# Patient Record
Sex: FEMALE | Race: BLACK OR AFRICAN AMERICAN | NOT HISPANIC OR LATINO | Employment: FULL TIME | URBAN - METROPOLITAN AREA
[De-identification: names, ages, dates, MRNs, and addresses within clinical notes are randomized per-mention and may not be internally consistent; named-entity substitution may affect disease eponyms.]

---

## 2017-01-02 ENCOUNTER — TELEPHONE (OUTPATIENT)
Dept: NEPHROLOGY | Facility: CLINIC | Age: 47
End: 2017-01-02

## 2017-01-02 NOTE — TELEPHONE ENCOUNTER
Checked in with patient regarding blood pressure. Adelaida has not monitored her blood pressure as of lately. She is feeling great. 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 Her stress level is way down. She is wondering if her blood pressure was higher due to the holidays. She has started weight watchers and has been counting points. Adelaida has also been working out at the gym. Alize will check her blood pressure when she gets home from the grocery store and call if she finds that her blood pressure is consistently greater than 140/90.    Wendy Sepulveda, RN, BSN  Nephrology Care Coordinator  [facility]

## 2017-01-03 ENCOUNTER — TELEPHONE (OUTPATIENT)
Dept: NEPHROLOGY | Facility: CLINIC | Age: 47
End: 2017-01-03

## 2017-01-03 DIAGNOSIS — I15.2 HYPERTENSION SECONDARY TO ENDOCRINE DISORDER WITH GOAL BLOOD PRESSURE LESS THAN 140/90: Primary | ICD-10-CM

## 2017-01-03 DIAGNOSIS — E34.9 HYPERTENSION SECONDARY TO ENDOCRINE DISORDER WITH GOAL BLOOD PRESSURE LESS THAN 140/90: Primary | ICD-10-CM

## 2017-01-03 NOTE — TELEPHONE ENCOUNTER
Adelaida is calling Wendy JOHNSTON RN with her blood pressure update  Last night blood pressure was 175/111  This morning blood pressure was  173/110  Please call patient back on cell phone [phone]  It is ok to leave detailed message on her voice mail

## 2017-01-05 RX ORDER — LABETALOL 300 MG/1
300 TABLET, FILM COATED ORAL EVERY 12 HOURS
Qty: 180 TABLET | Refills: 3 | Status: SHIPPED | OUTPATIENT
Start: 2017-01-05 | End: 2017-08-15 | Stop reason: DRUGHIGH

## 2017-01-05 NOTE — TELEPHONE ENCOUNTER
Spoke to patient. HR has been greater than 60. She found it to be around 67 last evening. Advised Adelaida to increase Labetalol to 300 mg BID. She verbalized understanding and will plan to send updated readings or call with updated readings within a couple of weeks. Adelaida verbalized understanding.      Wendy Sepulveda, RN, BSN  Nephrology Care Coordinator  [facility]

## 2017-02-04 ENCOUNTER — OFFICE VISIT (OUTPATIENT)
Dept: URGENT CARE | Facility: URGENT CARE | Age: 47
End: 2017-02-04
Payer: COMMERCIAL

## 2017-02-04 VITALS
SYSTOLIC BLOOD PRESSURE: 134 MMHG | TEMPERATURE: 97.4 F | HEART RATE: 80 BPM | BODY MASS INDEX: 41.87 KG/M2 | DIASTOLIC BLOOD PRESSURE: 90 MMHG | OXYGEN SATURATION: 98 % | WEIGHT: 229 LBS

## 2017-02-04 DIAGNOSIS — K08.89 TOOTH PAIN: ICD-10-CM

## 2017-02-04 DIAGNOSIS — H65.01 RIGHT ACUTE SEROUS OTITIS MEDIA, RECURRENCE NOT SPECIFIED: ICD-10-CM

## 2017-02-04 DIAGNOSIS — J01.90 ACUTE SINUSITIS WITH COEXISTING CONDITION REQUIRING PROPHYLACTIC TREATMENT: Primary | ICD-10-CM

## 2017-02-04 DIAGNOSIS — R51.9 SINUS HEADACHE: ICD-10-CM

## 2017-02-04 PROCEDURE — 99214 OFFICE O/P EST MOD 30 MIN: CPT | Performed by: FAMILY MEDICINE

## 2017-02-04 RX ORDER — CEFDINIR 300 MG/1
300 CAPSULE ORAL 2 TIMES DAILY
Qty: 20 CAPSULE | Refills: 0 | Status: SHIPPED | OUTPATIENT
Start: 2017-02-04 | End: 2017-02-21

## 2017-02-04 NOTE — PROGRESS NOTES
SUBJECTIVE:                                                    Adelaida Packer is a 46 year old female who presents to clinic today for the following health issues:      RESPIRATORY SYMPTOMS      Duration: 3 days     Description  Sinus pain and pressure, dental pain, right ear pain     Severity: moderate    Accompanying signs and symptoms: None    History (predisposing factors):  none    Precipitating or alleviating factors: None    Therapies tried and outcome:  Tylenol with no relief      PATIENT HAS HAD A RIGHT FRONTAL AND SINUS HEADACHE AND THE EAR HURTING  Patient says the pain was so severe and she feels like it's a sinus infection  No fevers chills  No body aches.   No sore throat  No fevers or chills chest pain or shortness of breath   No abdominal pain  No nausea vomiting or diarrhea  Denies any possibility of pregnancy declined a pregnancy test  Fever: No  Tried over the counter medications without relief  No fevers or chills chest pain or shortness of breath   No rash  Ill-contacts: family  Because of persistent and worsening symptoms came in to be seen    Problem list and histories reviewed & adjusted, as indicated.  Additional history: as documented    Problem list, Medication list, Allergies, and Medical/Social/Surgical histories reviewed in [facility] and updated as appropriate.    ROS:  Constitutional, HEENT, cardiovascular, pulmonary, gi and gu systems are negative, except as otherwise noted.    OBJECTIVE:                                                    /90 mmHg  Pulse 80  Temp(Src) 97.4  F (36.3  C) (Tympanic)  Wt 229 lb (103.874 kg)  SpO2 98%  LMP 08/13/2005  Body mass index is 41.87 kg/(m^2).  GENERAL: healthy, alert and no distress  EYES: pink palpebral conjunctiva, anicteric sclera, pupils equally reactive to light and accomodation, extraocular muscles intact full and equal.  ENT: midline nasal septum, positive  nasal congestion   Left ear:no tragal tenderness, no mastoid tenderness  normal tympaninc membrane   Right ear: no tragal tenderness, no mastoid tenderness dull and erythematous tympaninc membrane   NECK: no adenopathy, no asymmetry or  masses  RESP: lungs clear to auscultation - no rales, rhonchi or wheezes  CV: regular rate and rhythm, normal S1 S2, no S3 or S4, no murmur, click or rub, no peripheral edema and peripheral pulses strong  ABDOMEN: soft, nontender, no hepatosplenomegaly, no masses and bowel sounds normal  MS: no gross musculoskeletal defects noted, no edema  NEURO: Normal strength and tone, mentation intact and speech normal    Diagnostic Test Results:  No results found for this or any previous visit (from the past 24 hour(s)).     ASSESSMENT/PLAN:                                                        ICD-10-CM    1. Acute sinusitis with coexisting condition requiring prophylactic treatment J01.90 cefdinir (OMNICEF) 300 MG capsule   2. Right acute serous otitis media, recurrence not specified H65.01 cefdinir (OMNICEF) 300 MG capsule   3. Sinus headache R51        ICD-10-CM    1. Acute sinusitis with coexisting condition requiring prophylactic treatment J01.90 cefdinir (OMNICEF) 300 MG capsule   2. Right acute serous otitis media, recurrence not specified H65.01 cefdinir (OMNICEF) 300 MG capsule   3. Sinus headache R51    4. Tooth pain K08.89          Prescribed with omnicef  Patient mentions some tooth ache, may still be from sinus but recommend dental appointment. Patient already has one she plans to keep   Recommend follow up with primary care provider if no relief , sooner if worse  Needs ear recheck with primary care provider in 2-4 weeks  Adverse reactions of medications discussed.  Over the counter medications discussed.   Aware to come back in if with worsening symptoms or if no relief despite treatment plan  Patient voiced understanding and had no further questions.     MD Pearl Jalloh MD  [facility]

## 2017-02-04 NOTE — MR AVS SNAPSHOT
After Visit Summary   2/4/2017    Adelaida Packer    MRN: [id number]           Patient Information     Date Of Birth          1970        Visit Information        Provider Department      2/4/2017 4:15 PM Pearl Antonio MD [facility]        Today's Diagnoses     Acute sinusitis with coexisting condition requiring prophylactic treatment    -  1     Right acute serous otitis media, recurrence not specified         Sinus headache         Tooth pain            Follow-ups after your visit        Your next 10 appointments already scheduled     Feb 14, 2017  7:10 AM   LAB with LAB FIRST FLOOR [facility])    [address] [place] [id number]   [phone]           Patient must bring picture ID.  Patient should be prepared to give a urine specimen  Please do not eat 10-12 hours before your appointment if you are coming in fasting for labs on lipids, cholesterol, or glucose (sugar).  Pregnant women should follow their Care Team instructions. Water with medications is okay. Do not drink coffee or other fluids.   If you have concerns about taking  your medications, please ask at office or if scheduling via [employer], send a message by clicking on Secure Messaging, Message Your Care Team.            Feb 14, 2017  7:30 AM   Return Visit with Cele Myers MD   [facility])    [address] [place] [id number]   [phone]            Aug 10, 2017  9:00 AM   LAB with LAB FIRST FLOOR [facility])    [address] [place] [address]   [phone]           Patient must bring picture ID.  Patient should be prepared to give a urine specimen  Please do not eat 10-12 hours before your appointment if you are coming in fasting for labs on lipids, cholesterol, or glucose (sugar).  Pregnant  women should follow their Care Team instructions. Water with medications is okay. Do not drink coffee or other fluids.   If you have concerns about taking  your medications, please ask at office or if scheduling via [employer] send a message by clicking on Secure Messaging, Message Your Care Team.            Aug 17, 2017 12:30 PM   [facility] MD   [facility]    [address] [place] [address] [phone]              Who to contact     If you have questions or need follow up information about today's clinic visit or your schedule please contact [facility] directly at [phone].  Normal or non-critical lab and imaging results will be communicated to you by [employer], letter or phone within 4 business days after the clinic has received the results. If you do not hear from us within 7 days, please contact the clinic through [employer] or phone. If you have a critical or abnormal lab result, we will notify you by phone as soon as possible.  Submit refill requests through [employer] or call your pharmacy and they will forward the refill request to us. Please allow 3 business days for your refill to be completed.          Additional Information About Your Visit        [employer] Information     [employer] gives you secure access to your electronic health record. If you see a primary care provider, you can also send messages to your care team and make appointments. If you have questions, please call your primary care clinic.  If you do not have a primary care provider, please call [phone] and they will assist you.        Care EveryWhere ID     This is your Care EveryWhere ID. This could be used by other organizations to access your [place] medical records  [id number]        Your Vitals Were     Pulse Temperature Pulse Oximetry Last Period          80 97.4  F (36.3  C) (Tympanic) 98% 08/13/2005         Blood Pressure from  Last 3 Encounters:   02/04/17 134/90   12/27/16 154/88   12/27/16 160/94    Weight from Last 3 Encounters:   02/04/17 229 lb (103.874 kg)   08/18/16 239 lb (108.41 kg)   08/10/16 236 lb 11.2 oz (107.366 kg)              Today, you had the following     No orders found for display         Today's Medication Changes          These changes are accurate as of: 2/4/17 11:59 PM.  If you have any questions, ask your nurse or doctor.               Start taking these medicines.        Dose/Directions    cefdinir 300 MG capsule   Commonly known as:  OMNICEF   Used for:  Right acute serous otitis media, recurrence not specified, Acute sinusitis with coexisting condition requiring prophylactic treatment   Started by:  Pearl Antonio MD        Dose:  300 mg   Take 1 capsule (300 mg) by mouth 2 times daily   Quantity:  20 capsule   Refills:  0         These medicines have changed or have updated prescriptions.        Dose/Directions    ranitidine 150 MG tablet   Commonly known as:  ZANTAC   This may have changed:    - when to take this  - reasons to take this   Used for:  Gastroesophageal reflux disease without esophagitis        Dose:  150 mg   Take 1 tablet (150 mg) by mouth 2 times daily   Quantity:  60 tablet   Refills:  2            Where to get your medicines      These medications were sent to [facility] [address]  [address] - [address] AT [facility] [address]  [address] [facility] [place] [address]     Phone:  [phone]    - cefdinir 300 MG capsule             Primary Care Provider Office Phone # Fax #    Windy Gordillo [phone]       [facility] [address]  [facility] [address]        Thank you!     Thank you for choosing [facility]  [facility]. Our goal is always to provide you with excellent care. Hearing back from our patients is one way we can continue to improve our services. Please take a few minutes to complete  the written survey that you may receive in the mail after your visit with us. Thank you!             Your Updated Medication List - Protect others around you: Learn how to safely use, store and throw away your medicines at www.disposemymeds.org.          This list is accurate as of: 2/4/17 11:59 PM.  Always use your most recent med list.                   Brand Name Dispense Instructions for use    acetaminophen 325 MG tablet    TYLENOL    100 tablet    Take 3 tablets (975 mg) by mouth every 8 hours as needed for mild pain or headaches       ASPIRIN LOW DOSE 81 MG EC tablet   Generic drug:  aspirin     100 tablet    TAKE 1 TABLET BY MOUTH ONCE DAILY       blood glucose monitoring lancets     1 Box    Use to test blood sugar 4-5 times daily or as directed.       Blood Pressure Monitor Kit     1 kit    1 Device 2 times daily       cefdinir 300 MG capsule    OMNICEF    20 capsule    Take 1 capsule (300 mg) by mouth 2 times daily       cetirizine 10 MG tablet    zyrTEC     Take 10 mg by mouth daily as needed for allergies       eplerenone 25 MG tablet    INSPRA    540 tablet    Take 3 tablets (75 mg) by mouth 2 times daily       fluticasone 50 MCG/ACT spray    FLONASE    16 g    Spray 2 sprays into both nostrils daily       labetalol 300 MG tablet    NORMODYNE    180 tablet    Take 1 tablet (300 mg) by mouth every 12 hours       polyethylene glycol Packet    MIRALAX/GLYCOLAX     Take 17 g by mouth daily       ranitidine 150 MG tablet    ZANTAC    60 tablet    Take 1 tablet (150 mg) by mouth 2 times daily       simvastatin 10 MG tablet    ZOCOR    90 tablet    Take 1 tablet (10 mg) by mouth At Bedtime       SUMAtriptan 25 MG tablet    IMITREX    18 tablet    Take 1-2 tablets (25-50 mg) by mouth at onset of headache for migraine May repeat dose in 2 hours.  Do not exceed 200 mg or 2 doses in 24 hours

## 2017-02-04 NOTE — NURSING NOTE
"Chief Complaint   Patient presents with     Sick       Initial /90 mmHg  Pulse 80  Temp(Src) 97.4  F (36.3  C) (Tympanic)  Wt 229 lb (103.874 kg)  SpO2 98%  LMP 08/13/2005 Estimated body mass index is 41.87 kg/(m^2) as calculated from the following:    Height as of 8/18/16: 5' 2.01\" (1.575 m).    Weight as of this encounter: 229 lb (103.874 kg).  Medication Reconciliation: complete     FAB Mendez      "

## 2017-02-06 ENCOUNTER — TELEPHONE (OUTPATIENT)
Dept: NEUROLOGY | Facility: CLINIC | Age: 47
End: 2017-02-06

## 2017-02-06 ENCOUNTER — OFFICE VISIT - HEALTHEAST (OUTPATIENT)
Dept: PODIATRY | Facility: CLINIC | Age: 47
End: 2017-02-06

## 2017-02-06 ENCOUNTER — RECORDS - HEALTHEAST (OUTPATIENT)
Dept: GENERAL RADIOLOGY | Facility: CLINIC | Age: 47
End: 2017-02-06

## 2017-02-06 ENCOUNTER — TRANSFERRED RECORDS (OUTPATIENT)
Dept: HEALTH INFORMATION MANAGEMENT | Facility: CLINIC | Age: 47
End: 2017-02-06

## 2017-02-06 DIAGNOSIS — M21.6X1 OTHER ACQUIRED DEFORMITIES OF RIGHT FOOT: ICD-10-CM

## 2017-02-06 DIAGNOSIS — M20.10 HALLUX VALGUS (ACQUIRED), UNSPECIFIED FOOT: ICD-10-CM

## 2017-02-06 DIAGNOSIS — M20.10 HALLUX VALGUS, UNSPECIFIED LATERALITY: ICD-10-CM

## 2017-02-06 DIAGNOSIS — I10 BENIGN ESSENTIAL HYPERTENSION: Primary | ICD-10-CM

## 2017-02-06 DIAGNOSIS — M21.6X1 PRONATION DEFORMITY OF RIGHT FOOT: ICD-10-CM

## 2017-02-10 ENCOUNTER — DOCUMENTATION ONLY (OUTPATIENT)
Dept: LAB | Facility: CLINIC | Age: 47
End: 2017-02-10

## 2017-02-10 DIAGNOSIS — E11.22 TYPE 2 DIABETES MELLITUS WITH STAGE 3 CHRONIC KIDNEY DISEASE, WITHOUT LONG-TERM CURRENT USE OF INSULIN (H): Primary | ICD-10-CM

## 2017-02-10 DIAGNOSIS — N18.30 TYPE 2 DIABETES MELLITUS WITH STAGE 3 CHRONIC KIDNEY DISEASE, WITHOUT LONG-TERM CURRENT USE OF INSULIN (H): Primary | ICD-10-CM

## 2017-02-12 ENCOUNTER — OFFICE VISIT (OUTPATIENT)
Dept: URGENT CARE | Facility: URGENT CARE | Age: 47
End: 2017-02-12
Payer: COMMERCIAL

## 2017-02-12 VITALS
HEART RATE: 68 BPM | SYSTOLIC BLOOD PRESSURE: 138 MMHG | DIASTOLIC BLOOD PRESSURE: 92 MMHG | BODY MASS INDEX: 42.24 KG/M2 | WEIGHT: 231 LBS | TEMPERATURE: 97.9 F | OXYGEN SATURATION: 98 %

## 2017-02-12 DIAGNOSIS — H65.90 OTITIS MEDIA WITH EFFUSION, UNSPECIFIED LATERALITY: Primary | ICD-10-CM

## 2017-02-12 PROCEDURE — 99213 OFFICE O/P EST LOW 20 MIN: CPT | Performed by: FAMILY MEDICINE

## 2017-02-12 NOTE — MR AVS SNAPSHOT
After Visit Summary   2/12/2017    Adelaida Packer    MRN: [id number]           Patient Information     Date Of Birth          1970        Visit Information        Provider Department      2/12/2017 1:30 PM Behzad Lisa MD [facility]        Care Instructions      Fluid in the Middle Ear, No Infection (Adult)  Earaches can happen without an infection. This occurs when air and fluid build up behind the eardrum causing a feeling of fullness and discomfort and reduced hearing. This is called otitis media with effusion (OME) or serous otitis media. It means there is fluid in the middle ear. It is not the same as acute otitis media, which is typically from infection.  OME can happen when you have a cold if congestion blocks the passage that drains the middle ear (eustachian tube). It may also occur with nasal allergies or after a bacterial middle ear infection.    The pain/discomfort may come and go. You may hear clicking or popping sounds when you chew or swallow. You may feel that your balance is off. Or you may hear ringing in the ear.  It often takes from several weeks up to 3 months for the fluid to clear on its own. Oral pain relievers and ear drops help if there is pain. Decongestants and antihistamines sometimes help. Antibiotics don't help since there is no infection. Your doctor may prescribe a nasal spray to help reduce swelling in the nose and eustachian tube. This can allow the ear to drain.  If it doesn't improve after 3 months, surgery may be used to drain the fluid and insert a small tube in the eardrum to allow continued drainage.  Because the middle ear fluid can become infected, it is important to watch for signs of an ear infection which may develop later. These signs include increased ear pain, fever, or drainage from the ear.  Home care  The following guidelines will help you care for yourself at home:    You may use acetaminophen or ibuprofen to  control pain, unless another medicine was prescribed. [NOTE: If you have chronic liver or kidney disease or ever had a stomach ulcer or GI bleeding, talk with your doctor before using these medicines.] (Aspirin should never be used in anyone under 18 years of age who is ill with a fever. It may cause severe liver damage.)    While not always helpful, you may use over-the-counter decongestants such as phenylephrine or pseudoephedrine. Don't use nasal spray decongestants more than 3 days. Longer use can make congestion worse. (Prescription nasal sprays from your doctor don't typically have those restrictions.)    Antihistamines may help if you are also having allergy symptoms.    You may use medicines such as guaifenesin to thin mucus and promote drainage.  Follow-up care  Follow up with your doctor or as advised if you are not feeling better after 3 days.  When to seek medical care  Get prompt medical attention if any of the following occur:    Ear pain gets worse or does not start to improve     Fever of 100.4 F (38 C) or higher, or as directed by your health care provider    Fluid or blood draining from the ear    Headache or sinus pain    Stiff neck    Unusual drowsiness or confusion    2847-8833 [employer]. [address] [place] [address]. All rights reserved. This information is not intended as a substitute for professional medical care. Always follow your healthcare professional's instructions.              Follow-ups after your visit        Your next 10 appointments already scheduled     Feb 14, 2017  7:10 AM CST   LAB with LAB FIRST FLOOR [facility] ([facility])    [address] [place] [address] [phone]           Patient must bring picture ID.  Patient should be prepared to give a urine specimen  Please do not eat 10-12 hours before your appointment if you are coming in fasting for labs on lipids, cholesterol, or  glucose (sugar).  Pregnant women should follow their Care Team instructions. Water with medications is okay. Do not drink coffee or other fluids.   If you have concerns about taking  your medications, please ask at office or if scheduling via [employer], send a message by clicking on Secure Messaging, Message Your Care Team.            Feb 14, 2017  7:30 AM CST   [facility]   [facility]    [address] [place] [id number] [phone]            Mar 07, 2017  4:30 PM CST   Return Visit with Dia Aleman MD   [facility]    [address] [place] [phone]            Aug 10, 2017  9:00 AM CDT   LAB with LAB FIRST FLOOR [facility])    [address] [place] [id number] [phone]           Patient must bring picture ID.  Patient should be prepared to give a urine specimen  Please do not eat 10-12 hours before your appointment if you are coming in fasting for labs on lipids, cholesterol, or glucose (sugar).  Pregnant women should follow their Care Team instructions. Water with medications is okay. Do not drink coffee or other fluids.   If you have concerns about taking  your medications, please ask at office or if scheduling via [employer], send a message by clicking on Secure Messaging, Message Your Care Team.            Aug 17, 2017 12:30 PM CDT   Return Visit with Kristi Rodas MD   [facility]    [address] [place] [phone] [phone]              Who to contact     If you have questions or need follow up information about today's clinic visit or your schedule please contact [facility] directly at [phone].  Normal or non-critical lab and imaging results will be  communicated to you by [employer], letter or phone within 4 business days after the clinic has received the results. If you do not hear from us within 7 days, please contact the clinic through [employer] or phone. If you have a critical or abnormal lab result, we will notify you by phone as soon as possible.  Submit refill requests through [employer] or call your pharmacy and they will forward the refill request to us. Please allow 3 business days for your refill to be completed.          Additional Information About Your Visit        [employer] Information     [employer] gives you secure access to your electronic health record. If you see a primary care provider, you can also send messages to your care team and make appointments. If you have questions, please call your primary care clinic.  If you do not have a primary care provider, please call [phone] and they will assist you.        Care EveryWhere ID     This is your Care EveryWhere ID. This could be used by other organizations to access your [place] medical records  [id number]        Your Vitals Were     Pulse Temperature Last Period Pulse Oximetry BMI (Body Mass Index)       68 97.9  F (36.6  C) (Oral) 08/13/2005 98% 42.24 kg/m2        Blood Pressure from Last 3 Encounters:   02/12/17 (!) 138/92   02/04/17 134/90   12/27/16 154/88    Weight from Last 3 Encounters:   02/12/17 231 lb (104.8 kg)   02/04/17 229 lb (103.9 kg)   08/18/16 239 lb (108.4 kg)              Today, you had the following     No orders found for display         Today's Medication Changes          These changes are accurate as of: 2/12/17  2:21 PM.  If you have any questions, ask your nurse or doctor.               These medicines have changed or have updated prescriptions.        Dose/Directions    ranitidine 150 MG tablet   Commonly known as:  ZANTAC   This may have changed:    - when to take this  - reasons to take this   Used for:  Gastroesophageal reflux disease without esophagitis         Dose:  150 mg   Take 1 tablet (150 mg) by mouth 2 times daily   Quantity:  60 tablet   Refills:  2                Primary Care Provider Office Phone # Fax #    Windy Gordillo [phone]       [facility] [address]  [facility] [address]        Thank you!     Thank you for choosing [facility]  for your care. Our goal is always to provide you with excellent care. Hearing back from our patients is one way we can continue to improve our services. Please take a few minutes to complete the written survey that you may receive in the mail after your visit with us. Thank you!             Your Updated Medication List - Protect others around you: Learn how to safely use, store and throw away your medicines at www.disposemymeds.org.          This list is accurate as of: 2/12/17  2:21 PM.  Always use your most recent med list.                   Brand Name Dispense Instructions for use    acetaminophen 325 MG tablet    TYLENOL    100 tablet    Take 3 tablets (975 mg) by mouth every 8 hours as needed for mild pain or headaches       ASPIRIN LOW DOSE 81 MG EC tablet   Generic drug:  aspirin     100 tablet    TAKE 1 TABLET BY MOUTH ONCE DAILY       blood glucose monitoring lancets     1 Box    Use to test blood sugar 4-5 times daily or as directed.       Blood Pressure Monitor Kit     1 kit    1 Device 2 times daily       cefdinir 300 MG capsule    OMNICEF    20 capsule    Take 1 capsule (300 mg) by mouth 2 times daily       cetirizine 10 MG tablet    zyrTEC     Take 10 mg by mouth daily as needed for allergies       eplerenone 25 MG tablet    INSPRA    540 tablet    Take 3 tablets (75 mg) by mouth 2 times daily       fluticasone 50 MCG/ACT spray    FLONASE    16 g    Spray 2 sprays into both nostrils daily       labetalol 300 MG tablet    NORMODYNE    180 tablet    Take 1 tablet (300 mg) by mouth every 12 hours       polyethylene glycol Packet    MIRALAX/GLYCOLAX     Take  17 g by mouth daily       ranitidine 150 MG tablet    ZANTAC    60 tablet    Take 1 tablet (150 mg) by mouth 2 times daily       simvastatin 10 MG tablet    ZOCOR    90 tablet    Take 1 tablet (10 mg) by mouth At Bedtime       SUMAtriptan 25 MG tablet    IMITREX    18 tablet    Take 1-2 tablets (25-50 mg) by mouth at onset of headache for migraine May repeat dose in 2 hours.  Do not exceed 200 mg or 2 doses in 24 hours
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
## 2017-02-12 NOTE — PATIENT INSTRUCTIONS
Fluid in the Middle Ear, No Infection (Adult)  Earaches can happen without an infection. This occurs when air and fluid build up behind the eardrum causing a feeling of fullness and discomfort and reduced hearing. This is called otitis media with effusion (OME) or serous otitis media. It means there is fluid in the middle ear. It is not the same as acute otitis media, which is typically from infection.  OME can happen when you have a cold if congestion blocks the passage that drains the middle ear (eustachian tube). It may also occur with nasal allergies or after a bacterial middle ear infection.    The pain/discomfort may come and go. You may hear clicking or popping sounds when you chew or swallow. You may feel that your balance is off. Or you may hear ringing in the ear.  It often takes from several weeks up to 3 months for the fluid to clear on its own. Oral pain relievers and ear drops help if there is pain. Decongestants and antihistamines sometimes help. Antibiotics don't help since there is no infection. Your doctor may prescribe a nasal spray to help reduce swelling in the nose and eustachian tube. This can allow the ear to drain.  If it doesn't improve after 3 months, surgery may be used to drain the fluid and insert a small tube in the eardrum to allow continued drainage.  Because the middle ear fluid can become infected, it is important to watch for signs of an ear infection which may develop later. These signs include increased ear pain, fever, or drainage from the ear.  Home care  The following guidelines will help you care for yourself at home:    You may use acetaminophen or ibuprofen to control pain, unless another medicine was prescribed. [NOTE: If you have chronic liver or kidney disease or ever had a stomach ulcer or GI bleeding, talk with your doctor before using these medicines.] (Aspirin should never be used in anyone under 18 years of age who is ill with a fever. It may cause severe liver  damage.)    While not always helpful, you may use over-the-counter decongestants such as phenylephrine or pseudoephedrine. Don't use nasal spray decongestants more than 3 days. Longer use can make congestion worse. (Prescription nasal sprays from your doctor don't typically have those restrictions.)    Antihistamines may help if you are also having allergy symptoms.    You may use medicines such as guaifenesin to thin mucus and promote drainage.  Follow-up care  Follow up with your doctor or as advised if you are not feeling better after 3 days.  When to seek medical care  Get prompt medical attention if any of the following occur:    Ear pain gets worse or does not start to improve     Fever of 100.4 F (38 C) or higher, or as directed by your health care provider    Fluid or blood draining from the ear    Headache or sinus pain    Stiff neck    Unusual drowsiness or confusion    9812-2055 [employer]. [address] [place] [address], [place] [address]. All rights reserved. This information is not intended as a substitute for professional medical care. Always follow your healthcare professional's instructions.
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
## 2017-02-12 NOTE — NURSING NOTE
"Chief Complaint   Patient presents with     Otalgia     was seen last week and not feeling any better       Initial BP (!) 176/95 (BP Location: Left arm, Patient Position: Chair, Cuff Size: Adult Regular)  Pulse 68  Temp 97.9  F (36.6  C) (Oral)  Wt 231 lb (104.8 kg)  LMP 08/13/2005  SpO2 98%  BMI 42.24 kg/m2 Estimated body mass index is 42.24 kg/(m^2) as calculated from the following:    Height as of 8/18/16: 5' 2.01\" (1.575 m).    Weight as of this encounter: 231 lb (104.8 kg).  Medication Reconciliation: complete   Erica Dickey CMA      "

## 2017-02-14 ENCOUNTER — TELEPHONE (OUTPATIENT)
Dept: NURSING | Facility: CLINIC | Age: 47
End: 2017-02-14

## 2017-02-17 NOTE — TELEPHONE ENCOUNTER
Patient called today. She wanted to provide an update on her father. She stated that he is in the hospital with a tricuspid valve problem, complicated with heart failure, influenza and high blood pressure. He also has a creatinine of 1.93. Cardiology is recommending surgery. While she was visiting her father, who is 80 years old one of they physicians had advised that Adelaida and her siblings get echocardiograms done yearly. She is wanting to get Dr. Aleman's recommendations regarding her blood pressure knowing this information. Should he have a lower blood pressure reading? See BP readings below. She does not have updated readings as she has recently been ill and has not taken them. Advised that she should come back for follow up to discuss these concerns. This is scheduled for March 7. Will route to Dr. Aleman to see if she advises any changes prior to appointment.     Wendy Sepulveda, RN, BSN  Nephrology Care Coordinator  [facility]

## 2017-02-21 ENCOUNTER — OFFICE VISIT (OUTPATIENT)
Dept: ENDOCRINOLOGY | Facility: CLINIC | Age: 47
End: 2017-02-21
Payer: COMMERCIAL

## 2017-02-21 VITALS
SYSTOLIC BLOOD PRESSURE: 142 MMHG | BODY MASS INDEX: 43.37 KG/M2 | HEART RATE: 60 BPM | WEIGHT: 229.72 LBS | HEIGHT: 61 IN | DIASTOLIC BLOOD PRESSURE: 92 MMHG

## 2017-02-21 DIAGNOSIS — N95.1 MENOPAUSAL SYNDROME (HOT FLASHES): Primary | ICD-10-CM

## 2017-02-21 DIAGNOSIS — N18.30 TYPE 2 DIABETES MELLITUS WITH STAGE 3 CHRONIC KIDNEY DISEASE, WITHOUT LONG-TERM CURRENT USE OF INSULIN (H): ICD-10-CM

## 2017-02-21 DIAGNOSIS — E55.9 VITAMIN D INSUFFICIENCY: ICD-10-CM

## 2017-02-21 DIAGNOSIS — E11.21 CONTROLLED TYPE 2 DIABETES MELLITUS WITH DIABETIC NEPHROPATHY, WITHOUT LONG-TERM CURRENT USE OF INSULIN (H): ICD-10-CM

## 2017-02-21 DIAGNOSIS — E11.22 TYPE 2 DIABETES MELLITUS WITH STAGE 3 CHRONIC KIDNEY DISEASE, WITHOUT LONG-TERM CURRENT USE OF INSULIN (H): ICD-10-CM

## 2017-02-21 LAB
CHOLEST SERPL-MCNC: 177 MG/DL
ESTRADIOL SERPL-MCNC: 123 PG/ML
FSH SERPL-ACNC: 4.3 IU/L
HBA1C MFR BLD: 5.5 % (ref 4.3–6)
HCT VFR BLD AUTO: 36.8 % (ref 35–47)
HDLC SERPL-MCNC: 58 MG/DL
LDLC SERPL CALC-MCNC: 102 MG/DL
LH SERPL-ACNC: 1.9 IU/L
NONHDLC SERPL-MCNC: 119 MG/DL
TRIGL SERPL-MCNC: 83 MG/DL

## 2017-02-21 PROCEDURE — 85014 HEMATOCRIT: CPT | Performed by: INTERNAL MEDICINE

## 2017-02-21 PROCEDURE — 83002 ASSAY OF GONADOTROPIN (LH): CPT | Performed by: INTERNAL MEDICINE

## 2017-02-21 PROCEDURE — 83036 HEMOGLOBIN GLYCOSYLATED A1C: CPT | Performed by: INTERNAL MEDICINE

## 2017-02-21 PROCEDURE — 80061 LIPID PANEL: CPT | Performed by: INTERNAL MEDICINE

## 2017-02-21 PROCEDURE — 83001 ASSAY OF GONADOTROPIN (FSH): CPT | Performed by: INTERNAL MEDICINE

## 2017-02-21 PROCEDURE — 36415 COLL VENOUS BLD VENIPUNCTURE: CPT | Performed by: INTERNAL MEDICINE

## 2017-02-21 PROCEDURE — 82670 ASSAY OF TOTAL ESTRADIOL: CPT | Performed by: INTERNAL MEDICINE

## 2017-02-21 PROCEDURE — 99214 OFFICE O/P EST MOD 30 MIN: CPT | Performed by: INTERNAL MEDICINE

## 2017-02-21 NOTE — LETTER
2/21/2017      RE: Adelaida Packer  [address] [facility]  [place] [address]     Dear Colleague,    Thank you for referring your patient, Adelaida Packer, to the [facility]. Please see a copy of my visit note below.      The patient is seen in f/up.    Adelaida Packer is a 46 year old obese female with a long-standing history of hypertension, chronic kidney disease, primary hyperparathyroidism, primary hyperaldosteronism, type 2 diabetes diagnosed in July 2014, morbid obesity and stomach banding in 2009 (the band was removed).     1. Type 2 diabetes, formally diagnosed in 2014 (the blood glucose numbers have been high since 2012).   Due to the high HbA1c of 12.1 at the time of diagnosis, she was treated with insulin from July until September 2014. Adelaida was not able to tolerate metformin due to diarrhea and dry heaves. In August 2014, the short acting insulin was discontinued and she was started on Prandin. Later on, she discontinued both Prandin and Lantus as the glycemic control significantly improved with a low carbohydrate diet, and she was started on Bydureon on 9/22/14. The patient found the injections painful and she decided to discontinue Bydureon in the beginning 2015.  In February 2015, she was started on Victoza, at 3 mg daily, which she took until beginning of 2016. The GLP-1 analogs haven't been successful in inducing weight loss.  With improved diet and regular exercise, the patient was able to maintain a good glycemic control in the absence of treatment.    HbA1c today 5.5, down from 6.1 at her last appointment here, in August 2016.  She occasionally check her BG in am and the numbers are in the 90s. This was confirmed by her meter, which was downloaded in the clinic.     For the last 2 weeks, she's been having a cold.  She's been exercising regularly (riding the bike, elliptical exercises and water aerobics), for 40 minutes, 3-4 times a week. She also joined [employer].    In the past, she used to take vitamin D supplements but she discontinued them. She occasionally takes a MVI, denies taking any calcium supplements. Daily intake of dairy products - 1-2 servings.     Adelaida has no diabetes complications.  A comprehensive eye exam done in March 2016 was negative for diabetic retinopathy. Recent GFR in the 50s.   She is medicated with a daily baby ASA and 10 mg simvastatin daily.     2.  Primary hyperaldosteronism  The adrenal CT didn't identify adrenal lesions. Prior screening tests for pheochromocytoma and Cushing syndrome were negative. The decision was to treat the patient with a mineralocorticoid receptor blocker.    BP at home has been around: 130-140s/80-90s. Denies headaches.   Her blood pressure is currently managed with 75 mg eplerenone BID and 300 mg labetalol BID. She f/up with nephrology and she has an apt scheduled.     Prior antihypertensive medications she was treated with in the past were: hydralazine, hydrochlorothiazide, losartan, metoprolol, triamterene, verapamil, chlorthalidone.    Past Medical History   Diagnosis Date     Allergic rhinitis due to other allergen      seasonal     Diabetes (H)      Migraine, unspecified, without mention of intractable migraine without mention of status migrainosus      Morbid obesity (H)      Unspecified essential hypertension      dx around age 31   CKD   HTN dx 2007  Hypercholesterolemia  - on zocor since 2012   Ovarian cyst rupture 2014 - started on BCP   GERD   Type 2 diabetes 07/2014   Osteoarthritis of the knee  In 2013, she underwent parathyroidectomy for primary hyperparathyroidism.  The surgery was complicated by postoperative bleeding and she was intubated for almost one week. The patient thinks that the removed parathyroid gland was the right upper gland.  Adrenal CT - no adrenal lesions; metanephrines negative in 2012, 24 hr urine cortisol normal in 2014     Past Surgical History   Procedure Laterality Date     C  anesth,knee area surgery  01/2001      remove tonsils/adenoids,11+ y/o  1995     Hysterectomy, vaginal - ovaries were left in place  2005 12/2005     hysterectomy     C gastroplasty,obesity,vert band - 2007       removed 2009     Head & neck surgery  3/2013     Parathyroidectomy--had to go back in 6 days later for a possible bleed     Current Medications   Prescription Medications as of 2/21/2017             labetalol (NORMODYNE) 300 MG tablet Take 1 tablet (300 mg) by mouth every 12 hours    polyethylene glycol (MIRALAX/GLYCOLAX) Packet Take 17 g by mouth daily    cetirizine (ZYRTEC) 10 MG tablet Take 10 mg by mouth daily as needed for allergies    eplerenone (INSPRA) 25 MG tablet Take 3 tablets (75 mg) by mouth 2 times daily    fluticasone (FLONASE) 50 MCG/ACT nasal spray Spray 2 sprays into both nostrils daily    ranitidine (ZANTAC) 150 MG tablet Take 1 tablet (150 mg) by mouth 2 times daily    SUMAtriptan (IMITREX) 25 MG tablet Take 1-2 tablets (25-50 mg) by mouth at onset of headache for migraine May repeat dose in 2 hours.  Do not exceed 200 mg or 2 doses in 24 hours    Blood Pressure Monitor KIT 1 Device 2 times daily    acetaminophen (TYLENOL) 325 MG tablet Take 3 tablets (975 mg) by mouth every 8 hours as needed for mild pain or headaches    ASPIRIN LOW DOSE 81 MG EC tablet TAKE 1 TABLET BY MOUTH ONCE DAILY    blood glucose monitoring (ACCU-CHEK MULTICLIX) lancets Use to test blood sugar 4-5 times daily or as directed.    simvastatin (ZOCOR) 10 MG tablet Take 1 tablet (10 mg) by mouth At Bedtime        Family History   Problem Relation Age of Onset     Hypertension Mother      Gynecology Mother      hysterectomy     GI Mother      bowel upstruction     Thyroid  Mother      Migraines  Mother      Osteoporosis Mother      Hypertension Father      Lipids Father      Arthritis  Father      Prostatic CA Brother      Alcohol/Drug Brother      Arthritis Paternal Grandmother      Cancer Maternal Grandfather       "bone     Eye disease  Maternal Grandfather      Prostatic CA Maternal Grandfather      Cancer Maternal Grandmother      tumor-brain     Obesity Maternal Grandmother      Respiratory Daughter      asthma     Diabetes ? Type 1  Half Sister      Social History  Single with 2 children. She denies smoking, drinking alcohol or using illicit drugs. 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     Review of Systems   Systemic:               some fatigue which she associates with the recent cold  Eye:                       No eye symptoms   Glory-Laryngeal:      No glory-laryngeal symptoms, no dysphagia, no voice hoarseness, no cough      Breast:                   No breast symptoms  Cardiovascular:     No cardiovascular symptoms, no CP or palpitations   Pulmonary:            No pulmonary symptoms, no cough or SOB    Gastrointestinal:     Has occasional constipation   Genitourinary:        No increased thirst or urination   Endocrine:             No heat or cold intolerance; occasional hot flashes which started around age 43 - once a week, both at night and also during the day    Neurological:          no headaches, no tremor, no dizziness, no numbness or tingling sensation      Musculoskeletal:    R knee pain - intermittently   Skin:                       No dry skin or hair loss  Psychological:       No psychological symptoms                Vital Signs     Previous Weights:    Wt Readings from Last 10 Encounters:   02/21/17 104.2 kg (229 lb 11.5 oz)   02/12/17 104.8 kg (231 lb)   02/04/17 103.9 kg (229 lb)   08/18/16 108.4 kg (239 lb)   08/10/16 107.4 kg (236 lb 11.2 oz)   08/09/16 107.6 kg (237 lb 3.4 oz)   08/03/16 108.9 kg (240 lb)   07/07/16 108.6 kg (239 lb 6.4 oz)   04/25/16 106.6 kg (235 lb 1.6 oz)   04/06/16 107.5 kg (237 lb)                   Vital signs:   BP (!) 142/92  Pulse 60  Ht 1.556 m (5' 1.25\")  Wt 104.2 kg (229 lb 11.5 oz)  LMP 08/13/2005  BMI 43.05 kg/m2     Physical Exam  General Appearance:  " General obesity, mild buffalo hump, full supraclavicular fossae   Eyes:  conjutivae and extra-ocular motions are normal.                                    pupils round and reactive to light, no lid lag, no stare    HEENT:   oropharynx clear and moist, no JVD, no bruits      no thyromegaly, no palpable nodules   Cardiovascular:  regular rhythm, no murmurs, distal pulse palpable, no lower extremities edema  Respiratory:       chest clear, no rales, no rhonchi  Musculoskeletal: normal tone and strength  Psychiatric: affect and judgment normal  Skin: warm, no lesions   Neurological: reflexes normal and symmetric, no resting tremor     Lab Results  I reviewed prior lab results documented in Epic.  Lab Results   Component Value Date    A1C 5.5 02/21/2017    A1C 6.1 (H) 08/09/2016    A1C 6.1 (H) 02/24/2016    A1C 6.0 01/19/2016    A1C 6.1 (H) 10/16/2015    HEMOGLOBINA1 7.3 (A) 02/16/2015       Hemoglobin   Date Value Ref Range Status   08/03/2016 12.5 11.7 - 15.7 g/dL Final     Hematocrit   Date Value Ref Range Status   02/24/2016 36.3 35.0 - 47.0 % Final     Cholesterol   Date Value Ref Range Status   02/24/2016 137 <200 mg/dL Final     Cholesterol/HDL Ratio   Date Value Ref Range Status   05/15/2015 2.9 0.0 - 5.0 Final     HDL Cholesterol   Date Value Ref Range Status   02/24/2016 60 >49 mg/dL Final     LDL Cholesterol Calculated   Date Value Ref Range Status   02/24/2016 65 <100 mg/dL Final     Comment:     Desirable:       <100 mg/dl     VLDL-Cholesterol   Date Value Ref Range Status   05/15/2015 12 0 - 30 mg/dL Final     Triglycerides   Date Value Ref Range Status   02/24/2016 62 <150 mg/dL Final     Comment:     Fasting specimen     Albumin Urine mg/L   Date Value Ref Range Status   02/18/2016 22 mg/L Final     TSH   Date Value Ref Range Status   05/15/2015 1.84 0.40 - 4.00 mU/L Final     Last Basic Metabolic Panel:    Sodium   Date Value Ref Range Status   08/18/2016 139 133 - 144 mmol/L Final     Potassium   Date  Value Ref Range Status   08/18/2016 4.2 3.4 - 5.3 mmol/L Final     Chloride   Date Value Ref Range Status   08/18/2016 106 94 - 109 mmol/L Final     Calcium   Date Value Ref Range Status   08/18/2016 9.2 8.5 - 10.1 mg/dL Final     Carbon Dioxide   Date Value Ref Range Status   08/18/2016 25 20 - 32 mmol/L Final     Urea Nitrogen   Date Value Ref Range Status   08/18/2016 15 7 - 30 mg/dL Final     Creatinine   Date Value Ref Range Status   08/18/2016 1.09 (H) 0.52 - 1.04 mg/dL Final     Creatinine For Methaneph 24 H/Random Urine   Date Value Ref Range Status   02/06/2012 73  Final     Comment:     Unit: mg/dL     GFR Estimate   Date Value Ref Range Status   08/18/2016 54 (L) >60 mL/min/1.7m2 Final     Comment:     Non  GFR Calc     Glucose   Date Value Ref Range Status   08/18/2016 113 (H) 70 - 99 mg/dL Final       AST   Date Value Ref Range Status   02/24/2016 22 0 - 45 U/L Final     ALT   Date Value Ref Range Status   02/24/2016 34 0 - 50 U/L Final     Albumin   Date Value Ref Range Status   08/18/2016 3.6 3.4 - 5.0 g/dL Final     Assessment      1. Type 2 diabetes, with good glycemic control and no microvascular complications.   Check hematocrit and lipid panel.     2. Hot flashes, most likely menopausal  Check FSH, LH and estradiol     3. Health maintenance   Start taking 2000 U vitamin D daily and 600 mg calcium carbonate 1-2 times a day (depending on the dairy intake).     3. Primary hyperaldosteronism  Her BP remains elevated. She is going to f/up with Dr. Aleman. In the event the BP is diff to manage, we might pursue adrenal vein catheterization.       Orders Placed This Encounter   Procedures     Follicle stimulating hormone     Estradiol     Lutropin     Lipid panel reflex to direct LDL     Hematocrit       Again, thank you for allowing me to participate in the care of your patient.      Sincerely,    Cele Myers MD

## 2017-02-21 NOTE — MR AVS SNAPSHOT
After Visit Summary   2/21/2017    Adelaida Packer    MRN: [id number]           Patient Information     Date Of Birth          1970        Visit Information        Provider Department      2/21/2017 7:30 AM Cele Myers MD [facility]        Today's Diagnoses     Menopausal syndrome (hot flashes)    -  1    Controlled type 2 diabetes mellitus with diabetic nephropathy, without long-term current use of insulin (H)          Care Instructions    Please allow 7-10 business days for your lab results. You will be notified by phone, letter, or My Chart after the provider has reviewed them.  Thank you.     Please take Vitamin D 2000 iu daily and Calcium 600 mg         Follow-ups after your visit        Your next 10 appointments already scheduled     Mar 07, 2017  4:30 PM CST   Return Visit with Dia Aleman MD   [facility])    [id number] [place] [id number] [phone]            Aug 10, 2017  9:00 AM CDT   LAB with LAB FIRST FLOOR [facility])    [id number] [place] [id number] [phone]           Patient must bring picture ID.  Patient should be prepared to give a urine specimen  Please do not eat 10-12 hours before your appointment if you are coming in fasting for labs on lipids, cholesterol, or glucose (sugar).  Pregnant women should follow their Care Team instructions. Water with medications is okay. Do not drink coffee or other fluids.   If you have concerns about taking  your medications, please ask at office or if scheduling via [facility], send a message by clicking on Secure Messaging, Message Your Care Team.            Aug 15, 2017  9:15 AM CDT   Return Visit with Cele Myers MD,  ENDO NURSE   [facility])    [address] [place] [address]    [phone]            Aug 17, 2017  3:00 PM CDT   Return Visit with Kristi Rodas MD   [facility])    [address] [place] [address] [phone]              Who to contact     If you have questions or need follow up information about today's clinic visit or your schedule please contact [facility] directly at [phone].  Normal or non-critical lab and imaging results will be communicated to you by MyChart, letter or phone within 4 business days after the clinic has received the results. If you do not hear from us within 7 days, please contact the clinic through [employer] or phone. If you have a critical or abnormal lab result, we will notify you by phone as soon as possible.  Submit refill requests through [employer] or call your pharmacy and they will forward the refill request to us. Please allow 3 business days for your refill to be completed.          Additional Information About Your Visit        [employer] Information     [employer] gives you secure access to your electronic health record. If you see a primary care provider, you can also send messages to your care team and make appointments. If you have questions, please call your primary care clinic.  If you do not have a primary care provider, please call [phone] and they will assist you.      [employer] is an electronic gateway that provides easy, online access to your medical records. With [employer] [facility], you can request a clinic appointment, read your test results, renew a prescription or communicate with your care team.     To access your existing account, please contact your [facility] or call [phone] for assistance.        Care EveryWhere ID     This is your Care EveryWhere ID. This could be used by other organizations to access your [facility] medical records  [id number]        Your Vitals Were     Pulse Height Last Period BMI (Body Mass  "Index)          60 1.556 m (5' 1.25\") 08/13/2005 43.05 kg/m2         Blood Pressure from Last 3 Encounters:   02/21/17 (!) 142/92   02/12/17 (!) 138/92   02/04/17 134/90    Weight from Last 3 Encounters:   02/21/17 104.2 kg (229 lb 11.5 oz)   02/12/17 104.8 kg (231 lb)   02/04/17 103.9 kg (229 lb)              We Performed the Following     Estradiol     Follicle stimulating hormone     Hematocrit     Lipid panel reflex to direct LDL     Lutropin          Today's Medication Changes          These changes are accurate as of: 2/21/17  7:59 AM.  If you have any questions, ask your nurse or doctor.               These medicines have changed or have updated prescriptions.        Dose/Directions    ranitidine 150 MG tablet   Commonly known as:  ZANTAC   This may have changed:    - when to take this  - reasons to take this   Used for:  Gastroesophageal reflux disease without esophagitis        Dose:  150 mg   Take 1 tablet (150 mg) by mouth 2 times daily   Quantity:  60 tablet   Refills:  2                Primary Care Provider Office Phone # Fax #    Windy Gordillo [phone]       [facility] [address] [facility] [address]        Thank you!     Thank you for choosing [facility]  for your care. Our goal is always to provide you with excellent care. Hearing back from our patients is one way we can continue to improve our services. Please take a few minutes to complete the written survey that you may receive in the mail after your visit with us. Thank you!             Your Updated Medication List - Protect others around you: Learn how to safely use, store and throw away your medicines at www.disposemymeds.org.          This list is accurate as of: 2/21/17  7:59 AM.  Always use your most recent med list.                   Brand Name Dispense Instructions for use    acetaminophen 325 MG tablet    TYLENOL    100 tablet    Take 3 tablets (975 mg) by mouth every 8 hours " as needed for mild pain or headaches       ASPIRIN LOW DOSE 81 MG EC tablet   Generic drug:  aspirin     100 tablet    TAKE 1 TABLET BY MOUTH ONCE DAILY       blood glucose monitoring lancets     1 Box    Use to test blood sugar 4-5 times daily or as directed.       Blood Pressure Monitor Kit     1 kit    1 Device 2 times daily       cetirizine 10 MG tablet    zyrTEC     Take 10 mg by mouth daily as needed for allergies       eplerenone 25 MG tablet    INSPRA    540 tablet    Take 3 tablets (75 mg) by mouth 2 times daily       fluticasone 50 MCG/ACT spray    FLONASE    16 g    Spray 2 sprays into both nostrils daily       labetalol 300 MG tablet    NORMODYNE    180 tablet    Take 1 tablet (300 mg) by mouth every 12 hours       polyethylene glycol Packet    MIRALAX/GLYCOLAX     Take 17 g by mouth daily       ranitidine 150 MG tablet    ZANTAC    60 tablet    Take 1 tablet (150 mg) by mouth 2 times daily       simvastatin 10 MG tablet    ZOCOR    90 tablet    Take 1 tablet (10 mg) by mouth At Bedtime       SUMAtriptan 25 MG tablet    IMITREX    18 tablet    Take 1-2 tablets (25-50 mg) by mouth at onset of headache for migraine May repeat dose in 2 hours.  Do not exceed 200 mg or 2 doses in 24 hours

## 2017-02-21 NOTE — NURSING NOTE
"Adelaida Packer's goals for this visit include: Follow up Diabetes  She requests these members of her care team be copied on today's visit information: YES    PCP: Windy Gordillo    Referring Provider:  ESTABLISHED PATIENT  No address on file    Chief Complaint   Patient presents with     Diabetes       Initial Ht 1.556 m (5' 1.25\")  Wt 104.2 kg (229 lb 11.5 oz)  LMP 08/13/2005  BMI 43.05 kg/m2 Estimated body mass index is 43.05 kg/(m^2) as calculated from the following:    Height as of this encounter: 1.556 m (5' 1.25\").    Weight as of this encounter: 104.2 kg (229 lb 11.5 oz).  Medication Reconciliation: complete    Do you need any medication refills at today's visit? NO    "

## 2017-02-21 NOTE — PATIENT INSTRUCTIONS
Please allow 7-10 business days for your lab results. You will be notified by phone, letter, or My Chart after the provider has reviewed them.  Thank you.     Please take Vitamin D 2000 iu daily and Calcium 600 mg

## 2017-02-21 NOTE — PROGRESS NOTES
The patient is seen in f/up.    Adelaida Packer is a 46 year old obese female with a long-standing history of hypertension, chronic kidney disease, primary hyperparathyroidism, primary hyperaldosteronism, type 2 diabetes diagnosed in July 2014, morbid obesity and stomach banding in 2009 (the band was removed).     1. Type 2 diabetes, formally diagnosed in 2014 (the blood glucose numbers have been high since 2012).   Due to the high HbA1c of 12.1 at the time of diagnosis, she was treated with insulin from July until September 2014. Adelaida was not able to tolerate metformin due to diarrhea and dry heaves. In August 2014, the short acting insulin was discontinued and she was started on Prandin. Later on, she discontinued both Prandin and Lantus as the glycemic control significantly improved with a low carbohydrate diet, and she was started on Bydureon on 9/22/14. The patient found the injections painful and she decided to discontinue Bydureon in the beginning 2015.  In February 2015, she was started on Victoza, at 3 mg daily, which she took until beginning of 2016. The GLP-1 analogs haven't been successful in inducing weight loss.  With improved diet and regular exercise, the patient was able to maintain a good glycemic control in the absence of treatment.    HbA1c today 5.5, down from 6.1 at her last appointment here, in August 2016.  She occasionally check her BG in am and the numbers are in the 90s. This was confirmed by her meter, which was downloaded in the clinic.     For the last 2 weeks, she's been having a cold.  She's been exercising regularly (riding the bike, elliptical exercises and water aerobics), for 40 minutes, 3-4 times a week. She also joined [employer].   In the past, she used to take vitamin D supplements but she discontinued them. She occasionally takes a MVI, denies taking any calcium supplements. Daily intake of dairy products - 1-2 servings.     Adelaida has no diabetes complications.  A  comprehensive eye exam done in March 2016 was negative for diabetic retinopathy. Recent GFR in the 50s.   She is medicated with a daily baby ASA and 10 mg simvastatin daily.     2.  Primary hyperaldosteronism  The adrenal CT didn't identify adrenal lesions. Prior screening tests for pheochromocytoma and Cushing syndrome were negative. The decision was to treat the patient with a mineralocorticoid receptor blocker.    BP at home has been around: 130-140s/80-90s. Denies headaches.   Her blood pressure is currently managed with 75 mg eplerenone BID and 300 mg labetalol BID. She f/up with nephrology and she has an apt scheduled.     Prior antihypertensive medications she was treated with in the past were: hydralazine, hydrochlorothiazide, losartan, metoprolol, triamterene, verapamil, chlorthalidone.    Past Medical History   Diagnosis Date     Allergic rhinitis due to other allergen      seasonal     Diabetes (H)      Migraine, unspecified, without mention of intractable migraine without mention of status migrainosus      Morbid obesity (H)      Unspecified essential hypertension      dx around age 31   CKD   HTN dx 2007  Hypercholesterolemia  - on zocor since 2012   Ovarian cyst rupture 2014 - started on BCP   GERD   Type 2 diabetes 07/2014   Osteoarthritis of the knee  In 2013, she underwent parathyroidectomy for primary hyperparathyroidism.  The surgery was complicated by postoperative bleeding and she was intubated for almost one week. The patient thinks that the removed parathyroid gland was the right upper gland.  Adrenal CT - no adrenal lesions; metanephrines negative in 2012, 24 hr urine cortisol normal in 2014     Past Surgical History   Procedure Laterality Date     C anesth,knee area surgery  01/2001     Hc remove tonsils/adenoids,11+ y/o  1995     Hysterectomy, vaginal - ovaries were left in place  2005 12/2005     hysterectomy     C gastroplasty,obesity,vert band - 2007       removed 2009     Head & neck  surgery  3/2013     Parathyroidectomy--had to go back in 6 days later for a possible bleed     Current Medications   Prescription Medications as of 2/21/2017             labetalol (NORMODYNE) 300 MG tablet Take 1 tablet (300 mg) by mouth every 12 hours    polyethylene glycol (MIRALAX/GLYCOLAX) Packet Take 17 g by mouth daily    cetirizine (ZYRTEC) 10 MG tablet Take 10 mg by mouth daily as needed for allergies    eplerenone (INSPRA) 25 MG tablet Take 3 tablets (75 mg) by mouth 2 times daily    fluticasone (FLONASE) 50 MCG/ACT nasal spray Spray 2 sprays into both nostrils daily    ranitidine (ZANTAC) 150 MG tablet Take 1 tablet (150 mg) by mouth 2 times daily    SUMAtriptan (IMITREX) 25 MG tablet Take 1-2 tablets (25-50 mg) by mouth at onset of headache for migraine May repeat dose in 2 hours.  Do not exceed 200 mg or 2 doses in 24 hours    Blood Pressure Monitor KIT 1 Device 2 times daily    acetaminophen (TYLENOL) 325 MG tablet Take 3 tablets (975 mg) by mouth every 8 hours as needed for mild pain or headaches    ASPIRIN LOW DOSE 81 MG EC tablet TAKE 1 TABLET BY MOUTH ONCE DAILY    blood glucose monitoring (ACCU-CHEK MULTICLIX) lancets Use to test blood sugar 4-5 times daily or as directed.    simvastatin (ZOCOR) 10 MG tablet Take 1 tablet (10 mg) by mouth At Bedtime        Family History   Problem Relation Age of Onset     Hypertension Mother      Gynecology Mother      hysterectomy     GI Mother      bowel upstruction     Thyroid  Mother      Migraines  Mother      Osteoporosis Mother      Hypertension Father      Lipids Father      Arthritis  Father      Prostatic CA Brother      Alcohol/Drug Brother      Arthritis Paternal Grandmother      Cancer Maternal Grandfather      bone     Eye disease  Maternal Grandfather      Prostatic CA Maternal Grandfather      Cancer Maternal Grandmother      tumor-brain     Obesity Maternal Grandmother      Respiratory Daughter      asthma     Diabetes ? Type 1  Half Sister   "    Social History  Single with 2 children. She denies smoking, drinking alcohol or using illicit drugs. 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     Review of Systems   Systemic:               some fatigue which she associates with the recent cold  Eye:                       No eye symptoms   Glory-Laryngeal:      No glory-laryngeal symptoms, no dysphagia, no voice hoarseness, no cough      Breast:                   No breast symptoms  Cardiovascular:     No cardiovascular symptoms, no CP or palpitations   Pulmonary:            No pulmonary symptoms, no cough or SOB    Gastrointestinal:     Has occasional constipation   Genitourinary:        No increased thirst or urination   Endocrine:             No heat or cold intolerance; occasional hot flashes which started around age 43 - once a week, both at night and also during the day    Neurological:          no headaches, no tremor, no dizziness, no numbness or tingling sensation      Musculoskeletal:    R knee pain - intermittently   Skin:                       No dry skin or hair loss  Psychological:       No psychological symptoms                Vital Signs     Previous Weights:    Wt Readings from Last 10 Encounters:   02/21/17 104.2 kg (229 lb 11.5 oz)   02/12/17 104.8 kg (231 lb)   02/04/17 103.9 kg (229 lb)   08/18/16 108.4 kg (239 lb)   08/10/16 107.4 kg (236 lb 11.2 oz)   08/09/16 107.6 kg (237 lb 3.4 oz)   08/03/16 108.9 kg (240 lb)   07/07/16 108.6 kg (239 lb 6.4 oz)   04/25/16 106.6 kg (235 lb 1.6 oz)   04/06/16 107.5 kg (237 lb)                   Vital signs:   BP (!) 142/92  Pulse 60  Ht 1.556 m (5' 1.25\")  Wt 104.2 kg (229 lb 11.5 oz)  LMP 08/13/2005  BMI 43.05 kg/m2     Physical Exam  General Appearance:  General obesity, mild buffalo hump, full supraclavicular fossae   Eyes:  conjutivae and extra-ocular motions are normal.                                    pupils round and reactive to light, no lid lag, no stare    HEENT:   oropharynx " clear and moist, no JVD, no bruits      no thyromegaly, no palpable nodules   Cardiovascular:  regular rhythm, no murmurs, distal pulse palpable, no lower extremities edema  Respiratory:       chest clear, no rales, no rhonchi  Musculoskeletal: normal tone and strength  Psychiatric: affect and judgment normal  Skin: warm, no lesions   Neurological: reflexes normal and symmetric, no resting tremor     Lab Results  I reviewed prior lab results documented in Epic.  Lab Results   Component Value Date    A1C 5.5 02/21/2017    A1C 6.1 (H) 08/09/2016    A1C 6.1 (H) 02/24/2016    A1C 6.0 01/19/2016    A1C 6.1 (H) 10/16/2015    HEMOGLOBINA1 7.3 (A) 02/16/2015       Hemoglobin   Date Value Ref Range Status   08/03/2016 12.5 11.7 - 15.7 g/dL Final     Hematocrit   Date Value Ref Range Status   02/24/2016 36.3 35.0 - 47.0 % Final     Cholesterol   Date Value Ref Range Status   02/24/2016 137 <200 mg/dL Final     Cholesterol/HDL Ratio   Date Value Ref Range Status   05/15/2015 2.9 0.0 - 5.0 Final     HDL Cholesterol   Date Value Ref Range Status   02/24/2016 60 >49 mg/dL Final     LDL Cholesterol Calculated   Date Value Ref Range Status   02/24/2016 65 <100 mg/dL Final     Comment:     Desirable:       <100 mg/dl     VLDL-Cholesterol   Date Value Ref Range Status   05/15/2015 12 0 - 30 mg/dL Final     Triglycerides   Date Value Ref Range Status   02/24/2016 62 <150 mg/dL Final     Comment:     Fasting specimen     Albumin Urine mg/L   Date Value Ref Range Status   02/18/2016 22 mg/L Final     TSH   Date Value Ref Range Status   05/15/2015 1.84 0.40 - 4.00 mU/L Final     Last Basic Metabolic Panel:    Sodium   Date Value Ref Range Status   08/18/2016 139 133 - 144 mmol/L Final     Potassium   Date Value Ref Range Status   08/18/2016 4.2 3.4 - 5.3 mmol/L Final     Chloride   Date Value Ref Range Status   08/18/2016 106 94 - 109 mmol/L Final     Calcium   Date Value Ref Range Status   08/18/2016 9.2 8.5 - 10.1 mg/dL Final      Carbon Dioxide   Date Value Ref Range Status   08/18/2016 25 20 - 32 mmol/L Final     Urea Nitrogen   Date Value Ref Range Status   08/18/2016 15 7 - 30 mg/dL Final     Creatinine   Date Value Ref Range Status   08/18/2016 1.09 (H) 0.52 - 1.04 mg/dL Final     Creatinine For Methaneph 24 H/Random Urine   Date Value Ref Range Status   02/06/2012 73  Final     Comment:     Unit: mg/dL     GFR Estimate   Date Value Ref Range Status   08/18/2016 54 (L) >60 mL/min/1.7m2 Final     Comment:     Non  GFR Calc     Glucose   Date Value Ref Range Status   08/18/2016 113 (H) 70 - 99 mg/dL Final       AST   Date Value Ref Range Status   02/24/2016 22 0 - 45 U/L Final     ALT   Date Value Ref Range Status   02/24/2016 34 0 - 50 U/L Final     Albumin   Date Value Ref Range Status   08/18/2016 3.6 3.4 - 5.0 g/dL Final     Assessment      1. Type 2 diabetes, with good glycemic control and no microvascular complications.   Check hematocrit and lipid panel.     2. Hot flashes, most likely menopausal  Check FSH, LH and estradiol     3. Health maintenance   Start taking 2000 U vitamin D daily and 600 mg calcium carbonate 1-2 times a day (depending on the dairy intake).     3. Primary hyperaldosteronism  Her BP remains elevated. She is going to f/up with Dr. Aleman. In the event the BP is diff to manage, we might pursue adrenal vein catheterization.       Orders Placed This Encounter   Procedures     Follicle stimulating hormone     Estradiol     Lutropin     Lipid panel reflex to direct LDL     Hematocrit

## 2017-02-21 NOTE — TELEPHONE ENCOUNTER
Vital Signs 2/21/2017   Systolic 142   Diastolic 92   Pulse 60   Clinic reading today for patient. ECHO ordered per Dr. Aleman recommendations.     Wendy Sepulveda, RN, BSN  Nephrology Care Coordinator  [facility]

## 2017-02-22 DIAGNOSIS — N18.30 CKD (CHRONIC KIDNEY DISEASE) STAGE 3, GFR 30-59 ML/MIN (H): Primary | ICD-10-CM

## 2017-03-02 NOTE — TELEPHONE ENCOUNTER
Patient is going to be seen by Dr. Aleman on 3/7/17.    Wendy Sepulveda, RN, BSN  Nephrology Care Coordinator  [facility]

## 2017-03-07 ENCOUNTER — OFFICE VISIT (OUTPATIENT)
Dept: NEPHROLOGY | Facility: CLINIC | Age: 47
End: 2017-03-07
Payer: COMMERCIAL

## 2017-03-07 ENCOUNTER — COMMUNICATION - HEALTHEAST (OUTPATIENT)
Dept: ADMINISTRATIVE | Facility: CLINIC | Age: 47
End: 2017-03-07

## 2017-03-07 VITALS
HEART RATE: 63 BPM | WEIGHT: 229 LBS | DIASTOLIC BLOOD PRESSURE: 89 MMHG | SYSTOLIC BLOOD PRESSURE: 154 MMHG | BODY MASS INDEX: 42.92 KG/M2

## 2017-03-07 DIAGNOSIS — N18.30 CKD (CHRONIC KIDNEY DISEASE) STAGE 3, GFR 30-59 ML/MIN (H): ICD-10-CM

## 2017-03-07 DIAGNOSIS — E21.0 PRIMARY HYPERPARATHYROIDISM (H): Primary | ICD-10-CM

## 2017-03-07 DIAGNOSIS — D47.2 MONOCLONAL GAMMOPATHY: ICD-10-CM

## 2017-03-07 DIAGNOSIS — I10 HYPERTENSION GOAL BP (BLOOD PRESSURE) < 140/90: ICD-10-CM

## 2017-03-07 LAB
ALBUMIN SERPL-MCNC: 4.1 G/DL (ref 3.4–5)
ANION GAP SERPL CALCULATED.3IONS-SCNC: 7 MMOL/L (ref 3–14)
BUN SERPL-MCNC: 23 MG/DL (ref 7–30)
CALCIUM SERPL-MCNC: 9.7 MG/DL (ref 8.5–10.1)
CHLORIDE SERPL-SCNC: 104 MMOL/L (ref 94–109)
CO2 SERPL-SCNC: 26 MMOL/L (ref 20–32)
CREAT SERPL-MCNC: 1.28 MG/DL (ref 0.52–1.04)
CREAT UR-MCNC: 24 MG/DL
GFR SERPL CREATININE-BSD FRML MDRD: 45 ML/MIN/1.7M2
GLUCOSE SERPL-MCNC: 81 MG/DL (ref 70–99)
HGB BLD-MCNC: 12.4 G/DL (ref 11.7–15.7)
PHOSPHATE SERPL-MCNC: 3.3 MG/DL (ref 2.5–4.5)
POTASSIUM SERPL-SCNC: 3.7 MMOL/L (ref 3.4–5.3)
PROT UR-MCNC: <0.05 G/L
PROT/CREAT 24H UR: NORMAL G/G CR (ref 0–0.2)
PTH-INTACT SERPL-MCNC: 26 PG/ML (ref 12–72)
SODIUM SERPL-SCNC: 137 MMOL/L (ref 133–144)

## 2017-03-07 PROCEDURE — 83970 ASSAY OF PARATHORMONE: CPT | Performed by: INTERNAL MEDICINE

## 2017-03-07 PROCEDURE — 80069 RENAL FUNCTION PANEL: CPT | Performed by: INTERNAL MEDICINE

## 2017-03-07 PROCEDURE — 85018 HEMOGLOBIN: CPT | Performed by: INTERNAL MEDICINE

## 2017-03-07 PROCEDURE — 36415 COLL VENOUS BLD VENIPUNCTURE: CPT | Performed by: INTERNAL MEDICINE

## 2017-03-07 PROCEDURE — 99214 OFFICE O/P EST MOD 30 MIN: CPT | Performed by: INTERNAL MEDICINE

## 2017-03-07 PROCEDURE — 84156 ASSAY OF PROTEIN URINE: CPT | Performed by: INTERNAL MEDICINE

## 2017-03-09 ENCOUNTER — RADIANT APPOINTMENT (OUTPATIENT)
Dept: CARDIOLOGY | Facility: CLINIC | Age: 47
End: 2017-03-09
Attending: INTERNAL MEDICINE
Payer: COMMERCIAL

## 2017-03-09 DIAGNOSIS — I10 BENIGN ESSENTIAL HYPERTENSION: ICD-10-CM

## 2017-03-09 PROCEDURE — 93306 TTE W/DOPPLER COMPLETE: CPT

## 2017-03-10 NOTE — PROGRESS NOTES
LATA for patient regarding echocardiogram results.    Wendy Sepulveda, RN, BSN  Nephrology Care Coordinator  [facility]

## 2017-03-14 DIAGNOSIS — E78.5 HYPERLIPIDEMIA WITH TARGET LDL LESS THAN 100: ICD-10-CM

## 2017-03-17 RX ORDER — SIMVASTATIN 10 MG
TABLET ORAL
Qty: 90 TABLET | Refills: 0 | Status: SHIPPED | OUTPATIENT
Start: 2017-03-17 | End: 2017-09-02

## 2017-03-17 NOTE — TELEPHONE ENCOUNTER
simvastatin     Last Written Prescription Date: 1/8/16  Last Fill Quantity: 90, # refills: 3  Last Office Visit with [facility] or [facility] prescribing provider: 7/7/16       Lab Results   Component Value Date    CHOL 177 02/21/2017     Lab Results   Component Value Date    HDL 58 02/21/2017     Lab Results   Component Value Date     02/21/2017     Lab Results   Component Value Date    TRIG 83 02/21/2017     Lab Results   Component Value Date    CHOLHDLRATIO 2.9 05/15/2015     Medication filled for 3 months per protocol.      Kay Arriaga RN, [facility]

## 2017-03-26 NOTE — PROGRESS NOTES
3/7/17  CC: HTN and CKD    HPI: Adelaida Packer is a 46 year old female who presents for follow-up of HTN/CKD.  Has some mild CKD which is felt to be related to hypertension as well as likely some effects from hypercalcemia in the past. Her hx includes hyperparathyroidism, primary for which she underwent parathyroidectomy on 3/21/13. Following that surgery, she did have a complication related to a hematoma but we have seen many benefits of her parathyroidectomy - blood pressure improved as well as kidney function.    We again have seen variability to her blood pressure control with recent adjustments made. In the past, this has been associated with increased stress. Creatinine in the past peaked at 1.38 but was 1.09 in August. She denies any NSAID use. She has had some weight loss - was 239 lbs in August and now 229 lbs. Blood pressure is above goal today.        Allergies   Allergen Reactions     Sulfa Drugs Shortness Of Breath     Maxzide [Hydrochlorothiazide W/Triamterene] Other (See Comments)     Renal insuff     Metoprolol      Other reaction(s): Bradycardia  At high dose only     Seasonal Allergies          Current Outpatient Prescriptions   Medication Sig Dispense Refill     cholecalciferol 2000 UNITS CAPS Take 2,000 Units by mouth daily 90 capsule 3     calcium carbonate (OS-EVGENY 600 MG [place]. CA) 1500 (600 CA) MG tablet Take 1 tablet (600 mg) by mouth 2 times daily 180 tablet 3     labetalol (NORMODYNE) 300 MG tablet Take 1 tablet (300 mg) by mouth every 12 hours 180 tablet 3     polyethylene glycol (MIRALAX/GLYCOLAX) Packet Take 17 g by mouth daily       cetirizine (ZYRTEC) 10 MG tablet Take 10 mg by mouth daily as needed for allergies       eplerenone (INSPRA) 25 MG tablet Take 3 tablets (75 mg) by mouth 2 times daily 540 tablet 1     fluticasone (FLONASE) 50 MCG/ACT nasal spray Spray 2 sprays into both nostrils daily 16 g 3     ranitidine (ZANTAC) 150 MG tablet Take 1 tablet (150 mg) by mouth 2 times daily  (Patient taking differently: Take 150 mg by mouth daily as needed ) 60 tablet 2     SUMAtriptan (IMITREX) 25 MG tablet Take 1-2 tablets (25-50 mg) by mouth at onset of headache for migraine May repeat dose in 2 hours.  Do not exceed 200 mg or 2 doses in 24 hours 18 tablet 0     Blood Pressure Monitor KIT 1 Device 2 times daily 1 kit 0     acetaminophen (TYLENOL) 325 MG tablet Take 3 tablets (975 mg) by mouth every 8 hours as needed for mild pain or headaches 100 tablet 0     ASPIRIN LOW DOSE 81 MG EC tablet TAKE 1 TABLET BY MOUTH ONCE DAILY 100 tablet 3     blood glucose monitoring (ACCU-CHEK MULTICLIX) lancets Use to test blood sugar 4-5 times daily or as directed. 1 Box 6     simvastatin (ZOCOR) 10 MG tablet TAKE 1 TABLET BY MOUTH BEDTIME 90 tablet 0       Past Medical History:   Diagnosis Date     Allergic rhinitis due to other allergen     seasonal     Diabetes (H)      Migraine, unspecified, without mention of intractable migraine without mention of status migrainosus      Morbid obesity (H)      Unspecified essential hypertension     dx around age 31         Past Surgical History:   Procedure Laterality Date     C ANESTH,KNEE AREA SURGERY  01/2001     C GASTROPLASTY,OBESITY,VERT BAND      removed 2009     HC REMOVE TONSILS/ADENOIDS,11+ Y/O  1995     HEAD & NECK SURGERY  3/2013    Parathyroidectomy--had to go back in 6 days later for a possible bleed     HYSTERECTOMY, VAGINAL  12/2005    hysterectomy- fibroids         Social History   Substance Use Topics     Smoking status: Never Smoker     Smokeless tobacco: Never Used     Alcohol use No         Family History   Problem Relation Age of Onset     Hypertension Mother      Gynecology Mother      hysterectomy     GASTROINTESTINAL DISEASE Mother      bowel upstruction     Thyroid Disease Mother      Neurologic Disorder Mother      OSTEOPOROSIS Mother      Hypertension Father      Lipids Father      Arthritis Father      Prostate Cancer Brother      Alcohol/Drug  Brother      Circulatory Brother      Arthritis Paternal Grandmother      CANCER Maternal Grandfather      bone     Eye Disorder Maternal Grandfather      Prostate Cancer Maternal Grandfather      CANCER Maternal Grandmother      tumor-head     Obesity Maternal Grandmother      Respiratory Daughter      asthma     DIABETES Sister      CEREBROVASCULAR DISEASE Sister      Glaucoma No family hx of      Macular Degeneration No family hx of          ROS: A 4 system review of systems was negative other than noted here or above.     Exam:  /89  Pulse 63  Wt 103.9 kg (229 lb)  LMP 08/13/2005  BMI 42.92 kg/m2    GENERAL APPEARANCE: alert and no distress  CV: RRR  R: CTAB  Extremities: no clubbing, cyanosis, or edema  SKIN: no rash  NEURO: mentation intact and speech normal  PSYCH: affect normal/bright    Results  Office Visit on 03/07/2017   Component Date Value Ref Range Status     Hemoglobin 03/07/2017 12.4  11.7 - 15.7 g/dL Final     Parathyroid Hormone Intact 03/07/2017 26  12 - 72 pg/mL Final     Sodium 03/07/2017 137  133 - 144 mmol/L Final     Potassium 03/07/2017 3.7  3.4 - 5.3 mmol/L Final     Chloride 03/07/2017 104  94 - 109 mmol/L Final     Carbon Dioxide 03/07/2017 26  20 - 32 mmol/L Final     Anion Gap 03/07/2017 7  3 - 14 mmol/L Final     Glucose 03/07/2017 81  70 - 99 mg/dL Final    Non Fasting     Urea Nitrogen 03/07/2017 23  7 - 30 mg/dL Final     Creatinine 03/07/2017 1.28* 0.52 - 1.04 mg/dL Final     GFR Estimate 03/07/2017 45* >60 mL/min/1.7m2 Final    Non  GFR Calc     GFR Estimate If Black 03/07/2017 54* >60 mL/min/1.7m2 Final    African American GFR Calc     Calcium 03/07/2017 9.7  8.5 - 10.1 mg/dL Final     Phosphorus 03/07/2017 3.3  2.5 - 4.5 mg/dL Final     Albumin 03/07/2017 4.1  3.4 - 5.0 g/dL Final     Protein Random Urine 03/07/2017 <0.05  g/L Final     Protein Total Urine g/gr Creatinine 03/07/2017 Unable to calculate due to low value  0 - 0.2 g/g Cr Final      Creatinine Urine 03/07/2017 24  mg/dL Final          Assessment/Plan:  1. Hypertension: aldosterone level has been high on evaluation by Dr. Quintanilla with a low renin. In the past I was suspicious for hyperaldosteronism as well but CT scan was without adrenal adneoma appreciated. Agree with potassium sparing diuretic for BP control given presumed adrenal hyperplasia. Currently taking eplerenone 75 mg BID. I have noted in the past that stress related to work has cause an increase in blood pressure - we have been able to remove some anti-hypertensives during summer months given less stress around summer.   - Increasing eplerenone for addt BP coverage.   - Father recent dx with CMP - will get an echo this week to assure no concerns for a genetic CMP    2. CKD: likely some chronic kidney changes related to longstanding hypertension and hypercalcemia.     3. Hypercalcemia: s/p parathyroidectomy on 3/21/13. Unfortunately, complicated by arterial bleed 6 days later leading to intubation but now doing well. Calcium levels are normal.     4. 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 Rodas/[facility] - yearly follow-up was recommended by Dr. Rodas at either [facility] or here.      5. DM: A1C much improved at 5.5% - follows with Dr. Quintanilla.     Patient Instructions   1. Increase eplerenone to 100 mg in the AM and 75 mg in the PM  2. Repeat labs today   3. Labs in 6 months   4. Follow-up in 1 year.  5. Echo Thursday as planned.          Dia Aleman, DO

## 2017-04-03 ENCOUNTER — TELEPHONE (OUTPATIENT)
Dept: NEPHROLOGY | Facility: CLINIC | Age: 47
End: 2017-04-03

## 2017-04-03 NOTE — TELEPHONE ENCOUNTER
Message taken off of voice mail:    Patient requesting appointment for a blood pressure check on 4/04/17 around [phone].  Please call her back at [phone].    Darla Severin-Brown, LPN

## 2017-04-03 NOTE — TELEPHONE ENCOUNTER
Appointment scheduled. LM for patient with appointment time.    Wendy Sepulveda, RN, BSN  Nephrology Care Coordinator  [facility]

## 2017-04-04 ENCOUNTER — ALLIED HEALTH/NURSE VISIT (OUTPATIENT)
Dept: NURSING | Facility: CLINIC | Age: 47
End: 2017-04-04

## 2017-04-04 ENCOUNTER — RADIANT APPOINTMENT (OUTPATIENT)
Dept: MAMMOGRAPHY | Facility: CLINIC | Age: 47
End: 2017-04-04
Payer: COMMERCIAL

## 2017-04-04 VITALS — SYSTOLIC BLOOD PRESSURE: 138 MMHG | DIASTOLIC BLOOD PRESSURE: 77 MMHG | HEART RATE: 57 BPM

## 2017-04-04 DIAGNOSIS — E26.09 PRIMARY HYPERALDOSTERONISM (H): ICD-10-CM

## 2017-04-04 DIAGNOSIS — Z13.9 SCREENING: ICD-10-CM

## 2017-04-04 PROCEDURE — G0202 SCR MAMMO BI INCL CAD: HCPCS

## 2017-04-04 NOTE — MR AVS SNAPSHOT
After Visit Summary   4/4/2017    Adelaida Packer    MRN: [id number]           Patient Information     Date Of Birth          1970        Visit Information        Provider Department      4/4/2017 8:45 AM Spec, Nurse Only Med [facility]        Today's Diagnoses     Primary hyperaldosteronism (H)           Follow-ups after your visit        Your next 10 appointments already scheduled     Apr 20, 2017  4:15 PM CDT   PHYSICAL with Ruth Romero,    [facility] ([facility])    [address] [place] [address]   [phone]            May 12, 2017  2:30 PM CDT   Return Visit with Zac Sandhu OD   [facility] ([facility])    [address] [place] [address]   [phone]            Aug 10, 2017  9:00 AM CDT   LAB with LAB FIRST FLOOR [facility])    [address] [place] [address]   [phone]           Patient must bring picture ID.  Patient should be prepared to give a urine specimen  Please do not eat 10-12 hours before your appointment if you are coming in fasting for labs on lipids, cholesterol, or glucose (sugar).  Pregnant women should follow their Care Team instructions. Water with medications is okay. Do not drink coffee or other fluids.   If you have concerns about taking  your medications, please ask at office or if scheduling via [employer], send a message by clicking on Secure Messaging, Message Your Care Team.            Aug 15, 2017  9:15 AM CDT   Return Visit with Cele Myers MD, MG ENDO NURSE   [facility])    [address] [place] [address]   [phone]            Aug 17, 2017  2:30 PM CDT   LAB with LAB FIRST FLOOR [facility] ([facility])    [address] [place] [address]  [place] [address]   [phone]           Patient must bring picture ID.  Patient should be prepared to give a urine specimen  Please do not eat 10-12 hours before your appointment if you are coming in fasting for labs on lipids, cholesterol, or glucose (sugar).  Pregnant women should follow their Care Team instructions. Water with medications is okay. Do not drink coffee or other fluids.   If you have concerns about taking  your medications, please ask at office or if scheduling via [employer], send a message by clicking on Secure Messaging, Message Your Care Team.            Aug 17, 2017  3:00 PM CDT   Return Visit with Kristi Rodas MD   [facility] ([facility])    [address] [place] [id number] [phone]            Mar 06, 2018  4:00 PM CST   LAB with LAB FIRST FLOOR [facility] ([facility])    [address] [facility] [address] [phone]           Patient must bring picture ID.  Patient should be prepared to give a urine specimen  Please do not eat 10-12 hours before your appointment if you are coming in fasting for labs on lipids, cholesterol, or glucose (sugar).  Pregnant women should follow their Care Team instructions. Water with medications is okay. Do not drink coffee or other fluids.   If you have concerns about taking  your medications, please ask at office or if scheduling via [employer], send a message by clicking on Secure Messaging, Message Your Care Team.            Mar 06, 2018  4:30 PM CST   Return Visit with Dia Aleman MD   [facility] ([facility])    [address] [facility] [address] [phone]              Who to contact     If you have questions or need follow up information about today's clinic visit or your schedule please contact [facility] directly at [phone].  Normal or non-critical lab and  imaging results will be communicated to you by [employer], letter or phone within 4 business days after the clinic has received the results. If you do not hear from us within 7 days, please contact the clinic through [employer] or phone. If you have a critical or abnormal lab result, we will notify you by phone as soon as possible.  Submit refill requests through [employer] or call your pharmacy and they will forward the refill request to us. Please allow 3 business days for your refill to be completed.          Additional Information About Your Visit        [employer] Information     [employer] gives you secure access to your electronic health record. If you see a primary care provider, you can also send messages to your care team and make appointments. If you have questions, please call your primary care clinic.  If you do not have a primary care provider, please call [phone] and they will assist you.      [employer] is an electronic gateway that provides easy, online access to your medical records. With [employer], you can request a clinic appointment, read your test results, renew a prescription or communicate with your care team.     To access your existing account, please contact your [facility] or call [phone] for assistance.        Care EveryWhere ID     This is your Care EveryWhere ID. This could be used by other organizations to access your [place] medical records  [id number]        Your Vitals Were     Pulse Last Period                57 08/13/2005           Blood Pressure from Last 3 Encounters:   04/04/17 138/77   03/07/17 154/89   02/21/17 (!) 142/92    Weight from Last 3 Encounters:   03/07/17 229 lb (103.9 kg)   02/21/17 229 lb 11.5 oz (104.2 kg)   02/12/17 231 lb (104.8 kg)              Today, you had the following     No orders found for display         Today's Medication Changes          These changes are accurate as of: 4/4/17 11:59 PM.  If you have any questions, ask your  nurse or doctor.               These medicines have changed or have updated prescriptions.        Dose/Directions    eplerenone 25 MG tablet   Commonly known as:  INSPRA   This may have changed:    - how much to take  - how to take this  - when to take this  - additional instructions   Used for:  Primary hyperaldosteronism (H)   Changed by:  Spec, Nurse Only Med        100 mg (4 tabs) in AM and 75 mg (3 tabs) in PM   Quantity:  630 tablet   Refills:  1       ranitidine 150 MG tablet   Commonly known as:  ZANTAC   This may have changed:    - when to take this  - reasons to take this   Used for:  Gastroesophageal reflux disease without esophagitis        Dose:  150 mg   Take 1 tablet (150 mg) by mouth 2 times daily   Quantity:  60 tablet   Refills:  2            Where to get your medicines      These medications were sent to [id number]  [address] [facility] [facility] [address] [facility] [address], [place] [address]     Phone:  [phone]     eplerenone 25 MG tablet                Primary Care Provider Office Phone # Fax #    Windy Gordillo [phone]       [facility] [address]  [place] [address]        Thank you!     Thank you for choosing [facility]  for your care. Our goal is always to provide you with excellent care. Hearing back from our patients is one way we can continue to improve our services. Please take a few minutes to complete the written survey that you may receive in the mail after your visit with us. Thank you!             Your Updated Medication List - Protect others around you: Learn how to safely use, store and throw away your medicines at www.disposemymeds.org.          This list is accurate as of: 4/4/17 11:59 PM.  Always use your most recent med list.                   Brand Name Dispense Instructions for use    acetaminophen 325 MG tablet    TYLENOL    100 tablet    Take 3 tablets (440  mg) by mouth every 8 hours as needed for mild pain or headaches       ASPIRIN LOW DOSE 81 MG EC tablet   Generic drug:  aspirin     100 tablet    TAKE 1 TABLET BY MOUTH ONCE DAILY       blood glucose monitoring lancets     1 Box    Use to test blood sugar 4-5 times daily or as directed.       Blood Pressure Monitor Kit     1 kit    1 Device 2 times daily       calcium carbonate 1500 (600 CA) MG tablet    OS-EVGENY 600 mg [place]. Ca    180 tablet    Take 1 tablet (600 mg) by mouth 2 times daily       cetirizine 10 MG tablet    zyrTEC     Take 10 mg by mouth daily as needed for allergies       cholecalciferol 2000 UNITS Caps     90 capsule    Take 2,000 Units by mouth daily       eplerenone 25 MG tablet    INSPRA    630 tablet    100 mg (4 tabs) in AM and 75 mg (3 tabs) in PM       fluticasone 50 MCG/ACT spray    FLONASE    16 g    Spray 2 sprays into both nostrils daily       labetalol 300 MG tablet    NORMODYNE    180 tablet    Take 1 tablet (300 mg) by mouth every 12 hours       polyethylene glycol Packet    MIRALAX/GLYCOLAX     Take 17 g by mouth daily       ranitidine 150 MG tablet    ZANTAC    60 tablet    Take 1 tablet (150 mg) by mouth 2 times daily       simvastatin 10 MG tablet    ZOCOR    90 tablet    TAKE 1 TABLET BY MOUTH BEDTIME       SUMAtriptan 25 MG tablet    IMITREX    18 tablet    Take 1-2 tablets (25-50 mg) by mouth at onset of headache for migraine May repeat dose in 2 hours.  Do not exceed 200 mg or 2 doses in 24 hours

## 2017-04-05 RX ORDER — EPLERENONE 25 MG/1
TABLET, FILM COATED ORAL
Qty: 630 TABLET | Refills: 1 | Status: SHIPPED | OUTPATIENT
Start: 2017-04-05 | End: 2017-07-26

## 2017-04-05 NOTE — PROGRESS NOTES
Patient arrived for a blood pressure check. Dr. Aleman reviewed reading from today. No changes advised per Dr. Aleman. Will discuss with patient.     Wendy Sepulveda RN, BSN  Nephrology Care Coordinator  [facility]

## 2017-04-18 ENCOUNTER — COMMUNICATION - HEALTHEAST (OUTPATIENT)
Dept: ADMINISTRATIVE | Facility: CLINIC | Age: 47
End: 2017-04-18

## 2017-04-19 ENCOUNTER — RECORDS - HEALTHEAST (OUTPATIENT)
Dept: ADMINISTRATIVE | Facility: OTHER | Age: 47
End: 2017-04-19

## 2017-04-26 ENCOUNTER — OFFICE VISIT (OUTPATIENT)
Dept: OBGYN | Facility: CLINIC | Age: 47
End: 2017-04-26
Payer: COMMERCIAL

## 2017-04-26 VITALS
BODY MASS INDEX: 42.53 KG/M2 | WEIGHT: 225.3 LBS | HEART RATE: 63 BPM | DIASTOLIC BLOOD PRESSURE: 88 MMHG | SYSTOLIC BLOOD PRESSURE: 154 MMHG | HEIGHT: 61 IN | OXYGEN SATURATION: 98 %

## 2017-04-26 DIAGNOSIS — Z00.00 ROUTINE GENERAL MEDICAL EXAMINATION AT A HEALTH CARE FACILITY: Primary | ICD-10-CM

## 2017-04-26 DIAGNOSIS — Z13.29 SCREENING FOR THYROID DISORDER: ICD-10-CM

## 2017-04-26 DIAGNOSIS — Z12.31 ENCOUNTER FOR SCREENING MAMMOGRAM FOR BREAST CANCER: ICD-10-CM

## 2017-04-26 DIAGNOSIS — Z13.1 SCREENING FOR DIABETES MELLITUS: ICD-10-CM

## 2017-04-26 LAB
GLUCOSE SERPL-MCNC: 91 MG/DL (ref 70–99)
TSH SERPL DL<=0.005 MIU/L-ACNC: 1.66 MU/L (ref 0.4–4)

## 2017-04-26 PROCEDURE — 99396 PREV VISIT EST AGE 40-64: CPT | Performed by: OBSTETRICS & GYNECOLOGY

## 2017-04-26 PROCEDURE — 82947 ASSAY GLUCOSE BLOOD QUANT: CPT | Performed by: OBSTETRICS & GYNECOLOGY

## 2017-04-26 PROCEDURE — 84443 ASSAY THYROID STIM HORMONE: CPT | Performed by: OBSTETRICS & GYNECOLOGY

## 2017-04-26 PROCEDURE — 36415 COLL VENOUS BLD VENIPUNCTURE: CPT | Performed by: OBSTETRICS & GYNECOLOGY

## 2017-04-26 ASSESSMENT — ANXIETY QUESTIONNAIRES
2. NOT BEING ABLE TO STOP OR CONTROL WORRYING: NOT AT ALL
3. WORRYING TOO MUCH ABOUT DIFFERENT THINGS: NOT AT ALL
1. FEELING NERVOUS, ANXIOUS, OR ON EDGE: NOT AT ALL
7. FEELING AFRAID AS IF SOMETHING AWFUL MIGHT HAPPEN: NOT AT ALL
5. BEING SO RESTLESS THAT IT IS HARD TO SIT STILL: NOT AT ALL
6. BECOMING EASILY ANNOYED OR IRRITABLE: NOT AT ALL
GAD7 TOTAL SCORE: 0

## 2017-04-26 ASSESSMENT — PATIENT HEALTH QUESTIONNAIRE - PHQ9: 5. POOR APPETITE OR OVEREATING: NOT AT ALL

## 2017-04-26 NOTE — MR AVS SNAPSHOT
After Visit Summary   4/26/2017    Adelaida Packer    MRN: [id number]           Patient Information     Date Of Birth          1970        Visit Information        Provider Department      4/26/2017 8:30 AM Ruth Romero DO [facility]        Care Instructions                                                         If you have any questions regarding your visit, Please contact your care team.    [facility] CLINIC HOURS TELEPHONE NUMBER   Ruth DO Nick.    CHON Marcos -    MELY Velez RN       Monday, Wednesday, [facility]  8:30a.m-5:00 p.m   [facility]  [address] [place] [address]  [phone] ask for [facility]    Imaging Egrtloeixz-[phone]       Urgent Care locations:    [facility] Saturday and Sunday   9 am - 5 pm    Monday-Friday   12 pm - 8 pm  Saturday and Sunday   9 am - 5 pm   [phone]     [facility] Labor and Delivery:  [phone]    If you need a medication refill, please contact your pharmacy. Please allow 3 business days for your refill to be completed.  As always, Thank you for trusting us with your healthcare needs!              Follow-ups after your visit        Your next 10 appointments already scheduled     May 12, 2017  2:30 PM CDT   Return Visit with Zac Sandhu OD   [facility])    [address] [facility] [id number] [phone]            Aug 10, 2017  9:00 AM CDT   LAB with LAB FIRST FLOOR [facility] ([facility])    [address] [facility] [address] [phone]           Patient must bring picture ID.  Patient should be prepared to give a urine specimen  Please do not eat 10-12 hours before your appointment if you are coming in fasting for labs on lipids, cholesterol,  or glucose (sugar).  Pregnant women should follow their Care Team instructions. Water with medications is okay. Do not drink coffee or other fluids.   If you have concerns about taking  your medications, please ask at office or if scheduling via [employer], send a message by clicking on Secure Messaging, Message Your Care Team.            Aug 15, 2017  9:15 AM CDT   Return Visit with Cele Myers MD,  ENDO NURSE   [facility])    [address] [facility] [address]   [phone]            Aug 17, 2017  2:30 PM CDT   LAB with LAB FIRST FLOOR [facility])    [address] [facility] [address] [phone]           Patient must bring picture ID.  Patient should be prepared to give a urine specimen  Please do not eat 10-12 hours before your appointment if you are coming in fasting for labs on lipids, cholesterol, or glucose (sugar).  Pregnant women should follow their Care Team instructions. Water with medications is okay. Do not drink coffee or other fluids.   If you have concerns about taking  your medications, please ask at office or if scheduling via [employer], send a message by clicking on Secure Messaging, Message Your Care Team.            Aug 17, 2017  3:00 PM CDT   Return Visit with Kristi Rodas MD   [facility])    [address] [facility] [address]   [phone]            Mar 06, 2018  4:00 PM CST   LAB with LAB FIRST FLOOR [facility] ([facility])    [address] [facility] [address]   [phone]           Patient must bring picture ID.  Patient should be prepared to give a urine specimen  Please do not eat 10-12 hours before your appointment if you are coming in fasting for labs on lipids, cholesterol, or glucose (sugar).  Pregnant women should follow  "their Care Team instructions. Water with medications is okay. Do not drink coffee or other fluids.   If you have concerns about taking  your medications, please ask at office or if scheduling via [employer], send a message by clicking on Secure Messaging, Message Your Care Team.            Mar 06, 2018  4:30 PM CST   Return Visit with Dia Aleman MD   [facility])    [address] [place] [address] [phone]              Who to contact     If you have questions or need follow up information about today's clinic visit or your schedule please contact [facility] directly at [phone].  Normal or non-critical lab and imaging results will be communicated to you by [employer], letter or phone within 4 business days after the clinic has received the results. If you do not hear from us within 7 days, please contact the clinic through [employer] or phone. If you have a critical or abnormal lab result, we will notify you by phone as soon as possible.  Submit refill requests through [employer] or call your pharmacy and they will forward the refill request to us. Please allow 3 business days for your refill to be completed.          Additional Information About Your Visit        [employer] Information     [employer] gives you secure access to your electronic health record. If you see a primary care provider, you can also send messages to your care team and make appointments. If you have questions, please call your primary care clinic.  If you do not have a primary care provider, please call [phone] and they will assist you.        Care EveryWhere ID     This is your Care EveryWhere ID. This could be used by other organizations to access your [place] medical records  [id number]        Your Vitals Were     Pulse Height Last Period Pulse Oximetry Breastfeeding? BMI (Body Mass Index)    63 1.549 m (5' 1\") 08/13/2005 98% No 42.57 kg/m2       " Blood Pressure from Last 3 Encounters:   04/26/17 154/88   04/04/17 138/77   03/07/17 154/89    Weight from Last 3 Encounters:   04/26/17 102.2 kg (225 lb 4.8 oz)   03/07/17 103.9 kg (229 lb)   02/21/17 104.2 kg (229 lb 11.5 oz)              Today, you had the following     No orders found for display         Today's Medication Changes          These changes are accurate as of: 4/26/17  8:53 AM.  If you have any questions, ask your nurse or doctor.               These medicines have changed or have updated prescriptions.        Dose/Directions    ranitidine 150 MG tablet   Commonly known as:  ZANTAC   This may have changed:    - when to take this  - reasons to take this   Used for:  Gastroesophageal reflux disease without esophagitis        Dose:  150 mg   Take 1 tablet (150 mg) by mouth 2 times daily   Quantity:  60 tablet   Refills:  2                Primary Care Provider Office Phone # Fax #    Windy Gordillo [phone]       [facility] [address] [facility] [address]        Thank you!     Thank you for choosing [facility]  for your care. Our goal is always to provide you with excellent care. Hearing back from our patients is one way we can continue to improve our services. Please take a few minutes to complete the written survey that you may receive in the mail after your visit with us. Thank you!             Your Updated Medication List - Protect others around you: Learn how to safely use, store and throw away your medicines at www.disposemymeds.org.          This list is accurate as of: 4/26/17  8:53 AM.  Always use your most recent med list.                   Brand Name Dispense Instructions for use    acetaminophen 325 MG tablet    TYLENOL    100 tablet    Take 3 tablets (975 mg) by mouth every 8 hours as needed for mild pain or headaches       ASPIRIN LOW DOSE 81 MG EC tablet   Generic drug:  aspirin     100 tablet    TAKE 1 TABLET BY MOUTH ONCE  DAILY       blood glucose monitoring lancets     1 Box    Use to test blood sugar 4-5 times daily or as directed.       Blood Pressure Monitor Kit     1 kit    1 Device 2 times daily       calcium carbonate 1500 (600 CA) MG tablet    OS-EVGENY 600 mg [place]. Ca    180 tablet    Take 1 tablet (600 mg) by mouth 2 times daily       cetirizine 10 MG tablet    zyrTEC     Take 10 mg by mouth daily as needed for allergies       cholecalciferol 2000 UNITS Caps     90 capsule    Take 2,000 Units by mouth daily       eplerenone 25 MG tablet    INSPRA    630 tablet    100 mg (4 tabs) in AM and 75 mg (3 tabs) in PM       fluticasone 50 MCG/ACT spray    FLONASE    16 g    Spray 2 sprays into both nostrils daily       labetalol 300 MG tablet    NORMODYNE    180 tablet    Take 1 tablet (300 mg) by mouth every 12 hours       polyethylene glycol Packet    MIRALAX/GLYCOLAX     Take 17 g by mouth daily       ranitidine 150 MG tablet    ZANTAC    60 tablet    Take 1 tablet (150 mg) by mouth 2 times daily       simvastatin 10 MG tablet    ZOCOR    90 tablet    TAKE 1 TABLET BY MOUTH BEDTIME       SUMAtriptan 25 MG tablet    IMITREX    18 tablet    Take 1-2 tablets (25-50 mg) by mouth at onset of headache for migraine May repeat dose in 2 hours.  Do not exceed 200 mg or 2 doses in 24 hours

## 2017-04-26 NOTE — NURSING NOTE
"Chief Complaint   Patient presents with     Physical       Initial /88 (BP Location: Right arm, Patient Position: Chair, Cuff Size: Adult Large)  Pulse 63  Ht 1.549 m (5' 1\")  Wt 102.2 kg (225 lb 4.8 oz)  LMP 08/13/2005  SpO2 98%  Breastfeeding? No  BMI 42.57 kg/m2 Estimated body mass index is 42.57 kg/(m^2) as calculated from the following:    Height as of this encounter: 1.549 m (5' 1\").    Weight as of this encounter: 102.2 kg (225 lb 4.8 oz).  Medication Reconciliation: complete   Priti Brunner, [facility]  April 26, 2017 8:53 AM        "

## 2017-04-26 NOTE — PATIENT INSTRUCTIONS
If you have any questions regarding your visit, Please contact your care team.    [facility] CLINIC HOURS TELEPHONE NUMBER   Ruth DO Nick.    CHON Marcos -    MELY Velez RN       Monday, Wednesday, Thursday and Friday, [place]  8:30a.m-5:00 p.m   [facility]  [address] [place] [address]  [phone] ask for [facility]    Imaging [phone]       Urgent Care locations:    [facility] Saturday and Sunday   9 am - 5 pm    Monday-Friday   12 pm - 8 pm  Saturday and Sunday   9 am - 5 pm   [phone]     [facility] Labor and Delivery:  [phone]    If you need a medication refill, please contact your pharmacy. Please allow 3 business days for your refill to be completed.  As always, Thank you for trusting us with your healthcare needs!

## 2017-04-26 NOTE — PROGRESS NOTES
Adelaida is a 47 year old female, , who is here for her annual exam.  She is s/p vaginal hysterectomy in 2005 for menorrhagia issues felt to be due to fibroids.  She denies any gyn issues and declines a f/u US for ovarian cysts.  She is working with Dr. Piña for improved hypertension control.  She has already updated her mammogram this year and will be due for a baseline colonoscopy in 2 years.    ROS: Ten point review of systems was reviewed and negative except the above.    Health Maintenance   Topic Date Due     PNEUMOVAX 1X HI RISK PATIENT < 65 (NO IB MSG)  1971     MICROALBUMIN Q1 YEAR( NO INBASKET)  2017     EYE EXAM Q1 YEAR( NO INBASKET)  2017     TSH W/ FREE T4 REFLEX Q2 YEAR (NO INBASKET)  05/15/2017     FOOT EXAM Q1 YEAR( NO INBASKET)  2017     A1C Q6 MO( NO INBASKET)  2017     INFLUENZA VACCINE (SYSTEM ASSIGNED)  2017     LIPID MONITORING Q1 YEAR( NO INBASKET)  2018     CREATININE Q1 YEAR (NO INBASKET)  2018     MAMMO Q1 YR INBASKET MESSAGE  2018     TETANUS IMMUNIZATION (SYSTEM ASSIGNED)  2022     MIGRAINE ACTION PLAN,ONE TIME,NO INBASKET  Completed      Last pap: 06 normal  Last Mammogram: 17  Last Dexa: not applicable  Last Colonoscopy: not applicable  Lab Results   Component Value Date    CHOL 177 2017     Lab Results   Component Value Date    HDL 58 2017     Lab Results   Component Value Date     2017     Lab Results   Component Value Date    TRIG 83 2017     Lab Results   Component Value Date    CHOLHDLRATIO 2.9 05/15/2015         OBHX:      PSH:   Past Surgical History:   Procedure Laterality Date     C ANESTH,KNEE AREA SURGERY  2001     C GASTROPLASTY,OBESITY,VERT BAND      removed      HC REMOVE TONSILS/ADENOIDS,12+ Y/O       HEAD & NECK SURGERY  3/2013    Parathyroidectomy--had to go back in 6 days later for a possible bleed     HYSTERECTOMY, VAGINAL  2005    hysterectomy-  "fibroids     ORTHOPEDIC SURGERY           PMH: Her past medical, surgical, and obstetric histories were reviewed and are documented in their appropriate chart areas.    ALL/Meds: Her medication and allergy histories were reviewed and are documented in their appropriate chart areas.    SH/FMH: Her social and family history was reviewed and documented in its appropriate chart area.    PE: /88 (BP Location: Right arm, Patient Position: Chair, Cuff Size: Adult Large)  Pulse 63  Ht 1.549 m (5' 1\")  Wt 102.2 kg (225 lb 4.8 oz)  LMP 08/13/2005  SpO2 98%  Breastfeeding? No  BMI 42.57 kg/m2  Body mass index is 42.57 kg/(m^2).    General Appearance:  healthy, alert, active, no distress  Cardiovascular:  Regular rate and Rhythm without murmur  Neck: Supple, no adenopathy and thyroid normal  Lungs:  Clear, without wheeze, rale or rhonchi  Breast: normal breast exam  Abdomen: Benign, Soft, flat, non-tender, No masses, organomegaly, No inguinal nodes and Bowel sounds normoactive.   Pelvic:       - Ext: Vulva and perineum are normal without lesion, mass or discharge        - Urethra: normal without discharge        - Urethral Meatus: normal appearance       - Bladder: no tenderness, no masses       - Vagina: Normal mucosa, no discharge        - Cervix: Surgically absent       - Uterus: Surgically absent        - Adnexa: Normal without masses or tenderness       - Rectal: deferred    A/P:   Assessment:  Well woman exam (Annual exam)  Diabetic screening  Thyroid screening  Plan:   -  I discussed the new pap recommendations regarding screening.  Explained the rationale for increased intervals between paps.  Questions asked and answered.  She does agree to this regiment.  Pap was not obtained since she has no cervix and it was removed for benign disease   -  BC: TVH   -  Check fasting labwork this morning - due for a glucose and thyroid check since she already had her lipids checked in 2/17  Orders Placed This Encounter "   Procedures     TSH with free T4 reflex     Glucose      - Encouraged self-breast exam   - Encouraged low fat diet, regular exercise, and adequate calcium intake.    Ruth Romero DO, FACOG, FACS

## 2017-04-27 ASSESSMENT — PATIENT HEALTH QUESTIONNAIRE - PHQ9: SUM OF ALL RESPONSES TO PHQ QUESTIONS 1-9: 1

## 2017-04-27 ASSESSMENT — ANXIETY QUESTIONNAIRES: GAD7 TOTAL SCORE: 0

## 2017-05-07 DIAGNOSIS — G43.709 CHRONIC MIGRAINE WITHOUT AURA WITHOUT STATUS MIGRAINOSUS, NOT INTRACTABLE: ICD-10-CM

## 2017-05-07 DIAGNOSIS — E78.5 HYPERLIPIDEMIA WITH TARGET LDL LESS THAN 100: ICD-10-CM

## 2017-05-07 DIAGNOSIS — E11.9 TYPE 2 DIABETES, HBA1C GOAL < 7% (H): ICD-10-CM

## 2017-05-09 RX ORDER — SUMATRIPTAN 25 MG/1
TABLET, FILM COATED ORAL
Qty: 18 TABLET | Refills: 0 | Status: SHIPPED | OUTPATIENT
Start: 2017-05-09 | End: 2022-06-03

## 2017-05-09 RX ORDER — ASPIRIN 81 MG
TABLET, DELAYED RELEASE (ENTERIC COATED) ORAL
Qty: 100 TABLET | Refills: 0 | Status: SHIPPED | OUTPATIENT
Start: 2017-05-09 | End: 2021-05-28

## 2017-05-09 NOTE — TELEPHONE ENCOUNTER
SUMAtriptan (IMITREX) 25 MG tablet  Last Written Prescription Date: 2/9/2016  Last Fill Quantity: 18, # refills: 0  Last Office Visit with [facility], [facility] or [facility] prescribing provider: 7/7/16  Next 5 appointments (look out 90 days)     Jun 14, 2017  9:00 AM CDT   Return Visit with Zac Sandhu OD   [facility]    [address] [facility] [id number] [phone]                   BP Readings from Last 3 Encounters:   04/26/17 154/88   04/04/17 138/77   03/07/17 154/89         ASPIRIN LOW DOSE 81 MG EC tablet  Last Written Prescription Date: 12/30/2015  Last Fill Quantity: 100,  # refills: 3   Last Office Visit with [facility], [facility] or [facility] prescribing provider: 7/7/2016                                         Next 5 appointments (look out 90 days)     Jun 14, 2017  9:00 AM CDT   Return Visit with Zac Sandhu OD   [facility])    [address] [facility] [id number] [phone]                    Refilled per [facility] protocol.    Sydni Yeager RN

## 2017-05-22 ENCOUNTER — COMMUNICATION - HEALTHEAST (OUTPATIENT)
Dept: ADMINISTRATIVE | Facility: CLINIC | Age: 47
End: 2017-05-22

## 2017-06-14 ENCOUNTER — OFFICE VISIT (OUTPATIENT)
Dept: OPTOMETRY | Facility: CLINIC | Age: 47
End: 2017-06-14
Payer: COMMERCIAL

## 2017-06-14 DIAGNOSIS — H52.13 MYOPIA, BILATERAL: ICD-10-CM

## 2017-06-14 DIAGNOSIS — N18.30 TYPE 2 DIABETES MELLITUS WITH STAGE 3 CHRONIC KIDNEY DISEASE, WITHOUT LONG-TERM CURRENT USE OF INSULIN (H): Primary | ICD-10-CM

## 2017-06-14 DIAGNOSIS — H52.4 PRESBYOPIA: ICD-10-CM

## 2017-06-14 DIAGNOSIS — E11.22 TYPE 2 DIABETES MELLITUS WITH STAGE 3 CHRONIC KIDNEY DISEASE, WITHOUT LONG-TERM CURRENT USE OF INSULIN (H): Primary | ICD-10-CM

## 2017-06-14 PROCEDURE — 92015 DETERMINE REFRACTIVE STATE: CPT | Performed by: OPTOMETRIST

## 2017-06-14 PROCEDURE — 92014 COMPRE OPH EXAM EST PT 1/>: CPT | Performed by: OPTOMETRIST

## 2017-06-14 ASSESSMENT — VISUAL ACUITY
OD_CC: 20/70
OD_SC: 20/150
OS_CC: 20/20
OD_CC: 20/20
METHOD: SNELLEN - LINEAR
CORRECTION_TYPE: GLASSES
OS_SC: 20/100
OS_CC: 20/70

## 2017-06-14 ASSESSMENT — REFRACTION_MANIFEST
OS_AXIS: 070
OS_SPHERE: -4.50
OS_ADD: +2.00
OS_CYLINDER: +0.75
OD_SPHERE: -4.50
OD_ADD: +2.00

## 2017-06-14 ASSESSMENT — REFRACTION_WEARINGRX
OD_SPHERE: -5.25
OD_AXIS: 002
OS_AXIS: 075
OD_CYLINDER: +0.50
OD_ADD: +1.50
OS_ADD: +1.50
OS_SPHERE: -5.00
OS_CYLINDER: +0.75

## 2017-06-14 ASSESSMENT — TONOMETRY
IOP_METHOD: TONOPEN
OD_IOP_MMHG: 13
OS_IOP_MMHG: 12

## 2017-06-14 ASSESSMENT — SLIT LAMP EXAM - LIDS
COMMENTS: NORMAL
COMMENTS: NORMAL

## 2017-06-14 ASSESSMENT — CUP TO DISC RATIO
OS_RATIO: 0.2
OD_RATIO: 0.2

## 2017-06-14 ASSESSMENT — EXTERNAL EXAM - LEFT EYE: OS_EXAM: NORMAL

## 2017-06-14 ASSESSMENT — CONF VISUAL FIELD
OS_NORMAL: 1
OD_NORMAL: 1

## 2017-06-14 ASSESSMENT — EXTERNAL EXAM - RIGHT EYE: OD_EXAM: NORMAL

## 2017-06-14 NOTE — LETTER
June 14, 2017        Adelaida Packer    1970  [id number]    Dear Dr. Myers and Dr. Gordillo,    Your patient was seen in our office on 6/14/2017 for a dilated diabetic eye exam.      Findings:    No Retinopathy  OU - Bilaterally (both)    Comments:  Dilated eye exam.  No diabetic retinopathy.  RTC 1 year or sooner at your discretion.      Sincerely,    Zac Sandhu, OD  [address] [id number] [phone]

## 2017-06-14 NOTE — MR AVS SNAPSHOT
After Visit Summary   6/14/2017    Adelaida Packer    MRN: [id number]           Patient Information     Date Of Birth          1970        Visit Information        Provider Department      6/14/2017 9:00 AM Zac Sandhu OD [facility]        Today's Diagnoses     Type 2 diabetes mellitus with stage 3 chronic kidney disease, without long-term current use of insulin (H)    -  1    No diabetic retinopathy in both eyes        Myopia, bilateral        Presbyopia          Care Instructions    There are not any signs of the diabetes affecting the eyes today.  It is important that you get your eyes dilated once yearly and keep good control of your diabetes.    Eyeglass prescription given.  Your options are a bifocal which would allow you to see distance and near vision or separate glasses for distance and reading.    Return for cl fit- may try a different brand of 1 use contact lens.    Return in 1 year for a complete eye exam or sooner if needed.    Zac Sandhu OD            Follow-ups after your visit        Follow-up notes from your care team     Return in about 1 year (around 6/14/2018) for Annual Visit.      Your next 10 appointments already scheduled     Aug 10, 2017  9:00 AM CDT   LAB with LAB FIRST FLOOR [facility] ([facility])    [address] [place] [address] [phone]           Patient must bring picture ID.  Patient should be prepared to give a urine specimen  Please do not eat 10-12 hours before your appointment if you are coming in fasting for labs on lipids, cholesterol, or glucose (sugar).  Pregnant women should follow their Care Team instructions. Water with medications is okay. Do not drink coffee or other fluids.   If you have concerns about taking  your medications, please ask at office or if scheduling via [facility], send a message by clicking on Secure Messaging, Message Your Care Team.            Aug 15,  2017  9:15 AM CDT   Return Visit with Cele Myers MD,  ENDO NURSE   [facility])    [address] [facility] [address]   [phone]            Aug 17, 2017  2:30 PM CDT   LAB with LAB FIRST FLOOR [facility] ([facility])    [address] [facility] [address]   [phone]           Patient must bring picture ID.  Patient should be prepared to give a urine specimen  Please do not eat 10-12 hours before your appointment if you are coming in fasting for labs on lipids, cholesterol, or glucose (sugar).  Pregnant women should follow their Care Team instructions. Water with medications is okay. Do not drink coffee or other fluids.   If you have concerns about taking  your medications, please ask at office or if scheduling via [employer], send a message by clicking on Secure Messaging, Message Your Care Team.            Aug 17, 2017  3:00 PM CDT   Return Visit with Kristi Rodas MD   [facility])    [address] [facility] [address]   [phone]            Mar 06, 2018  4:00 PM CST   LAB with LAB FIRST FLOOR [facility])    [address] [facility] [address]   [phone]           Patient must bring picture ID.  Patient should be prepared to give a urine specimen  Please do not eat 10-12 hours before your appointment if you are coming in fasting for labs on lipids, cholesterol, or glucose (sugar).  Pregnant women should follow their Care Team instructions. Water with medications is okay. Do not drink coffee or other fluids.   If you have concerns about taking  your medications, please ask at office or if scheduling via [employer], send a message by clicking on Secure Messaging, Message Your Care Team.            Mar 06, 2018  4:30 PM CST   Return Visit with Dia  Elsa Aleman MD   [facility] ([facility])    [address] [place] [address] [phone]              Who to contact     If you have questions or need follow up information about today's clinic visit or your schedule please contact [facility] directly at [phone].  Normal or non-critical lab and imaging results will be communicated to you by MyChart, letter or phone within 4 business days after the clinic has received the results. If you do not hear from us within 7 days, please contact the clinic through [employer] or phone. If you have a critical or abnormal lab result, we will notify you by phone as soon as possible.  Submit refill requests through [employer] or call your pharmacy and they will forward the refill request to us. Please allow 3 business days for your refill to be completed.          Additional Information About Your Visit        [employer] Information     [employer] gives you secure access to your electronic health record. If you see a primary care provider, you can also send messages to your care team and make appointments. If you have questions, please call your primary care clinic.  If you do not have a primary care provider, please call [phone] and they will assist you.      [employer] is an electronic gateway that provides easy, online access to your medical records. With [employer], you can request a clinic appointment, read your test results, renew a prescription or communicate with your care team.     To access your existing account, please contact your [facility] or call [phone] for assistance.        Care EveryWhere ID     This is your Care EveryWhere ID. This could be used by other organizations to access your [facility] medical records  [id number]        Your Vitals Were     Last Period                   08/13/2005            Blood Pressure from Last 3 Encounters:   04/26/17 154/88    04/04/17 138/77   03/07/17 154/89    Weight from Last 3 Encounters:   04/26/17 102.2 kg (225 lb 4.8 oz)   03/07/17 103.9 kg (229 lb)   02/21/17 104.2 kg (229 lb 11.5 oz)              We Performed the Following     EYE EXAM (SIMPLE-NONBILLABLE)     REFRACTION          Today's Medication Changes          These changes are accurate as of: 6/14/17  1:19 PM.  If you have any questions, ask your nurse or doctor.               These medicines have changed or have updated prescriptions.        Dose/Directions    ranitidine 150 MG tablet   Commonly known as:  ZANTAC   This may have changed:    - when to take this  - reasons to take this   Used for:  Gastroesophageal reflux disease without esophagitis        Dose:  150 mg   Take 1 tablet (150 mg) by mouth 2 times daily   Quantity:  60 tablet   Refills:  2                Primary Care Provider Office Phone # Fax #    Windy Gordillo [phone]       [facility] [id number] [address] [facility] [address]        Thank you!     Thank you for choosing [facility]  for your care. Our goal is always to provide you with excellent care. Hearing back from our patients is one way we can continue to improve our services. Please take a few minutes to complete the written survey that you may receive in the mail after your visit with us. Thank you!             Your Updated Medication List - Protect others around you: Learn how to safely use, store and throw away your medicines at www.disposemymeds.org.          This list is accurate as of: 6/14/17  1:19 PM.  Always use your most recent med list.                   Brand Name Dispense Instructions for use    acetaminophen 325 MG tablet    TYLENOL    100 tablet    Take 3 tablets (975 mg) by mouth every 8 hours as needed for mild pain or headaches       ASPIRIN LOW DOSE 81 MG EC tablet   Generic drug:  aspirin     100 tablet    TAKE 1 TABLET BY MOUTH ONCE DAILY       blood glucose monitoring lancets      1 Box    Use to test blood sugar 4-5 times daily or as directed.       Blood Pressure Monitor Kit     1 kit    1 Device 2 times daily       calcium carbonate 1500 (600 CA) MG tablet    OS-EVGENY 600 mg [place]. Ca    180 tablet    Take 1 tablet (600 mg) by mouth 2 times daily       cetirizine 10 MG tablet    zyrTEC     Take 10 mg by mouth daily as needed for allergies       cholecalciferol 2000 UNITS Caps     90 capsule    Take 2,000 Units by mouth daily       eplerenone 25 MG tablet    INSPRA    630 tablet    100 mg (4 tabs) in AM and 75 mg (3 tabs) in PM       fluticasone 50 MCG/ACT spray    FLONASE    16 g    Spray 2 sprays into both nostrils daily       labetalol 300 MG tablet    NORMODYNE    180 tablet    Take 1 tablet (300 mg) by mouth every 12 hours       polyethylene glycol Packet    MIRALAX/GLYCOLAX     Take 17 g by mouth daily       ranitidine 150 MG tablet    ZANTAC    60 tablet    Take 1 tablet (150 mg) by mouth 2 times daily       simvastatin 10 MG tablet    ZOCOR    90 tablet    TAKE 1 TABLET BY MOUTH BEDTIME       SUMAtriptan 25 MG tablet    IMITREX    18 tablet    TAKE 1 TO 2 TABLETS BY MOUTH AT ONSET OF HEADACHE FOR MIGRAINE. MAY REPEAT DOSE IN 2 HOURS. MAX OF 200MG OR 2 DOSES IN 24 HOURS

## 2017-06-14 NOTE — PROGRESS NOTES
Chief Complaint   Patient presents with     COMPREHENSIVE EYE EXAM     Type 2 diabetes         Hemoglobin A1C   Date Value Ref Range Status   02/21/2017 5.5 4.3 - 6.0 % Final       Last Eye Exam: 3-  Dilated Previously: Yes    What are you currently using to see?  glasses and contacts       Distance Vision Acuity: Satisfied with vision    Near Vision Acuity: Satisfied with vision while reading  unaided    Eye Comfort: good  Do you use eye drops? : No  Occupation or Hobbies: teacher    Ibeth Martin Optometric Assistant, A.B.O.C.          Medical, surgical and family histories reviewed and updated 6/14/2017.       OBJECTIVE: See Ophthalmology exam    ASSESSMENT:    ICD-10-CM    1. Type 2 diabetes mellitus with stage 3 chronic kidney disease, without long-term current use of insulin (H) E11.22 EYE EXAM (SIMPLE-NONBILLABLE)    N18.3    2. No diabetic retinopathy in both eyes Z03.89 EYE EXAM (SIMPLE-NONBILLABLE)   3. Myopia, bilateral H52.13 REFRACTION   4. Presbyopia H52.4 REFRACTION      PLAN:     Patient Instructions   There are not any signs of the diabetes affecting the eyes today.  It is important that you get your eyes dilated once yearly and keep good control of your diabetes.    Eyeglass prescription given.  Your options are a bifocal which would allow you to see distance and near vision or separate glasses for distance and reading.    Return for cl fit- may try a different brand of 1 use contact lens.    Return in 1 year for a complete eye exam or sooner if needed.    Zac Sandhu, OD

## 2017-06-14 NOTE — PATIENT INSTRUCTIONS
There are not any signs of the diabetes affecting the eyes today.  It is important that you get your eyes dilated once yearly and keep good control of your diabetes.    Eyeglass prescription given.  Your options are a bifocal which would allow you to see distance and near vision or separate glasses for distance and reading.    Return for cl fit- may try a different brand of 1 use contact lens.    Return in 1 year for a complete eye exam or sooner if needed.    Zac Sandhu, OD

## 2017-06-20 ENCOUNTER — TELEPHONE (OUTPATIENT)
Dept: PHARMACY | Facility: CLINIC | Age: 47
End: 2017-06-20

## 2017-06-20 NOTE — TELEPHONE ENCOUNTER
Called patient to schedule a f/u MTM. Patient is scheduled for 8/15.  Mireya Gabriel, Pharm.D, BCACP  Medication Therapy Management Pharmacist

## 2017-07-17 ENCOUNTER — OFFICE VISIT (OUTPATIENT)
Dept: PEDIATRICS | Facility: CLINIC | Age: 47
End: 2017-07-17
Payer: COMMERCIAL

## 2017-07-17 VITALS
WEIGHT: 212 LBS | SYSTOLIC BLOOD PRESSURE: 138 MMHG | TEMPERATURE: 97.2 F | BODY MASS INDEX: 40.06 KG/M2 | HEART RATE: 64 BPM | DIASTOLIC BLOOD PRESSURE: 90 MMHG | OXYGEN SATURATION: 100 %

## 2017-07-17 DIAGNOSIS — L03.811 CELLULITIS OF HEAD EXCEPT FACE: Primary | ICD-10-CM

## 2017-07-17 DIAGNOSIS — I88.9 LYMPHADENITIS: ICD-10-CM

## 2017-07-17 PROCEDURE — 99213 OFFICE O/P EST LOW 20 MIN: CPT | Performed by: INTERNAL MEDICINE

## 2017-07-17 RX ORDER — CEPHALEXIN 500 MG/1
500 CAPSULE ORAL 3 TIMES DAILY
Qty: 30 CAPSULE | Refills: 0 | Status: SHIPPED | OUTPATIENT
Start: 2017-07-17 | End: 2017-08-15

## 2017-07-17 NOTE — PATIENT INSTRUCTIONS
Follow up if no improvement or worsening.     Medication(s) prescribed today:    Orders Placed This Encounter   Medications     cephALEXin (KEFLEX) 500 MG capsule     Sig: Take 1 capsule (500 mg) by mouth 3 times daily     Dispense:  30 capsule     Refill:  0

## 2017-07-17 NOTE — NURSING NOTE
"Chief Complaint   Patient presents with     Mass     Lump behind right ear, also on right neck.        Initial /90 (Cuff Size: Adult Large)  Pulse 64  Temp 97.2  F (36.2  C) (Temporal)  Wt 212 lb (96.2 kg)  LMP 08/13/2005  SpO2 100%  BMI 40.06 kg/m2 Estimated body mass index is 40.06 kg/(m^2) as calculated from the following:    Height as of 4/26/17: 5' 1\" (1.549 m).    Weight as of this encounter: 212 lb (96.2 kg).  Medication Reconciliation: complete    "

## 2017-07-17 NOTE — MR AVS SNAPSHOT
After Visit Summary   7/17/2017    Adelaida Packer    MRN: [id number]           Patient Information     Date Of Birth          1970        Visit Information        Provider Department      7/17/2017 9:40 AM Saeid Cruz MD [facility]        Today's Diagnoses     Cellulitis of head except face    -  1    Lymphadenitis          Care Instructions    Follow up if no improvement or worsening.     Medication(s) prescribed today:    Orders Placed This Encounter   Medications     cephALEXin (KEFLEX) 500 MG capsule     Sig: Take 1 capsule (500 mg) by mouth 3 times daily     Dispense:  30 capsule     Refill:  0                   Follow-ups after your visit        Your next 10 appointments already scheduled     Aug 10, 2017  9:00 AM CDT   LAB with LAB FIRST FLOOR [facility] ([facility])    [address] [place] [address] [phone]           Patient must bring picture ID.  Patient should be prepared to give a urine specimen  Please do not eat 10-12 hours before your appointment if you are coming in fasting for labs on lipids, cholesterol, or glucose (sugar).  Pregnant women should follow their Care Team instructions. Water with medications is okay. Do not drink coffee or other fluids.   If you have concerns about taking  your medications, please ask at office or if scheduling via [employer], send a message by clicking on Secure Messaging, Message Your Care Team.            Aug 15, 2017  9:15 AM CDT   Return Visit with Cele Myers MD,  ENDO NURSE   [facility] ([facility])    [address] [place] [address] [address]  [facility] [address]   [phone]            Aug 15, 2017 10:30 AM CDT   SHORT with Mireya Gabriel [facility] ([facility])    [address] [place] [address] [address]  [facility] [address]   [phone]            Aug 17, 2017  2:30 PM  CDT   LAB with LAB FIRST FLOOR [facility] ([facility])    [address] [facility] [address] [phone]           Patient must bring picture ID.  Patient should be prepared to give a urine specimen  Please do not eat 10-12 hours before your appointment if you are coming in fasting for labs on lipids, cholesterol, or glucose (sugar).  Pregnant women should follow their Care Team instructions. Water with medications is okay. Do not drink coffee or other fluids.   If you have concerns about taking  your medications, please ask at office or if scheduling via [employer], send a message by clicking on Secure Messaging, Message Your Care Team.            Aug 17, 2017  3:00 PM CDT   Return Visit with Kristi Rodas MD   [facility])    [address] [facility] [address]   [phone]            Mar 06, 2018  4:00 PM CST   LAB with LAB FIRST FLOOR [facility])    [address] [facility] [address] [phone]           Patient must bring picture ID.  Patient should be prepared to give a urine specimen  Please do not eat 10-12 hours before your appointment if you are coming in fasting for labs on lipids, cholesterol, or glucose (sugar).  Pregnant women should follow their Care Team instructions. Water with medications is okay. Do not drink coffee or other fluids.   If you have concerns about taking  your medications, please ask at office or if scheduling via [employer], send a message by clicking on Secure Messaging, Message Your Care Team.            Mar 06, 2018  4:30 PM CST   Return Visit with Dia Aleman MD   [facility])    [address] [address] [address]   [phone]              Who to contact     If you have questions or need follow up information about today's  clinic visit or your schedule please contact [facility] directly at [phone].  Normal or non-critical lab and imaging results will be communicated to you by [employer], letter or phone within 4 business days after the clinic has received the results. If you do not hear from us within 7 days, please contact the clinic through [employer] or phone. If you have a critical or abnormal lab result, we will notify you by phone as soon as possible.  Submit refill requests through [employer] or call your pharmacy and they will forward the refill request to us. Please allow 3 business days for your refill to be completed.          Additional Information About Your Visit        [employer] Information     [employer] gives you secure access to your electronic health record. If you see a primary care provider, you can also send messages to your care team and make appointments. If you have questions, please call your primary care clinic.  If you do not have a primary care provider, please call [phone] and they will assist you.      [employer] is an electronic gateway that provides easy, online access to your medical records. With [employer], you can request a clinic appointment, read your test results, renew a prescription or communicate with your care team.     To access your existing account, please contact your [facility] or call [phone] for assistance.        Care EveryWhere ID     This is your Care EveryWhere ID. This could be used by other organizations to access your [facility] medical records  [id number]        Your Vitals Were     Pulse Temperature Last Period Pulse Oximetry BMI (Body Mass Index)       64 97.2  F (36.2  C) (Temporal) 08/13/2005 100% 40.06 kg/m2        Blood Pressure from Last 3 Encounters:   07/17/17 138/90   04/26/17 154/88   04/04/17 138/77    Weight from Last 3 Encounters:   07/17/17 212 lb (96.2 kg)   04/26/17 225 lb 4.8 oz (102.2 kg)   03/07/17 229 lb (103.9  kg)              Today, you had the following     No orders found for display         Today's Medication Changes          These changes are accurate as of: 7/17/17 10:11 AM.  If you have any questions, ask your nurse or doctor.               Start taking these medicines.        Dose/Directions    cephALEXin 500 MG capsule   Commonly known as:  KEFLEX   Used for:  Lymphadenitis, Cellulitis of head except face   Started by:  Saeid Cruz MD        Dose:  500 mg   Take 1 capsule (500 mg) by mouth 3 times daily   Quantity:  30 capsule   Refills:  0         These medicines have changed or have updated prescriptions.        Dose/Directions    ranitidine 150 MG tablet   Commonly known as:  ZANTAC   This may have changed:    - when to take this  - reasons to take this   Used for:  Gastroesophageal reflux disease without esophagitis        Dose:  150 mg   Take 1 tablet (150 mg) by mouth 2 times daily   Quantity:  60 tablet   Refills:  2            Where to get your medicines      These medications were sent to [facility] [place], [place] - [address]  [address], [facility] [address]     Phone:  [phone]     cephALEXin 500 MG capsule                Primary Care Provider Office Phone # Fax #    Windy Gordillo [phone]       [facility] [address]  [facility] [address]        Equal Access to Services     ATTILA VILLEGAS AH: Hadii luma awad hadasho Soomaali, waaxda luqadaha, qaybta kaalmada adeegyada, silvia faust haymateus mahmood . So [facility] [phone].    ATENCIÓN: Si habla español, tiene a lynch disposición servicios gratuitos de asistencia lingüística. Llame al [phone].    We comply with applicable federal civil rights laws and [place] laws. We do not discriminate on the basis of race, color, national origin, age, disability sex, sexual orientation or gender identity.            Thank you!     Thank you for choosing [facility]  for your care. Our goal is always to provide you with excellent care. Hearing back from our patients is one way we can continue to improve our services. Please take a few minutes to complete the written survey that you may receive in the mail after your visit with us. Thank you!             Your Updated Medication List - Protect others around you: Learn how to safely use, store and throw away your medicines at www.disposemymeds.org.          This list is accurate as of: 7/17/17 10:11 AM.  Always use your most recent med list.                   Brand Name Dispense Instructions for use Diagnosis    acetaminophen 325 MG tablet    TYLENOL    100 tablet    Take 3 tablets (975 mg) by mouth every 8 hours as needed for mild pain or headaches    New daily persistent headache       ASPIRIN LOW DOSE 81 MG EC tablet   Generic drug:  aspirin     100 tablet    TAKE 1 TABLET BY MOUTH ONCE DAILY    Type 2 diabetes, HbA1c goal < 7% (H), Hyperlipidemia with target LDL less than 100       blood glucose monitoring lancets     1 Box    Use to test blood sugar 4-5 times daily or as directed.    Type 2 diabetes, HbA1c goal < 7% (H)       Blood Pressure Monitor Kit     1 kit    1 Device 2 times daily    Essential hypertension       calcium carbonate 1500 (600 CA) MG tablet    OS-EVGENY 600 mg [place]. Ca    180 tablet    Take 1 tablet (600 mg) by mouth 2 times daily    Vitamin D insufficiency       cephALEXin 500 MG capsule    KEFLEX    30 capsule    Take 1 capsule (500 mg) by mouth 3 times daily    Lymphadenitis, Cellulitis of head except face       cetirizine 10 MG tablet    zyrTEC     Take 10 mg by mouth daily as needed for allergies        cholecalciferol 2000 UNITS Caps     90 capsule    Take 2,000 Units by mouth daily    Vitamin D insufficiency       eplerenone 25 MG tablet    INSPRA    630 tablet    100 mg (4 tabs) in AM and 75 mg (3 tabs) in PM    Primary hyperaldosteronism (H)       fluticasone 50 MCG/ACT  spray    FLONASE    16 g    Spray 2 sprays into both nostrils daily    Nasal congestion       labetalol 300 MG tablet    NORMODYNE    180 tablet    Take 1 tablet (300 mg) by mouth every 12 hours    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 MAY REPEAT DOSE IN 2 HOURS. MAX OF 200MG OR 2 DOSES IN 24 HOURS    Chronic migraine without aura without status migrainosus, not intractable

## 2017-07-17 NOTE — PROGRESS NOTES
SUBJECTIVE:                                                    Adelaida Packer is a 47 year old female who presents to clinic today for the following health issues:      Lump behind right ear, also on right neck. Started suddenly 2 days ago, warm and painful. No fever. No URIs, no scalp infection, no ear pain.       Current Outpatient Prescriptions on File Prior to Visit:  ASPIRIN LOW DOSE 81 MG EC tablet TAKE 1 TABLET BY MOUTH ONCE DAILY   simvastatin (ZOCOR) 10 MG tablet TAKE 1 TABLET BY MOUTH BEDTIME   cholecalciferol 2000 UNITS CAPS Take 2,000 Units by mouth daily   labetalol (NORMODYNE) 300 MG tablet Take 1 tablet (300 mg) by mouth every 12 hours   cetirizine (ZYRTEC) 10 MG tablet Take 10 mg by mouth daily as needed for allergies   SUMAtriptan (IMITREX) 25 MG tablet TAKE 1 TO 2 TABLETS BY MOUTH AT ONSET OF HEADACHE FOR MIGRAINE. MAY REPEAT DOSE IN 2 HOURS. MAX OF 200MG OR 2 DOSES IN 24 HOURS   calcium carbonate (OS-EVGENY 600 MG [place]. CA) 1500 (600 CA) MG tablet Take 1 tablet (600 mg) by mouth 2 times daily   polyethylene glycol (MIRALAX/GLYCOLAX) Packet Take 17 g by mouth daily   fluticasone (FLONASE) 50 MCG/ACT nasal spray Spray 2 sprays into both nostrils daily   ranitidine (ZANTAC) 150 MG tablet Take 1 tablet (150 mg) by mouth 2 times daily (Patient taking differently: Take 150 mg by mouth daily as needed )   Blood Pressure Monitor KIT 1 Device 2 times daily   acetaminophen (TYLENOL) 325 MG tablet Take 3 tablets (975 mg) by mouth every 8 hours as needed for mild pain or headaches   blood glucose monitoring (ACCU-CHEK MULTICLIX) lancets Use to test blood sugar 4-5 times daily or as directed.     No current facility-administered medications on file prior to visit.       Problem list, Medication list, Allergies, and Medical/Social/Surgical histories reviewed in [facility] and updated as appropriate.    OBJECTIVE:                                                    /90 (Cuff Size: Adult Large)  Pulse 64  Temp  97.2  F (36.2  C) (Temporal)  Wt 212 lb (96.2 kg)  LMP 08/13/2005  SpO2 100%  BMI 40.06 kg/m2    GEN: healthy, alert and no distress  HEENT: normal TM, external canal. Behind the right ear over the mastoid, there is a palpable lump with warmth and erythema, tender. There is a small cut in the center. There is also a tender right anterior cervical lymph node.         ASSESSMENT/PLAN:                                                      47 year old female with the following diagnoses and treatment plan:      ICD-10-CM    1. Cellulitis of head except face L03.811 cephALEXin (KEFLEX) 500 MG capsule   2. Lymphadenitis I88.9 cephALEXin (KEFLEX) 500 MG capsule       Will call or return to clinic if worsening or symptoms not improving as discussed.  See Patient Instructions.        Saeid Cruz MD-PhD  [facility]    (Note: Chart documentation was done in part with Dragon Voice Recognition software. Although reviewed after completion, some word and grammatical errors may remain.)

## 2017-07-26 DIAGNOSIS — E26.09 PRIMARY HYPERALDOSTERONISM (H): ICD-10-CM

## 2017-07-26 RX ORDER — EPLERENONE 25 MG/1
TABLET, FILM COATED ORAL
Qty: 630 TABLET | Refills: 1 | Status: SHIPPED | OUTPATIENT
Start: 2017-07-26 | End: 2017-10-30

## 2017-07-26 NOTE — TELEPHONE ENCOUNTER
Refill request received for Eplerenone 25 mg tablet- Take 4 tablets by mouth every morning, then take 3 tablets by mouth every evening. #[id number] from [employer].    eplerenone (INSPRA) 25 MG tablet 630 tablet 1 2017  No   Si mg (4 tabs) in AM and 75 mg (3 tabs) in PM   Class: E-Prescribe   Order: [id number]   E-Prescribing Status: Receipt confirmed by pharmacy (2017  8:38 AM CDT)     Component      Latest Ref Rng & Units 3/7/2017   GFR Estimate If Black      >60 mL/min/1.7m2 54 (L)     Component      Latest Ref Rng & Units 3/7/2017   Potassium      3.4 - 5.3 mmol/L 3.7     Next follow up with Dr. Aleman scheduled for 3/2018. Refilled medication.    Wendy Sepulveda, RN, BSN  Nephrology Care Coordinator  [facility]

## 2017-08-10 DIAGNOSIS — D47.2 MONOCLONAL GAMMOPATHY: ICD-10-CM

## 2017-08-10 LAB
ALBUMIN SERPL-MCNC: 3.8 G/DL (ref 3.4–5)
ALP SERPL-CCNC: 34 U/L (ref 40–150)
ALT SERPL W P-5'-P-CCNC: 28 U/L (ref 0–50)
ANION GAP SERPL CALCULATED.3IONS-SCNC: 7 MMOL/L (ref 3–14)
AST SERPL W P-5'-P-CCNC: 16 U/L (ref 0–45)
BASOPHILS # BLD AUTO: 0 10E9/L (ref 0–0.2)
BASOPHILS NFR BLD AUTO: 0.3 %
BILIRUB SERPL-MCNC: 0.5 MG/DL (ref 0.2–1.3)
BUN SERPL-MCNC: 16 MG/DL (ref 7–30)
CALCIUM SERPL-MCNC: 9.3 MG/DL (ref 8.5–10.1)
CHLORIDE SERPL-SCNC: 106 MMOL/L (ref 94–109)
CO2 SERPL-SCNC: 26 MMOL/L (ref 20–32)
CREAT SERPL-MCNC: 1.13 MG/DL (ref 0.52–1.04)
DIFFERENTIAL METHOD BLD: ABNORMAL
EOSINOPHIL # BLD AUTO: 0.3 10E9/L (ref 0–0.7)
EOSINOPHIL NFR BLD AUTO: 7.3 %
ERYTHROCYTE [DISTWIDTH] IN BLOOD BY AUTOMATED COUNT: 13.2 % (ref 10–15)
GFR SERPL CREATININE-BSD FRML MDRD: 51 ML/MIN/1.7M2
GLUCOSE SERPL-MCNC: 89 MG/DL (ref 70–99)
HCT VFR BLD AUTO: 35.3 % (ref 35–47)
HGB BLD-MCNC: 12 G/DL (ref 11.7–15.7)
LYMPHOCYTES # BLD AUTO: 1.7 10E9/L (ref 0.8–5.3)
LYMPHOCYTES NFR BLD AUTO: 44.8 %
MCH RBC QN AUTO: 29.9 PG (ref 26.5–33)
MCHC RBC AUTO-ENTMCNC: 34 G/DL (ref 31.5–36.5)
MCV RBC AUTO: 88 FL (ref 78–100)
MONOCYTES # BLD AUTO: 0.3 10E9/L (ref 0–1.3)
MONOCYTES NFR BLD AUTO: 6.5 %
NEUTROPHILS # BLD AUTO: 1.6 10E9/L (ref 1.6–8.3)
NEUTROPHILS NFR BLD AUTO: 41.1 %
PLATELET # BLD AUTO: 177 10E9/L (ref 150–450)
POTASSIUM SERPL-SCNC: 3.7 MMOL/L (ref 3.4–5.3)
PROT SERPL-MCNC: 7.3 G/DL (ref 6.8–8.8)
RBC # BLD AUTO: 4.02 10E12/L (ref 3.8–5.2)
SODIUM SERPL-SCNC: 139 MMOL/L (ref 133–144)
WBC # BLD AUTO: 3.8 10E9/L (ref 4–11)

## 2017-08-10 PROCEDURE — 80053 COMPREHEN METABOLIC PANEL: CPT | Performed by: INTERNAL MEDICINE

## 2017-08-10 PROCEDURE — 83883 ASSAY NEPHELOMETRY NOT SPEC: CPT | Mod: 59 | Performed by: INTERNAL MEDICINE

## 2017-08-10 PROCEDURE — 85025 COMPLETE CBC W/AUTO DIFF WBC: CPT | Performed by: INTERNAL MEDICINE

## 2017-08-10 PROCEDURE — 82784 ASSAY IGA/IGD/IGG/IGM EACH: CPT | Performed by: INTERNAL MEDICINE

## 2017-08-10 PROCEDURE — 86334 IMMUNOFIX E-PHORESIS SERUM: CPT | Performed by: INTERNAL MEDICINE

## 2017-08-10 PROCEDURE — 86335 IMMUNFIX E-PHORSIS/URINE/CSF: CPT | Performed by: INTERNAL MEDICINE

## 2017-08-10 PROCEDURE — 84165 PROTEIN E-PHORESIS SERUM: CPT | Performed by: INTERNAL MEDICINE

## 2017-08-10 PROCEDURE — 84166 PROTEIN E-PHORESIS/URINE/CSF: CPT | Performed by: INTERNAL MEDICINE

## 2017-08-10 PROCEDURE — 00000402 ZZHCL STATISTIC TOTAL PROTEIN: Performed by: INTERNAL MEDICINE

## 2017-08-10 PROCEDURE — 83883 ASSAY NEPHELOMETRY NOT SPEC: CPT | Performed by: INTERNAL MEDICINE

## 2017-08-10 PROCEDURE — 36415 COLL VENOUS BLD VENIPUNCTURE: CPT | Performed by: INTERNAL MEDICINE

## 2017-08-11 LAB
ALBUMIN MFR UR ELPH: 42.6 %
ALBUMIN SERPL ELPH-MCNC: 4.1 G/DL (ref 3.7–5.1)
ALPHA1 GLOB MFR UR ELPH: 23.1 %
ALPHA1 GLOB SERPL ELPH-MCNC: 0.2 G/DL (ref 0.2–0.4)
ALPHA2 GLOB MFR UR ELPH: 17.6 %
ALPHA2 GLOB SERPL ELPH-MCNC: 0.6 G/DL (ref 0.5–0.9)
B-GLOBULIN MFR UR ELPH: 9.8 %
B-GLOBULIN SERPL ELPH-MCNC: 0.7 G/DL (ref 0.6–1)
GAMMA GLOB MFR UR ELPH: 6.9 %
GAMMA GLOB SERPL ELPH-MCNC: 1.3 G/DL (ref 0.7–1.6)
IGA SERPL-MCNC: 109 MG/DL (ref 70–380)
IGG SERPL-MCNC: 1360 MG/DL (ref 695–1620)
IGM SERPL-MCNC: 98 MG/DL (ref 60–265)
IMMUNOFIXATION ELP: NORMAL
KAPPA LC UR-MCNC: 1.69 MG/DL (ref 0.33–1.94)
KAPPA LC/LAMBDA SER: 0.4 {RATIO} (ref 0.26–1.65)
LAMBDA LC SERPL-MCNC: 4.21 MG/DL (ref 0.57–2.63)
M PROTEIN MFR UR ELPH: 0 %
M PROTEIN SERPL ELPH-MCNC: 0.3 G/DL
PROT ELPH PNL UR ELPH: NORMAL
PROT PATTERN SERPL ELPH-IMP: ABNORMAL
PROT PATTERN UR ELPH-IMP: ABNORMAL

## 2017-08-15 ENCOUNTER — DOCUMENTATION ONLY (OUTPATIENT)
Dept: LAB | Facility: CLINIC | Age: 47
End: 2017-08-15

## 2017-08-15 ENCOUNTER — OFFICE VISIT (OUTPATIENT)
Dept: ENDOCRINOLOGY | Facility: CLINIC | Age: 47
End: 2017-08-15
Payer: COMMERCIAL

## 2017-08-15 ENCOUNTER — OFFICE VISIT (OUTPATIENT)
Dept: PHARMACY | Facility: CLINIC | Age: 47
End: 2017-08-15
Payer: COMMERCIAL

## 2017-08-15 VITALS
BODY MASS INDEX: 39.08 KG/M2 | DIASTOLIC BLOOD PRESSURE: 82 MMHG | HEIGHT: 61 IN | HEART RATE: 63 BPM | SYSTOLIC BLOOD PRESSURE: 139 MMHG | WEIGHT: 207 LBS

## 2017-08-15 DIAGNOSIS — E11.22 TYPE 2 DIABETES MELLITUS WITH STAGE 3 CHRONIC KIDNEY DISEASE, WITHOUT LONG-TERM CURRENT USE OF INSULIN (H): Primary | ICD-10-CM

## 2017-08-15 DIAGNOSIS — K21.9 GASTROESOPHAGEAL REFLUX DISEASE WITHOUT ESOPHAGITIS: ICD-10-CM

## 2017-08-15 DIAGNOSIS — G89.29 CHRONIC PAIN OF BOTH KNEES: ICD-10-CM

## 2017-08-15 DIAGNOSIS — G43.009 MIGRAINE WITHOUT AURA AND WITHOUT STATUS MIGRAINOSUS, NOT INTRACTABLE: ICD-10-CM

## 2017-08-15 DIAGNOSIS — E78.5 HYPERLIPIDEMIA WITH TARGET LDL LESS THAN 100: ICD-10-CM

## 2017-08-15 DIAGNOSIS — E21.0 PRIMARY HYPERPARATHYROIDISM (H): ICD-10-CM

## 2017-08-15 DIAGNOSIS — E55.9 VITAMIN D DEFICIENCY: Primary | ICD-10-CM

## 2017-08-15 DIAGNOSIS — E11.22 TYPE 2 DIABETES MELLITUS WITH STAGE 3 CHRONIC KIDNEY DISEASE, WITHOUT LONG-TERM CURRENT USE OF INSULIN (H): ICD-10-CM

## 2017-08-15 DIAGNOSIS — I15.2 HYPERTENSION DUE TO ENDOCRINE DISORDER: Primary | ICD-10-CM

## 2017-08-15 DIAGNOSIS — M25.562 CHRONIC PAIN OF BOTH KNEES: ICD-10-CM

## 2017-08-15 DIAGNOSIS — N18.30 TYPE 2 DIABETES MELLITUS WITH STAGE 3 CHRONIC KIDNEY DISEASE, WITHOUT LONG-TERM CURRENT USE OF INSULIN (H): Primary | ICD-10-CM

## 2017-08-15 DIAGNOSIS — M25.561 CHRONIC PAIN OF BOTH KNEES: ICD-10-CM

## 2017-08-15 DIAGNOSIS — J30.2 SEASONAL ALLERGIC RHINITIS, UNSPECIFIED CHRONICITY, UNSPECIFIED TRIGGER: ICD-10-CM

## 2017-08-15 DIAGNOSIS — N18.30 TYPE 2 DIABETES MELLITUS WITH STAGE 3 CHRONIC KIDNEY DISEASE, WITHOUT LONG-TERM CURRENT USE OF INSULIN (H): ICD-10-CM

## 2017-08-15 DIAGNOSIS — I12.9 HYPERTENSION, RENAL, STAGE 1-4 OR UNSPECIFIED CHRONIC KIDNEY DISEASE: ICD-10-CM

## 2017-08-15 DIAGNOSIS — E26.09 PRIMARY HYPERALDOSTERONISM (H): ICD-10-CM

## 2017-08-15 LAB
CHOLEST SERPL-MCNC: 120 MG/DL
HBA1C MFR BLD: 5.2 % (ref 0–5.7)
HDLC SERPL-MCNC: 56 MG/DL
LDLC SERPL CALC-MCNC: 52 MG/DL
NONHDLC SERPL-MCNC: 64 MG/DL
TRIGL SERPL-MCNC: 62 MG/DL

## 2017-08-15 PROCEDURE — 99213 OFFICE O/P EST LOW 20 MIN: CPT | Performed by: INTERNAL MEDICINE

## 2017-08-15 PROCEDURE — 83036 HEMOGLOBIN GLYCOSYLATED A1C: CPT | Performed by: INTERNAL MEDICINE

## 2017-08-15 PROCEDURE — 80061 LIPID PANEL: CPT | Performed by: INTERNAL MEDICINE

## 2017-08-15 PROCEDURE — 36415 COLL VENOUS BLD VENIPUNCTURE: CPT | Performed by: INTERNAL MEDICINE

## 2017-08-15 PROCEDURE — 99607 MTMS BY PHARM ADDL 15 MIN: CPT | Performed by: PHARMACIST

## 2017-08-15 PROCEDURE — 99605 MTMS BY PHARM NP 15 MIN: CPT | Performed by: PHARMACIST

## 2017-08-15 RX ORDER — SENNOSIDES 8.6 MG
650-1300 CAPSULE ORAL EVERY 8 HOURS PRN
Status: ON HOLD | COMMUNITY
End: 2021-08-12

## 2017-08-15 RX ORDER — LABETALOL 200 MG/1
200 TABLET, FILM COATED ORAL 2 TIMES DAILY
COMMUNITY
End: 2017-10-30

## 2017-08-15 NOTE — LETTER
8/15/2017       RE: Adelaida Packer  [id number] [facility]  [place] [address]     Dear Colleague,    Thank you for referring your patient, Adelaida Packer, to the [facility]. Please see a copy of my visit note below.      The patient is seen in f/up.    Adelaida Packer is a 47 year old obese female with a long-standing history of hypertension, chronic kidney disease, primary hyperparathyroidism, primary hyperaldosteronism, type 2 diabetes diagnosed in July 2014, morbid obesity and stomach banding in 2009 (the band was removed).     1. Type 2 diabetes, formally diagnosed in 2014 (the blood glucose numbers have been high since 2012).   Due to the high HbA1c of 12.1 at the time of diagnosis, she was treated with insulin from July until September 2014. Adelaida was not able to tolerate metformin due to diarrhea and dry heaves. In August 2014, the short acting insulin was discontinued and she was started on Prandin. Later on, she discontinued both Prandin and Lantus as the glycemic control significantly improved with a low carbohydrate diet, and she was started on Bydureon on 9/22/14. The patient found the injections painful and she decided to discontinue Bydureon in the beginning 2015.  In February 2015, she was started on Victoza, at 3 mg daily, which she took until beginning of 2016. The GLP-1 analogs haven't been successful in inducing weight loss.  With improved diet and regular exercise, the patient was able to maintain a good glycemic control in the absence of treatment.    HbA1c today was 5.2%, down from 5.5% at her last appointment here.  She occasionally check her BG in am and the numbers are in the 80s or 90s. She rarely has numbers as high as 120.   Her weight is down 18 pounds since March and the patient reports exercising regularly (riding the bike, elliptical exercises and water aerobics), for 40 minutes, 3-4 times a week. Also, she is  enrolled in a weight loss program with [facility], since this spring.     In the past, she used to take vitamin D supplements but she discontinued them. She occasionally takes a MVI, denies taking any calcium supplements. Daily intake of dairy products - 1-2 servings.     Adelaida has no diabetes complications.  A comprehensive eye exam done in March 2016 was negative for diabetic retinopathy. Recent GFR in the 50s.   She is medicated with a daily baby ASA and 10 mg simvastatin daily.     2.  Primary hyperaldosteronism  The adrenal CT didn't identify adrenal lesions. Prior screening tests for pheochromocytoma and Cushing syndrome were negative. The decision was to treat the patient with a mineralocorticoid receptor blocker.    BP at home has been around: 130-140s/80-90s. Denies headaches.   Her blood pressure is currently managed with 100 mg eplerenone in am and 75 mg in the evening, 300 mg labetalol BID. She f/up with nephrology.   Prior antihypertensive medications she was treated with in the past were: hydralazine, hydrochlorothiazide, losartan, metoprolol, triamterene, verapamil, chlorthalidone.    Past Medical History:   Diagnosis Date     Allergic rhinitis due to other allergen     seasonal     Diabetes (H)      Migraine, unspecified, without mention of intractable migraine without mention of status migrainosus      Morbid obesity (H)      Unspecified essential hypertension     dx around age 31   CKD   HTN dx 2007  Hypercholesterolemia  - on zocor since 2012   Ovarian cyst rupture 2014 - started on BCP   GERD   Type 2 diabetes 07/2014   Osteoarthritis of the knee  In 2013, she underwent parathyroidectomy for primary hyperparathyroidism.  The surgery was complicated by postoperative bleeding and she was intubated for almost one week. The patient thinks that the removed parathyroid gland was the right upper gland.  Adrenal CT - no adrenal lesions; metanephrines negative in 2012, 24 hr urine cortisol normal in  2014   Monoclonal heavy Chain: followed by Dr. Rodas/[facility]     Past Surgical History   Procedure Laterality Date     C anesth,knee area surgery  01/2001     Hc remove tonsils/adenoids,11+ y/o  1995     Hysterectomy, vaginal - ovaries were left in place  2005 12/2005     hysterectomy     C gastroplasty,obesity,vert band - 2007       removed 2009     Head & neck surgery  3/2013     Parathyroidectomy--had to go back in 6 days later for a possible bleed     Current Medications   Prescription Medications as of 8/15/2017             eplerenone (INSPRA) 25 MG tablet 100 mg (4 tabs) in AM and 75 mg (3 tabs) in PM    SUMAtriptan (IMITREX) 25 MG tablet TAKE 1 TO 2 TABLETS BY MOUTH AT ONSET OF HEADACHE FOR MIGRAINE. MAY REPEAT DOSE IN 2 HOURS. MAX OF 200MG OR 2 DOSES IN 24 HOURS    ASPIRIN LOW DOSE 81 MG EC tablet TAKE 1 TABLET BY MOUTH ONCE DAILY    simvastatin (ZOCOR) 10 MG tablet TAKE 1 TABLET BY MOUTH BEDTIME    cholecalciferol 2000 UNITS CAPS Take 2,000 Units by mouth daily    calcium carbonate (OS-EVGENY 600 MG [place]. CA) 1500 (600 CA) MG tablet Take 1 tablet (600 mg) by mouth 2 times daily    labetalol (NORMODYNE) 300 MG tablet Take 1 tablet (300 mg) by mouth every 12 hours    polyethylene glycol (MIRALAX/GLYCOLAX) Packet Take 17 g by mouth daily    cetirizine (ZYRTEC) 10 MG tablet Take 10 mg by mouth daily as needed for allergies    fluticasone (FLONASE) 50 MCG/ACT nasal spray Spray 2 sprays into both nostrils daily    ranitidine (ZANTAC) 150 MG tablet Take 1 tablet (150 mg) by mouth 2 times daily    Blood Pressure Monitor KIT 1 Device 2 times daily    acetaminophen (TYLENOL) 325 MG tablet Take 3 tablets (975 mg) by mouth every 8 hours as needed for mild pain or headaches    blood glucose monitoring (ACCU-CHEK MULTICLIX) lancets Use to test blood sugar 4-5 times daily or as directed.        Family History   Problem Relation Age of Onset     Hypertension Mother      Gynecology Mother      hysterectomy     GI  Mother      bowel upstruction     Thyroid  Mother      Migraines  Mother      Osteoporosis Mother      Hypertension Father      Lipids Father      Arthritis  Father      Prostatic CA Brother      Alcohol/Drug Brother      Arthritis Paternal Grandmother      Cancer Maternal Grandfather      bone     Eye disease  Maternal Grandfather      Prostatic CA Maternal Grandfather      Cancer Maternal Grandmother      tumor-brain     Obesity Maternal Grandmother      Respiratory Daughter      asthma     Diabetes ? Type 1  Half Sister    Father - cardiomyopathy.     Social History  Single with 2 children. She denies smoking, drinking alcohol or using illicit drugs. 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     Review of Systems   Systemic:               No significant fatigue  Eye:                       No eye symptoms   Glory-Laryngeal:      No glory-laryngeal symptoms, no dysphagia, no voice hoarseness, no cough      Breast:                   No breast symptoms  Cardiovascular:     No cardiovascular symptoms, no CP or palpitations   Pulmonary:            No pulmonary symptoms, no cough or SOB    Gastrointestinal:     Has occasional constipation   Genitourinary:        No increased thirst or urination   Endocrine:             No heat or cold intolerance; occasional hot flashes which started around age 43 - improved since her last visit here  Neurological:          no headaches, no tremor, no dizziness, no numbness or tingling sensation      Musculoskeletal:    R knee pain - intermittently   Skin:                       No dry skin or hair loss  Psychological:       No psychological symptoms                Vital Signs     Previous Weights:    Wt Readings from Last 10 Encounters:   07/17/17 96.2 kg (212 lb)   04/26/17 102.2 kg (225 lb 4.8 oz)   03/07/17 103.9 kg (229 lb)   02/21/17 104.2 kg (229 lb 11.5 oz)   02/12/17 104.8 kg (231 lb)   02/04/17 103.9 kg (229 lb)   08/18/16 108.4 kg (239 lb)   08/10/16 107.4 kg (236  "lb 11.2 oz)   08/09/16 107.6 kg (237 lb 3.4 oz)   08/03/16 108.9 kg (240 lb)                   Vital signs:   /82  Pulse 63  Ht 1.549 m (5' 1\")  Wt 93.9 kg (207 lb)  LMP 08/13/2005  BMI 39.11 kg/m2     Physical Exam  General Appearance:  General obesity, mild buffalo hump, full supraclavicular fossae   Eyes:  conjutivae and extra-ocular motions are normal.                                    pupils round and reactive to light, no lid lag, no stare    HEENT:   oropharynx clear and moist, no JVD, no bruits      no thyromegaly, no palpable nodules   Cardiovascular:  regular rhythm, no murmurs, distal pulse palpable, no lower extremities edema  Respiratory:       chest clear, no rales, no rhonchi  Musculoskeletal: normal tone and strength  Psychiatric: affect and judgment normal  Skin: warm, no lesions   Neurological: reflexes normal and symmetric, no resting tremor  Feet:                        Sensation intact to monofilament testing     Lab Results  I reviewed prior lab results documented in Epic.  Lab Results   Component Value Date    A1C 5.5 02/21/2017    A1C 6.1 (H) 08/09/2016    A1C 6.1 (H) 02/24/2016    A1C 6.0 01/19/2016    A1C 6.1 (H) 10/16/2015    HEMOGLOBINA1 7.3 (A) 02/16/2015       Hemoglobin   Date Value Ref Range Status   08/10/2017 12.0 11.7 - 15.7 g/dL Final     Hematocrit   Date Value Ref Range Status   08/10/2017 35.3 35.0 - 47.0 % Final     Cholesterol   Date Value Ref Range Status   02/21/2017 177 <200 mg/dL Final     Cholesterol/HDL Ratio   Date Value Ref Range Status   05/15/2015 2.9 0.0 - 5.0 Final     HDL Cholesterol   Date Value Ref Range Status   02/21/2017 58 >49 mg/dL Final     LDL Cholesterol Calculated   Date Value Ref Range Status   02/21/2017 102 (H) <100 mg/dL Final     Comment:     Above desirable:  100-129 mg/dl   Borderline High:  130-159 mg/dL   High:             160-189 mg/dL   Very high:       >189 mg/dl       VLDL-Cholesterol   Date Value Ref Range Status   05/15/2015 " 12 0 - 30 mg/dL Final     Triglycerides   Date Value Ref Range Status   02/21/2017 83 <150 mg/dL Final     Comment:     Fasting specimen     Albumin Urine mg/L   Date Value Ref Range Status   02/18/2016 22 mg/L Final     TSH   Date Value Ref Range Status   04/26/2017 1.66 0.40 - 4.00 mU/L Final     Last Basic Metabolic Panel:    Sodium   Date Value Ref Range Status   08/10/2017 139 133 - 144 mmol/L Final     Potassium   Date Value Ref Range Status   08/10/2017 3.7 3.4 - 5.3 mmol/L Final     Chloride   Date Value Ref Range Status   08/10/2017 106 94 - 109 mmol/L Final     Calcium   Date Value Ref Range Status   08/10/2017 9.3 8.5 - 10.1 mg/dL Final     Carbon Dioxide   Date Value Ref Range Status   08/10/2017 26 20 - 32 mmol/L Final     Urea Nitrogen   Date Value Ref Range Status   08/10/2017 16 7 - 30 mg/dL Final     Creatinine   Date Value Ref Range Status   08/10/2017 1.13 (H) 0.52 - 1.04 mg/dL Final     GFR Estimate   Date Value Ref Range Status   08/10/2017 51 (L) >60 mL/min/1.7m2 Final     Comment:     Non  GFR Calc     Glucose   Date Value Ref Range Status   08/10/2017 89 70 - 99 mg/dL Final     Comment:     Non Fasting       AST   Date Value Ref Range Status   08/10/2017 16 0 - 45 U/L Final     ALT   Date Value Ref Range Status   08/10/2017 28 0 - 50 U/L Final     Albumin   Date Value Ref Range Status   08/10/2017 3.8 3.4 - 5.0 g/dL Final     Assessment      1. H/o type 2 diabetes, with no microvascular complications. With better eating habits and significant weight loss, it appears that the diabetes resolved.  F/up lipid panel.     2. Primary hyperaldosteronism  Her blood pressure remains fairly well controlled.    3. History of primary hyperparathyroidism  Most recent calcium level has been normal.    Orders Placed This Encounter   Procedures     Hemoglobin A1c POCT     Lipid panel reflex to direct LDL       Again, thank you for allowing me to participate in the care of your patient.       Sincerely,    Cele Myers MD

## 2017-08-15 NOTE — PROGRESS NOTES
The patient is seen in f/up.    Adelaida Packer is a 47 year old obese female with a long-standing history of hypertension, chronic kidney disease, primary hyperparathyroidism, primary hyperaldosteronism, type 2 diabetes diagnosed in July 2014, morbid obesity and stomach banding in 2009 (the band was removed).     1. Type 2 diabetes, formally diagnosed in 2014 (the blood glucose numbers have been high since 2012).   Due to the high HbA1c of 12.1 at the time of diagnosis, she was treated with insulin from July until September 2014. Adelaida was not able to tolerate metformin due to diarrhea and dry heaves. In August 2014, the short acting insulin was discontinued and she was started on Prandin. Later on, she discontinued both Prandin and Lantus as the glycemic control significantly improved with a low carbohydrate diet, and she was started on Bydureon on 9/22/14. The patient found the injections painful and she decided to discontinue Bydureon in the beginning 2015.  In February 2015, she was started on Victoza, at 3 mg daily, which she took until beginning of 2016. The GLP-1 analogs haven't been successful in inducing weight loss.  With improved diet and regular exercise, the patient was able to maintain a good glycemic control in the absence of treatment.    HbA1c today was 5.2%, down from 5.5% at her last appointment here.  She occasionally check her BG in am and the numbers are in the 80s or 90s. She rarely has numbers as high as 120.   Her weight is down 18 pounds since March and the patient reports exercising regularly (riding the bike, elliptical exercises and water aerobics), for 40 minutes, 3-4 times a week. Also, she is enrolled in a weight loss program with [facility], since this spring.     In the past, she used to take vitamin D supplements but she discontinued them. She occasionally takes a MVI, denies taking any calcium supplements. Daily intake of dairy products - 1-2 servings.     Adelaida has no  diabetes complications.  A comprehensive eye exam done in March 2016 was negative for diabetic retinopathy. Recent GFR in the 50s.   She is medicated with a daily baby ASA and 10 mg simvastatin daily.     2.  Primary hyperaldosteronism  The adrenal CT didn't identify adrenal lesions. Prior screening tests for pheochromocytoma and Cushing syndrome were negative. The decision was to treat the patient with a mineralocorticoid receptor blocker.    BP at home has been around: 130-140s/80-90s. Denies headaches.   Her blood pressure is currently managed with 100 mg eplerenone in am and 75 mg in the evening, 300 mg labetalol BID. She f/up with nephrology.   Prior antihypertensive medications she was treated with in the past were: hydralazine, hydrochlorothiazide, losartan, metoprolol, triamterene, verapamil, chlorthalidone.    Past Medical History:   Diagnosis Date     Allergic rhinitis due to other allergen     seasonal     Diabetes (H)      Migraine, unspecified, without mention of intractable migraine without mention of status migrainosus      Morbid obesity (H)      Unspecified essential hypertension     dx around age 31   CKD   HTN dx 2007  Hypercholesterolemia  - on zocor since 2012   Ovarian cyst rupture 2014 - started on BCP   GERD   Type 2 diabetes 07/2014   Osteoarthritis of the knee  In 2013, she underwent parathyroidectomy for primary hyperparathyroidism.  The surgery was complicated by postoperative bleeding and she was intubated for almost one week. The patient thinks that the removed parathyroid gland was the right upper gland.  Adrenal CT - no adrenal lesions; metanephrines negative in 2012, 24 hr urine cortisol normal in 2014   Monoclonal heavy Chain: followed by Dr. Rodas/[facility]     Past Surgical History   Procedure Laterality Date     C anesth,knee area surgery  01/2001     Hc remove tonsils/adenoids,11+ y/o  1995     Hysterectomy, vaginal - ovaries were left in place  2005 12/2005      hysterectomy     C gastroplasty,obesity,vert band - 2007       removed 2009     Head & neck surgery  3/2013     Parathyroidectomy--had to go back in 6 days later for a possible bleed     Current Medications   Prescription Medications as of 8/15/2017             eplerenone (INSPRA) 25 MG tablet 100 mg (4 tabs) in AM and 75 mg (3 tabs) in PM    SUMAtriptan (IMITREX) 25 MG tablet TAKE 1 TO 2 TABLETS BY MOUTH AT ONSET OF HEADACHE FOR MIGRAINE. MAY REPEAT DOSE IN 2 HOURS. MAX OF 200MG OR 2 DOSES IN 24 HOURS    ASPIRIN LOW DOSE 81 MG EC tablet TAKE 1 TABLET BY MOUTH ONCE DAILY    simvastatin (ZOCOR) 10 MG tablet TAKE 1 TABLET BY MOUTH BEDTIME    cholecalciferol 2000 UNITS CAPS Take 2,000 Units by mouth daily    calcium carbonate (OS-EVGENY 600 MG [place]. CA) 1500 (600 CA) MG tablet Take 1 tablet (600 mg) by mouth 2 times daily    labetalol (NORMODYNE) 300 MG tablet Take 1 tablet (300 mg) by mouth every 12 hours    polyethylene glycol (MIRALAX/GLYCOLAX) Packet Take 17 g by mouth daily    cetirizine (ZYRTEC) 10 MG tablet Take 10 mg by mouth daily as needed for allergies    fluticasone (FLONASE) 50 MCG/ACT nasal spray Spray 2 sprays into both nostrils daily    ranitidine (ZANTAC) 150 MG tablet Take 1 tablet (150 mg) by mouth 2 times daily    Blood Pressure Monitor KIT 1 Device 2 times daily    acetaminophen (TYLENOL) 325 MG tablet Take 3 tablets (975 mg) by mouth every 8 hours as needed for mild pain or headaches    blood glucose monitoring (ACCU-CHEK MULTICLIX) lancets Use to test blood sugar 4-5 times daily or as directed.        Family History   Problem Relation Age of Onset     Hypertension Mother      Gynecology Mother      hysterectomy     GI Mother      bowel upstruction     Thyroid  Mother      Migraines  Mother      Osteoporosis Mother      Hypertension Father      Lipids Father      Arthritis  Father      Prostatic CA Brother      Alcohol/Drug Brother      Arthritis Paternal Grandmother      Cancer Maternal Grandfather   "    bone     Eye disease  Maternal Grandfather      Prostatic CA Maternal Grandfather      Cancer Maternal Grandmother      tumor-brain     Obesity Maternal Grandmother      Respiratory Daughter      asthma     Diabetes ? Type 1  Half Sister    Father - cardiomyopathy.     Social History  Single with 2 children. She denies smoking, drinking alcohol or using illicit drugs. 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     Review of Systems   Systemic:               No significant fatigue  Eye:                       No eye symptoms   Glory-Laryngeal:      No glory-laryngeal symptoms, no dysphagia, no voice hoarseness, no cough      Breast:                   No breast symptoms  Cardiovascular:     No cardiovascular symptoms, no CP or palpitations   Pulmonary:            No pulmonary symptoms, no cough or SOB    Gastrointestinal:     Has occasional constipation   Genitourinary:        No increased thirst or urination   Endocrine:             No heat or cold intolerance; occasional hot flashes which started around age 43 - improved since her last visit here  Neurological:          no headaches, no tremor, no dizziness, no numbness or tingling sensation      Musculoskeletal:    R knee pain - intermittently   Skin:                       No dry skin or hair loss  Psychological:       No psychological symptoms                Vital Signs     Previous Weights:    Wt Readings from Last 10 Encounters:   07/17/17 96.2 kg (212 lb)   04/26/17 102.2 kg (225 lb 4.8 oz)   03/07/17 103.9 kg (229 lb)   02/21/17 104.2 kg (229 lb 11.5 oz)   02/12/17 104.8 kg (231 lb)   02/04/17 103.9 kg (229 lb)   08/18/16 108.4 kg (239 lb)   08/10/16 107.4 kg (236 lb 11.2 oz)   08/09/16 107.6 kg (237 lb 3.4 oz)   08/03/16 108.9 kg (240 lb)                   Vital signs:   /82  Pulse 63  Ht 1.549 m (5' 1\")  Wt 93.9 kg (207 lb)  LMP 08/13/2005  BMI 39.11 kg/m2     Physical Exam  General Appearance:  General obesity, mild buffalo hump, " full supraclavicular fossae   Eyes:  conjutivae and extra-ocular motions are normal.                                    pupils round and reactive to light, no lid lag, no stare    HEENT:   oropharynx clear and moist, no JVD, no bruits      no thyromegaly, no palpable nodules   Cardiovascular:  regular rhythm, no murmurs, distal pulse palpable, no lower extremities edema  Respiratory:       chest clear, no rales, no rhonchi  Musculoskeletal: normal tone and strength  Psychiatric: affect and judgment normal  Skin: warm, no lesions   Neurological: reflexes normal and symmetric, no resting tremor  Feet:                        Sensation intact to monofilament testing     Lab Results  I reviewed prior lab results documented in Epic.  Lab Results   Component Value Date    A1C 5.5 02/21/2017    A1C 6.1 (H) 08/09/2016    A1C 6.1 (H) 02/24/2016    A1C 6.0 01/19/2016    A1C 6.1 (H) 10/16/2015    HEMOGLOBINA1 7.3 (A) 02/16/2015       Hemoglobin   Date Value Ref Range Status   08/10/2017 12.0 11.7 - 15.7 g/dL Final     Hematocrit   Date Value Ref Range Status   08/10/2017 35.3 35.0 - 47.0 % Final     Cholesterol   Date Value Ref Range Status   02/21/2017 177 <200 mg/dL Final     Cholesterol/HDL Ratio   Date Value Ref Range Status   05/15/2015 2.9 0.0 - 5.0 Final     HDL Cholesterol   Date Value Ref Range Status   02/21/2017 58 >49 mg/dL Final     LDL Cholesterol Calculated   Date Value Ref Range Status   02/21/2017 102 (H) <100 mg/dL Final     Comment:     Above desirable:  100-129 mg/dl   Borderline High:  130-159 mg/dL   High:             160-189 mg/dL   Very high:       >189 mg/dl       VLDL-Cholesterol   Date Value Ref Range Status   05/15/2015 12 0 - 30 mg/dL Final     Triglycerides   Date Value Ref Range Status   02/21/2017 83 <150 mg/dL Final     Comment:     Fasting specimen     Albumin Urine mg/L   Date Value Ref Range Status   02/18/2016 22 mg/L Final     TSH   Date Value Ref Range Status   04/26/2017 1.66 0.40 - 4.00  mU/L Final     Last Basic Metabolic Panel:    Sodium   Date Value Ref Range Status   08/10/2017 139 133 - 144 mmol/L Final     Potassium   Date Value Ref Range Status   08/10/2017 3.7 3.4 - 5.3 mmol/L Final     Chloride   Date Value Ref Range Status   08/10/2017 106 94 - 109 mmol/L Final     Calcium   Date Value Ref Range Status   08/10/2017 9.3 8.5 - 10.1 mg/dL Final     Carbon Dioxide   Date Value Ref Range Status   08/10/2017 26 20 - 32 mmol/L Final     Urea Nitrogen   Date Value Ref Range Status   08/10/2017 16 7 - 30 mg/dL Final     Creatinine   Date Value Ref Range Status   08/10/2017 1.13 (H) 0.52 - 1.04 mg/dL Final     GFR Estimate   Date Value Ref Range Status   08/10/2017 51 (L) >60 mL/min/1.7m2 Final     Comment:     Non  GFR Calc     Glucose   Date Value Ref Range Status   08/10/2017 89 70 - 99 mg/dL Final     Comment:     Non Fasting       AST   Date Value Ref Range Status   08/10/2017 16 0 - 45 U/L Final     ALT   Date Value Ref Range Status   08/10/2017 28 0 - 50 U/L Final     Albumin   Date Value Ref Range Status   08/10/2017 3.8 3.4 - 5.0 g/dL Final     Assessment      1. H/o type 2 diabetes, with no microvascular complications. With better eating habits and significant weight loss, it appears that the diabetes resolved.  F/up lipid panel.     2. Primary hyperaldosteronism  Her blood pressure remains fairly well controlled.    3. History of primary hyperparathyroidism  Most recent calcium level has been normal.    Orders Placed This Encounter   Procedures     Hemoglobin A1c POCT     Lipid panel reflex to direct LDL

## 2017-08-15 NOTE — NURSING NOTE
"Adelaida Packer's goals for this visit include:   Chief Complaint   Patient presents with     Diabetes       She requests these members of her care team be copied on today's visit information: Windy Gordillo      PCP: Windy Gordillo    Referring Provider:  No referring provider defined for this encounter.    Chief Complaint   Patient presents with     Diabetes       Initial /82  Pulse 63  Ht 1.549 m (5' 1\")  Wt 93.9 kg (207 lb)  LMP 08/13/2005  BMI 39.11 kg/m2 Estimated body mass index is 39.11 kg/(m^2) as calculated from the following:    Height as of this encounter: 1.549 m (5' 1\").    Weight as of this encounter: 93.9 kg (207 lb).  Medication Reconciliation: complete    Do you need any medication refills at today's visit? No    Briseida Hebert LPN      "

## 2017-08-15 NOTE — MR AVS SNAPSHOT
After Visit Summary   8/15/2017    Adelaida Packer    MRN: [id number]           Patient Information     Date Of Birth          1970        Visit Information        Provider Department      8/15/2017 10:30 AM Mireya Gabriel, [facility] MTM        Care Instructions    Recommendations from today's MTM visit:                                                        1.  Vitamin D level    Next MTM visit: 6 months    To schedule another MTM appointment, please call the clinic directly or you may call the MTM scheduling line at [phone] or toll-free at [phone].     My Clinical Pharmacist's contact information:                                                      It was a pleasure seeing you today!  Please feel free to contact me with any questions or concerns you have.     Mireya Gabriel, Pharm.D, [facility]  Medication Therapy Management Pharmacist  [phone]    You may receive a survey about the MTM services you received.  I would appreciate your feedback to help me serve you better in the future. Please fill it out and return it when you can. Your comments will be anonymous.                Follow-ups after your visit        Your next 10 appointments already scheduled     Aug 17, 2017  2:30 PM CDT   LAB with LAB FIRST FLOOR [facility] ([facility])    [address] [place] [id number] [phone]           Patient must bring picture ID. Patient should be prepared to give a urine specimen  Please do not eat 10-12 hours before your appointment if you are coming in fasting for labs on lipids, cholesterol, or glucose (sugar). Pregnant women should follow their Care Team instructions. Water with medications is okay. Do not drink coffee or other fluids. If you have concerns about taking  your medications, please ask at office or if scheduling via [email or web address], send a message by clicking on Secure Messaging, Message  Your Care Team.            Aug 17, 2017  3:00 PM CDT   Return Visit with Kristi Rodas MD   [facility])    [address] [place] [phone]            Aug 22, 2017 11:20 AM CDT   Return Visit with Zac Sandhu OD   [facility]    [address] [facility] [id number] [phone]            Mar 06, 2018  4:00 PM CST   LAB with LAB FIRST FLOOR [facility] ([facility])    [address] [place] [phone]           Patient must bring picture ID. Patient should be prepared to give a urine specimen  Please do not eat 10-12 hours before your appointment if you are coming in fasting for labs on lipids, cholesterol, or glucose (sugar). Pregnant women should follow their Care Team instructions. Water with medications is okay. Do not drink coffee or other fluids. If you have concerns about taking  your medications, please ask at office or if scheduling via dBMEDx, send a message by clicking on Secure Messaging, Message Your Care Team.            Mar 06, 2018  4:30 PM CST   Return Visit with Dia Aleman MD   [facility]    [address] [place] [address]   [phone]            Aug 14, 2018  9:15 AM CDT   Return Visit with Cele Myers MD, [facility] NURSE   [facility])    [address] [place] [address]   [phone]              Future tests that were ordered for you today     Open Standing Orders        Priority Remaining Interval Expires Ordered    Hemoglobin A1c POCT Routine 3/4  8/15/2018 8/15/2017          Open Future Orders        Priority Expected Expires Ordered    Hemoglobin Routine 9/18/2017 10/31/2017 8/15/2017    Parathyroid Hormone Intact Routine  9/18/2017 10/31/2017 8/15/2017    Renal panel Routine 9/18/2017 10/31/2017 8/15/2017    Protein  random urine Routine 9/18/2017 10/31/2017 8/15/2017            Who to contact     If you have questions or need follow up information about today's clinic visit or your schedule please contact [facility] directly at [phone].  Normal or non-critical lab and imaging results will be communicated to you by Omatehart, letter or phone within 4 business days after the clinic has received the results. If you do not hear from us within 7 days, please contact the clinic through Hubblr or phone. If you have a critical or abnormal lab result, we will notify you by phone as soon as possible.  Submit refill requests through Hubblr or call your pharmacy and they will forward the refill request to us. Please allow 3 business days for your refill to be completed.          Additional Information About Your Visit        OmateharEverything But The House (EBTH) Information     Hubblr gives you secure access to your electronic health record. If you see a primary care provider, you can also send messages to your care team and make appointments. If you have questions, please call your primary care clinic.  If you do not have a primary care provider, please call [phone] and they will assist you.        Care EveryWhere ID     This is your Care EveryWhere ID. This could be used by other organizations to access your [place] medical records  [id number]        Your Vitals Were     Last Period                   08/13/2005            Blood Pressure from Last 3 Encounters:   08/15/17 139/82   07/17/17 138/90   04/26/17 154/88    Weight from Last 3 Encounters:   08/15/17 207 lb (93.9 kg)   07/17/17 212 lb (96.2 kg)   04/26/17 225 lb 4.8 oz (102.2 kg)              Today, you had the following     No orders found for display         Today's Medication Changes          These changes are accurate as of: 8/15/17 10:52 AM.  If you have any questions, ask  your nurse or doctor.               These medicines have changed or have updated prescriptions.        Dose/Directions    labetalol 200 MG tablet   Commonly known as:  NORMODYNE   This may have changed:  Another medication with the same name was removed. Continue taking this medication, and follow the directions you see here.   Changed by:  Mireya Gabriel RPH        Dose:  200 mg   Take 200 mg by mouth 2 times daily   Refills:  0       TYLENOL ARTHRITIS PAIN 650 MG CR tablet   This may have changed:  Another medication with the same name was removed. Continue taking this medication, and follow the directions you see here.   Generic drug:  acetaminophen   Changed by:  Mireya Gabriel RPH        Dose:  650-1300 mg   Take 650-1,300 mg by mouth every 8 hours as needed for mild pain or fever   Refills:  0                Primary Care Provider Office Phone # Fax #    Windy Gordillo [phone] [address]  [facility] [address]        Equal Access to Services     [facility]: Hadii luma ku hadasho Soomaali, waaxda luqadaha, qaybta kaalmada adeegyada, waxay idiin hayyasmeenn rayray mahmood . So [facility] [phone].    ATENCIÓN: Si habla español, tiene a lynch disposición servicios gratuitos de asistencia lingüística. Llame al [phone].    We comply with applicable federal civil rights laws and [place] laws. We do not discriminate on the basis of race, color, national origin, age, disability sex, sexual orientation or gender identity.            Thank you!     Thank you for choosing [facility]  for your care. Our goal is always to provide you with excellent care. Hearing back from our patients is one way we can continue to improve our services. Please take a few minutes to complete the written survey that you may receive in the mail after your visit with us. Thank you!             Your Updated Medication List - Protect others around you: Learn how to  safely use, store and throw away your medicines at www.disposemymeds.org.          This list is accurate as of: 8/15/17 10:52 AM.  Always use your most recent med list.                   Brand Name Dispense Instructions for use Diagnosis    ASPIRIN LOW DOSE 81 MG EC tablet   Generic drug:  aspirin     100 tablet    TAKE 1 TABLET BY MOUTH ONCE DAILY    Type 2 diabetes, HbA1c goal < 7% (H), Hyperlipidemia with target LDL less than 100       blood glucose monitoring lancets     1 Box    Use to test blood sugar 4-5 times daily or as directed.    Type 2 diabetes, HbA1c goal < 7% (H)       Blood Pressure Monitor Kit     1 kit    1 Device 2 times daily    Essential hypertension       calcium carbonate 1500 (600 CA) MG tablet    OS-EVGENY 600 mg [place]. Ca    180 tablet    Take 1 tablet (600 mg) by mouth 2 times daily    Vitamin D insufficiency       cetirizine 10 MG tablet    zyrTEC     Take 10 mg by mouth daily as needed for allergies        cholecalciferol 2000 UNITS Caps     90 capsule    Take 2,000 Units by mouth daily    Vitamin D insufficiency       eplerenone 25 MG tablet    INSPRA    630 tablet    100 mg (4 tabs) in AM and 75 mg (3 tabs) in PM    Primary hyperaldosteronism (H)       fluticasone 50 MCG/ACT spray    FLONASE    16 g    Spray 2 sprays into both nostrils daily    Nasal congestion       labetalol 200 MG tablet    NORMODYNE     Take 200 mg by mouth 2 times daily        polyethylene glycol Packet    MIRALAX/GLYCOLAX     Take 17 g by mouth daily        ranitidine 150 MG tablet    ZANTAC    60 tablet    Take 1 tablet (150 mg) by mouth 2 times daily    Gastroesophageal reflux disease without esophagitis       simvastatin 10 MG tablet    ZOCOR    90 tablet    TAKE 1 TABLET BY MOUTH BEDTIME    Hyperlipidemia with target LDL less than 100       SUMAtriptan 25 MG tablet    IMITREX    18 tablet    TAKE 1 TO 2 TABLETS BY MOUTH AT ONSET OF HEADACHE FOR MIGRAINE. MAY REPEAT DOSE IN 2 HOURS. MAX OF 200MG OR 2 DOSES IN 24  HOURS    Chronic migraine without aura without status migrainosus, not intractable       TYLENOL ARTHRITIS PAIN 650 MG CR tablet   Generic drug:  acetaminophen      Take 650-1,300 mg by mouth every 8 hours as needed for mild pain or fever

## 2017-08-15 NOTE — PATIENT INSTRUCTIONS
Recommendations from today's MTM visit:                                                        1.  Vitamin D level    Next MTM visit: 6 months    To schedule another MTM appointment, please call the clinic directly or you may call the MTM scheduling line at [phone] or toll-free at [phone].     My Clinical Pharmacist's contact information:                                                      It was a pleasure seeing you today!  Please feel free to contact me with any questions or concerns you have.     Mireya Gabriel, Pharm.D, [facility]  Medication Therapy Management Pharmacist  [phone]    You may receive a survey about the MTM services you received.  I would appreciate your feedback to help me serve you better in the future. Please fill it out and return it when you can. Your comments will be anonymous.

## 2017-08-15 NOTE — MR AVS SNAPSHOT
After Visit Summary   8/15/2017    Adelaida Packer    MRN: [id number]           Patient Information     Date Of Birth          1970        Visit Information        Provider Department      8/15/2017 9:15 AM Cele Myers MD;  ENDO NURSE [facility]        Today's Diagnoses     Type 2 diabetes mellitus with stage 3 chronic kidney disease, without long-term current use of insulin (H)    -  1      Care Instructions      Sending blood sugars to your provider at [facility]:  We want to help you with your diabetes management, which often requires frequent adjustments to your therapy. For your convenience, we have several ways to send your blood sugars to your doctor for review.    - Send message directly to your doctor through My Chart.  Please ask the rooming staff if you would like to sign up for My Chart.  This is a fast and confidential way to send your information and communicate directly with your provider.   - Record readings and fax to [phone].  We have a template for you to use for your convenience.  - If you have a Medtronic pump, upload to Cumulux and notify your provider of your username and password.   - Stop by the clinic with your meter for download.   - My Chart or call Jenny Bello, Diabetes Educator at [phone]  - Call the clinic and speak to one of the endocrine nurses to relay information on the telephone.  Di Duque or Briseida at [phone].   -    Please call the on-call Endocrinologist at the [facility] for after       hours/weekend needs at [phone] Option #4.    Please note that you do not need to FAST if you are just having an A1C drawn. Please remember to ALWAYS bring your glucose meter with to your appointment. This data is very important for the management of your care.    Thank you!  Your [facility] Diabetes Care Team                Follow-ups after your visit        Your next 10 appointments already scheduled     Aug 15,  2017 10:30 AM CDT   SHORT with Mireya Gabriel [facility] ([facility]    [address]  [facility] [address]  [facility] [address]   [phone]            Aug 17, 2017  2:30 PM CDT   LAB with LAB FIRST FLOOR [facility] ([facility])    [address] [facility] [address]   [phone]           Patient must bring picture ID. Patient should be prepared to give a urine specimen  Please do not eat 10-12 hours before your appointment if you are coming in fasting for labs on lipids, cholesterol, or glucose (sugar). Pregnant women should follow their Care Team instructions. Water with medications is okay. Do not drink coffee or other fluids. If you have concerns about taking  your medications, please ask at office or if scheduling via The Micro, send a message by clicking on Secure Messaging, Message Your Care Team.            Aug 17, 2017  3:00 PM CDT   Return Visit with Krisit Rodas MD   [facility]    [address] [facility] [address]   [phone]            Mar 06, 2018  4:00 PM CST   LAB with LAB FIRST FLOOR [facility] ([facility])    [address] [facility] [address]   [phone]           Patient must bring picture ID. Patient should be prepared to give a urine specimen  Please do not eat 10-12 hours before your appointment if you are coming in fasting for labs on lipids, cholesterol, or glucose (sugar). Pregnant women should follow their Care Team instructions. Water with medications is okay. Do not drink coffee or other fluids. If you have concerns about taking  your medications, please ask at office or if scheduling via The Micro, send a message by clicking on Secure Messaging, Message Your Care Team.            Mar 06, 2018  4:30 PM CST   Return Visit with Dia Aleman MD    [facility]    [address] [place] [phone]            Aug 14, 2018  9:15 AM CDT   Return Visit with Cele Myers MD, MG ENDO NURSE   [facility] ([facility])    [address] [place] [phone]              Future tests that were ordered for you today     Open Standing Orders        Priority Remaining Interval Expires Ordered    Hemoglobin A1c POCT Routine 3/4  8/15/2018 8/15/2017          Open Future Orders        Priority Expected Expires Ordered    Hemoglobin Routine 9/18/2017 10/31/2017 8/15/2017    Parathyroid Hormone Intact Routine 9/18/2017 10/31/2017 8/15/2017    Renal panel Routine 9/18/2017 10/31/2017 8/15/2017    Protein  random urine Routine 9/18/2017 10/31/2017 8/15/2017            Who to contact     If you have questions or need follow up information about today's clinic visit or your schedule please contact [facility] directly at [phone].  Normal or non-critical lab and imaging results will be communicated to you by Vee24hart, letter or phone within 4 business days after the clinic has received the results. If you do not hear from us within 7 days, please contact the clinic through Vee24hart or phone. If you have a critical or abnormal lab result, we will notify you by phone as soon as possible.  Submit refill requests through Epoque or call your pharmacy and they will forward the refill request to us. Please allow 3 business days for your refill to be completed.          Additional Information About Your Visit        MyChart Information     Epoque gives you secure access to your electronic health record. If you see a primary care provider, you can also send messages to your care team and make appointments. If you have questions, please call your primary care clinic.  If you do not have a primary care provider, please call  "[phone] and they will assist you.      Sword & Plough is an electronic gateway that provides easy, online access to your medical records. With Sword & Plough, you can request a clinic appointment, read your test results, renew a prescription or communicate with your care team.     To access your existing account, please contact your [facility] or call [phone] for assistance.        Care EveryWhere ID     This is your Care EveryWhere ID. This could be used by other organizations to access your [place] medical records  [id number]        Your Vitals Were     Pulse Height Last Period BMI (Body Mass Index)          63 1.549 m (5' 1\") 08/13/2005 39.11 kg/m2         Blood Pressure from Last 3 Encounters:   08/15/17 139/82   07/17/17 138/90   04/26/17 154/88    Weight from Last 3 Encounters:   08/15/17 93.9 kg (207 lb)   07/17/17 96.2 kg (212 lb)   04/26/17 102.2 kg (225 lb 4.8 oz)              We Performed the Following     Hemoglobin A1c POCT     Lipid panel reflex to direct LDL        Primary Care Provider Office Phone # Fax #    Windy Gordillo [phone] [address] [facility] [address]        Equal Access to Services     [facility] : Hadii aad ku hadasho Soomaali, waaxda luqadaha, qaybta kaalmada adeegyada, silvia faust hayyasmeenn rayray mahmood . So [facility] [phone].    ATENCIÓN: Si habla español, tiene a lynch disposición servicios gratuitos de asistencia lingüística. Llame al [phone].    We comply with applicable federal civil rights laws and [place] laws. We do not discriminate on the basis of race, color, national origin, age, disability sex, sexual orientation or gender identity.            Thank you!     Thank you for choosing [facility]  for your care. Our goal is always to provide you with excellent care. Hearing back from our patients is one way we can continue to improve our services. Please take a few minutes to " complete the written survey that you may receive in the mail after your visit with us. Thank you!             Your Updated Medication List - Protect others around you: Learn how to safely use, store and throw away your medicines at www.disposemymeds.org.          This list is accurate as of: 8/15/17  9:47 AM.  Always use your most recent med list.                   Brand Name Dispense Instructions for use Diagnosis    acetaminophen 325 MG tablet    TYLENOL    100 tablet    Take 3 tablets (975 mg) by mouth every 8 hours as needed for mild pain or headaches    New daily persistent headache       ASPIRIN LOW DOSE 81 MG EC tablet   Generic drug:  aspirin     100 tablet    TAKE 1 TABLET BY MOUTH ONCE DAILY    Type 2 diabetes, HbA1c goal < 7% (H), Hyperlipidemia with target LDL less than 100       blood glucose monitoring lancets     1 Box    Use to test blood sugar 4-5 times daily or as directed.    Type 2 diabetes, HbA1c goal < 7% (H)       Blood Pressure Monitor Kit     1 kit    1 Device 2 times daily    Essential hypertension       calcium carbonate 1500 (600 CA) MG tablet    OS-EVGENY 600 mg [place]. Ca    180 tablet    Take 1 tablet (600 mg) by mouth 2 times daily    Vitamin D insufficiency       cetirizine 10 MG tablet    zyrTEC     Take 10 mg by mouth daily as needed for allergies        cholecalciferol 2000 UNITS Caps     90 capsule    Take 2,000 Units by mouth daily    Vitamin D insufficiency       eplerenone 25 MG tablet    INSPRA    630 tablet    100 mg (4 tabs) in AM and 75 mg (3 tabs) in PM    Primary hyperaldosteronism (H)       fluticasone 50 MCG/ACT spray    FLONASE    16 g    Spray 2 sprays into both nostrils daily    Nasal congestion       labetalol 300 MG tablet    NORMODYNE    180 tablet    Take 1 tablet (300 mg) by mouth every 12 hours    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 MAY REPEAT DOSE IN 2 HOURS. MAX OF 200MG OR 2 DOSES IN 24 HOURS    Chronic migraine without aura without status migrainosus, not intractable

## 2017-08-15 NOTE — PATIENT INSTRUCTIONS
Sending blood sugars to your provider at [facility]:  We want to help you with your diabetes management, which often requires frequent adjustments to your therapy. For your convenience, we have several ways to send your blood sugars to your doctor for review.    - Send message directly to your doctor through My Chart.  Please ask the rooming staff if you would like to sign up for My Chart.  This is a fast and confidential way to send your information and communicate directly with your provider.   - Record readings and fax to [phone].  We have a template for you to use for your convenience.  - If you have a Medtronic pump, upload to Packback and notify your provider of your username and password.   - Stop by the clinic with your meter for download.   - My Chart or call Jenny Bello, Diabetes Educator at [phone]  - Call the clinic and speak to one of the endocrine nurses to relay information on the telephone.  Di Duque, or Briseida at [phone].   -    Please call the on-call Endocrinologist at the [facility] [place] for after       hours/weekend needs at [phone] Option #4.    Please note that you do not need to FAST if you are just having an A1C drawn. Please remember to ALWAYS bring your glucose meter with to your appointment. This data is very important for the management of your care.    Thank you!  Your [facility] Diabetes Care Team

## 2017-08-15 NOTE — PROGRESS NOTES
SUBJECTIVE/OBJECTIVE:                           Adelaida Packer is a 47 year old female coming in for an initial visit for Medication Therapy Management for 2017.  She was referred to me from Windy Gordillo     Chief Complaint: Medication Review.    Allergies/ADRs: Reviewed in Epic  Tobacco: No tobacco use  Alcohol: none  Caffeine: 1 cups/week of coffee  Activity: Goal 200 minutes a week; biking/water tabata  PMH: Reviewed in Epic    Medication Adherence: no issues reported    Diabetes:  Pt currently taking lifestyle modifications.   SMBG: rarely.     Patient is not experiencing hypoglycemia  Recent symptoms of high blood sugar? none  Eye exam: up to date  Foot exam: up to date  Microalbumin is < 30 mg/g. Pt is not taking an ACEi/ARB.  Aspirin: Taking 81mg daily and denies side effects  Diet/Exercise: patient has lost weight [employer]; 1400 calories/day and exercise.    Hypertension: Current medications include labetalol 200mg bid, eplerenone 100mg in the morning and 75mg in the evening.  Patient does not self-monitor BP.  Patient reports no current medication side effects.    Hyperlipidemia: Current therapy includes simvastatin 10mg once daily.  Pt reports no significant myalgias or other side effects.   Pooled Cohorts Equation Calculator:  Risk factor: 6.1%    Allergies: Current therapy includes zyrtec 10mg daily as needed. Patient also has flonase but has not had to use but generally uses in the spring and fall. Denies side effects.    Migraines: current therapy includes imitrex. Patient had one migraine in the last spring. Sleep deprivation will cause migraine.    Knee Pain: current therapy includes tylenol arthritis 1-2 tablets daily. Will only take if the pain is unmanageable. Does take the edge off.     Osteoporosis Prevention: Current therapy includes: calcium 600/Vitamin D 200 bid and Vitamin D supplements: 2000 IU. Pt is experiencing side effects.  Constipation. Patient has always had issues with constipation and takes miralax every couple of days.  Pt is getting the following sources of dietary calcium: yogurt and green leafy vegetables  Last vitamin D level: 2013 <13  DEXA History: None  Risk factors: None    GERD: Current medications include: Zantac (ranitidine) 150mg twice daily only takes as needed. Pt c/o no current symptoms.  Patient feels that current regimen is effective.    Current labs include:BP Readings from Last 3 Encounters:   08/15/17 139/82   07/17/17 138/90   04/26/17 154/88     Today's Vitals: LMP 08/13/2005  Lab Results   Component Value Date    A1C 5.2 08/15/2017   .  Lab Results   Component Value Date    CHOL 177 02/21/2017     Lab Results   Component Value Date    TRIG 83 02/21/2017     Lab Results   Component Value Date    HDL 58 02/21/2017     Lab Results   Component Value Date     02/21/2017       Liver Function Studies -   Recent Labs   Lab Test  08/10/17   0906   PROTTOTAL  7.3   ALBUMIN  3.8   BILITOTAL  0.5   ALKPHOS  34*   AST  16   ALT  28       Lab Results   Component Value Date    UCRR 24 03/07/2017    MICROL 22 02/18/2016    UMALCR 14.86 02/18/2016       Last Basic Metabolic Panel:  Lab Results   Component Value Date     08/10/2017      Lab Results   Component Value Date    POTASSIUM 3.7 08/10/2017     Lab Results   Component Value Date    CHLORIDE 106 08/10/2017     Lab Results   Component Value Date    BUN 16 08/10/2017     Lab Results   Component Value Date    CR 1.13 08/10/2017     GFR Estimate   Date Value Ref Range Status   08/10/2017 51 (L) >60 mL/min/1.7m2 Final     Comment:     Non  GFR Calc   03/07/2017 45 (L) >60 mL/min/1.7m2 Final     Comment:     Non  GFR Calc   08/18/2016 54 (L) >60 mL/min/1.7m2 Final     Comment:     Non  GFR Calc     GFR Estimate If Black   Date Value Ref Range Status   08/10/2017 62 >60 mL/min/1.7m2 Final     Comment:       American GFR Calc   03/07/2017 54 (L) >60 mL/min/1.7m2 Final     Comment:      GFR Calc   08/18/2016 65 >60 mL/min/1.7m2 Final     Comment:      GFR Calc     TSH   Date Value Ref Range Status   04/26/2017 1.66 0.40 - 4.00 mU/L Final   ]    Most Recent Immunizations   Administered Date(s) Administered     Influenza Vaccine IM 3yrs+ 4 Valent IIV4 10/17/2014     MMR 11/30/1990     TD (ADULT, 7+) 10/25/1990     TDAP Vaccine (Adacel) 01/11/2012       ASSESSMENT:                             Current medications were reviewed today.     Medication Adherence: no issues identified    Diabetes: Stable. Patient is meeting A1c goal of < 7%.    Hypertension: Stable. Patient is meeting BP goal of < 140/90mmHg.     Hyperlipidemia: Stable. No change in current medication necessary at this time.    Allergies: stable    Migraines: stable    Knee Pain: stable    Osteoporosis Prevention: Stable. Pt would benefit from:repeat Vitamin D level. Last level in 2013 low.    GERD: Stable.  Current treatment is effective.    PLAN:                            Repeat Vitamin D level    I spent 45 minutes with this patient today. All changes were made via collaborative practice agreement with Windy Gordillo A copy of the visit note was provided to the patient's primary care provider.    Will follow up in 6 months.    The patient was given a summary of these recommendations as an after visit summary.     Mireya Gabriel, Pharm.D, BCACP  Medication Therapy Management Pharmacist

## 2017-08-16 ENCOUNTER — TELEPHONE (OUTPATIENT)
Dept: OPTOMETRY | Facility: CLINIC | Age: 47
End: 2017-08-16

## 2017-08-16 ENCOUNTER — MYC MEDICAL ADVICE (OUTPATIENT)
Dept: ENDOCRINOLOGY | Facility: CLINIC | Age: 47
End: 2017-08-16

## 2017-08-16 ASSESSMENT — REFRACTION_CURRENTRX
OD_BRAND: ALCON DAILIES TOTAL 1 BC 8.5, D 14.1
OS_BRAND: ALCON DAILIES TOTAL 1 BC 8.5, D 14.1
OD_SPHERE: -4.25
OS_SPHERE: -4.00

## 2017-08-16 NOTE — TELEPHONE ENCOUNTER
Trials ordered for Adelaida's appointment on 8/22/17.    Current Contact Lens Rx (Trial Lens, Ordered)     Brand Sphere   Right Ron Dailies Total 1 BC 8.5, D 14.1 -4.25   Left Ron Dailies Total 1 BC 8.5, D 14.1 -4.00

## 2017-08-17 ENCOUNTER — ONCOLOGY VISIT (OUTPATIENT)
Dept: ONCOLOGY | Facility: CLINIC | Age: 47
End: 2017-08-17
Payer: COMMERCIAL

## 2017-08-17 VITALS
RESPIRATION RATE: 20 BRPM | TEMPERATURE: 97.6 F | DIASTOLIC BLOOD PRESSURE: 97 MMHG | OXYGEN SATURATION: 97 % | WEIGHT: 207 LBS | SYSTOLIC BLOOD PRESSURE: 135 MMHG | BODY MASS INDEX: 39.08 KG/M2 | HEIGHT: 61 IN | HEART RATE: 60 BPM

## 2017-08-17 DIAGNOSIS — E55.9 VITAMIN D DEFICIENCY: ICD-10-CM

## 2017-08-17 DIAGNOSIS — D47.2 MONOCLONAL GAMMOPATHY: ICD-10-CM

## 2017-08-17 DIAGNOSIS — I15.2 HYPERTENSION DUE TO ENDOCRINE DISORDER: ICD-10-CM

## 2017-08-17 DIAGNOSIS — D47.2 MGUS (MONOCLONAL GAMMOPATHY OF UNKNOWN SIGNIFICANCE): Primary | ICD-10-CM

## 2017-08-17 LAB
ALBUMIN SERPL-MCNC: 3.8 G/DL (ref 3.4–5)
ANION GAP SERPL CALCULATED.3IONS-SCNC: 6 MMOL/L (ref 3–14)
BUN SERPL-MCNC: 15 MG/DL (ref 7–30)
CALCIUM SERPL-MCNC: 9.4 MG/DL (ref 8.5–10.1)
CHLORIDE SERPL-SCNC: 105 MMOL/L (ref 94–109)
CO2 SERPL-SCNC: 27 MMOL/L (ref 20–32)
CREAT SERPL-MCNC: 1.03 MG/DL (ref 0.52–1.04)
CREAT UR-MCNC: 24 MG/DL
GFR SERPL CREATININE-BSD FRML MDRD: 57 ML/MIN/1.7M2
GLUCOSE SERPL-MCNC: 99 MG/DL (ref 70–99)
HGB BLD-MCNC: 11.9 G/DL (ref 11.7–15.7)
PHOSPHATE SERPL-MCNC: 2.7 MG/DL (ref 2.5–4.5)
POTASSIUM SERPL-SCNC: 4 MMOL/L (ref 3.4–5.3)
PROT UR-MCNC: <0.05 G/L
PROT/CREAT 24H UR: NORMAL G/G CR (ref 0–0.2)
PTH-INTACT SERPL-MCNC: 26 PG/ML (ref 12–72)
SODIUM SERPL-SCNC: 138 MMOL/L (ref 133–144)

## 2017-08-17 PROCEDURE — 99214 OFFICE O/P EST MOD 30 MIN: CPT | Performed by: INTERNAL MEDICINE

## 2017-08-17 PROCEDURE — 83970 ASSAY OF PARATHORMONE: CPT | Performed by: INTERNAL MEDICINE

## 2017-08-17 PROCEDURE — 86335 IMMUNFIX E-PHORSIS/URINE/CSF: CPT | Performed by: INTERNAL MEDICINE

## 2017-08-17 PROCEDURE — 85018 HEMOGLOBIN: CPT | Performed by: INTERNAL MEDICINE

## 2017-08-17 PROCEDURE — 36415 COLL VENOUS BLD VENIPUNCTURE: CPT | Performed by: INTERNAL MEDICINE

## 2017-08-17 PROCEDURE — 80069 RENAL FUNCTION PANEL: CPT | Performed by: INTERNAL MEDICINE

## 2017-08-17 PROCEDURE — 84156 ASSAY OF PROTEIN URINE: CPT | Performed by: INTERNAL MEDICINE

## 2017-08-17 PROCEDURE — 82306 VITAMIN D 25 HYDROXY: CPT | Performed by: INTERNAL MEDICINE

## 2017-08-17 ASSESSMENT — PAIN SCALES - GENERAL: PAINLEVEL: SEVERE PAIN (6)

## 2017-08-17 NOTE — MR AVS SNAPSHOT
After Visit Summary   8/17/2017    Adelaida Packer    MRN: [id number]           Patient Information     Date Of Birth          1970        Visit Information        Provider Department      8/17/2017 3:00 PM Kristi Rodas MD [facility]        Today's Diagnoses     MGUS (monoclonal gammopathy of unknown significance)    -  1       Follow-ups after your visit        Your next 10 appointments already scheduled     Aug 22, 2017 11:20 AM CDT   [facility] Visit with Zac Sandhu OD   [facility]    [address] [facility] [address]   [phone]            Mar 06, 2018  4:00 PM CST   LAB with LAB FIRST FLOOR [facility])    [address] [address] [place] [address]   [phone]           Patient must bring picture ID. Patient should be prepared to give a urine specimen  Please do not eat 10-12 hours before your appointment if you are coming in fasting for labs on lipids, cholesterol, or glucose (sugar). Pregnant women should follow their Care Team instructions. Water with medications is okay. Do not drink coffee or other fluids. If you have concerns about taking  your medications, please ask at office or if scheduling via [employer], send a message by clicking on Secure Messaging, Message Your Care Team.            Mar 06, 2018  4:30 PM CST   Return Visit with Dia Aleman MD   [facility])    [address] [facility] [address]   [phone]            Aug 07, 2018 10:10 AM CDT   LAB with LAB FIRST FLOOR [facility] ([facility])    [address] [facility] [address]   [phone]           Patient must bring picture ID. Patient should be prepared to give a urine specimen  Please do not eat 10-12 hours before your appointment if you are  coming in fasting for labs on lipids, cholesterol, or glucose (sugar). Pregnant women should follow their Care Team instructions. Water with medications is okay. Do not drink coffee or other fluids. If you have concerns about taking  your medications, please ask at office or if scheduling via [employer], send a message by clicking on Secure Messaging, Message Your Care Team.            Aug 14, 2018  9:15 AM CDT   Return Visit with Cele Myers MD, MG ENDO NURSE   [facility]    [address] [place] [id number] [phone]            Aug 14, 2018  2:00 PM CDT   Return Visit with Kristi Rodas MD   [facility])    [address] [place] [id number] [phone]              Future tests that were ordered for you today     Open Future Orders        Priority Expected Expires Ordered    CBC with platelets differential Routine  8/17/2018 8/17/2017    Comprehensive metabolic panel Routine  8/17/2018 8/17/2017    Protein electrophoresis random urine Routine  8/17/2018 8/17/2017    Protein immunofixation urine Routine  8/17/2018 8/17/2017    Protein Immunofixation Serum Routine  8/17/2018 8/17/2017    Kappa and lambda light chain Routine  8/17/2018 8/17/2017    Protein electrophoresis Routine  8/17/2018 8/17/2017    Immunoglobulins A G and M Routine  8/17/2018 8/17/2017            Who to contact     If you have questions or need follow up information about today's clinic visit or your schedule please contact [facility] directly at [phone].  Normal or non-critical lab and imaging results will be communicated to you by MyChart, letter or phone within 4 business days after the clinic has received the results. If you do not hear from us within 7 days, please contact the clinic through MyChart or phone. If you have a critical or abnormal lab result, we will notify you by  "phone as soon as possible.  Submit refill requests through [employer] or call your pharmacy and they will forward the refill request to us. Please allow 3 business days for your refill to be completed.          Additional Information About Your Visit        [employer] Information     [employer] gives you secure access to your electronic health record. If you see a primary care provider, you can also send messages to your care team and make appointments. If you have questions, please call your primary care clinic.  If you do not have a primary care provider, please call [phone] and they will assist you.      [employer] is an electronic gateway that provides easy, online access to your medical records. With [employer], you can request a clinic appointment, read your test results, renew a prescription or communicate with your care team.     To access your existing account, please contact your [facility] or call [phone] for assistance.        Care EveryWhere ID     This is your Care EveryWhere ID. This could be used by other organizations to access your [facility] medical records  [id number]        Your Vitals Were     Pulse Temperature Respirations Height Last Period Pulse Oximetry    60 97.6  F (36.4  C) (Oral) 20 1.549 m (5' 0.98\") 08/13/2005 97%    BMI (Body Mass Index)                   39.13 kg/m2            Blood Pressure from Last 3 Encounters:   08/17/17 (!) 135/97   08/15/17 139/82   07/17/17 138/90    Weight from Last 3 Encounters:   08/17/17 93.9 kg (207 lb)   08/15/17 93.9 kg (207 lb)   07/17/17 96.2 kg (212 lb)               Primary Care Provider Office Phone # Fax #    Windy Gordillo [phone]       [address]  [facility] [address]        Equal Access to Services     TABATHA VILLEGAS : Alphonso Darby, marcelina plata, qaybta kasilvia hernandez. So [facility] [phone].    ATENCIÓN: Si otto espemily, " tiene a lynch disposición servicios gratuitos de asistencia lingüística. Jaleesa mullins [phone].    We comply with applicable federal civil rights laws and [place] laws. We do not discriminate on the basis of race, color, national origin, age, disability sex, sexual orientation or gender identity.            Thank you!     Thank you for choosing [facility]  for your care. Our goal is always to provide you with excellent care. Hearing back from our patients is one way we can continue to improve our services. Please take a few minutes to complete the written survey that you may receive in the mail after your visit with us. Thank you!             Your Updated Medication List - Protect others around you: Learn how to safely use, store and throw away your medicines at www.disposemymeds.org.          This list is accurate as of: 8/17/17 11:59 PM.  Always use your most recent med list.                   Brand Name Dispense Instructions for use Diagnosis    ASPIRIN LOW DOSE 81 MG EC tablet   Generic drug:  aspirin     100 tablet    TAKE 1 TABLET BY MOUTH ONCE DAILY    Type 2 diabetes, HbA1c goal < 7% (H), Hyperlipidemia with target LDL less than 100       blood glucose monitoring lancets     1 Box    Use to test blood sugar 4-5 times daily or as directed.    Type 2 diabetes, HbA1c goal < 7% (H)       Blood Pressure Monitor Kit     1 kit    1 Device 2 times daily    Essential hypertension       calcium carbonate 1500 (600 CA) MG tablet    OS-EVGENY 600 mg [place]. Ca    180 tablet    Take 1 tablet (600 mg) by mouth 2 times daily    Vitamin D insufficiency       cetirizine 10 MG tablet    zyrTEC     Take 10 mg by mouth daily as needed for allergies        cholecalciferol 2000 UNITS Caps     90 capsule    Take 2,000 Units by mouth daily    Vitamin D insufficiency       eplerenone 25 MG tablet    INSPRA    630 tablet    100 mg (4 tabs) in AM and 75 mg (3 tabs) in PM    Primary hyperaldosteronism (H)       fluticasone  50 MCG/ACT spray    FLONASE    16 g    Spray 2 sprays into both nostrils daily    Nasal congestion       labetalol 200 MG tablet    NORMODYNE     Take 200 mg by mouth 2 times daily        polyethylene glycol Packet    MIRALAX/GLYCOLAX     Take 17 g by mouth daily        ranitidine 150 MG tablet    ZANTAC    60 tablet    Take 1 tablet (150 mg) by mouth 2 times daily    Gastroesophageal reflux disease without esophagitis       simvastatin 10 MG tablet    ZOCOR    90 tablet    TAKE 1 TABLET BY MOUTH BEDTIME    Hyperlipidemia with target LDL less than 100       SUMAtriptan 25 MG tablet    IMITREX    18 tablet    TAKE 1 TO 2 TABLETS BY MOUTH AT ONSET OF HEADACHE FOR MIGRAINE. MAY REPEAT DOSE IN 2 HOURS. MAX OF 200MG OR 2 DOSES IN 24 HOURS    Chronic migraine without aura without status migrainosus, not intractable       TYLENOL ARTHRITIS PAIN 650 MG CR tablet   Generic drug:  acetaminophen      Take 650-1,300 mg by mouth every 8 hours as needed for mild pain or fever

## 2017-08-17 NOTE — PROGRESS NOTES
Hematology Follow-up Visit:  8/17/2017    PRIMARY CARE PHYSICIAN:  Dr. Gordillo   Referring PHYSICIAN:  Dia Aleman MD    Hematologist at [facility]: Dr. Can Munoz    Diagnosis:  1. Monoclonal gammopathy (alpha heavy chains detected in serum repeatedly) -  She had alpha heavy chains found in the serum during the evaluation of chronic renal insufficiency. Adelaida has had a longstanding history of hypertension and chronic kidney disease (ckd since at least 2009, with a creatinine ranging from 0.9-1.3 and no evidence of proteinuria).  During workup for evaluation of chronic kidney disease on 02/28/2012 serum protein electrophoresis was and was essentially normal with no obvious monoclonal protein seen on SPEP.  However, immunofixation and electrophoresis in the sample collected on 02/06/2012 has demonstrated presence of monoclonal protein, specifically monoclonal heavy-chain IgA with no associated light chain visible suggesting the possibility of alpha heavy-chain disease.  The patient also had a serum free kappa lambda chain analysis at that time that showed normal amounts of serum free kappa light chains and serum free lambda light chains.  She also had normal serum immunoglobulin levels.   She has had repeated serum protein electrophoresis and serum immunofixation studies, and on serum immunofixation repeatedly, a monoclonal IgA immunoglobulin band with no corresponding light chain band was found suggesting the possibility of alpha heavy chain disease.  UPEP/UIEP was negative for M protein.   She has had a non-contrast CT abdomen/pelvis in 02/2012 and there was no definite abnormality  or lymphadenopathy. She also had an EGD in 08/2012 which showed normal esophagus, erythema of gastric mucosa which was biopsied and normal duodenum, biopsied as well, and the biopsies were unremarkable.   Pt sen by Dr. Can Munoz's heme team at [facility] on 2/22/13. She was felt to have MGUS with small monoclonal IgA fragment  in the alpha 2 region. Campylobacter jejunum, Campylobacter coli, salmonella, shigella, Yersinia and shiga toxin producing E.Coli as well as enteroinvasive E.Coli testing was negative.H.pylori also negative. Did not feel there was MALT. Bone Marrow Biopsy at [facility] in [place], [place] 2/20/2013: normocellular BM, no morphologic or immunophenotypic evidence of involvement by a plasma cell disorder or malignant lymphoma. Amyloid stain neg on BM.  Skeletal survey showed no lytic lesions.      History Of Present Illness:  Ms. Packer is a 47 year old female with type 2 DM, who  is here for follow-up of alpha heavy chains detected in the serum. .    Her M spike has been stable. Serum free lambda light chains have been stable in the last 6 months. She is on Labetalol and eplerenone for HTN. Creat has been stable. She saw Dr. Aleman in March 2017 and her eplerenone dose was increased. She had parathyroidectomy in 2013. Calcium level is normal.  She had f/up MGUS labs on 8/10/2017.  Serum FLC ratio is normal. M spike is stable at 0.2g/dl-0.3 g/dl. Serum immunoglobulin levels are within normal limits. Serum immunofixation showed:  Monoclonal IgG immunoglobulin of lambda light chain type.   Monoclonal IgA immunoglobulin with no visible light chain.     Results for SERENITY PACKER (MRN [id number]) as of 8/17/2017 14:50   Ref. Range 2/24/2016 08:03 8/3/2016 07:38 8/9/2016 11:14 8/9/2016 11:22 2/21/2017 08:10 3/7/2017 16:52 8/10/2017 09:06   IGA Latest Ref Range: 70 - 380 mg/dL 136  124    109   IGG Latest Ref Range: 695 - 1620 mg/dL 1540  1470    1360   IGM Latest Ref Range: 60 - 265 mg/dL 177  131    98   Immunofix ELP Urine Unknown    No monoclonal pro...      Immunofixation ELP Unknown (Note)...  (Note)...    (Note)...   Kappa Free Lt Chain Latest Ref Range: 0.33 - 1.94 mg/dL 2.87 (H)  1.86    1.69   Kappa Lambda Ratio Latest Ref Range: 0.26 - 1.65  0.89  0.58    0.40   Lambda Free Lt Chain Latest Ref Range: 0.57 - 2.63  mg/dL 3.22 (H)  3.21 (H)    4.21 (H)   Lutropin Latest Units: IU/L     1.9     Monoclonal Peak Latest Ref Range: 0.0 g/dL 0.2 (H)  0.2 (H)    0.3 (H)       Urine protein electrophoresis and urine immunofixation electrophoresis showed  no M protein either. Her CBCd showed mildly low WBC with normal absolute differential counts and Hb was normal at 12 g/dl and platelet count was normal as well.  She had a negative screening mammogram in 08/2015 and was reminded to proceed with her annual screening study at this time again.  Most recent HbA1C earlier this week was 5.2%. She followed up with Dr. Myers on 8/15/2017. DM is diet controlled. She has lost about 20 lbs in the last 6 months. She has occasional hard stools and is using Miralax prn. She has pain in her right knee which she rates at 5/6. She uses Tylenol arthritis for pain relief.  In addition, a complete 12 point  review of systems is negative.        Past medical, social, surgical, and family histories reviewed.  Pt underwent right inferior parathyroidectomy at [facility] on 3/21/2013 and PTH normalized  She has a history of morbid obesity and has had stomach banding in 2009 and the band was subsequently removed.    Allergies:  Allergies as of 08/17/2017 - Review Complete 08/15/2017   Allergen Reaction Noted     Sulfa drugs Shortness Of Breath 11/19/2003     Maxzide [hydrochlorothiazide w/triamterene] Other (See Comments) 09/13/2010     Metoprolol  08/10/2016     Seasonal allergies  04/09/2007       Current Medications:  Current Outpatient Prescriptions   Medication Sig Dispense Refill     labetalol (NORMODYNE) 200 MG tablet Take 200 mg by mouth 2 times daily       acetaminophen (TYLENOL ARTHRITIS PAIN) 650 MG CR tablet Take 650-1,300 mg by mouth every 8 hours as needed for mild pain or fever       eplerenone (INSPRA) 25 MG tablet 100 mg (4 tabs) in AM and 75 mg (3 tabs) in  tablet 1     SUMAtriptan (IMITREX) 25 MG tablet TAKE 1 TO 2 TABLETS BY MOUTH  "AT ONSET OF HEADACHE FOR MIGRAINE. MAY REPEAT DOSE IN 2 HOURS. MAX OF 200MG OR 2 DOSES IN 24 HOURS 18 tablet 0     ASPIRIN LOW DOSE 81 MG EC tablet TAKE 1 TABLET BY MOUTH ONCE DAILY 100 tablet 0     simvastatin (ZOCOR) 10 MG tablet TAKE 1 TABLET BY MOUTH BEDTIME 90 tablet 0     cholecalciferol 2000 UNITS CAPS Take 2,000 Units by mouth daily 90 capsule 3     calcium carbonate (OS-EVGENY 600 MG [place]. CA) 1500 (600 CA) MG tablet Take 1 tablet (600 mg) by mouth 2 times daily 180 tablet 3     polyethylene glycol (MIRALAX/GLYCOLAX) Packet Take 17 g by mouth daily       cetirizine (ZYRTEC) 10 MG tablet Take 10 mg by mouth daily as needed for allergies       fluticasone (FLONASE) 50 MCG/ACT nasal spray Spray 2 sprays into both nostrils daily 16 g 3     ranitidine (ZANTAC) 150 MG tablet Take 1 tablet (150 mg) by mouth 2 times daily 60 tablet 2     Blood Pressure Monitor KIT 1 Device 2 times daily 1 kit 0     blood glucose monitoring (ACCU-CHEK MULTICLIX) lancets Use to test blood sugar 4-5 times daily or as directed. 1 Box 6        Physical Exam:  Pulse 60  Temp 97.6  F (36.4  C) (Oral)  Resp 20  Ht 1.549 m (5' 0.98\")  Wt 93.9 kg (207 lb)  LMP 08/13/2005  SpO2 97%  BMI 39.13 kg/m2 /97      LMP 08/13/2005      GENERAL APPEARANCE: healthy, alert and no distress        NECK: no adenopathy, no asymmetry or masses  Lymphatics: No cervical, supraclavicular or axillary lymphadenopathy     RESP: lungs clear to auscultation - no rales, rhonchi or wheezes     CARDIOVASCULAR: regular rates and rhythm, normal S1 S2, no S3 or S4 and no murmur.     ABDOMEN:  soft, nontender, no HSM or masses and bowel sounds normal     MUSCULOSKELETAL: extremities normal- no gross deformities noted, no evidence of inflammation in joints, FROM in all extremities. No edema b/l LE.     SKIN: no suspicious lesions or rashes     PSYCHIATRIC: mentation appears normal and affect normal    Laboratory/Imaging Studies  Results for SERENITY PHAN (MRN " [id number]) as of 8/17/2017 14:50   Ref. Range 8/3/2016 07:38 8/9/2016 11:14 8/18/2016 11:41 3/7/2017 16:52 8/10/2017 09:06   Creatinine Latest Ref Range: 0.52 - 1.04 mg/dL 1.38 (H) 1.27 (H) 1.09 (H) 1.28 (H) 1.13 (H)     Labs reviewed and documented in the EMR.      ASSESSMENT/PLAN:   Adelaida is a very pleasant 47-year-old woman with a longstanding history of hypertension and also with chronic kidney disease likely secondary to hypertension, who during evaluation of chronic kidney disease was found to have an IgA monoclonal protein on serum immunofixation electrophoresis  without corresponding light chain band, although on most recent serum immunofixation ELP in 08/2016 she had monoclonal IgG immunoglobulin of lambda light chain type and a persistent small monoclonal IgA immunoglobulin with no associated light chain type.     1. Monoclonal gammopathy- She is felt to have monoclonal gammopathy of unknown significance (MGUS) and needs annual f/up like patients with MGUS. M spike is stable. Ig levels normal. Serum FLC ratio is normal.  We'll continue to monitor her annually with serum and urine MGUS labs.     2. Type 2 DM- good glycemic control. Continue diet.  3. HTN- Cont Eplerenone, Labetalol.  4. Hyperaldosteronism- Cont Eplerenone and f/up with Dr. Aleman and Dr. Myers  At the end of our visit patient verbalized understanding and concurred with the plan.

## 2017-08-17 NOTE — NURSING NOTE
"Oncology Rooming Note    August 17, 2017 2:58 PM   Adelaida Packer is a 47 year old female who presents for:    Chief Complaint   Patient presents with     Oncology Clinic Visit     1 yr f/u, labs 8/10     Initial Vitals: LMP 08/13/2005 Estimated body mass index is 39.11 kg/(m^2) as calculated from the following:    Height as of 8/15/17: 1.549 m (5' 1\").    Weight as of 8/15/17: 93.9 kg (207 lb). There is no height or weight on file to calculate BSA.  Data Unavailable Comment: Data Unavailable   Patient's last menstrual period was 08/13/2005.  Allergies reviewed: Yes  Medications reviewed: Yes    Medications: Medication refills not needed today.  Pharmacy name entered into [facility]:    [facility] [place] - [address], [address]  [facility] - [place] - [address] [facility] [address]  [facility] - [place], [place] - [address] [facility]  AT [facility]    Clinical concerns:     8 minutes for nursing intake (face to face time)     YOEL MILIAN LPN              "

## 2017-08-18 LAB
DEPRECATED CALCIDIOL+CALCIFEROL SERPL-MC: 32 UG/L (ref 20–75)
PROT ELPH PNL UR ELPH: NORMAL

## 2017-08-19 NOTE — PROGRESS NOTES
Dr.Kuppa Janelle  Is out of the office and we are reviewing your results for you.  Please follow up if further concerns or questions   Attached are your test results.  -Vitamin D level is normal, 1000 IU daily in diet or supplements is recommended.    Please contact us if you have any questions.    Nat Mac, CNP

## 2017-08-22 ENCOUNTER — OFFICE VISIT (OUTPATIENT)
Dept: OPTOMETRY | Facility: CLINIC | Age: 47
End: 2017-08-22
Payer: COMMERCIAL

## 2017-08-22 DIAGNOSIS — H52.13 MYOPIA, BILATERAL: Primary | ICD-10-CM

## 2017-08-22 PROCEDURE — 99207 ZZC NO BILLABLE SERVICE THIS VISIT: CPT | Performed by: OPTOMETRIST

## 2017-08-22 PROCEDURE — 92310 CONTACT LENS FITTING OU: CPT | Performed by: OPTOMETRIST

## 2017-08-22 ASSESSMENT — REFRACTION_CURRENTRX
OD_BRAND: ALCON DAILIES TOTAL 1 BC 8.5, D 14.1
OS_SPHERE: -4.00
OD_SPHERE: -4.25
OS_BRAND: ALCON DAILIES TOTAL 1 BC 8.5, D 14.1

## 2017-08-22 NOTE — MR AVS SNAPSHOT
After Visit Summary   8/22/2017    Adelaida Packer    MRN: [id number]           Patient Information     Date Of Birth          1970        Visit Information        Provider Department      8/22/2017 11:20 AM Zac Sandhu OD [facility]        Today's Diagnoses     Myopia, bilateral    -  1      Care Instructions    Dispensed trial contacts.  Ok to order if satisfied.    OTC readers over contact lenses to read.    Return in 1 year for a complete eye exam or sooner if needed.    Zac Sandhu OD            Follow-ups after your visit        Follow-up notes from your care team     Return in about 1 year (around 8/22/2018), or if symptoms worsen or fail to improve, for Annual Visit.      Your next 10 appointments already scheduled     Mar 06, 2018  4:00 PM CST   LAB with LAB FIRST FLOOR [facility])    [address] [place] [id number] [phone]           Patient must bring picture ID. Patient should be prepared to give a urine specimen  Please do not eat 10-12 hours before your appointment if you are coming in fasting for labs on lipids, cholesterol, or glucose (sugar). Pregnant women should follow their Care Team instructions. Water with medications is okay. Do not drink coffee or other fluids. If you have concerns about taking  your medications, please ask at office or if scheduling via PaeDae, send a message by clicking on Secure Messaging, Message Your Care Team.            Mar 06, 2018  4:30 PM CST   Return Visit with Dia Aleman MD   [facility])    [address] [facility] [address]   [phone]            Aug 07, 2018 10:10 AM CDT   LAB with LAB FIRST FLOOR [facility])    [address] [place] [id number]   [phone]           Patient must bring picture ID.  Patient should be prepared to give a urine specimen  Please do not eat 10-12 hours before your appointment if you are coming in fasting for labs on lipids, cholesterol, or glucose (sugar). Pregnant women should follow their Care Team instructions. Water with medications is okay. Do not drink coffee or other fluids. If you have concerns about taking  your medications, please ask at office or if scheduling via Evirx, send a message by clicking on Secure Messaging, Message Your Care Team.            Aug 14, 2018  9:15 AM CDT   Return Visit with Cele Myers MD, MG ENDO NURSE   [facility]    [address] [place] [id number] [phone]            Aug 14, 2018  2:00 PM CDT   Return Visit with Kristi Rodas MD   [facility])    [address] [place] [id number] [phone]              Who to contact     If you have questions or need follow up information about today's clinic visit or your schedule please [facility] at [phone].  Normal or non-critical lab and imaging results will be communicated to you by MyChart, letter or phone within 4 business days after the clinic has received the results. If you do not hear from us within 7 days, please contact the clinic through Aula 7hart or phone. If you have a critical or abnormal lab result, we will notify you by phone as soon as possible.  Submit refill requests through Evirx or call your pharmacy and they will forward the refill request to us. Please allow 3 business days for your refill to be completed.          Additional Information About Your Visit        Evirx Information     Evirx gives you secure access to your electronic health record. If you see a primary care provider, you can also send messages to your care team and make appointments. If you have questions, please call your  primary care clinic.  If you do not have a primary care provider, please call [phone] and they will assist you.        Care EveryWhere ID     This is your Care EveryWhere ID. This could be used by other organizations to access your [place] medical records  [id number]        Your Vitals Were     Last Period                   08/13/2005            Blood Pressure from Last 3 Encounters:   08/17/17 (!) 135/97   08/15/17 139/82   07/17/17 138/90    Weight from Last 3 Encounters:   08/17/17 93.9 kg (207 lb)   08/15/17 93.9 kg (207 lb)   07/17/17 96.2 kg (212 lb)              We Performed the Following     CONTACT LENS FITTING,BILAT        Primary Care Provider Office Phone # Fax #    Windy Gordillo [phone] [address]  [facility] [address]        Equal Access to Services     [facility]: Hadii aad ku hadasho Soomaali, waaxda luqadaha, qaybta kaalmada adeegyada, waxay idiin hayaan rayray khbrandon mahmood . So [facility] [phone].    ATENCIÓN: Si habla español, tiene a lynch disposición servicios gratuitos de asistencia lingüística. Jaleesa al [phone].    We comply with applicable federal civil rights laws and [place] laws. We do not discriminate on the basis of race, color, national origin, age, disability sex, sexual orientation or gender identity.            Thank you!     Thank you for choosing [facility]  for your care. Our goal is always to provide you with excellent care. Hearing back from our patients is one way we can continue to improve our services. Please take a few minutes to complete the written survey that you may receive in the mail after your visit with us. Thank you!             Your Updated Medication List - Protect others around you: Learn how to safely use, store and throw away your medicines at www.disposemymeds.org.          This list is accurate as of: 8/22/17 11:54 AM.  Always use your most recent med list.                   Brand Name  Dispense Instructions for use Diagnosis    ASPIRIN LOW DOSE 81 MG EC tablet   Generic drug:  aspirin     100 tablet    TAKE 1 TABLET BY MOUTH ONCE DAILY    Type 2 diabetes, HbA1c goal < 7% (H), Hyperlipidemia with target LDL less than 100       blood glucose monitoring lancets     1 Box    Use to test blood sugar 4-5 times daily or as directed.    Type 2 diabetes, HbA1c goal < 7% (H)       Blood Pressure Monitor Kit     1 kit    1 Device 2 times daily    Essential hypertension       calcium carbonate 1500 (600 CA) MG tablet    OS-EVGENY 600 mg [place]. [place]    180 tablet    Take 1 tablet (600 mg) by mouth 2 times daily    Vitamin D insufficiency       cetirizine 10 MG tablet    zyrTEC     Take 10 mg by mouth daily as needed for allergies        cholecalciferol 2000 UNITS Caps     90 capsule    Take 2,000 Units by mouth daily    Vitamin D insufficiency       eplerenone 25 MG tablet    INSPRA    630 tablet    100 mg (4 tabs) in AM and 75 mg (3 tabs) in PM    Primary hyperaldosteronism (H)       fluticasone 50 MCG/ACT spray    FLONASE    16 g    Spray 2 sprays into both nostrils daily    Nasal congestion       labetalol 200 MG tablet    NORMODYNE     Take 200 mg by mouth 2 times daily        polyethylene glycol Packet    MIRALAX/GLYCOLAX     Take 17 g by mouth daily        ranitidine 150 MG tablet    ZANTAC    60 tablet    Take 1 tablet (150 mg) by mouth 2 times daily    Gastroesophageal reflux disease without esophagitis       simvastatin 10 MG tablet    ZOCOR    90 tablet    TAKE 1 TABLET BY MOUTH BEDTIME    Hyperlipidemia with target LDL less than 100       SUMAtriptan 25 MG tablet    IMITREX    18 tablet    TAKE 1 TO 2 TABLETS BY MOUTH AT ONSET OF HEADACHE FOR MIGRAINE. MAY REPEAT DOSE IN 2 HOURS. MAX OF 200MG OR 2 DOSES IN 24 HOURS    Chronic migraine without aura without status migrainosus, not intractable       TYLENOL ARTHRITIS PAIN 650 MG CR tablet   Generic drug:  acetaminophen      Take 650-1,300 mg by mouth every 8  hours as needed for mild pain or fever

## 2017-08-22 NOTE — PROGRESS NOTES
Chief Complaint   Patient presents with     Contact Lens Fitting       Contact lenses dispensed    Ibeth Martin Optometric Assistant, A.B.O.C.       OBJECTIVE: See Ophthalmology exam     ASSESSMENT:    ICD-10-CM    1. Myopia, bilateral H52.13 CONTACT LENS FITTING,BILAT      PLAN:  Patient Instructions   Dispensed trial contacts.  Ok to order if satisfied.    OTC readers over contact lenses to read.    Return in 1 year for a complete eye exam or sooner if needed.    Zac Sandhu, OD

## 2017-08-22 NOTE — PATIENT INSTRUCTIONS
Dispensed trial contacts.  Ok to order if satisfied.    OTC readers over contact lenses to read.    Return in 1 year for a complete eye exam or sooner if needed.    Zac Sandhu, OD

## 2017-10-30 ENCOUNTER — MYC REFILL (OUTPATIENT)
Dept: NEPHROLOGY | Facility: CLINIC | Age: 47
End: 2017-10-30

## 2017-10-30 ENCOUNTER — MYC MEDICAL ADVICE (OUTPATIENT)
Dept: NEPHROLOGY | Facility: CLINIC | Age: 47
End: 2017-10-30

## 2017-10-30 DIAGNOSIS — E26.09 PRIMARY HYPERALDOSTERONISM (H): ICD-10-CM

## 2017-10-30 DIAGNOSIS — I12.9 HYPERTENSION, RENAL: Primary | ICD-10-CM

## 2017-10-30 RX ORDER — LABETALOL 200 MG/1
200 TABLET, FILM COATED ORAL 2 TIMES DAILY
Qty: 180 TABLET | Refills: 1 | Status: SHIPPED | OUTPATIENT
Start: 2017-10-30 | End: 2018-05-14

## 2017-10-30 RX ORDER — EPLERENONE 25 MG/1
TABLET, FILM COATED ORAL
Qty: 630 TABLET | Refills: 1 | Status: SHIPPED | OUTPATIENT
Start: 2017-10-30 | End: 2018-05-09

## 2017-12-01 ENCOUNTER — OFFICE VISIT (OUTPATIENT)
Dept: FAMILY MEDICINE | Facility: CLINIC | Age: 47
End: 2017-12-01
Payer: COMMERCIAL

## 2017-12-01 VITALS
SYSTOLIC BLOOD PRESSURE: 144 MMHG | HEART RATE: 64 BPM | BODY MASS INDEX: 36.45 KG/M2 | WEIGHT: 192.8 LBS | TEMPERATURE: 98.6 F | OXYGEN SATURATION: 99 % | DIASTOLIC BLOOD PRESSURE: 108 MMHG

## 2017-12-01 DIAGNOSIS — R19.7 DIARRHEA, UNSPECIFIED TYPE: Primary | ICD-10-CM

## 2017-12-01 DIAGNOSIS — E21.0 PRIMARY HYPERPARATHYROIDISM (H): Primary | ICD-10-CM

## 2017-12-01 DIAGNOSIS — N18.30 CKD (CHRONIC KIDNEY DISEASE) STAGE 3, GFR 30-59 ML/MIN (H): ICD-10-CM

## 2017-12-01 DIAGNOSIS — I12.9 HYPERTENSION, RENAL: ICD-10-CM

## 2017-12-01 DIAGNOSIS — R11.0 NAUSEA: ICD-10-CM

## 2017-12-01 PROCEDURE — 99213 OFFICE O/P EST LOW 20 MIN: CPT | Performed by: PHYSICIAN ASSISTANT

## 2017-12-01 RX ORDER — ONDANSETRON 4 MG/1
4 TABLET, FILM COATED ORAL EVERY 8 HOURS PRN
Qty: 15 TABLET | Refills: 0 | Status: SHIPPED | OUTPATIENT
Start: 2017-12-01 | End: 2018-11-15

## 2017-12-01 NOTE — NURSING NOTE
"Chief Complaint   Patient presents with     Diarrhea       Initial BP (!) 144/108 (BP Location: Left arm, Patient Position: Chair, Cuff Size: Adult Large)  Pulse 64  Temp 98.6  F (37  C) (Oral)  Wt 192 lb 12.8 oz (87.5 kg)  LMP 08/13/2005  SpO2 99%  Breastfeeding? No  BMI 36.45 kg/m2 Estimated body mass index is 36.45 kg/(m^2) as calculated from the following:    Height as of 8/17/17: 5' 0.98\" (1.549 m).    Weight as of this encounter: 192 lb 12.8 oz (87.5 kg).  Medication Reconciliation: complete   Kameron Reyes MA        "
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
## 2017-12-01 NOTE — PATIENT INSTRUCTIONS
"At [facility], we strive to deliver an exceptional experience to you, every time we see you.  If you receive a survey in the mail, please send us back your thoughts. We really do value your feedback.    Based on your medical history, these are the current health maintenance/preventive care services that you are due for (some may have been done at this visit.)  Health Maintenance Due   Topic Date Due     PNEUMOVAX 1X HI RISK PATIENT < 65 (NO IB MSG)  04/07/1972     MICROALBUMIN Q1 YEAR  02/18/2017     INFLUENZA VACCINE (SYSTEM ASSIGNED)  09/01/2017         Suggested websites for health information:  Www.[facility].org : Up to date and easily searchable information on multiple topics.  Www.medlineplus.gov : medication info, interactive tutorials, watch real surgeries online  Www.familydoctor.org : good info from the Academy of Family Physicians  Www.cdc.gov : public health info, travel advisories, epidemics (H1N1)  Www.aap.org : children's health info, normal development, vaccinations  Www.health.[facility].[place].us : [place] dept of health, public health issues in [place], N1N1    Your care team:                            Family Medicine Internal Medicine   MD Neymar Johnson MD Shantel Branch-Fleming, MD Katya Georgiev PA-C Nam Ho, MD Pediatrics   ROSSANA Christianson, JAYDA Garcia APRROXANA CNP   MD Yani Gonzalez MD Deborah Mielke, MD Kim Thein, APRN [facility]      Clinic hours: Monday - Thursday 7 am-7 pm; Fridays 7 am-5 pm.   Urgent care: Monday - Friday 11 am-9 pm; Saturday and Sunday 9 am-5 pm.  Pharmacy : Monday -Thursday 8 am-8 pm; Friday 8 am-6 pm; Saturday and Sunday 9 am-5 pm.     Clinic: [phone]   Pharmacy: [phone]     * FOOD POISONING or VIRAL GASTROENTERITIS (6yr-Adult)  FOOD POISONING may occur from 6 to 24 hours after eating food that has spoiled and lasts up to1-2 days. VIRAL GASTRO-ENTERITIS is commonly known as the \"stomach " "flu\" and may last 2-7 days. Symptoms of both illnesses may include vomiting, diarrhea, fever, abdominal cramping. Antibiotics are not effective, but simple home treatment will be helpful.  HOME CARE:    If symptoms are severe, rest at home for the next 24 hours.    Avoid tobacco and alcohol. These may worsen your symptoms.    If medicines for diarrhea (low dose of Immodium: one tablet a day for an adult) or vomiting were prescribed, take only as directed.   During the first 12 to 24 hours follow the diet below:    DRINKS: Sport drinks like Gatorade, soft drinks without caffeine; ginger ale, mineral water (plain or flavored), decaffeinated tea and coffee.    SOUPS: Clear broth, consommé and bouillon    DESSERTS: Plain gelatin (Jell-O), popsicles and fruit juice bars.  During the next 24 hours you may add the following to the above:    Hot cereal, plain toast, bread, rolls, crackers    Plain noodles, rice, mashed potatoes, chicken noodle or rice soup    Unsweetened canned fruit (avoid pineapple), bananas    Limit fat intake to less than 15 grams per day by avoiding margarine, butter, oils, mayonnaise, sauces, gravies, fried foods, peanut butter, meat, poultry and fish.    Limit fiber; avoid raw or cooked vegetables, fresh fruits (except bananas) and bran cereals.    Limit caffeine and chocolate. No spices or seasonings except salt.  Slowly go back to a normal diet as you feel better and your symptoms lessen.  FOLLOW UP with your doctor as advised if you are not better in 2 days. If a stool (diarrhea) sample was taken, you may call in 2 days (or as directed) for the results.  GET PROMPT MEDICAL ATTENTION if any of the following occur:    Increasing abdominal pain or constant pain in one spot    Continued vomiting (unable to keep liquids down)    Frequent diarrhea (more than 5 times a day)    Blood in vomit or stool (black or red color)    Unable to take in fluids at all    No urine output for 12 hours or extreme " thirst    Weakness, dizziness, fainting    Drowsiness, confusion, stiff neck or seizure    Fever over 101.0  F (38.3  C) for more than 3 days    New rash    7112-3203 [employer]. [address] [place] [address], [place] [address]. All rights reserved. This information is not intended as a substitute for professional medical care. Always follow your healthcare professional's instructions.  This information has been modified by your health care provider with permission from the publisher.

## 2017-12-01 NOTE — MR AVS SNAPSHOT
After Visit Summary   12/1/2017    Adealida Packer    MRN: [id number]           Patient Information     Date Of Birth          1970        Visit Information        Provider Department      12/1/2017 11:20 AM Chase Osborne PA [facility]        Today's Diagnoses     Diarrhea, unspecified type    -  1    Nausea          Care Instructions    At [facility], we strive to deliver an exceptional experience to you, every time we see you.  If you receive a survey in the mail, please send us back your thoughts. We really do value your feedback.    Based on your medical history, these are the current health maintenance/preventive care services that you are due for (some may have been done at this visit.)  Health Maintenance Due   Topic Date Due     PNEUMOVAX 1X HI RISK PATIENT < 65 (NO IB MSG)  04/07/1971     MICROALBUMIN Q1 YEAR  02/18/2017     INFLUENZA VACCINE (SYSTEM ASSIGNED)  09/01/2017         Suggested websites for health information:  Www.Kanshu : Up to date and easily searchable information on multiple topics.  Www.medlineplus.gov : medication info, interactive tutorials, watch real surgeries online  Www.familydoctor.org : good info from the Academy of Family Physicians  Www.cdc.gov : public health info, travel advisories, epidemics (H1N1)  Www.aap.org : children's health info, normal development, vaccinations  Www.health.state.[place].us : [place] dept of health, public health issues in [place], N1N1    Your care team:                            Family Medicine Internal Medicine   MD Neymar Johnson MD Shantel Branch-Fleming, MD Katya Georgiev PA-C Nam Ho, MD Pediatrics   ROSSANA Christianson, MD Yani Abraham CNP, MD Deborah Mielke, MD Kim Thein, PEREZ CNP      Clinic hours: Monday - Thursday 7 am-7 pm; Fridays 7 am-5 pm.   Urgent care: Monday - Friday 11 am-9 pm;  "Saturday and Sunday 9 am-5 pm.  Pharmacy : Monday -Thursday 8 am-8 pm; Friday 8 am-6 pm; Saturday and Sunday 9 am-5 pm.     Clinic: [phone]   Pharmacy: [phone]     * FOOD POISONING or VIRAL GASTROENTERITIS (6yr-Adult)  FOOD POISONING may occur from 6 to 24 hours after eating food that has spoiled and lasts up to1-2 days. VIRAL GASTRO-ENTERITIS is commonly known as the \"stomach flu\" and may last 2-7 days. Symptoms of both illnesses may include vomiting, diarrhea, fever, abdominal cramping. Antibiotics are not effective, but simple home treatment will be helpful.  HOME CARE:    If symptoms are severe, rest at home for the next 24 hours.    Avoid tobacco and alcohol. These may worsen your symptoms.    If medicines for diarrhea (low dose of Immodium: one tablet a day for an adult) or vomiting were prescribed, take only as directed.   During the first 12 to 24 hours follow the diet below:    DRINKS: Sport drinks like Gatorade, soft drinks without caffeine; ginger ale, mineral water (plain or flavored), decaffeinated tea and coffee.    SOUPS: Clear broth, consommé and bouillon    DESSERTS: Plain gelatin (Jell-O), popsicles and fruit juice bars.  During the next 24 hours you may add the following to the above:    Hot cereal, plain toast, bread, rolls, crackers    Plain noodles, rice, mashed potatoes, chicken noodle or rice soup    Unsweetened canned fruit (avoid pineapple), bananas    Limit fat intake to less than 15 grams per day by avoiding margarine, butter, oils, mayonnaise, sauces, gravies, fried foods, peanut butter, meat, poultry and fish.    Limit fiber; avoid raw or cooked vegetables, fresh fruits (except bananas) and bran cereals.    Limit caffeine and chocolate. No spices or seasonings except salt.  Slowly go back to a normal diet as you feel better and your symptoms lessen.  FOLLOW UP with your doctor as advised if you are not better in 2 days. If a stool (diarrhea) sample was taken, you may call " in 2 days (or as directed) for the results.  GET PROMPT MEDICAL ATTENTION if any of the following occur:    Increasing abdominal pain or constant pain in one spot    Continued vomiting (unable to keep liquids down)    Frequent diarrhea (more than 5 times a day)    Blood in vomit or stool (black or red color)    Unable to take in fluids at all    No urine output for 12 hours or extreme thirst    Weakness, dizziness, fainting    Drowsiness, confusion, stiff neck or seizure    Fever over 101.0  F (38.3  C) for more than 3 days    New rash    7720-5430 [employer]. [address] [place] [address]. All rights reserved. This information is not intended as a substitute for professional medical care. Always follow your healthcare professional's instructions.  This information has been modified by your health care provider with permission from the publisher.            Follow-ups after your visit        Follow-up notes from your care team     Return if symptoms worsen or fail to improve.      Your next 10 appointments already scheduled     Dec 05, 2017  4:30 PM CST   Return Visit with Dia Aleman MD   [facility])    [address] [place] [address]   [phone]            Mar 06, 2018  4:00 PM CST   LAB with LAB FIRST FLOOR [facility])    [address] [place] [address]   [phone]           Please do not eat 10-12 hours before your appointment if you are coming in fasting for labs on lipids, cholesterol, or glucose (sugar). This does not apply to pregnant women. Water, hot tea and black coffee (with nothing added) are okay. Do not drink other fluids, diet soda or chew gum.            Mar 06, 2018  4:30 PM CST   Return Visit with Dia Aleman MD   [facility])    [address] [place] [address]  [address]   [phone]            Aug 07, 2018 10:10 AM CDT   LAB with LAB FIRST FLOOR [facility] ([facility])    [address] [place] [address]   [phone]           Please do not eat 10-12 hours before your appointment if you are coming in fasting for labs on lipids, cholesterol, or glucose (sugar). This does not apply to pregnant women. Water, hot tea and black coffee (with nothing added) are okay. Do not drink other fluids, diet soda or chew gum.            Aug 14, 2018  9:15 AM CDT   Return Visit with Cele Myers MD, [facility] NURSE   [facility])    [address] [place] [address]   [phone]            Aug 14, 2018  2:00 PM CDT   Return Visit with Kristi Rodas MD   [facility])    [address] [place] [phone]   [phone]              Future tests that were ordered for you today     Open Future Orders        Priority Expected Expires Ordered    Clostridium difficile toxin B PCR Routine  12/4/2017 12/1/2017    Enteric Bacteria and Virus Panel by ALISIA Stool Routine  12/1/2018 12/1/2017    Hemoglobin Routine 12/5/2017 2/28/2018 12/1/2017    Parathyroid Hormone Intact Routine 12/5/2017 2/28/2018 12/1/2017    Protein  random urine with Creat Ratio Routine 12/5/2017 2/28/2018 12/1/2017    Renal panel Routine 12/5/2017 2/28/2018 12/1/2017            Who to contact     If you have questions or need follow up information about today's clinic visit or your schedule please contact [facility] directly at [phone].  Normal or non-critical lab and imaging results will be communicated to you by MyChart, letter or phone within 4 business days after the clinic has received the results. If you do not hear from us within 7 days, please contact the clinic through MyChart or phone. If you have a  critical or abnormal lab result, we will notify you by phone as soon as possible.  Submit refill requests through YES.TAP or call your pharmacy and they will forward the refill request to us. Please allow 3 business days for your refill to be completed.          Additional Information About Your Visit        Vendor Registryhart Information     YES.TAP gives you secure access to your electronic health record. If you see a primary care provider, you can also send messages to your care team and make appointments. If you have questions, please call your primary care clinic.  If you do not have a primary care provider, please call [phone] and they will assist you.        Care EveryWhere ID     This is your Care EveryWhere ID. This could be used by other organizations to access your [facility] medical records  [id number]        Your Vitals Were     Pulse Temperature Last Period Pulse Oximetry Breastfeeding? BMI (Body Mass Index)    64 98.6  F (37  C) (Oral) 08/13/2005 99% No 36.45 kg/m2       Blood Pressure from Last 3 Encounters:   12/01/17 (!) 144/108   08/17/17 (!) 135/97   08/15/17 139/82    Weight from Last 3 Encounters:   12/01/17 192 lb 12.8 oz (87.5 kg)   08/17/17 207 lb (93.9 kg)   08/15/17 207 lb (93.9 kg)                 Today's Medication Changes          These changes are accurate as of: 12/1/17 11:20 AM.  If you have any questions, ask your nurse or doctor.               Start taking these medicines.        Dose/Directions    ondansetron 4 MG tablet   Commonly known as:  ZOFRAN   Used for:  Nausea   Started by:  Chase Osborne PA        Dose:  4 mg   Take 1 tablet (4 mg) by mouth every 8 hours as needed for nausea   Quantity:  15 tablet   Refills:  0            Where to get your medicines      These medications were sent to [facility] [place] [address]  [address], [place] [address]     Phone:  [phone]     ondansetron 4 MG tablet                 Primary Care Provider Office Phone # Fax #    Windy Gordillo [phone] [address]  [facility] [address]        Equal Access to Services     TABATHA VILLEGAS : Hadavelino luma ku amano Soshannonali, waaxda luqadaha, qaybta kaalmada adeshola, silvia zacariasmateus ayala. So [facility] [phone].    ATENCIÓN: Si habla español, tiene a lynch disposición servicios gratuitos de asistencia lingüística. Llame al [phone].    We comply with applicable federal civil rights laws and [place] laws. We do not discriminate on the basis of race, color, national origin, age, disability, sex, sexual orientation, or gender identity.            Thank you!     Thank you for choosing [facility]  for your care. Our goal is always to provide you with excellent care. Hearing back from our patients is one way we can continue to improve our services. Please take a few minutes to complete the written survey that you may receive in the mail after your visit with us. Thank you!             Your Updated Medication List - Protect others around you: Learn how to safely use, store and throw away your medicines at www.disposemymeds.org.          This list is accurate as of: 12/1/17 11:20 AM.  Always use your most recent med list.                   Brand Name Dispense Instructions for use Diagnosis    ASPIRIN LOW DOSE 81 MG EC tablet   Generic drug:  aspirin     100 tablet    TAKE 1 TABLET BY MOUTH ONCE DAILY    Type 2 diabetes, HbA1c goal < 7% (H), Hyperlipidemia with target LDL less than 100       blood glucose monitoring lancets     1 Box    Use to test blood sugar 4-5 times daily or as directed.    Type 2 diabetes, HbA1c goal < 7% (H)       Blood Pressure Monitor Kit     1 kit    1 Device 2 times daily    Essential hypertension       calcium carbonate 1500 (600 CA) MG tablet    OS-EVGENY 600 mg [place]. Ca    180 tablet    Take 1 tablet (600 mg) by mouth 2 times daily    Vitamin D insufficiency        cetirizine 10 MG tablet    zyrTEC     Take 10 mg by mouth daily as needed for allergies        cholecalciferol 2000 UNITS Caps     90 capsule    Take 2,000 Units by mouth daily    Vitamin D insufficiency       eplerenone 25 MG tablet    INSPRA    630 tablet    100 mg (4 tabs) in AM and 75 mg (3 tabs) in PM    Primary hyperaldosteronism (H)       fluticasone 50 MCG/ACT spray    FLONASE    16 g    Spray 2 sprays into both nostrils daily    Nasal congestion       labetalol 200 MG tablet    NORMODYNE    180 tablet    Take 1 tablet (200 mg) by mouth 2 times daily    Hypertension, renal       ondansetron 4 MG tablet    ZOFRAN    15 tablet    Take 1 tablet (4 mg) by mouth every 8 hours as needed for nausea    Nausea       polyethylene glycol Packet    MIRALAX/GLYCOLAX     Take 17 g by mouth daily        ranitidine 150 MG tablet    ZANTAC    60 tablet    Take 1 tablet (150 mg) by mouth 2 times daily    Gastroesophageal reflux disease without esophagitis       simvastatin 10 MG tablet    ZOCOR    90 tablet    TAKE 1 TABLET BY MOUTH BEDTIME    Hyperlipidemia with target LDL less than 100       SUMAtriptan 25 MG tablet    IMITREX    18 tablet    TAKE 1 TO 2 TABLETS BY MOUTH AT ONSET OF HEADACHE FOR MIGRAINE. MAY REPEAT DOSE IN 2 HOURS. MAX OF 200MG OR 2 DOSES IN 24 HOURS    Chronic migraine without aura without status migrainosus, not intractable       TYLENOL ARTHRITIS PAIN 650 MG CR tablet   Generic drug:  acetaminophen      Take 650-1,300 mg by mouth every 8 hours as needed for mild pain or fever

## 2017-12-05 ENCOUNTER — OFFICE VISIT (OUTPATIENT)
Dept: NEPHROLOGY | Facility: CLINIC | Age: 47
End: 2017-12-05
Payer: COMMERCIAL

## 2017-12-05 VITALS
WEIGHT: 196 LBS | OXYGEN SATURATION: 98 % | SYSTOLIC BLOOD PRESSURE: 154 MMHG | BODY MASS INDEX: 37.05 KG/M2 | DIASTOLIC BLOOD PRESSURE: 103 MMHG | HEART RATE: 72 BPM

## 2017-12-05 DIAGNOSIS — E26.09 PRIMARY HYPERALDOSTERONISM (H): ICD-10-CM

## 2017-12-05 DIAGNOSIS — E21.0 PRIMARY HYPERPARATHYROIDISM (H): ICD-10-CM

## 2017-12-05 DIAGNOSIS — D47.2 MONOCLONAL GAMMOPATHY: ICD-10-CM

## 2017-12-05 DIAGNOSIS — E11.22 TYPE 2 DIABETES MELLITUS WITH STAGE 3 CHRONIC KIDNEY DISEASE, WITHOUT LONG-TERM CURRENT USE OF INSULIN (H): Primary | ICD-10-CM

## 2017-12-05 DIAGNOSIS — I10 HYPERTENSION GOAL BP (BLOOD PRESSURE) < 140/90: ICD-10-CM

## 2017-12-05 DIAGNOSIS — N18.30 TYPE 2 DIABETES MELLITUS WITH STAGE 3 CHRONIC KIDNEY DISEASE, WITHOUT LONG-TERM CURRENT USE OF INSULIN (H): Primary | ICD-10-CM

## 2017-12-05 DIAGNOSIS — N18.30 CKD (CHRONIC KIDNEY DISEASE) STAGE 3, GFR 30-59 ML/MIN (H): ICD-10-CM

## 2017-12-05 PROCEDURE — 99214 OFFICE O/P EST MOD 30 MIN: CPT | Performed by: INTERNAL MEDICINE

## 2017-12-05 ASSESSMENT — PAIN SCALES - GENERAL: PAINLEVEL: NO PAIN (0)

## 2017-12-05 NOTE — PATIENT INSTRUCTIONS
1. Increase eplerenone 100 mg twice a day  2. Labs in a week  3. Clinicaltrials.gov if interested in studies  4. Plan one year follow-up but keep in touch as we will keep working on your blood pressure

## 2017-12-05 NOTE — NURSING NOTE
"Adelaida MARIN Ochoa's goals for this visit include: discuss blood pressure readings    She requests these members of her care team be copied on today's visit information: no    PCP: Windy Gordillo    Referring Provider:  No referring provider defined for this encounter.    Chief Complaint   Patient presents with     RECHECK     hypertension  LOV 03/07/17       Initial BP (!) 166/109 (BP Location: Right arm, Patient Position: Sitting, Cuff Size: Adult Regular)  Pulse 72  Wt 88.9 kg (196 lb)  LMP 08/13/2005  SpO2 98%  BMI 37.05 kg/m2 Estimated body mass index is 37.05 kg/(m^2) as calculated from the following:    Height as of 8/17/17: 1.549 m (5' 0.98\").    Weight as of this encounter: 88.9 kg (196 lb).  Medication Reconciliation: complete    Do you need any medication refills at today's visit? No    Alise Nesbitt LPN    "

## 2017-12-05 NOTE — MR AVS SNAPSHOT
After Visit Summary   12/5/2017    Adelaida Packer    MRN: [id number]           Patient Information     Date Of Birth          1970        Visit Information        Provider Department      12/5/2017 4:30 PM Dia Aleman MD [facility]        Care Instructions    1. Increase eplerenone 100 mg twice a day  2. Labs in a week  3. Clinicaltrials.gov if interested in studies  4. Plan one year follow-up but keep in touch as we will keep working on your blood pressure          Follow-ups after your visit        Your next 10 appointments already scheduled     Dec 12, 2017  5:00 PM CST   LAB with BE LAB   [facility])    [address] [facility] [id number] [phone]           Please do not eat 10-12 hours before your appointment if you are coming in fasting for labs on lipids, cholesterol, or glucose (sugar). This does not apply to pregnant women. Water, hot tea and black coffee (with nothing added) are okay. Do not drink other fluids, diet soda or chew gum.            Mar 06, 2018  4:00 PM CST   LAB with LAB FIRST FLOOR [facility])    [address] [place] [id number] [phone]           Please do not eat 10-12 hours before your appointment if you are coming in fasting for labs on lipids, cholesterol, or glucose (sugar). This does not apply to pregnant women. Water, hot tea and black coffee (with nothing added) are okay. Do not drink other fluids, diet soda or chew gum.            Mar 06, 2018  4:30 PM CST   Return Visit with Dia Aleman MD   [facility])    [address] [facility] [phone] [phone]            Aug 07, 2018 10:10 AM CDT   LAB with LAB FIRST FLOOR [facility])    [address] [place] [address]    [phone]           Please do not eat 10-12 hours before your appointment if you are coming in fasting for labs on lipids, cholesterol, or glucose (sugar). This does not apply to pregnant women. Water, hot tea and black coffee (with nothing added) are okay. Do not drink other fluids, diet soda or chew gum.            Aug 14, 2018  9:15 AM CDT   Return Visit with Cele Myers MD, [facility] NURSE   [facility])    [address] [place] [phone] [phone]            Aug 14, 2018  2:00 PM CDT   Return Visit with Kristi Rodas MD   [facility])    [address] [place] [phone]            Dec 04, 2018  4:00 PM CST   LAB with LAB FIRST FLOOR [facility])    [address] [place] [id number] [phone]           Please do not eat 10-12 hours before your appointment if you are coming in fasting for labs on lipids, cholesterol, or glucose (sugar). This does not apply to pregnant women. Water, hot tea and black coffee (with nothing added) are okay. Do not drink other fluids, diet soda or chew gum.            Dec 04, 2018  4:30 PM CST   Return Visit with Dia Aleman MD   [facility])    [address] [place] [phone] [phone]              Who to contact     If you have questions or need follow up information about today's clinic visit or your schedule please contact [facility] directly at [phone].  Normal or non-critical lab and imaging results will be communicated to you by MyChart, letter or phone within 4 business days after the clinic has received the results. If you do not hear from us within 7 days, please contact the clinic through MyChart or phone. If you have a critical or abnormal lab  result, we will notify you by phone as soon as possible.  Submit refill requests through MindOps or call your pharmacy and they will forward the refill request to us. Please allow 3 business days for your refill to be completed.          Additional Information About Your Visit        InnoVital Systemshart Information     MindOps gives you secure access to your electronic health record. If you see a primary care provider, you can also send messages to your care team and make appointments. If you have questions, please call your primary care clinic.  If you do not have a primary care provider, please call [phone] and they will assist you.      MindOps is an electronic gateway that provides easy, online access to your medical records. With MindOps, you can request a clinic appointment, read your test results, renew a prescription or communicate with your care team.     To access your existing account, please contact your [facility] or call [phone] for assistance.        Care EveryWhere ID     This is your Care EveryWhere ID. This could be used by other organizations to access your [place] medical records  [id number]        Your Vitals Were     Pulse Last Period Pulse Oximetry BMI (Body Mass Index)          72 08/13/2005 98% 37.05 kg/m2         Blood Pressure from Last 3 Encounters:   12/05/17 (!) 154/103   12/01/17 (!) 144/108   08/17/17 (!) 135/97    Weight from Last 3 Encounters:   12/05/17 88.9 kg (196 lb)   12/01/17 87.5 kg (192 lb 12.8 oz)   08/17/17 93.9 kg (207 lb)              Today, you had the following     No orders found for display       Primary Care Provider Office Phone # Fax #    Windy Gordillo [phone]       [address]  [facility] [address]        Equal Access to Services     TABATHA VILLEGAS : Alphonso Darby, marcelina plata, silvia baird. So [facility] [phone].    ATENCIÓN: Si  otto corbett, tiene a lynch disposición servicios gratuitos de asistencia lingüística. Jaleesa mullins [phone].    We comply with applicable federal civil rights laws and [place] laws. We do not discriminate on the basis of race, color, national origin, age, disability, sex, sexual orientation, or gender identity.            Thank you!     Thank you for choosing [facility]  for your care. Our goal is always to provide you with excellent care. Hearing back from our patients is one way we can continue to improve our services. Please take a few minutes to complete the written survey that you may receive in the mail after your visit with us. Thank you!             Your Updated Medication List - Protect others around you: Learn how to safely use, store and throw away your medicines at www.disposemymeds.org.          This list is accurate as of: 12/5/17  5:26 PM.  Always use your most recent med list.                   Brand Name Dispense Instructions for use Diagnosis    ASPIRIN LOW DOSE 81 MG EC tablet   Generic drug:  aspirin     100 tablet    TAKE 1 TABLET BY MOUTH ONCE DAILY    Type 2 diabetes, HbA1c goal < 7% (H), Hyperlipidemia with target LDL less than 100       blood glucose monitoring lancets     1 Box    Use to test blood sugar 4-5 times daily or as directed.    Type 2 diabetes, HbA1c goal < 7% (H)       Blood Pressure Monitor Kit     1 kit    1 Device 2 times daily    Essential hypertension       calcium carbonate 1500 (600 CA) MG tablet    OS-EVGENY 600 mg [place]. Ca    180 tablet    Take 1 tablet (600 mg) by mouth 2 times daily    Vitamin D insufficiency       cetirizine 10 MG tablet    zyrTEC     Take 10 mg by mouth daily as needed for allergies        cholecalciferol 2000 UNITS Caps     90 capsule    Take 2,000 Units by mouth daily    Vitamin D insufficiency       eplerenone 25 MG tablet    INSPRA    630 tablet    100 mg (4 tabs) in AM and 75 mg (3 tabs) in PM    Primary hyperaldosteronism (H)        fluticasone 50 MCG/ACT spray    FLONASE    16 g    Spray 2 sprays into both nostrils daily    Nasal congestion       labetalol 200 MG tablet    NORMODYNE    180 tablet    Take 1 tablet (200 mg) by mouth 2 times daily    Hypertension, renal       ondansetron 4 MG tablet    ZOFRAN    15 tablet    Take 1 tablet (4 mg) by mouth every 8 hours as needed for nausea    Nausea       polyethylene glycol Packet    MIRALAX/GLYCOLAX     Take 17 g by mouth daily        ranitidine 150 MG tablet    ZANTAC    60 tablet    Take 1 tablet (150 mg) by mouth 2 times daily    Gastroesophageal reflux disease without esophagitis       simvastatin 10 MG tablet    ZOCOR    90 tablet    TAKE 1 TABLET BY MOUTH BEDTIME    Hyperlipidemia with target LDL less than 100       SUMAtriptan 25 MG tablet    IMITREX    18 tablet    TAKE 1 TO 2 TABLETS BY MOUTH AT ONSET OF HEADACHE FOR MIGRAINE. MAY REPEAT DOSE IN 2 HOURS. MAX OF 200MG OR 2 DOSES IN 24 HOURS    Chronic migraine without aura without status migrainosus, not intractable       TYLENOL ARTHRITIS PAIN 650 MG CR tablet   Generic drug:  acetaminophen      Take 650-1,300 mg by mouth every 8 hours as needed for mild pain or fever

## 2017-12-30 ENCOUNTER — OFFICE VISIT (OUTPATIENT)
Dept: URGENT CARE | Facility: URGENT CARE | Age: 47
End: 2017-12-30
Payer: COMMERCIAL

## 2017-12-30 VITALS
DIASTOLIC BLOOD PRESSURE: 90 MMHG | WEIGHT: 196 LBS | OXYGEN SATURATION: 99 % | HEART RATE: 75 BPM | TEMPERATURE: 98.1 F | SYSTOLIC BLOOD PRESSURE: 152 MMHG | BODY MASS INDEX: 37.05 KG/M2

## 2017-12-30 DIAGNOSIS — J02.0 ACUTE STREPTOCOCCAL PHARYNGITIS: Primary | ICD-10-CM

## 2017-12-30 DIAGNOSIS — R07.0 THROAT PAIN: ICD-10-CM

## 2017-12-30 DIAGNOSIS — R50.9 FEVER, UNSPECIFIED FEVER CAUSE: ICD-10-CM

## 2017-12-30 LAB
DEPRECATED S PYO AG THROAT QL EIA: ABNORMAL
FLUAV+FLUBV AG SPEC QL: NEGATIVE
FLUAV+FLUBV AG SPEC QL: NEGATIVE
SPECIMEN SOURCE: ABNORMAL
SPECIMEN SOURCE: NORMAL

## 2017-12-30 PROCEDURE — 87880 STREP A ASSAY W/OPTIC: CPT | Performed by: PHYSICIAN ASSISTANT

## 2017-12-30 PROCEDURE — 99213 OFFICE O/P EST LOW 20 MIN: CPT | Performed by: PHYSICIAN ASSISTANT

## 2017-12-30 PROCEDURE — 87804 INFLUENZA ASSAY W/OPTIC: CPT | Mod: 59 | Performed by: PHYSICIAN ASSISTANT

## 2017-12-30 RX ORDER — PENICILLIN V POTASSIUM 500 MG/1
500 TABLET, FILM COATED ORAL 2 TIMES DAILY
Qty: 20 TABLET | Refills: 0 | Status: SHIPPED | OUTPATIENT
Start: 2017-12-30 | End: 2018-04-08

## 2017-12-30 NOTE — PROGRESS NOTES
SUBJECTIVE:   Adelaida Packer is a 47 year old female presenting with a chief complaint of No chief complaint on file.  .    Onset of symptoms was 2 day(s) ago.  Course of illness is worsening.    Severity moderate  Current and Associated symptoms: chills, stuffy nose, sore throat, hoarse voice, headache, body aches and fatigue  Treatment measures tried include Tylenol/Ibuprofen and Decongestants.  Predisposing factors include None.        Review of Systems   Constitutional: Positive for chills, fever and malaise/fatigue. Negative for diaphoresis and weight loss.   HENT: Positive for congestion, sinus pain and sore throat. Negative for ear discharge, ear pain, hearing loss, nosebleeds and tinnitus.    Eyes: Negative for blurred vision, pain and discharge.   Respiratory: Positive for cough. Negative for hemoptysis, sputum production, shortness of breath and stridor.    Cardiovascular: Negative for chest pain, palpitations, orthopnea and claudication.   Gastrointestinal: Negative for constipation, diarrhea, nausea and vomiting.   Genitourinary: Negative.    Musculoskeletal: Positive for myalgias.   Skin: Negative for itching and rash.   Neurological: Negative for weakness.         Past Medical History:   Diagnosis Date     Allergic rhinitis due to other allergen     seasonal     Diabetes (H)      Migraine, unspecified, without mention of intractable migraine without mention of status migrainosus      Morbid obesity (H)      Unspecified essential hypertension     dx around age 31     Current Outpatient Prescriptions   Medication Sig Dispense Refill     ondansetron (ZOFRAN) 4 MG tablet Take 1 tablet (4 mg) by mouth every 8 hours as needed for nausea 15 tablet 0     eplerenone (INSPRA) 25 MG tablet 100 mg (4 tabs) in AM and 75 mg (3 tabs) in  tablet 1     labetalol (NORMODYNE) 200 MG tablet Take 1 tablet (200 mg) by mouth 2 times daily 180 tablet 1     simvastatin (ZOCOR) 10 MG tablet TAKE 1 TABLET BY MOUTH BEDTIME  90 tablet 3     acetaminophen (TYLENOL ARTHRITIS PAIN) 650 MG CR tablet Take 650-1,300 mg by mouth every 8 hours as needed for mild pain or fever       SUMAtriptan (IMITREX) 25 MG tablet TAKE 1 TO 2 TABLETS BY MOUTH AT ONSET OF HEADACHE FOR MIGRAINE. MAY REPEAT DOSE IN 2 HOURS. MAX OF 200MG OR 2 DOSES IN 24 HOURS (Patient not taking: Reported on 12/5/2017) 18 tablet 0     ASPIRIN LOW DOSE 81 MG EC tablet TAKE 1 TABLET BY MOUTH ONCE DAILY 100 tablet 0     cholecalciferol 2000 UNITS CAPS Take 2,000 Units by mouth daily 90 capsule 3     calcium carbonate (OS-EVGENY 600 MG [place]. [place]) 1500 (600 CA) MG tablet Take 1 tablet (600 mg) by mouth 2 times daily 180 tablet 3     polyethylene glycol (MIRALAX/GLYCOLAX) Packet Take 17 g by mouth daily       cetirizine (ZYRTEC) 10 MG tablet Take 10 mg by mouth daily as needed for allergies       fluticasone (FLONASE) 50 MCG/ACT nasal spray Spray 2 sprays into both nostrils daily 16 g 3     ranitidine (ZANTAC) 150 MG tablet Take 1 tablet (150 mg) by mouth 2 times daily 60 tablet 2     Blood Pressure Monitor KIT 1 Device 2 times daily 1 kit 0     blood glucose monitoring (ACCU-CHEK MULTICLIX) lancets Use to test blood sugar 4-5 times daily or as directed. 1 Box 6     Social History   Substance Use Topics     Smoking status: Never Smoker     Smokeless tobacco: Never Used     Alcohol use No       OBJECTIVE  /90  Pulse 75  Temp 98.1  F (36.7  C) (Oral)  Wt 196 lb (88.9 kg)  LMP 08/13/2005  SpO2 99%  BMI 37.05 kg/m2    Physical Exam   Constitutional: She is well-developed, well-nourished, and in no distress.   HENT:   Right Ear: External ear normal.   Left Ear: External ear normal.   Nose: Nose normal.   Mouth/Throat: Oropharynx is clear and moist.   Eyes: Conjunctivae are normal. Pupils are equal, round, and reactive to light. Right eye exhibits no discharge. Left eye exhibits no discharge. No scleral icterus.   Neck: Normal range of motion. Neck supple.   Cardiovascular: Normal  rate, regular rhythm and normal heart sounds.    Pulmonary/Chest: Effort normal and breath sounds normal.   Lymphadenopathy:     She has no cervical adenopathy.       Labs:  Results for orders placed or performed in visit on 12/30/17   Strep, Rapid Screen   Result Value Ref Range    Specimen Description Throat     Rapid Strep A Screen (A)      POSITIVE: Group A Streptococcal antigen detected by immunoassay.   Influenza A/B antigen   Result Value Ref Range    Influenza A/B Agn Specimen Nasal     Influenza A Negative NEG^Negative    Influenza B Negative NEG^Negative         ASSESSMENT:  (J02.0) Acute streptococcal pharyngitis  (primary encounter diagnosis)  Plan: penicillin V potassium (VEETID) 500 MG tablet           (R50.9) Fever  Plan: Influenza A/B antigen       (R07.0) Throat pain  Plan: Strep, Rapid Screen    Patient Instructions     Pharyngitis: Strep (Confirmed)    You have had a positive test for strep throat. Strep throat is a contagious illness. It is spread by coughing, kissing or by touching others after touching your mouth or nose. Symptoms include throat pain that is worse with swallowing, aching all over, headache, and fever. It is treated with antibiotic medicine. This should help you start to feel better in 1 to 2 days.  Home care    Rest at home. Drink plenty of fluids to you won't get dehydrated.    No work or school for the first 2 days of taking the antibiotics. After this time, you will not be contagious. You can then return to school or work if you are feeling better.     Take antibiotic medicine for the full 10 days, even if you feel better. This is very important to ensure the infection is treated. It is also important to prevent medicine-resistant germs from developing. If you were given an antibiotic shot, you don't need any more antibiotics.    You may use acetaminophen or ibuprofen to control pain or fever, unless another medicine was prescribed for this. Talk with your doctor before  taking these medicines if you have chronic liver or kidney disease. Also talk with your doctor if you have had a stomach ulcer or GI bleeding.    Throat lozenges or sprays help reduce pain. Gargling with warm saltwater will also reduce throat pain. Dissolve 1/2 teaspoon of salt in 1 glass of warm water. This may be useful just before meals.     Soft foods are OK. Avoid salty or spicy foods.  Follow-up care  Follow up with your healthcare provider or our staff if you don't get better over the next week.  When to seek medical advice  Call your healthcare provider right away if any of these occur:    Fever of 100.4 F (38 C) or higher, or as directed by your healthcare provider    New or worsening ear pain, sinus pain, or headache    Painful lumps in the back of neck    Stiff neck    Lymph nodes getting larger or becoming soft in the middle    You can't swallow liquids or you can't open your mouth wide because of throat pain    Signs of dehydration. These include very dark urine or no urine, sunken eyes, and dizziness.    Trouble breathing or noisy breathing    Muffled voice    Rash  Date Last Reviewed: 4/13/2015 2000-2017 [employer] [address] [place] [address], [place] [address]. All rights reserved. This information is not intended as a substitute for professional medical care. Always follow your healthcare professional's instructions.

## 2017-12-30 NOTE — PATIENT INSTRUCTIONS
Pharyngitis: Strep (Confirmed)    You have had a positive test for strep throat. Strep throat is a contagious illness. It is spread by coughing, kissing or by touching others after touching your mouth or nose. Symptoms include throat pain that is worse with swallowing, aching all over, headache, and fever. It is treated with antibiotic medicine. This should help you start to feel better in 1 to 2 days.  Home care    Rest at home. Drink plenty of fluids to you won't get dehydrated.    No work or school for the first 2 days of taking the antibiotics. After this time, you will not be contagious. You can then return to school or work if you are feeling better.     Take antibiotic medicine for the full 10 days, even if you feel better. This is very important to ensure the infection is treated. It is also important to prevent medicine-resistant germs from developing. If you were given an antibiotic shot, you don't need any more antibiotics.    You may use acetaminophen or ibuprofen to control pain or fever, unless another medicine was prescribed for this. Talk with your doctor before taking these medicines if you have chronic liver or kidney disease. Also talk with your doctor if you have had a stomach ulcer or GI bleeding.    Throat lozenges or sprays help reduce pain. Gargling with warm saltwater will also reduce throat pain. Dissolve 1/2 teaspoon of salt in 1 glass of warm water. This may be useful just before meals.     Soft foods are OK. Avoid salty or spicy foods.  Follow-up care  Follow up with your healthcare provider or our staff if you don't get better over the next week.  When to seek medical advice  Call your healthcare provider right away if any of these occur:    Fever of 100.4 F (38 C) or higher, or as directed by your healthcare provider    New or worsening ear pain, sinus pain, or headache    Painful lumps in the back of neck    Stiff neck    Lymph nodes getting larger or becoming soft in the  middle    You can't swallow liquids or you can't open your mouth wide because of throat pain    Signs of dehydration. These include very dark urine or no urine, sunken eyes, and dizziness.    Trouble breathing or noisy breathing    Muffled voice    Rash  Date Last Reviewed: 4/13/2015 2000-2017 [employer]. [address] [place] [address], [place] [address]. All rights reserved. This information is not intended as a substitute for professional medical care. Always follow your healthcare professional's instructions.

## 2017-12-30 NOTE — MR AVS SNAPSHOT
After Visit Summary   12/30/2017    Adelaida Packer    MRN: [id number]           Patient Information     Date Of Birth          1970        Visit Information        Provider Department      12/30/2017 1:35 PM Petr Avelar PA-C [facility]        Today's Diagnoses     Fever    -  1    Throat pain        Acute recurrent streptococcal tonsillitis        Acute streptococcal pharyngitis          Care Instructions      Pharyngitis: Strep (Confirmed)    You have had a positive test for strep throat. Strep throat is a contagious illness. It is spread by coughing, kissing or by touching others after touching your mouth or nose. Symptoms include throat pain that is worse with swallowing, aching all over, headache, and fever. It is treated with antibiotic medicine. This should help you start to feel better in 1 to 2 days.  Home care    Rest at home. Drink plenty of fluids to you won't get dehydrated.    No work or school for the first 2 days of taking the antibiotics. After this time, you will not be contagious. You can then return to school or work if you are feeling better.     Take antibiotic medicine for the full 10 days, even if you feel better. This is very important to ensure the infection is treated. It is also important to prevent medicine-resistant germs from developing. If you were given an antibiotic shot, you don't need any more antibiotics.    You may use acetaminophen or ibuprofen to control pain or fever, unless another medicine was prescribed for this. Talk with your doctor before taking these medicines if you have chronic liver or kidney disease. Also talk with your doctor if you have had a stomach ulcer or GI bleeding.    Throat lozenges or sprays help reduce pain. Gargling with warm saltwater will also reduce throat pain. Dissolve 1/2 teaspoon of salt in 1 glass of warm water. This may be useful just before meals.     Soft foods are OK. Avoid salty or spicy  foods.  Follow-up care  Follow up with your healthcare provider or our staff if you don't get better over the next week.  When to seek medical advice  Call your healthcare provider right away if any of these occur:    Fever of 100.4 F (38 C) or higher, or as directed by your healthcare provider    New or worsening ear pain, sinus pain, or headache    Painful lumps in the back of neck    Stiff neck    Lymph nodes getting larger or becoming soft in the middle    You can't swallow liquids or you can't open your mouth wide because of throat pain    Signs of dehydration. These include very dark urine or no urine, sunken eyes, and dizziness.    Trouble breathing or noisy breathing    Muffled voice    Rash  Date Last Reviewed: 4/13/2015 2000-2017 [employer]. [address] [place] [address]. All rights reserved. This information is not intended as a substitute for professional medical care. Always follow your healthcare professional's instructions.                Follow-ups after your visit        Your next 10 appointments already scheduled     Aug 07, 2018 10:10 AM CDT   LAB with LAB FIRST FLOOR [facility])    [id number] [place] [address] [id number] [phone]           Please do not eat 10-12 hours before your appointment if you are coming in fasting for labs on lipids, cholesterol, or glucose (sugar). This does not apply to pregnant women. Water, hot tea and black coffee (with nothing added) are okay. Do not drink other fluids, diet soda or chew gum.            Aug 14, 2018  9:15 AM CDT   Return Visit with Cele Myers MD, [facility] NURSE   [facility])    [id number] [place] [address] [id number] [phone]            Aug 14, 2018  2:00 PM CDT   Return Visit with Kristi Rodas MD   [facility])    [id number] [place] [address]  [facility] [id number] [phone]            Dec 04, 2018  4:00 PM CST   LAB with LAB FIRST FLOOR [facility] ([facility])    [address] [facility] [id number] [phone]           Please do not eat 10-12 hours before your appointment if you are coming in fasting for labs on lipids, cholesterol, or glucose (sugar). This does not apply to pregnant women. Water, hot tea and black coffee (with nothing added) are okay. Do not drink other fluids, diet soda or chew gum.            Dec 04, 2018  4:30 PM CST   Return Visit with Dia Aleman MD   [facility])    [address] [facility] [id number] [phone]              Who to contact     If you have questions or need follow up information about today's clinic visit or your schedule please contact [facility] directly at [phone].  Normal or non-critical lab and imaging results will be communicated to you by [employer]hart, letter or phone within 4 business days after the clinic has received the results. If you do not hear from us within 7 days, please contact the clinic through [employer] or phone. If you have a critical or abnormal lab result, we will notify you by phone as soon as possible.  Submit refill requests through [employer] or call your pharmacy and they will forward the refill request to us. Please allow 3 business days for your refill to be completed.          Additional Information About Your Visit        [employer] Information     [employer] gives you secure access to your electronic health record. If you see a primary care provider, you can also send messages to your care team and make appointments. If you have questions, please call your primary care clinic.  If you do not have a primary care provider, please call [phone] and they will assist you.        Care EveryWhere ID     This is your Care  EveryWhere ID. This could be used by other organizations to access your [facility] medical records  [id number]        Your Vitals Were     Pulse Temperature Last Period Pulse Oximetry BMI (Body Mass Index)       75 98.1  F (36.7  C) (Oral) 08/13/2005 99% 37.05 kg/m2        Blood Pressure from Last 3 Encounters:   12/30/17 152/90   12/05/17 (!) 154/103   12/01/17 (!) 144/108    Weight from Last 3 Encounters:   12/30/17 196 lb (88.9 kg)   12/05/17 196 lb (88.9 kg)   12/01/17 192 lb 12.8 oz (87.5 kg)              We Performed the Following     Influenza A/B antigen     Strep, Rapid Screen          Today's Medication Changes          These changes are accurate as of: 12/30/17  3:40 PM.  If you have any questions, ask your nurse or doctor.               Start taking these medicines.        Dose/Directions    penicillin V potassium 500 MG tablet   Commonly known as:  VEETID   Used for:  Acute streptococcal pharyngitis   Started by:  Petr Avelar PA-C        Dose:  500 mg   Take 1 tablet (500 mg) by mouth 2 times daily   Quantity:  20 tablet   Refills:  0            Where to get your medicines      These medications were sent to [facility] - [place], [place] - [address]  [address], [facility] [address]     Phone:  [phone]     penicillin V potassium 500 MG tablet                Primary Care Provider Office Phone # Fax #    Windy Gordillo [phone] [address]  [facility] [address]        Equal Access to Services     [facility]: Hadii luma awad hadasho Soomaali, waaxda luqadaha, qaybta kaalmada lola, silvia ayala. So [facility] [phone].    ATENCIÓN: Si habla español, tiene a lynch disposición servicios gratuitos de asistencia lingüística. Llame al [phone].    We comply with applicable federal civil rights laws and [place] laws. We do not discriminate on the basis of race, color, national origin,  age, disability, sex, sexual orientation, or gender identity.            Thank you!     Thank you for choosing [facility]  for your care. Our goal is always to provide you with excellent care. Hearing back from our patients is one way we can continue to improve our services. Please take a few minutes to complete the written survey that you may receive in the mail after your visit with us. Thank you!             Your Updated Medication List - Protect others around you: Learn how to safely use, store and throw away your medicines at www.disposemymeds.org.          This list is accurate as of: 12/30/17  3:40 PM.  Always use your most recent med list.                   Brand Name Dispense Instructions for use Diagnosis    ASPIRIN LOW DOSE 81 MG EC tablet   Generic drug:  aspirin     100 tablet    TAKE 1 TABLET BY MOUTH ONCE DAILY    Type 2 diabetes, HbA1c goal < 7% (H), Hyperlipidemia with target LDL less than 100       blood glucose monitoring lancets     1 Box    Use to test blood sugar 4-5 times daily or as directed.    Type 2 diabetes, HbA1c goal < 7% (H)       Blood Pressure Monitor Kit     1 kit    1 Device 2 times daily    Essential hypertension       calcium carbonate 1500 (600 CA) MG tablet    OS-EVGENY 600 mg [place]. [place]    180 tablet    Take 1 tablet (600 mg) by mouth 2 times daily    Vitamin D insufficiency       cetirizine 10 MG tablet    zyrTEC     Take 10 mg by mouth daily as needed for allergies        cholecalciferol 2000 UNITS Caps     90 capsule    Take 2,000 Units by mouth daily    Vitamin D insufficiency       eplerenone 25 MG tablet    INSPRA    630 tablet    100 mg (4 tabs) in AM and 75 mg (3 tabs) in PM    Primary hyperaldosteronism (H)       fluticasone 50 MCG/ACT spray    FLONASE    16 g    Spray 2 sprays into both nostrils daily    Nasal congestion       labetalol 200 MG tablet    NORMODYNE    180 tablet    Take 1 tablet (200 mg) by mouth 2 times daily    Hypertension, renal        ondansetron 4 MG tablet    ZOFRAN    15 tablet    Take 1 tablet (4 mg) by mouth every 8 hours as needed for nausea    Nausea       penicillin V potassium 500 MG tablet    VEETID    20 tablet    Take 1 tablet (500 mg) by mouth 2 times daily    Acute streptococcal pharyngitis       polyethylene glycol Packet    MIRALAX/GLYCOLAX     Take 17 g by mouth daily        ranitidine 150 MG tablet    ZANTAC    60 tablet    Take 1 tablet (150 mg) by mouth 2 times daily    Gastroesophageal reflux disease without esophagitis       simvastatin 10 MG tablet    ZOCOR    90 tablet    TAKE 1 TABLET BY MOUTH BEDTIME    Hyperlipidemia with target LDL less than 100       SUMAtriptan 25 MG tablet    IMITREX    18 tablet    TAKE 1 TO 2 TABLETS BY MOUTH AT ONSET OF HEADACHE FOR MIGRAINE. MAY REPEAT DOSE IN 2 HOURS. MAX OF 200MG OR 2 DOSES IN 24 HOURS    Chronic migraine without aura without status migrainosus, not intractable       TYLENOL ARTHRITIS PAIN 650 MG CR tablet   Generic drug:  acetaminophen      Take 650-1,300 mg by mouth every 8 hours as needed for mild pain or fever

## 2017-12-30 NOTE — NURSING NOTE
"Chief Complaint   Patient presents with     URI     2.5 days       Initial BP (!) 179/92 (BP Location: Left arm, Patient Position: Chair, Cuff Size: Adult Large)  Pulse 75  Temp 98.1  F (36.7  C) (Oral)  Wt 196 lb (88.9 kg)  LMP 08/13/2005  SpO2 99%  BMI 37.05 kg/m2 Estimated body mass index is 37.05 kg/(m^2) as calculated from the following:    Height as of 8/17/17: 5' 0.98\" (1.549 m).    Weight as of this encounter: 196 lb (88.9 kg).  Medication Reconciliation: complete   Erica Dickey CMA      "

## 2017-12-31 NOTE — PROGRESS NOTES
12/5/17  CC: HTN and CKD    HPI: Adelaida Packer is a 47 year old female who presents for follow-up of HTN/CKD.  Has some mild CKD which is felt to be related to hypertension as well as likely some effects from hypercalcemia in the past. Her hx includes hyperparathyroidism, primary for which she underwent parathyroidectomy on 3/21/13. Following that surgery, she did have a complication related to a hematoma but we have seen many benefits of her parathyroidectomy - blood pressure improved as well as kidney function.    Today she presents for follow-up and reports that her blood pressure has been higher than goal most recently. Her creatinine has been stable at 1.03-1.28 in the past year with a correlating GFR of 54-69. No proteinuria previously. She reports some diarrhea recently. She has had success with weight loss - 40# so far. She is checking her BP at home about once per week and it is typically 160s most recently.        Allergies   Allergen Reactions     Sulfa Drugs Shortness Of Breath     Maxzide [Hydrochlorothiazide W/Triamterene] Other (See Comments)     Renal insuff     Metoprolol      Other reaction(s): Bradycardia  At high dose only     Seasonal Allergies          Current Outpatient Prescriptions   Medication Sig Dispense Refill     ondansetron (ZOFRAN) 4 MG tablet Take 1 tablet (4 mg) by mouth every 8 hours as needed for nausea 15 tablet 0     eplerenone (INSPRA) 25 MG tablet 100 mg (4 tabs) in AM and 75 mg (3 tabs) in  tablet 1     labetalol (NORMODYNE) 200 MG tablet Take 1 tablet (200 mg) by mouth 2 times daily 180 tablet 1     simvastatin (ZOCOR) 10 MG tablet TAKE 1 TABLET BY MOUTH BEDTIME 90 tablet 3     acetaminophen (TYLENOL ARTHRITIS PAIN) 650 MG CR tablet Take 650-1,300 mg by mouth every 8 hours as needed for mild pain or fever       ASPIRIN LOW DOSE 81 MG EC tablet TAKE 1 TABLET BY MOUTH ONCE DAILY 100 tablet 0     cholecalciferol 2000 UNITS CAPS Take 2,000 Units by mouth daily 90 capsule  3     calcium carbonate (OS-EVGENY 600 MG [place]. CA) 1500 (600 CA) MG tablet Take 1 tablet (600 mg) by mouth 2 times daily 180 tablet 3     polyethylene glycol (MIRALAX/GLYCOLAX) Packet Take 17 g by mouth daily       cetirizine (ZYRTEC) 10 MG tablet Take 10 mg by mouth daily as needed for allergies       fluticasone (FLONASE) 50 MCG/ACT nasal spray Spray 2 sprays into both nostrils daily 16 g 3     ranitidine (ZANTAC) 150 MG tablet Take 1 tablet (150 mg) by mouth 2 times daily 60 tablet 2     Blood Pressure Monitor KIT 1 Device 2 times daily 1 kit 0     blood glucose monitoring (ACCU-CHEK MULTICLIX) lancets Use to test blood sugar 4-5 times daily or as directed. 1 Box 6     penicillin V potassium (VEETID) 500 MG tablet Take 1 tablet (500 mg) by mouth 2 times daily 20 tablet 0     SUMAtriptan (IMITREX) 25 MG tablet TAKE 1 TO 2 TABLETS BY MOUTH AT ONSET OF HEADACHE FOR MIGRAINE. MAY REPEAT DOSE IN 2 HOURS. MAX OF 200MG OR 2 DOSES IN 24 HOURS (Patient not taking: Reported on 12/5/2017) 18 tablet 0       Past Medical History:   Diagnosis Date     Allergic rhinitis due to other allergen     seasonal     Diabetes (H)      Migraine, unspecified, without mention of intractable migraine without mention of status migrainosus      Morbid obesity (H)      Unspecified essential hypertension     dx around age 31         Past Surgical History:   Procedure Laterality Date     C ANESTH,KNEE AREA SURGERY  01/2001     C GASTROPLASTY,OBESITY,VERT BAND      removed 2009     HC REMOVE TONSILS/ADENOIDS,11+ Y/O  1995     HEAD & NECK SURGERY  3/2013    Parathyroidectomy--had to go back in 6 days later for a possible bleed     HYSTERECTOMY, VAGINAL  12/2005    hysterectomy- fibroids     ORTHOPEDIC SURGERY           Social History   Substance Use Topics     Smoking status: Never Smoker     Smokeless tobacco: Never Used     Alcohol use No         Family History   Problem Relation Age of Onset     Hypertension Mother      Gynecology Mother       hysterectomy     GASTROINTESTINAL DISEASE Mother      bowel upstruction     Thyroid Disease Mother      Neurologic Disorder Mother      OSTEOPOROSIS Mother      Hypertension Father      Lipids Father      Arthritis Father      HEART DISEASE Father      Prostate Cancer Brother      Alcohol/Drug Brother      Circulatory Brother      Arthritis Paternal Grandmother      CANCER Maternal Grandfather      bone     Eye Disorder Maternal Grandfather      Prostate Cancer Maternal Grandfather      CANCER Maternal Grandmother      tumor-head     Obesity Maternal Grandmother      Respiratory Daughter      asthma     DIABETES Sister      CEREBROVASCULAR DISEASE Sister      Glaucoma No family hx of      Macular Degeneration No family hx of          ROS: A 4 system review of systems was negative other than noted here or above.     Exam:  BP (!) 154/103 (BP Location: Right arm, Patient Position: Sitting, Cuff Size: Adult Large)  Pulse 72  Wt 88.9 kg (196 lb)  LMP 08/13/2005  SpO2 98%  BMI 37.05 kg/m2    GENERAL APPEARANCE: alert and no distress  CV: RRR  R: CTAB  Extremities: no clubbing, cyanosis, or edema  SKIN: no rash  NEURO: mentation intact and speech normal  PSYCH: affect normal/bright    Results  No visits with results within 1 Day(s) from this visit.  Latest known visit with results is:    Orders Only on 08/17/2017   Component Date Value Ref Range Status     Immunofix ELP Urine 08/17/2017    Final                    Value:No monoclonal protein seen on immunofixation.  Pathological significance requires clinical   correlation.      BARBARA Miner M.D., Ph.D.     Hemoglobin 08/17/2017 11.9  11.7 - 15.7 g/dL Final     Parathyroid Hormone Intact 08/17/2017 26  12 - 72 pg/mL Final     Sodium 08/17/2017 138  133 - 144 mmol/L Final     Potassium 08/17/2017 4.0  3.4 - 5.3 mmol/L Final     Chloride 08/17/2017 105  94 - 109 mmol/L Final     Carbon Dioxide 08/17/2017 27  20 - 32 mmol/L Final     Anion Gap 08/17/2017 6  3 - 14  mmol/L Final     Glucose 08/17/2017 99  70 - 99 mg/dL Final    Non Fasting     Urea Nitrogen 08/17/2017 15  7 - 30 mg/dL Final     Creatinine 08/17/2017 1.03  0.52 - 1.04 mg/dL Final     GFR Estimate 08/17/2017 57* >60 mL/min/1.7m2 Final    Non  GFR Calc     GFR Estimate If Black 08/17/2017 69  >60 mL/min/1.7m2 Final    African American GFR Calc     Calcium 08/17/2017 9.4  8.5 - 10.1 mg/dL Final     Phosphorus 08/17/2017 2.7  2.5 - 4.5 mg/dL Final     Albumin 08/17/2017 3.8  3.4 - 5.0 g/dL Final     Protein Random Urine 08/17/2017 <0.05  g/L Final     Protein Total Urine g/gr Creatinine 08/17/2017 Unable to calculate due to low value  0 - 0.2 g/g Cr Final     Vitamin D Deficiency screening 08/17/2017 32  20 - 75 ug/L Final    Comment: Season, race, dietary intake, and treatment affect the concentration of   25-hydroxy-Vitamin D. Values may decrease during winter months and increase   during summer months. Values 20-29 ug/L may indicate Vitamin D insufficiency   and values <20 ug/L may indicate Vitamin D deficiency.  Vitamin D determination is routinely performed by an immunoassay specific for   25 hydroxyvitamin D3.  If an individual is on vitamin D2 (ergocalciferol)   supplementation, please specify 25 OH vitamin D2 and D3 level determination by   LCMSMS test [id number].       Creatinine Urine 08/17/2017 24  mg/dL Final        Assessment/Plan:  1. Hypertension: aldosterone level has been high on evaluation by Dr. Quintanilla with a low renin. In the past I was suspicious for hyperaldosteronism as well but CT scan was without adrenal adneoma appreciated. Agree with potassium sparing diuretic for BP control given presumed adrenal hyperplasia. Currently taking eplerenone 75 mg BID. I have noted in the past that stress related to work has cause an increase in blood pressure - we have been able to remove some anti-hypertensives during summer months given less stress around summer.   - Increasing eplerenone for  addt BP coverage.     2. CKD: likely some chronic kidney changes related to longstanding hypertension and hypercalcemia.     3. Hypercalcemia: s/p parathyroidectomy on 3/21/13. Calcium now normal.     4. 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 Rodas/[facility] - yearly follow-up was recommended by Dr. Rodas at either [place] or here.      5. DM: A1C much improved at 5.2% - follows with Dr. Quintanilla.     Patient Instructions   1. Increase eplerenone 100 mg twice a day  2. Labs in a week  3. Clinicaltrials.gov if interested in studies  4. Plan one year follow-up but keep in touch as we will keep working on your blood pressure         Dia Aleman, DO

## 2018-01-03 ASSESSMENT — ENCOUNTER SYMPTOMS
WEIGHT LOSS: 0
STRIDOR: 0
HEMOPTYSIS: 0
SPUTUM PRODUCTION: 0
CLAUDICATION: 0
ORTHOPNEA: 0
EYE DISCHARGE: 0
NAUSEA: 0
BLURRED VISION: 0
EYE PAIN: 0
PALPITATIONS: 0
SINUS PAIN: 1
CHILLS: 1
SHORTNESS OF BREATH: 0
COUGH: 1
VOMITING: 0
FEVER: 1
WEAKNESS: 0
DIAPHORESIS: 0
MYALGIAS: 1
SORE THROAT: 1
DIARRHEA: 0
CONSTIPATION: 0

## 2018-03-12 ENCOUNTER — TELEPHONE (OUTPATIENT)
Dept: FAMILY MEDICINE | Facility: CLINIC | Age: 48
End: 2018-03-12

## 2018-03-12 NOTE — LETTER
March 21, 2018          Adelaida Packer  [address]  [place] [address]            Dear Adelaida Packer,      At [facility] we care about your health and are committed to providing quality patient care. Regular appointments are a vital part of the care and management of your health and can help prevent many of the complications that can occur.      It has come to our attention that you are due for Blood Pressure follow up.  Please call [facility] at [phone] soon to schedule your follow up appointment.    If you have transferred care to another clinic please call to inform us so that we do not continue to send you reminder letters.      Sincerely,      [facility] Care Team

## 2018-03-21 NOTE — TELEPHONE ENCOUNTER
This writer attempted to contact patient on 03/21/18    Reason for call due for follow up Blood Pressure check and left detailed message.    If patient calls back:   Schedule Office Visit appointment within 2 business days with PCP, document that pt called and close encounter     Letter sent.      Tasneem Bull MA

## 2018-04-08 ENCOUNTER — OFFICE VISIT (OUTPATIENT)
Dept: URGENT CARE | Facility: URGENT CARE | Age: 48
End: 2018-04-08
Payer: COMMERCIAL

## 2018-04-08 VITALS
BODY MASS INDEX: 38.75 KG/M2 | HEART RATE: 64 BPM | SYSTOLIC BLOOD PRESSURE: 164 MMHG | WEIGHT: 205 LBS | OXYGEN SATURATION: 100 % | TEMPERATURE: 97.9 F | DIASTOLIC BLOOD PRESSURE: 95 MMHG

## 2018-04-08 DIAGNOSIS — J06.9 VIRAL URI: ICD-10-CM

## 2018-04-08 DIAGNOSIS — H69.91 DYSFUNCTION OF RIGHT EUSTACHIAN TUBE: ICD-10-CM

## 2018-04-08 DIAGNOSIS — R07.0 THROAT PAIN: Primary | ICD-10-CM

## 2018-04-08 LAB
DEPRECATED S PYO AG THROAT QL EIA: NORMAL
SPECIMEN SOURCE: NORMAL

## 2018-04-08 PROCEDURE — 87880 STREP A ASSAY W/OPTIC: CPT | Performed by: FAMILY MEDICINE

## 2018-04-08 PROCEDURE — 87081 CULTURE SCREEN ONLY: CPT | Performed by: FAMILY MEDICINE

## 2018-04-08 PROCEDURE — 99213 OFFICE O/P EST LOW 20 MIN: CPT | Performed by: FAMILY MEDICINE
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
## 2018-04-08 NOTE — NURSING NOTE
"Chief Complaint   Patient presents with     URI     ear pain and sore throat since last night- was [place] water park.       Initial BP (!) 164/95 (BP Location: Left arm, Patient Position: Chair, Cuff Size: Adult Large)  Pulse 64  Temp 97.9  F (36.6  C) (Oral)  Wt 205 lb (93 kg)  LMP 08/13/2005  SpO2 100%  BMI 38.75 kg/m2 Estimated body mass index is 38.75 kg/(m^2) as calculated from the following:    Height as of 8/17/17: 5' 0.98\" (1.549 m).    Weight as of this encounter: 205 lb (93 kg).  Medication Reconciliation: complete   Erica Dickey CMA      "

## 2018-04-08 NOTE — MR AVS SNAPSHOT
After Visit Summary   4/8/2018    Adelaida Packer    MRN: [id number]           Patient Information     Date Of Birth          1970        Visit Information        Provider Department      4/8/2018 12:05 PM Valery York MD [facility]        Today's Diagnoses     Throat pain    -  1    Viral URI        Dysfunction of right eustachian tube          Care Instructions      Earache, No Infection (Adult)  Earaches can happen without an infection. This occurs when air and fluid build up behind the eardrum causing a feeling of fullness and discomfort and reduced hearing. This is called otitis media with effusion (OME) or serous otitis media. It means there is fluid in the middle ear. It is not the same as acute otitis media, which is typically from infection.  OME can happen when you have a cold if congestion blocks the passage that drains the middle ear. This passage is called the eustachian tube. OME may also occur with nasal allergies or after a bacterial middle ear infection.    The pain or discomfort may come and go. You may hear clicking or popping sounds when you chew or swallow. You may feel that your balance is off. Or you may hear ringing in the ear.  It often takes from several weeks up to 3 months for the fluid to clear on its own. Oral pain relievers and ear drops help if there is pain. Decongestants and antihistamines sometimes help. Antibiotics don't help since there is no infection. Your doctor may prescribe a nasal spray to help reduce swelling in the nose and eustachian tube. This can allow the ear to drain.  If your OME doesn't improve after 3 months, surgery may be used to drain the fluid and insert a small tube in the eardrum to allow continued drainage.  Because the middle ear fluid can become infected, it is important to watch for signs of an ear infection which may develop later. These signs include increased ear pain, fever, or drainage from the ear.  Home  care  The following guidelines will help you care for yourself at home:    You may use over-the-counter medicine as directed to control pain, unless another medicine was prescribed. If you have chronic liver or kidney disease or ever had a stomach ulcer or GI bleeding, talk with your doctor before using these medicines. Aspirin should never be used in anyone under 18 years of age who is ill with a fever. It may cause severe liver damage.    You may use over-the-counter decongestants such as phenylephrine or pseudoephedrine. But they are not always helpful. Don't use nasal spray decongestants more than 3 days. Longer use can make congestion worse. Prescription nasal sprays from your doctor don't typically have those restrictions.    Antihistamines may help if you are also having allergy symptoms.    You may use medicines such as guaifenesin to thin mucus and promote drainage.  Follow-up care  Follow up with your healthcare provider or as advised if you are not feeling better after 3 days.  When to seek medical advice  Call your healthcare provider right away if any of the following occur:    Your ear pain gets worse or does not start to improve     Fever of 100.4 F (38 C) or higher, or as directed by your healthcare provider    Fluid or blood draining from the ear    Headache or sinus pain    Stiff neck    Unusual drowsiness or confusion  Date Last Reviewed: 10/1/2016    1052-5827 [employer]. [address] [place] [address], [place] [address]. All rights reserved. This information is not intended as a substitute for professional medical care. Always follow your healthcare professional's instructions.        Viral Upper Respiratory Illness (Adult)  You have a viral upper respiratory illness (URI), which is another term for the common cold. This illness is contagious during the first few days. It is spread through the air by coughing and sneezing. It may also be spread by direct contact (touching the sick person  and then touching your own eyes, nose, or mouth). Frequent handwashing will decrease risk of spread. Most viral illnesses go away within 7 to 10 days with rest and simple home remedies. Sometimes the illness may last for several weeks. Antibiotics will not kill a virus, and they are generally not prescribed for this condition.    Home care    If symptoms are severe, rest at home for the first 2 to 3 days. When you resume activity, don't let yourself get too tired.    Avoid being exposed to cigarette smoke (yours or others ).    You may use acetaminophen or ibuprofen to control pain and fever, unless another medicine was prescribed. (Note: If you have chronic liver or kidney disease, have ever had a stomach ulcer or gastrointestinal bleeding, or are taking blood-thinning medicines, talk with your healthcare provider before using these medicines.) Aspirin should never be given to anyone under 18 years of age who is ill with a viral infection or fever. It may cause severe liver or brain damage.    Your appetite may be poor, so a light diet is fine. Avoid dehydration by drinking 6 to 8 glasses of fluids per day (water, soft drinks, juices, tea, or soup). Extra fluids will help loosen secretions in the nose and lungs.    Over-the-counter cold medicines will not shorten the length of time you re sick, but they may be helpful for the following symptoms: cough, sore throat, and nasal and sinus congestion. (Note: Do not use decongestants if you have high blood pressure.)  Follow-up care  Follow up with your healthcare provider, or as advised.  When to seek medical advice  Call your healthcare provider right away if any of these occur:    Cough with lots of colored sputum (mucus)    Severe headache; face, neck, or ear pain    Difficulty swallowing due to throat pain    Fever of 100.4 F (38 C)  Call 911, or get immediate medical care  Call emergency services right away if any of these occur:    Chest pain, shortness of breath,  wheezing, or difficulty breathing    Coughing up blood    Inability to swallow due to throat pain  Date Last Reviewed: 9/13/2015 2000-2017 [employer]. [address] [place] [address], [place] [address]. All rights reserved. This information is not intended as a substitute for professional medical care. Always follow your healthcare professional's instructions.                Follow-ups after your visit        Your next 10 appointments already scheduled     Aug 07, 2018 10:10 AM CDT   LAB with LAB FIRST FLOOR [facility])    [id number] [place] [id number]   [phone]           Please do not eat 10-12 hours before your appointment if you are coming in fasting for labs on lipids, cholesterol, or glucose (sugar). This does not apply to pregnant women. Water, hot tea and black coffee (with nothing added) are okay. Do not drink other fluids, diet soda or chew gum.            Aug 14, 2018  9:15 AM CDT   Return Visit with Cele Myers MD, [facility] NURSE   [facility])    [address] [place] [id number]   [phone]            Aug 14, 2018  2:00 PM CDT   Return Visit with Kristi Rodas MD   [facility])    [address] [place] [address]   [phone]            Dec 04, 2018  4:00 PM CST   LAB with LAB FIRST FLOOR [facility])    [address] [place] [id number]   [phone]           Please do not eat 10-12 hours before your appointment if you are coming in fasting for labs on lipids, cholesterol, or glucose (sugar). This does not apply to pregnant women. Water, hot tea and black coffee (with nothing added) are okay. Do not drink other fluids, diet soda or chew gum.            Dec 04, 2018  4:30 PM CST   Return Visit with Dia Hunter  MD [facility]   [facility]    [address] [place] [id number] [phone]              Who to contact     If you have questions or need follow up information about today's clinic visit or your schedule please contact [facility] directly at [phone].  Normal or non-critical lab and imaging results will be communicated to you by MyChart, letter or phone within 4 business days after the clinic has received the results. If you do not hear from us within 7 days, please contact the clinic through [email or web address] or phone. If you have a critical or abnormal lab result, we will notify you by phone as soon as possible.  Submit refill requests through [employer] or call your pharmacy and they will forward the refill request to us. Please allow 3 business days for your refill to be completed.          Additional Information About Your Visit        [email or web address] Information     [employer] gives you secure access to your electronic health record. If you see a primary care provider, you can also send messages to your care team and make appointments. If you have questions, please call your primary care clinic.  If you do not have a primary care provider, please call [phone] and they will assist you.        Care EveryWhere ID     This is your Care EveryWhere ID. This could be used by other organizations to access your [place] medical records  [id number]        Your Vitals Were     Pulse Temperature Last Period Pulse Oximetry BMI (Body Mass Index)       64 97.9  F (36.6  C) (Oral) 08/13/2005 100% 38.75 kg/m2        Blood Pressure from Last 3 Encounters:   04/08/18 (!) 164/95   12/30/17 152/90   12/05/17 (!) 154/103    Weight from Last 3 Encounters:   04/08/18 205 lb (93 kg)   12/30/17 196 lb (88.9 kg)   12/05/17 196 lb (88.9 kg)              We Performed the Following     Beta strep group A culture     Strep, Rapid Screen        Primary Care Provider Office  Phone # Fax #    Windy Gordillo [phone] [address]  [facility] [address]        Equal Access to Services     TABATHA VILLEGAS : Hadii aad ku hadlexigarcia Soaj, waaxda luqadaha, qaybta kaalmada lola, silvia hoskins laRobertomateus ayala. So [facility] [phone].    ATENCIÓN: Si habla español, tiene a lynch disposición servicios gratuitos de asistencia lingüística. Llame al [phone].    We comply with applicable federal civil rights laws and [place] laws. We do not discriminate on the basis of race, color, national origin, age, disability, sex, sexual orientation, or gender identity.            Thank you!     Thank you for choosing [facility]  for your care. Our goal is always to provide you with excellent care. Hearing back from our patients is one way we can continue to improve our services. Please take a few minutes to complete the written survey that you may receive in the mail after your visit with us. Thank you!             Your Updated Medication List - Protect others around you: Learn how to safely use, store and throw away your medicines at www.disposemymeds.org.          This list is accurate as of 4/8/18  1:09 PM.  Always use your most recent med list.                   Brand Name Dispense Instructions for use Diagnosis    ASPIRIN LOW DOSE 81 MG EC tablet   Generic drug:  aspirin     100 tablet    TAKE 1 TABLET BY MOUTH ONCE DAILY    Type 2 diabetes, HbA1c goal < 7% (H), Hyperlipidemia with target LDL less than 100       blood glucose monitoring lancets     1 Box    Use to test blood sugar 4-5 times daily or as directed.    Type 2 diabetes, HbA1c goal < 7% (H)       Blood Pressure Monitor Kit     1 kit    1 Device 2 times daily    Essential hypertension       calcium carbonate 1500 (600 CA) MG tablet    OS-EVGENY 600 mg [place]. Ca    180 tablet    Take 1 tablet (600 mg) by mouth 2 times daily    Vitamin D insufficiency       cetirizine 10 MG tablet     zyrTEC     Take 10 mg by mouth daily as needed for allergies        cholecalciferol 2000 UNITS Caps     90 capsule    Take 2,000 Units by mouth daily    Vitamin D insufficiency       eplerenone 25 MG tablet    INSPRA    630 tablet    100 mg (4 tabs) in AM and 75 mg (3 tabs) in PM    Primary hyperaldosteronism (H)       fluticasone 50 MCG/ACT spray    FLONASE    16 g    Spray 2 sprays into both nostrils daily    Nasal congestion       labetalol 200 MG tablet    NORMODYNE    180 tablet    Take 1 tablet (200 mg) by mouth 2 times daily    Hypertension, renal       ondansetron 4 MG tablet    ZOFRAN    15 tablet    Take 1 tablet (4 mg) by mouth every 8 hours as needed for nausea    Nausea       polyethylene glycol Packet    MIRALAX/GLYCOLAX     Take 17 g by mouth daily        ranitidine 150 MG tablet    ZANTAC    60 tablet    Take 1 tablet (150 mg) by mouth 2 times daily    Gastroesophageal reflux disease without esophagitis       simvastatin 10 MG tablet    ZOCOR    90 tablet    TAKE 1 TABLET BY MOUTH BEDTIME    Hyperlipidemia with target LDL less than 100       SUMAtriptan 25 MG tablet    IMITREX    18 tablet    TAKE 1 TO 2 TABLETS BY MOUTH AT ONSET OF HEADACHE FOR MIGRAINE. MAY REPEAT DOSE IN 2 HOURS. MAX OF 200MG OR 2 DOSES IN 24 HOURS    Chronic migraine without aura without status migrainosus, not intractable       TYLENOL ARTHRITIS PAIN 650 MG CR tablet   Generic drug:  acetaminophen      Take 650-1,300 mg by mouth every 8 hours as needed for mild pain or fever

## 2018-04-08 NOTE — PATIENT INSTRUCTIONS
Earache, No Infection (Adult)  Earaches can happen without an infection. This occurs when air and fluid build up behind the eardrum causing a feeling of fullness and discomfort and reduced hearing. This is called otitis media with effusion (OME) or serous otitis media. It means there is fluid in the middle ear. It is not the same as acute otitis media, which is typically from infection.  OME can happen when you have a cold if congestion blocks the passage that drains the middle ear. This passage is called the eustachian tube. OME may also occur with nasal allergies or after a bacterial middle ear infection.    The pain or discomfort may come and go. You may hear clicking or popping sounds when you chew or swallow. You may feel that your balance is off. Or you may hear ringing in the ear.  It often takes from several weeks up to 3 months for the fluid to clear on its own. Oral pain relievers and ear drops help if there is pain. Decongestants and antihistamines sometimes help. Antibiotics don't help since there is no infection. Your doctor may prescribe a nasal spray to help reduce swelling in the nose and eustachian tube. This can allow the ear to drain.  If your OME doesn't improve after 3 months, surgery may be used to drain the fluid and insert a small tube in the eardrum to allow continued drainage.  Because the middle ear fluid can become infected, it is important to watch for signs of an ear infection which may develop later. These signs include increased ear pain, fever, or drainage from the ear.  Home care  The following guidelines will help you care for yourself at home:    You may use over-the-counter medicine as directed to control pain, unless another medicine was prescribed. If you have chronic liver or kidney disease or ever had a stomach ulcer or GI bleeding, talk with your doctor before using these medicines. Aspirin should never be used in anyone under 18 years of age who is ill with a fever. It  may cause severe liver damage.    You may use over-the-counter decongestants such as phenylephrine or pseudoephedrine. But they are not always helpful. Don't use nasal spray decongestants more than 3 days. Longer use can make congestion worse. Prescription nasal sprays from your doctor don't typically have those restrictions.    Antihistamines may help if you are also having allergy symptoms.    You may use medicines such as guaifenesin to thin mucus and promote drainage.  Follow-up care  Follow up with your healthcare provider or as advised if you are not feeling better after 3 days.  When to seek medical advice  Call your healthcare provider right away if any of the following occur:    Your ear pain gets worse or does not start to improve     Fever of 100.4 F (38 C) or higher, or as directed by your healthcare provider    Fluid or blood draining from the ear    Headache or sinus pain    Stiff neck    Unusual drowsiness or confusion  Date Last Reviewed: 10/1/2016    5329-4386 [employer]. [address] [place] [address]. All rights reserved. This information is not intended as a substitute for professional medical care. Always follow your healthcare professional's instructions.        Viral Upper Respiratory Illness (Adult)  You have a viral upper respiratory illness (URI), which is another term for the common cold. This illness is contagious during the first few days. It is spread through the air by coughing and sneezing. It may also be spread by direct contact (touching the sick person and then touching your own eyes, nose, or mouth). Frequent handwashing will decrease risk of spread. Most viral illnesses go away within 7 to 10 days with rest and simple home remedies. Sometimes the illness may last for several weeks. Antibiotics will not kill a virus, and they are generally not prescribed for this condition.    Home care    If symptoms are severe, rest at home for the first 2 to 3 days.  When you resume activity, don't let yourself get too tired.    Avoid being exposed to cigarette smoke (yours or others ).    You may use acetaminophen or ibuprofen to control pain and fever, unless another medicine was prescribed. (Note: If you have chronic liver or kidney disease, have ever had a stomach ulcer or gastrointestinal bleeding, or are taking blood-thinning medicines, talk with your healthcare provider before using these medicines.) Aspirin should never be given to anyone under 18 years of age who is ill with a viral infection or fever. It may cause severe liver or brain damage.    Your appetite may be poor, so a light diet is fine. Avoid dehydration by drinking 6 to 8 glasses of fluids per day (water, soft drinks, juices, tea, or soup). Extra fluids will help loosen secretions in the nose and lungs.    Over-the-counter cold medicines will not shorten the length of time you re sick, but they may be helpful for the following symptoms: cough, sore throat, and nasal and sinus congestion. (Note: Do not use decongestants if you have high blood pressure.)  Follow-up care  Follow up with your healthcare provider, or as advised.  When to seek medical advice  Call your healthcare provider right away if any of these occur:    Cough with lots of colored sputum (mucus)    Severe headache; face, neck, or ear pain    Difficulty swallowing due to throat pain    Fever of 100.4 F (38 C)  Call 911, or get immediate medical care  Call emergency services right away if any of these occur:    Chest pain, shortness of breath, wheezing, or difficulty breathing    Coughing up blood    Inability to swallow due to throat pain  Date Last Reviewed: 9/13/2015 2000-2017 [employer]. [address] [place] [address]. All rights reserved. This information is not intended as a substitute for professional medical care. Always follow your healthcare professional's instructions.

## 2018-04-09 LAB
BACTERIA SPEC CULT: NORMAL
SPECIMEN SOURCE: NORMAL

## 2018-05-09 ENCOUNTER — COMMUNICATION - HEALTHEAST (OUTPATIENT)
Dept: ADMINISTRATIVE | Facility: CLINIC | Age: 48
End: 2018-05-09

## 2018-05-09 DIAGNOSIS — E26.09 PRIMARY HYPERALDOSTERONISM (H): ICD-10-CM

## 2018-05-09 DIAGNOSIS — I12.9 HYPERTENSION, RENAL: ICD-10-CM

## 2018-05-09 NOTE — TELEPHONE ENCOUNTER
Message taken from voicemail-left at 4:13 pm on 18.    Patient requesting refill of:    eplerenone (INSPRA) 25 MG tablet 630 tablet 1 10/30/2017  No   Si mg (4 tabs) in AM and 75 mg (3 tabs) in PM     Pt did not leave name of pharmacy.    Pt states that she thinks she is due for follow up (she currently has one scheduled 18)but can not come in for follow up right now and is hoping to get refill until she can come in in .    Please advise.    Pt can be reached at [phone].    Verito Bonner LPN

## 2018-05-14 RX ORDER — EPLERENONE 25 MG/1
TABLET, FILM COATED ORAL
Qty: 630 TABLET | Refills: 1 | Status: SHIPPED | OUTPATIENT
Start: 2018-05-14 | End: 2018-11-15

## 2018-05-14 RX ORDER — LABETALOL 200 MG/1
200 TABLET, FILM COATED ORAL 2 TIMES DAILY
Qty: 180 TABLET | Refills: 1 | Status: SHIPPED | OUTPATIENT
Start: 2018-05-14 | End: 2018-09-14

## 2018-05-14 NOTE — TELEPHONE ENCOUNTER
Refilled medication.    Left patient message that yearly follow up had been previously advised and that blood pressure management would continue in between visits.     Advised that she continue to monitor BP at home and contact clinic if BP above goal range.    Wendy Sepulveda RN, BSN  Nephrology Care Coordinator  [facility]

## 2018-05-14 NOTE — TELEPHONE ENCOUNTER
Adelaida called back. Her BP has been 140-150/80-90s. She denies any swelling. She also needs a refill on the labetalol. 4/8/18 clinic blood pressure.  Vital Signs 4/8/2018   Systolic 164   Diastolic 95   Pulse 64     Will route to Dr. Aleman to advise on BP readings and if any further recommendations at this time. Current meds reconciled with patient.     Wendy Sepulveda, RN, BSN  Nephrology Care Coordinator  [facility]

## 2018-05-17 RX ORDER — LABETALOL 100 MG/1
100 TABLET, FILM COATED ORAL 2 TIMES DAILY
Qty: 60 TABLET | Refills: 1 | Status: SHIPPED | OUTPATIENT
Start: 2018-05-17 | End: 2018-09-14 | Stop reason: ALTCHOICE

## 2018-05-17 NOTE — TELEPHONE ENCOUNTER
Received message from Dr. Aleman:  Is her heart rate consistently above 60? If so, could we consider increasing the labetalol to 300 mg BID?     Thanks, KW     Contacted and spoke to patient regarding Dr. Aleman's message. She states she has been tracking her pulse, and the readings have been in the mid-60s consistently. Advised her of Dr. Aleman's recommendations for medication change. She states she has been taking labetalol 200mg BID (as listed on med list), and just refilled the Rx last night. She requests our clinic to send Rx for labetalol 100mg tablets so she can take with 200mg tablets to total 300mg BID.     Rx sent via e-prescribe per Dr. Aleman's recommendations and patient request. Encounter routed to Dr. Aleman as FYI with update and that Rx was sent. Patient agrees to contact our clinic with any further questions or concerns.    Jo Ann PATEL RN, BSN  Pulmonary Care Coordinator

## 2018-05-29 ENCOUNTER — TELEPHONE (OUTPATIENT)
Dept: PHARMACY | Facility: CLINIC | Age: 48
End: 2018-05-29

## 2018-05-29 NOTE — TELEPHONE ENCOUNTER
Called patient to schedule follow-up MTM appointment. LM to return call.  Mireya Gabriel, Pharm.D, BCACP  Medication Therapy Management Pharmacist

## 2018-06-09 ENCOUNTER — HEALTH MAINTENANCE LETTER (OUTPATIENT)
Age: 48
End: 2018-06-09

## 2018-06-14 ENCOUNTER — OFFICE VISIT - HEALTHEAST (OUTPATIENT)
Dept: PODIATRY | Facility: CLINIC | Age: 48
End: 2018-06-14

## 2018-06-14 DIAGNOSIS — M62.40 CONTRACTED, TENDON: ICD-10-CM

## 2018-06-14 DIAGNOSIS — M20.10 HALLUX VALGUS, UNSPECIFIED LATERALITY: ICD-10-CM

## 2018-06-14 DIAGNOSIS — M21.6X2 PRONATION DEFORMITY OF BOTH FEET: ICD-10-CM

## 2018-06-14 DIAGNOSIS — M21.6X1 PRONATION DEFORMITY OF BOTH FEET: ICD-10-CM

## 2018-06-18 ENCOUNTER — OFFICE VISIT (OUTPATIENT)
Dept: PEDIATRICS | Facility: CLINIC | Age: 48
End: 2018-06-18
Payer: COMMERCIAL

## 2018-06-18 VITALS
OXYGEN SATURATION: 100 % | HEIGHT: 61 IN | SYSTOLIC BLOOD PRESSURE: 140 MMHG | WEIGHT: 208.6 LBS | DIASTOLIC BLOOD PRESSURE: 87 MMHG | TEMPERATURE: 97.8 F | BODY MASS INDEX: 39.38 KG/M2 | HEART RATE: 57 BPM

## 2018-06-18 DIAGNOSIS — Z01.818 PREOP GENERAL PHYSICAL EXAM: Primary | ICD-10-CM

## 2018-06-18 PROCEDURE — 99214 OFFICE O/P EST MOD 30 MIN: CPT | Performed by: FAMILY MEDICINE
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
## 2018-06-18 NOTE — TELEPHONE ENCOUNTER
No response from patient after multiple attempts. MTM will no longer be following. Would be happy to see patient again in the future.    Mireya Gabriel, Pharm.D, BCACP  Medication Therapy Management Pharmacist

## 2018-06-18 NOTE — MR AVS SNAPSHOT
After Visit Summary   6/18/2018    Adelaida Packer    MRN: [id number]           Patient Information     Date Of Birth          1970        Visit Information        Provider Department      6/18/2018 8:30 AM Marco Antonio Vang MD [facility]        Today's Diagnoses     Preop general physical exam    -  1      Care Instructions      Before Your Surgery      Call your surgeon if there is any change in your health. This includes signs of a cold or flu (such as a sore throat, runny nose, cough, rash or fever).    Do not smoke, drink alcohol or take over the counter medicine (unless your surgeon or primary care doctor tells you to) for the 24 hours before and after surgery.    If you take prescribed drugs: Follow your doctor s orders about which medicines to take and which to stop until after surgery.    Eating and drinking prior to surgery: follow the instructions from your surgeon    Take a shower or bath the night before surgery. Use the soap your surgeon gave you to gently clean your skin. If you do not have soap from your surgeon, use your regular soap. Do not shave or scrub the surgery site.  Wear clean pajamas and have clean sheets on your bed.           Follow-ups after your visit        Your next 10 appointments already scheduled     Aug 07, 2018 10:10 AM CDT   LAB with LAB FIRST FLOOR [facility] ([facility])    [address] [place] [id number] [phone]           Please do not eat 10-12 hours before your appointment if you are coming in fasting for labs on lipids, cholesterol, or glucose (sugar). This does not apply to pregnant women. Water, hot tea and black coffee (with nothing added) are okay. Do not drink other fluids, diet soda or chew gum.            Aug 14, 2018  9:15 AM CDT   Return Visit with Cele Myers MD, [facility] NURSE   [facility]    [address]  [address] [place] [address]   [phone]            Aug 14, 2018  2:00 PM CDT   [facility] Visit with Kristi Rodas MD   [facility]    [address] [place] [id number] [phone]            Dec 04, 2018  4:00 PM CST   LAB with LAB FIRST FLOOR [facility] ([facility])    [address] [place] [id number] [phone]           Please do not eat 10-12 hours before your appointment if you are coming in fasting for labs on lipids, cholesterol, or glucose (sugar). This does not apply to pregnant women. Water, hot tea and black coffee (with nothing added) are okay. Do not drink other fluids, diet soda or chew gum.            Dec 04, 2018  4:30 PM CST   Return Visit with Dia Aleman MD   [facility]    [address] [place] [id number] [phone]              Who to contact     If you have questions or need follow up information about today's clinic visit or your schedule please contact [facility] directly at [phone].  Normal or non-critical lab and imaging results will be communicated to you by MyChart, letter or phone within 4 business days after the clinic has received the results. If you do not hear from us within 7 days, please contact the clinic through MyChart or phone. If you have a critical or abnormal lab result, we will notify you by phone as soon as possible.  Submit refill requests through [employer] or call your pharmacy and they will forward the refill request to us. Please allow 3 business days for your refill to be completed.          Additional Information About Your Visit        [employer] Information     [employer] gives you secure access to your electronic health record. If you see a primary care provider, you can also send messages to your care team and make appointments. If  "you have questions, please call your primary care clinic.  If you do not have a primary care provider, please call [phone] and they will assist you.      [employer] is an electronic gateway that provides easy, online access to your medical records. With [employer], you can request a clinic appointment, read your test results, renew a prescription or communicate with your care team.     To access your existing account, please contact your [facility] or call [phone] for assistance.        Care EveryWhere ID     This is your Care EveryWhere ID. This could be used by other organizations to access your [facility] medical records  [id number]        Your Vitals Were     Pulse Temperature Height Last Period Pulse Oximetry BMI (Body Mass Index)    57 97.8  F (36.6  C) (Temporal) 5' 1\" (1.549 m) 08/13/2005 100% 39.41 kg/m2       Blood Pressure from Last 3 Encounters:   06/18/18 140/87   04/08/18 (!) 164/95   12/30/17 152/90    Weight from Last 3 Encounters:   06/18/18 208 lb 9.6 oz (94.6 kg)   04/08/18 205 lb (93 kg)   12/30/17 196 lb (88.9 kg)              Today, you had the following     No orders found for display       Primary Care Provider Office Phone # Fax #    Windy Gordillo [phone]       [address] [facility] [address]        Equal Access to Services     [facility]: Hadii aad ku hadasho Soomaali, waaxda luqadaha, qaybta kaalmada adejovitayada, silvia ayala. So [facility] [phone].    ATENCIÓN: Si habla español, tiene a lynch disposición servicios gratuitos de asistencia lingüística. Llame al [phone].    We comply with applicable federal civil rights laws and [place] laws. We do not discriminate on the basis of race, color, national origin, age, disability, sex, sexual orientation, or gender identity.            Thank you!     Thank you for choosing [facility]  for your care. Our goal is always to provide " you with excellent care. Hearing back from our patients is one way we can continue to improve our services. Please take a few minutes to complete the written survey that you may receive in the mail after your visit with us. Thank you!             Your Updated Medication List - Protect others around you: Learn how to safely use, store and throw away your medicines at www.disposemymeds.org.          This list is accurate as of 6/18/18  9:16 AM.  Always use your most recent med list.                   Brand Name Dispense Instructions for use Diagnosis    ASPIRIN LOW DOSE 81 MG EC tablet   Generic drug:  aspirin     100 tablet    TAKE 1 TABLET BY MOUTH ONCE DAILY    Type 2 diabetes, HbA1c goal < 7% (H), Hyperlipidemia with target LDL less than 100       blood glucose monitoring lancets     1 Box    Use to test blood sugar 4-5 times daily or as directed.    Type 2 diabetes, HbA1c goal < 7% (H)       Blood Pressure Monitor Kit     1 kit    1 Device 2 times daily    Essential hypertension       calcium carbonate 1500 (600 Ca) MG tablet    OS-EVGENY 600 mg [place]. Ca    180 tablet    Take 1 tablet (600 mg) by mouth 2 times daily    Vitamin D insufficiency       cetirizine 10 MG tablet    zyrTEC     Take 10 mg by mouth daily as needed for allergies        cholecalciferol 2000 units Caps     90 capsule    Take 2,000 Units by mouth daily    Vitamin D insufficiency       eplerenone 25 MG tablet    INSPRA    630 tablet    Take 100 mg (4, 25 mg tabs) in AM and 75 mg (3, 25 mg tabs) in PM    Primary hyperaldosteronism (H)       fluticasone 50 MCG/ACT spray    FLONASE    16 g    Spray 2 sprays into both nostrils daily    Nasal congestion       * labetalol 200 MG tablet    NORMODYNE    180 tablet    Take 1 tablet (200 mg) by mouth 2 times daily    Hypertension, renal       * labetalol 100 MG tablet    NORMODYNE    60 tablet    Take 1 tablet (100 mg) by mouth 2 times daily With 1 tablet (200mg) to equal 300mg 2 times daily     Hypertension, renal       ondansetron 4 MG tablet    ZOFRAN    15 tablet    Take 1 tablet (4 mg) by mouth every 8 hours as needed for nausea    Nausea       polyethylene glycol Packet    MIRALAX/GLYCOLAX     Take 17 g by mouth daily        ranitidine 150 MG tablet    ZANTAC    60 tablet    Take 1 tablet (150 mg) by mouth 2 times daily    Gastroesophageal reflux disease without esophagitis       simvastatin 10 MG tablet    ZOCOR    90 tablet    TAKE 1 TABLET BY MOUTH BEDTIME    Hyperlipidemia with target LDL less than 100       SUMAtriptan 25 MG tablet    IMITREX    18 tablet    TAKE 1 TO 2 TABLETS BY MOUTH AT ONSET OF HEADACHE FOR MIGRAINE. MAY REPEAT DOSE IN 2 HOURS. MAX OF 200MG OR 2 DOSES IN 24 HOURS    Chronic migraine without aura without status migrainosus, not intractable       TYLENOL ARTHRITIS PAIN 650 MG CR tablet   Generic drug:  acetaminophen      Take 650-1,300 mg by mouth every 8 hours as needed for mild pain or fever        * Notice:  This list has 2 medication(s) that are the same as other medications prescribed for you. Read the directions carefully, and ask your doctor or other care provider to review them with you.

## 2018-06-20 ENCOUNTER — COMMUNICATION - HEALTHEAST (OUTPATIENT)
Dept: ADMINISTRATIVE | Facility: CLINIC | Age: 48
End: 2018-06-20

## 2018-06-20 ENCOUNTER — COMMUNICATION - HEALTHEAST (OUTPATIENT)
Dept: PODIATRY | Facility: CLINIC | Age: 48
End: 2018-06-20

## 2018-06-28 ASSESSMENT — MIFFLIN-ST. JEOR: SCORE: 1522.61

## 2018-06-29 ENCOUNTER — TELEPHONE (OUTPATIENT)
Dept: PEDIATRICS | Facility: CLINIC | Age: 48
End: 2018-06-29

## 2018-06-29 NOTE — TELEPHONE ENCOUNTER
[facility] Call Center    Phone Message    May a detailed message be left on voicemail: no    Reason for Call:  Kristel from [facility] called asking for Pre-op notes, images and lab in relation to patients pre op that was done on 6/18/18. Requesting that be faxed over as soon as possible to [phone]. Clinic is closed M-W next week and patients surgery is Friday. If you have any questions please call Kristel at [phone]    Action Taken: Message routed to:  Primary Care p [phone]

## 2018-06-29 NOTE — TELEPHONE ENCOUNTER
Pre-op exam note faxed to [facility] at fax #: [phone]. Received confirmation from [employer] that fax was sent successfully.  Veronica Landry CMA

## 2018-07-03 ENCOUNTER — TELEPHONE (OUTPATIENT)
Dept: PEDIATRICS | Facility: CLINIC | Age: 48
End: 2018-07-03

## 2018-07-03 NOTE — TELEPHONE ENCOUNTER
Faxed preop to new fax # [phone] provided. Received confirmation from [employer] that fax was sent successfully.  Called patient to inform that both times clinic has faxed the preop to her surgery center it shows successful on our end. Patient will call surgery center to verify preop received and will call clinic back only if there is still a problem.  Rosalva Jimenez, CMA

## 2018-07-03 NOTE — TELEPHONE ENCOUNTER
[facility] Call Center    Phone Message    May a detailed message be left on voicemail: yes    Reason for Call: Other: Patient states that [facility] did not receive Pre-op notes and requesting them to be resent to  Fax: [phone]. Jonasaes call patient once request is complete.      Action Taken: Message routed to:  Primary Care p [phone]

## 2018-07-05 ENCOUNTER — ANESTHESIA - HEALTHEAST (OUTPATIENT)
Dept: SURGERY | Facility: AMBULATORY SURGERY CENTER | Age: 48
End: 2018-07-05

## 2018-07-05 ENCOUNTER — TRANSFERRED RECORDS (OUTPATIENT)
Dept: HEALTH INFORMATION MANAGEMENT | Facility: CLINIC | Age: 48
End: 2018-07-05

## 2018-07-06 ENCOUNTER — SURGERY - HEALTHEAST (OUTPATIENT)
Dept: SURGERY | Facility: AMBULATORY SURGERY CENTER | Age: 48
End: 2018-07-06

## 2018-07-06 ENCOUNTER — TRANSFERRED RECORDS (OUTPATIENT)
Dept: HEALTH INFORMATION MANAGEMENT | Facility: CLINIC | Age: 48
End: 2018-07-06

## 2018-07-06 ASSESSMENT — MIFFLIN-ST. JEOR: SCORE: 1522.61

## 2018-07-09 ENCOUNTER — COMMUNICATION - HEALTHEAST (OUTPATIENT)
Dept: ADMINISTRATIVE | Facility: CLINIC | Age: 48
End: 2018-07-09

## 2018-07-12 ENCOUNTER — COMMUNICATION - HEALTHEAST (OUTPATIENT)
Dept: ADMINISTRATIVE | Facility: CLINIC | Age: 48
End: 2018-07-12

## 2018-07-12 ENCOUNTER — OFFICE VISIT - HEALTHEAST (OUTPATIENT)
Dept: PODIATRY | Facility: CLINIC | Age: 48
End: 2018-07-12

## 2018-07-12 ENCOUNTER — AMBULATORY - HEALTHEAST (OUTPATIENT)
Dept: PODIATRY | Facility: CLINIC | Age: 48
End: 2018-07-12

## 2018-07-12 DIAGNOSIS — M20.10 HALLUX ABDUCTO VALGUS: ICD-10-CM

## 2018-07-12 DIAGNOSIS — M21.6X2 PRONATION DEFORMITY OF BOTH FEET: ICD-10-CM

## 2018-07-12 DIAGNOSIS — M21.6X1 PRONATION DEFORMITY OF BOTH FEET: ICD-10-CM

## 2018-07-12 DIAGNOSIS — M62.40 CONTRACTED, TENDON: ICD-10-CM

## 2018-07-12 DIAGNOSIS — M20.10 HALLUX VALGUS, UNSPECIFIED LATERALITY: ICD-10-CM

## 2018-07-19 ENCOUNTER — RECORDS - HEALTHEAST (OUTPATIENT)
Dept: GENERAL RADIOLOGY | Facility: CLINIC | Age: 48
End: 2018-07-19

## 2018-07-19 ENCOUNTER — OFFICE VISIT - HEALTHEAST (OUTPATIENT)
Dept: PODIATRY | Facility: CLINIC | Age: 48
End: 2018-07-19

## 2018-07-19 DIAGNOSIS — M62.40 CONTRACTURE OF MUSCLE, UNSPECIFIED SITE: ICD-10-CM

## 2018-07-19 DIAGNOSIS — M20.11 HALLUX VALGUS (ACQUIRED), RIGHT FOOT: ICD-10-CM

## 2018-07-19 DIAGNOSIS — M21.6X1 PRONATION DEFORMITY OF RIGHT FOOT: ICD-10-CM

## 2018-07-19 DIAGNOSIS — M21.6X1 OTHER ACQUIRED DEFORMITIES OF RIGHT FOOT: ICD-10-CM

## 2018-07-19 DIAGNOSIS — M62.40 CONTRACTED, TENDON: ICD-10-CM

## 2018-07-19 DIAGNOSIS — M20.11 HALLUX VALGUS OF RIGHT FOOT: ICD-10-CM

## 2018-07-20 ENCOUNTER — COMMUNICATION - HEALTHEAST (OUTPATIENT)
Dept: ADMINISTRATIVE | Facility: CLINIC | Age: 48
End: 2018-07-20

## 2018-08-02 ENCOUNTER — OFFICE VISIT - HEALTHEAST (OUTPATIENT)
Dept: PODIATRY | Facility: CLINIC | Age: 48
End: 2018-08-02

## 2018-08-02 ENCOUNTER — TRANSFERRED RECORDS (OUTPATIENT)
Dept: HEALTH INFORMATION MANAGEMENT | Facility: CLINIC | Age: 48
End: 2018-08-02

## 2018-08-02 DIAGNOSIS — M20.11 HALLUX VALGUS OF RIGHT FOOT: ICD-10-CM

## 2018-08-02 DIAGNOSIS — M62.40 CONTRACTED, TENDON: ICD-10-CM

## 2018-08-02 DIAGNOSIS — M21.6X1 PRONATION DEFORMITY OF RIGHT FOOT: ICD-10-CM

## 2018-08-06 DIAGNOSIS — N18.30 CKD (CHRONIC KIDNEY DISEASE) STAGE 3, GFR 30-59 ML/MIN (H): Primary | ICD-10-CM

## 2018-08-06 DIAGNOSIS — D47.2 MGUS (MONOCLONAL GAMMOPATHY OF UNKNOWN SIGNIFICANCE): ICD-10-CM

## 2018-08-06 DIAGNOSIS — N18.30 TYPE 2 DIABETES MELLITUS WITH STAGE 3 CHRONIC KIDNEY DISEASE, WITHOUT LONG-TERM CURRENT USE OF INSULIN (H): Primary | ICD-10-CM

## 2018-08-06 DIAGNOSIS — E11.22 TYPE 2 DIABETES MELLITUS WITH STAGE 3 CHRONIC KIDNEY DISEASE, WITHOUT LONG-TERM CURRENT USE OF INSULIN (H): Primary | ICD-10-CM

## 2018-08-06 DIAGNOSIS — E11.22 TYPE 2 DIABETES MELLITUS WITH STAGE 3 CHRONIC KIDNEY DISEASE, WITHOUT LONG-TERM CURRENT USE OF INSULIN (H): ICD-10-CM

## 2018-08-06 DIAGNOSIS — N18.30 TYPE 2 DIABETES MELLITUS WITH STAGE 3 CHRONIC KIDNEY DISEASE, WITHOUT LONG-TERM CURRENT USE OF INSULIN (H): ICD-10-CM

## 2018-08-06 LAB
ALBUMIN SERPL-MCNC: 3.7 G/DL (ref 3.4–5)
ALP SERPL-CCNC: 50 U/L (ref 40–150)
ALT SERPL W P-5'-P-CCNC: 30 U/L (ref 0–50)
ANION GAP SERPL CALCULATED.3IONS-SCNC: 6 MMOL/L (ref 3–14)
AST SERPL W P-5'-P-CCNC: 19 U/L (ref 0–45)
BASOPHILS # BLD AUTO: 0 10E9/L (ref 0–0.2)
BASOPHILS NFR BLD AUTO: 0.4 %
BILIRUB SERPL-MCNC: 0.3 MG/DL (ref 0.2–1.3)
BUN SERPL-MCNC: 25 MG/DL (ref 7–30)
CALCIUM SERPL-MCNC: 9.6 MG/DL (ref 8.5–10.1)
CHLORIDE SERPL-SCNC: 105 MMOL/L (ref 94–109)
CO2 SERPL-SCNC: 29 MMOL/L (ref 20–32)
CREAT SERPL-MCNC: 1.16 MG/DL (ref 0.52–1.04)
CREAT UR-MCNC: 105 MG/DL
DIFFERENTIAL METHOD BLD: ABNORMAL
EOSINOPHIL # BLD AUTO: 0.2 10E9/L (ref 0–0.7)
EOSINOPHIL NFR BLD AUTO: 8.1 %
ERYTHROCYTE [DISTWIDTH] IN BLOOD BY AUTOMATED COUNT: 13.1 % (ref 10–15)
GFR SERPL CREATININE-BSD FRML MDRD: 50 ML/MIN/1.7M2
GLUCOSE SERPL-MCNC: 94 MG/DL (ref 70–99)
HBA1C MFR BLD: 5.2 % (ref 0–5.6)
HCT VFR BLD AUTO: 36.9 % (ref 35–47)
HGB BLD-MCNC: 12.1 G/DL (ref 11.7–15.7)
IMM GRANULOCYTES # BLD: 0 10E9/L (ref 0–0.4)
IMM GRANULOCYTES NFR BLD: 0.4 %
LYMPHOCYTES # BLD AUTO: 1.2 10E9/L (ref 0.8–5.3)
LYMPHOCYTES NFR BLD AUTO: 40.6 %
MCH RBC QN AUTO: 29.1 PG (ref 26.5–33)
MCHC RBC AUTO-ENTMCNC: 32.8 G/DL (ref 31.5–36.5)
MCV RBC AUTO: 89 FL (ref 78–100)
MONOCYTES # BLD AUTO: 0.3 10E9/L (ref 0–1.3)
MONOCYTES NFR BLD AUTO: 11.7 %
NEUTROPHILS # BLD AUTO: 1.1 10E9/L (ref 1.6–8.3)
NEUTROPHILS NFR BLD AUTO: 38.8 %
PLATELET # BLD AUTO: 154 10E9/L (ref 150–450)
POTASSIUM SERPL-SCNC: 4.1 MMOL/L (ref 3.4–5.3)
PROT SERPL-MCNC: 7.5 G/DL (ref 6.8–8.8)
PROT UR-MCNC: 0.07 G/L
PROT/CREAT 24H UR: 0.06 G/G CR (ref 0–0.2)
PTH-INTACT SERPL-MCNC: 37 PG/ML (ref 18–80)
RBC # BLD AUTO: 4.16 10E12/L (ref 3.8–5.2)
SODIUM SERPL-SCNC: 140 MMOL/L (ref 133–144)
WBC # BLD AUTO: 2.8 10E9/L (ref 4–11)

## 2018-08-06 PROCEDURE — 80053 COMPREHEN METABOLIC PANEL: CPT | Performed by: INTERNAL MEDICINE

## 2018-08-06 PROCEDURE — 83036 HEMOGLOBIN GLYCOSYLATED A1C: CPT | Performed by: INTERNAL MEDICINE

## 2018-08-06 PROCEDURE — 86334 IMMUNOFIX E-PHORESIS SERUM: CPT | Performed by: INTERNAL MEDICINE

## 2018-08-06 PROCEDURE — 82784 ASSAY IGA/IGD/IGG/IGM EACH: CPT | Performed by: INTERNAL MEDICINE

## 2018-08-06 PROCEDURE — 83970 ASSAY OF PARATHORMONE: CPT | Performed by: INTERNAL MEDICINE

## 2018-08-06 PROCEDURE — 85025 COMPLETE CBC W/AUTO DIFF WBC: CPT | Performed by: INTERNAL MEDICINE

## 2018-08-06 PROCEDURE — 83883 ASSAY NEPHELOMETRY NOT SPEC: CPT | Performed by: INTERNAL MEDICINE

## 2018-08-06 PROCEDURE — 86335 IMMUNFIX E-PHORSIS/URINE/CSF: CPT | Performed by: INTERNAL MEDICINE

## 2018-08-06 PROCEDURE — 84156 ASSAY OF PROTEIN URINE: CPT | Performed by: INTERNAL MEDICINE

## 2018-08-06 PROCEDURE — 36415 COLL VENOUS BLD VENIPUNCTURE: CPT | Performed by: INTERNAL MEDICINE

## 2018-08-06 PROCEDURE — 00000402 ZZHCL STATISTIC TOTAL PROTEIN: Performed by: INTERNAL MEDICINE

## 2018-08-06 PROCEDURE — 84166 PROTEIN E-PHORESIS/URINE/CSF: CPT | Performed by: INTERNAL MEDICINE

## 2018-08-06 PROCEDURE — 84165 PROTEIN E-PHORESIS SERUM: CPT | Performed by: INTERNAL MEDICINE

## 2018-08-07 LAB
ALBUMIN SERPL ELPH-MCNC: 4.2 G/DL (ref 3.7–5.1)
ALPHA1 GLOB SERPL ELPH-MCNC: 0.3 G/DL (ref 0.2–0.4)
ALPHA2 GLOB SERPL ELPH-MCNC: 0.6 G/DL (ref 0.5–0.9)
B-GLOBULIN SERPL ELPH-MCNC: 0.7 G/DL (ref 0.6–1)
GAMMA GLOB SERPL ELPH-MCNC: 1.4 G/DL (ref 0.7–1.6)
IGA SERPL-MCNC: 120 MG/DL (ref 70–380)
IGG SERPL-MCNC: 1460 MG/DL (ref 695–1620)
IGM SERPL-MCNC: 138 MG/DL (ref 60–265)
KAPPA LC UR-MCNC: 2.06 MG/DL (ref 0.33–1.94)
KAPPA LC/LAMBDA SER: 0.26 {RATIO} (ref 0.26–1.65)
LAMBDA LC SERPL-MCNC: 7.91 MG/DL (ref 0.57–2.63)
M PROTEIN SERPL ELPH-MCNC: 0.5 G/DL
PROT ELPH PNL UR ELPH: NORMAL
PROT PATTERN SERPL ELPH-IMP: ABNORMAL
PROT PATTERN SERPL IFE-IMP: NORMAL
PROT PATTERN UR ELPH-IMP: NORMAL

## 2018-08-20 ENCOUNTER — COMMUNICATION - HEALTHEAST (OUTPATIENT)
Dept: ADMINISTRATIVE | Facility: CLINIC | Age: 48
End: 2018-08-20

## 2018-08-23 ENCOUNTER — OFFICE VISIT - HEALTHEAST (OUTPATIENT)
Dept: PODIATRY | Facility: CLINIC | Age: 48
End: 2018-08-23

## 2018-08-23 DIAGNOSIS — M20.11 HALLUX VALGUS OF RIGHT FOOT: ICD-10-CM

## 2018-08-23 DIAGNOSIS — M21.6X2 PRONATION DEFORMITY OF BOTH FEET: ICD-10-CM

## 2018-08-23 DIAGNOSIS — M21.6X1 PRONATION DEFORMITY OF RIGHT FOOT: ICD-10-CM

## 2018-08-23 DIAGNOSIS — M62.40 CONTRACTED, TENDON: ICD-10-CM

## 2018-08-23 DIAGNOSIS — M21.6X1 PRONATION DEFORMITY OF BOTH FEET: ICD-10-CM

## 2018-08-27 ENCOUNTER — COMMUNICATION - HEALTHEAST (OUTPATIENT)
Dept: OTHER | Facility: CLINIC | Age: 48
End: 2018-08-27

## 2018-09-07 ENCOUNTER — COMMUNICATION - HEALTHEAST (OUTPATIENT)
Dept: ADMINISTRATIVE | Facility: CLINIC | Age: 48
End: 2018-09-07

## 2018-09-13 DIAGNOSIS — I12.9 HYPERTENSION, RENAL: ICD-10-CM

## 2018-09-14 RX ORDER — LABETALOL 300 MG/1
300 TABLET, FILM COATED ORAL 2 TIMES DAILY
Qty: 180 TABLET | Refills: 3 | Status: SHIPPED | OUTPATIENT
Start: 2018-09-14 | End: 2019-10-28

## 2018-09-14 NOTE — TELEPHONE ENCOUNTER
Refilled medication. Sent in 300 mg tablet versus previous SIG in EPIC which requrire two separate prescriptions to equal 300 mg BID dosing.    Wendy Sepulveda, RN, BSN  Nephrology Care Coordinator  [facility]

## 2018-09-14 NOTE — TELEPHONE ENCOUNTER
Writer received a refill request from: [facility] in [place]. [phone]    Medication: Labetalol 300mg tablet  Sig: Take One tablet by mouth twice daily    Date last dispensed: 8/09/18      Pt's last office visit: 12/05/17  Next scheduled office visit: 12/04/18      Per the RN/LPN medication refill protocol, writer is  to refill this request. If able to refill, please call the pt to inform them the RX was sent to the pharmacy. If unable to refill, route this encounter to the prescribing physician for authorization or further instructions.

## 2018-09-18 ENCOUNTER — RECORDS - HEALTHEAST (OUTPATIENT)
Dept: ADMINISTRATIVE | Facility: OTHER | Age: 48
End: 2018-09-18

## 2018-09-19 ENCOUNTER — COMMUNICATION - HEALTHEAST (OUTPATIENT)
Dept: ADMINISTRATIVE | Facility: CLINIC | Age: 48
End: 2018-09-19

## 2018-10-19 ENCOUNTER — OFFICE VISIT (OUTPATIENT)
Dept: OPTOMETRY | Facility: CLINIC | Age: 48
End: 2018-10-19
Payer: COMMERCIAL

## 2018-10-19 DIAGNOSIS — N18.30 TYPE 2 DIABETES MELLITUS WITH STAGE 3 CHRONIC KIDNEY DISEASE, WITHOUT LONG-TERM CURRENT USE OF INSULIN (H): Primary | ICD-10-CM

## 2018-10-19 DIAGNOSIS — H52.4 PRESBYOPIA: ICD-10-CM

## 2018-10-19 DIAGNOSIS — H52.13 MYOPIA OF BOTH EYES: ICD-10-CM

## 2018-10-19 DIAGNOSIS — E11.22 TYPE 2 DIABETES MELLITUS WITH STAGE 3 CHRONIC KIDNEY DISEASE, WITHOUT LONG-TERM CURRENT USE OF INSULIN (H): Primary | ICD-10-CM

## 2018-10-19 DIAGNOSIS — H52.222 REGULAR ASTIGMATISM OF LEFT EYE: ICD-10-CM

## 2018-10-19 PROCEDURE — 92015 DETERMINE REFRACTIVE STATE: CPT | Performed by: OPTOMETRIST

## 2018-10-19 PROCEDURE — 92014 COMPRE OPH EXAM EST PT 1/>: CPT | Performed by: OPTOMETRIST

## 2018-10-19 ASSESSMENT — VISUAL ACUITY
OD_CC: 20/20
OS_SC: 20/50
OS_SC+: -1
OD_SC: 20/100
CORRECTION_TYPE: GLASSES
METHOD: SNELLEN - LINEAR
OS_SC: J1+
OD_CC+: -1
OD_SC: J1+
OS_CC: 20/20

## 2018-10-19 ASSESSMENT — REFRACTION_MANIFEST
OS_CYLINDER: +0.75
OS_ADD: +2.00
OD_ADD: +2.00
OS_SPHERE: -4.50
OD_SPHERE: -4.50
OS_AXIS: 070

## 2018-10-19 ASSESSMENT — REFRACTION_WEARINGRX
OD_ADD: +2.00
OD_SPHERE: -4.50
SPECS_TYPE: SVL
OS_AXIS: 075
OS_SPHERE: -4.50
OD_SPHERE: -5.25
OD_CYLINDER: +0.50
OD_AXIS: 002
OS_CYLINDER: +0.75
OS_AXIS: 070
OS_ADD: +2.00
OS_CYLINDER: +0.75
OS_SPHERE: -5.00

## 2018-10-19 ASSESSMENT — CUP TO DISC RATIO
OD_RATIO: 0.2
OS_RATIO: 0.2

## 2018-10-19 ASSESSMENT — CONF VISUAL FIELD
OS_NORMAL: 1
OD_NORMAL: 1
METHOD: COUNTING FINGERS

## 2018-10-19 ASSESSMENT — TONOMETRY
IOP_METHOD: TONOPEN
OS_IOP_MMHG: 20
OD_IOP_MMHG: 19

## 2018-10-19 ASSESSMENT — SLIT LAMP EXAM - LIDS
COMMENTS: NORMAL
COMMENTS: NORMAL

## 2018-10-19 ASSESSMENT — EXTERNAL EXAM - LEFT EYE: OS_EXAM: NORMAL

## 2018-10-19 ASSESSMENT — EXTERNAL EXAM - RIGHT EYE: OD_EXAM: NORMAL

## 2018-10-19 NOTE — PATIENT INSTRUCTIONS
There are not any signs of the diabetes affecting the eyes today.  It is important that you get your eyes dilated once yearly and keep good control of your diabetes.    Diabetes weakens the blood vessels all over the body, including the eyes. Damage to the blood vessels in the eyes can cause swelling or bleeding into part of the eye (called the retina). This is called diabetic retinopathy (DENIS-tin-AH-puh-thee). If not treated, this disease can cause vision loss or blindness.   Symptoms may include blurred or distorted vision, but many people have no symptoms. It's important to see your eye doctor regularly to check for problems.   Early treatment and good control can help protect your vision. Here are the things you can do to help prevent vision loss:      1. Keep your blood sugar levels under tight control.      2. Bring high blood pressure under control.      3. No smoking.      4. Have yearly dilated eye exams.    Eyeglass prescription given.  Recommend no line bifocal.     Return in 1 year for a complete eye exam or sooner if needed.    Zac Sandhu, OD    The affects of the dilating drops last for 4- 6 hours.  You will be more sensitive to light and vision will be blurry up close.  Mydriatic sunglasses were given if needed.      Optometry Providers       Clinic Locations                                 Telephone Number   Dr. Bhumika Barahona   [place]    [place]   [place] [facility] [phone]     [place] Optical Hours:                [place] Barah[place] Optical Hours:       [place] [facility] Hours:   [address]   [address]     [address] [place] [address]   [place] [address]    [place] [address]  Phone: [phone]                    Phone: [phone]     Phone: [phone]                      Monday 8:00-7:00                          Monday 8:00-7:00                           Monday 8:00-7:00              Tuesday 8:00-6:00                          Tuesday 8:00-7:00                          Tuesday 8:00-7:00              Wednesday 8:00-6:00                  Wednesday 8:00-7:00                   Wednesday 8:00-7:00      Thursday 8:00-6:00                        Thursday 8:00-7:00                         Thursday 8:00-7:00            Friday 8:00-5:00 Friday 8:00-5:00 Friday 8:00-5:00    [facility] Hours:   [address] [place] [address]  [phone]    Monday 8:00-7:00  Tuesday 8:00-7:00  Wednesday 8:00-7:00  Thursday 8:00-7:00  Friday 8:00-5:00  Please log on to [email or web address] to order your contact lenses.  The link is found on the [facility] page.  As always, Thank you for trusting us with your health care needs!

## 2018-10-19 NOTE — PROGRESS NOTES
Chief Complaint   Patient presents with     COMPREHENSIVE EYE EXAM     Pt does wear contacts but has elected not to do contact lens fit today - will do next year.     Lab Results   Component Value Date    A1C 5.2 08/06/2018    A1C 5.2 08/15/2017    A1C 5.5 02/21/2017    A1C 6.1 08/09/2016    A1C 6.1 02/24/2016       Last Eye Exam: 6/2017  Dilated Previously: Yes    What are you currently using to see?  glasses and contacts    Distance Vision Acuity: Satisfied with vision    Near Vision Acuity: Not satisfied  - Pt currently has single vision glasses which she has to take off to read.  Adelaida wears cheaters when she wears her contacts.    Eye Comfort: good  Do you use eye drops? 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       OBJECTIVE: See Ophthalmology exam    ASSESSMENT:    ICD-10-CM    1. Type 2 diabetes mellitus with stage 3 chronic kidney disease, without long-term current use of insulin (H) E11.22 EYE EXAM (SIMPLE-NONBILLABLE)    N18.3     Negative diabetic retinopathy   2. Myopia of both eyes H52.13 REFRACTION   3. Regular astigmatism of left eye H52.222 REFRACTION   4. Presbyopia H52.4 REFRACTION      PLAN:    Adelaida Packer aware  eye exam results will be sent to Windy Gordillo  Patient Instructions   There are not any signs of the diabetes affecting the eyes today.  It is important that you get your eyes dilated once yearly and keep good control of your diabetes.    Diabetes weakens the blood vessels all over the body, including the eyes. Damage to the blood vessels in the eyes can cause swelling or bleeding into part of the eye (called the retina). This is called diabetic retinopathy (DENIS-tin-AH-puh-thee). If not treated, this disease can cause vision loss or blindness.   Symptoms may include blurred or distorted vision, but many people have no symptoms. It's important to see your  eye doctor regularly to check for problems.   Early treatment and good control can help protect your vision. Here are the things you can do to help prevent vision loss:      1. Keep your blood sugar levels under tight control.      2. Bring high blood pressure under control.      3. No smoking.      4. Have yearly dilated eye exams.    Eyeglass prescription given.  Recommend no line bifocal.     Return in 1 year for a complete eye exam or sooner if needed.    Zac Sandhu, OD

## 2018-10-19 NOTE — MR AVS SNAPSHOT
After Visit Summary   10/19/2018    Adelaida Packer    MRN: [id number]           Patient Information     Date Of Birth          1970        Visit Information        Provider Department      10/19/2018 8:40 AM Zac Sandhu, BR[facility]        Today's Diagnoses     Type 2 diabetes mellitus with stage 3 chronic kidney disease, without long-term current use of insulin (H)    -  1    Myopia of both eyes        Regular astigmatism of left eye        Presbyopia          Care Instructions    There are not any signs of the diabetes affecting the eyes today.  It is important that you get your eyes dilated once yearly and keep good control of your diabetes.    Diabetes weakens the blood vessels all over the body, including the eyes. Damage to the blood vessels in the eyes can cause swelling or bleeding into part of the eye (called the retina). This is called diabetic retinopathy (DENIS-tin-AH-puh-thee). If not treated, this disease can cause vision loss or blindness.   Symptoms may include blurred or distorted vision, but many people have no symptoms. It's important to see your eye doctor regularly to check for problems.   Early treatment and good control can help protect your vision. Here are the things you can do to help prevent vision loss:      1. Keep your blood sugar levels under tight control.      2. Bring high blood pressure under control.      3. No smoking.      4. Have yearly dilated eye exams.    Eyeglass prescription given.  Recommend no line bifocal.     Return in 1 year for a complete eye exam or sooner if needed.    Zac Sandhu, BRIGHT    The affects of the dilating drops last for 4- 6 hours.  You will be more sensitive to light and vision will be blurry up close.  Mydriatic sunglasses were given if needed.      Optometry Providers       Clinic Locations                                 Telephone Number   Dr. Bhumika Sandhu Dr.  [facility]   [facility]   [facility] [phone]     [employer] Optical Hours:                [place] Barahona Optical Hours:       [facility] Optical Hours:   [address]   [address]     [address]  [place] [address]   [place] [address]    [place] [address]  Phone: [phone]                    Phone: [phone]     Phone: [phone]                      Monday 8:00-7:00                          Monday 8:00-7:00                          Monday 8:00-7:00              Tuesday 8:00-6:00                          Tuesday 8:00-7:00                          Tuesday 8:00-7:00              Wednesday 8:00-6:00                  Wednesday 8:00-7:00                   Wednesday 8:00-7:00      Thursday 8:00-6:00                        Thursday 8:00-7:00                         Thursday 8:00-7:00            Friday 8:00-5:00                              Friday 8:00-5:00                              Friday 8:00-5:00    [facility] Hours:   [address] [place], [place] [id number] [phone]    Monday 8:00-7:00  Tuesday 8:00-7:00  Wednesday 8:00-7:00  Thursday 8:00-7:00  Friday 8:00-5:00  Please log on to [email or web address] to order your contact lenses.  The link is found on the [facility] page.  As always, Thank you for trusting us with your health care needs!              Follow-ups after your visit        Follow-up notes from your care team     Return in about 1 year (around 10/19/2019) for Annual Visit.      Your next 10 appointments already scheduled     Dec 04, 2018  4:00 PM CST   LAB with LAB FIRST FLOOR [facility] ([facility])    [address] [place] [address] [phone]           Please do not eat 10-12 hours before your appointment if you are coming in fasting for labs on lipids, cholesterol, or glucose (sugar). This does not apply to pregnant women. Water,  hot tea and black coffee (with nothing added) are okay. Do not drink other fluids, diet soda or chew gum.            Dec 04, 2018  4:30 PM CST   [facility]ia [facility]   [facility]    [address] [place] [address] [phone]            Jan 29, 2019  2:00 PM CST   Return Visit with Cele Myers MD   [facility]    [address] [place] [address] [phone]              Who to contact     If you have questions or need follow up information about today's clinic visit or your schedule please contact [facility] directly at [phone].  Normal or non-critical lab and imaging results will be communicated to you by Dougu[employer]art, letter or phone within 4 business days after the clinic has received the results. If you do not hear from us within 7 days, please contact the clinic through [employer] or phone. If you have a critical or abnormal lab result, we will notify you by phone as soon as possible.  Submit refill requests through [employer] or call your pharmacy and they will forward the refill request to us. Please allow 3 business days for your refill to be completed.          Additional Information About Your Visit        [employer]art Information     [employer] gives you secure access to your electronic health record. If you see a primary care provider, you can also send messages to your care team and make appointments. If you have questions, please call your primary care clinic.  If you do not have a primary care provider, please call [phone] and they will assist you.      [employer] is an electronic gateway that provides easy, online access to your medical records. With [employer]e, you can request a clinic appointment, read your test results, renew a prescription or communicate with your care team.     To access your existing account, please contact  your [facility] or call [phone] for assistance.        Care EveryWhere ID     This is your Care EveryWhere ID. This could be used by other organizations to access your [facility] medical records  [id number]        Your Vitals Were     Last Period                   08/13/2005            Blood Pressure from Last 3 Encounters:   06/18/18 140/87   04/08/18 (!) 164/95   12/30/17 152/90    Weight from Last 3 Encounters:   06/18/18 94.6 kg (208 lb 9.6 oz)   04/08/18 93 kg (205 lb)   12/30/17 88.9 kg (196 lb)              We Performed the Following     EYE EXAM (SIMPLE-NONBILLABLE)     REFRACTION        Primary Care Provider Office Phone # Fax #    Windy Gordillo [phone] [address]  [facility] [address]        Equal Access to Services     [facility]: Hadii aad ku hadasho Soomaali, waaxda luqadaha, qaybta kaalmada adeegyada, waxay idiin hayaan adejovita mahmood . So [facility] [phone].    ATENCIÓN: Si habla español, tiene a lynch disposición servicios gratuitos de asistencia lingüística. Llame al [phone].    We comply with applicable federal civil rights laws and [place] laws. We do not discriminate on the basis of race, color, national origin, age, disability, sex, sexual orientation, or gender identity.            Thank you!     Thank you for choosing [facility]  for your care. Our goal is always to provide you with excellent care. Hearing back from our patients is one way we can continue to improve our services. Please take a few minutes to complete the written survey that you may receive in the mail after your visit with us. Thank you!             Your Updated Medication List - Protect others around you: Learn how to safely use, store and throw away your medicines at www.disposemymeds.org.          This list is accurate as of 10/19/18 10:14 AM.  Always use your most recent med list.                   Brand Name Dispense  Instructions for use Diagnosis    ASPIRIN LOW DOSE 81 MG EC tablet   Generic drug:  aspirin     100 tablet    TAKE 1 TABLET BY MOUTH ONCE DAILY    Type 2 diabetes, HbA1c goal < 7% (H), Hyperlipidemia with target LDL less than 100       blood glucose monitoring lancets     1 Box    Use to test blood sugar 4-5 times daily or as directed.    Type 2 diabetes, HbA1c goal < 7% (H)       Blood Pressure Monitor Kit     1 kit    1 Device 2 times daily    Essential hypertension       calcium carbonate 600 mg (elemental) 1500 (600 Ca) MG tablet    OS-EVGENY    180 tablet    Take 1 tablet (600 mg) by mouth 2 times daily    Vitamin D insufficiency       cetirizine 10 MG tablet    zyrTEC     Take 10 mg by mouth daily as needed for allergies        cholecalciferol 2000 units Caps     90 capsule    Take 2,000 Units by mouth daily    Vitamin D insufficiency       eplerenone 25 MG tablet    INSPRA    630 tablet    Take 100 mg (4, 25 mg tabs) in AM and 75 mg (3, 25 mg tabs) in PM    Primary hyperaldosteronism (H)       fluticasone 50 MCG/ACT spray    FLONASE    16 g    Spray 2 sprays into both nostrils daily    Nasal congestion       labetalol 300 MG tablet    NORMODYNE    180 tablet    Take 1 tablet (300 mg) by mouth 2 times daily Please note change in tablet strength    Hypertension, renal       ondansetron 4 MG tablet    ZOFRAN    15 tablet    Take 1 tablet (4 mg) by mouth every 8 hours as needed for nausea    Nausea       polyethylene glycol Packet    MIRALAX/GLYCOLAX     Take 17 g by mouth daily        ranitidine 150 MG tablet    ZANTAC    60 tablet    Take 1 tablet (150 mg) by mouth 2 times daily    Gastroesophageal reflux disease without esophagitis       simvastatin 10 MG tablet    ZOCOR    90 tablet    TAKE 1 TABLET BY MOUTH BEDTIME    Hyperlipidemia with target LDL less than 100       SUMAtriptan 25 MG tablet    IMITREX    18 tablet    TAKE 1 TO 2 TABLETS BY MOUTH AT ONSET OF HEADACHE FOR MIGRAINE. MAY REPEAT DOSE IN 2 HOURS.  MAX OF 200MG OR 2 DOSES IN 24 HOURS    Chronic migraine without aura without status migrainosus, not intractable       TYLENOL ARTHRITIS PAIN 650 MG CR tablet   Generic drug:  acetaminophen      Take 650-1,300 mg by mouth every 8 hours as needed for mild pain or fever

## 2018-11-15 ENCOUNTER — TELEPHONE (OUTPATIENT)
Dept: NEPHROLOGY | Facility: CLINIC | Age: 48
End: 2018-11-15

## 2018-11-15 ENCOUNTER — OFFICE VISIT (OUTPATIENT)
Dept: PEDIATRICS | Facility: CLINIC | Age: 48
End: 2018-11-15
Payer: COMMERCIAL

## 2018-11-15 VITALS
SYSTOLIC BLOOD PRESSURE: 168 MMHG | BODY MASS INDEX: 42.01 KG/M2 | DIASTOLIC BLOOD PRESSURE: 97 MMHG | HEIGHT: 61 IN | WEIGHT: 222.5 LBS | HEART RATE: 58 BPM | TEMPERATURE: 98.2 F | OXYGEN SATURATION: 100 %

## 2018-11-15 DIAGNOSIS — H83.09 LABYRINTHITIS, UNSPECIFIED LATERALITY: Primary | ICD-10-CM

## 2018-11-15 DIAGNOSIS — E26.09 PRIMARY HYPERALDOSTERONISM (H): ICD-10-CM

## 2018-11-15 DIAGNOSIS — N18.30 CKD (CHRONIC KIDNEY DISEASE) STAGE 3, GFR 30-59 ML/MIN (H): ICD-10-CM

## 2018-11-15 DIAGNOSIS — I10 HYPERTENSION GOAL BP (BLOOD PRESSURE) < 140/90: ICD-10-CM

## 2018-11-15 PROCEDURE — 99214 OFFICE O/P EST MOD 30 MIN: CPT | Performed by: INTERNAL MEDICINE

## 2018-11-15 RX ORDER — EPLERENONE 25 MG/1
TABLET, FILM COATED ORAL
Qty: 240 TABLET | Refills: 3 | Status: SHIPPED | OUTPATIENT
Start: 2018-11-15 | End: 2019-03-17

## 2018-11-15 RX ORDER — AMLODIPINE BESYLATE 5 MG/1
5 TABLET ORAL DAILY
Qty: 30 TABLET | Refills: 1 | Status: SHIPPED | OUTPATIENT
Start: 2018-11-15 | End: 2019-01-04

## 2018-11-15 RX ORDER — EPLERENONE 25 MG/1
TABLET, FILM COATED ORAL
Qty: 630 TABLET | Refills: 1 | Status: CANCELLED | OUTPATIENT
Start: 2018-11-15
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
## 2018-11-15 NOTE — TELEPHONE ENCOUNTER
"Patient called nephrology clinic to discuss symptoms of dizziness that she has noted within the past 24 hours. She awoke yesterday with symptoms and went home from work early yesterday. Yesterday AM, she reports that it was difficult to get her head off of her pillow due to the feeling of dizziness and room spinning. She went home from work yesterday with a headache, took some Excedrin and awoke today with improved headache symptoms. Adelaida also mentions that she had a \"plugged ear\" that she could not get relief from with yawning. She resumed taking her BP throughout the past 24 hours and has found readings as follows: 158/102, 175/104. She takes Eplerenone 100 mg in AM and 75 mg in the PM as well as Labetalol 300 mg BID. 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     Last visit with Dr. Aleman was 12/5/17. Upcoming visit scheduled for 12/4/18. Advised that Adelaida schedule with PCP team to assess/address symptoms. Transferred patient to scheduling team.    Wendy Sepulveda, RN, BSN  Nephrology Care Coordinator  [facility]    "

## 2018-11-15 NOTE — MR AVS SNAPSHOT
After Visit Summary   11/15/2018    Adelaida Packer    MRN: [id number]           Patient Information     Date Of Birth          1970        Visit Information        Provider Department      11/15/2018 1:30 PM Bonilla Weston MD [facility]        Today's Diagnoses     Labyrinthitis, unspecified laterality    -  1    Hypertension goal BP (blood pressure) < 140/90        CKD (chronic kidney disease) stage 3, GFR 30-59 ml/min (H)        Primary hyperaldosteronism (H)           Follow-ups after your visit        Follow-up notes from your care team     Return if symptoms worsen or fail to improve.      Your next 10 appointments already scheduled     Dec 04, 2018  4:00 PM CST   LAB with LAB FIRST FLOOR [facility] ([facility])    [address] [place] [address] [phone]           Please do not eat 10-12 hours before your appointment if you are coming in fasting for labs on lipids, cholesterol, or glucose (sugar). This does not apply to pregnant women. Water, hot tea and black coffee (with nothing added) are okay. Do not drink other fluids, diet soda or chew gum.            Dec 04, 2018  4:30 PM CST   Return Visit with Dia Aleman MD   [facility] ([facility])    [address] [place] [address] [phone]            Jan 29, 2019  2:00 PM CST   Return Visit with Cele Myers MD   [facility])    [address] [place] [address] [phone]              Who to contact     If you have questions or need follow up information about today's clinic visit or your schedule please contact [facility] directly at [phone].  Normal or non-critical lab and imaging results will be communicated to you by MyChart, letter or phone within 4 business days after the clinic has received  "the results. If you do not hear from us within 7 days, please contact the clinic through NodePrime or phone. If you have a critical or abnormal lab result, we will notify you by phone as soon as possible.  Submit refill requests through NodePrime or call your pharmacy and they will forward the refill request to us. Please allow 3 business days for your refill to be completed.          Additional Information About Your Visit        siOPTICAharBitPoster Information     NodePrime gives you secure access to your electronic health record. If you see a primary care provider, you can also send messages to your care team and make appointments. If you have questions, please call your primary care clinic.  If you do not have a primary care provider, please call [phone] and they will assist you.      NodePrime is an electronic gateway that provides easy, online access to your medical records. With NodePrime, you can request a clinic appointment, read your test results, renew a prescription or communicate with your care team.     To access your existing account, please contact your [facility] or call [phone] for assistance.        Care EveryWhere ID     This is your Care EveryWhere ID. This could be used by other organizations to access your [place] medical records  [id number]        Your Vitals Were     Pulse Temperature Height Last Period Pulse Oximetry BMI (Body Mass Index)    58 98.2  F (36.8  C) (Temporal) 5' 1\" (1.549 m) 08/13/2005 100% 42.04 kg/m2       Blood Pressure from Last 3 Encounters:   11/15/18 (!) 168/97   06/18/18 140/87   04/08/18 (!) 164/95    Weight from Last 3 Encounters:   11/15/18 222 lb 8 oz (100.9 kg)   06/18/18 208 lb 9.6 oz (94.6 kg)   04/08/18 205 lb (93 kg)              Today, you had the following     No orders found for display         Today's Medication Changes          These changes are accurate as of 11/15/18  2:31 PM.  If you have any questions, ask your nurse or doctor. "               Start taking these medicines.        Dose/Directions    amLODIPine 5 MG tablet   Commonly known as:  NORVASC   Used for:  Hypertension goal BP (blood pressure) < 140/90   Started by:  Bonilla Weston MD        Dose:  5 mg   Take 1 tablet (5 mg) by mouth daily   Quantity:  30 tablet   Refills:  1         These medicines have changed or have updated prescriptions.        Dose/Directions    eplerenone 25 MG tablet   Commonly known as:  INSPRA   This may have changed:  additional instructions   Used for:  Primary hyperaldosteronism (H)   Changed by:  Bonilla Weston MD        Take 100 mg (4, 25 mg tabs) in AM and 100 mg (4, 25 mg tabs) in PM   Quantity:  240 tablet   Refills:  3            Where to get your medicines      These medications were sent to [facility] - [place], [place] - [address], [address]  [address], [facility] [address]     Phone:  [phone]     amLODIPine 5 MG tablet         These medications were sent to [facility] [address] [place] [address] [address] [facility]  [address] [facility] [address]    Hours:  24-hours Phone:  [phone]     eplerenone 25 MG tablet                Primary Care Provider Office Phone # Fax #    Windy Gordillo [phone]       [address]  [facility] [address]        Equal Access to Services     [facility]: Hadii luma ku hadasho Soomaali, waaxda luqadaha, qaybta kaalmada adeegyada, silvia ozuna adejovita mahmood . So [facility] [phone].    ATENCIÓN: Si angelitala aric, tiene a lynch disposición servicios gratuitos de asistencia lingüística. Jaleesa al [phone].    We comply with applicable federal civil rights laws and [place] laws. We do not discriminate on the basis of race, color, national origin, age, disability, sex, sexual orientation, or gender identity.            Thank you!     Thank you for choosing [facility]  for your care. Our goal is always to provide you with excellent care. Hearing back from our patients is one way we can continue to improve our services. Please take a few minutes to complete the written survey that you may receive in the mail after your visit with us. Thank you!             Your Updated Medication List - Protect others around you: Learn how to safely use, store and throw away your medicines at www.disposemymeds.org.          This list is accurate as of 11/15/18  2:31 PM.  Always use your most recent med list.                   Brand Name Dispense Instructions for use Diagnosis    amLODIPine 5 MG tablet    NORVASC    30 tablet    Take 1 tablet (5 mg) by mouth daily    Hypertension goal BP (blood pressure) < 140/90       ASPIRIN LOW DOSE 81 MG EC tablet   Generic drug:  aspirin     100 tablet    TAKE 1 TABLET BY MOUTH ONCE DAILY    Type 2 diabetes, HbA1c goal < 7% (H), Hyperlipidemia with target LDL less than 100       blood glucose monitoring lancets     1 Box    Use to test blood sugar 4-5 times daily or as directed.    Type 2 diabetes, HbA1c goal < 7% (H)       Blood Pressure Monitor Kit     1 kit    1 Device 2 times daily    Essential hypertension       calcium carbonate 600 mg (elemental) 1500 (600 Ca) MG tablet    OS-EVGENY    180 tablet    Take 1 tablet (600 mg) by mouth 2 times daily    Vitamin D insufficiency       cetirizine 10 MG tablet    zyrTEC     Take 10 mg by mouth daily as needed for allergies        cholecalciferol 2000 units Caps     90 capsule    Take 2,000 Units by mouth daily    Vitamin D insufficiency       eplerenone 25 MG tablet    INSPRA    240 tablet    Take 100 mg (4, 25 mg tabs) in AM and 100 mg (4, 25 mg tabs) in PM    Primary hyperaldosteronism (H)       labetalol 300 MG tablet    NORMODYNE    180 tablet    Take 1 tablet (300 mg) by mouth 2 times daily Please note change in tablet strength    Hypertension, renal       polyethylene glycol Packet     MIRALAX/GLYCOLAX     Take 17 g by mouth daily        ranitidine 150 MG tablet    ZANTAC    60 tablet    Take 1 tablet (150 mg) by mouth 2 times daily    Gastroesophageal reflux disease without esophagitis       SUMAtriptan 25 MG tablet    IMITREX    18 tablet    TAKE 1 TO 2 TABLETS BY MOUTH AT ONSET OF HEADACHE FOR MIGRAINE. MAY REPEAT DOSE IN 2 HOURS. MAX OF 200MG OR 2 DOSES IN 24 HOURS    Chronic migraine without aura without status migrainosus, not intractable       TYLENOL ARTHRITIS PAIN 650 MG CR tablet   Generic drug:  acetaminophen      Take 650-1,300 mg by mouth every 8 hours as needed for mild pain or fever

## 2018-11-15 NOTE — TELEPHONE ENCOUNTER
Phone Message    Name of caller: Adelaida  Phone number: [phone]  Relation to patient: Self    Is this a new patient? No    Reason for call:   Symptoms or Concerns     If patient has red-flag symptoms, warm transfer to triage line    Current symptom or concern: dizziness    Symptoms have been present for: couple of days    Has patient previously been seen for this? No      MESSAGE ROUTED TO: Adult Clinics:  Nephrology - [id number]

## 2018-11-15 NOTE — TELEPHONE ENCOUNTER
Writer received a refill request from: [facility] in [place], [place]. [phone]    Medication: eplerenone (INSPRA) 25 MG tablet  Sig: Take 4 tablets (100mg) by mouth every morning and 3 tablets (75mg) by mouth every evening    Date last dispensed: 10/15/18      Pt's last office visit: 12/05/17  Next scheduled office visit: 12/04/18      Per the RN/LPN medication refill protocol, writer is to refill this request. If able to refill, please call the pt to inform them the RX was sent to the pharmacy. If unable to refill, route this encounter to the prescribing physician for authorization or further instructions.

## 2018-11-20 DIAGNOSIS — N18.30 CKD (CHRONIC KIDNEY DISEASE) STAGE 3, GFR 30-59 ML/MIN (H): Primary | ICD-10-CM

## 2018-11-29 ENCOUNTER — OFFICE VISIT - HEALTHEAST (OUTPATIENT)
Dept: PODIATRY | Facility: CLINIC | Age: 48
End: 2018-11-29

## 2018-11-29 ENCOUNTER — RECORDS - HEALTHEAST (OUTPATIENT)
Dept: GENERAL RADIOLOGY | Facility: CLINIC | Age: 48
End: 2018-11-29

## 2018-11-29 DIAGNOSIS — M79.672 PAIN IN LEFT FOOT: ICD-10-CM

## 2018-11-29 DIAGNOSIS — M79.672 LEFT FOOT PAIN: ICD-10-CM

## 2018-12-04 ENCOUNTER — OFFICE VISIT (OUTPATIENT)
Dept: NEPHROLOGY | Facility: CLINIC | Age: 48
End: 2018-12-04
Payer: COMMERCIAL

## 2018-12-04 VITALS
OXYGEN SATURATION: 95 % | HEART RATE: 73 BPM | BODY MASS INDEX: 41.57 KG/M2 | SYSTOLIC BLOOD PRESSURE: 141 MMHG | WEIGHT: 220 LBS | DIASTOLIC BLOOD PRESSURE: 77 MMHG

## 2018-12-04 DIAGNOSIS — I10 HYPERTENSION GOAL BP (BLOOD PRESSURE) < 140/90: ICD-10-CM

## 2018-12-04 DIAGNOSIS — D47.2 MONOCLONAL GAMMOPATHY: ICD-10-CM

## 2018-12-04 DIAGNOSIS — N18.30 CKD (CHRONIC KIDNEY DISEASE) STAGE 3, GFR 30-59 ML/MIN (H): ICD-10-CM

## 2018-12-04 DIAGNOSIS — N18.30 TYPE 2 DIABETES MELLITUS WITH STAGE 3 CHRONIC KIDNEY DISEASE, WITHOUT LONG-TERM CURRENT USE OF INSULIN (H): ICD-10-CM

## 2018-12-04 DIAGNOSIS — N18.30 CKD (CHRONIC KIDNEY DISEASE) STAGE 3, GFR 30-59 ML/MIN (H): Primary | ICD-10-CM

## 2018-12-04 DIAGNOSIS — E21.0 PRIMARY HYPERPARATHYROIDISM (H): ICD-10-CM

## 2018-12-04 DIAGNOSIS — E11.22 TYPE 2 DIABETES MELLITUS WITH STAGE 3 CHRONIC KIDNEY DISEASE, WITHOUT LONG-TERM CURRENT USE OF INSULIN (H): ICD-10-CM

## 2018-12-04 LAB
ALBUMIN SERPL-MCNC: 4.1 G/DL (ref 3.4–5)
ANION GAP SERPL CALCULATED.3IONS-SCNC: 9 MMOL/L (ref 3–14)
BUN SERPL-MCNC: 22 MG/DL (ref 7–30)
CALCIUM SERPL-MCNC: 9.8 MG/DL (ref 8.5–10.1)
CHLORIDE SERPL-SCNC: 104 MMOL/L (ref 94–109)
CO2 SERPL-SCNC: 23 MMOL/L (ref 20–32)
CREAT SERPL-MCNC: 1.06 MG/DL (ref 0.52–1.04)
CREAT UR-MCNC: 109 MG/DL
GFR SERPL CREATININE-BSD FRML MDRD: 55 ML/MIN/1.7M2
GLUCOSE SERPL-MCNC: 88 MG/DL (ref 70–99)
HGB BLD-MCNC: 12.5 G/DL (ref 11.7–15.7)
PHOSPHATE SERPL-MCNC: 3.2 MG/DL (ref 2.5–4.5)
POTASSIUM SERPL-SCNC: 4.2 MMOL/L (ref 3.4–5.3)
PROT UR-MCNC: <0.05 G/L
PROT/CREAT 24H UR: NORMAL G/G CR (ref 0–0.2)
PTH-INTACT SERPL-MCNC: 26 PG/ML (ref 18–80)
SODIUM SERPL-SCNC: 136 MMOL/L (ref 133–144)

## 2018-12-04 PROCEDURE — 85018 HEMOGLOBIN: CPT | Performed by: INTERNAL MEDICINE

## 2018-12-04 PROCEDURE — 36415 COLL VENOUS BLD VENIPUNCTURE: CPT | Performed by: INTERNAL MEDICINE

## 2018-12-04 PROCEDURE — 80069 RENAL FUNCTION PANEL: CPT | Performed by: INTERNAL MEDICINE

## 2018-12-04 PROCEDURE — 99214 OFFICE O/P EST MOD 30 MIN: CPT | Performed by: INTERNAL MEDICINE

## 2018-12-04 PROCEDURE — 84156 ASSAY OF PROTEIN URINE: CPT | Performed by: INTERNAL MEDICINE

## 2018-12-04 PROCEDURE — 83970 ASSAY OF PARATHORMONE: CPT | Performed by: INTERNAL MEDICINE

## 2018-12-04 ASSESSMENT — PAIN SCALES - GENERAL: PAINLEVEL: MODERATE PAIN (5)

## 2018-12-04 NOTE — PATIENT INSTRUCTIONS
1. Follow home blood pressure. Ideal range 110-130 but please update in a few weeks what your range is.   2. Follow-up yearly  3. Labs in 6 months

## 2018-12-04 NOTE — MR AVS SNAPSHOT
After Visit Summary   12/4/2018    Adelaida Packer    MRN: [id number]           Patient Information     Date Of Birth          1970        Visit Information        Provider Department      12/4/2018 4:30 PM Dia Aleman MD [facility]        Care Instructions    1. Follow home blood pressure. Ideal range 110-130 but please update in a few weeks what your range is.   2. Follow-up yearly  3. Labs in 6 months          Follow-ups after your visit        Your next 10 appointments already scheduled     Jan 29, 2019  2:00 PM CST   Return Visit with Cele Myers MD   [facility])    [address] [place] [id number]   [phone]            Reyes 10, 2019  9:00 AM CDT   LAB with BE LAB   [facility]    [address] [place] [address]   [phone]           Please do not eat 10-12 hours before your appointment if you are coming in fasting for labs on lipids, cholesterol, or glucose (sugar). This does not apply to pregnant women. Water, hot tea and black coffee (with nothing added) are okay. Do not drink other fluids, diet soda or chew gum.            Dec 03, 2019  4:00 PM CST   LAB with LAB FIRST FLOOR [facility])    [address] [place] [address]   [phone]           Please do not eat 10-12 hours before your appointment if you are coming in fasting for labs on lipids, cholesterol, or glucose (sugar). This does not apply to pregnant women. Water, hot tea and black coffee (with nothing added) are okay. Do not drink other fluids, diet soda or chew gum.            Dec 03, 2019  4:30 PM CST   Return Visit with Dia Aleman MD   [facility])    [address] [place] [address]   [phone]              Who to  contact     If you have questions or need follow up information about today's clinic visit or your schedule please contact [facility] directly at [phone].  Normal or non-critical lab and imaging results will be communicated to you by Interanahart, letter or phone within 4 business days after the clinic has received the results. If you do not hear from us within 7 days, please contact the clinic through Interanahart or phone. If you have a critical or abnormal lab result, we will notify you by phone as soon as possible.  Submit refill requests through Entreda or call your pharmacy and they will forward the refill request to us. Please allow 3 business days for your refill to be completed.          Additional Information About Your Visit        Entreda Information     Entreda gives you secure access to your electronic health record. If you see a primary care provider, you can also send messages to your care team and make appointments. If you have questions, please call your primary care clinic.  If you do not have a primary care provider, please call [phone] and they will assist you.      Entreda is an electronic gateway that provides easy, online access to your medical records. With Entreda, you can request a clinic appointment, read your test results, renew a prescription or communicate with your care team.     To access your existing account, please contact your [facility] or call [phone] for assistance.        Care EveryWhere ID     This is your Care EveryWhere ID. This could be used by other organizations to access your [place] medical records  [id number]        Your Vitals Were     Pulse Last Period Pulse Oximetry BMI (Body Mass Index)          73 08/13/2005 95% 41.57 kg/m2         Blood Pressure from Last 3 Encounters:   12/04/18 141/77   11/15/18 (!) 168/97   06/18/18 140/87    Weight from Last 3 Encounters:   12/04/18 99.8 kg (220 lb)   11/15/18 100.9  kg (222 lb 8 oz)   06/18/18 94.6 kg (208 lb 9.6 oz)              Today, you had the following     No orders found for display       Primary Care Provider Office Phone # Fax #    Windy Gordillo [phone] [address]  [facility] [address]        Equal Access to Services     [facility]: Hadii aad ku hadasho Soomaali, waaxda luqadaha, qaybta kaalmada adeegyada, waxay idiin hayaan adeeg kharash laRobertoaan . So [facility] [phone].    ATENCIÓN: Si habla español, tiene a lynch disposición servicios gratuitos de asistencia lingüística. Llame al [phone].    We comply with applicable federal civil rights laws and [place] laws. We do not discriminate on the basis of race, color, national origin, age, disability, sex, sexual orientation, or gender identity.            Thank you!     Thank you for choosing [facility]  for your care. Our goal is always to provide you with excellent care. Hearing back from our patients is one way we can continue to improve our services. Please take a few minutes to complete the written survey that you may receive in the mail after your visit with us. Thank you!             Your Updated Medication List - Protect others around you: Learn how to safely use, store and throw away your medicines at www.disposemymeds.org.          This list is accurate as of 12/4/18  4:46 PM.  Always use your most recent med list.                   Brand Name Dispense Instructions for use Diagnosis    amLODIPine 5 MG tablet    NORVASC    30 tablet    Take 1 tablet (5 mg) by mouth daily    Hypertension goal BP (blood pressure) < 140/90       ASPIRIN LOW DOSE 81 MG EC tablet   Generic drug:  aspirin     100 tablet    TAKE 1 TABLET BY MOUTH ONCE DAILY    Type 2 diabetes, HbA1c goal < 7% (H), Hyperlipidemia with target LDL less than 100       blood glucose monitoring lancets     1 Box    Use to test blood sugar 4-5 times daily or as directed.    Type 2 diabetes, HbA1c  goal < 7% (H)       Blood Pressure Monitor Kit     1 kit    1 Device 2 times daily    Essential hypertension       calcium carbonate 600 mg (elemental) 1500 (600 Ca) MG tablet    OS-EVGENY    180 tablet    Take 1 tablet (600 mg) by mouth 2 times daily    Vitamin D insufficiency       cetirizine 10 MG tablet    zyrTEC     Take 10 mg by mouth daily as needed for allergies        cholecalciferol 2000 units Caps     90 capsule    Take 2,000 Units by mouth daily    Vitamin D insufficiency       eplerenone 25 MG tablet    INSPRA    240 tablet    Take 100 mg (4, 25 mg tabs) in AM and 100 mg (4, 25 mg tabs) in PM    Primary hyperaldosteronism (H)       labetalol 300 MG tablet    NORMODYNE    180 tablet    Take 1 tablet (300 mg) by mouth 2 times daily Please note change in tablet strength    Hypertension, renal       polyethylene glycol packet    MIRALAX/GLYCOLAX     Take 17 g by mouth daily        ranitidine 150 MG tablet    ZANTAC    60 tablet    Take 1 tablet (150 mg) by mouth 2 times daily    Gastroesophageal reflux disease without esophagitis       SUMAtriptan 25 MG tablet    IMITREX    18 tablet    TAKE 1 TO 2 TABLETS BY MOUTH AT ONSET OF HEADACHE FOR MIGRAINE. MAY REPEAT DOSE IN 2 HOURS. MAX OF 200MG OR 2 DOSES IN 24 HOURS    Chronic migraine without aura without status migrainosus, not intractable       TYLENOL ARTHRITIS PAIN 650 MG CR tablet   Generic drug:  acetaminophen      Take 650-1,300 mg by mouth every 8 hours as needed for mild pain or fever

## 2018-12-04 NOTE — NURSING NOTE
Adelaida Packer's goals for this visit include:   Chief Complaint   Patient presents with     RECHECK     yrly recheck CKD        She requests these members of her care team be copied on today's visit information: no    PCP: Windy Gordillo    Referring Provider:  No referring provider defined for this encounter.    /77 (BP Location: Right arm, Patient Position: Sitting, Cuff Size: Adult Large)  Pulse 73  Wt 99.8 kg (220 lb)  LMP 08/13/2005  SpO2 95%  BMI 41.57 kg/m2    Do you need any medication refills at today's visit? No    Alise Nesbitt LPN

## 2018-12-31 NOTE — PROGRESS NOTES
12/4/18  CC: HTN and CKD    HPI: Adelaida Packer is a 48 year old female who presents for follow-up of HTN/CKD.  Has some mild CKD which is felt to be related to hypertension as well as likely some effects from hypercalcemia in the past. Her hx includes hyperparathyroidism, primary for which she underwent parathyroidectomy on 3/21/13. Following that surgery, she did have a complication related to a hematoma but we have seen many benefits of her parathyroidectomy - blood pressure improved as well as kidney function.    Creatinine has been 1.03-1.28 in the past year; 1.06 now. No proteinuria in august 2018. She has not been following her BP at home recently but it was 120/80 last Thursday; 141/77 today. She had a foot surgery this summer but otherwise no other changes to her health.        Allergies   Allergen Reactions     Sulfa Drugs Shortness Of Breath     Hydrochlorothiazide W/Triamterene Other (See Comments)     Renal insuff     Maxzide [Hydrochlorothiazide W/Triamterene] Other (See Comments)     Renal insuff     Metoprolol      Other reaction(s): Bradycardia  At high dose only     Seasonal Allergies          Current Outpatient Medications   Medication Sig Dispense Refill     acetaminophen (TYLENOL ARTHRITIS PAIN) 650 MG CR tablet Take 650-1,300 mg by mouth every 8 hours as needed for mild pain or fever       amLODIPine (NORVASC) 5 MG tablet Take 1 tablet (5 mg) by mouth daily 30 tablet 1     ASPIRIN LOW DOSE 81 MG EC tablet TAKE 1 TABLET BY MOUTH ONCE DAILY 100 tablet 0     Blood Pressure Monitor KIT 1 Device 2 times daily 1 kit 0     calcium carbonate (OS-EVGENY 600 MG [place]. CA) 1500 (600 CA) MG tablet Take 1 tablet (600 mg) by mouth 2 times daily 180 tablet 3     cetirizine (ZYRTEC) 10 MG tablet Take 10 mg by mouth daily as needed for allergies       cholecalciferol 2000 UNITS CAPS Take 2,000 Units by mouth daily 90 capsule 3     eplerenone (INSPRA) 25 MG tablet Take 100 mg (4, 25 mg tabs) in AM and 100 mg (4, 25  mg tabs) in  tablet 3     labetalol (NORMODYNE) 300 MG tablet Take 1 tablet (300 mg) by mouth 2 times daily Please note change in tablet strength 180 tablet 3     polyethylene glycol (MIRALAX/GLYCOLAX) Packet Take 17 g by mouth daily       ranitidine (ZANTAC) 150 MG tablet Take 1 tablet (150 mg) by mouth 2 times daily 60 tablet 2     SUMAtriptan (IMITREX) 25 MG tablet TAKE 1 TO 2 TABLETS BY MOUTH AT ONSET OF HEADACHE FOR MIGRAINE. MAY REPEAT DOSE IN 2 HOURS. MAX OF 200MG OR 2 DOSES IN 24 HOURS 18 tablet 0     blood glucose monitoring (ACCU-CHEK MULTICLIX) lancets Use to test blood sugar 4-5 times daily or as directed. (Patient not taking: Reported on 11/15/2018) 1 Box 6       Past Medical History:   Diagnosis Date     Allergic rhinitis due to other allergen     seasonal     Diabetes (H)      Migraine, unspecified, without mention of intractable migraine without mention of status migrainosus      Morbid obesity (H)      Unspecified essential hypertension     dx around age 31         Past Surgical History:   Procedure Laterality Date     C ANESTH,KNEE AREA SURGERY  01/2001     C GASTROPLASTY,OBESITY,VERT BAND      removed 2009     HC REMOVE TONSILS/ADENOIDS,12+ Y/O  1995     HEAD & NECK SURGERY  3/2013    Parathyroidectomy--had to go back in 6 days later for a possible bleed     HYSTERECTOMY, VAGINAL  12/2005    hysterectomy- fibroids     ORTHOPEDIC SURGERY           Social History     Tobacco Use     Smoking status: Never Smoker     Smokeless tobacco: Never Used   Substance Use Topics     Alcohol use: No     Drug use: No         Family History   Problem Relation Age of Onset     Hypertension Mother      Gynecology Mother         hysterectomy     Gastrointestinal Disease Mother         bowel upstruction     Thyroid Disease Mother      Neurologic Disorder Mother      Osteoporosis Mother      Hypertension Father      Lipids Father      Arthritis Father      Heart Disease Father      Prostate Cancer Brother       Alcohol/Drug Brother      Circulatory Brother      Arthritis Paternal Grandmother      Cancer Maternal Grandfather         bone     Eye Disorder Maternal Grandfather      Prostate Cancer Maternal Grandfather      Glaucoma Maternal Grandfather      Cancer Maternal Grandmother         tumor-head     Obesity Maternal Grandmother      Respiratory Daughter         asthma     Diabetes Sister      Cerebrovascular Disease Sister      Macular Degeneration No family hx of          ROS: A 4 system review of systems was negative other than noted here or above.     Exam:  /77 (BP Location: Right arm, Patient Position: Sitting, Cuff Size: Adult Large)   Pulse 73   Wt 99.8 kg (220 lb)   LMP 08/13/2005   SpO2 95%   BMI 41.57 kg/m      GENERAL APPEARANCE: alert and no distress  CV: RRR  R: CTAB  Extremities: no clubbing, cyanosis, or edema  SKIN: no rash  NEURO: mentation intact and speech normal  PSYCH: affect normal/bright    Results  Orders Only on 12/04/2018   Component Date Value Ref Range Status     Protein Random Urine 12/04/2018 <0.05  g/L Final     Protein Total Urine g/gr Creatinine 12/04/2018 Unable to calculate due to low value  0 - 0.2 g/g Cr Final     Hemoglobin 12/04/2018 12.5  11.7 - 15.7 g/dL Final     Parathyroid Hormone Intact 12/04/2018 26  18 - 80 pg/mL Final     Sodium 12/04/2018 136  133 - 144 mmol/L Final     Potassium 12/04/2018 4.2  3.4 - 5.3 mmol/L Final     Chloride 12/04/2018 104  94 - 109 mmol/L Final     Carbon Dioxide 12/04/2018 23  20 - 32 mmol/L Final     Anion Gap 12/04/2018 9  3 - 14 mmol/L Final     Glucose 12/04/2018 88  70 - 99 mg/dL Final    Non Fasting     Urea Nitrogen 12/04/2018 22  7 - 30 mg/dL Final     Creatinine 12/04/2018 1.06* 0.52 - 1.04 mg/dL Final     GFR Estimate 12/04/2018 55* >60 mL/min/1.7m2 Final    Non  GFR Calc     GFR Estimate If Black 12/04/2018 66  >60 mL/min/1.7m2 Final    African American GFR Calc     Calcium 12/04/2018 9.8  8.5 - 10.1 mg/dL  Final     Phosphorus 12/04/2018 3.2  2.5 - 4.5 mg/dL Final     Albumin 12/04/2018 4.1  3.4 - 5.0 g/dL Final     Creatinine Urine 12/04/2018 109  mg/dL Final         Assessment/Plan:  1. Hypertension: aldosterone level has been high on evaluation by Dr. Quintanilla with a low renin. In the past I was suspicious for hyperaldosteronism as well but CT scan was without adrenal adneoma appreciated. Agree with potassium sparing diuretic for BP control given presumed adrenal hyperplasia. Will have her monitor BP readings at home and update if not consistently at goal of 110-130.     2. CKD: likely some chronic kidney changes related to longstanding hypertension and hypercalcemia.     3. Hypercalcemia: s/p parathyroidectomy on 3/21/13. Calcium now normal.     4. 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 Rodas/[facility] - yearly follow-up was recommended by Dr. Rodas at either [place] or here.      5. DM: A1C much improved at 5.2% - follows with Dr. Quintanilla.     Patient Instructions   1. Follow home blood pressure. Ideal range 110-130 but please update in a few weeks what your range is.   2. Follow-up yearly  3. Labs in 6 months         Dia Aleman, DO

## 2019-01-04 DIAGNOSIS — I10 HYPERTENSION GOAL BP (BLOOD PRESSURE) < 140/90: ICD-10-CM

## 2019-01-04 RX ORDER — AMLODIPINE BESYLATE 5 MG/1
5 TABLET ORAL DAILY
Qty: 90 TABLET | Refills: 3 | Status: SHIPPED | OUTPATIENT
Start: 2019-01-04 | End: 2019-01-29

## 2019-01-04 NOTE — TELEPHONE ENCOUNTER
Patient called to request a refill on Amlodipine. She has not been checking home readings though will begin to do so and let the clinic know if abnormal.    Wendy Sepulveda, RN, BSN  Nephrology Care Coordinator  [facility]

## 2019-01-17 ENCOUNTER — OFFICE VISIT - HEALTHEAST (OUTPATIENT)
Dept: PODIATRY | Facility: CLINIC | Age: 49
End: 2019-01-17

## 2019-01-17 DIAGNOSIS — M79.671 RIGHT FOOT PAIN: ICD-10-CM

## 2019-01-17 ASSESSMENT — MIFFLIN-ST. JEOR: SCORE: 1567.97

## 2019-01-28 ENCOUNTER — OFFICE VISIT (OUTPATIENT)
Dept: PEDIATRICS | Facility: CLINIC | Age: 49
End: 2019-01-28
Payer: COMMERCIAL

## 2019-01-28 VITALS
BODY MASS INDEX: 41.37 KG/M2 | DIASTOLIC BLOOD PRESSURE: 100 MMHG | TEMPERATURE: 97.2 F | HEIGHT: 61 IN | WEIGHT: 219.1 LBS | SYSTOLIC BLOOD PRESSURE: 133 MMHG | HEART RATE: 66 BPM | OXYGEN SATURATION: 99 %

## 2019-01-28 DIAGNOSIS — J01.00 ACUTE NON-RECURRENT MAXILLARY SINUSITIS: Primary | ICD-10-CM

## 2019-01-28 PROCEDURE — 99213 OFFICE O/P EST LOW 20 MIN: CPT | Performed by: NURSE PRACTITIONER

## 2019-01-28 ASSESSMENT — MIFFLIN-ST. JEOR: SCORE: 1556.21
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
## 2019-01-28 NOTE — PATIENT INSTRUCTIONS
PLAN:   1.   Symptomatic therapy suggested: rest, increase fluids, apply heat to sinuses prn, use mist of vaporizer prn, OTC saline nasal spray as needed and call prn if symptoms persist or worsen.  2.  Orders Placed This Encounter   Medications     amoxicillin-clavulanate (AUGMENTIN) 875-125 MG tablet     Sig: Take 1 tablet by mouth 2 times daily     Dispense:  20 tablet     Refill:  0     3. Patient needs to follow up in if no improvement,or sooner if worsening of symptoms or other symptoms develop.        Patient Education     Sinusitis (Antibiotic Treatment)    The sinuses are air-filled spaces within the bones of the face. They connect to the inside of the nose. Sinusitis is an inflammation of the tissue that lines the sinuses. Sinusitis can occur during a cold. It can also happen due to allergies to pollens and other particles in the air. Sinusitis can cause symptoms of sinus congestion and a feeling of fullness. A sinus infection causes fever, headache, and facial pain. There is often green or yellow fluid draining from the nose or into the back of the throat (post-nasal drip). You have been given antibiotics to treat this condition.  Home care    Take the full course of antibiotics as instructed. Do not stop taking them, even when you feel better.    Drink plenty of water, hot tea, and other liquids. This may help thin nasal mucus. It also may help your sinuses drain fluids.    Heat may help soothe painful areas of your face. Use a towel soaked in hot water. Or,  the shower and direct the warm spray onto your face. Using a vaporizer along with a menthol rub at night may also help soothe symptoms.     An expectorant with guaifenesin may help thin nasal mucus and help your sinuses drain fluids.    You can use an over-the-counter decongestant, unless a similar medicine was prescribed to you. Nasal sprays work the fastest. Use one that contains phenylephrine or oxymetazoline. First blow your nose gently.  Then use the spray. Do not use these medicines more often than directed on the label. If you do, your symptoms may get worse. You may also take pills that contain pseudoephedrine. Don t use products that combine multiple medicines. This is because side effects may be increased. Read labels. You can also ask the pharmacist for help. (People with high blood pressure should not use decongestants. They can raise blood pressure.)    Over-the-counter antihistamines may help if allergies contributed to your sinusitis.      Do not use nasal rinses or irrigation during an acute sinus infection, unless your healthcare provider tells you to. Rinsing may spread the infection to other areas in your sinuses.    Use acetaminophen or ibuprofen to control pain, unless another pain medicine was prescribed to you. If you have chronic liver or kidney disease or ever had a stomach ulcer, talk with your healthcare provider before using these medicines. (Aspirin should never be taken by anyone under age 18 who is ill with a fever. It may cause severe liver damage.)    Don't smoke. This can make symptoms worse.  Follow-up care  Follow up with your healthcare provider or our staff if you are better in 1 week.  When to seek medical advice  Call your healthcare provider if any of these occur:    Facial pain or headache that gets worse    Stiff neck    Unusual drowsiness or confusion    Swelling of your forehead or eyelids    Vision problems, such as blurred or double vision    Fever of 100.4 F (38 C) or higher, or as directed by your healthcare provider    Seizure    Breathing problems    Symptoms don't go away in 10 days  Prevention  Here are steps you can take to help prevent an infection:    Keep good hand washing habits.    Don t have close contact with people who have sore throats, colds, or other upper respiratory infections.    Don t smoke, and stay away from secondhand smoke.    Stay up to date with of your vaccines.  Date Last  Reviewed: 11/1/2017 2000-2018 [employer]. [address] [place] [address], [place] [address]. All rights reserved. This information is not intended as a substitute for professional medical care. Always follow your healthcare professional's instructions.

## 2019-01-28 NOTE — NURSING NOTE
"Chief Complaint   Patient presents with     URI     URI x 5 days       Initial BP (!) 133/100 (BP Location: Right arm, Patient Position: Sitting, Cuff Size: Adult Large)   Pulse 66   Temp 97.2  F (36.2  C) (Temporal)   Ht 1.549 m (5' 1\")   Wt 99.4 kg (219 lb 1.6 oz)   LMP 08/13/2005   SpO2 99%   Breastfeeding? No   BMI 41.40 kg/m   Estimated body mass index is 41.4 kg/m  as calculated from the following:    Height as of this encounter: 1.549 m (5' 1\").    Weight as of this encounter: 99.4 kg (219 lb 1.6 oz).  Medication Reconciliation: complete      FAB Nuno      "

## 2019-01-29 ENCOUNTER — OFFICE VISIT (OUTPATIENT)
Dept: ENDOCRINOLOGY | Facility: CLINIC | Age: 49
End: 2019-01-29
Payer: COMMERCIAL

## 2019-01-29 VITALS
DIASTOLIC BLOOD PRESSURE: 90 MMHG | HEART RATE: 73 BPM | OXYGEN SATURATION: 98 % | WEIGHT: 219.5 LBS | BODY MASS INDEX: 41.47 KG/M2 | SYSTOLIC BLOOD PRESSURE: 142 MMHG

## 2019-01-29 DIAGNOSIS — I15.9 SECONDARY HYPERTENSION, UNSPECIFIED: ICD-10-CM

## 2019-01-29 DIAGNOSIS — Z86.39 HISTORY OF PRIMARY HYPERPARATHYROIDISM: ICD-10-CM

## 2019-01-29 DIAGNOSIS — N18.30 TYPE 2 DIABETES MELLITUS WITH STAGE 3 CHRONIC KIDNEY DISEASE, WITHOUT LONG-TERM CURRENT USE OF INSULIN (H): Primary | ICD-10-CM

## 2019-01-29 DIAGNOSIS — E11.22 TYPE 2 DIABETES MELLITUS WITH STAGE 3 CHRONIC KIDNEY DISEASE, WITHOUT LONG-TERM CURRENT USE OF INSULIN (H): Primary | ICD-10-CM

## 2019-01-29 LAB — HBA1C MFR BLD: 5.3 % (ref 0–5.6)

## 2019-01-29 PROCEDURE — 36415 COLL VENOUS BLD VENIPUNCTURE: CPT | Performed by: INTERNAL MEDICINE

## 2019-01-29 PROCEDURE — 83036 HEMOGLOBIN GLYCOSYLATED A1C: CPT | Performed by: INTERNAL MEDICINE

## 2019-01-29 PROCEDURE — 99214 OFFICE O/P EST MOD 30 MIN: CPT | Performed by: INTERNAL MEDICINE

## 2019-01-29 RX ORDER — AMLODIPINE BESYLATE 10 MG/1
10 TABLET ORAL DAILY
Qty: 90 TABLET | Refills: 3 | Status: SHIPPED | OUTPATIENT
Start: 2019-01-29 | End: 2020-05-29

## 2019-01-29 NOTE — PROGRESS NOTES
The patient is seen in f/up.    Adelaida Packer is a 48 year old obese female with a long-standing history of hypertension, chronic kidney disease, primary hyperparathyroidism, primary hyperaldosteronism, type 2 diabetes diagnosed in July 2014, morbid obesity and stomach banding in 2009 (the band was removed).     1. Type 2 diabetes, formally diagnosed in 2014 (the blood glucose numbers have been high since 2012).   Due to the high HbA1c of 12.1 at the time of diagnosis, she was treated with insulin from July until September 2014. Adelaida was not able to tolerate metformin due to diarrhea and dry heaves. In August 2014, the short acting insulin was discontinued and she was started on Prandin. Later on, she discontinued both Prandin and Lantus as the glycemic control significantly improved with a low carbohydrate diet, and she was started on Bydureon on 9/22/14. The patient found the injections painful and she decided to discontinue Bydureon in the beginning 2015.  In February 2015, she was started on Victoza, at 3 mg daily, which she took until beginning of 2016. The GLP-1 analogs haven't been successful in inducing weight loss.  With improved diet and regular exercise, the patient was able to maintain a good glycemic control in the absence of treatment.    HbA1c today was 5.3%,stable since her last appointment here.  She has not been checking her blood sugar at home.  Her weight is up 18 pounds in the last year.    Adelaida has no diabetes complications.  She is scheduled for an eye exam.  Recent GFR is in the 50s, mainly as a result of long-standing hypertension.     2.  Primary hyperaldosteronism  The adrenal CT didn't identify adrenal lesions. Prior screening tests for pheochromocytoma and Cushing syndrome were negative. The decision was to treat the patient with a mineralocorticoid receptor blocker and she continues to take eplerenone, at 100 mg twice daily.  In addition to eplerenone, she takes labetalol and 5  mg amlodipine daily.    As per patient, at home, her blood pressure is well controlled, averaging 120/80.  For the last week, she has been having a cold associated with sinus infection and headaches.  She f/up with nephrology.   Prior antihypertensive medications she was treated with in the past were: hydralazine, hydrochlorothiazide, losartan, metoprolol, triamterene, verapamil, chlorthalidone.    Past Medical History:   Diagnosis Date     Allergic rhinitis due to other allergen     seasonal     Diabetes (H)      Migraine, unspecified, without mention of intractable migraine without mention of status migrainosus      Monoclonal gammopathy      Morbid obesity (H)      Unspecified essential hypertension     dx around age 31   CKD   HTN dx 2007  Hypercholesterolemia  - on zocor since 2012   Ovarian cyst rupture 2014 - started on BCP   GERD   Type 2 diabetes 07/2014   Osteoarthritis of the knee  In 2013, she underwent parathyroidectomy for primary hyperparathyroidism.  The surgery was complicated by postoperative bleeding and she was intubated for almost one week. The patient thinks that the removed parathyroid gland was the right upper gland.  Adrenal CT - no adrenal lesions; metanephrines negative in 2012, 24 hr urine cortisol normal in 2014   Monoclonal heavy Chain: followed by Dr. Rodas/[facility]     Past Surgical History   Procedure Laterality Date     C anesth,knee area surgery  01/2001     Hc remove tonsils/adenoids,11+ y/o  1995     Hysterectomy, vaginal - ovaries were left in place  2005 12/2005     hysterectomy     C gastroplasty,obesity,vert band - 2007       removed 2009     Head & neck surgery  3/2013     Parathyroidectomy--had to go back in 6 days later for a possible bleed     Current Medications     Current Outpatient Medications:      acetaminophen (TYLENOL ARTHRITIS PAIN) 650 MG CR tablet, Take 650-1,300 mg by mouth every 8 hours as needed for mild pain or fever, Disp: , Rfl:      amLODIPine  (NORVASC) 5 MG tablet, Take 1 tablet (5 mg) by mouth daily, Disp: 90 tablet, Rfl: 3     amoxicillin-clavulanate (AUGMENTIN) 875-125 MG tablet, Take 1 tablet by mouth 2 times daily, Disp: 20 tablet, Rfl: 0     ASPIRIN LOW DOSE 81 MG EC tablet, TAKE 1 TABLET BY MOUTH ONCE DAILY, Disp: 100 tablet, Rfl: 0     Blood Pressure Monitor KIT, 1 Device 2 times daily, Disp: 1 kit, Rfl: 0     calcium carbonate (OS-EVGENY 600 MG [place]. [place]) 1500 (600 [place]) MG tablet, Take 1 tablet (600 mg) by mouth 2 times daily, Disp: 180 tablet, Rfl: 3     cetirizine (ZYRTEC) 10 MG tablet, Take 10 mg by mouth daily as needed for allergies, Disp: , Rfl:      cholecalciferol 2000 UNITS CAPS, Take 2,000 Units by mouth daily, Disp: 90 capsule, Rfl: 3     eplerenone (INSPRA) 25 MG tablet, Take 100 mg (4, 25 mg tabs) in AM and 100 mg (4, 25 mg tabs) in PM, Disp: 240 tablet, Rfl: 3     labetalol (NORMODYNE) 300 MG tablet, Take 1 tablet (300 mg) by mouth 2 times daily Please note change in tablet strength, Disp: 180 tablet, Rfl: 3     polyethylene glycol (MIRALAX/GLYCOLAX) Packet, Take 17 g by mouth daily, Disp: , Rfl:      ranitidine (ZANTAC) 150 MG tablet, Take 1 tablet (150 mg) by mouth 2 times daily, Disp: 60 tablet, Rfl: 2     blood glucose monitoring (ACCU-CHEK MULTICLIX) lancets, Use to test blood sugar 4-5 times daily or as directed. (Patient not taking: Reported on 1/29/2019), Disp: 1 Box, Rfl: 6     SUMAtriptan (IMITREX) 25 MG tablet, TAKE 1 TO 2 TABLETS BY MOUTH AT ONSET OF HEADACHE FOR MIGRAINE. MAY REPEAT DOSE IN 2 HOURS. MAX OF 200MG OR 2 DOSES IN 24 HOURS (Patient not taking: Reported on 1/29/2019), Disp: 18 tablet, Rfl: 0    Family History   Problem Relation Age of Onset     Hypertension Mother      Gynecology Mother      hysterectomy     GI Mother      bowel upstruction     Thyroid  Mother      Migraines  Mother      Osteoporosis Mother      Hypertension Father      Lipids Father      Arthritis  Father      Prostatic CA Brother       Alcohol/Drug Brother      Arthritis Paternal Grandmother      Cancer Maternal Grandfather      bone     Eye disease  Maternal Grandfather      Prostatic CA Maternal Grandfather      Cancer Maternal Grandmother      tumor-brain     Obesity Maternal Grandmother      Respiratory Daughter      asthma     Diabetes ? Type 1  Half Sister    Father - cardiomyopathy.     Social History  Single with 2 children. She denies smoking, drinking alcohol or using illicit drugs. 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     Review of Systems   Systemic:               No significant fatigue; weight up 20 lbs in the last year   Eye:                       No eye symptoms   Glory-Laryngeal:      No glory-laryngeal symptoms, no dysphagia, no voice hoarseness, no cough      Breast:                   No breast symptoms  Cardiovascular:     No cardiovascular symptoms, no CP or palpitations   Pulmonary:            No pulmonary symptoms, no cough or SOB    Gastrointestinal:     Has occasional constipation   Genitourinary:        No increased thirst or urination   Endocrine:             No heat or cold intolerance; occasional hot flashes which started around age 43   Neurological:          no headaches, no tremor, no dizziness, no numbness or tingling sensation      Musculoskeletal:    B/L knee pain - intermittently   Skin:                       No dry skin or hair loss  Psychological:       No psychological symptoms                Vital Signs     Previous Weights:    Wt Readings from Last 10 Encounters:   01/29/19 99.6 kg (219 lb 8 oz)   01/28/19 99.4 kg (219 lb 1.6 oz)   12/04/18 99.8 kg (220 lb)   11/15/18 100.9 kg (222 lb 8 oz)   06/18/18 94.6 kg (208 lb 9.6 oz)   04/08/18 93 kg (205 lb)   12/30/17 88.9 kg (196 lb)   12/05/17 88.9 kg (196 lb)   12/01/17 87.5 kg (192 lb 12.8 oz)   08/17/17 93.9 kg (207 lb)                   Vital signs:   /90 (BP Location: Left arm, Patient Position: Sitting, Cuff Size: Adult Regular)    Pulse 73   Wt 99.6 kg (219 lb 8 oz)   LMP 08/13/2005   SpO2 98%   BMI 41.47 kg/m       Physical Exam  General Appearance:  General obesity, mild buffalo hump, full supraclavicular fossae   Eyes:  conjutivae and extra-ocular motions are normal.                                    pupils round and reactive to light, no lid lag, no stare    HEENT:   oropharynx clear and moist, no JVD, no bruits      no thyromegaly, no palpable nodules   Cardiovascular:  regular rhythm, no murmurs, distal pulse palpable, no lower extremities edema  Respiratory:       chest clear, no rales, no rhonchi  Musculoskeletal: normal tone and strength  Psychiatric: affect and judgment normal  Skin: warm, no lesions   Neurological: reflexes normal and symmetric, no resting tremor  Feet:                        Sensation intact to monofilament testing     Lab Results  I reviewed prior lab results documented in Epic.  Lab Results   Component Value Date    A1C 5.3 01/29/2019    A1C 5.2 08/06/2018    A1C 5.2 08/15/2017    A1C 5.5 02/21/2017    A1C 6.1 (H) 08/09/2016    HEMOGLOBINA1 7.3 (A) 02/16/2015       Hemoglobin   Date Value Ref Range Status   12/04/2018 12.5 11.7 - 15.7 g/dL Final     Hematocrit   Date Value Ref Range Status   08/06/2018 36.9 35.0 - 47.0 % Final     Cholesterol   Date Value Ref Range Status   08/15/2017 120 <200 mg/dL Final     Cholesterol/HDL Ratio   Date Value Ref Range Status   05/15/2015 2.9 0.0 - 5.0 Final     HDL Cholesterol   Date Value Ref Range Status   08/15/2017 56 >49 mg/dL Final     LDL Cholesterol Calculated   Date Value Ref Range Status   08/15/2017 52 <100 mg/dL Final     Comment:     Desirable:       <100 mg/dl     VLDL-Cholesterol   Date Value Ref Range Status   05/15/2015 12 0 - 30 mg/dL Final     Triglycerides   Date Value Ref Range Status   08/15/2017 62 <150 mg/dL Final     Comment:     Fasting specimen     Albumin Urine mg/L   Date Value Ref Range Status   02/18/2016 22 mg/L Final     TSH   Date Value  Ref Range Status   04/26/2017 1.66 0.40 - 4.00 mU/L Final     Last Basic Metabolic Panel:    Sodium   Date Value Ref Range Status   12/04/2018 136 133 - 144 mmol/L Final     Potassium   Date Value Ref Range Status   12/04/2018 4.2 3.4 - 5.3 mmol/L Final     Chloride   Date Value Ref Range Status   12/04/2018 104 94 - 109 mmol/L Final     Calcium   Date Value Ref Range Status   12/04/2018 9.8 8.5 - 10.1 mg/dL Final     Carbon Dioxide   Date Value Ref Range Status   12/04/2018 23 20 - 32 mmol/L Final     Urea Nitrogen   Date Value Ref Range Status   12/04/2018 22 7 - 30 mg/dL Final     Creatinine   Date Value Ref Range Status   12/04/2018 1.06 (H) 0.52 - 1.04 mg/dL Final     GFR Estimate   Date Value Ref Range Status   12/04/2018 55 (L) >60 mL/min/1.7m2 Final     Comment:     Non  GFR Calc     Glucose   Date Value Ref Range Status   12/04/2018 88 70 - 99 mg/dL Final     Comment:     Non Fasting       AST   Date Value Ref Range Status   08/06/2018 19 0 - 45 U/L Final     ALT   Date Value Ref Range Status   08/06/2018 30 0 - 50 U/L Final     Albumin   Date Value Ref Range Status   12/04/2018 4.1 3.4 - 5.0 g/dL Final     Assessment      1. H/o type 2 diabetes, with no microvascular complications. With better eating habits and significant weight loss, the diabetes resolved.  Advised to have a lipid panel checked.    2. Primary hyperaldosteronism  Her blood pressure was elevated.  I recommended to increase the dose of amlodipine to 10 mg daily.  She is going to follow-up on this with nephrology.    3. History of primary hyperparathyroidism  Most recent calcium level has been normal.    Orders Placed This Encounter   Procedures     Hemoglobin A1c POCT     Lipid panel reflex to direct LDL Fasting

## 2019-01-29 NOTE — LETTER
1/29/2019         RE: Adelaida Packer  [address] [place] [address]        Dear Colleague,    Thank you for referring your patient, Adelaida Packer, to the [facility]. Please see a copy of my visit note below.      The patient is seen in f/up.    Adelaida Packer is a 48 year old obese female with a long-standing history of hypertension, chronic kidney disease, primary hyperparathyroidism, primary hyperaldosteronism, type 2 diabetes diagnosed in July 2014, morbid obesity and stomach banding in 2009 (the band was removed).     1. Type 2 diabetes, formally diagnosed in 2014 (the blood glucose numbers have been high since 2012).   Due to the high HbA1c of 12.1 at the time of diagnosis, she was treated with insulin from July until September 2014. Adelaida was not able to tolerate metformin due to diarrhea and dry heaves. In August 2014, the short acting insulin was discontinued and she was started on Prandin. Later on, she discontinued both Prandin and Lantus as the glycemic control significantly improved with a low carbohydrate diet, and she was started on Bydureon on 9/22/14. The patient found the injections painful and she decided to discontinue Bydureon in the beginning 2015.  In February 2015, she was started on Victoza, at 3 mg daily, which she took until beginning of 2016. The GLP-1 analogs haven't been successful in inducing weight loss.  With improved diet and regular exercise, the patient was able to maintain a good glycemic control in the absence of treatment.    HbA1c today was 5.3%,stable since her last appointment here.  She has not been checking her blood sugar at home.  Her weight is up 18 pounds in the last year.    Adelaida has no diabetes complications.  She is scheduled for an eye exam.  Recent GFR is in the 50s, mainly as a result of long-standing hypertension.     2.  Primary hyperaldosteronism  The adrenal CT didn't identify adrenal lesions. Prior screening  tests for pheochromocytoma and Cushing syndrome were negative. The decision was to treat the patient with a mineralocorticoid receptor blocker and she continues to take eplerenone, at 100 mg twice daily.  In addition to eplerenone, she takes labetalol and 5 mg amlodipine daily.    As per patient, at home, her blood pressure is well controlled, averaging 120/80.  For the last week, she has been having a cold associated with sinus infection and headaches.  She f/up with nephrology.   Prior antihypertensive medications she was treated with in the past were: hydralazine, hydrochlorothiazide, losartan, metoprolol, triamterene, verapamil, chlorthalidone.    Past Medical History:   Diagnosis Date     Allergic rhinitis due to other allergen     seasonal     Diabetes (H)      Migraine, unspecified, without mention of intractable migraine without mention of status migrainosus      Monoclonal gammopathy      Morbid obesity (H)      Unspecified essential hypertension     dx around age 31   CKD   HTN dx 2007  Hypercholesterolemia  - on zocor since 2012   Ovarian cyst rupture 2014 - started on BCP   GERD   Type 2 diabetes 07/2014   Osteoarthritis of the knee  In 2013, she underwent parathyroidectomy for primary hyperparathyroidism.  The surgery was complicated by postoperative bleeding and she was intubated for almost one week. The patient thinks that the removed parathyroid gland was the right upper gland.  Adrenal CT - no adrenal lesions; metanephrines negative in 2012, 24 hr urine cortisol normal in 2014   Monoclonal heavy Chain: followed by Dr. Rodas/[facility]     Past Surgical History   Procedure Laterality Date     C anesth,knee area surgery  01/2001     Hc remove tonsils/adenoids,11+ y/o  1995     Hysterectomy, vaginal - ovaries were left in place  2005 12/2005     hysterectomy     C gastroplasty,obesity,vert band - 2007       removed 2009     Head & neck surgery  3/2013     Parathyroidectomy--had to go back in 6  days later for a possible bleed     Current Medications     Current Outpatient Medications:      acetaminophen (TYLENOL ARTHRITIS PAIN) 650 MG CR tablet, Take 650-1,300 mg by mouth every 8 hours as needed for mild pain or fever, Disp: , Rfl:      amLODIPine (NORVASC) 5 MG tablet, Take 1 tablet (5 mg) by mouth daily, Disp: 90 tablet, Rfl: 3     amoxicillin-clavulanate (AUGMENTIN) 875-125 MG tablet, Take 1 tablet by mouth 2 times daily, Disp: 20 tablet, Rfl: 0     ASPIRIN LOW DOSE 81 MG EC tablet, TAKE 1 TABLET BY MOUTH ONCE DAILY, Disp: 100 tablet, Rfl: 0     Blood Pressure Monitor KIT, 1 Device 2 times daily, Disp: 1 kit, Rfl: 0     calcium carbonate (OS-EVGENY 600 MG [place]. [place]) 1500 (600 [place]) MG tablet, Take 1 tablet (600 mg) by mouth 2 times daily, Disp: 180 tablet, Rfl: 3     cetirizine (ZYRTEC) 10 MG tablet, Take 10 mg by mouth daily as needed for allergies, Disp: , Rfl:      cholecalciferol 2000 UNITS CAPS, Take 2,000 Units by mouth daily, Disp: 90 capsule, Rfl: 3     eplerenone (INSPRA) 25 MG tablet, Take 100 mg (4, 25 mg tabs) in AM and 100 mg (4, 25 mg tabs) in PM, Disp: 240 tablet, Rfl: 3     labetalol (NORMODYNE) 300 MG tablet, Take 1 tablet (300 mg) by mouth 2 times daily Please note change in tablet strength, Disp: 180 tablet, Rfl: 3     polyethylene glycol (MIRALAX/GLYCOLAX) Packet, Take 17 g by mouth daily, Disp: , Rfl:      ranitidine (ZANTAC) 150 MG tablet, Take 1 tablet (150 mg) by mouth 2 times daily, Disp: 60 tablet, Rfl: 2     blood glucose monitoring (ACCU-CHEK MULTICLIX) lancets, Use to test blood sugar 4-5 times daily or as directed. (Patient not taking: Reported on 1/29/2019), Disp: 1 Box, Rfl: 6     SUMAtriptan (IMITREX) 25 MG tablet, TAKE 1 TO 2 TABLETS BY MOUTH AT ONSET OF HEADACHE FOR MIGRAINE. MAY REPEAT DOSE IN 2 HOURS. MAX OF 200MG OR 2 DOSES IN 24 HOURS (Patient not taking: Reported on 1/29/2019), Disp: 18 tablet, Rfl: 0    Family History   Problem Relation Age of Onset     Hypertension  Mother      Gynecology Mother      hysterectomy     GI Mother      bowel upstruction     Thyroid  Mother      Migraines  Mother      Osteoporosis Mother      Hypertension Father      Lipids Father      Arthritis  Father      Prostatic CA Brother      Alcohol/Drug Brother      Arthritis Paternal Grandmother      Cancer Maternal Grandfather      bone     Eye disease  Maternal Grandfather      Prostatic CA Maternal Grandfather      Cancer Maternal Grandmother      tumor-brain     Obesity Maternal Grandmother      Respiratory Daughter      asthma     Diabetes ? Type 1  Half Sister    Father - cardiomyopathy.     Social History  Single with 2 children. She denies smoking, drinking alcohol or using illicit drugs. 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     Review of Systems   Systemic:               No significant fatigue; weight up 20 lbs in the last year   Eye:                       No eye symptoms   Glory-Laryngeal:      No glory-laryngeal symptoms, no dysphagia, no voice hoarseness, no cough      Breast:                   No breast symptoms  Cardiovascular:     No cardiovascular symptoms, no CP or palpitations   Pulmonary:            No pulmonary symptoms, no cough or SOB    Gastrointestinal:     Has occasional constipation   Genitourinary:        No increased thirst or urination   Endocrine:             No heat or cold intolerance; occasional hot flashes which started around age 43   Neurological:          no headaches, no tremor, no dizziness, no numbness or tingling sensation      Musculoskeletal:    B/L knee pain - intermittently   Skin:                       No dry skin or hair loss  Psychological:       No psychological symptoms                Vital Signs     Previous Weights:    Wt Readings from Last 10 Encounters:   01/29/19 99.6 kg (219 lb 8 oz)   01/28/19 99.4 kg (219 lb 1.6 oz)   12/04/18 99.8 kg (220 lb)   11/15/18 100.9 kg (222 lb 8 oz)   06/18/18 94.6 kg (208 lb 9.6 oz)   04/08/18 93 kg  (205 lb)   12/30/17 88.9 kg (196 lb)   12/05/17 88.9 kg (196 lb)   12/01/17 87.5 kg (192 lb 12.8 oz)   08/17/17 93.9 kg (207 lb)                   Vital signs:   /90 (BP Location: Left arm, Patient Position: Sitting, Cuff Size: Adult Regular)   Pulse 73   Wt 99.6 kg (219 lb 8 oz)   LMP 08/13/2005   SpO2 98%   BMI 41.47 kg/m        Physical Exam  General Appearance:  General obesity, mild buffalo hump, full supraclavicular fossae   Eyes:  conjutivae and extra-ocular motions are normal.                                    pupils round and reactive to light, no lid lag, no stare    HEENT:   oropharynx clear and moist, no JVD, no bruits      no thyromegaly, no palpable nodules   Cardiovascular:  regular rhythm, no murmurs, distal pulse palpable, no lower extremities edema  Respiratory:       chest clear, no rales, no rhonchi  Musculoskeletal: normal tone and strength  Psychiatric: affect and judgment normal  Skin: warm, no lesions   Neurological: reflexes normal and symmetric, no resting tremor  Feet:                        Sensation intact to monofilament testing     Lab Results  I reviewed prior lab results documented in Epic.  Lab Results   Component Value Date    A1C 5.3 01/29/2019    A1C 5.2 08/06/2018    A1C 5.2 08/15/2017    A1C 5.5 02/21/2017    A1C 6.1 (H) 08/09/2016    HEMOGLOBINA1 7.3 (A) 02/16/2015       Hemoglobin   Date Value Ref Range Status   12/04/2018 12.5 11.7 - 15.7 g/dL Final     Hematocrit   Date Value Ref Range Status   08/06/2018 36.9 35.0 - 47.0 % Final     Cholesterol   Date Value Ref Range Status   08/15/2017 120 <200 mg/dL Final     Cholesterol/HDL Ratio   Date Value Ref Range Status   05/15/2015 2.9 0.0 - 5.0 Final     HDL Cholesterol   Date Value Ref Range Status   08/15/2017 56 >49 mg/dL Final     LDL Cholesterol Calculated   Date Value Ref Range Status   08/15/2017 52 <100 mg/dL Final     Comment:     Desirable:       <100 mg/dl     VLDL-Cholesterol   Date Value Ref Range Status    05/15/2015 12 0 - 30 mg/dL Final     Triglycerides   Date Value Ref Range Status   08/15/2017 62 <150 mg/dL Final     Comment:     Fasting specimen     Albumin Urine mg/L   Date Value Ref Range Status   02/18/2016 22 mg/L Final     TSH   Date Value Ref Range Status   04/26/2017 1.66 0.40 - 4.00 mU/L Final     Last Basic Metabolic Panel:    Sodium   Date Value Ref Range Status   12/04/2018 136 133 - 144 mmol/L Final     Potassium   Date Value Ref Range Status   12/04/2018 4.2 3.4 - 5.3 mmol/L Final     Chloride   Date Value Ref Range Status   12/04/2018 104 94 - 109 mmol/L Final     Calcium   Date Value Ref Range Status   12/04/2018 9.8 8.5 - 10.1 mg/dL Final     Carbon Dioxide   Date Value Ref Range Status   12/04/2018 23 20 - 32 mmol/L Final     Urea Nitrogen   Date Value Ref Range Status   12/04/2018 22 7 - 30 mg/dL Final     Creatinine   Date Value Ref Range Status   12/04/2018 1.06 (H) 0.52 - 1.04 mg/dL Final     GFR Estimate   Date Value Ref Range Status   12/04/2018 55 (L) >60 mL/min/1.7m2 Final     Comment:     Non  GFR Calc     Glucose   Date Value Ref Range Status   12/04/2018 88 70 - 99 mg/dL Final     Comment:     Non Fasting       AST   Date Value Ref Range Status   08/06/2018 19 0 - 45 U/L Final     ALT   Date Value Ref Range Status   08/06/2018 30 0 - 50 U/L Final     Albumin   Date Value Ref Range Status   12/04/2018 4.1 3.4 - 5.0 g/dL Final     Assessment      1. H/o type 2 diabetes, with no microvascular complications. With better eating habits and significant weight loss, the diabetes resolved.  Advised to have a lipid panel checked.    2. Primary hyperaldosteronism  Her blood pressure was elevated.  I recommended to increase the dose of amlodipine to 10 mg daily.  She is going to follow-up on this with nephrology.    3. History of primary hyperparathyroidism  Most recent calcium level has been normal.    Orders Placed This Encounter   Procedures     Hemoglobin A1c POCT     Lipid  panel reflex to direct LDL Fasting       Again, thank you for allowing me to participate in the care of your patient.        Sincerely,        Cele Myers MD

## 2019-01-29 NOTE — NURSING NOTE
Adelaida Packer's goals for this visit include:   Chief Complaint   Patient presents with     Diabetes     She requests these members of her care team be copied on today's visit information: Yes    PCP: Windy Gordillo    Referring Provider:  Windy Gordillo MD  [address] [place] [address]    /90 (BP Location: Left arm, Patient Position: Sitting, Cuff Size: Adult Regular)   Pulse 73   Wt 99.6 kg (219 lb 8 oz)   LMP 08/13/2005   SpO2 98%   BMI 41.47 kg/m      Do you need any medication refills at today's visit? No

## 2019-02-18 DIAGNOSIS — E11.22 TYPE 2 DIABETES MELLITUS WITH STAGE 3 CHRONIC KIDNEY DISEASE, WITHOUT LONG-TERM CURRENT USE OF INSULIN (H): ICD-10-CM

## 2019-02-18 DIAGNOSIS — N18.30 TYPE 2 DIABETES MELLITUS WITH STAGE 3 CHRONIC KIDNEY DISEASE, WITHOUT LONG-TERM CURRENT USE OF INSULIN (H): ICD-10-CM

## 2019-02-18 LAB
CHOLEST SERPL-MCNC: 164 MG/DL
HDLC SERPL-MCNC: 59 MG/DL
LDLC SERPL CALC-MCNC: 95 MG/DL
NONHDLC SERPL-MCNC: 105 MG/DL
TRIGL SERPL-MCNC: 48 MG/DL

## 2019-02-18 PROCEDURE — 80061 LIPID PANEL: CPT | Performed by: INTERNAL MEDICINE

## 2019-02-18 PROCEDURE — 36415 COLL VENOUS BLD VENIPUNCTURE: CPT | Performed by: INTERNAL MEDICINE

## 2019-05-07 ENCOUNTER — ANCILLARY PROCEDURE (OUTPATIENT)
Dept: MAMMOGRAPHY | Facility: CLINIC | Age: 49
End: 2019-05-07
Attending: FAMILY MEDICINE
Payer: COMMERCIAL

## 2019-05-07 DIAGNOSIS — Z12.31 VISIT FOR SCREENING MAMMOGRAM: ICD-10-CM

## 2019-05-07 PROCEDURE — 77067 SCR MAMMO BI INCL CAD: CPT | Mod: TC

## 2019-05-08 NOTE — RESULT ENCOUNTER NOTE
Dear Adelaida,  Your mammogram is negative and reassuring.   Let me know if you have any questions. Take care.  Windy Gordillo MD

## 2019-05-24 ENCOUNTER — DOCUMENTATION ONLY (OUTPATIENT)
Dept: LAB | Facility: CLINIC | Age: 49
End: 2019-05-24

## 2019-05-24 DIAGNOSIS — N18.30 CKD (CHRONIC KIDNEY DISEASE) STAGE 3, GFR 30-59 ML/MIN (H): Primary | ICD-10-CM

## 2019-05-24 NOTE — PROGRESS NOTES
Patient is coming in for 'lee labs' on 6/10. Please place orders needed.  Thanks, FREDERIC Velazquez ([facility])

## 2019-06-11 DIAGNOSIS — N18.30 CKD (CHRONIC KIDNEY DISEASE) STAGE 3, GFR 30-59 ML/MIN (H): ICD-10-CM

## 2019-06-11 LAB
ALBUMIN SERPL-MCNC: 3.7 G/DL (ref 3.4–5)
ANION GAP SERPL CALCULATED.3IONS-SCNC: 8 MMOL/L (ref 3–14)
BUN SERPL-MCNC: 14 MG/DL (ref 7–30)
CALCIUM SERPL-MCNC: 8.9 MG/DL (ref 8.5–10.1)
CHLORIDE SERPL-SCNC: 108 MMOL/L (ref 94–109)
CO2 SERPL-SCNC: 23 MMOL/L (ref 20–32)
CREAT SERPL-MCNC: 1.03 MG/DL (ref 0.52–1.04)
GFR SERPL CREATININE-BSD FRML MDRD: 63 ML/MIN/{1.73_M2}
GLUCOSE SERPL-MCNC: 91 MG/DL (ref 70–99)
HGB BLD-MCNC: 11.2 G/DL (ref 11.7–15.7)
PHOSPHATE SERPL-MCNC: 3 MG/DL (ref 2.5–4.5)
POTASSIUM SERPL-SCNC: 4.4 MMOL/L (ref 3.4–5.3)
PTH-INTACT SERPL-MCNC: 100 PG/ML (ref 18–80)
SODIUM SERPL-SCNC: 139 MMOL/L (ref 133–144)

## 2019-06-11 PROCEDURE — 80069 RENAL FUNCTION PANEL: CPT | Performed by: INTERNAL MEDICINE

## 2019-06-11 PROCEDURE — 83970 ASSAY OF PARATHORMONE: CPT | Performed by: INTERNAL MEDICINE

## 2019-06-11 PROCEDURE — 85018 HEMOGLOBIN: CPT | Performed by: INTERNAL MEDICINE

## 2019-06-11 PROCEDURE — 36415 COLL VENOUS BLD VENIPUNCTURE: CPT | Performed by: INTERNAL MEDICINE

## 2019-06-14 DIAGNOSIS — N18.30 CKD (CHRONIC KIDNEY DISEASE) STAGE 3, GFR 30-59 ML/MIN (H): ICD-10-CM

## 2019-06-14 LAB
CREAT UR-MCNC: 101 MG/DL
PROT UR-MCNC: 0.05 G/L
PROT/CREAT 24H UR: 0.05 G/G CR (ref 0–0.2)

## 2019-06-14 PROCEDURE — 84156 ASSAY OF PROTEIN URINE: CPT | Performed by: INTERNAL MEDICINE

## 2019-07-01 ENCOUNTER — OFFICE VISIT (OUTPATIENT)
Dept: PEDIATRICS | Facility: CLINIC | Age: 49
End: 2019-07-01
Payer: COMMERCIAL

## 2019-07-01 VITALS
TEMPERATURE: 98.1 F | DIASTOLIC BLOOD PRESSURE: 81 MMHG | BODY MASS INDEX: 42.76 KG/M2 | OXYGEN SATURATION: 99 % | WEIGHT: 226.3 LBS | HEART RATE: 67 BPM | SYSTOLIC BLOOD PRESSURE: 134 MMHG

## 2019-07-01 DIAGNOSIS — J06.9 VIRAL URI WITH COUGH: ICD-10-CM

## 2019-07-01 DIAGNOSIS — J02.9 SORE THROAT: Primary | ICD-10-CM

## 2019-07-01 LAB
DEPRECATED S PYO AG THROAT QL EIA: NORMAL
SPECIMEN SOURCE: NORMAL

## 2019-07-01 PROCEDURE — 99213 OFFICE O/P EST LOW 20 MIN: CPT | Performed by: FAMILY MEDICINE

## 2019-07-01 PROCEDURE — 87081 CULTURE SCREEN ONLY: CPT | Performed by: FAMILY MEDICINE

## 2019-07-01 PROCEDURE — 87880 STREP A ASSAY W/OPTIC: CPT | Performed by: FAMILY MEDICINE

## 2019-07-01 NOTE — PATIENT INSTRUCTIONS
Use Afrin nasal sprays for up to 3 days  Take Tylenol as needed for fever and headaches  Push fluids  Continue with oral antihistamines  Patient Education     Viral Upper Respiratory Illness (Adult)    You have a viral upper respiratory illness (URI), which is another term for the common cold. This illness is contagious during the first few days. It is spread through the air by coughing and sneezing. It may also be spread by direct contact (touching the sick person and then touching your own eyes, nose, or mouth). Frequent handwashing will decrease risk of spread. Most viral illnesses go away within 7 to 10 days with rest and simple home remedies. Sometimes the illness may last for several weeks. Antibiotics will not kill a virus, and they are generally not prescribed for this condition.  Home care    If symptoms are severe, rest at home for the first 2 to 3 days. When you resume activity, don't let yourself get too tired.    Don't smoke. If you need help stopping, talk with your healthcare provider.    Avoid being exposed to cigarette smoke (yours or others ).    You may use acetaminophen or ibuprofen to control pain and fever, unless another medicine was prescribed. If you have chronic liver or kidney disease, have ever had a stomach ulcer or gastrointestinal bleeding, or are taking blood-thinning medicines, talk with your healthcare provider before using these medicines. Aspirin should never be given to anyone under 18 years of age who is ill with a viral infection or fever. It may cause severe liver or brain damage.    Your appetite may be poor, so a light diet is fine. Stay well hydrated by drinking 6 to 8 glasses of fluids per day (water, soft drinks, juices, tea, or soup). Extra fluids will help loosen secretions in the nose and lungs.    Over-the-counter cold medicines will not shorten the length of time you re sick, but they may be helpful for the following symptoms: cough, sore throat, and nasal and sinus  congestion. If you take prescription medicines, ask your healthcare provider or pharmacist which over-the-counter medicines are safe to use. (Note: Don't use decongestants if you have high blood pressure.)  Follow-up care  Follow up with your healthcare provider, or as advised.  When to seek medical advice  Call your healthcare provider right away if any of these occur:    Cough with lots of colored sputum (mucus)    Severe headache; face, neck, or ear pain    Difficulty swallowing due to throat pain    Fever of 100.4 F (38 C) or higher, or as directed by your healthcare provider  Call 911  Call 911 if any of these occur:    Chest pain, shortness of breath, wheezing, or difficulty breathing    Coughing up blood    Very severe pain with swallowing, especially if it goes along with a muffled voice   Date Last Reviewed: 6/1/2018 2000-2018 [employer]. [address] [place] [address]. All rights reserved. This information is not intended as a substitute for professional medical care. Always follow your healthcare professional's instructions.

## 2019-07-02 LAB
BACTERIA SPEC CULT: NORMAL
SPECIMEN SOURCE: NORMAL

## 2019-07-04 ENCOUNTER — NURSE TRIAGE (OUTPATIENT)
Dept: NURSING | Facility: CLINIC | Age: 49
End: 2019-07-04

## 2019-07-04 ENCOUNTER — OFFICE VISIT (OUTPATIENT)
Dept: URGENT CARE | Facility: URGENT CARE | Age: 49
End: 2019-07-04
Payer: COMMERCIAL

## 2019-07-04 VITALS
SYSTOLIC BLOOD PRESSURE: 145 MMHG | WEIGHT: 228.6 LBS | OXYGEN SATURATION: 100 % | BODY MASS INDEX: 43.19 KG/M2 | HEART RATE: 80 BPM | TEMPERATURE: 98.4 F | DIASTOLIC BLOOD PRESSURE: 86 MMHG

## 2019-07-04 DIAGNOSIS — J01.90 ACUTE SINUSITIS WITH SYMPTOMS > 10 DAYS: Primary | ICD-10-CM

## 2019-07-04 DIAGNOSIS — H10.33 ACUTE BACTERIAL CONJUNCTIVITIS OF BOTH EYES: ICD-10-CM

## 2019-07-04 PROCEDURE — 99214 OFFICE O/P EST MOD 30 MIN: CPT | Performed by: PHYSICIAN ASSISTANT

## 2019-07-04 RX ORDER — POLYMYXIN B SULFATE AND TRIMETHOPRIM 1; 10000 MG/ML; [USP'U]/ML
1 SOLUTION OPHTHALMIC EVERY 4 HOURS
Qty: 1 BOTTLE | Refills: 0 | Status: SHIPPED | OUTPATIENT
Start: 2019-07-04 | End: 2019-11-19

## 2019-07-04 ASSESSMENT — ENCOUNTER SYMPTOMS
ABDOMINAL PAIN: 0
COUGH: 0
TROUBLE SWALLOWING: 0
BACK PAIN: 0
VOMITING: 0
FATIGUE: 0
EYE REDNESS: 1
WHEEZING: 0
PALPITATIONS: 0
SINUS PRESSURE: 1
EYE DISCHARGE: 1
SHORTNESS OF BREATH: 0
DIARRHEA: 0
CHEST TIGHTNESS: 0
CHILLS: 0
SINUS PAIN: 1
NAUSEA: 0
FEVER: 0
UNEXPECTED WEIGHT CHANGE: 0
EYE PAIN: 0
SORE THROAT: 0
MYALGIAS: 0
RHINORRHEA: 1
ARTHRALGIAS: 0

## 2019-07-04 NOTE — PROGRESS NOTES
SUBJECTIVE:   Adelaida Packer is a 49 year old female presenting with a chief complaint of   Chief Complaint   Patient presents with     Conjunctivitis     Patient complains of redness in both eyes and sinus problems        She is an established patient of [facility].    UR Adult    Onset of symptoms was 1+ week(s) ago.  Course of illness is same.    Severity moderate  Current and Associated symptoms: facial pain/pressure, eye drainage  Treatment measures tried include Tylenol/Ibuprofen.  Predisposing factors include None.        Review of Systems   Constitutional: Negative for chills, fatigue, fever and unexpected weight change.   HENT: Positive for rhinorrhea, sinus pressure and sinus pain. Negative for congestion, ear pain, postnasal drip, sore throat and trouble swallowing.    Eyes: Positive for discharge and redness. Negative for pain and visual disturbance.   Respiratory: Negative for cough, chest tightness, shortness of breath and wheezing.    Cardiovascular: Negative for chest pain and palpitations.   Gastrointestinal: Negative for abdominal pain, diarrhea, nausea and vomiting.   Musculoskeletal: Negative for arthralgias, back pain and myalgias.   Skin: Negative for rash.       Past Medical History:   Diagnosis Date     Allergic rhinitis due to other allergen     seasonal     Diabetes (H)      Migraine, unspecified, without mention of intractable migraine without mention of status migrainosus      Monoclonal gammopathy      Morbid obesity (H)      Unspecified essential hypertension     dx around age 31     Family History   Problem Relation Age of Onset     Hypertension Mother      Gynecology Mother         hysterectomy     Gastrointestinal Disease Mother         bowel upstruction     Thyroid Disease Mother      Neurologic Disorder Mother      Osteoporosis Mother      Hypertension Father      Lipids Father      Arthritis Father      Heart Disease Father      Prostate Cancer Brother      Alcohol/Drug Brother       Circulatory Brother      Arthritis Paternal Grandmother      Cancer Maternal Grandfather         bone     Eye Disorder Maternal Grandfather      Prostate Cancer Maternal Grandfather      Glaucoma Maternal Grandfather      Cancer Maternal Grandmother         tumor-head     Obesity Maternal Grandmother      Respiratory Daughter         asthma     Diabetes Sister      Cerebrovascular Disease Sister      Macular Degeneration No family hx of      Current Outpatient Medications   Medication Sig Dispense Refill     acetaminophen (TYLENOL ARTHRITIS PAIN) 650 MG CR tablet Take 650-1,300 mg by mouth every 8 hours as needed for mild pain or fever       amLODIPine (NORVASC) 10 MG tablet Take 1 tablet (10 mg) by mouth daily 90 tablet 3     amoxicillin-clavulanate (AUGMENTIN) 875-125 MG tablet Take 1 tablet by mouth 2 times daily for 10 days 20 tablet 0     ASPIRIN LOW DOSE 81 MG EC tablet TAKE 1 TABLET BY MOUTH ONCE DAILY 100 tablet 0     Blood Pressure Monitor KIT 1 Device 2 times daily 1 kit 0     calcium carbonate (OS-EVGENY 600 MG [employer] [place]) 1500 (600 CA) MG tablet Take 1 tablet (600 mg) by mouth 2 times daily 180 tablet 3     cetirizine (ZYRTEC) 10 MG tablet Take 10 mg by mouth daily as needed for allergies       cholecalciferol 2000 UNITS CAPS Take 2,000 Units by mouth daily 90 capsule 3     eplerenone (INSPRA) 25 MG tablet TAKE 4 TABLETS EVERY MORNING AND 4 TABLETS EVERY EVENING 240 tablet 5     labetalol (NORMODYNE) 300 MG tablet Take 1 tablet (300 mg) by mouth 2 times daily Please note change in tablet strength 180 tablet 3     polyethylene glycol (MIRALAX/GLYCOLAX) Packet Take 17 g by mouth daily       ranitidine (ZANTAC) 150 MG tablet Take 1 tablet (150 mg) by mouth 2 times daily 60 tablet 2     SUMAtriptan (IMITREX) 25 MG tablet TAKE 1 TO 2 TABLETS BY MOUTH AT ONSET OF HEADACHE FOR MIGRAINE. MAY REPEAT DOSE IN 2 HOURS. MAX OF 200MG OR 2 DOSES IN 24 HOURS 18 tablet 0     trimethoprim-polymyxin b (POLYTRIM) 08096-8.1  UNIT/ML-% ophthalmic solution Place 1 drop into both eyes every 4 hours for 7 days 1 Bottle 0     Social History     Tobacco Use     Smoking status: Never Smoker     Smokeless tobacco: Never Used   Substance Use Topics     Alcohol use: No       OBJECTIVE  /86 (BP Location: Left arm, Patient Position: Chair, Cuff Size: Adult Regular)   Pulse 80   Temp 98.4  F (36.9  C) (Oral)   Wt 103.7 kg (228 lb 9.6 oz)   LMP 08/13/2005   SpO2 100%   BMI 43.19 kg/m      Physical Exam   Constitutional: She appears well-developed and well-nourished.   HENT:   Head: Normocephalic.   Right Ear: Tympanic membrane and ear canal normal.   Left Ear: Tympanic membrane and ear canal normal.   Nose: Mucosal edema and rhinorrhea (right nare) present.   Mouth/Throat: Oropharynx is clear and moist.   Eyes: Pupils are equal, round, and reactive to light. Left eye exhibits exudate. Right conjunctiva is injected. Left conjunctiva is injected.   Cardiovascular: Normal rate and normal heart sounds.   Pulmonary/Chest: Effort normal and breath sounds normal.   Skin: Skin is warm and dry. No rash noted.   Psychiatric: She has a normal mood and affect. Her behavior is normal.       Labs:  No results found for this or any previous visit (from the past 24 hour(s)).    X-Ray was not done.    ASSESSMENT:      ICD-10-CM    1. Acute sinusitis with symptoms > 10 days J01.90 amoxicillin-clavulanate (AUGMENTIN) 875-125 MG tablet   2. Acute bacterial conjunctivitis of both eyes H10.33 trimethoprim-polymyxin b (POLYTRIM) 08550-0.1 UNIT/ML-% ophthalmic solution        Medical Decision Making:    Differential Diagnosis:  URI Adult/Peds:  Allergic rhinitis, Conjunctivitis, Sinusitis, Viral syndrome and Viral upper respiratory illness    Serious Comorbid Conditions:  Adult:  None    PLAN:    URI Adult:  Tylenol, Ibuprofen, Fluids, Rest, OTC cough suppressant/expectorant, OTC decongestant/antihistamine and RX sinusitis  Augmentin 875 bid x 10 days     Eye  Problem: Warm packs for comfort. Hygiene measures discussed. Polytrim ophthalmic drops-1-2 drops in the affected eye(s) every 4 hours while awake for 3-7 days.    Followup:    If not improving or if condition worsens, follow up with your Primary Care Provider    There are no Patient Instructions on file for this visit.

## 2019-07-04 NOTE — TELEPHONE ENCOUNTER
Adelaida has been sick for 6 days with a viral illness. She was seen in clinic on 7/1/2019. Negative strep throat culture.  Her eyes started tearing excessively yesterday. She woke today with her lashes matted and crusty. The eye discharge has continued throughout the morning. Her eyes are red and her eyelids are both nearly swollen shut. She said she's had fevers off and on throughout this illness. Her thermometer just quit working but she feels still that she's having off and on fevers.    Because of the degree of swelling, the discharge discharge and fever the protocol recommended she be seen in an ER. I told her too that she needs someone else to drive.  Adelaida stated understanding and agreement.  She said she'll go to [facility]'s ER. I answered all Adelaida's questions.    Reason for Disposition    [1] Eyelids are very swollen (shut or almost) AND [2] fever    Additional Information    Negative: Eye exposure to chemical or fumes    Negative: Redness of white of eye (sclera), but no pus or only a small amount of brief pus    Negative: SEVERE eye pain (e.g., excruciating)    Protocols used: EYE - PUS OR [id number]    Aliza HEART RN [facility]

## 2019-09-29 ENCOUNTER — HEALTH MAINTENANCE LETTER (OUTPATIENT)
Age: 49
End: 2019-09-29

## 2019-10-25 DIAGNOSIS — N18.30 CKD (CHRONIC KIDNEY DISEASE) STAGE 3, GFR 30-59 ML/MIN (H): Primary | ICD-10-CM

## 2019-10-28 DIAGNOSIS — I12.9 HYPERTENSION, RENAL: ICD-10-CM

## 2019-10-28 RX ORDER — LABETALOL 300 MG/1
300 TABLET, FILM COATED ORAL 2 TIMES DAILY
Qty: 180 TABLET | Refills: 3 | Status: SHIPPED | OUTPATIENT
Start: 2019-10-28 | End: 2020-10-16

## 2019-10-28 NOTE — TELEPHONE ENCOUNTER
Writer received a refill request from: [facility] in [place], [place]. [phone]    Medication: labetalol (NORMODYNE) 300 MG tablet     Sig: Take 1 tablet (300 mg) by mouth 2 times daily     Date last dispensed: 10/01/19      Pt's last office visit: 12/04/18  Next scheduled office visit: 12/0/19      Per the RN/LPN medication refill protocol, writer is to refill this request. If able to refill, please call the pt to inform them the RX was sent to the pharmacy. If unable to refill, route this encounter to the prescribing physician for authorization or further instructions.

## 2019-11-05 ENCOUNTER — RECORDS - HEALTHEAST (OUTPATIENT)
Dept: ADMINISTRATIVE | Facility: OTHER | Age: 49
End: 2019-11-05

## 2019-11-15 ENCOUNTER — DOCUMENTATION ONLY (OUTPATIENT)
Dept: LAB | Facility: CLINIC | Age: 49
End: 2019-11-15

## 2019-11-15 DIAGNOSIS — E11.22 TYPE 2 DIABETES MELLITUS WITH STAGE 3 CHRONIC KIDNEY DISEASE, WITHOUT LONG-TERM CURRENT USE OF INSULIN (H): Primary | ICD-10-CM

## 2019-11-15 DIAGNOSIS — N18.30 TYPE 2 DIABETES MELLITUS WITH STAGE 3 CHRONIC KIDNEY DISEASE, WITHOUT LONG-TERM CURRENT USE OF INSULIN (H): Primary | ICD-10-CM

## 2019-11-19 ENCOUNTER — OFFICE VISIT (OUTPATIENT)
Dept: PEDIATRICS | Facility: CLINIC | Age: 49
End: 2019-11-19
Payer: COMMERCIAL

## 2019-11-19 VITALS
HEART RATE: 67 BPM | OXYGEN SATURATION: 96 % | DIASTOLIC BLOOD PRESSURE: 84 MMHG | HEIGHT: 61 IN | WEIGHT: 240 LBS | SYSTOLIC BLOOD PRESSURE: 124 MMHG | TEMPERATURE: 97.9 F | BODY MASS INDEX: 45.31 KG/M2

## 2019-11-19 DIAGNOSIS — N18.30 CKD (CHRONIC KIDNEY DISEASE) STAGE 3, GFR 30-59 ML/MIN (H): ICD-10-CM

## 2019-11-19 DIAGNOSIS — Z12.39 BREAST CANCER SCREENING: ICD-10-CM

## 2019-11-19 DIAGNOSIS — Z23 NEED FOR PNEUMOCOCCAL VACCINATION: ICD-10-CM

## 2019-11-19 DIAGNOSIS — Z23 NEED FOR SHINGLES VACCINE: ICD-10-CM

## 2019-11-19 DIAGNOSIS — R73.01 ELEVATED FASTING BLOOD SUGAR: ICD-10-CM

## 2019-11-19 DIAGNOSIS — E11.22 TYPE 2 DIABETES MELLITUS WITH STAGE 3 CHRONIC KIDNEY DISEASE, WITHOUT LONG-TERM CURRENT USE OF INSULIN (H): ICD-10-CM

## 2019-11-19 DIAGNOSIS — M25.561 CHRONIC PAIN OF BOTH KNEES: ICD-10-CM

## 2019-11-19 DIAGNOSIS — M25.562 CHRONIC PAIN OF BOTH KNEES: ICD-10-CM

## 2019-11-19 DIAGNOSIS — Z12.4 CERVICAL CANCER SCREENING: ICD-10-CM

## 2019-11-19 DIAGNOSIS — I10 HYPERTENSION GOAL BP (BLOOD PRESSURE) < 140/90: ICD-10-CM

## 2019-11-19 DIAGNOSIS — E66.01 MORBID OBESITY WITH BMI OF 45.0-49.9, ADULT (H): ICD-10-CM

## 2019-11-19 DIAGNOSIS — N18.30 TYPE 2 DIABETES MELLITUS WITH STAGE 3 CHRONIC KIDNEY DISEASE, WITHOUT LONG-TERM CURRENT USE OF INSULIN (H): ICD-10-CM

## 2019-11-19 DIAGNOSIS — M25.551 HIP PAIN, RIGHT: ICD-10-CM

## 2019-11-19 DIAGNOSIS — Z23 NEED FOR INFLUENZA VACCINATION: ICD-10-CM

## 2019-11-19 DIAGNOSIS — G89.29 CHRONIC PAIN OF BOTH KNEES: ICD-10-CM

## 2019-11-19 DIAGNOSIS — Z12.11 COLON CANCER SCREENING: ICD-10-CM

## 2019-11-19 DIAGNOSIS — Z00.00 ROUTINE GENERAL MEDICAL EXAMINATION AT A HEALTH CARE FACILITY: Primary | ICD-10-CM

## 2019-11-19 DIAGNOSIS — Z90.710 S/P VAGINAL HYSTERECTOMY: ICD-10-CM

## 2019-11-19 LAB
ALBUMIN SERPL-MCNC: 3.9 G/DL (ref 3.4–5)
ANION GAP SERPL CALCULATED.3IONS-SCNC: 6 MMOL/L (ref 3–14)
BUN SERPL-MCNC: 19 MG/DL (ref 7–30)
CALCIUM SERPL-MCNC: 9.5 MG/DL (ref 8.5–10.1)
CHLORIDE SERPL-SCNC: 107 MMOL/L (ref 94–109)
CO2 SERPL-SCNC: 27 MMOL/L (ref 20–32)
CREAT SERPL-MCNC: 1.13 MG/DL (ref 0.52–1.04)
CREAT UR-MCNC: 125 MG/DL
GFR SERPL CREATININE-BSD FRML MDRD: 56 ML/MIN/{1.73_M2}
GLUCOSE SERPL-MCNC: 106 MG/DL (ref 70–99)
HBA1C MFR BLD: 5.5 % (ref 0–5.6)
HGB BLD-MCNC: 13 G/DL (ref 11.7–15.7)
MICROALBUMIN UR-MCNC: <5 MG/L
MICROALBUMIN/CREAT UR: NORMAL MG/G CR (ref 0–25)
PHOSPHATE SERPL-MCNC: 3.5 MG/DL (ref 2.5–4.5)
POTASSIUM SERPL-SCNC: 4.4 MMOL/L (ref 3.4–5.3)
PROT UR-MCNC: 0.08 G/L
PROT/CREAT 24H UR: 0.07 G/G CR (ref 0–0.2)
PTH-INTACT SERPL-MCNC: 50 PG/ML (ref 18–80)
SODIUM SERPL-SCNC: 140 MMOL/L (ref 133–144)
TSH SERPL DL<=0.005 MIU/L-ACNC: 1.59 MU/L (ref 0.4–4)

## 2019-11-19 PROCEDURE — 83970 ASSAY OF PARATHORMONE: CPT | Performed by: FAMILY MEDICINE

## 2019-11-19 PROCEDURE — 83036 HEMOGLOBIN GLYCOSYLATED A1C: CPT | Performed by: FAMILY MEDICINE

## 2019-11-19 PROCEDURE — 84156 ASSAY OF PROTEIN URINE: CPT | Performed by: FAMILY MEDICINE

## 2019-11-19 PROCEDURE — 85018 HEMOGLOBIN: CPT | Performed by: FAMILY MEDICINE

## 2019-11-19 PROCEDURE — 80069 RENAL FUNCTION PANEL: CPT | Performed by: FAMILY MEDICINE

## 2019-11-19 PROCEDURE — 99396 PREV VISIT EST AGE 40-64: CPT | Performed by: FAMILY MEDICINE

## 2019-11-19 PROCEDURE — 82043 UR ALBUMIN QUANTITATIVE: CPT | Performed by: FAMILY MEDICINE

## 2019-11-19 PROCEDURE — 99213 OFFICE O/P EST LOW 20 MIN: CPT | Mod: 25 | Performed by: FAMILY MEDICINE

## 2019-11-19 PROCEDURE — 84443 ASSAY THYROID STIM HORMONE: CPT | Performed by: FAMILY MEDICINE

## 2019-11-19 PROCEDURE — 36415 COLL VENOUS BLD VENIPUNCTURE: CPT | Performed by: FAMILY MEDICINE

## 2019-11-19 ASSESSMENT — MIFFLIN-ST. JEOR: SCORE: 1638.07

## 2019-11-19 ASSESSMENT — PAIN SCALES - GENERAL: PAINLEVEL: EXTREME PAIN (8)
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
## 2019-11-19 NOTE — PATIENT INSTRUCTIONS
Start on PT  Get the xrays today    Schedule for dietary consult  Schedule for colonoscopy  Schedule for sports consult      Preventive Health Recommendations  Female Ages 50 - 64    Yearly exam: See your health care provider every year in order to  o Review health changes.   o Discuss preventive care.    o Review your medicines if your doctor has prescribed any.      Get a Pap test every three years (unless you have an abnormal result and your provider advises testing more often).    If you get Pap tests with HPV test, you only need to test every 5 years, unless you have an abnormal result.     You do not need a Pap test if your uterus was removed (hysterectomy) and you have not had cancer.    You should be tested each year for STDs (sexually transmitted diseases) if you're at risk.     Have a mammogram every 1 to 2 years.    Have a colonoscopy at age 50, or have a yearly FIT test (stool test). These exams screen for colon cancer.      Have a cholesterol test every 5 years, or more often if advised.    Have a diabetes test (fasting glucose) every three years. If you are at risk for diabetes, you should have this test more often.     If you are at risk for osteoporosis (brittle bone disease), think about having a bone density scan (DEXA).    Shots: Get a flu shot each year. Get a tetanus shot every 10 years.    Nutrition:     Eat at least 5 servings of fruits and vegetables each day.    Eat whole-grain bread, whole-wheat pasta and brown rice instead of white grains and rice.    Get adequate Calcium and Vitamin D.     Lifestyle    Exercise at least 150 minutes a week (30 minutes a day, 5 days a week). This will help you control your weight and prevent disease.    Limit alcohol to one drink per day.    No smoking.     Wear sunscreen to prevent skin cancer.     See your dentist every six months for an exam and cleaning.    See your eye doctor every 1 to 2 years.

## 2019-11-20 ENCOUNTER — ANCILLARY PROCEDURE (OUTPATIENT)
Dept: GENERAL RADIOLOGY | Facility: CLINIC | Age: 49
End: 2019-11-20
Attending: FAMILY MEDICINE
Payer: COMMERCIAL

## 2019-11-20 DIAGNOSIS — G89.29 CHRONIC PAIN OF BOTH KNEES: ICD-10-CM

## 2019-11-20 DIAGNOSIS — M25.551 HIP PAIN, RIGHT: ICD-10-CM

## 2019-11-20 DIAGNOSIS — M25.561 CHRONIC PAIN OF BOTH KNEES: ICD-10-CM

## 2019-11-20 DIAGNOSIS — M25.562 CHRONIC PAIN OF BOTH KNEES: ICD-10-CM

## 2019-11-20 PROCEDURE — 73560 X-RAY EXAM OF KNEE 1 OR 2: CPT | Mod: LT | Performed by: RADIOLOGY

## 2019-11-20 PROCEDURE — 73521 X-RAY EXAM HIPS BI 2 VIEWS: CPT | Performed by: RADIOLOGY

## 2019-11-21 ENCOUNTER — OFFICE VISIT (OUTPATIENT)
Dept: ORTHOPEDICS | Facility: CLINIC | Age: 49
End: 2019-11-21
Payer: COMMERCIAL

## 2019-11-21 VITALS
HEIGHT: 61 IN | BODY MASS INDEX: 45.31 KG/M2 | SYSTOLIC BLOOD PRESSURE: 125 MMHG | WEIGHT: 240 LBS | DIASTOLIC BLOOD PRESSURE: 84 MMHG

## 2019-11-21 DIAGNOSIS — M17.10 OSTEOARTHRITIS, LOCALIZED, KNEE: Primary | ICD-10-CM

## 2019-11-21 DIAGNOSIS — M25.851 RIGHT HIP IMPINGEMENT SYNDROME: ICD-10-CM

## 2019-11-21 PROCEDURE — 99214 OFFICE O/P EST MOD 30 MIN: CPT | Performed by: FAMILY MEDICINE

## 2019-11-21 ASSESSMENT — MIFFLIN-ST. JEOR: SCORE: 1638.07

## 2019-11-21 ASSESSMENT — PAIN SCALES - GENERAL: PAINLEVEL: SEVERE PAIN (6)
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
## 2019-11-21 NOTE — PATIENT INSTRUCTIONS
AQUATIC THERAPY LOCATIONS    [facility] ([facility])    - [place], [place]: [address], call [phone] for  an appointment    - [place], [place]: [address], call [phone] for an appointment    - [place], [place]: [address], call [phone] for an appointment    - [place], [place]: [address], call [phone] for an appointment    - [place], [place]: [address] [facility] [address] [facility] [facility], call [phone] for an appointment    - [place], [place]: [address] [place] [address], call [phone] for an appointment    - [place]: [address], call [phone] for an appointment    - [place], [place]: [address], call [phone] for an appointment     - [place], [place]: [address] [facility] [facility], call [phone] for an appointment    - [place], [place]: [address], call [phone] for an appointment    - [place], [place]: [address], call [phone] for an appointment    - [place], [place]: [address] [facility], call [phone] for an appointment    - [place], [place]: [address], call [phone] for an appointment    [facility] ([facility]    - [place], [place]: [address] [facility] [facility] [address], call [phone] for an appointment    [facility]    -[place], [place]: [address], call [phone] for an appointment    -[place], [place]: [address], call [phone] for an appointment    [facility]    - [place], [place]: [address], call [phone] for an appointment    - [place], [place]: [address], call [phone] for an appointment    - [place]: [address], call [phone] for an appointment    - [place]: [address], call [phone] for an appointment    [facility]    - [place]: [address] [facility], call  [phone]
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
## 2019-11-29 ENCOUNTER — DOCUMENTATION ONLY (OUTPATIENT)
Dept: LAB | Facility: CLINIC | Age: 49
End: 2019-11-29

## 2019-11-29 DIAGNOSIS — I12.9 HYPERTENSION, RENAL: ICD-10-CM

## 2019-11-29 DIAGNOSIS — N18.30 CKD (CHRONIC KIDNEY DISEASE) STAGE 3, GFR 30-59 ML/MIN (H): Primary | ICD-10-CM

## 2019-12-03 ENCOUNTER — OFFICE VISIT (OUTPATIENT)
Dept: NEPHROLOGY | Facility: CLINIC | Age: 49
End: 2019-12-03
Payer: COMMERCIAL

## 2019-12-03 VITALS
SYSTOLIC BLOOD PRESSURE: 128 MMHG | HEART RATE: 75 BPM | DIASTOLIC BLOOD PRESSURE: 86 MMHG | OXYGEN SATURATION: 96 % | BODY MASS INDEX: 46.29 KG/M2 | WEIGHT: 241 LBS

## 2019-12-03 DIAGNOSIS — E26.09 PRIMARY HYPERALDOSTERONISM (H): ICD-10-CM

## 2019-12-03 DIAGNOSIS — N18.2 CKD (CHRONIC KIDNEY DISEASE) STAGE 2, GFR 60-89 ML/MIN: ICD-10-CM

## 2019-12-03 DIAGNOSIS — I12.9 HYPERTENSION, RENAL: ICD-10-CM

## 2019-12-03 DIAGNOSIS — E11.22 TYPE 2 DIABETES MELLITUS WITH STAGE 3 CHRONIC KIDNEY DISEASE, WITHOUT LONG-TERM CURRENT USE OF INSULIN (H): ICD-10-CM

## 2019-12-03 DIAGNOSIS — I10 HYPERTENSION GOAL BP (BLOOD PRESSURE) < 140/90: Primary | ICD-10-CM

## 2019-12-03 DIAGNOSIS — N18.30 CKD (CHRONIC KIDNEY DISEASE) STAGE 3, GFR 30-59 ML/MIN (H): ICD-10-CM

## 2019-12-03 DIAGNOSIS — N18.30 TYPE 2 DIABETES MELLITUS WITH STAGE 3 CHRONIC KIDNEY DISEASE, WITHOUT LONG-TERM CURRENT USE OF INSULIN (H): ICD-10-CM

## 2019-12-03 PROCEDURE — 99214 OFFICE O/P EST MOD 30 MIN: CPT | Performed by: INTERNAL MEDICINE

## 2019-12-03 RX ORDER — AMOXICILLIN 500 MG/1
500 CAPSULE ORAL 3 TIMES DAILY
COMMUNITY
Start: 2019-11-30 | End: 2020-05-26

## 2019-12-03 RX ORDER — EPLERENONE 50 MG/1
100 TABLET, FILM COATED ORAL 2 TIMES DAILY
Qty: 360 TABLET | Refills: 3 | Status: SHIPPED | OUTPATIENT
Start: 2019-12-03 | End: 2020-10-16

## 2019-12-03 ASSESSMENT — PAIN SCALES - GENERAL: PAINLEVEL: MODERATE PAIN (5)

## 2019-12-03 NOTE — NURSING NOTE
Adelaida Packer's goals for this visit include:   Chief Complaint   Patient presents with     RECHECK     one year recheck CKD       She requests these members of her care team be copied on today's visit information: no    PCP: Windy Gordillo    Referring Provider:  No referring provider defined for this encounter.    /86 (BP Location: Right arm, Patient Position: Sitting, Cuff Size: Adult Large)   Pulse 75   Wt 109.3 kg (241 lb)   LMP 08/13/2005   SpO2 96%   BMI 46.29 kg/m      Do you need any medication refills at today's visit? yes

## 2019-12-04 ENCOUNTER — OFFICE VISIT (OUTPATIENT)
Dept: PEDIATRICS | Facility: CLINIC | Age: 49
End: 2019-12-04
Payer: COMMERCIAL

## 2019-12-04 VITALS
HEART RATE: 79 BPM | BODY MASS INDEX: 46.25 KG/M2 | DIASTOLIC BLOOD PRESSURE: 76 MMHG | TEMPERATURE: 97.9 F | OXYGEN SATURATION: 98 % | WEIGHT: 240.8 LBS | SYSTOLIC BLOOD PRESSURE: 113 MMHG

## 2019-12-04 DIAGNOSIS — Z23 NEED FOR PROPHYLACTIC VACCINATION AND INOCULATION AGAINST INFLUENZA: ICD-10-CM

## 2019-12-04 DIAGNOSIS — J20.9 ACUTE BRONCHITIS, UNSPECIFIED ORGANISM: Primary | ICD-10-CM

## 2019-12-04 DIAGNOSIS — R09.81 CONGESTION OF PARANASAL SINUS: ICD-10-CM

## 2019-12-04 DIAGNOSIS — T36.95XA ANTIBIOTIC CAUSING ADVERSE EFFECT: ICD-10-CM

## 2019-12-04 PROCEDURE — 90471 IMMUNIZATION ADMIN: CPT | Performed by: FAMILY MEDICINE

## 2019-12-04 PROCEDURE — 99214 OFFICE O/P EST MOD 30 MIN: CPT | Mod: 25 | Performed by: FAMILY MEDICINE

## 2019-12-04 PROCEDURE — 90682 RIV4 VACC RECOMBINANT DNA IM: CPT | Performed by: FAMILY MEDICINE

## 2019-12-04 RX ORDER — OXYMETAZOLINE HYDROCHLORIDE 0.05 G/100ML
2 SPRAY NASAL 2 TIMES DAILY
Qty: 1 BOTTLE | Refills: 0 | Status: SHIPPED | OUTPATIENT
Start: 2019-12-04 | End: 2020-01-23

## 2019-12-04 RX ORDER — FLUCONAZOLE 150 MG/1
150 TABLET ORAL ONCE
Qty: 1 TABLET | Refills: 0 | Status: SHIPPED | OUTPATIENT
Start: 2019-12-04 | End: 2020-01-23

## 2019-12-04 RX ORDER — AZITHROMYCIN 250 MG/1
TABLET, FILM COATED ORAL
Qty: 6 TABLET | Refills: 0 | Status: SHIPPED | OUTPATIENT
Start: 2019-12-04 | End: 2020-05-26

## 2019-12-04 RX ORDER — ALBUTEROL SULFATE 90 UG/1
2 AEROSOL, METERED RESPIRATORY (INHALATION) EVERY 4 HOURS PRN
Qty: 1 INHALER | Refills: 0 | Status: SHIPPED | OUTPATIENT
Start: 2019-12-04 | End: 2021-05-28

## 2019-12-04 RX ORDER — GUAIFENESIN 1200 MG/1
1200 TABLET, EXTENDED RELEASE ORAL EVERY 12 HOURS
COMMUNITY
End: 2020-01-23

## 2019-12-04 ASSESSMENT — PAIN SCALES - GENERAL: PAINLEVEL: NO PAIN (0)

## 2019-12-04 NOTE — LETTER
December 4, 2019      Adelaida Packer  [address] [place] [address]              To whom it may concern:    Adelaida Packer is under my care. Adelaida was seen in the clinic today. Please excuse her from work for today.                Sincerely,      Windy Gordillo MD

## 2019-12-04 NOTE — PATIENT INSTRUCTIONS
Start on z-evelyn, inhaler as given today  Take diflucan after the z-evelyn completion  Use afrin for up to 3 days  Get the flu shot today
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
## 2019-12-29 NOTE — PROGRESS NOTES
12/3/19  CC: HTN and CKD    HPI: Adelaida Packer is a 49 year old female who presents for follow-up of HTN/CKD.  Has some mild CKD which is felt to be related to hypertension as well as likely some effects from hypercalcemia in the past. Her hx includes hyperparathyroidism, primary for which she underwent parathyroidectomy on 3/21/13. Following that surgery, she did have a complication related to a hematoma but we have seen many benefits of her parathyroidectomy - blood pressure improved as well as kidney function.    Creatinine has been 1.03-1.16 in the past year.  She did not have proteinuria on last check.  She has been sick for 10 days and is currently on amoxicillin.  Her blood pressure has been 120s at home over 70s to 80s.  She feels that her blood pressure improvement may be related to her change in job this year as it has been less stress.       Allergies   Allergen Reactions     Sulfa Drugs Shortness Of Breath and Rash     Hydrochlorothiazide W/Triamterene Other (See Comments)     Renal insuff     Maxzide [Hydrochlorothiazide W/Triamterene] Other (See Comments)     Renal insuff     Metoprolol Other (See Comments)     Other reaction(s): Bradycardia  At high dose only  At high dose only     Seasonal Allergies          Current Outpatient Medications   Medication Sig Dispense Refill     acetaminophen (TYLENOL ARTHRITIS PAIN) 650 MG CR tablet Take 650-1,300 mg by mouth every 8 hours as needed for mild pain or fever       amLODIPine (NORVASC) 10 MG tablet Take 1 tablet (10 mg) by mouth daily 90 tablet 3     amoxicillin (AMOXIL) 500 MG capsule 500 mg 3 times daily        ASPIRIN LOW DOSE 81 MG EC tablet TAKE 1 TABLET BY MOUTH ONCE DAILY 100 tablet 0     Blood Pressure Monitor KIT 1 Device 2 times daily 1 kit 0     calcium carbonate (OS-EVGENY 600 MG [place]. CA) 1500 (600 CA) MG tablet Take 1 tablet (600 mg) by mouth 2 times daily 180 tablet 3     cetirizine (ZYRTEC) 10 MG tablet Take 10 mg by mouth daily as needed for  allergies       cholecalciferol 2000 UNITS CAPS Take 2,000 Units by mouth daily 90 capsule 3     eplerenone (INSPRA) 50 MG tablet Take 2 tablets (100 mg) by mouth 2 times daily 360 tablet 3     labetalol (NORMODYNE) 300 MG tablet Take 1 tablet (300 mg) by mouth 2 times daily 180 tablet 3     polyethylene glycol (MIRALAX/GLYCOLAX) Packet Take 17 g by mouth daily       SUMAtriptan (IMITREX) 25 MG tablet TAKE 1 TO 2 TABLETS BY MOUTH AT ONSET OF HEADACHE FOR MIGRAINE. MAY REPEAT DOSE IN 2 HOURS. MAX OF 200MG OR 2 DOSES IN 24 HOURS 18 tablet 0     albuterol (PROAIR HFA/PROVENTIL HFA/VENTOLIN HFA) 108 (90 Base) MCG/ACT inhaler Inhale 2 puffs into the lungs every 4 hours as needed for shortness of breath / dyspnea or wheezing 1 Inhaler 0     azithromycin (ZITHROMAX) 250 MG tablet Two tablets first day, then one tablet daily for four days. 6 tablet 0     guaiFENesin (CVS MUCUS EXTENDED RELEASE) 1200 MG TB12 Take 1,200 mg by mouth every 12 hours         Past Medical History:   Diagnosis Date     Allergic rhinitis due to other allergen     seasonal     Diabetes (H)      Migraine, unspecified, without mention of intractable migraine without mention of status migrainosus      Monoclonal gammopathy      Morbid obesity (H)      Unspecified essential hypertension     dx around age 31         Past Surgical History:   Procedure Laterality Date     C ANESTH,KNEE AREA SURGERY  01/2001     C GASTROPLASTY,OBESITY,VERT BAND      removed 2009     HC REMOVE TONSILS/ADENOIDS,12+ Y/O  1995     HEAD & NECK SURGERY  3/2013    Parathyroidectomy--had to go back in 6 days later for a possible bleed     HYSTERECTOMY, VAGINAL  12/2005    hysterectomy- fibroids     ORTHOPEDIC SURGERY           Social History     Tobacco Use     Smoking status: Never Smoker     Smokeless tobacco: Never Used   Substance Use Topics     Alcohol use: No     Drug use: No         Family History   Problem Relation Age of Onset     Hypertension Mother      Gynecology Mother          hysterectomy     Gastrointestinal Disease Mother         bowel upstruction     Thyroid Disease Mother      Neurologic Disorder Mother      Osteoporosis Mother      Hypertension Father      Lipids Father      Arthritis Father      Heart Disease Father      Prostate Cancer Brother      Alcohol/Drug Brother      Circulatory Brother      Arthritis Paternal Grandmother      Cancer Maternal Grandfather         bone     Eye Disorder Maternal Grandfather      Prostate Cancer Maternal Grandfather      Glaucoma Maternal Grandfather      Cancer Maternal Grandmother         tumor-head     Obesity Maternal Grandmother      Respiratory Daughter         asthma     Diabetes Sister      Cerebrovascular Disease Sister      Macular Degeneration No family hx of          ROS: A 4 system review of systems was negative other than noted here or above.     Exam:  /86 (BP Location: Right arm, Patient Position: Sitting, Cuff Size: Adult Large)   Pulse 75   Wt 109.3 kg (241 lb)   LMP 08/13/2005   SpO2 96%   BMI 46.29 kg/m      GENERAL APPEARANCE: alert and no distress  CV: RRR  R: CTAB  Extremities: no clubbing, cyanosis, or edema  SKIN: no rash  NEURO: mentation intact and speech normal  PSYCH: affect normal/bright    Result  No visits with results within 1 Day(s) from this visit.   Latest known visit with results is:   Orders Only on 11/19/2019   Component Date Value Ref Range Status     Hemoglobin A1C 11/19/2019 5.5  0 - 5.6 % Final    Comment: Normal <5.7% Prediabetes 5.7-6.4%  Diabetes 6.5% or higher - adopted from ADA   consensus guidelines.       TSH 11/19/2019 1.59  0.40 - 4.00 mU/L Final     Creatinine Urine 11/19/2019 125  mg/dL Final     Albumin Urine mg/L 11/19/2019 <5  mg/L Final     Albumin Urine mg/g Cr 11/19/2019 Unable to calculate due to low value  0 - 25 mg/g Cr Final     Sodium 11/19/2019 140  133 - 144 mmol/L Final     Potassium 11/19/2019 4.4  3.4 - 5.3 mmol/L Final     Chloride 11/19/2019 107  94 - 109  mmol/L Final     Carbon Dioxide 11/19/2019 27  20 - 32 mmol/L Final     Anion Gap 11/19/2019 6  3 - 14 mmol/L Final     Glucose 11/19/2019 106* 70 - 99 mg/dL Final     Urea Nitrogen 11/19/2019 19  7 - 30 mg/dL Final     Creatinine 11/19/2019 1.13* 0.52 - 1.04 mg/dL Final     GFR Estimate 11/19/2019 56* >60 mL/min/[1.73_m2] Final    Comment: Non  GFR Calc  Starting 12/18/2018, serum creatinine based estimated GFR (eGFR) will be   calculated using the Chronic Kidney Disease Epidemiology Collaboration   (CKD-EPI) equation.       GFR Estimate If Black 11/19/2019 65  >60 mL/min/[1.73_m2] Final    Comment:  GFR Calc  Starting 12/18/2018, serum creatinine based estimated GFR (eGFR) will be   calculated using the Chronic Kidney Disease Epidemiology Collaboration   (CKD-EPI) equation.       Calcium 11/19/2019 9.5  8.5 - 10.1 mg/dL Final     Phosphorus 11/19/2019 3.5  2.5 - 4.5 mg/dL Final     Albumin 11/19/2019 3.9  3.4 - 5.0 g/dL Final     Protein Random Urine 11/19/2019 0.08  g/L Final     Protein Total Urine g/gr Creatinine 11/19/2019 0.07  0 - 0.2 g/g Cr Final     Parathyroid Hormone Intact 11/19/2019 50  18 - 80 pg/mL Final     Hemoglobin 11/19/2019 13.0  11.7 - 15.7 g/dL Final        Assessment/Plan:  1. Hypertension: aldosterone level has been high on evaluation by Dr. Quintanilla with a low renin. In the past I was suspicious for hyperaldosteronism as well but CT scan was without adrenal adneoma appreciated. Agree with potassium sparing diuretic for BP control given presumed adrenal hyperplasia. Blood pressure is at goal.     2. CKD: likely some chronic kidney changes related to longstanding hypertension and hypercalcemia.     3. Hypercalcemia: s/p parathyroidectomy on 3/21/13. Calcium now normal.     4. 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 Rodas/[facility] - yearly follow-up was recommended by Dr. Rodas at either [place] or here.      5. DM: A1C much improved at 5.5% - follows  with Dr. Quintanilla.     Patient Instructions   One year follow-up.          Dia Aleman DO

## 2020-01-20 ENCOUNTER — TELEPHONE (OUTPATIENT)
Dept: ORTHOPEDICS | Facility: CLINIC | Age: 50
End: 2020-01-20

## 2020-01-20 NOTE — TELEPHONE ENCOUNTER
[facility] calling, pt needs referral for PT sent to insurance company for PT outside of [facility]. Please advise with insurance company

## 2020-01-23 ENCOUNTER — OFFICE VISIT (OUTPATIENT)
Dept: ORTHOPEDICS | Facility: CLINIC | Age: 50
End: 2020-01-23
Payer: COMMERCIAL

## 2020-01-23 ENCOUNTER — HOSPITAL ENCOUNTER (EMERGENCY)
Facility: CLINIC | Age: 50
Discharge: HOME | End: 2020-01-24
Attending: EMERGENCY MEDICINE | Admitting: EMERGENCY MEDICINE
Payer: COMMERCIAL

## 2020-01-23 ENCOUNTER — APPOINTMENT (OUTPATIENT)
Dept: ULTRASOUND IMAGING | Facility: CLINIC | Age: 50
End: 2020-01-23
Attending: EMERGENCY MEDICINE
Payer: COMMERCIAL

## 2020-01-23 VITALS
WEIGHT: 240 LBS | BODY MASS INDEX: 45.31 KG/M2 | SYSTOLIC BLOOD PRESSURE: 129 MMHG | DIASTOLIC BLOOD PRESSURE: 87 MMHG | HEIGHT: 61 IN

## 2020-01-23 DIAGNOSIS — M71.22 POPLITEAL CYST, LEFT: ICD-10-CM

## 2020-01-23 DIAGNOSIS — M79.89 LEFT LEG SWELLING: Primary | ICD-10-CM

## 2020-01-23 LAB — D DIMER PPP FEU-MCNC: 1.3 UG/ML FEU (ref 0–0.5)

## 2020-01-23 PROCEDURE — 99214 OFFICE O/P EST MOD 30 MIN: CPT | Performed by: FAMILY MEDICINE

## 2020-01-23 PROCEDURE — 85379 FIBRIN DEGRADATION QUANT: CPT | Performed by: FAMILY MEDICINE

## 2020-01-23 PROCEDURE — 99284 EMERGENCY DEPT VISIT MOD MDM: CPT | Mod: 25

## 2020-01-23 PROCEDURE — 36415 COLL VENOUS BLD VENIPUNCTURE: CPT | Performed by: FAMILY MEDICINE

## 2020-01-23 PROCEDURE — 99283 EMERGENCY DEPT VISIT LOW MDM: CPT | Mod: Z6 | Performed by: EMERGENCY MEDICINE

## 2020-01-23 PROCEDURE — 93971 EXTREMITY STUDY: CPT | Mod: LT

## 2020-01-23 ASSESSMENT — PAIN SCALES - GENERAL: PAINLEVEL: EXTREME PAIN (8)

## 2020-01-23 ASSESSMENT — MIFFLIN-ST. JEOR: SCORE: 1638.07
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
## 2020-01-23 NOTE — ED AVS SNAPSHOT
[facility], [place], Emergency Department  [address]  [facility] [address]  Phone:  [phone]  Fax:  [phone]                                    Adelaida Packer   MRN: [id number]    Department:  [facility], Emergency Department   Date of Visit:  1/23/2020           After Visit Summary Signature Page    I have received my discharge instructions, and my questions have been answered. I have discussed any challenges I see with this plan with the nurse or doctor.    ..........................................................................................................................................  Patient/Patient Representative Signature      ..........................................................................................................................................  Patient Representative Print Name and Relationship to Patient    ..................................................               ................................................  Date                                   Time    ..........................................................................................................................................  Reviewed by Signature/Title    ...................................................              ..............................................  Date                                               Time          22EPIC Rev 08/18
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
## 2020-01-24 ENCOUNTER — TELEPHONE (OUTPATIENT)
Dept: ORTHOPEDICS | Facility: CLINIC | Age: 50
End: 2020-01-24

## 2020-01-24 ENCOUNTER — TELEPHONE (OUTPATIENT)
Dept: PEDIATRICS | Facility: CLINIC | Age: 50
End: 2020-01-24

## 2020-01-24 ENCOUNTER — OFFICE VISIT (OUTPATIENT)
Dept: ORTHOPEDICS | Facility: CLINIC | Age: 50
End: 2020-01-24
Payer: COMMERCIAL

## 2020-01-24 VITALS — HEIGHT: 61 IN | BODY MASS INDEX: 47.39 KG/M2 | WEIGHT: 251 LBS

## 2020-01-24 VITALS
SYSTOLIC BLOOD PRESSURE: 135 MMHG | RESPIRATION RATE: 16 BRPM | OXYGEN SATURATION: 99 % | TEMPERATURE: 98.3 F | BODY MASS INDEX: 48.31 KG/M2 | HEART RATE: 75 BPM | WEIGHT: 251.5 LBS | DIASTOLIC BLOOD PRESSURE: 77 MMHG

## 2020-01-24 DIAGNOSIS — M71.21 SYNOVIAL CYST OF BOTH POPLITEAL SPACES: Primary | ICD-10-CM

## 2020-01-24 DIAGNOSIS — M71.22 SYNOVIAL CYST OF BOTH POPLITEAL SPACES: Primary | ICD-10-CM

## 2020-01-24 DIAGNOSIS — M17.0 BILATERAL PRIMARY OSTEOARTHRITIS OF KNEE: ICD-10-CM

## 2020-01-24 PROCEDURE — 20610 DRAIN/INJ JOINT/BURSA W/O US: CPT | Mod: 50 | Performed by: FAMILY MEDICINE

## 2020-01-24 PROCEDURE — 99212 OFFICE O/P EST SF 10 MIN: CPT | Mod: 25 | Performed by: FAMILY MEDICINE

## 2020-01-24 RX ADMIN — TRIAMCINOLONE ACETONIDE 40 MG: 40 INJECTION, SUSPENSION INTRA-ARTICULAR; INTRAMUSCULAR at 11:42

## 2020-01-24 ASSESSMENT — ENCOUNTER SYMPTOMS
FEVER: 0
JOINT SWELLING: 0
SHORTNESS OF BREATH: 0
MYALGIAS: 1
ARTHRALGIAS: 1

## 2020-01-24 ASSESSMENT — MIFFLIN-ST. JEOR: SCORE: 1687.97

## 2020-01-24 NOTE — ED PROVIDER NOTES
"    [facility] [place] EMERGENCY DEPARTMENT ([facility])    1/23/20      History     Chief Complaint   Patient presents with     Abnormal Labs     pt was called by her PMD informing pt that her D dimer is high and that she needs to go to the ER for further eval. Pt c/o \" a knot behind the L leg\" Denies hx of blood clots. Denies CP or SOB in triage     The history is provided by the patient and medical records.   Abnormal Labs     Adelaida Packer is a 49 year old female with a past medical history of essential hypertension, morbid obesity, monoclonal gammopathy, diabetes, CKD stage III, and osteoarthritis of bilateral knees who was referred to the Emergency Department for elevated d-dimer in setting of new left posterior leg pain. Patient reports pain started in the past 2-3 days. Located behind left knee and radiates into proximal calf and distal thigh posteriorly. Denies leg swelling or asymmetry. No recent trauma or surgeries. No recent travel, immobilization. Denies family or personal hx of clotting disorder. Not on estrogen. Denies CP/SOB. No leg numbness/weakness/joint swelling/redness.    Past Medical History:   Diagnosis Date     Allergic rhinitis due to other allergen     seasonal     Diabetes (H)      Migraine, unspecified, without mention of intractable migraine without mention of status migrainosus      Monoclonal gammopathy      Morbid obesity (H)      Unspecified essential hypertension     dx around age 31       Past Surgical History:   Procedure Laterality Date     C ANESTH,KNEE AREA SURGERY  01/2001     C GASTROPLASTY,OBESITY,VERT BAND      removed 2009     HC REMOVE TONSILS/ADENOIDS,12+ Y/O  1995     HEAD & NECK SURGERY  3/2013    Parathyroidectomy--had to go back in 6 days later for a possible bleed     HYSTERECTOMY, VAGINAL  12/2005    hysterectomy- fibroids     ORTHOPEDIC SURGERY         Family History   Problem Relation Age of Onset     Hypertension Mother      Gynecology Mother         hysterectomy "     Gastrointestinal Disease Mother         bowel upstruction     Thyroid Disease Mother      Neurologic Disorder Mother      Osteoporosis Mother      Hypertension Father      Lipids Father      Arthritis Father      Heart Disease Father      Prostate Cancer Brother      Alcohol/Drug Brother      Circulatory Brother      Arthritis Paternal Grandmother      Cancer Maternal Grandfather         bone     Eye Disorder Maternal Grandfather      Prostate Cancer Maternal Grandfather      Glaucoma Maternal Grandfather      Cancer Maternal Grandmother         tumor-head     Obesity Maternal Grandmother      Respiratory Daughter         asthma     Diabetes Sister      Cerebrovascular Disease Sister      Macular Degeneration No family hx of        Social History     Tobacco Use     Smoking status: Never Smoker     Smokeless tobacco: Never Used   Substance Use Topics     Alcohol use: No       No current facility-administered medications for this encounter.      Current Outpatient Medications   Medication     amLODIPine (NORVASC) 10 MG tablet     eplerenone (INSPRA) 50 MG tablet     labetalol (NORMODYNE) 300 MG tablet     acetaminophen (TYLENOL ARTHRITIS PAIN) 650 MG CR tablet     albuterol (PROAIR HFA/PROVENTIL HFA/VENTOLIN HFA) 108 (90 Base) MCG/ACT inhaler     amoxicillin (AMOXIL) 500 MG capsule     ASPIRIN LOW DOSE 81 MG EC tablet     azithromycin (ZITHROMAX) 250 MG tablet     Blood Pressure Monitor KIT     calcium carbonate (OS-EVGENY 600 MG [place]. CA) 1500 (600 CA) MG tablet     cetirizine (ZYRTEC) 10 MG tablet     cholecalciferol 2000 UNITS CAPS     polyethylene glycol (MIRALAX/GLYCOLAX) Packet     SUMAtriptan (IMITREX) 25 MG tablet        Allergies   Allergen Reactions     Sulfa Drugs Shortness Of Breath and Rash     Hydrochlorothiazide W/Triamterene Other (See Comments)     Renal insuff     Maxzide [Hydrochlorothiazide W/Triamterene] Other (See Comments)     Renal insuff     Metoprolol Other (See Comments)     Other  reaction(s): Bradycardia  At high dose only  At high dose only     Seasonal Allergies      I have reviewed the Medications, Allergies, Past Medical and Surgical History, and Social History in the Epic system.    Review of Systems   Constitutional: Negative for fever.   Respiratory: Negative for shortness of breath.    Cardiovascular: Negative for chest pain and leg swelling.   Musculoskeletal: Positive for arthralgias and myalgias. Negative for joint swelling.   Skin: Negative for rash.   All other systems reviewed and are negative.      Physical Exam   BP: (!) 149/80  Pulse: 72  Temp: 98.3  F (36.8  C)  Resp: 16  Weight: 114.1 kg (251 lb 8 oz)  SpO2: 99 %      Physical Exam  Vitals signs and nursing note reviewed.   Constitutional:       General: She is not in acute distress.     Appearance: Normal appearance. She is well-developed. She is obese. She is not ill-appearing or diaphoretic.   HENT:      Head: Normocephalic and atraumatic.      Nose: Nose normal.      Mouth/Throat:      Mouth: Mucous membranes are moist.   Eyes:      General: No scleral icterus.     Conjunctiva/sclera: Conjunctivae normal.   Neck:      Musculoskeletal: Normal range of motion and neck supple.   Cardiovascular:      Rate and Rhythm: Normal rate.   Pulmonary:      Effort: Pulmonary effort is normal. No respiratory distress.      Breath sounds: No stridor.   Abdominal:      General: There is no distension.   Musculoskeletal: Normal range of motion.         General: Tenderness present. No swelling, deformity or signs of injury.      Left knee: She exhibits normal range of motion, no swelling, no effusion, no ecchymosis, no deformity, no laceration, no erythema and no bony tenderness.      Right lower leg: No edema.      Left lower leg: No edema.        Legs:       Comments: Tender over left popliteal fossa, distal left thigh and proximal left calf. No warmth, fluctuance, erythema. Normal knee ROM. Distal strength, sensation, perfusion  intact.   Skin:     General: Skin is warm and dry.      Findings: No rash.   Neurological:      General: No focal deficit present.      Mental Status: She is alert and oriented to person, place, and time.   Psychiatric:         Mood and Affect: Mood normal.         Behavior: Behavior normal.         Thought Content: Thought content normal.         ED Course   Medications - No data to display     Procedures             Critical Care time:  none             Labs Ordered and Resulted from Time of ED Arrival Up to the Time of Departure from the ED - No data to display  Results for orders placed or performed during the hospital encounter of 01/23/20 (from the past 24 hour(s))   US Lower Extremity Venous Duplex Left    Narrative    EXAM: US LOWER EXTREMITY VENOUS DUPLEX LEFT  LOCATION: [facility]  DATE/TIME: 1/23/2020 10:55 PM    INDICATION: Leg pain. Elevated d-dimer.  COMPARISON: None.  TECHNIQUE: Venous Duplex ultrasound of the left lower extremity with and without compression, augmentation and duplex. Color flow and spectral Doppler with waveform analysis performed.    FINDINGS: Exam includes the common femoral, femoral, popliteal, and contralateral common femoral veins as well as segmentally visualized deep calf veins and greater saphenous vein.     LEFT: No deep vein thrombosis. No superficial thrombophlebitis. Complex left popliteal fossa cyst measuring 8.8 x 5.9 x 3.7 cm with septations. This compresses upon the left popliteal vein, however it still remains patent.      Impression    IMPRESSION:  1.  No deep venous thrombosis in the left lower extremity.  2.  Complex popliteal fossa cyst.          Assessments & Plan (with Medical Decision Making)   Adelaida Packer is a 49 year old female with a past medical history of essential hypertension, morbid obesity, monoclonal gammopathy, diabetes, CKD stage III, and osteoarthritis of bilateral knees who was referred to the Emergency Department for elevated  d-dimer in setting of new left posterior leg pain.    Ddx: superficial thrombophlebitis, DVT, popliteal cyst, early cellulitis    No s/s of PE. DVT US of left leg neg for DVT but shows complex popliteal fossa cyst. Discussed results with patient. Advised follow up with PCP or sports med for reevaluation of baker's cyst. Patient understands she could develop a DVT from cyst pressing on veins in leg, so she agrees to return for repeat evaluation if she develops worsening sxs. Advised on conservative management for leg pain.     I have reviewed the nursing notes.    I have reviewed the findings, diagnosis, plan and need for follow up with the patient.    Discharge Medication List as of 1/24/2020 12:13 AM          Final diagnoses:   Popliteal cyst, left   I, Zeferino Ray, am serving as a trained medical scribe to document services personally performed by Gosia Branham MD, based on the provider's statements to me.      IGosia MD, was physically present and have reviewed and verified the accuracy of this note documented by Zeferino Ray.    1/23/2020   [facility], [address] [place], EMERGENCY DEPARTMENT     Gosia Branham MD  01/24/20 0306

## 2020-01-24 NOTE — TELEPHONE ENCOUNTER
Patient is scheduled with Sports Med in one hour.   Is it from arthritis? Also, D dimer is elevated- what does that mean? When does she need to follow up with PCP? Tylenol and Advil isn't helping and she woke up crying.  She will ask all questions at her visit today and schedule follow ups. She is also aware that PCP isn't in the office until next week. Patient verbalized understanding.     ER visit says: No s/s of PE. DVT US of left leg neg for DVT but shows complex popliteal fossa cyst. Discussed results with patient. Advised follow up with PCP or sports med for reevaluation of baker's cyst. Patient understands she could develop a DVT from cyst pressing on veins in leg, so she agrees to return for repeat evaluation if she develops worsening sxs. Advised on conservative management for leg pain.   Albania Gomez RN

## 2020-01-24 NOTE — TELEPHONE ENCOUNTER
Hi there.    For PT PA's that would go to our PT department, however if the PT is being done outside of , then the provider of the requested facility would need to check for PA and other coverages.

## 2020-01-24 NOTE — LETTER
"    1/24/2020         RE: Adelaida Packer  [address] [place] [address]        Dear Colleague,    Thank you for referring your patient, Adelaida Packer, to the [facility]. Please see a copy of my visit note below.    HISTORY OF PRESENT ILLNESS  Ms. Packer is a pleasant 49 year old female following up with left posterior knee pain.  Adelaida was in the emergency department last night after a positive d-dimer associated with her leg and knee swelling.  Fortunately she had a negative ultrasound.  A large Ch's cyst was discovered with multiple septations.  Her knee and leg are still quite painful.     PHYSICAL EXAM  Vitals:    01/24/20 1108   Weight: 113.9 kg (251 lb)   Height: 1.537 m (5' 0.5\")     General  - normal appearance, in no obvious distress  CV  - normal popliteal pulse  Pulm  - normal respiratory pattern, non-labored  Musculoskeletal - knee  - stance: mildly antalgic gait  - inspection: generalized swelling, trace effusion  - palpation: posterior lateral knee tenderness  Neuro  - no sensory or motor deficit, grossly normal coordination, normal muscle tone  Skin  - no ecchymosis, erythema, warmth, or induration, no obvious rash  Psych  - interactive, appropriate, normal mood and affect          ASSESSMENT & PLAN  Ms. Packer is a 49 year old female following up with left knee pain and swelling.    I reviewed her ultrasound in the room with her:  LEFT: No deep vein thrombosis. No superficial thrombophlebitis. Complex left popliteal fossa cyst measuring 8.8 x 5.9 x 3.7 cm with septations. This compresses upon the left popliteal vein, however it still remains patent.    We discussed pathophysiology of popliteal cysts, we also discussed management strategies.  After frances discussion I did inject Law knee today (see procedure note).    If she does well with this injection we could follow up as needed for this and other issues.    It was a pleasure seeing " "Adelaida.    PROCEDURE    Knee Injection - Intraarticular  The patient was informed of the risks and the benefits of the procedure and a written consent was signed.  The patient s left knee was prepped with chlorhexidine in sterile fashion.   40 mg of triamcinolone suspension was drawn up into a 5 mL syringe with 4 mL of 1% lidocaine.  Injection was performed using substerile technique.  A 1.5-inch 22-gauge needle was used to enter the lateral aspect of the left knee.  Injection performed successfully without difficulty.  There were no complications. The patient tolerated the procedure well. There was negligible bleeding.   The patient s right knee was prepped with chlorhexidine in sterile fashion.   40 mg of triamcinolone suspension was drawn up into a 5 mL syringe with 4 mL of 1% lidocaine.  Injection was performed using substerile technique.  A 1.5-inch 22-gauge needle was used to enter the lateral aspect of the right knee.  Injection performed successfully without difficulty.  There were no complications. The patient tolerated the procedure well. There was negligible bleeding.   The patient was instructed to ice the knee upon leaving clinic and refrain from overuse over the next 3 days.   The patient was instructed to call or go to the emergency room with any unusual pain, swelling, redness, or if otherwise concerned.      Tj Mac DO, CAQSM      This note was constructed using Dragon dictation software, please excuse any minor errors in spelling, grammar, or syntax.        [facility] FOLLOW-UP VISIT 1/24/2020    Adelaida Packer's chief complaint for this visit includes:  Chief Complaint   Patient presents with     RECHECK     f/u left leg      PCP: Windy Gordillo    Referring Provider:  No referring provider defined for this encounter.    Ht 1.537 m (5' 0.5\")   Wt 113.9 kg (251 lb)   LMP 08/13/2005   BMI 48.21 kg/m     Data Unavailable       Interval History:     Follow up reason: left " leg pain     Medical History:    Any recent changes to your medical history? No    Any new medication prescribed since last visit? No    Review of Systems:    Do you have fever, chills, weight loss? No    Do you have any vision problems? No    Do you have any chest pain or edema? No    Do you have any shortness of breath or wheezing?  No    Do you have stomach problems? No    Do you have any numbness or focal weakness? No    Do you have diabetes? No    Do you have problems with bleeding or clotting? No    Do you have an rashes or other skin lesions? No    Large Joint Injection/Arthocentesis: bilateral knee  Date/Time: 1/24/2020 11:42 AM  Performed by: Tj Mac DO  Authorized by: Tj Mac DO     Indications:  Pain  Needle Size:  22 G  Guidance: landmark guided    Approach:  Lateral  Location:  Knee  Laterality:  Bilateral      Medications (Right):  40 mg triamcinolone 40 MG/ML  Medications (Left):  40 mg triamcinolone 40 MG/ML  Outcome:  Tolerated well, no immediate complications  Procedure discussed: discussed risks, benefits, and alternatives    Consent Given by:  Patient  Timeout: timeout called immediately prior to procedure    Prep: patient was prepped and draped in usual sterile fashion            Again, thank you for allowing me to participate in the care of your patient.        Sincerely,        Tj Mac DO

## 2020-01-24 NOTE — TELEPHONE ENCOUNTER
[facility] Call Center    Phone Message    May a detailed message be left on voicemail: yes    Reason for Call: Patient was seen at [facility] ER and a cyst was dicovered behind left knee. Patient would like a call back plan of care.       Action Taken: Message routed to:  Primary Care p [phone]

## 2020-01-24 NOTE — PROGRESS NOTES
"HISTORY OF PRESENT ILLNESS  Ms. Packer is a pleasant 49 year old female following up with left posterior knee pain.  Adelaida was in the emergency department last night after a positive d-dimer associated with her leg and knee swelling.  Fortunately she had a negative ultrasound.  A large Ch's cyst was discovered with multiple septations.  Her knee and leg are still quite painful.     PHYSICAL EXAM  Vitals:    01/24/20 1108   Weight: 113.9 kg (251 lb)   Height: 1.537 m (5' 0.5\")     General  - normal appearance, in no obvious distress  CV  - normal popliteal pulse  Pulm  - normal respiratory pattern, non-labored  Musculoskeletal - knee  - stance: mildly antalgic gait  - inspection: generalized swelling, trace effusion  - palpation: posterior lateral knee tenderness  Neuro  - no sensory or motor deficit, grossly normal coordination, normal muscle tone  Skin  - no ecchymosis, erythema, warmth, or induration, no obvious rash  Psych  - interactive, appropriate, normal mood and affect          ASSESSMENT & PLAN  Ms. Packer is a 49 year old female following up with left knee pain and swelling.    I reviewed her ultrasound in the room with her:  LEFT: No deep vein thrombosis. No superficial thrombophlebitis. Complex left popliteal fossa cyst measuring 8.8 x 5.9 x 3.7 cm with septations. This compresses upon the left popliteal vein, however it still remains patent.    We discussed pathophysiology of popliteal cysts, we also discussed management strategies.  After frances discussion I did inject Adelaida's knee today (see procedure note).    If she does well with this injection we could follow up as needed for this and other issues.    It was a pleasure seeing Adelaida.    PROCEDURE    Knee Injection - Intraarticular  The patient was informed of the risks and the benefits of the procedure and a written consent was signed.  The patient s left knee was prepped with chlorhexidine in sterile fashion.   40 mg of triamcinolone suspension " "was drawn up into a 5 mL syringe with 4 mL of 1% lidocaine.  Injection was performed using substerile technique.  A 1.5-inch 22-gauge needle was used to enter the lateral aspect of the left knee.  Injection performed successfully without difficulty.  There were no complications. The patient tolerated the procedure well. There was negligible bleeding.   The patient s right knee was prepped with chlorhexidine in sterile fashion.   40 mg of triamcinolone suspension was drawn up into a 5 mL syringe with 4 mL of 1% lidocaine.  Injection was performed using substerile technique.  A 1.5-inch 22-gauge needle was used to enter the lateral aspect of the right knee.  Injection performed successfully without difficulty.  There were no complications. The patient tolerated the procedure well. There was negligible bleeding.   The patient was instructed to ice the knee upon leaving clinic and refrain from overuse over the next 3 days.   The patient was instructed to call or go to the emergency room with any unusual pain, swelling, redness, or if otherwise concerned.      Tj Mac DO, CAQSM      This note was constructed using Dragon dictation software, please excuse any minor errors in spelling, grammar, or syntax.        [facility] FOLLOW-UP VISIT 1/24/2020    Adelaida Packer's chief complaint for this visit includes:  Chief Complaint   Patient presents with     RECHECK     f/u left leg      PCP: Windy Gordillo    Referring Provider:  No referring provider defined for this encounter.    Ht 1.537 m (5' 0.5\")   Wt 113.9 kg (251 lb)   LMP 08/13/2005   BMI 48.21 kg/m    Data Unavailable       Interval History:     Follow up reason: left leg pain     Medical History:    Any recent changes to your medical history? No    Any new medication prescribed since last visit? No    Review of Systems:    Do you have fever, chills, weight loss? No    Do you have any vision problems? No    Do you have any chest pain or " edema? No    Do you have any shortness of breath or wheezing?  No    Do you have stomach problems? No    Do you have any numbness or focal weakness? No    Do you have diabetes? No    Do you have problems with bleeding or clotting? No    Do you have an rashes or other skin lesions? No    Large Joint Injection/Arthocentesis: bilateral knee  Date/Time: 1/24/2020 11:42 AM  Performed by: Tj Mac DO  Authorized by: Tj Mac DO     Indications:  Pain  Needle Size:  22 G  Guidance: landmark guided    Approach:  Lateral  Location:  Knee  Laterality:  Bilateral      Medications (Right):  40 mg triamcinolone 40 MG/ML  Medications (Left):  40 mg triamcinolone 40 MG/ML  Outcome:  Tolerated well, no immediate complications  Procedure discussed: discussed risks, benefits, and alternatives    Consent Given by:  Patient  Timeout: timeout called immediately prior to procedure    Prep: patient was prepped and draped in usual sterile fashion

## 2020-01-24 NOTE — TELEPHONE ENCOUNTER
[facility] Call Center    Phone Message    May a detailed message be left on voicemail: yes    Reason for Call: Patient was seen at [facility] ER and a cyst was dicovered behind left knee. Patient would like a call back plan of care.      Action Taken: Message routed to:  Adult Clinics: Sports Medicine p [phone]

## 2020-01-24 NOTE — DISCHARGE INSTRUCTIONS
Please make an appointment to follow up with Your Primary Care Provider and Sports Medicine (phone: [phone]) in 2-3 days unless symptoms completely resolve.    Take 600 mg ibuprofen every 8 hours, for pain and inflammation.  Take this on schedule, for approximately 1 week or until resolution of  symptoms.  Take this medication with food to prevent stomach upset.  If you taking a chronic antacid medication, make sure to take this while you are taking this medication.    Rest, avoid repetitive motion, and lifting over 5 pounds with the effected extremity.  May use ice or heating pad to help with the pain.  Elevate the affected extremity above the level of your heart to help with swelling.    Follow-up with her primary care doctor in 1-2 weeks if your symptoms have not resolved. Return to the emergency department if you develop worsening pain, weakness, numbness or tingling, discoloration of your extremity.  Sometimes popliteal cysts can cause you to develop blood clots from increased pressure on the veins behind your knee. If you develop worsening swelling or pain you should follow up for repeat evaluation.

## 2020-02-03 RX ORDER — TRIAMCINOLONE ACETONIDE 40 MG/ML
40 INJECTION, SUSPENSION INTRA-ARTICULAR; INTRAMUSCULAR
Status: DISCONTINUED | OUTPATIENT
Start: 2020-01-24 | End: 2021-08-09

## 2020-02-17 NOTE — PROGRESS NOTES
SUBJECTIVE:   Adelaida Packer is a 49 year old female who is seen in consultation at the request of Tj Mac DO for evaluation of bilateral knee pain that has been present years. No known injury.    Present symptoms: left side pain in the posterior knee. Pain at night, sitting.  Right: anterior pain.  Painful to walk.  Locks up. Feeling of giving-way occasionally.  Constant pain.     Treatments tried to this point: has a bakers cyst on the left,  Had corticosteroid injection in the knees 4-5 times the last 4 years.  They only work a couple of weeks.  (no cyst aspirations)  In the process of fitting for  braces    Orthopedic PMH: bunion surgery 2018 right side    Review of Systems:  Constitutional:  NEGATIVE for fever, chills, change in weight  Integumentary/Skin:  NEGATIVE for worrisome rashes, moles or lesions  Eyes:  NEGATIVE for vision changes or irritation  ENT/Mouth:  NEGATIVE for ear, mouth and throat problems  Resp:  NEGATIVE for significant cough or SOB  Breast:  NEGATIVE for masses, tenderness or discharge  CV:  NEGATIVE for chest pain, palpitations or peripheral edema  GI:  NEGATIVE for nausea, abdominal pain, heartburn, or change in bowel habits  :  Negative   Musculoskeletal:  See HPI above  Neuro:  NEGATIVE for weakness, dizziness or paresthesias  Endocrine:  NEGATIVE for temperature intolerance, skin/hair changes  Heme/allergy/immune:  NEGATIVE for bleeding problems  Psychiatric:  NEGATIVE for changes in mood or affect    Past Medical History:   Past Medical History:   Diagnosis Date     Allergic rhinitis due to other allergen     seasonal     Diabetes (H)      Migraine, unspecified, without mention of intractable migraine without mention of status migrainosus      Monoclonal gammopathy      Morbid obesity (H)      Unspecified essential hypertension     dx around age 31     Past Surgical History:   Past Surgical History:   Procedure Laterality Date     C ANESTH,KNEE AREA SURGERY  01/2001  "    C GASTROPLASTY,OBESITY,VERT BAND      removed 2009      REMOVE TONSILS/ADENOIDS,12+ Y/O  1995     HEAD & NECK SURGERY  3/2013    Parathyroidectomy--had to go back in 6 days later for a possible bleed     HYSTERECTOMY, VAGINAL  12/2005    hysterectomy- fibroids     ORTHOPEDIC SURGERY       Family History:   Family History   Problem Relation Age of Onset     Hypertension Mother      Gynecology Mother         hysterectomy     Gastrointestinal Disease Mother         bowel upstruction     Thyroid Disease Mother      Neurologic Disorder Mother      Osteoporosis Mother      Hypertension Father      Lipids Father      Arthritis Father      Heart Disease Father      Prostate Cancer Brother      Alcohol/Drug Brother      Circulatory Brother      Arthritis Paternal Grandmother      Cancer Maternal Grandfather         bone     Eye Disorder Maternal Grandfather      Prostate Cancer Maternal Grandfather      Glaucoma Maternal Grandfather      Cancer Maternal Grandmother         tumor-head     Obesity Maternal Grandmother      Respiratory Daughter         asthma     Diabetes Sister      Cerebrovascular Disease Sister      Macular Degeneration No family hx of      Social History:   Social History     Tobacco Use     Smoking status: Never Smoker     Smokeless tobacco: Never Used   Substance Use Topics     Alcohol use: No     OBJECTIVE:  Physical Exam:  BP (!) 140/80 (BP Location: Left arm, Patient Position: Sitting, Cuff Size: Adult Regular)   Pulse 69   LMP 08/13/2005   SpO2 97%    Weight: 113.9 kg (251 lb)   Height: 1.537 m (5' 0.5\")   BMI= 49.01    General Appearance: healthy, alert and no distress   Skin: no suspicious lesions or rashes  Neuro: Normal strength and tone, mentation intact and speech normal  Vascular: good pulses, and cappillary refill   Lymph: no lymphadenopathy   Psych:  mentation appears normal and affect normal/bright  Resp: no increased work of breathing     Bilateral Knee Exam:  Gait: " broad-based  Squat: 40 % painful.     Left Knee Right Knee   Alignment moderate varus moderate varus   Patellofemoral Joint mild crepitations mild crepitations   Effusion ? ?   ROM 0-110* 0-110   Tender popliteal area and ? mass medial joint line, popliteal area, ? mass   Ligaments Lachman's: stable  Anterior/Posterior Drawer: stable  Varus/Valgus stress:pseudolaxity medially Lachman's: stable  Anterior/Posterior Drawer: stable  Varus/Valgus stress: pseudolaxity medially   McMurrays pain but no catching with hyperflexion pain but no catching with hyperflexion     X-rays:  Obtained 11/20/2019 of the bilateral knees: 2 views, reviewed in the office with the patient by myself today and show      Comparison: 7/14/2014 radiograph     Findings:     AP view of bilateral knees, and lateral views of the right and left knee were  obtained.      RIGHT KNEE     No acute osseous abnormality.  Small joint effusion.     Severe medial compartment joint space narrowing with osteophytosis.     Soft tissue is unremarkable.     LEFT KNEE     No acute osseous abnormality.  Small knee joint effusion.     Severe medial compartment joint space narrowing with osteophytosis.     Soft tissue is unremarkable.                                                                      Impression:  1.  Severe bilateral medial compartment joint space narrowing.         ASSESSMENT:   Severe bilateral knee osteoarthritis  Left, possible bilateral popliteal cysts    Encounter Diagnoses   Name Primary?     Chronic pain of both knees      Primary osteoarthritis of both knees Yes     Morbid obesity (H)         PLAN:   Her BMI is well above our limit of 40.  She is also short of stature and carries a lot of her weight in her thighs.  This combination greatly increases the risks associated with total knee arthroplasty.  We discussed the risks of increased weight in relation to surgery and the importance of weight loss for a future arthroplasty.   In the  meantime, I think the treatment team's goal at this point should be to help control her pain, to buy some time so she can lose weight before having total knee replacement.  I think a well fitting  brace would be quite helpful, but but her leg would be difficult to fit.  Aspiration of popliteal cysts, if present could help her posterior knee pains.  I think it is just as likely that the posterior knee pains could be caused by the severe osteoarthritis in the medial compartment that extends all the way to the posterior aspect of the joint.  I have asked Dr. Mac to try image guided popliteal cyst aspirations as part of our desperate effort to help her pain.  I also suggest trying hyaluronic acid injections, preferably image guided.  An AVS was given outlining some other ways to manage osteoarthritis pain.    Return to clinic: as needed.  Patellofemoral views would be needed if surgery was being contemplated.    HORACIO Bowen MD  Dept. Orthopedic Surgery  [facility]

## 2020-02-18 ENCOUNTER — OFFICE VISIT (OUTPATIENT)
Dept: ORTHOPEDICS | Facility: CLINIC | Age: 50
End: 2020-02-18
Payer: COMMERCIAL

## 2020-02-18 VITALS — DIASTOLIC BLOOD PRESSURE: 80 MMHG | SYSTOLIC BLOOD PRESSURE: 140 MMHG | HEART RATE: 69 BPM | OXYGEN SATURATION: 97 %

## 2020-02-18 DIAGNOSIS — M25.561 CHRONIC PAIN OF BOTH KNEES: ICD-10-CM

## 2020-02-18 DIAGNOSIS — E66.01 MORBID OBESITY (H): ICD-10-CM

## 2020-02-18 DIAGNOSIS — G89.29 CHRONIC PAIN OF BOTH KNEES: ICD-10-CM

## 2020-02-18 DIAGNOSIS — M25.562 CHRONIC PAIN OF BOTH KNEES: ICD-10-CM

## 2020-02-18 DIAGNOSIS — M17.0 PRIMARY OSTEOARTHRITIS OF BOTH KNEES: Primary | ICD-10-CM

## 2020-02-18 PROCEDURE — 99204 OFFICE O/P NEW MOD 45 MIN: CPT | Performed by: ORTHOPAEDIC SURGERY

## 2020-02-18 ASSESSMENT — PAIN SCALES - GENERAL: PAINLEVEL: MODERATE PAIN (5)

## 2020-02-18 NOTE — LETTER
2/18/2020         RE: Adelaida Packer  [address] [place] [address]        Dear Colleague,    Thank you for referring your patient, Adelaida Packer, to the [facility]. Please see a copy of my visit note below.    SUBJECTIVE:   Adelaida Packer is a 49 year old female who is seen in consultation at the request of Tj Mac DO for evaluation of bilateral knee pain that has been present years. No known injury.    Present symptoms: left side pain in the posterior knee. Pain at night, sitting.  Right: anterior pain.  Painful to walk.  Locks up. Feeling of giving-way occasionally.  Constant pain.     Treatments tried to this point: has a bakers cyst on the left,  Had corticosteroid injection in the knees 4-5 times the last 4 years.  They only work a couple of weeks.  (no cyst aspirations)  In the process of fitting for  braces    Orthopedic PMH: bunion surgery 2018 right side    Review of Systems:  Constitutional:  NEGATIVE for fever, chills, change in weight  Integumentary/Skin:  NEGATIVE for worrisome rashes, moles or lesions  Eyes:  NEGATIVE for vision changes or irritation  ENT/Mouth:  NEGATIVE for ear, mouth and throat problems  Resp:  NEGATIVE for significant cough or SOB  Breast:  NEGATIVE for masses, tenderness or discharge  CV:  NEGATIVE for chest pain, palpitations or peripheral edema  GI:  NEGATIVE for nausea, abdominal pain, heartburn, or change in bowel habits  :  Negative   Musculoskeletal:  See HPI above  Neuro:  NEGATIVE for weakness, dizziness or paresthesias  Endocrine:  NEGATIVE for temperature intolerance, skin/hair changes  Heme/allergy/immune:  NEGATIVE for bleeding problems  Psychiatric:  NEGATIVE for changes in mood or affect    Past Medical History:   Past Medical History:   Diagnosis Date     Allergic rhinitis due to other allergen     seasonal     Diabetes (H)      Migraine, unspecified, without mention of intractable migraine without  "mention of status migrainosus      Monoclonal gammopathy      Morbid obesity (H)      Unspecified essential hypertension     dx around age 31     Past Surgical History:   Past Surgical History:   Procedure Laterality Date     C ANESTH,KNEE AREA SURGERY  01/2001     C GASTROPLASTY,OBESITY,VERT BAND      removed 2009     [facility] REMOVE TONSILS/ADENOIDS,12+ Y/O  1995     HEAD & NECK SURGERY  3/2013    Parathyroidectomy--had to go back in 6 days later for a possible bleed     HYSTERECTOMY, VAGINAL  12/2005    hysterectomy- fibroids     ORTHOPEDIC SURGERY       Family History:   Family History   Problem Relation Age of Onset     Hypertension Mother      Gynecology Mother         hysterectomy     Gastrointestinal Disease Mother         bowel upstruction     Thyroid Disease Mother      Neurologic Disorder Mother      Osteoporosis Mother      Hypertension Father      Lipids Father      Arthritis Father      Heart Disease Father      Prostate Cancer Brother      Alcohol/Drug Brother      Circulatory Brother      Arthritis Paternal Grandmother      Cancer Maternal Grandfather         bone     Eye Disorder Maternal Grandfather      Prostate Cancer Maternal Grandfather      Glaucoma Maternal Grandfather      Cancer Maternal Grandmother         tumor-head     Obesity Maternal Grandmother      Respiratory Daughter         asthma     Diabetes Sister      Cerebrovascular Disease Sister      Macular Degeneration No family hx of      Social History:   Social History     Tobacco Use     Smoking status: Never Smoker     Smokeless tobacco: Never Used   Substance Use Topics     Alcohol use: No     OBJECTIVE:  Physical Exam:  BP (!) 140/80 (BP Location: Left arm, Patient Position: Sitting, Cuff Size: Adult Regular)   Pulse 69   LMP 08/13/2005   SpO2 97%    Weight: 113.9 kg (251 lb)   Height: 1.537 m (5' 0.5\")   BMI= 49.01    General Appearance: healthy, alert and no distress   Skin: no suspicious lesions or rashes  Neuro: Normal strength " and tone, mentation intact and speech normal  Vascular: good pulses, and cappillary refill   Lymph: no lymphadenopathy   Psych:  mentation appears normal and affect normal/bright  Resp: no increased work of breathing     Bilateral Knee Exam:  Gait: broad-based  Squat: 40 % painful.     Left Knee Right Knee   Alignment moderate varus moderate varus   Patellofemoral Joint mild crepitations mild crepitations   Effusion ? ?   ROM 0-110* 0-110   Tender popliteal area and ? mass medial joint line, popliteal area, ? mass   Ligaments Lachman's: stable  Anterior/Posterior Drawer: stable  Varus/Valgus stress:pseudolaxity medially Lachman's: stable  Anterior/Posterior Drawer: stable  Varus/Valgus stress: pseudolaxity medially   McMurrays pain but no catching with hyperflexion pain but no catching with hyperflexion     X-rays:  Obtained 11/20/2019 of the bilateral knees: 2 views, reviewed in the office with the patient by myself today and show      Comparison: 7/14/2014 radiograph     Findings:     AP view of bilateral knees, and lateral views of the right and left knee were  obtained.      RIGHT KNEE     No acute osseous abnormality.  Small joint effusion.     Severe medial compartment joint space narrowing with osteophytosis.     Soft tissue is unremarkable.     LEFT KNEE     No acute osseous abnormality.  Small knee joint effusion.     Severe medial compartment joint space narrowing with osteophytosis.     Soft tissue is unremarkable.                                                                      Impression:  1.  Severe bilateral medial compartment joint space narrowing.         ASSESSMENT:   Severe bilateral knee osteoarthritis  Left, possible bilateral popliteal cysts    Encounter Diagnoses   Name Primary?     Chronic pain of both knees      Primary osteoarthritis of both knees Yes     Morbid obesity (H)         PLAN:   Her BMI is well above our limit of 40.  She is also short of stature and carries a lot of her  weight in her thighs.  This combination greatly increases the risks associated with total knee arthroplasty.  We discussed the risks of increased weight in relation to surgery and the importance of weight loss for a future arthroplasty.   In the meantime, I think the treatment team's goal at this point should be to help control her pain, to buy some time so she can lose weight before having total knee replacement.  I think a well fitting  brace would be quite helpful, but but her leg would be difficult to fit.  Aspiration of popliteal cysts, if present could help her posterior knee pains.  I think it is just as likely that the posterior knee pains could be caused by the severe osteoarthritis in the medial compartment that extends all the way to the posterior aspect of the joint.  I have asked Dr. Mac to try image guided popliteal cyst aspirations as part of our desperate effort to help her pain.  I also suggest trying hyaluronic acid injections, preferably image guided.  An AVS was given outlining some other ways to manage osteoarthritis pain.    Return to clinic: as needed.  Patellofemoral views would be needed if surgery was being contemplated.    HORACIO Bowen MD  Dept. Orthopedic Surgery  [facility]         Again, thank you for allowing me to participate in the care of your patient.        Sincerely,        Diogo Bowen MD

## 2020-02-18 NOTE — PATIENT INSTRUCTIONS
Non-surgical treatment for knee arthritis includes:    * rest.  For acute flares. (Periodic).    * Activity modification - avoid impact activities or activities that aggravate symptoms.   Keeping joints moving may be the most important aspect of joint health.    * Tylenol as needed for pain, consider Tylenol arthritis or similar.  (no more than 3000mg of acetaminophen in a 24 hour period)    * NSAIDS (non-steroidal anti-inflammatory medications; e.g. Aleve, advil, motrin, ibuprofen, etc) - regular use for inflammation ( twice daily or three times daily), with food, as long as there are no contra-indications to using these medications. (if you have stomach ulcers, reflux, or kidney dysfunction, you should talk to your primary care provider to discuss whether these medications are right for your).  Please discuss other concerns with your primary care doctor if needed.    *Glucosamine-Chondroitin (1500 mg per day. Available over the counter)  has not been proven to help arthritis, yet some patients claim that it does help them with their arthritis pain.    * ice, 15-20 minutes at a time several times a day or as needed if there is swelling, or after an acute injury.  * Strengthening of quadriceps muscles  * Physical Therapy for strengthening, stretching and range of motion exercises of legs      * Weight loss: Weight loss:  Your body mass index is There is no height or weight on file to calculate BMI.. A healthy BMI is below 25.  Weight loss benefits, not only the current pain symptoms, but also overall health. Recommend a good diet plan that works for the patient, with the assistance of a dietician or primary care doctor as needed. Also, a good, low-impact exercise program for at least 20 minutes per day, 3 times per week, such as exercise bike, elliptical , or pool.    *DIET:  Although there is no set diet that will eliminate/cure arthritis, there are some foods that may help inflammation, which is the cause  "of the pain in arthritis, such as concentrated cherry juice, Tumeric, and Fish Oil.    Tumeric with Black pepper is the most effective.    Check out the Arthritis Foundation website for further diet suggestions to potentially reduce inflammation and pain:    http://www.arthritis.org/living-with-arthritis/arthritis-diet/best-foods-for-arthritis/    * Exercise: low impact such as stationary bike, elliptical, pool.  Some Knee exercises can be found at the Ortho[employer] website:  https://orthoinfo.aaos.org/en/staying-healthy/knee-exercises/     * Injections: cortisone, versus viscosupplementation (hyaluronic acid, \"rooster comb\", \"gel shots\")  * Bracing: bracing the knee may offer some relief of symptoms when worn and provide some stability.  These can be over the counter, or custom-made \"\" braces that take the pressure off of the worn portion of the knee.  * over the counter supplements such as glucosamine-chondroitin sulfate may help with joint pain.  * topical ointments may help as well with pain  *CBD oil has been found to help arthritis pain as well.    *Accupuncture may be helpful to control the pain.    *Surgical options include arthroscopy, osteotomy (correcting the alignment of the bone by cutting it), biologic resurfacing, cadaver partial transplant, partial or total knee replacement.  This should be discussed with Dr. Bowen.                          "

## 2020-02-18 NOTE — NURSING NOTE
Adelaida to follow up with Primary Care provider regarding elevated blood pressure.    David GODDARD

## 2020-02-25 ENCOUNTER — OFFICE VISIT (OUTPATIENT)
Dept: ORTHOPEDICS | Facility: CLINIC | Age: 50
End: 2020-02-25
Payer: COMMERCIAL

## 2020-02-25 VITALS — WEIGHT: 251 LBS | BODY MASS INDEX: 47.39 KG/M2 | HEIGHT: 61 IN

## 2020-02-25 DIAGNOSIS — M17.0 BILATERAL PRIMARY OSTEOARTHRITIS OF KNEE: Primary | ICD-10-CM

## 2020-02-25 PROCEDURE — 99213 OFFICE O/P EST LOW 20 MIN: CPT | Performed by: FAMILY MEDICINE

## 2020-02-25 ASSESSMENT — PAIN SCALES - GENERAL: PAINLEVEL: SEVERE PAIN (7)

## 2020-02-25 ASSESSMENT — MIFFLIN-ST. JEOR: SCORE: 1687.97

## 2020-02-25 NOTE — PROGRESS NOTES
"HISTORY OF PRESENT ILLNESS  Ms. Packer is a pleasant 49 year old female following up with bilateral knee osteoarthritis.  Adelaida is continuing to experience bilateral severe knee pain.  She was recently seen by Dr. Bowen, she is here to discuss alternative options to surgery.  No new complaints or concerns.  Fortunately she was able to lose 10 pounds since last seeing me, she has started weight watchers.     PHYSICAL EXAM  Vitals:    02/25/20 0747   Weight: 113.9 kg (251 lb)   Height: 1.537 m (5' 0.5\")       ASSESSMENT & PLAN  Ms. Packer is a 49 year old female following up with bilateral knee osteoarthritis.    I again reviewed her knee x-rays in the room with her.  I had a lengthy discussion with Adelaida centering around her options, we revisited the role of injection based interventions with corticosteroids, hyaluronic acid, and PRP.  I do agree with Dr. Bowen that she may be a good candidate for hyaluronic acid, we can start the approval process as she is interested in this.    She would also like to obtain a second opinion regarding her knees, I placed the referral.    Adelaida is going to follow-up for hyaluronic acid injections under ultrasound guidance.    It was a pleasure seeing Adelaida.    20 minutes was spent in the room, 15 of which was spent on counseling and coordination of care.        Tj Mac DO, CAQSM      This note was constructed using Dragon dictation software, please excuse any minor errors in spelling, grammar, or syntax.    "

## 2020-02-25 NOTE — LETTER
"    2/25/2020         RE: Adelaida Packer  [address] [address]        Dear Colleague,    Thank you for referring your patient, Adelaida Packer, to the [facility]. Please see a copy of my visit note below.    HISTORY OF PRESENT ILLNESS  Ms. Packer is a pleasant 49 year old female following up with bilateral knee osteoarthritis.  Adelaida is continuing to experience bilateral severe knee pain.  She was recently seen by Dr. Bowen, she is here to discuss alternative options to surgery.  No new complaints or concerns.  Fortunately she was able to lose 10 pounds since last seeing me, she has started weight watchers.     PHYSICAL EXAM  Vitals:    02/25/20 0747   Weight: 113.9 kg (251 lb)   Height: 1.537 m (5' 0.5\")       ASSESSMENT & PLAN  Ms. Packer is a 49 year old female following up with bilateral knee osteoarthritis.    I again reviewed her knee x-rays in the room with her.  I had a lengthy discussion with Adelaida centering around her options, we revisited the role of injection based interventions with corticosteroids, hyaluronic acid, and PRP.  I do agree with Dr. Bowen that she may be a good candidate for hyaluronic acid, we can start the approval process as she is interested in this.    She would also like to obtain a second opinion regarding her knees, I placed the referral.    Adelaida is going to follow-up for hyaluronic acid injections under ultrasound guidance.    It was a pleasure seeing Adelaida.    20 minutes was spent in the room, 15 of which was spent on counseling and coordination of care.        Tj Mac DO, CAQSM      This note was constructed using Dragon dictation software, please excuse any minor errors in spelling, grammar, or syntax.      Again, thank you for allowing me to participate in the care of your patient.        Sincerely,        Tj Mac DO    "

## 2020-03-03 ENCOUNTER — OFFICE VISIT (OUTPATIENT)
Dept: ORTHOPEDICS | Facility: CLINIC | Age: 50
End: 2020-03-03
Payer: COMMERCIAL

## 2020-03-03 VITALS — HEIGHT: 61 IN | BODY MASS INDEX: 47.39 KG/M2 | WEIGHT: 251 LBS

## 2020-03-03 DIAGNOSIS — M17.0 BILATERAL PRIMARY OSTEOARTHRITIS OF KNEE: Primary | ICD-10-CM

## 2020-03-03 PROCEDURE — 20610 DRAIN/INJ JOINT/BURSA W/O US: CPT | Mod: 50 | Performed by: FAMILY MEDICINE

## 2020-03-03 PROCEDURE — 99207 ZZC DROP WITH A PROCEDURE: CPT | Performed by: FAMILY MEDICINE

## 2020-03-03 ASSESSMENT — PAIN SCALES - GENERAL: PAINLEVEL: MODERATE PAIN (4)

## 2020-03-03 ASSESSMENT — MIFFLIN-ST. JEOR: SCORE: 1687.97

## 2020-03-03 NOTE — PROGRESS NOTES
"      [facility] FOLLOW-UP VISIT 3/3/2020    Adelaida Rendonon's chief complaint for this visit includes:  Chief Complaint   Patient presents with     RECHECK     bilateral knee HA injections      PCP: Windy Gordillo    Referring Provider:  No referring provider defined for this encounter.    Ht 1.537 m (5' 0.5\")   Wt 113.9 kg (251 lb)   LMP 08/13/2005   BMI 48.21 kg/m    Moderate Pain (4)       Interval History:     Follow up reason: bilateral knee HA injections    Medical History:    Any recent changes to your medical history? No    Any new medication prescribed since last visit? No    Review of Systems:    Do you have fever, chills, weight loss? No    Do you have any vision problems? No    Do you have any chest pain or edema? No    Do you have any shortness of breath or wheezing?  No    Do you have stomach problems? No    Do you have any urinary track issues? No    Do you have any numbness or focal weakness? No    Do you have diabetes? No     Do you have problems with bleeding or clotting? No    Do you have an rashes or other skin lesions? No    Adelaida is here for bilateral knee injections with HA.    PROCEDURE    Knee Injection, hyaluronic acid, bilateral  The patient was informed of the risks and the benefits of the procedure and a written consent was signed.  The patient s left knee was prepped with chlorhexidine in sterile fashion.   Synvisc One syringe removed from packaging and inspected for integrity.  Injection was performed using substerile technique.  A 1.5-inch 22-gauge needle was used to enter the lateral aspect of the left knee.  Injection performed successfully without difficulty.  There were no complications. The patient tolerated the procedure well. There was negligible bleeding.   The patient s right knee was prepped with chlorhexidine in sterile fashion.   Synvisc One syringe removed from packaging and inspected for integrity.  Injection was performed using substerile " technique.  A 1.5-inch 22-gauge needle was used to enter the lateral aspect of the right knee.  Injection performed successfully without difficulty.  There were no complications. The patient tolerated the procedure well. There was negligible bleeding.   The patient was instructed to ice the knee upon leaving clinic and refrain from overuse over the next 3 days.   The patient was instructed to call or go to the emergency room with any unusual pain, swelling, redness, or if otherwise concerned.    Zay Mac DO CAQSM

## 2020-03-03 NOTE — LETTER
"    3/3/2020         RE: Adelaida Packer  [address] [place] [address]        Dear Colleague,    Thank you for referring your patient, Adelaida Packer, to the [facility], [place]. Please see a copy of my visit note below.          [facility] FOLLOW-UP VISIT 3/3/2020    Adelaida Packer's chief complaint for this visit includes:  Chief Complaint   Patient presents with     RECHECK     bilateral knee HA injections      PCP: Windy Gordillo    Referring Provider:  No referring provider defined for this encounter.    Ht 1.537 m (5' 0.5\")   Wt 113.9 kg (251 lb)   LMP 08/13/2005   BMI 48.21 kg/m     Moderate Pain (4)       Interval History:     Follow up reason: bilateral knee HA injections    Medical History:    Any recent changes to your medical history? No    Any new medication prescribed since last visit? No    Review of Systems:    Do you have fever, chills, weight loss? No    Do you have any vision problems? No    Do you have any chest pain or edema? No    Do you have any shortness of breath or wheezing?  No    Do you have stomach problems? No    Do you have any urinary track issues? No    Do you have any numbness or focal weakness? No    Do you have diabetes? No     Do you have problems with bleeding or clotting? No    Do you have an rashes or other skin lesions? No    Adelaida is here for bilateral knee injections with HA.    PROCEDURE    Knee Injection, hyaluronic acid, bilateral  The patient was informed of the risks and the benefits of the procedure and a written consent was signed.  The patient s left knee was prepped with chlorhexidine in sterile fashion.   Synvisc One syringe removed from packaging and inspected for integrity.  Injection was performed using substerile technique.  A 1.5-inch 22-gauge needle was used to enter the lateral aspect of the left knee.  Injection performed successfully without difficulty.  There were no complications. The patient " tolerated the procedure well. There was negligible bleeding.   The patient s right knee was prepped with chlorhexidine in sterile fashion.   Synvisc One syringe removed from packaging and inspected for integrity.  Injection was performed using substerile technique.  A 1.5-inch 22-gauge needle was used to enter the lateral aspect of the right knee.  Injection performed successfully without difficulty.  There were no complications. The patient tolerated the procedure well. There was negligible bleeding.   The patient was instructed to ice the knee upon leaving clinic and refrain from overuse over the next 3 days.   The patient was instructed to call or go to the emergency room with any unusual pain, swelling, redness, or if otherwise concerned.    Zay Mac DO CAQSM          Large Joint Injection/Arthocentesis: bilateral knee  Date/Time: 3/3/2020 8:29 AM  Performed by: Tj Mac DO  Authorized by: Tj Mac DO     Indications:  Pain  Needle Size:  22 G  Guidance: landmark guided    Approach:  Lateral  Location:  Knee  Laterality:  Bilateral      Medications (Right):  48 mg hylan 48 MG/6ML  Medications (Left):  48 mg hylan 48 MG/6ML  Outcome:  Tolerated well, no immediate complications  Procedure discussed: discussed risks, benefits, and alternatives    Consent Given by:  Patient  Timeout: timeout called immediately prior to procedure    Prep: patient was prepped and draped in usual sterile fashion            Again, thank you for allowing me to participate in the care of your patient.        Sincerely,        Tj Mac DO

## 2020-03-03 NOTE — PROGRESS NOTES
Large Joint Injection/Arthocentesis: bilateral knee  Date/Time: 3/3/2020 8:29 AM  Performed by: Tj Mac DO  Authorized by: Tj Mac DO     Indications:  Pain  Needle Size:  22 G  Guidance: landmark guided    Approach:  Lateral  Location:  Knee  Laterality:  Bilateral      Medications (Right):  48 mg hylan 48 MG/6ML  Medications (Left):  48 mg hylan 48 MG/6ML  Outcome:  Tolerated well, no immediate complications  Procedure discussed: discussed risks, benefits, and alternatives    Consent Given by:  Patient  Timeout: timeout called immediately prior to procedure    Prep: patient was prepped and draped in usual sterile fashion

## 2020-03-09 ENCOUNTER — TELEPHONE (OUTPATIENT)
Dept: ORTHOPEDICS | Facility: CLINIC | Age: 50
End: 2020-03-09

## 2020-03-09 NOTE — TELEPHONE ENCOUNTER
The patient was contacted today to discuss her increased pain. It was explained to the patient that HA injections can take up to 2-4 weeks to feel the effects.  The patient reports increased pain from her knee caps to her calves since the injection. She denies any fever, chilling, redness or hot sensation. She states she knees are a little warm and swollen.  Dr. Mac was consulted. It was explained to Adelaida that if she develops a fever or have increased hot and red feeling in the knee she should go to the ED.  She will also try to get into see Dr. Plata at [facility].

## 2020-03-09 NOTE — TELEPHONE ENCOUNTER
[facility] Call Center    Phone Message    May a detailed message be left on voicemail: yes     Reason for Call: Other: Pt states she had an injection in both knees last week and is still experiencing a lot of pain. Pt states she has been having to take ibuprofen and tylenol and it does not seem to be helping. Please avise.      Action Taken: Message routed to:  Adult Clinics: Sports Medicine p [id number]    Travel Screening: Not Applicable

## 2020-03-25 ENCOUNTER — MYC MEDICAL ADVICE (OUTPATIENT)
Dept: ORTHOPEDICS | Facility: CLINIC | Age: 50
End: 2020-03-25

## 2020-03-25 DIAGNOSIS — M17.0 BILATERAL PRIMARY OSTEOARTHRITIS OF KNEE: Primary | ICD-10-CM

## 2020-03-27 NOTE — TELEPHONE ENCOUNTER
"[employer]" - Pat Mackenzie is calling.     Patient told Pat that Dr. Mac would not send a referral to [facility].     Writer told her that maybe there is some confusion happening and that she would write everything down for the clinic.    Pat is looking for a referral to cover these past and future appointment for her pool therapy at [facility] (however, the bill through [facility]):    dates of service   1/7/2020 physical therapy eval AND PT  1/20 PT   1/30 PT    2/21 PT  2/28 PT .   FUTURE: 3 more appt after COVID is done. Can the referral be extended for 6 months due to the unknown when they are opening again.    Pat would like the referral done online using the [employer] website for providers.      If there are anymore questions: please call Pat.     Please advise.

## 2020-04-03 NOTE — TELEPHONE ENCOUNTER
[facility] Call Center    Phone Message    May a detailed message be left on voicemail: yes     Reason for Call: Form or Letter   Clare from [email or web address] called and states the form needs to be submitted electronically online, it can not be faxed. Clare said to lopez the [facility] at [phone]. Or call her at [phone] with reference number [id number] for further questions.       Action Taken: Message routed to:  Adult Clinics: Sports Medicine p [id number]

## 2020-04-07 NOTE — TELEPHONE ENCOUNTER
Called and talked to Provider portal support, they are unsure how to proceed as we are a speciality clinic and not the patients primary clinic, it was explained to the representative that we are the primary clinic. She would like Dr. Mac to reach out to Matt Lord for access to the [employer] provider portal.     Clare from [facility] services was contacted and a voicemail was left.     Clare called the caller back.  It was explained to the patient that We can try to get access to the provider protal, but we might not have accesses asap with COVID 19. She was agreeable and the patient forms was faxed in again.

## 2020-05-26 ENCOUNTER — VIRTUAL VISIT (OUTPATIENT)
Dept: PEDIATRICS | Facility: CLINIC | Age: 50
End: 2020-05-26
Payer: COMMERCIAL

## 2020-05-26 DIAGNOSIS — E66.01 MORBID OBESITY WITH BMI OF 45.0-49.9, ADULT (H): Primary | ICD-10-CM

## 2020-05-26 DIAGNOSIS — G43.709 CHRONIC MIGRAINE WITHOUT AURA WITHOUT STATUS MIGRAINOSUS, NOT INTRACTABLE: ICD-10-CM

## 2020-05-26 DIAGNOSIS — N18.30 CKD (CHRONIC KIDNEY DISEASE) STAGE 3, GFR 30-59 ML/MIN (H): ICD-10-CM

## 2020-05-26 PROCEDURE — 99214 OFFICE O/P EST MOD 30 MIN: CPT | Mod: GT | Performed by: FAMILY MEDICINE

## 2020-05-26 RX ORDER — TOPIRAMATE 25 MG/1
TABLET, FILM COATED ORAL
Qty: 60 TABLET | Refills: 0 | Status: SHIPPED | OUTPATIENT
Start: 2020-05-26 | End: 2020-06-16

## 2020-05-26 RX ORDER — BUPROPION HYDROCHLORIDE 150 MG/1
150 TABLET ORAL EVERY MORNING
Qty: 30 TABLET | Refills: 3 | Status: SHIPPED | OUTPATIENT
Start: 2020-05-26 | End: 2020-12-01
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
## 2020-05-26 NOTE — PATIENT INSTRUCTIONS
Schedule for dilation consult  Schedule for endocrinology consult weight loss  Start on Wellbutrin 150 mg daily in the morning  Start on Topamax 25 mg, increase by 1 tablet weekly until you reach the 75 mg once at bedtime  Schedule for recheck in 3 to 4 weeks unless you are able to see the endocrinologist by then.  Stop using Celebrex  Use Tylenol arthritis 2 tablets twice a day as needed for knee pain along with topical lidocaine Aspercreme once a day as needed

## 2020-05-27 DIAGNOSIS — N18.30 CKD (CHRONIC KIDNEY DISEASE) STAGE 3, GFR 30-59 ML/MIN (H): Primary | ICD-10-CM

## 2020-05-27 NOTE — TELEPHONE ENCOUNTER
Writer received a refill request from: [facility] in [place], [place].  [phone]    Medication: amLODIPine (NORVASC) 10 MG tablet     Sig: Take 1 tablet (10 mg) by mouth daily     Date last dispensed: 10/01/2019    * chart note states pt is not taking dated 5/26/2020  Med still on list.      Pt's last office visit: 12/03/2019  Next scheduled office visit: 12/01/2020      Per the RN/LPN medication refill protocol, writer is to refill this request. If able to refill, please call the pt to inform them the RX was sent to the pharmacy. If unable to refill, route this encounter to the prescribing physician for authorization or further instructions.

## 2020-05-29 RX ORDER — AMLODIPINE BESYLATE 10 MG/1
10 TABLET ORAL DAILY
Qty: 90 TABLET | Refills: 3 | Status: SHIPPED | OUTPATIENT
Start: 2020-05-29 | End: 2020-05-29

## 2020-05-29 RX ORDER — AMLODIPINE BESYLATE 5 MG/1
5 TABLET ORAL DAILY
Qty: 90 TABLET | Refills: 3 | Status: SHIPPED | OUTPATIENT
Start: 2020-05-29 | End: 2021-06-16

## 2020-05-29 NOTE — TELEPHONE ENCOUNTER
SPoke to patient. She is actually only taking Amlodipine 5 mg daily.     Wendy Sepulveda, RN, BSN  Nephrology Care Coordinator  [facility] [place]

## 2020-08-24 ENCOUNTER — OFFICE VISIT (OUTPATIENT)
Dept: ORTHOPEDICS | Facility: CLINIC | Age: 50
End: 2020-08-24
Payer: COMMERCIAL

## 2020-08-24 VITALS — HEIGHT: 61 IN | WEIGHT: 251 LBS | BODY MASS INDEX: 47.39 KG/M2

## 2020-08-24 DIAGNOSIS — M17.0 BILATERAL PRIMARY OSTEOARTHRITIS OF KNEE: Primary | ICD-10-CM

## 2020-08-24 PROCEDURE — 99212 OFFICE O/P EST SF 10 MIN: CPT | Mod: 25 | Performed by: FAMILY MEDICINE

## 2020-08-24 PROCEDURE — 20610 DRAIN/INJ JOINT/BURSA W/O US: CPT | Mod: 50 | Performed by: FAMILY MEDICINE

## 2020-08-24 RX ADMIN — TRIAMCINOLONE ACETONIDE 40 MG: 40 INJECTION, SUSPENSION INTRA-ARTICULAR; INTRAMUSCULAR at 17:00

## 2020-08-24 ASSESSMENT — MIFFLIN-ST. JEOR: SCORE: 1682.97

## 2020-08-24 NOTE — PROGRESS NOTES
"Scribed by Shanika Santana ATC for Dr. Mac on 8/24/20 at 5:00 PM, based on the providers statements to me.       [facility] FOLLOW-UP VISIT 8/24/2020    Adelaida Packer's chief complaint for this visit includes:  Chief Complaint   Patient presents with     RECHECK     bilateral knee joint follow up. left worse then right,      PCP: Windy Gordillo    Referring Provider:  No referring provider defined for this encounter.    Ht 1.537 m (5' 0.5\")   Wt 113.9 kg (251 lb)   LMP 08/13/2005   BMI 48.21 kg/m    Data Unavailable       Interval History:     Follow up reason: bilateral knees     Medical History:    Any recent changes to your medical history? No    Any new medication prescribed since last visit? No    Review of Systems:    Do you have fever, chills, weight loss? No    Do you have any vision problems? No    Do you have any chest pain or edema? No    Do you have any shortness of breath or wheezing?  No    Do you have stomach problems? No    Do you have any urinary track issues? No    Do you have any numbness or focal weakness? No    Do you have diabetes? No    Do you have problems with bleeding or clotting? No    Do you have an rashes or other skin lesions? No    Large Joint Injection/Arthocentesis: bilateral knee    Date/Time: 8/24/2020 5:00 PM  Performed by: Tj Mac DO  Authorized by: Tj Mac DO     Indications:  Pain  Needle Size:  22 G  Guidance: landmark guided    Approach:  Lateral  Location:  Knee  Laterality:  Bilateral      Medications (Right):  40 mg triamcinolone 40 MG/ML  Medications (Left):  40 mg triamcinolone 40 MG/ML  Outcome:  Tolerated well, no immediate complications  Procedure discussed: discussed risks, benefits, and alternatives    Consent Given by:  Patient  Timeout: timeout called immediately prior to procedure    Prep: patient was prepped and draped in usual sterile fashion       PROCEDURE    Knee Injection - Intraarticular    The patient was " informed of the risks and the benefits of the procedure and a written consent was signed.    The patient s left knee was prepped with chlorhexidine in sterile fashion.   40 mg of triamcinolone suspension was drawn up into a 5 mL syringe with 4 mL of 1% lidocaine.  Injection was performed using substerile technique.  A 1.5-inch 22-gauge needle was used to enter the lateral aspect of the left knee.  Injection performed successfully without difficulty.  There were no complications. The patient tolerated the procedure well. There was negligible bleeding.     The patient's right knee was prepped with chlorhexidine in sterile fashion.   40 mg of triamcinolone suspension was drawn up into a 5 mL syringe with 4 mL of 1% lidocaine.  Injection was performed using substerile technique.  A 1.5-inch 22-gauge needle was used to enter the lateral aspect of the right knee.  Injection performed successfully without difficulty.  There were no complications. The patient tolerated the procedure well. There was negligible bleeding.     The patient was instructed to ice the knee upon leaving clinic and refrain from overuse over the next 3 days.   The patient was instructed to call or go to the emergency room with any unusual pain, swelling, redness, or if otherwise concerned.

## 2020-08-24 NOTE — PROGRESS NOTES
"HISTORY OF PRESENT ILLNESS  Ms. Packer is a pleasant 50 year old female following up with knee osteoarthritis.  Adelaida has symptomatic Ch cysts in each knee, which is ultimately secondary to her osteoarthritis.  I last saw Adelaida in March of this year, I injected both of her knees with hyaluronic acid at that visit.  In January of this year she had bilateral corticosteroid injections.  The corticosteroids did help somewhat with her pain, unfortunately the hyaluronic acid did not provide much relief.  In the interim she has been seen by Dr. Steven Plata, they discussed weight loss prior to a potential knee replacement.     PHYSICAL EXAM  Vitals:    08/24/20 1643   Weight: 113.9 kg (251 lb)   Height: 1.537 m (5' 0.5\")     General  - normal appearance, in no obvious distress  HEENT  - conjunctivae not injected, moist mucous membranes  CV  - normal popliteal pulse  Pulm  - normal respiratory pattern, non-labored  Musculoskeletal - right and left knee  - stance: mildly antalgic gait, genu varum bilaterally  - inspection: generalized swelling, 2+ effusion on right, 1+ effusion on left  - palpation: medial joint line tenderness bilaterally, patella and patellar tendon non-tender, normal popliteal pulse  - ROM: 100 degrees flexion, 0 degrees extension, painful active ROM    Neuro  - no sensory or motor deficit, grossly normal coordination, normal muscle tone  Skin  - no ecchymosis, erythema, warmth, or induration, no obvious rash  Psych  - interactive, appropriate, normal mood and affect          ASSESSMENT & PLAN  Ms. Packer is a 50 year old female following up with bilateral knee osteoarthritis.    Adelaida and I revisited our discussion centering around the spectrum of treatment options for osteoarthritis that preclude surgery.  This is a discussion that she is very familiar with given our previous visits and her 2 surgical consults.    I did inject her knees again today (see procedure note).    I also had a long " discussion with Adelaida regarding her options.  There is some recent evidence that shows that arthroplasty may be safer and or more effective in those with a larger BMI than previously thought.  I am referring her to Dr. Hernandez for another opinion regarding surgery.    Adelaida should follow up in weeks.  She should follow up sooner if the condition worsens or if other problems arise.    It was a pleasure seeing Adelaida.    Aside to and separate from the procedures approximately 25 minutes was spent in discussion regarding the above.      Tj Mac, , CAQSM      This note was constructed using Dragon dictation software, please excuse any minor errors in spelling, grammar, or syntax.

## 2020-08-24 NOTE — LETTER
"    8/24/2020         RE: Adelaida Packer  [id number] [place] [address]        Dear Colleague,    Thank you for referring your patient, Adelaida Packer, to the [facility]. Please see a copy of my visit note below.    HISTORY OF PRESENT ILLNESS  Ms. Packer is a pleasant 50 year old female following up with knee osteoarthritis.  Adelaida has symptomatic Ch cysts in each knee, which is ultimately secondary to her osteoarthritis.  I last saw Adelaida in March of this year, I injected both of her knees with hyaluronic acid at that visit.  In January of this year she had bilateral corticosteroid injections.  The corticosteroids did help somewhat with her pain, unfortunately the hyaluronic acid did not provide much relief.  In the interim she has been seen by Dr. Steven Plata, they discussed weight loss prior to a potential knee replacement.     PHYSICAL EXAM  Vitals:    08/24/20 1643   Weight: 113.9 kg (251 lb)   Height: 1.537 m (5' 0.5\")     General  - normal appearance, in no obvious distress  HEENT  - conjunctivae not injected, moist mucous membranes  CV  - normal popliteal pulse  Pulm  - normal respiratory pattern, non-labored  Musculoskeletal - right and left knee  - stance: mildly antalgic gait, genu varum bilaterally  - inspection: generalized swelling, 2+ effusion on right, 1+ effusion on left  - palpation: medial joint line tenderness bilaterally, patella and patellar tendon non-tender, normal popliteal pulse  - ROM: 100 degrees flexion, 0 degrees extension, painful active ROM    Neuro  - no sensory or motor deficit, grossly normal coordination, normal muscle tone  Skin  - no ecchymosis, erythema, warmth, or induration, no obvious rash  Psych  - interactive, appropriate, normal mood and affect          ASSESSMENT & PLAN  Ms. Packer is a 50 year old female following up with bilateral knee osteoarthritis.    Adelaida and I revisited our discussion centering around the spectrum of " "treatment options for osteoarthritis that preclude surgery.  This is a discussion that she is very familiar with given our previous visits and her 2 surgical consults.    I did inject her knees again today (see procedure note).    I also had a long discussion with Adelaida regarding her options.  There is some recent evidence that shows that arthroplasty may be safer and or more effective in those with a larger BMI than previously thought.  I am referring her to Dr. Hernandez for another opinion regarding surgery.    Adelaida should follow up in weeks.  She should follow up sooner if the condition worsens or if other problems arise.    It was a pleasure seeing Adelaida.    Aside to and separate from the procedures approximately 25 minutes was spent in discussion regarding the above.      Tj Mac DO, CAQSM      This note was constructed using Dragon dictation software, please excuse any minor errors in spelling, grammar, or syntax.      Scribed by Shanika Santana ATC for Dr. Mac on 8/24/20 at 5:00 PM, based on the providers statements to me.       [facility] FOLLOW-UP VISIT 8/24/2020    Adelaida Packer's chief complaint for this visit includes:  Chief Complaint   Patient presents with     RECHECK     bilateral knee joint follow up. left worse then right,      PCP: Windy Gordillo    Referring Provider:  No referring provider defined for this encounter.    Ht 1.537 m (5' 0.5\")   Wt 113.9 kg (251 lb)   LMP 08/13/2005   BMI 48.21 kg/m    Data Unavailable       Interval History:     Follow up reason: bilateral knees     Medical History:    Any recent changes to your medical history? No    Any new medication prescribed since last visit? No    Review of Systems:    Do you have fever, chills, weight loss? No    Do you have any vision problems? No    Do you have any chest pain or edema? No    Do you have any shortness of breath or wheezing?  No    Do you have stomach problems? No    Do you have any urinary " track issues? No    Do you have any numbness or focal weakness? No    Do you have diabetes? No    Do you have problems with bleeding or clotting? No    Do you have an rashes or other skin lesions? No    Large Joint Injection/Arthocentesis: bilateral knee    Date/Time: 8/24/2020 5:00 PM  Performed by: Tj Mac DO  Authorized by: Tj Mac DO     Indications:  Pain  Needle Size:  22 G  Guidance: landmark guided    Approach:  Lateral  Location:  Knee  Laterality:  Bilateral      Medications (Right):  40 mg triamcinolone 40 MG/ML  Medications (Left):  40 mg triamcinolone 40 MG/ML  Outcome:  Tolerated well, no immediate complications  Procedure discussed: discussed risks, benefits, and alternatives    Consent Given by:  Patient  Timeout: timeout called immediately prior to procedure    Prep: patient was prepped and draped in usual sterile fashion       PROCEDURE    Knee Injection - Intraarticular    The patient was informed of the risks and the benefits of the procedure and a written consent was signed.    The patient s left knee was prepped with chlorhexidine in sterile fashion.   40 mg of triamcinolone suspension was drawn up into a 5 mL syringe with 4 mL of 1% lidocaine.  Injection was performed using substerile technique.  A 1.5-inch 22-gauge needle was used to enter the lateral aspect of the left knee.  Injection performed successfully without difficulty.  There were no complications. The patient tolerated the procedure well. There was negligible bleeding.     The patient's right knee was prepped with chlorhexidine in sterile fashion.   40 mg of triamcinolone suspension was drawn up into a 5 mL syringe with 4 mL of 1% lidocaine.  Injection was performed using substerile technique.  A 1.5-inch 22-gauge needle was used to enter the lateral aspect of the right knee.  Injection performed successfully without difficulty.  There were no complications. The patient tolerated the procedure well. There was  negligible bleeding.     The patient was instructed to ice the knee upon leaving clinic and refrain from overuse over the next 3 days.   The patient was instructed to call or go to the emergency room with any unusual pain, swelling, redness, or if otherwise concerned.                Again, thank you for allowing me to participate in the care of your patient.        Sincerely,        Tj Mac, DO

## 2020-08-25 RX ORDER — TRIAMCINOLONE ACETONIDE 40 MG/ML
40 INJECTION, SUSPENSION INTRA-ARTICULAR; INTRAMUSCULAR
Status: DISCONTINUED | OUTPATIENT
Start: 2020-08-24 | End: 2021-08-09

## 2020-09-23 ENCOUNTER — PRE VISIT (OUTPATIENT)
Dept: ORTHOPEDICS | Facility: CLINIC | Age: 50
End: 2020-09-23

## 2020-09-23 DIAGNOSIS — M25.561 CHRONIC PAIN OF BOTH KNEES: Primary | ICD-10-CM

## 2020-09-23 DIAGNOSIS — M25.562 CHRONIC PAIN OF BOTH KNEES: Primary | ICD-10-CM

## 2020-09-23 DIAGNOSIS — G89.29 CHRONIC PAIN OF BOTH KNEES: Primary | ICD-10-CM

## 2020-09-23 NOTE — TELEPHONE ENCOUNTER
PREVISIT INFORMATION                                                    Adelaida Packer scheduled for future visit at [facility].    Patient is scheduled to see Dr. Hernandez on 9/29  Reason for visit: Bilateral knee pain  Referring provider MIKE Mac  Has patient seen previous specialist? No  Medical Records:  Available in chart.  Patient was previously seen at a [place] or [facility] facility.    REVIEW                                                      New patient packet mailed to patient: Yes  Medication reconciliation complete: Yes      Current Outpatient Medications   Medication Sig Dispense Refill     acetaminophen (TYLENOL ARTHRITIS PAIN) 650 MG CR tablet Take 650-1,300 mg by mouth every 8 hours as needed for mild pain or fever       albuterol (PROAIR HFA/PROVENTIL HFA/VENTOLIN HFA) 108 (90 Base) MCG/ACT inhaler Inhale 2 puffs into the lungs every 4 hours as needed for shortness of breath / dyspnea or wheezing 1 Inhaler 0     amLODIPine (NORVASC) 5 MG tablet Take 1 tablet (5 mg) by mouth daily 90 tablet 3     ASPIRIN LOW DOSE 81 MG EC tablet TAKE 1 TABLET BY MOUTH ONCE DAILY (Patient not taking: Reported on 5/26/2020) 100 tablet 0     Blood Pressure Monitor KIT 1 Device 2 times daily 1 kit 0     buPROPion (WELLBUTRIN XL) 150 MG 24 hr tablet Take 1 tablet (150 mg) by mouth every morning 30 tablet 3     calcium carbonate (OS-EVGENY 600 MG Lummi. CA) 1500 (600 CA) MG tablet Take 1 tablet (600 mg) by mouth 2 times daily 180 tablet 3     cetirizine (ZYRTEC) 10 MG tablet Take 10 mg by mouth daily as needed for allergies       cholecalciferol 2000 UNITS CAPS Take 2,000 Units by mouth daily 90 capsule 3     eplerenone (INSPRA) 50 MG tablet Take 2 tablets (100 mg) by mouth 2 times daily 360 tablet 3     labetalol (NORMODYNE) 300 MG tablet Take 1 tablet (300 mg) by mouth 2 times daily 180 tablet 3     polyethylene glycol (MIRALAX/GLYCOLAX) Packet Take 17 g by mouth daily        SUMAtriptan (IMITREX) 25 MG tablet TAKE 1 TO 2 TABLETS BY MOUTH AT ONSET OF HEADACHE FOR MIGRAINE. MAY REPEAT DOSE IN 2 HOURS. MAX OF 200MG OR 2 DOSES IN 24 HOURS (Patient not taking: Reported on 5/26/2020) 18 tablet 0     topiramate (TOPAMAX) 25 MG tablet Take 3 tablets (75 mg) by mouth At Bedtime 270 tablet 1       Allergies: Sulfa drugs; Hydrochlorothiazide w/triamterene; Maxzide [hydrochlorothiazide w/triamterene]; Metoprolol; and Seasonal allergies    New XR ordered    PLAN/FOLLOW-UP NEEDED                                                      Previsit review complete.  Patient will see provider at future scheduled appointment.     Patient Reminders Given:  Please, make sure you bring an updated list of your medications.   If you are having a procedure, please, present 15 minutes early.  If you need to cancel or reschedule,please call [phone].    Josette Dumont RN

## 2020-10-05 ENCOUNTER — NURSE TRIAGE (OUTPATIENT)
Dept: NURSING | Facility: CLINIC | Age: 50
End: 2020-10-05

## 2020-10-05 NOTE — TELEPHONE ENCOUNTER
Adelaida is calling about Covid-19 testing    She found out today that a person she had close contact with last Sun, 9/27 has tested Positive for the virus.    Per protocol, Home Care (quarantine) advised    Referred to [email or web address]    COVID 19 Nurse Triage Plan/Patient Instructions    Please be aware that novel coronavirus (COVID-19) may be circulating in the community. If you develop symptoms such as fever, cough, or SOB or if you have concerns about the presence of another infection including coronavirus (COVID-19), please contact your health care provider or visit [email or web address].     Disposition/Instructions    Home care recommended. Follow home care protocol based instructions.    Thank you for taking steps to prevent the spread of this virus.  o Limit your contact with others.  o Wear a simple mask to cover your cough.  o Wash your hands well and often.    Resources    [facility]: About COVID-19: [email or web address]    CDC: What to Do If You're Sick: www.cdc.gov/coronavirus/2019-ncov/about/steps-when-sick.html    CDC: Ending Home Isolation: www.cdc.gov/coronavirus/2019-ncov/hcp/disposition-in-home-patients.html     CDC: Caring for Someone: www.cdc.gov/coronavirus/2019-ncov/if-you-are-sick/care-for-someone.html     [facility]: Interim Guidance for Hospital Discharge to Home: www.health.[facility].[place]/diseases/coronavirus/hcp/hospdischarge.pdf    [facility] clinical trials (COVID-19 research studies): clinicalaffairs.[facility]-clinical-trials     Below are the COVID-19 hotlines at the [facility] ([facility]). Interpreters are available.   o For health questions: Call 604-614-5322 or 1-242.606.9551 (7 a.m. to 7 p.m.)  o For questions about schools and childcare: Call 672-488-3269 or 1-630.993.2359 (7 a.m. to 7 p.m.)     Marj Hernandez RN  [facility]      Reason for Disposition    [1] COVID-19 EXPOSURE (Close Contact) AND [2] within last 14 days BUT [3]  NO symptoms    Additional Information    Negative: [1] COVID-19 EXPOSURE (Close Contact) within last 14 days AND [2] needs COVID-19 lab test to return to work AND [3] NO symptoms    Negative: [1] COVID-19 EXPOSURE (Close Contact) within last 14 days AND [2] exposed person is a healthcare worker who was NOT using all recommended personal protective equipment (i.e., a respirator-N95 mask, eye protection, gloves, and gown) AND [3] NO symptoms    Protocols used: CORONAVIRUS (COVID-19) [facility]

## 2020-10-06 ENCOUNTER — VIRTUAL VISIT (OUTPATIENT)
Dept: FAMILY MEDICINE | Facility: OTHER | Age: 50
End: 2020-10-06

## 2020-10-06 NOTE — PROGRESS NOTES
"Date: 10/06/2020 10:40:31  Clinician: Vaibhav Saenz  Clinician NPI: [id number]  Patient: Adelaida Packer  Patient : 1970  Patient Address: [address] [place] [address] [place] [address]  Patient Phone: [phone]  Visit Protocol: URI  Patient Summary:  Adelaida is a 50 year old ( : 1970 ) female who initiated a [facility] Visit for COVID-19 (Coronavirus) evaluation and screening. When asked the question \"Please sign me up to receive news, health information and promotions. \", Adelaida responded \"Yes\".    Adelaida states her symptoms started 1-2 days ago.   Her symptoms consist of a headache.   Symptom details   Headache: She states the headache is mild (1-3 on a 10 point pain scale).    Adelaida denies having ear pain, wheezing, fever, enlarged lymph nodes, cough, nasal congestion, anosmia, vomiting, rhinitis, nausea, facial pain or pressure, myalgias, chills, malaise, sore throat, teeth pain, ageusia, and diarrhea. She also denies taking antibiotic medication in the past month and having recent facial or sinus surgery in the past 60 days. She is not experiencing dyspnea.    Pertinent COVID-19 (Coronavirus) information  In the past 14 days, Adelaida has not worked in a congregate living setting.   She does not work or volunteer as healthcare worker or a  and does not work or volunteer in a healthcare facility.   Adelaida also has not lived in a congregate living setting in the past 14 days. She does not live with a healthcare worker.   Adelaida has had a close contact with a laboratory-confirmed COVID-19 patient within 14 days of symptom onset. Additional information about contact with COVID-19 (Coronavirus) patient as reported by the patient (free text): I was exposed to a friend at [employer] we were sitting in close proximity. I can't remember if we had our masks on. The date was 20.   Since 2019, Adelaida and has not had upper respiratory infection or influenza-like illness. Has not been " diagnosed with lab-confirmed COVID-19 test   Pertinent medical history  Adelaida typically gets a yeast infection when she takes antibiotics. She has used fluconazole (Diflucan) to treat previous yeast infections. She is not sure if she has needed 1 or 2 doses of fluconazole (Diflucan) for symptoms to resolve in the past.   Adelaida does not need a return to work/school note.   Weight: 240 lbs   Adelaida does not smoke or use smokeless tobacco.   Additional information as reported by the patient (free text): I am a teacher.   Weight: 240 lbs    MEDICATIONS: Inspra oral, labetalol oral, ALLERGIES: NKDA  Clinician Response:  Dear Adelaida,   Your symptoms show that you may have coronavirus (COVID-19). This illness can cause fever, cough and trouble breathing. Many people get a mild case and get better on their own. Some people can get very sick.  What should I do?  We would like to test you for this virus.   1. Please call [phone] to schedule your visit. Explain that you were referred by [facility] to have a COVID-19 test. Be ready to share your [facility] visit ID number.  The following will serve as your written order for this COVID Test, ordered by me, for the indication of suspected COVID [Z20.828]: The test will be ordered in [facility], our electronic health record, after you are scheduled. It will show as ordered and authorized by Salvador Bull MD.  Order: COVID-19 (Coronavirus) PCR for SYMPTOMATIC testing from [facility].      2. When it's time for your COVID test:  Stay at least 6 feet away from others. (If someone will drive you to your test, stay in the backseat, as far away from the  as you can.)   Cover your mouth and nose with a mask, tissue or washcloth.  Go straight to the testing site. Don't make any stops on the way there or back.      3.Starting now: Stay home and away from others (self-isolate) until:   You've had no fever---and no medicine that reduces fever---for one full day (24 hours). And...   Your other  "symptoms have gotten better. For example, your cough or breathing has improved. And...   At least 10 days have passed since your symptoms started.       During this time, don't leave the house except for testing or medical care.   Stay in your own room, even for meals. Use your own bathroom if you can.   Stay away from others in your home. No hugging, kissing or shaking hands. No visitors.  Don't go to work, school or anywhere else.    Clean \"high touch\" surfaces often (doorknobs, counters, handles, etc.). Use a household cleaning spray or wipes. You'll find a full list of  on the EPA website: www.epa.gov/pesticide-registration/list-n-disinfectants-use-against-sars-cov-2.   Cover your mouth and nose with a mask, tissue or washcloth to avoid spreading germs.  Wash your hands and face often. Use soap and water.  Caregivers in these groups are at risk for severe illness due to COVID-19:  o People 65 years and older  o People who live in a nursing home or long-term care facility  o People with chronic disease (lung, heart, cancer, diabetes, kidney, liver, immunologic)  o People who have a weakened immune system, including those who:   Are in cancer treatment  Take medicine that weakens the immune system, such as corticosteroids  Had a bone marrow or organ transplant  Have an immune deficiency  Have poorly controlled HIV or AIDS  Are obese (body mass index of 40 or higher)  Smoke regularly   o Caregivers should wear gloves while washing dishes, handling laundry and cleaning bedrooms and bathrooms.  o Use caution when washing and drying laundry: Don't shake dirty laundry, and use the warmest water setting that you can.  o For more tips, go to www.cdc.gov/coronavirus/2019-ncov/downloads/10Things.pdf.    How can I take care of myself?    Get lots of rest. Drink extra fluids (unless a doctor has told you not to).   Take Tylenol (acetaminophen) for fever or pain. If you have liver or kidney problems, ask your family " doctor if it's okay to take Tylenol.   Adults can take either:    650 mg (two 325 mg pills) every 4 to 6 hours, or...   1,000 mg (two 500 mg pills) every 8 hours as needed.    Note: Don't take more than 3,000 mg in one day. Acetaminophen is found in many medicines (both prescribed and over-the-counter medicines). Read all labels to be sure you don't take too much.   For children, check the Tylenol bottle for the right dose. The dose is based on the child's age or weight.    If you have other health problems (like cancer, heart failure, an organ transplant or severe kidney disease): Call your specialty clinic if you don't feel better in the next 2 days.       Know when to call 911. Emergency warning signs include:    Trouble breathing or shortness of breath Pain or pressure in the chest that doesn't go away Feeling confused like you haven't felt before, or not being able to wake up Bluish-colored lips or face.  Where can I get more information?   [facility] -- About COVID-19: [email or web address]   CDC -- What to Do If You're Sick: www.cdc.gov/coronavirus/2019-ncov/about/steps-when-sick.html   CDC -- Ending Home Isolation: www.cdc.gov/coronavirus/2019-ncov/hcp/disposition-in-home-patients.html   CDC -- Caring for Someone: www.cdc.gov/coronavirus/2019-ncov/if-you-are-sick/care-for-someone.html   [facility] -- Interim Guidance for Hospital Discharge to Home: www.health.[facility].[place].us/diseases/coronavirus/hcp/hospdischarge.pdf   [facility] clinical trials (COVID-19 research studies): [email or web address].edu/umn-clinical-trials    Below are the COVID-19 hotlines at the [place] Department of Health ([facility]). Interpreters are available.    For health questions: Call 948-587-1009 or 1-335.339.9035 (7 a.m. to 7 p.m.) For questions about schools and childcare: Call 385-296-0242 or 1-919.555.1404 (7 a.m. to 7 p.m.)    Diagnosis: Tension-type headache, unspecified, intractable  Diagnosis ICD: G44.201

## 2020-10-07 ENCOUNTER — MYC MEDICAL ADVICE (OUTPATIENT)
Dept: PEDIATRICS | Facility: CLINIC | Age: 50
End: 2020-10-07

## 2020-10-09 ENCOUNTER — VIRTUAL VISIT (OUTPATIENT)
Dept: PEDIATRICS | Facility: CLINIC | Age: 50
End: 2020-10-09
Payer: COMMERCIAL

## 2020-10-09 DIAGNOSIS — K52.9 GASTROENTERITIS: ICD-10-CM

## 2020-10-09 DIAGNOSIS — R10.13 DYSPEPSIA: Primary | ICD-10-CM

## 2020-10-09 PROCEDURE — 99213 OFFICE O/P EST LOW 20 MIN: CPT | Mod: GT | Performed by: NURSE PRACTITIONER

## 2020-10-09 RX ORDER — FAMOTIDINE 20 MG/1
20 TABLET, FILM COATED ORAL 2 TIMES DAILY
Qty: 60 TABLET | Refills: 1 | Status: SHIPPED | OUTPATIENT
Start: 2020-10-09 | End: 2020-11-04

## 2020-10-09 NOTE — PATIENT INSTRUCTIONS
"PLAN:   1.   Symptomatic therapy suggested: rest, increase fluids, small amounts of clear fluids frequently, gradual reintroduction of usual diet and call prn if symptoms persist or worsen.    2.  Orders Placed This Encounter   Medications     famotidine (PEPCID) 20 MG tablet     Sig: Take 1 tablet (20 mg) by mouth 2 times daily     Dispense:  60 tablet     Refill:  1     3. Patient needs to follow up in if no improvement,or sooner if worsening of symptoms or other symptoms develop.    Discharge Instructions for COVID-19 Patients  You have--or may have--COVID-19. Please follow the instructions listed below.   If you have a weakened immune system, discuss with your doctor any other actions you need to take.  How can I protect others?  If you have symptoms (fever, cough, body aches or trouble breathing):    Stay home and away from others (self-isolate) until:  ? At least 10 days have passed since your symptoms started, And   ? You've had no fever--and no medicine that reduces fever--for 1 full day (24 hours), And    ? Your other symptoms have resolved (gotten better).  If you don't show symptoms, but testing showed that you have COVID-19:    Stay home and away from others (self-isolate). Follow the tips under \"How do I self-isolate?\" below for 10 days (20 days if you have a weak immune system).    You don't need to be retested for COVID-19 before going back to school or work. As long as you're fever-free and feeling better, you can go back to school, work and other activities after waiting the 10 or 20 days.   How do I self-isolate?    Stay in your own room, even for meals. Use your own bathroom if you can.    Stay away from others in your home. No hugging, kissing or shaking hands. No visitors.    Don't go to work, school or anywhere else.    Clean \"high touch\" surfaces often (doorknobs, counters, handles). Use household cleaning spray or wipes. You'll find a full list of  on the EPA website: " www.epa.gov/pesticide-registration/list-n-disinfectants-use-against-sars-cov-2.    Cover your mouth and nose with a mask or other face covering to avoid spreading germs.    Wash your hands and face often. Use soap and water.    Caregivers in these groups are at risk for severe illness due to COVID-19:  ? People 65 years and older  ? People who live in a nursing home or long-term care facility  ? People with chronic disease (lung, heart, cancer, diabetes, kidney, liver, immunologic)  ? People who have a weakened immune system, including those who:    Are in cancer treatment    Take medicine that weakens the immune system, such as corticosteroids    Had a bone marrow or organ transplant    Have an immune deficiency    Have poorly controlled HIV or AIDS    Are obese (body mass index of 40 or higher)    Smoke regularly    Caregivers should wear gloves while washing dishes, handling laundry and cleaning bedrooms and bathrooms.    Use caution when washing and drying laundry: Don't shake dirty laundry and use the warmest water setting that you can.    For more tips on managing your health at home, go to www.cdc.gov/coronavirus/2019-ncov/downloads/10Things.pdf.  How can I take care of myself at home?  1. Get lots of rest. Drink extra fluids (unless a doctor has told you not to).    2. Take Tylenol (acetaminophen) for fever or pain. If you have liver or kidney problems, ask your family doctor if it's okay to take Tylenol.     Adults can take either:  ? 650 mg (two 325 mg pills) every 4 to 6 hours, or   ? 1,000 mg (two 500 mg pills) every 8 hours as needed.  ? Note: Don't take more than 3,000 mg in one day. Acetaminophen is found in many medicines (both prescribed and over-the-counter medicines). Read all labels to be sure you don't take too much.   For children, check the Tylenol bottle for the right dose. The dose is based on the child's age or weight.  3. If you have other health problems (like cancer, heart failure, an  organ transplant or severe kidney disease): Call your specialty clinic if you don't feel better in the next 2 days.    4. Know when to call 911. Emergency warning signs include:  ? Trouble breathing or shortness of breath  ? Pain or pressure in the chest that doesn't go away  ? Feeling confused like you haven't felt before, or not being able to wake up  ? Bluish-colored lips or face    5. Your doctor may have prescribed a blood thinner medicine. Follow their instructions.  Where can I get more information?    [facility] - About COVID-19: [email or web address]    CDC - What to Do If You're Sick: www.cdc.gov/coronavirus/2019-ncov/about/steps-when-sick.html    CDC - Ending Home Isolation: www.cdc.gov/coronavirus/2019-ncov/hcp/disposition-in-home-patients.html    CDC - Caring for Someone: www.cdc.gov/coronavirus/2019-ncov/if-you-are-sick/care-for-someone.html    [facility] - Interim Guidance for Hospital Discharge to Home: www.[facility].mn./diseases/coronavirus/hcp/hospdischarge.pdf    [facility] clinical trials (COVID-19 research studies): clinicalaffairs.[facility]-clinical-trials    Below are the COVID-19 hotlines at the [place] Department of Health ([facility]). Interpreters are available.  ? For health questions: Call 915-478-6509 or 1-556.898.5772 (7 a.m. to 7 p.m.)  ? For questions about schools and childcare: Call 314-118-6161 or 1-523.611.3240 (7 a.m. to 7 p.m.)    For informational purposes only. Not to replace the advice of your health care provider. Clinically reviewed by the Infection Prevention Team. Copyright   2020 [employer]. All rights reserved. [employer] [id number] - REV 08/04/20.            Patient Education     Diet for Vomiting or Diarrhea (Adult)    Your symptoms may return or get worse after eating certain foods listed below. If this happens, stop eating these foods until your symptoms ease and you feel better.  Once the vomiting stops, follow the steps  below.   During the first 12 to 24 hours  During the first 12 to 24 hours, follow this diet:    Drinks. Plain water, sport drinks like electrolyte solutions, soft drinks without caffeine, mineral water (plain or flavored), clear fruit juices, and decaffeinated tea and coffee.    Soups. Clear broth.    Desserts. Plain gelatin, popsicles, and fruit juice bars. As you feel better, you may add 6 to 8 ounces of yogurt per day. If you have diarrhea, don't have foods or drinks that contain sugar, high-fructose corn syrup, or sugar alcohols.  During the next 24 hours  During the next 24 hours you may add the following to the above:    Hot cereal, plain toast, bread, rolls, and crackers    Plain noodles, rice, mashed potatoes, and chicken noodle or rice soup    Unsweetened canned fruit (but not pineapple) and bananas  Don't eat more than 15 grams of fat a day. Do this by staying away from margarine, butter, oils, mayonnaise, sauces, gravies, fried foods, peanut butter, meat, poultry, and fish.  Don't eat much fiber. Stay away from raw or cooked vegetables, fresh fruits (except bananas), and bran cereals.  Limit how much caffeine and chocolate you have. Do not use any spices or seasonings except salt.  During the next 24 hours  Slowly go back to your normal diet, as you feel better and your symptoms ease.  Date Last Reviewed: 8/1/2016 2000-2019 [employer]. [address] [place] [address], [place] [address]. All rights reserved. This information is not intended as a substitute for professional medical care. Always follow your healthcare professional's instructions.

## 2020-10-09 NOTE — PROGRESS NOTES
"Adelaida Packer is a 50 year old female who is being evaluated via a billable video visit.      The patient has been notified of following:     \"This video visit will be conducted via a call between you and your physician/provider. We have found that certain health care needs can be provided without the need for an in-person physical exam.  This service lets us provide the care you need with a video conversation.  If a prescription is necessary we can send it directly to your pharmacy.  If lab work is needed we can place an order for that and you can then stop by our lab to have the test done at a later time.    Video visits are billed at different rates depending on your insurance coverage.  Please reach out to your insurance provider with any questions.    If during the course of the call the physician/provider feels a video visit is not appropriate, you will not be charged for this service.\"    Patient has given verbal consent for Video visit? Yes  How would you like to obtain your AVS? MyChart  If you are dropped from the video visit, the video invite should be resent to: Text to cell phone: [phone]  Will anyone else be joining your video visit? No      Subjective     Adelaida Packer is a 50 year old female who presents today via video visit for the following health issues:    HPI     Abdominal/Flank Pain  Onset/Duration: 6 days pt states she was exposed to a friend being diagnosed with COVID  Description:   Character: Cramping  Location: not a specific area  Radiation: None  Intensity: moderate  Progression of Symptoms:  same  Accompanying Signs & Symptoms:  Fever/Chills: no  Gas/Bloating: YES  Nausea: YES  Vomitting: YES  Diarrhea: YES  Constipation: no  Dysuria or Hematuria: no  History:   Trauma: no  Previous similar pain: no  Previous tests done: none  Precipitating factors:   Does the pain change with:     Food: no    Bowel Movement: no    Urination: no   Other factors:  no  Therapies tried and " outcome: miralax.  Patient's last menstrual period was 08/13/2005.  Last Sunday started feeling sick   Monday was vomiting and diarrhea   Is eating saltines  Just feeling sick to her stomach   Got tested on Wednesday but no results yet   Some burning in her stomach   No abdominal pain   Has been exposed to Covid and is waiting results from [facility] yet       Video Start Time: 1:38 PM    Review of Systems   Constitutional, HEENT, cardiovascular, pulmonary, gi and gu systems are negative, except as otherwise noted.      Objective           Vitals:  No vitals were obtained today due to virtual visit.    Physical Exam     GENERAL: alert, no distress and over weight  EYES: Eyes grossly normal to inspection.  No discharge or erythema, or obvious scleral/conjunctival abnormalities.  HENT: normal cephalic/atraumatic and oral mucous membranes moist  RESP: No audible wheeze, cough, or visible cyanosis.  No visible retractions or increased work of breathing.    MS: No gross musculoskeletal defects noted.  Normal range of motion.  No visible edema.  SKIN: Visible skin clear. No significant rash, abnormal pigmentation or lesions.  NEURO: Cranial nerves grossly intact.  Mentation and speech appropriate for age.  PSYCH: Mentation appears normal, affect normal/bright, judgement and insight intact, normal speech and appearance well-groomed.  Pending orders and results           Assessment & Plan     Adelaida was seen today for gastrointestinal problem.    Diagnoses and all orders for this visit:    Dyspepsia  -     famotidine (PEPCID) 20 MG tablet; Take 1 tablet (20 mg) by mouth 2 times daily    Gastroenteritis  I think this is primarily related to a viral illness given the various associated symptoms.  Went over the treatment of viral illnesses, which is primarily supportive.    Recheck if not improving as expected          See Patient Instructions  Patient Instructions     PLAN:   1.   Symptomatic therapy suggested: rest,  increase fluids, small amounts of clear fluids frequently, gradual reintroduction of usual diet and call prn if symptoms persist or worsen.    2.  Orders Placed This Encounter   Medications     famotidine (PEPCID) 20 MG tablet     Sig: Take 1 tablet (20 mg) by mouth 2 times daily     Dispense:  60 tablet     Refill:  1     3. Patient needs to follow up in if no improvement,or sooner if worsening of symptoms or other symptoms develop.    No follow-ups on file.    PEREZ Betancourt CNP  [facility]      Video-Visit Details    Type of service:  Video Visit    Video End Time:1:53 PM    Originating Location (pt. Location): Home    Distant Location (provider location):  [facility]     Platform used for Video Visit: Delmi

## 2020-10-16 DIAGNOSIS — E26.09 PRIMARY HYPERALDOSTERONISM (H): ICD-10-CM

## 2020-10-16 DIAGNOSIS — I12.9 HYPERTENSION, RENAL: ICD-10-CM

## 2020-10-16 RX ORDER — EPLERENONE 50 MG/1
100 TABLET, FILM COATED ORAL 2 TIMES DAILY
Qty: 360 TABLET | Refills: 3 | Status: SHIPPED | OUTPATIENT
Start: 2020-10-16 | End: 2021-11-29

## 2020-10-16 RX ORDER — LABETALOL 300 MG/1
300 TABLET, FILM COATED ORAL 2 TIMES DAILY
Qty: 180 TABLET | Refills: 3 | Status: SHIPPED | OUTPATIENT
Start: 2020-10-16 | End: 2021-08-20

## 2020-10-31 DIAGNOSIS — R10.13 DYSPEPSIA: ICD-10-CM

## 2020-11-04 RX ORDER — FAMOTIDINE 20 MG/1
20 TABLET, FILM COATED ORAL 2 TIMES DAILY
Qty: 180 TABLET | Refills: 1 | Status: SHIPPED | OUTPATIENT
Start: 2020-11-04 | End: 2022-06-03

## 2020-11-04 NOTE — TELEPHONE ENCOUNTER
FAMOTIDINE 20 MG TABLET   Last Written Prescription Date:  10/9/2020  Last Fill Quantity: 60,   # refills: 1  Last Office Visit : 10/9/2020  Future Office visit:  None  Routing refill request to provider for review/approval because:  Only a 60 day supply sent to pharm last order.     Is it okay to increase refills to cover Pt for the whole year.     Refer to Provider for advise and review.  Thank you       Cele Hope RN  Central Triage Red Flags/Med Refills

## 2020-11-06 ENCOUNTER — VIRTUAL VISIT (OUTPATIENT)
Dept: NUTRITION | Facility: CLINIC | Age: 50
End: 2020-11-06
Payer: COMMERCIAL

## 2020-11-06 DIAGNOSIS — E66.01 MORBID OBESITY (H): Primary | ICD-10-CM

## 2020-11-06 PROCEDURE — 97802 MEDICAL NUTRITION INDIV IN: CPT | Mod: GT | Performed by: DIETITIAN, REGISTERED

## 2020-11-06 NOTE — PROGRESS NOTES
"Adelaida Packer is a 50 year old female who is being evaluated via a billable video visit.      The patient has been notified of following:     \"This video visit will be conducted via a call between you and your physician/provider. We have found that certain health care needs can be provided without the need for an in-person physical exam.  This service lets us provide the care you need with a video conversation.  If a prescription is necessary we can send it directly to your pharmacy.  If lab work is needed we can place an order for that and you can then stop by our lab to have the test done at a later time.    Video visits are billed at different rates depending on your insurance coverage.  Please reach out to your insurance provider with any questions.    If during the course of the call the physician/provider feels a video visit is not appropriate, you will not be charged for this service.\"    Patient has given verbal consent for Video visit? Yes  How would you like to obtain your AVS? MyChart  If you are dropped from the video visit, the video invite should be resent to: Text to cell phone: [phone]   Will anyone else be joining your video visit? No        Video-Visit Details    Type of service:  Video Visit    Video Start Time: 8:41 AM  Video End Time: 9:36    Originating Location (pt. Location): Home    Distant Location (provider location):  [facility]     Platform used for Video Visit: Delmi Romero RD        Medical Nutrition Therapy  Visit Type: Initial assessment and intervention    Referring Provider: No ref. provider found       REASON FOR REFERRAL:   Adelaida Packer is a 50 year old female who is interested in Medical Nutrition Therapy (MNT) and education related to weight management.   She is accompanied by self.     Had surgery on foot and had lost weight prior from eating healthy and exercise and since gained weight back. She is thinking about having another " surgery on her knees and knows her weight impacts it. She is at 243lbs and would like to get under 200lbs. She was about 190lbs in 2017.     NUTRITION ASSESSMENT:   Nutritional Goals 11/4/2020   Nutritional Goal Create healthier eating patterns;Create a plan to lose weight;Work on meal planning/recipes;Overcome emotional eating;Stabilize blood sugars;Eliminate cravings for sugar and refined carbohydrates;Increase energy        Neurological 11/4/2020   Migraine Headaches Past;Current   Tension Headache Past;Current       No flowsheet data found.   No flowsheet data found.   Gastrointestinal 11/4/2020   Constipation Current   Heartburn/Reflux/GERD Current       No flowsheet data found.   Endocrine 11/4/2020   Type 2 diabetes Past   Adrenal problems Current   Hair thinning/loss Current   Overweight/obesity Past;Current   Hot flashes Current      Skin 11/4/2020   Acne Past   Dry Skin Current      Cardiopulmonary 11/4/2020   High blood pressure Current   Ankle/leg swelling Past      No flowsheet data found.   Psychological 11/4/2020   Depression/Anxiety Past      Genitourinary 11/4/2020   Endometriosis Past   Fibroiods Past      Womens Health Assessment 11/4/2020   Hysterectomy Yes   How many ovaries were removed?  No ovaries removed   I am breastfeeding. No   Are you officially in menopause? (no period for one year or longer)  No        Past Medical History:  Past Medical History:   Diagnosis Date     Allergic rhinitis due to other allergen     seasonal     Arthritis      Diabetes (H)      Localized osteoarthritis of both knees      Migraine, unspecified, without mention of intractable migraine without mention of status migrainosus      Monoclonal gammopathy      Morbid obesity (H)      Unspecified essential hypertension     dx around age 31       Previous Surgeries:   Past Surgical History:   Procedure Laterality Date     C ANESTH,KNEE AREA SURGERY  01/2001     C GASTROPLASTY,OBESITY,VERT BAND      removed 2009       REMOVE TONSILS/ADENOIDS,11+ Y/O  1995     HEAD & NECK SURGERY  3/2013    Parathyroidectomy--had to go back in 6 days later for a possible bleed     HYSTERECTOMY, VAGINAL  12/2005    hysterectomy- fibroids     ORTHOPEDIC SURGERY          Family History:  Family History   Problem Relation Age of Onset     Hypertension Mother      Gynecology Mother         hysterectomy     Gastrointestinal Disease Mother         bowel upstruction     Thyroid Disease Mother      Neurologic Disorder Mother      Osteoporosis Mother      Hypertension Father      Lipids Father      Arthritis Father      Heart Disease Father      Prostate Cancer Brother      Alcohol/Drug Brother      Circulatory Brother      Arthritis Paternal Grandmother      Cancer Maternal Grandfather         bone     Eye Disorder Maternal Grandfather      Prostate Cancer Maternal Grandfather      Glaucoma Maternal Grandfather      Cancer Maternal Grandmother         tumor-head     Obesity Maternal Grandmother      Respiratory Daughter         asthma     Diabetes Sister      Cerebrovascular Disease Sister      Macular Degeneration No family hx of         Lifestyle History:  Lifestyle 11/4/2020   Do you feel your life is stressful right now?  Yes   What is the cause(s) of stress in your life?  Work;Health Concerns;Over thinking and too much analysis   Are you currently implementing any strategies to help manage stress? No        Exercise History:  Exercise 11/4/2020   Does your occupation require extended periods of sitting?  Yes   Does your occupation require extended periods of repetitive movements (ex: walking or lifting)?  Yes   Do you currently participate in any forms of exercise? No   Rate your level of motivation for including exercise in your routine (0=none, 10=high): 4   Do you have any medical conditions, pain, injuries, surgeries etc. restricting you from exercise? Yes        Sleep History:  Sleep 11/4/2020   How many hours (on average) do you sleep per night?  4-6   What time do you turn off the lights? 10 PM   How long does it take for you to fall asleep? 15-20 mins   What time do you stop using electronic devices? 11 PM   What time do you wake up? 4 AM   When do you eat your first meal?  8 AM   Do you feel well-rested during the day?  No   Do you take naps?  No   Do you have a comfortable bedroom environment (cool, quiet, dark, etc)? Yes   Do you have a sleep routine/ ritual that you do before bed?  Yes   How many hours do you spend per day looking at a screen (TV, computer, tablet and phone)? 10 +   Select all factors that apply to your current sleep habits: Difficulty falling asleep;Wake up in the middle of the night;Have tried supplements (ie: melatonin) for sleep        Nutrition History:  Nutrition 11/4/2020   Have you ever had a nutrition consultation? No   Do you currently follow a special diet or nutritional program? No   What do you feel are the biggest barriers getting in the way of achieving you nutritional goals? Lack of prep/cooking skills;Work (such as lack of time to eat, unhealthy choices, etc.);Lack of control over emotions/behaviors;Stress   Do you have any food allergies, sensitivities or intolerances?  No       Digestion 11/4/2020   Do you experience stomach pains/cramping? Rarely   Do you experience bloating?  Rarely   Do you experience gas?  After eating   Do you experience heartburn/acid reflux/indigestion? Daily   How often do you have a bowel movement? Every other day   What is a typical bowel movement like for you? Select all that apply: Alternates between loose and hard;Pasty and sticky;Balls/melissa      Food Access:  11/4/2020   Who does the grocery shopping? Self   How often is grocery shopping done? Weekly   Where do you usually receive your groceries from? Select all that apply: [employer]   Do you read food labels? Yes   What do you look at?  Sugar;Protein;Ingredients   Who does the cooking? Select all that apply: Self    How many meals do you eat out per week?  1 to 3   What restaurants do you typically choose? Sit-down      Daily Patterns: 11/4/2020   How many days per week do you have breakfast? 7   How many days per week do you have lunch? 5   How many days per week do you have dinner? 6   How many days per week do you have snacks? 7      Protein Intake: 11/4/2020   How many times per day do you typically consume a protein source(s)? 4   What types of protein do you currently eat?  Ground Beef;Beef Ribs;Hamburgers;Beef Roast;Beef Hot Dogs;Other Fish;Lamb Chops;Other Lamb;Chicken Breast;Chicken Thighs/Legs;Deli Chicken;Other Chicken;[place] Breast;[place] Thighs;Ground Turkey;Deli Turkey;Turkey Morales;[place] Sausage;[place] Hot Dogs       Fat Intake:  11/4/2020   How many times per day do you typically consume healthy fat(s)? 3   What types of health fats do you currently eat? Select all that apply:  Almonds;Cashews;Other nuts;Sunflower seeds;Hemp seeds;Butter;Salad dressings       Fruit Intake:  11/4/2020   How many times per day do you typically consume fruits? 2   What types of fruit do currently eat? Apple;Banana;Blackberries;Blueberries;Grapes;Mandarin oranges;Raspberries       Vegetable Intake:  11/4/2020   How many times per day do you typically consume vegetables? 2   What types of vegetables do you currently eat? Cabbage;Cucumber;Onions;Spinach;Tomato;Yam, sweet potato;Other      Grain Intake:  11/4/2020   How many times per day do you typically consume grains? 3   What types of grains do currently eat? Select all that apply:  Bagel (non-gluten free);Breads (non-gluten free);Cereals (non-gluten free);Chips (non-gluten free);Crackers (non-gluten free);Muffins (non-gluten free);Pancakes/waffles (non-gluten free);Pasta (non-gluten free)       Dairy Intake:  11/4/2020   How many times per day do you typically consume dairy? 2   What types of dairy do currently eat? Select all that apply:  Cheese;Yogurt       Non-Dairy  Alternative Intake:  11/4/2020   How many times per day do you typically consume non-dairy alternatives? 1   What types of non-dairy alternatives do currently eat? Select all that apply:  [place] milk;Oat milk       Sweets Intake:  11/4/2020   How many times per day do you typically consume sweets? 4      Beverage Intake:  11/4/2020   How many 8 oz cups of water do you typically consume per day?  4 to 5   How many 8 oz cups of caffeine do you typically consume per day?  1 to 2   How many drinks of alcohol do you typically consume per week (1 drink = 5 oz wine, 12 oz beer, 1.5 oz spirits)?   0       Lifestyle Recall:  11/4/2020   What time did you wake up? 3 AM   What time did you go to sleep? 11 PM   What time did you have breakfast? 8-9 AM   Where did you have breakfast?  Home   What time did you have a morning snack? 10-11 AM   Where did you have your morning snack? Home   What time did you have lunch? 12-1 PM   Where did you have lunch?  Home   What time did you have an afternoon snack? 3-4 PM   Where did you have your afternoon snack? Home   What time did you have dinner? 6-7 PM   Where did you have dinner?  Home   What time did you have an evening snack? 10-11PM   Where did you have your evening snack? Home   What time of day did you exercise? 8 AM   Where did you exercise? Home          Additional concerns:    MEDICATIONS:  Current Outpatient Medications   Medication Sig Dispense Refill     acetaminophen (TYLENOL ARTHRITIS PAIN) 650 MG CR tablet Take 650-1,300 mg by mouth every 8 hours as needed for mild pain or fever       albuterol (PROAIR HFA/PROVENTIL HFA/VENTOLIN HFA) 108 (90 Base) MCG/ACT inhaler Inhale 2 puffs into the lungs every 4 hours as needed for shortness of breath / dyspnea or wheezing 1 Inhaler 0     amLODIPine (NORVASC) 5 MG tablet Take 1 tablet (5 mg) by mouth daily 90 tablet 3     ASPIRIN LOW DOSE 81 MG EC tablet TAKE 1 TABLET BY MOUTH ONCE DAILY 100 tablet 0     Blood Pressure Monitor KIT 1  Device 2 times daily 1 kit 0     buPROPion (WELLBUTRIN XL) 150 MG 24 hr tablet Take 1 tablet (150 mg) by mouth every morning 30 tablet 3     calcium carbonate (OS-EVGENY 600 MG [place]) 1500 (600 CA) MG tablet Take 1 tablet (600 mg) by mouth 2 times daily 180 tablet 3     cetirizine (ZYRTEC) 10 MG tablet Take 10 mg by mouth daily as needed for allergies       cholecalciferol 2000 UNITS CAPS Take 2,000 Units by mouth daily 90 capsule 3     eplerenone (INSPRA) 50 MG tablet Take 2 tablets (100 mg) by mouth 2 times daily 360 tablet 3     famotidine (PEPCID) 20 MG tablet Take 1 tablet (20 mg) by mouth 2 times daily 180 tablet 1     labetalol (NORMODYNE) 300 MG tablet Take 1 tablet (300 mg) by mouth 2 times daily 180 tablet 3     polyethylene glycol (MIRALAX/GLYCOLAX) Packet Take 17 g by mouth daily       SUMAtriptan (IMITREX) 25 MG tablet TAKE 1 TO 2 TABLETS BY MOUTH AT ONSET OF HEADACHE FOR MIGRAINE. MAY REPEAT DOSE IN 2 HOURS. MAX OF 200MG OR 2 DOSES IN 24 HOURS 18 tablet 0     topiramate (TOPAMAX) 25 MG tablet Take 3 tablets (75 mg) by mouth At Bedtime 270 tablet 1       Dietary Supplements 11/4/2020   Individual Vitamin Supplements C;D;General multivitamin   Individual Mineral Supplements Calcium with D;Magnesium;Zinc   Herbal Complimentary products Laxative         ALLERGIES:   Allergies   Allergen Reactions     Sulfa Drugs Shortness Of Breath and Rash     Hydrochlorothiazide W/Triamterene Other (See Comments)     Renal insuff     Maxzide [Hydrochlorothiazide W/Triamterene] Other (See Comments)     Renal insuff     Metoprolol Other (See Comments)     Other reaction(s): Bradycardia  At high dose only  At high dose only     Seasonal Allergies         .na  LABS:  Last Basic Metabolic Panel:  Lab Results   Component Value Date     11/19/2019     06/11/2019     12/04/2018      Lab Results   Component Value Date    POTASSIUM 4.4 11/19/2019    POTASSIUM 4.4 06/11/2019    POTASSIUM 4.2 12/04/2018     Lab  Results   Component Value Date    CHLORIDE 107 11/19/2019    CHLORIDE 108 06/11/2019    CHLORIDE 104 12/04/2018     Lab Results   Component Value Date    EVGENY 9.5 11/19/2019    EVGENY 8.9 06/11/2019    EVGENY 9.8 12/04/2018     Lab Results   Component Value Date    CO2 27 11/19/2019    CO2 23 06/11/2019    CO2 23 12/04/2018     Lab Results   Component Value Date    BUN 19 11/19/2019    BUN 14 06/11/2019    BUN 22 12/04/2018     Lab Results   Component Value Date    CR 1.13 11/19/2019    CR 1.03 06/11/2019    CR 1.06 12/04/2018     Lab Results   Component Value Date     11/19/2019    GLC 91 06/11/2019    GLC 88 12/04/2018       Last Glucose Profile:   Hemoglobin A1C   Date Value Ref Range Status   11/19/2019 5.5 0 - 5.6 % Final     Comment:     Normal <5.7% Prediabetes 5.7-6.4%  Diabetes 6.5% or higher - adopted from ADA   consensus guidelines.     01/29/2019 5.3 0 - 5.6 % Final   08/06/2018 5.2 0 - 5.6 % Final     Comment:     Normal <5.7% Prediabetes 5.7-6.4%  Diabetes 6.5% or higher - adopted from ADA   consensus guidelines.         Last Lipid Profile:   Cholesterol   Date Value Ref Range Status   02/18/2019 164 <200 mg/dL Final   08/15/2017 120 <200 mg/dL Final   02/21/2017 177 <200 mg/dL Final     HDL Cholesterol   Date Value Ref Range Status   02/18/2019 59 >49 mg/dL Final   08/15/2017 56 >49 mg/dL Final   02/21/2017 58 >49 mg/dL Final     LDL Cholesterol Calculated   Date Value Ref Range Status   02/18/2019 95 <100 mg/dL Final     Comment:     Desirable:       <100 mg/dl   08/15/2017 52 <100 mg/dL Final     Comment:     Desirable:       <100 mg/dl   02/21/2017 102 (H) <100 mg/dL Final     Comment:     Above desirable:  100-129 mg/dl   Borderline High:  130-159 mg/dL   High:             160-189 mg/dL   Very high:       >189 mg/dl       Triglycerides   Date Value Ref Range Status   02/18/2019 48 <150 mg/dL Final     Comment:     Fasting specimen   08/15/2017 62 <150 mg/dL Final     Comment:     Fasting specimen    02/21/2017 83 <150 mg/dL Final     Comment:     Fasting specimen     Cholesterol/HDL Ratio   Date Value Ref Range Status   05/15/2015 2.9 0.0 - 5.0 Final   10/17/2014 2.0 0.0 - 5.0 Final   12/31/2012 3.5 0.0 - 5.0 Final       Most recent CBC:  Recent Labs   Lab Test 11/19/19  0734 06/11/19  1013 12/04/18  1549 08/06/18  0910 08/10/17  0906 08/10/17  0906 02/21/17  0810 02/21/17  0810 02/24/16  0803 02/24/16  0803   WBC  --   --   --  2.8*  --  3.8*  --   --   --  4.2   HGB 13.0 11.2* 12.5 12.1   < > 12.0   < >  --    < > 12.7   HCT  --   --   --  36.9  --  35.3  --  36.8  --  36.3   PLT  --   --   --  154  --  177  --   --   --  155    < > = values in this interval not displayed.     Most recent hepatic panel:  Recent Labs   Lab Test 08/06/18  0910 08/10/17  0906   ALT 30 28   AST 19 16     Most recent creatinine:  Recent Labs   Lab Test 11/19/19  0734 06/11/19  1013   CR 1.13* 1.03       No components found for: GFRESETIMATEDLASTLAB(gfrestblack:1@  Lab Results   Component Value Date    ALBUMIN 3.9 11/19/2019       Last Thyroid Profile:   TSH   Date Value Ref Range Status   11/19/2019 1.59 0.40 - 4.00 mU/L Final   04/26/2017 1.66 0.40 - 4.00 mU/L Final   05/15/2015 1.84 0.40 - 4.00 mU/L Final       Last Mineral Profile:   Ferritin   Date Value Ref Range Status   02/07/2013 71 10 - 120 ng/mL Final     Iron   Date Value Ref Range Status   02/07/2013 95 35 - 180 ug/dL Final     Iron Binding Cap   Date Value Ref Range Status   02/07/2013 247 240 - 430 ug/dL Final       Autoimmune & Inflammatory   No results found for: CRP      Last Vitamin Profile:   Vitamin D 1,25   Date Value Ref Range Status   02/28/2012 50  Final     Comment:     Reference range: 15 to 75  Unit: pg/mL  (Note)  INTERPRETIVE INFORMATION: Vitamin D, 1,25-Dihydroxy  This test is primarily indicated during patient evaluation  for hypercalcemia and renal failure. A normal result does  not rule out Vitamin D deficiency. The recommended test  for  diagnosing Vitamin D deficiency is Vitamin D 25-hydroxy.  This test was developed and its performance characteristics  determined by [facility]. The U.S. Food and Drug  Administration has not approved or cleared this test;  however, FDA clearance or approval is not currently  required for clinical use. The results are not intended to  be used as the sole means for clinical diagnosis or patient  management decisions.  Performed by [facility],  [address] [place] [address] [phone]  [email or web address], Kathryn Kelly MD, Lab. Director     25 OH Vit D2   Date Value Ref Range Status   04/06/2016 <5 ug/L Final   02/28/2012 6 ug/L Final   02/06/2012 <5 ug/L Final     25 OH Vit D3   Date Value Ref Range Status   04/06/2016 82 ug/L Final   02/28/2012 9 ug/L Final   02/06/2012 9 ug/L Final     25 OH Vit D total   Date Value Ref Range Status   04/06/2016 (H) 20 - 75 ug/L Final    <87  Season, race, dietary intake, and treatment affect the concentration of   25-hydroxy-Vitamin D. Values may decrease during winter months and increase   during summer months. Values 20-29 ug/L may indicate Vitamin D insufficiency   and values <20 ug/L may indicate Vitamin D deficiency.   This test was developed and its performance characteristics determined by the   [facility],  Special [facility] Laboratory. It has   not been cleared or approved by the FDA. The laboratory is regulated under CLIA   as qualified to perform high-complexity testing. This test is used for clinical   purposes. It should not be regarded as investigational or for research.     07/16/2012 18 (L) 30 - 75 ug/L Final     Comment:     Season, race, dietary intake, and treatment affect the concentration of   25-hydroxy-Vitamin D. Values may decrease during winter months and increase   during summer months. Values less than 30 ug/L may indicate Vitamin D   deficiency.   Vitamin D determination is routinely performed by an  "immunoassay specific for   25 hydroxyvitamin D3. If an individual is on vitamin D2 (ergocalciferol)   supplementation, please specify 25 OH vitamin D2 and D3 level determination   by   [facility]. For questions, please contact the laboratory at [phone].   02/28/2012 15 (L) 30 - 75 ug/L Final     Comment:     Season, race, dietary intake, and treatment affect the concentration of   25-hydroxy-Vitamin D. Values may decrease during winter months and increase   during summer months. Values less than 30 ug/L may indicate Vitamin D   deficiency.       ANTHROPOMETRICS:  Vitals:   BP Readings from Last 1 Encounters:   02/18/20 (!) 140/80     Pulse Readings from Last 1 Encounters:   02/18/20 69     Estimated body mass index is 48.21 kg/m  as calculated from the following:    Height as of 8/24/20: 1.537 m (5' 0.5\").    Weight as of 8/24/20: 113.9 kg (251 lb).    Wt Readings from Last 5 Encounters:   08/24/20 113.9 kg (251 lb)   03/03/20 113.9 kg (251 lb)   02/25/20 113.9 kg (251 lb)   01/24/20 113.9 kg (251 lb)   01/23/20 114.1 kg (251 lb 8 oz)     NUTRITION DIAGNOSIS:   1. Altered nutrition-related laboratory values related to endocrine dysfunction as evidenced by elevated BMI.        NUTRITION INTERVENTION:   CARDIOMETABOLIC    Long Term Goals:   Goal: Lose 20-24lbs in 6 months.       Short Term Goals:     1. Start having breakfast daily - ex (smoothie with dairy free alternative -ex. unsweetened almond milk, plant based protein powder, healthy fat - ex ground flax, ellis or hemp seeds and or avocado plus 1 serving of fruit and veggie - ex: spinach, kale      see recipes provided   2.   Start having snack 1-2x daily with healthy fats (ex: guac, nuts, hummus) and add a fruit/veggie for fiber- see ideas discussed and provided   3. Get the dailygreatness.[email or web address] wellness journal to set goals and track habits, meal plan etc.     Cardiometabolic Food plan - 1400 -1800 calories per day     Protein 9-10 servings per day  - include at " each meal to stabilize blood sugars   (Choose 3oz or 21g per meal and aim for 1oz of 7g for snacks)   - Strive for 1-2 servings of fish per week especially of higher omega-3 fatty acid containing fish such as salmon.     Legumes <1 serving per day     Dairy alternatives 1 serving per day     Nuts and seeds 2-3 servings per day - great to incorporate as snacks     Fats and oils 4 servings per day     Non starchy vegetables 7-8 servings per day     Starchy vegetable limit 1 serving per day as they tend to impact blood sugar (they are moderate-GI).     Fruits 2 servings per day - best to couple with a little bit of protein or fat to offset a rise in blood sugars (they are low-moderate-GI foods).     Whole grains <1 serving per day - try gluten free whole grains instead       Incorporate protein powder daily:    Plant based hemp (recommended brands: [place], Nutiva, Just Hemp Protein, [employer])      Plant based pea (recommended brands: Naked Pea, Now Sports). If you want to try a combo of pea and hemp the brand [place] in vanilla or chocolate is a great option.     Try Bone broth protein powder or collagen peptides in liquid bone broth, vegetable broth or 12 oz of water as snack. The bone broth powder and collagen can be used for soups as well. This can help provide essential amino acids and minerals that heal your gut as well as balance blood sugars. A great option if you have a hard time tolerating solids.     Choose Low Glycemic (GI) foods: Regulate your sugar levels by eating foods that do not spike blood sugars.  Eat low -GI foods so only small fluctuations in blood glucose and insulin levels are produced.      Examples of low-GI foods: nuts, seeds, GF oats, most vegetables especially non-starchy and fruits.     Medium or high-GI foods should be eaten with a protein or fat, both of which blunt the glycemic effect of these foods. This reduces the overall glycemic impact of a meal.   Ex: Most grains and  starchy veggies are medium/high GI.     Avoid foods containing refined sugars, artificial sweeteners, and refined grains they are considered high-GI because they lead to sharp increases in blood sugars levels, which increase insulin sensitivity causing increased TG, and low good cholesterol (HDL).   Ex: cakes, cookies, pies, bread, sodas, fruit drinks, presweetened tea, coffee drinks, energy or sport drinks, flavored milk and other processed foods.     Choose foods high in fiber: Aim for at least 5 grams of fiber per serving of food or a total of 25-35 grams fiber per day. Remember, when looking at the label, you can take the fiber away from the total carbs. Ex:15g of total carbs - 4g of fiber = 11g net carbs     Insoluble fiber acts like a bulky  inner broom,  sweeping out debris from the intestine and creating more motility and movement.      Soluble fiber attracts water and swells, creating a gel that slows digestion.  Also, slows the release of glucose from foods into the blood which stops spikes in blood sugar levels.  Soluble fiber traps toxins in the gut, helping to carry them to excretion and provides healthy bacteria in the digestive tract.     Choose High Quality Healthy Fats: Adding anti-inflammatory fats into your diet such as fish (salmon, herring, mackerel, and sardines), omega 3 eggs, ellis seeds, ground flax seeds/milk, hemp seeds/milk, walnuts and some other certain leafy greens will increase omega-3 fats to omeaga-6 fats ratio.     Therapeutic fats both monounsaturated and polyunsaturated to include daily: ground flaxseeds, unsalted mixed nuts, avocados, olives, extra-virgin olive oil.     Emphasize high-quality oils and fats in the diet daily such as avocado oil, coconut oil, flaxseed, olive, sesame. Ex: 1 tsp to 1 tbsp of MCT oil from coconuts can be added into coffee, smoothies, and salad dressings per day.     Avoid trans fats found in processed foods     Drink more water. Hydration is critical,  so drink at least six to eight glasses of water a day. Drink more water between meals and less at meals.     Try adding herbal teas (sugar free) or lemon/lime/cucumber/fruit to water for flavor. Avoid artificial sweetener packets to flavor your water.     Cut back on coffee switch to green tea. Avoid adding sugar and milk to coffee instead use dairy alternatives such as almond, flax, coconut milk.       Rebuild the friendly bacteria in your microbime. Start by taking a probiotic supplement, they will help rebuild the healthy bacteria so essential to good gut health.     you can help establish healthy gut microflora by consuming foods that contain live active cultures ( probiotics ) such as kimchi,     Increase physical activity. Moving our body helps move our bowels and speeds up your metabolism.     Exercise 15-60 minutes daily--whether that looks like burst training, yoga, or vigorous walking.    Manage your stress. Stress can negatively impact the way you digest foods and absorb nutrients leading to more digestive issues (constipation, diarrhea, indigestion, nausea etc), imbalanced blood sugars and weight imbalances. Try to focus on the following relaxation techniques:     Regular exercise such as walking     Yoga    Meditation     Breathing techniques     Time management     Track what you eat. Writing down or tracking through an anika what you eat as well as how you feel acn help you identify patterns in your symptoms. This can help you become more aware and creat a diet that is right for you.     Pick a food tracking anika:     Through the mysymptoms anika, you can track symptoms, bowl movements, medications, stress, exercise, sleep and foods as well as beverages to become more mindful. [email or web address]    Through the [employer] anika, you can focus more on calories and macronutrient's of foods to balance blood sugars or monitor weight. You can track blood sugars in the notes section along with symptoms,  bowl movements, medications, stress, exercise, water intake and sleep.   Note: You don't have to journal forever and it is more important that you have consistent meals as well as snacks while focusing on adding in healthy foods.    NUTRITION RESOURCES:  1. Purchase Eat Fat Get Thin by Sebastián Walker Book/Cookbook   2. IFM Cardiometabolic Packet     Functional Nutrition Fundamentals     Comprehensive Guide    Meal plan with recipes       PATIENT'S BEHAVIOR CHANGE GOALS:   See nutrition intervention for patient stated behavior change goals. AVS was printed and given to patient at today's appointment.    MONITOR / EVALUATE:  Registered Dietitian will monitor/evaluate the following:     Beliefs and attitudes related to food    Food and nutrition knowledge / skills    Food / Beverage / Nutrient intake     Pertinent Labs    Progress toward meeting stated nutrition-related goals    Readiness to change nutrition-related behaviors    Weight change    Digestion     COORDINATION OF CARE:  Follow up with primary care provider       FOLLOW-UP:  Follow-up appointment scheduled on 12/22/2020.       Time spent in minutes: 45 minutes 3 units   Encounter: Individual    Savanah Romero RD, CLT, LD  Integrative Registered Dietitian

## 2020-11-11 NOTE — PATIENT INSTRUCTIONS
NUTRITION INTERVENTION:   CARDIOMETABOLIC    Long Term Goals:   Goal: Lose 20-24lbs in 6 months.       Short Term Goals:     1. Start having breakfast daily - ex (smoothie with dairy free alternative -ex. unsweetened almond milk, plant based protein powder, healthy fat - ex ground flax, ellis or hemp seeds and or avocado plus 1 serving of fruit and veggie - ex: spinach, kale      see recipes provided   2.   Start having snack 1-2x daily with healthy fats (ex: guac, nuts, hummus) and add a fruit/veggie for fiber- see ideas discussed and provided   3. Get the [employer] wellness journal to set goals and track habits, meal plan etc.     Cardiometabolic Food plan - 1400 -1800 calories per day     Protein 9-10 servings per day  - include at each meal to stabilize blood sugars   (Choose 3oz or 21g per meal and aim for 1oz of 7g for snacks)   - Strive for 1-2 servings of fish per week especially of higher omega-3 fatty acid containing fish such as salmon.     Legumes <1 serving per day     Dairy alternatives 1 serving per day     Nuts and seeds 2-3 servings per day - great to incorporate as snacks     Fats and oils 4 servings per day     Non starchy vegetables 7-8 servings per day     Starchy vegetable limit 1 serving per day as they tend to impact blood sugar (they are moderate-GI).     Fruits 2 servings per day - best to couple with a little bit of protein or fat to offset a rise in blood sugars (they are low-moderate-GI foods).     Whole grains <1 serving per day - try gluten free whole grains instead       Incorporate protein powder daily:    Plant based hemp (recommended brands: [place], Nutiva, Just Hemp Protein, H[employer])      Plant based pea (recommended brands: Naked Pea, Now Sports). If you want to try a combo of pea and hemp the brand Giles in vanilla or chocolate is a great option.     Try Bone broth protein powder or collagen peptides in liquid bone broth, vegetable broth or 12 oz of water  as snack. The bone broth powder and collagen can be used for soups as well. This can help provide essential amino acids and minerals that heal your gut as well as balance blood sugars. A great option if you have a hard time tolerating solids.     Choose Low Glycemic (GI) foods: Regulate your sugar levels by eating foods that do not spike blood sugars.  Eat low -GI foods so only small fluctuations in blood glucose and insulin levels are produced.      Examples of low-GI foods: nuts, seeds, GF oats, most vegetables especially non-starchy and fruits.     Medium or high-GI foods should be eaten with a protein or fat, both of which blunt the glycemic effect of these foods. This reduces the overall glycemic impact of a meal.   Ex: Most grains and starchy veggies are medium/high GI.     Avoid foods containing refined sugars, artificial sweeteners, and refined grains they are considered high-GI because they lead to sharp increases in blood sugars levels, which increase insulin sensitivity causing increased TG, and low good cholesterol (HDL).   Ex: cakes, cookies, pies, bread, sodas, fruit drinks, presweetened tea, coffee drinks, energy or sport drinks, flavored milk and other processed foods.     Choose foods high in fiber: Aim for at least 5 grams of fiber per serving of food or a total of 25-35 grams fiber per day. Remember, when looking at the label, you can take the fiber away from the total carbs. Ex:15g of total carbs - 4g of fiber = 11g net carbs     Insoluble fiber acts like a bulky  inner broom,  sweeping out debris from the intestine and creating more motility and movement.      Soluble fiber attracts water and swells, creating a gel that slows digestion.  Also, slows the release of glucose from foods into the blood which stops spikes in blood sugar levels.  Soluble fiber traps toxins in the gut, helping to carry them to excretion and provides healthy bacteria in the digestive tract.     Choose High Quality  Healthy Fats: Adding anti-inflammatory fats into your diet such as fish (salmon, herring, mackerel, and sardines), omega 3 eggs, ellis seeds, ground flax seeds/milk, hemp seeds/milk, walnuts and some other certain leafy greens will increase omega-3 fats to omeaga-6 fats ratio.     Therapeutic fats both monounsaturated and polyunsaturated to include daily: ground flaxseeds, unsalted mixed nuts, avocados, olives, extra-virgin olive oil.     Emphasize high-quality oils and fats in the diet daily such as avocado oil, coconut oil, flaxseed, olive, sesame. Ex: 1 tsp to 1 tbsp of MCT oil from coconuts can be added into coffee, smoothies, and salad dressings per day.     Avoid trans fats found in processed foods     Drink more water. Hydration is critical, so drink at least six to eight glasses of water a day. Drink more water between meals and less at meals.     Try adding herbal teas (sugar free) or lemon/lime/cucumber/fruit to water for flavor. Avoid artificial sweetener packets to flavor your water.     Cut back on coffee switch to green tea. Avoid adding sugar and milk to coffee instead use dairy alternatives such as almond, flax, coconut milk.       Rebuild the friendly bacteria in your microbime. Start by taking a probiotic supplement, they will help rebuild the healthy bacteria so essential to good gut health.     you can help establish healthy gut microflora by consuming foods that contain live active cultures ( probiotics ) such as kimchi,     Increase physical activity. Moving our body helps move our bowels and speeds up your metabolism.     Exercise 15-60 minutes daily--whether that looks like burst training, yoga, or vigorous walking.    Manage your stress. Stress can negatively impact the way you digest foods and absorb nutrients leading to more digestive issues (constipation, diarrhea, indigestion, nausea etc), imbalanced blood sugars and weight imbalances. Try to focus on the following relaxation techniques:      Regular exercise such as walking     Yoga    Meditation     Breathing techniques     Time management     Track what you eat. Writing down or tracking through an anika what you eat as well as how you feel acn help you identify patterns in your symptoms. This can help you become more aware and creat a diet that is right for you.     Pick a food tracking anika:     Through the mysymptoms anika, you can track symptoms, bowl movements, medications, stress, exercise, sleep and foods as well as beverages to become more mindful. [email or web address]    Through the [employer] anika, you can focus more on calories and macronutrient's of foods to balance blood sugars or monitor weight. You can track blood sugars in the notes section along with symptoms, bowl movements, medications, stress, exercise, water intake and sleep.   Note: You don't have to journal forever and it is more important that you have consistent meals as well as snacks while focusing on adding in healthy foods.    NUTRITION RESOURCES:  1. Purchase Eat Fat Get Thin by Sebastián Walker Book/Cookbook   2. IFM Cardiometabolic Packet     Functional Nutrition Fundamentals     Comprehensive Guide    Meal plan with recipes

## 2020-11-30 DIAGNOSIS — I12.9 HYPERTENSION, RENAL: ICD-10-CM

## 2020-11-30 DIAGNOSIS — R73.01 ELEVATED FASTING BLOOD SUGAR: ICD-10-CM

## 2020-11-30 DIAGNOSIS — N18.30 CKD (CHRONIC KIDNEY DISEASE) STAGE 3, GFR 30-59 ML/MIN (H): ICD-10-CM

## 2020-11-30 DIAGNOSIS — Z13.1 SCREENING FOR DIABETES MELLITUS (DM): ICD-10-CM

## 2020-11-30 LAB
ALBUMIN SERPL-MCNC: 3.7 G/DL (ref 3.4–5)
ANION GAP SERPL CALCULATED.3IONS-SCNC: 4 MMOL/L (ref 3–14)
BUN SERPL-MCNC: 20 MG/DL (ref 7–30)
CALCIUM SERPL-MCNC: 9.5 MG/DL (ref 8.5–10.1)
CHLORIDE SERPL-SCNC: 107 MMOL/L (ref 94–109)
CO2 SERPL-SCNC: 28 MMOL/L (ref 20–32)
CREAT SERPL-MCNC: 1.17 MG/DL (ref 0.52–1.04)
GFR SERPL CREATININE-BSD FRML MDRD: 54 ML/MIN/{1.73_M2}
GLUCOSE SERPL-MCNC: 121 MG/DL (ref 70–99)
HBA1C MFR BLD: 6.1 % (ref 0–5.6)
HGB BLD-MCNC: 12.5 G/DL (ref 11.7–15.7)
PHOSPHATE SERPL-MCNC: 3.5 MG/DL (ref 2.5–4.5)
POTASSIUM SERPL-SCNC: 4.7 MMOL/L (ref 3.4–5.3)
PTH-INTACT SERPL-MCNC: 46 PG/ML (ref 18–80)
SODIUM SERPL-SCNC: 139 MMOL/L (ref 133–144)

## 2020-11-30 PROCEDURE — 80069 RENAL FUNCTION PANEL: CPT | Performed by: INTERNAL MEDICINE

## 2020-11-30 PROCEDURE — 83036 HEMOGLOBIN GLYCOSYLATED A1C: CPT | Performed by: FAMILY MEDICINE

## 2020-11-30 PROCEDURE — 85018 HEMOGLOBIN: CPT | Performed by: INTERNAL MEDICINE

## 2020-11-30 PROCEDURE — 83970 ASSAY OF PARATHORMONE: CPT | Performed by: INTERNAL MEDICINE

## 2020-12-01 ENCOUNTER — VIRTUAL VISIT (OUTPATIENT)
Dept: NEPHROLOGY | Facility: CLINIC | Age: 50
End: 2020-12-01
Payer: COMMERCIAL

## 2020-12-01 DIAGNOSIS — E11.22 TYPE 2 DIABETES MELLITUS WITH STAGE 3A CHRONIC KIDNEY DISEASE, WITHOUT LONG-TERM CURRENT USE OF INSULIN (H): ICD-10-CM

## 2020-12-01 DIAGNOSIS — N18.31 TYPE 2 DIABETES MELLITUS WITH STAGE 3A CHRONIC KIDNEY DISEASE, WITHOUT LONG-TERM CURRENT USE OF INSULIN (H): ICD-10-CM

## 2020-12-01 DIAGNOSIS — E21.0 PRIMARY HYPERPARATHYROIDISM (H): ICD-10-CM

## 2020-12-01 DIAGNOSIS — N18.31 STAGE 3A CHRONIC KIDNEY DISEASE (H): Primary | ICD-10-CM

## 2020-12-01 DIAGNOSIS — I12.9 HYPERTENSION, RENAL: ICD-10-CM

## 2020-12-01 PROCEDURE — 99214 OFFICE O/P EST MOD 30 MIN: CPT | Mod: GT | Performed by: INTERNAL MEDICINE

## 2020-12-01 NOTE — PROGRESS NOTES
12/1/20   CC: HTN and CKD    HPI: Adelaida Packer is a 49 year old female who presents for follow-up of HTN/CKD.  Has some mild CKD which is felt to be related to hypertension as well as likely some effects from hypercalcemia in the past. Her hx includes hyperparathyroidism, primary for which she underwent parathyroidectomy on 3/21/13. Following that surgery, she did have a complication related to a hematoma but we have seen many benefits of her parathyroidectomy - blood pressure improved as well as kidney function.    Creatinine has been 1.03-1.16 in the past year.  She did not have proteinuria on last check.  She has been sick for 10 days and is currently on amoxicillin.  Her blood pressure has been 120s at home over 70s to 80s.  She feels that her blood pressure improvement may be related to her change in job this year as it has been less stress.    12/1/20: no swelling. No home readings recently. 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 No specific questions/compalints today.        Allergies   Allergen Reactions     Sulfa Drugs Shortness Of Breath and Rash     Hydrochlorothiazide W/Triamterene Other (See Comments)     Renal insuff     Maxzide [Hydrochlorothiazide W/Triamterene] Other (See Comments)     Renal insuff     Metoprolol Other (See Comments)     Other reaction(s): Bradycardia  At high dose only  At high dose only     Seasonal Allergies          Current Outpatient Medications   Medication Sig Dispense Refill     acetaminophen (TYLENOL ARTHRITIS PAIN) 650 MG CR tablet Take 650-1,300 mg by mouth every 8 hours as needed for mild pain or fever       albuterol (PROAIR HFA/PROVENTIL HFA/VENTOLIN HFA) 108 (90 Base) MCG/ACT inhaler Inhale 2 puffs into the lungs every 4 hours as needed for shortness of breath / dyspnea or wheezing 1 Inhaler 0     amLODIPine (NORVASC) 5 MG tablet Take 1 tablet (5 mg) by mouth daily 90 tablet 3     ASPIRIN LOW DOSE 81 MG EC tablet TAKE 1 TABLET BY MOUTH  ONCE DAILY 100 tablet 0     Blood Pressure Monitor KIT 1 Device 2 times daily 1 kit 0     calcium carbonate (OS-EVGENY 600 MG [place]. [place]) 1500 (600 CA) MG tablet Take 1 tablet (600 mg) by mouth 2 times daily 180 tablet 3     cetirizine (ZYRTEC) 10 MG tablet Take 10 mg by mouth daily as needed for allergies       cholecalciferol 2000 UNITS CAPS Take 2,000 Units by mouth daily 90 capsule 3     eplerenone (INSPRA) 50 MG tablet Take 2 tablets (100 mg) by mouth 2 times daily 360 tablet 3     famotidine (PEPCID) 20 MG tablet Take 1 tablet (20 mg) by mouth 2 times daily (Patient taking differently: Take 20 mg by mouth 2 times daily as needed ) 180 tablet 1     labetalol (NORMODYNE) 300 MG tablet Take 1 tablet (300 mg) by mouth 2 times daily 180 tablet 3     polyethylene glycol (MIRALAX/GLYCOLAX) Packet Take 17 g by mouth daily as needed        SUMAtriptan (IMITREX) 25 MG tablet TAKE 1 TO 2 TABLETS BY MOUTH AT ONSET OF HEADACHE FOR MIGRAINE. MAY REPEAT DOSE IN 2 HOURS. MAX OF 200MG OR 2 DOSES IN 24 HOURS (Patient not taking: Reported on 11/24/2020) 18 tablet 0       Past Medical History:   Diagnosis Date     Allergic rhinitis due to other allergen     seasonal     Arthritis      Diabetes (H)      Localized osteoarthritis of both knees      Migraine, unspecified, without mention of intractable migraine without mention of status migrainosus      Monoclonal gammopathy      Morbid obesity (H)      Unspecified essential hypertension     dx around age 31         Past Surgical History:   Procedure Laterality Date     C ANESTH,KNEE AREA SURGERY  01/2001     C GASTROPLASTY,OBESITY,VERT BAND      removed 2009     HC REMOVE TONSILS/ADENOIDS,12+ Y/O  1995     HEAD & NECK SURGERY  3/2013    Parathyroidectomy--had to go back in 6 days later for a possible bleed     HYSTERECTOMY, VAGINAL  12/2005    hysterectomy- fibroids     ORTHOPEDIC SURGERY           Social History     Tobacco Use     Smoking status: Never Smoker     Smokeless tobacco:  Never Used   Substance Use Topics     Alcohol use: No     Drug use: No         Family History   Problem Relation Age of Onset     Hypertension Mother      Gynecology Mother         hysterectomy     Gastrointestinal Disease Mother         bowel upstruction     Thyroid Disease Mother      Neurologic Disorder Mother      Osteoporosis Mother      Hypertension Father      Lipids Father      Arthritis Father      Heart Disease Father      Prostate Cancer Brother      Alcohol/Drug Brother      Circulatory Brother      Arthritis Paternal Grandmother      Cancer Maternal Grandfather         bone     Eye Disorder Maternal Grandfather      Prostate Cancer Maternal Grandfather      Glaucoma Maternal Grandfather      Cancer Maternal Grandmother         tumor-head     Obesity Maternal Grandmother      Respiratory Daughter         asthma     Diabetes Sister      Cerebrovascular Disease Sister      Macular Degeneration No family hx of          ROS: A 4 system review of systems was negative other than noted here or above.     Exam:    GENERAL: Healthy, alert and no distress  EYES: Eyes grossly normal to inspection.  No discharge or erythema, or obvious scleral/conjunctival abnormalities.  RESP: No audible wheeze, cough, or visible cyanosis.  No visible retractions or increased work of breathing.    SKIN: Visible skin clear. No significant rash, abnormal pigmentation or lesions.  NEURO: Cranial nerves grossly intact.  Mentation and speech appropriate for age.  PSYCH: Mentation appears normal, affect normal/bright, judgement and insight intact, normal speech and appearance well-groomed.     Result  No visits with results within 1 Day(s) from this visit.   Latest known visit with results is:   Orders Only on 11/19/2019   Component Date Value Ref Range Status     Hemoglobin A1C 11/19/2019 5.5  0 - 5.6 % Final    Comment: Normal <5.7% Prediabetes 5.7-6.4%  Diabetes 6.5% or higher - adopted from ADA   consensus guidelines.       TSH  11/19/2019 1.59  0.40 - 4.00 mU/L Final     Creatinine Urine 11/19/2019 125  mg/dL Final     Albumin Urine mg/L 11/19/2019 <5  mg/L Final     Albumin Urine mg/g Cr 11/19/2019 Unable to calculate due to low value  0 - 25 mg/g Cr Final     Sodium 11/19/2019 140  133 - 144 mmol/L Final     Potassium 11/19/2019 4.4  3.4 - 5.3 mmol/L Final     Chloride 11/19/2019 107  94 - 109 mmol/L Final     Carbon Dioxide 11/19/2019 27  20 - 32 mmol/L Final     Anion Gap 11/19/2019 6  3 - 14 mmol/L Final     Glucose 11/19/2019 106* 70 - 99 mg/dL Final     Urea Nitrogen 11/19/2019 19  7 - 30 mg/dL Final     Creatinine 11/19/2019 1.13* 0.52 - 1.04 mg/dL Final     GFR Estimate 11/19/2019 56* >60 mL/min/[1.73_m2] Final    Comment: Non  GFR Calc  Starting 12/18/2018, serum creatinine based estimated GFR (eGFR) will be   calculated using the Chronic Kidney Disease Epidemiology Collaboration   (CKD-EPI) equation.       GFR Estimate If Black 11/19/2019 65  >60 mL/min/[1.73_m2] Final    Comment:  GFR Calc  Starting 12/18/2018, serum creatinine based estimated GFR (eGFR) will be   calculated using the Chronic Kidney Disease Epidemiology Collaboration   (CKD-EPI) equation.       Calcium 11/19/2019 9.5  8.5 - 10.1 mg/dL Final     Phosphorus 11/19/2019 3.5  2.5 - 4.5 mg/dL Final     Albumin 11/19/2019 3.9  3.4 - 5.0 g/dL Final     Protein Random Urine 11/19/2019 0.08  g/L Final     Protein Total Urine g/gr Creatinine 11/19/2019 0.07  0 - 0.2 g/g Cr Final     Parathyroid Hormone Intact 11/19/2019 50  18 - 80 pg/mL Final     Hemoglobin 11/19/2019 13.0  11.7 - 15.7 g/dL Final        Assessment/Plan:  1. Hypertension: aldosterone level has been high on evaluation by Dr. Quintanilla with a low renin. In the past I was suspicious for hyperaldosteronism as well but CT scan was without adrenal adneoma appreciated. Agree with potassium sparing diuretic for BP control given presumed adrenal hyperplasia. No recent BP readings -  "goal is less than 130/80 but at the very least less than 140/90.     2. CKD: likely some chronic kidney changes related to longstanding hypertension and hypercalcemia.     3. Hypercalcemia: s/p parathyroidectomy on 3/21/13. Calcium now normal.      4. 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 Rodas/[facility] - yearly follow-up was recommended by Dr. Rodas at either [place] or here.      5. DM: A1C much improved at 6.1% in Nov.     Patient Instructions   1. Labs in 6 months  2. Follow-up in one year.          Dia Aleman DO              Adelaida Packer is a 50 year old female who is being evaluated via a billable video visit.      The patient has been notified of following:     \"This video visit will be conducted via a call between you and your physician/provider. We have found that certain health care needs can be provided without the need for an in-person physical exam.  This service lets us provide the care you need with a video conversation.  If a prescription is necessary we can send it directly to your pharmacy.  If lab work is needed we can place an order for that and you can then stop by our lab to have the test done at a later time.    Video visits are billed at different rates depending on your insurance coverage.  Please reach out to your insurance provider with any questions.    If during the course of the call the physician/provider feels a video visit is not appropriate, you will not be charged for this service.\"    Patient has given verbal consent for Video visit? Yes  How would you like to obtain your AVS? MyChart  If you are dropped from the video visit, the video invite should be resent to: Text to cell phone: [phone]  Will anyone else be joining your video visit? Maura Nesbitt LPN      Video-Visit Details    Type of service:  Video Visit    Video Start Time: 434 PM  Video End Time: 451 PM    Originating Location (pt. Location): Home    Distant Location (provider " location):  [facility]     Platform used for Video Visit: Delmi Aleman MD

## 2020-12-03 ENCOUNTER — OFFICE VISIT (OUTPATIENT)
Dept: ORTHOPEDICS | Facility: CLINIC | Age: 50
End: 2020-12-03
Payer: COMMERCIAL

## 2020-12-03 VITALS — WEIGHT: 251 LBS | BODY MASS INDEX: 47.39 KG/M2 | HEIGHT: 61 IN

## 2020-12-03 DIAGNOSIS — M17.0 BILATERAL PRIMARY OSTEOARTHRITIS OF KNEE: Primary | ICD-10-CM

## 2020-12-03 DIAGNOSIS — M72.2 PLANTAR FASCIITIS, LEFT: ICD-10-CM

## 2020-12-03 PROCEDURE — 99213 OFFICE O/P EST LOW 20 MIN: CPT | Performed by: FAMILY MEDICINE

## 2020-12-03 ASSESSMENT — MIFFLIN-ST. JEOR: SCORE: 1682.97

## 2020-12-03 NOTE — PROGRESS NOTES
"HISTORY OF PRESENT ILLNESS  Ms. Packer is a pleasant 50 year old female following up with bilateral knee pain.  Adelaida has been doing quite a bit better since last seeing me.  Her knees have intermittently bothered her, however, they do feel great today.  Her pain is minimal today, although she does have ongoing pain on occasion.  She has been working pretty hard on losing weight, she has been attending her nutrition classes and altering her diet.  She also has a spot on her foot that is bothering her quite a bit.  This is on the bottom of her left foot.  She is getting orthotics made today at 4:00.     PHYSICAL EXAM  Vitals:    12/03/20 1309   Weight: 113.9 kg (251 lb)   Height: 1.537 m (5' 0.5\")     General  - normal appearance, in no obvious distress  HEENT  - conjunctivae not injected, moist mucous membranes  CV  - normal popliteal pulse  Pulm  - normal respiratory pattern, non-labored  Musculoskeletal - right and left knee  - stance: mildly antalgic gait  - inspection: generalized swelling  - palpation: no tenderness, popliteal fullness  - ROM: 100 degrees flexion, 0 degrees extension  Musculoskeletal - left foot  - palpation: tender at the medial calcaneal tubercle  Neuro  - no sensory or motor deficit, grossly normal coordination, normal muscle tone  Skin  - no ecchymosis, erythema, warmth, or induration, no obvious rash  Psych  - interactive, appropriate, normal mood and affect          ASSESSMENT & PLAN  Ms. Packer is a 50 year old female following up with bilateral knee osteoarthritis.  She also has left foot pain.    I am happy that Adelaida is doing better, we are going to forego the injections today given that she is minimally symptomatic.  This is something that we could perform in the future, if her knees flareup.  If this is the case she is going to contact us.    She also has clinical evidence of plantar fasciitis.  She is going to try her custom orthotics.  If these do not improve her symptoms to any " degree we should follow-up.    It was a pleasure seeing Adelaida.        Tj Mac DO, CAKRISTEL      This note was constructed using Dragon dictation software, please excuse any minor errors in spelling, grammar, or syntax.

## 2020-12-03 NOTE — LETTER
"    12/3/2020         RE: Adelaida Packer  [address] [facility] [place] [address]        Dear Colleague,    Thank you for referring your patient, Adelaida Packer, to the [facility] [place]. Please see a copy of my visit note below.    HISTORY OF PRESENT ILLNESS  Ms. Packer is a pleasant 50 year old female following up with bilateral knee pain.  Adelaida has been doing quite a bit better since last seeing me.  Her knees have intermittently bothered her, however, they do feel great today.  Her pain is minimal today, although she does have ongoing pain on occasion.  She has been working pretty hard on losing weight, she has been attending her nutrition classes and altering her diet.  She also has a spot on her foot that is bothering her quite a bit.  This is on the bottom of her left foot.  She is getting orthotics made today at 4:00.     PHYSICAL EXAM  Vitals:    12/03/20 1309   Weight: 113.9 kg (251 lb)   Height: 1.537 m (5' 0.5\")     General  - normal appearance, in no obvious distress  HEENT  - conjunctivae not injected, moist mucous membranes  CV  - normal popliteal pulse  Pulm  - normal respiratory pattern, non-labored  Musculoskeletal - right and left knee  - stance: mildly antalgic gait  - inspection: generalized swelling  - palpation: no tenderness, popliteal fullness  - ROM: 100 degrees flexion, 0 degrees extension  Musculoskeletal - left foot  - palpation: tender at the medial calcaneal tubercle  Neuro  - no sensory or motor deficit, grossly normal coordination, normal muscle tone  Skin  - no ecchymosis, erythema, warmth, or induration, no obvious rash  Psych  - interactive, appropriate, normal mood and affect          ASSESSMENT & PLAN  Ms. Packer is a 50 year old female following up with bilateral knee osteoarthritis.  She also has left foot pain.    I am happy that Adelaida is doing better, we are going to forego the injections today given that she is minimally " symptomatic.  This is something that we could perform in the future, if her knees flareup.  If this is the case she is going to contact us.    She also has clinical evidence of plantar fasciitis.  She is going to try her custom orthotics.  If these do not improve her symptoms to any degree we should follow-up.    It was a pleasure seeing Adelaida.        Tj Mac DO, CAQSM      This note was constructed using Dragon dictation software, please excuse any minor errors in spelling, grammar, or syntax.        Again, thank you for allowing me to participate in the care of your patient.        Sincerely,        Tj Mac DO

## 2020-12-15 ENCOUNTER — RECORDS - HEALTHEAST (OUTPATIENT)
Dept: ADMINISTRATIVE | Facility: OTHER | Age: 50
End: 2020-12-15

## 2020-12-23 NOTE — NURSING NOTE
Message sent to Procedure  to assist with scheduling Future appts recommended by Dr. Aleman:    Dispositions Check-out Note   0 Return in about 1 year (around 12/1/2021). 1. Labs in 6 months   2. Follow-up in one year.      Alise Nesbitt LPN

## 2021-01-14 ENCOUNTER — HEALTH MAINTENANCE LETTER (OUTPATIENT)
Age: 51
End: 2021-01-14

## 2021-01-15 ENCOUNTER — VIRTUAL VISIT (OUTPATIENT)
Dept: NUTRITION | Facility: CLINIC | Age: 51
End: 2021-01-15
Payer: COMMERCIAL

## 2021-01-15 DIAGNOSIS — E66.01 MORBID OBESITY (H): Primary | ICD-10-CM

## 2021-01-15 PROCEDURE — 97803 MED NUTRITION INDIV SUBSEQ: CPT | Mod: GT | Performed by: DIETITIAN, REGISTERED

## 2021-01-15 NOTE — PATIENT INSTRUCTIONS
NUTRITION INTERVENTION:   CARDIOMETABOLIC    Long Term Goals:   Goal: Lose 20-24lbs in 6 months.   10-11lbs in 6 weeks       Short Term Goals:     1. Get sleep back on track sleeping through the night full 7hrs- bed by 10pm and waking up at 5:30am     [email or web address]-essential-strategies-to-sleep-your-[email or web address]-a-better-body-better-health-vyp-sivoif-pabgfyf_tzdty-stevenson/[id number]=&pmt=&slid=&plc=&[id number]&[id number]    2. Get the [email or web address] wellness journal to set goals and track habits, meal plan etc.   3. Focusing on meal prep and having foods accessible at work - focus stuff to put in desk and go to snacks at work   4. Focus on water and increasing to 64 and 90oz per day     Cardiometabolic Food plan - 1400 -1800 calories per day     Protein 9-10 servings per day  - include at each meal to stabilize blood sugars   (Choose 3oz or 21g per meal and aim for 1oz of 7g for snacks)   - Strive for 1-2 servings of fish per week especially of higher omega-3 fatty acid containing fish such as salmon.     Legumes <1 serving per day     Dairy alternatives 1 serving per day     Nuts and seeds 2-3 servings per day - great to incorporate as snacks     Fats and oils 4 servings per day     Non starchy vegetables 7-8 servings per day     Starchy vegetable limit 1 serving per day as they tend to impact blood sugar (they are moderate-GI).     Fruits 2 servings per day - best to couple with a little bit of protein or fat to offset a rise in blood sugars (they are low-moderate-GI foods).     Whole grains <1 serving per day - try gluten free whole grains instead       Incorporate protein powder daily:    Plant based hemp (recommended brands: [place], Nutiva, Just Hemp Protein, [employer])       Plant based pea (recommended brands: Naked Pea, Now Sports). If you want to try a combo of pea and hemp the brand Giles in vanilla or chocolate is a great option.     Try Bone broth protein powder or collagen peptides in liquid bone broth, vegetable broth or 12 oz of water as snack. The bone broth powder and collagen can be used for soups as well. This can help provide essential amino acids and minerals that heal your gut as well as balance blood sugars. A great option if you have a hard time tolerating solids.     Choose Low Glycemic (GI) foods: Regulate your sugar levels by eating foods that do not spike blood sugars.  Eat low -GI foods so only small fluctuations in blood glucose and insulin levels are produced.      Examples of low-GI foods: nuts, seeds, GF oats, most vegetables especially non-starchy and fruits.     Medium or high-GI foods should be eaten with a protein or fat, both of which blunt the glycemic effect of these foods. This reduces the overall glycemic impact of a meal.   Ex: Most grains and starchy veggies are medium/high GI.     Avoid foods containing refined sugars, artificial sweeteners, and refined grains they are considered high-GI because they lead to sharp increases in blood sugars levels, which increase insulin sensitivity causing increased TG, and low good cholesterol (HDL).   Ex: cakes, cookies, pies, bread, sodas, fruit drinks, presweetened tea, coffee drinks, energy or sport drinks, flavored milk and other processed foods.     Choose foods high in fiber: Aim for at least 5 grams of fiber per serving of food or a total of 25-35 grams fiber per day. Remember, when looking at the label, you can take the fiber away from the total carbs. Ex:15g of total carbs - 4g of fiber = 11g net carbs     Insoluble fiber acts like a bulky  inner broom,  sweeping out debris from the intestine and creating more motility and movement.      Soluble fiber attracts water and swells, creating a gel that  slows digestion.  Also, slows the release of glucose from foods into the blood which stops spikes in blood sugar levels.  Soluble fiber traps toxins in the gut, helping to carry them to excretion and provides healthy bacteria in the digestive tract.     Choose High Quality Healthy Fats: Adding anti-inflammatory fats into your diet such as fish (salmon, herring, mackerel, and sardines), omega 3 eggs, ellis seeds, ground flax seeds/milk, hemp seeds/milk, walnuts and some other certain leafy greens will increase omega-3 fats to omeaga-6 fats ratio.     Therapeutic fats both monounsaturated and polyunsaturated to include daily: ground flaxseeds, unsalted mixed nuts, avocados, olives, extra-virgin olive oil.     Emphasize high-quality oils and fats in the diet daily such as avocado oil, coconut oil, flaxseed, olive, sesame. Ex: 1 tsp to 1 tbsp of MCT oil from coconuts can be added into coffee, smoothies, and salad dressings per day.     Avoid trans fats found in processed foods     Drink more water. Hydration is critical, so drink at least six to eight glasses of water a day. Drink more water between meals and less at meals.     Try adding herbal teas (sugar free) or lemon/lime/cucumber/fruit to water for flavor. Avoid artificial sweetener packets to flavor your water.     Cut back on coffee switch to green tea. Avoid adding sugar and milk to coffee instead use dairy alternatives such as almond, flax, coconut milk.       Rebuild the friendly bacteria in your microbime. Start by taking a probiotic supplement, they will help rebuild the healthy bacteria so essential to good gut health.     you can help establish healthy gut microflora by consuming foods that contain live active cultures ( probiotics ) such as kimchi,     Increase physical activity. Moving our body helps move our bowels and speeds up your metabolism.     Exercise 15-60 minutes daily--whether that looks like burst training, yoga, or vigorous  walking.    Manage your stress. Stress can negatively impact the way you digest foods and absorb nutrients leading to more digestive issues (constipation, diarrhea, indigestion, nausea etc), imbalanced blood sugars and weight imbalances. Try to focus on the following relaxation techniques:     Regular exercise such as walking     Yoga    Meditation     Breathing techniques     Time management     Track what you eat. Writing down or tracking through an anika what you eat as well as how you feel acn help you identify patterns in your symptoms. This can help you become more aware and creat a diet that is right for you.     Pick a food tracking anika:     Through the [employer] anika, you can track symptoms, bowl movements, medications, stress, exercise, sleep and foods as well as beverages to become more mindful. [email or web address]    Through the [employer] anika, you can focus more on calories and macronutrient's of foods to balance blood sugars or monitor weight. You can track blood sugars in the notes section along with symptoms, bowl movements, medications, stress, exercise, water intake and sleep.   Note: You don't have to journal forever and it is more important that you have consistent meals as well as snacks while focusing on adding in healthy foods.    NUTRITION RESOURCES:  1. Purchase Eat Fat Get Thin by Sebastián Walker Book/Cookbook   2. IFM Cardiometabolic Packet     Functional Nutrition Fundamentals     Comprehensive Guide    Meal plan with recipes

## 2021-01-15 NOTE — PROGRESS NOTES
Adelaida Packer is a 50 year old female who is being evaluated via a billable video visit.      Patient has given verbal consent for Video visit? Yes  How would you like to obtain your AVS? MyChart  If you are dropped from the video visit, the video invite should be resent to: Text to cell phone: [phone]   Will anyone else be joining your video visit? No        Video-Visit Details    Type of service:  Video Visit    Video Start Time: 2:05 pm  Video End Time: 2:43pm   30 mins of education     Originating Location (pt. Location): Home    Distant Location (provider location):  [facility]     Platform used for Video Visit: Delmi Romero RD        Medical Nutrition Therapy  Visit Type: Initial assessment and intervention    Referring Provider: No ref. provider found       REASON FOR REFERRAL:   Adelaida Packer is a 50 year old female who is interested in Medical Nutrition Therapy (MNT) and education related to weight management.   She is accompanied by self.     Had surgery on foot and had lost weight prior from eating healthy and exercise and since gained weight back. She is thinking about having another surgery on her knees and knows her weight impacts it. She is at 243lbs and would like to get under 200lbs. She was about 190lbs in 2017.     Changes since previous consult Yes        Behavior Status:Improvement shown  Barriers Include:working from home, sleep/stress and transitioning back to work.     She started a new plan with 4 friends implementing a sugar fast and has had 10lb weight loss. Even though she has had hard time sleeping more recently she still has been mindful of eating. She is trying to make those wise choices to not undue what she has been doing for the past 3-4 weeks. I has been helpful to have their accountability and has been using myfitnesspal. With the recent sleep issues she hasn't been able to workout. She got the green  and suspended before the  holidays. She had found that helpful in getting her cooking more and she may not be resuming because she feels more confident and cooking has been fun. She hasn;t been as stressed with the cooking. She did try the healthy fat snacks. Since starting cooking she has making some healthy breakfasts. She made a gonsalez egg, muffin cups, boiled eggs/egg. She bought some smoothies from daily harvest and fond it beneficial and easy. She liked that it was easy all chopped up and ready for the . She has been doing more teas     NUTRITION ASSESSMENT:   Nutritional Goals 11/4/2020   Nutritional Goal Create healthier eating patterns;Create a plan to lose weight;Work on meal planning/recipes;Overcome emotional eating;Stabilize blood sugars;Eliminate cravings for sugar and refined carbohydrates;Increase energy        Neurological 11/4/2020   Migraine Headaches Past;Current   Tension Headache Past;Current       No flowsheet data found.   No flowsheet data found.   Gastrointestinal 11/4/2020   Constipation Current   Heartburn/Reflux/GERD Current       No flowsheet data found.   Endocrine 11/4/2020   Type 2 diabetes Past   Adrenal problems Current   Hair thinning/loss Current   Overweight/obesity Past;Current   Hot flashes Current      Skin 11/4/2020   Acne Past   Dry Skin Current      Cardiopulmonary 11/4/2020   High blood pressure Current   Ankle/leg swelling Past      No flowsheet data found.   Psychological 11/4/2020   Depression/Anxiety Past      Genitourinary 11/4/2020   Endometriosis Past   Fibroiods Past      Womens Health Assessment 11/4/2020   Hysterectomy Yes   How many ovaries were removed?  No ovaries removed   I am breastfeeding. No   Are you officially in menopause? (no period for one year or longer)  No        Past Medical History:  Past Medical History:   Diagnosis Date     Allergic rhinitis due to other allergen     seasonal     Arthritis      Diabetes (H)      Localized osteoarthritis of both knees       Migraine, unspecified, without mention of intractable migraine without mention of status migrainosus      Monoclonal gammopathy      Morbid obesity (H)      Unspecified essential hypertension     dx around age 31       Previous Surgeries:   Past Surgical History:   Procedure Laterality Date     C ANESTH,KNEE AREA SURGERY  01/2001     C GASTROPLASTY,OBESITY,VERT BAND      removed 2009     HC REMOVE TONSILS/ADENOIDS,11+ Y/O  1995     HEAD & NECK SURGERY  3/2013    Parathyroidectomy--had to go back in 6 days later for a possible bleed     HYSTERECTOMY, VAGINAL  12/2005    hysterectomy- fibroids     ORTHOPEDIC SURGERY          Family History:  Family History   Problem Relation Age of Onset     Hypertension Mother      Gynecology Mother         hysterectomy     Gastrointestinal Disease Mother         bowel upstruction     Thyroid Disease Mother      Neurologic Disorder Mother      Osteoporosis Mother      Hypertension Father      Lipids Father      Arthritis Father      Heart Disease Father      Prostate Cancer Brother      Alcohol/Drug Brother      Circulatory Brother      Arthritis Paternal Grandmother      Cancer Maternal Grandfather         bone     Eye Disorder Maternal Grandfather      Prostate Cancer Maternal Grandfather      Glaucoma Maternal Grandfather      Cancer Maternal Grandmother         tumor-head     Obesity Maternal Grandmother      Respiratory Daughter         asthma     Diabetes Sister      Cerebrovascular Disease Sister      Macular Degeneration No family hx of         Lifestyle History:  Lifestyle 11/4/2020   Do you feel your life is stressful right now?  Yes   What is the cause(s) of stress in your life?  Work;Health Concerns;Over thinking and too much analysis   Are you currently implementing any strategies to help manage stress? No        Exercise History:  Exercise 11/4/2020   Does your occupation require extended periods of sitting?  Yes   Does your occupation require extended periods of  repetitive movements (ex: walking or lifting)?  Yes   Do you currently participate in any forms of exercise? No   Rate your level of motivation for including exercise in your routine (0=none, 10=high): 4   Do you have any medical conditions, pain, injuries, surgeries etc. restricting you from exercise? Yes        Sleep History:  Sleep 11/4/2020   How many hours (on average) do you sleep per night? 4-6   What time do you turn off the lights? 10 PM   How long does it take for you to fall asleep? 15-20 mins   What time do you stop using electronic devices? 11 PM   What time do you wake up? 4 AM   When do you eat your first meal?  8 AM   Do you feel well-rested during the day?  No   Do you take naps?  No   Do you have a comfortable bedroom environment (cool, quiet, dark, etc)? Yes   Do you have a sleep routine/ ritual that you do before bed?  Yes   How many hours do you spend per day looking at a screen (TV, computer, tablet and phone)? 10 +   Select all factors that apply to your current sleep habits: Difficulty falling asleep;Wake up in the middle of the night;Have tried supplements (ie: melatonin) for sleep        Nutrition History:  Nutrition 11/4/2020   Have you ever had a nutrition consultation? No   Do you currently follow a special diet or nutritional program? No   What do you feel are the biggest barriers getting in the way of achieving you nutritional goals? Lack of prep/cooking skills;Work (such as lack of time to eat, unhealthy choices, etc.);Lack of control over emotions/behaviors;Stress   Do you have any food allergies, sensitivities or intolerances?  No       Digestion 11/4/2020   Do you experience stomach pains/cramping? Rarely   Do you experience bloating?  Rarely   Do you experience gas?  After eating   Do you experience heartburn/acid reflux/indigestion? Daily   How often do you have a bowel movement? Every other day   What is a typical bowel movement like for you? Select all that apply: Alternates  between loose and hard;Pasty and sticky;Balls/melissa      Food Access:  11/4/2020   Who does the grocery shopping? Self   How often is grocery shopping done? Weekly   Where do you usually receive your groceries from? Select all that apply: [employer]   Do you read food labels? Yes   What do you look at?  Sugar;Protein;Ingredients   Who does the cooking? Select all that apply: Self   How many meals do you eat out per week?  1 to 3   What restaurants do you typically choose? Sit-down      Daily Patterns: 11/4/2020   How many days per week do you have breakfast? 7   How many days per week do you have lunch? 5   How many days per week do you have dinner? 6   How many days per week do you have snacks? 7      Protein Intake: 11/4/2020   How many times per day do you typically consume a protein source(s)? 4   What types of protein do you currently eat?  Ground Beef;Beef Ribs;Hamburgers;Beef Roast;Beef Hot Dogs;Other Fish;Lamb Chops;Other Lamb;Chicken Breast;Chicken Thighs/Legs;Deli Chicken;Other Chicken;Turkey Breast;Turkey Thighs;Ground Turkey;Deli Turkey;Turkey Morales;Turkey Sausage;Tur[place] Hot Dogs       Fat Intake:  11/4/2020   How many times per day do you typically consume healthy fat(s)? 3   What types of health fats do you currently eat? Select all that apply:  Almonds;Cashews;Other nuts;Sunflower seeds;Hemp seeds;Butter;Salad dressings       Fruit Intake:  11/4/2020   How many times per day do you typically consume fruits? 2   What types of fruit do currently eat? Apple;Banana;Blackberries;Blueberries;Grapes;Mandarin oranges;Raspberries       Vegetable Intake:  11/4/2020   How many times per day do you typically consume vegetables? 2   What types of vegetables do you currently eat? Cabbage;Cucumber;Onions;Spinach;Tomato;Yam, sweet potato;Other      Grain Intake:  11/4/2020   How many times per day do you typically consume grains? 3   What types of grains do currently eat? Select all that apply:   Bagel (non-gluten free);Breads (non-gluten free);Cereals (non-gluten free);Chips (non-gluten free);Crackers (non-gluten free);Muffins (non-gluten free);Pancakes/waffles (non-gluten free);Pasta (non-gluten free)       Dairy Intake:  11/4/2020   How many times per day do you typically consume dairy? 2   What types of dairy do currently eat? Select all that apply:  Cheese;Yogurt       Non-Dairy Alternative Intake:  11/4/2020   How many times per day do you typically consume non-dairy alternatives? 1   What types of non-dairy alternatives do currently eat? Select all that apply:  [place] milk;Oat milk       Sweets Intake:  11/4/2020   How many times per day do you typically consume sweets? 4      Beverage Intake:  11/4/2020   How many 8 oz cups of water do you typically consume per day?  4 to 5   How many 8 oz cups of caffeine do you typically consume per day?  1 to 2   How many drinks of alcohol do you typically consume per week (1 drink = 5 oz wine, 12 oz beer, 1.5 oz spirits)?   0       Lifestyle Recall:  11/4/2020   What time did you wake up? 3 AM   What time did you go to sleep? 11 PM   What time did you have breakfast? 8-9 AM   Where did you have breakfast?  Home   What time did you have a morning snack? 10-11 AM   Where did you have your morning snack? Home   What time did you have lunch? 12-1 PM   Where did you have lunch?  Home   What time did you have an afternoon snack? 3-4 PM   Where did you have your afternoon snack? Home   What time did you have dinner? 6-7 PM   Where did you have dinner?  Home   What time did you have an evening snack? 10-11PM   Where did you have your evening snack? Home   What time of day did you exercise? 8 AM   Where did you exercise? Home          Additional concerns:    MEDICATIONS:  Current Outpatient Medications   Medication Sig Dispense Refill     acetaminophen (TYLENOL ARTHRITIS PAIN) 650 MG CR tablet Take 650-1,300 mg by mouth every 8 hours as needed for mild pain or fever        albuterol (PROAIR HFA/PROVENTIL HFA/VENTOLIN HFA) 108 (90 Base) MCG/ACT inhaler Inhale 2 puffs into the lungs every 4 hours as needed for shortness of breath / dyspnea or wheezing 1 Inhaler 0     amLODIPine (NORVASC) 5 MG tablet Take 1 tablet (5 mg) by mouth daily 90 tablet 3     ASPIRIN LOW DOSE 81 MG EC tablet TAKE 1 TABLET BY MOUTH ONCE DAILY 100 tablet 0     Blood Pressure Monitor KIT 1 Device 2 times daily 1 kit 0     calcium carbonate (OS-EVGENY 600 MG [place]) 1500 (600 CA) MG tablet Take 1 tablet (600 mg) by mouth 2 times daily 180 tablet 3     cetirizine (ZYRTEC) 10 MG tablet Take 10 mg by mouth daily as needed for allergies       cholecalciferol 2000 UNITS CAPS Take 2,000 Units by mouth daily 90 capsule 3     eplerenone (INSPRA) 50 MG tablet Take 2 tablets (100 mg) by mouth 2 times daily 360 tablet 3     famotidine (PEPCID) 20 MG tablet Take 1 tablet (20 mg) by mouth 2 times daily (Patient taking differently: Take 20 mg by mouth 2 times daily as needed ) 180 tablet 1     labetalol (NORMODYNE) 300 MG tablet Take 1 tablet (300 mg) by mouth 2 times daily 180 tablet 3     polyethylene glycol (MIRALAX/GLYCOLAX) Packet Take 17 g by mouth daily as needed        SUMAtriptan (IMITREX) 25 MG tablet TAKE 1 TO 2 TABLETS BY MOUTH AT ONSET OF HEADACHE FOR MIGRAINE. MAY REPEAT DOSE IN 2 HOURS. MAX OF 200MG OR 2 DOSES IN 24 HOURS (Patient not taking: Reported on 11/24/2020) 18 tablet 0       Dietary Supplements 11/4/2020   Individual Vitamin Supplements C;D;General multivitamin   Individual Mineral Supplements Calcium with D;Magnesium;Zinc   Herbal Complimentary products Laxative         ALLERGIES:   Allergies   Allergen Reactions     Sulfa Drugs Shortness Of Breath and Rash     Hydrochlorothiazide W/Triamterene Other (See Comments)     Renal insuff     Maxzide [Hydrochlorothiazide W/Triamterene] Other (See Comments)     Renal insuff     Metoprolol Other (See Comments)     Other reaction(s): Bradycardia  At high dose  only  At high dose only     Seasonal Allergies         .na  LABS:  Last Basic Metabolic Panel:  Lab Results   Component Value Date     11/19/2019     06/11/2019     12/04/2018      Lab Results   Component Value Date    POTASSIUM 4.4 11/19/2019    POTASSIUM 4.4 06/11/2019    POTASSIUM 4.2 12/04/2018     Lab Results   Component Value Date    CHLORIDE 107 11/19/2019    CHLORIDE 108 06/11/2019    CHLORIDE 104 12/04/2018     Lab Results   Component Value Date    EVGENY 9.5 11/19/2019    EVGENY 8.9 06/11/2019    EVGENY 9.8 12/04/2018     Lab Results   Component Value Date    CO2 27 11/19/2019    CO2 23 06/11/2019    CO2 23 12/04/2018     Lab Results   Component Value Date    BUN 19 11/19/2019    BUN 14 06/11/2019    BUN 22 12/04/2018     Lab Results   Component Value Date    CR 1.13 11/19/2019    CR 1.03 06/11/2019    CR 1.06 12/04/2018     Lab Results   Component Value Date     11/19/2019    GLC 91 06/11/2019    GLC 88 12/04/2018       Last Glucose Profile:   Hemoglobin A1C   Date Value Ref Range Status   11/30/2020 6.1 (H) 0 - 5.6 % Final     Comment:     Normal <5.7% Prediabetes 5.7-6.4%  Diabetes 6.5% or higher - adopted from ADA   consensus guidelines.     11/19/2019 5.5 0 - 5.6 % Final     Comment:     Normal <5.7% Prediabetes 5.7-6.4%  Diabetes 6.5% or higher - adopted from ADA   consensus guidelines.     01/29/2019 5.3 0 - 5.6 % Final       Last Lipid Profile:   Cholesterol   Date Value Ref Range Status   02/18/2019 164 <200 mg/dL Final   08/15/2017 120 <200 mg/dL Final   02/21/2017 177 <200 mg/dL Final     HDL Cholesterol   Date Value Ref Range Status   02/18/2019 59 >49 mg/dL Final   08/15/2017 56 >49 mg/dL Final   02/21/2017 58 >49 mg/dL Final     LDL Cholesterol Calculated   Date Value Ref Range Status   02/18/2019 95 <100 mg/dL Final     Comment:     Desirable:       <100 mg/dl   08/15/2017 52 <100 mg/dL Final     Comment:     Desirable:       <100 mg/dl   02/21/2017 102 (H) <100 mg/dL Final      Comment:     Above desirable:  100-129 mg/dl   Borderline High:  130-159 mg/dL   High:             160-189 mg/dL   Very high:       >189 mg/dl       Triglycerides   Date Value Ref Range Status   02/18/2019 48 <150 mg/dL Final     Comment:     Fasting specimen   08/15/2017 62 <150 mg/dL Final     Comment:     Fasting specimen   02/21/2017 83 <150 mg/dL Final     Comment:     Fasting specimen     Cholesterol/HDL Ratio   Date Value Ref Range Status   05/15/2015 2.9 0.0 - 5.0 Final   10/17/2014 2.0 0.0 - 5.0 Final   12/31/2012 3.5 0.0 - 5.0 Final       Most recent CBC:  Recent Labs   Lab Test 11/30/20  0659 11/19/19  0734 06/11/19  1013 08/06/18  0910 08/06/18  0910 08/10/17  0906 08/10/17  0906 02/21/17  0810 02/21/17  0810 02/24/16  0803 02/24/16  0803   WBC  --   --   --   --  2.8*  --  3.8*  --   --   --  4.2   HGB 12.5 13.0 11.2*   < > 12.1   < > 12.0   < >  --    < > 12.7   HCT  --   --   --   --  36.9  --  35.3  --  36.8  --  36.3   PLT  --   --   --   --  154  --  177  --   --   --  155    < > = values in this interval not displayed.     Most recent hepatic panel:  Recent Labs   Lab Test 08/06/18  0910 08/10/17  0906   ALT 30 28   AST 19 16     Most recent creatinine:  Recent Labs   Lab Test 11/30/20  0659 11/19/19  0734   CR 1.17* 1.13*       No components found for: GFRESETIMATEDLASTLAB(gfrestblack:1@  Lab Results   Component Value Date    ALBUMIN 3.9 11/19/2019       Last Thyroid Profile:   TSH   Date Value Ref Range Status   11/19/2019 1.59 0.40 - 4.00 mU/L Final   04/26/2017 1.66 0.40 - 4.00 mU/L Final   05/15/2015 1.84 0.40 - 4.00 mU/L Final       Last Mineral Profile:   Ferritin   Date Value Ref Range Status   02/07/2013 71 10 - 120 ng/mL Final     Iron   Date Value Ref Range Status   02/07/2013 95 35 - 180 ug/dL Final     Iron Binding Cap   Date Value Ref Range Status   02/07/2013 247 240 - 430 ug/dL Final       Autoimmune & Inflammatory   No results found for: CRP      Last Vitamin Profile:    Vitamin D 1,25   Date Value Ref Range Status   02/28/2012 50  Final     Comment:     Reference range: 15 to 75  Unit: pg/mL  (Note)  INTERPRETIVE INFORMATION: Vitamin D, 1,25-Dihydroxy  This test is primarily indicated during patient evaluation  for hypercalcemia and renal failure. A normal result does  not rule out Vitamin D deficiency. The recommended test for  diagnosing Vitamin D deficiency is Vitamin D 25-hydroxy.  This test was developed and its performance characteristics  determined by [facility]. The U.S. Food and Drug  Administration has not approved or cleared this test;  however, FDA clearance or approval is not currently  required for clinical use. The results are not intended to  be used as the sole means for clinical diagnosis or patient  management decisions.  Performed by [facility],  [address] [place] [id number] [phone]  [email or web address], Kathryn Kelly MD, Lab. Director     25 OH Vit D2   Date Value Ref Range Status   04/06/2016 <5 ug/L Final   02/28/2012 6 ug/L Final   02/06/2012 <5 ug/L Final     25 OH Vit D3   Date Value Ref Range Status   04/06/2016 82 ug/L Final   02/28/2012 9 ug/L Final   02/06/2012 9 ug/L Final     25 OH Vit D total   Date Value Ref Range Status   04/06/2016 (H) 20 - 75 ug/L Final    <87  Season, race, dietary intake, and treatment affect the concentration of   25-hydroxy-Vitamin D. Values may decrease during winter months and increase   during summer months. Values 20-29 ug/L may indicate Vitamin D insufficiency   and values <20 ug/L may indicate Vitamin D deficiency.   This test was developed and its performance characteristics determined by the   [facility],  Special Chemistry Laboratory. It has   not been cleared or approved by the FDA. The laboratory is regulated under CLIA   as qualified to perform high-complexity testing. This test is used for clinical   purposes. It should not be regarded as investigational or  "for research.     07/16/2012 18 (L) 30 - 75 ug/L Final     Comment:     Season, race, dietary intake, and treatment affect the concentration of   25-hydroxy-Vitamin D. Values may decrease during winter months and increase   during summer months. Values less than 30 ug/L may indicate Vitamin D   deficiency.   Vitamin D determination is routinely performed by an immunoassay specific for   25 hydroxyvitamin D3. If an individual is on vitamin D2 (ergocalciferol)   supplementation, please specify 25 OH vitamin D2 and D3 level determination   by   LC[facility]. For questions, please contact the laboratory at [phone].   02/28/2012 15 (L) 30 - 75 ug/L Final     Comment:     Season, race, dietary intake, and treatment affect the concentration of   25-hydroxy-Vitamin D. Values may decrease during winter months and increase   during summer months. Values less than 30 ug/L may indicate Vitamin D   deficiency.       ANTHROPOMETRICS:  Vitals:   BP Readings from Last 1 Encounters:   02/18/20 (!) 140/80     Pulse Readings from Last 1 Encounters:   02/18/20 69     Estimated body mass index is 48.21 kg/m  as calculated from the following:    Height as of 12/3/20: 1.537 m (5' 0.5\").    Weight as of 12/3/20: 113.9 kg (251 lb).    Wt Readings from Last 5 Encounters:   12/03/20 113.9 kg (251 lb)   11/24/20 112.5 kg (248 lb)   08/24/20 113.9 kg (251 lb)   03/03/20 113.9 kg (251 lb)   02/25/20 113.9 kg (251 lb)     NUTRITION DIAGNOSIS:   1. Altered nutrition-related laboratory values related to endocrine dysfunction as evidenced by elevated BMI.        NUTRITION INTERVENTION:   CARDIOMETABOLIC    Long Term Goals:   Goal: Lose 20-24lbs in 6 months.   10-11lbs in 6 weeks       Short Term Goals:     1. Get sleep back on track sleeping through the night full 7hrs- bed by 10pm and waking up at 5:30am "     [id number]&pkw=&pmt=&slid=&plc=&[email or web address]=[id number]=[id number]&[id number]    2. Get the dailygreatness.[email or web address] wellness journal to set goals and track habits, meal plan etc.   3. Focusing on meal prep and having foods accessible at work - focus stuff to put in desk and go to snacks at work   4. Focus on water and increasing to 64 and 90oz per day     Cardiometabolic Food plan - 1400 -1800 calories per day     Protein 9-10 servings per day  - include at each meal to stabilize blood sugars   (Choose 3oz or 21g per meal and aim for 1oz of 7g for snacks)   - Strive for 1-2 servings of fish per week especially of higher omega-3 fatty acid containing fish such as salmon.     Legumes <1 serving per day     Dairy alternatives 1 serving per day     Nuts and seeds 2-3 servings per day - great to incorporate as snacks     Fats and oils 4 servings per day     Non starchy vegetables 7-8 servings per day     Starchy vegetable limit 1 serving per day as they tend to impact blood sugar (they are moderate-GI).     Fruits 2 servings per day - best to couple with a little bit of protein or fat to offset a rise in blood sugars (they are low-moderate-GI foods).     Whole grains <1 serving per day - try gluten free whole grains instead       Incorporate protein powder daily:    Plant based hemp (recommended brands: [place], Nutiva, Just Hemp Protein, [employer])      Plant based pea (recommended brands: Naked Pea, Now Sports). If you want to try a combo of pea and hemp the brand Giles in vanilla or chocolate is a great option.     Try Bone broth protein powder or collagen peptides in liquid bone broth,  vegetable broth or 12 oz of water as snack. The bone broth powder and collagen can be used for soups as well. This can help provide essential amino acids and minerals that heal your gut as well as balance blood sugars. A great option if you have a hard time tolerating solids.     Choose Low Glycemic (GI) foods: Regulate your sugar levels by eating foods that do not spike blood sugars.  Eat low -GI foods so only small fluctuations in blood glucose and insulin levels are produced.      Examples of low-GI foods: nuts, seeds, GF oats, most vegetables especially non-starchy and fruits.     Medium or high-GI foods should be eaten with a protein or fat, both of which blunt the glycemic effect of these foods. This reduces the overall glycemic impact of a meal.   Ex: Most grains and starchy veggies are medium/high GI.     Avoid foods containing refined sugars, artificial sweeteners, and refined grains they are considered high-GI because they lead to sharp increases in blood sugars levels, which increase insulin sensitivity causing increased TG, and low good cholesterol (HDL).   Ex: cakes, cookies, pies, bread, sodas, fruit drinks, presweetened tea, coffee drinks, energy or sport drinks, flavored milk and other processed foods.     Choose foods high in fiber: Aim for at least 5 grams of fiber per serving of food or a total of 25-35 grams fiber per day. Remember, when looking at the label, you can take the fiber away from the total carbs. Ex:15g of total carbs - 4g of fiber = 11g net carbs     Insoluble fiber acts like a bulky  inner broom,  sweeping out debris from the intestine and creating more motility and movement.      Soluble fiber attracts water and swells, creating a gel that slows digestion.  Also, slows the release of glucose from foods into the blood which stops spikes in blood sugar levels.  Soluble fiber traps toxins in the gut, helping to carry them to excretion and provides healthy bacteria in the digestive  tract.     Choose High Quality Healthy Fats: Adding anti-inflammatory fats into your diet such as fish (salmon, herring, mackerel, and sardines), omega 3 eggs, ellis seeds, ground flax seeds/milk, hemp seeds/milk, walnuts and some other certain leafy greens will increase omega-3 fats to omeaga-6 fats ratio.     Therapeutic fats both monounsaturated and polyunsaturated to include daily: ground flaxseeds, unsalted mixed nuts, avocados, olives, extra-virgin olive oil.     Emphasize high-quality oils and fats in the diet daily such as avocado oil, coconut oil, flaxseed, olive, sesame. Ex: 1 tsp to 1 tbsp of MCT oil from coconuts can be added into coffee, smoothies, and salad dressings per day.     Avoid trans fats found in processed foods     Drink more water. Hydration is critical, so drink at least six to eight glasses of water a day. Drink more water between meals and less at meals.     Try adding herbal teas (sugar free) or lemon/lime/cucumber/fruit to water for flavor. Avoid artificial sweetener packets to flavor your water.     Cut back on coffee switch to green tea. Avoid adding sugar and milk to coffee instead use dairy alternatives such as almond, flax, coconut milk.       Rebuild the friendly bacteria in your microbime. Start by taking a probiotic supplement, they will help rebuild the healthy bacteria so essential to good gut health.     you can help establish healthy gut microflora by consuming foods that contain live active cultures ( probiotics ) such as kimchi,     Increase physical activity. Moving our body helps move our bowels and speeds up your metabolism.     Exercise 15-60 minutes daily--whether that looks like burst training, yoga, or vigorous walking.    Manage your stress. Stress can negatively impact the way you digest foods and absorb nutrients leading to more digestive issues (constipation, diarrhea, indigestion, nausea etc), imbalanced blood sugars and weight imbalances. Try to focus on the  following relaxation techniques:     Regular exercise such as walking     Yoga    Meditation     Breathing techniques     Time management     Track what you eat. Writing down or tracking through an anika what you eat as well as how you feel acn help you identify patterns in your symptoms. This can help you become more aware and creat a diet that is right for you.     Pick a food tracking anika:     Through the mysymptoms anika, you can track symptoms, bowl movements, medications, stress, exercise, sleep and foods as well as beverages to become more mindful. [email or web address]    Through the Vita[employer]avornesspal anika, you can focus more on calories and macronutrient's of foods to balance blood sugars or monitor weight. You can track blood sugars in the notes section along with symptoms, bowl movements, medications, stress, exercise, water intake and sleep.   Note: You don't have to journal forever and it is more important that you have consistent meals as well as snacks while focusing on adding in healthy foods.    NUTRITION RESOURCES:  1. Purchase Eat Fat Get Thin by Sebastián Walker Book/Cookbook   2. IFM Cardiometabolic Packet     Functional Nutrition Fundamentals     Comprehensive Guide    Meal plan with recipes       PATIENT'S BEHAVIOR CHANGE GOALS:   See nutrition intervention for patient stated behavior change goals. AVS was printed and given to patient at today's appointment.    MONITOR / EVALUATE:  Registered Dietitian will monitor/evaluate the following:     Beliefs and attitudes related to food    Food and nutrition knowledge / skills    Food / Beverage / Nutrient intake     Pertinent Labs    Progress toward meeting stated nutrition-related goals    Readiness to change nutrition-related behaviors    Weight change    Digestion     COORDINATION OF CARE:  Follow up with primary care provider       FOLLOW-UP:  Follow-up appointment to call in when you have new schedule updated       Time spent in minutes: 30 minutes 2  units   Encounter: Individual    Savanah Romero, RD, CLT, LD  Integrative Registered Dietitian

## 2021-01-26 ENCOUNTER — OFFICE VISIT (OUTPATIENT)
Dept: OPTOMETRY | Facility: CLINIC | Age: 51
End: 2021-01-26
Payer: COMMERCIAL

## 2021-01-26 DIAGNOSIS — H52.4 PRESBYOPIA: ICD-10-CM

## 2021-01-26 DIAGNOSIS — H52.223 REGULAR ASTIGMATISM OF BOTH EYES: ICD-10-CM

## 2021-01-26 DIAGNOSIS — H10.13 ALLERGIC CONJUNCTIVITIS OF BOTH EYES: ICD-10-CM

## 2021-01-26 DIAGNOSIS — Z01.00 EXAMINATION OF EYES AND VISION: Primary | ICD-10-CM

## 2021-01-26 DIAGNOSIS — H52.13 MYOPIA OF BOTH EYES: ICD-10-CM

## 2021-01-26 PROCEDURE — 92310 CONTACT LENS FITTING OU: CPT | Mod: GA | Performed by: OPTOMETRIST

## 2021-01-26 PROCEDURE — 92015 DETERMINE REFRACTIVE STATE: CPT | Performed by: OPTOMETRIST

## 2021-01-26 PROCEDURE — 92014 COMPRE OPH EXAM EST PT 1/>: CPT | Performed by: OPTOMETRIST

## 2021-01-26 RX ORDER — OLOPATADINE HYDROCHLORIDE 2 MG/ML
1 SOLUTION/ DROPS OPHTHALMIC DAILY
Qty: 2.5 ML | Refills: 11 | Status: SHIPPED | OUTPATIENT
Start: 2021-01-26 | End: 2022-01-26

## 2021-01-26 ASSESSMENT — TONOMETRY
OD_IOP_MMHG: 18
OS_IOP_MMHG: 17
IOP_METHOD: TONOPEN

## 2021-01-26 ASSESSMENT — VISUAL ACUITY
OD_CC+: -1
OD_CC: 20/30
OD_SC: 20/200
OS_CC: 20/25
METHOD: SNELLEN - LINEAR
OS_CC: 20/30
OS_CC+: -1
OS_SC: 20/150
OD_CC: 20/20
CORRECTION_TYPE: GLASSES

## 2021-01-26 ASSESSMENT — EXTERNAL EXAM - LEFT EYE: OS_EXAM: NORMAL

## 2021-01-26 ASSESSMENT — REFRACTION_MANIFEST
OD_AXIS: 175
OD_ADD: +2.25
OS_ADD: +2.25
OS_SPHERE: -4.75
OD_SPHERE: -4.50
OS_AXIS: 070
OS_CYLINDER: +0.75
OD_CYLINDER: +0.25

## 2021-01-26 ASSESSMENT — EXTERNAL EXAM - RIGHT EYE: OD_EXAM: NORMAL

## 2021-01-26 ASSESSMENT — CONF VISUAL FIELD
OD_NORMAL: 1
OS_NORMAL: 1

## 2021-01-26 ASSESSMENT — REFRACTION_CURRENTRX
OD_BRAND: ALCON DAILIES TOTAL 1 BC 8.5, D 14.1
OS_BRAND: ALCON DAILIES TOTAL 1 BC 8.5, D 14.1
OS_SPHERE: -4.00
OD_SPHERE: -4.25

## 2021-01-26 ASSESSMENT — CUP TO DISC RATIO
OS_RATIO: 0.2
OD_RATIO: 0.2

## 2021-01-26 ASSESSMENT — REFRACTION_WEARINGRX
OS_ADD: +2.00
SPECS_TYPE: PAL
OS_CYLINDER: +0.75
OS_SPHERE: -4.50
OD_ADD: +2.00
OS_AXIS: 070
OD_SPHERE: -4.50

## 2021-01-26 ASSESSMENT — SLIT LAMP EXAM - LIDS
COMMENTS: NORMAL
COMMENTS: NORMAL

## 2021-01-26 NOTE — PATIENT INSTRUCTIONS
Eyeglass prescription given.    Contact lens prescription given and form signed.  Trials of multifocal dispensed. Ok to order if satisfied otherwise let me know if you are interested in trying monovision.    Return in 1 year for a complete eye exam or sooner if needed.    Zac Sandhu, BRIGHT    The affects of the dilating drops last for 4- 6 hours.  You will be more sensitive to light and vision will be blurry up close.  Do not drive if you do not feel comfortable.  Mydriatic sunglasses were given if needed.      Optometry Providers       Clinic Locations                                 Telephone Number   Dr. Aliza Case [phone]     [place] [facility] Hours:                [facility] Hours:       [facility] Hours:   [address]   [address] [facility]     [address]  [place] [address]   [place] [address]    [place] [address]  Phone: [phone]                    Phone: [phone]     Phone: [phone]                      Monday 8:00-7:00                          Monday 8:00-7:00                          Monday 8:00-7:00              Tuesday 8:00-6:00                          Tuesday 8:00-7:00                          Tuesday 8:00-7:00              Wednesday 8:00-6:00                  Wednesday 8:00-7:00                   Wednesday 8:00-7:00      Thursday 8:00-6:00                        Thursday 8:00-7:00                         Thursday 8:00-7:00            Friday 8:00-5:00                              Friday 8:00-5:00                              Friday 8:00-5:00    [facility] Hours:   [address] [facility] [place] [id number] [phone]    Monday 8:00-7:00  Tuesday 8:00-7:00  Wednesday 8:00-7:00  Thursday 8:00-7:00  Friday 8:00-5:00  Please log on to [email or web address] to order your contact lenses.  The link is found on the [facility] page.  As always,  Thank you for trusting us with your health care needs!       Attending Only

## 2021-01-26 NOTE — PROGRESS NOTES
Chief Complaint   Patient presents with     Annual Eye Exam     More contacts fit fee ok$75       Hemoglobin A1C   Date Value Ref Range Status   11/30/2020 6.1 (H) 0 - 5.6 % Final     Comment:     Normal <5.7% Prediabetes 5.7-6.4%  Diabetes 6.5% or higher - adopted from ADA   consensus guidelines.         Accompanied by self  Previous contact lens wearer? Yes: villalies total 1  Comfort of contact lenses :good  Satisfied with current lenses: Yes        Last Eye Exam: 10-  Dilated Previously: Yes    What are you currently using to see?  Glasses     Distance Vision Acuity: Satisfied with vision    Near Vision Acuity: Satisfied with vision while reading  with glasses but takes off glasses most of the time for computer and reading    Eye Comfort: good  Do you use eye drops? : No  Occupation or Hobbies: teacher       Ibeth Martin Optometric Assistant, A.B.O.C.     Medical, surgical and family histories reviewed and updated 1/26/2021.       OBJECTIVE: See Ophthalmology exam    ASSESSMENT:    ICD-10-CM    1. Examination of eyes and vision  Z01.00 EYE EXAM (SIMPLE-NONBILLABLE)   2. Myopia of both eyes  H52.13 REFRACTION     CONTACT LENS FITTING,BILAT w/ signed waiver   3. Regular astigmatism of both eyes  H52.223 REFRACTION     CONTACT LENS FITTING,BILAT w/ signed waiver   4. Presbyopia  H52.4 REFRACTION   5. Allergic conjunctivitis of both eyes  H10.13 olopatadine (PATADAY) 0.2 % ophthalmic solution      PLAN:     Patient Instructions   Eyeglass prescription given.    Contact lens prescription given and form signed.  Trials of multifocal dispensed. Ok to order if satisfied otherwise let me know if you are interested in trying monovision.    Return in 1 year for a complete eye exam or sooner if needed.    Zac Sandhu, OD

## 2021-01-26 NOTE — LETTER
1/26/2021         RE: Ad[place]ida Packer  [address] [place] [address]        Dear Colleague,    Thank you for referring your patient, [place]ida Packer, to the [facility]. Please see a copy of my visit note below.    Chief Complaint   Patient presents with     Annual Eye Exam     More contacts fit fee ok$75       Hemoglobin A1C   Date Value Ref Range Status   11/30/2020 6.1 (H) 0 - 5.6 % Final     Comment:     Normal <5.7% Prediabetes 5.7-6.4%  Diabetes 6.5% or higher - adopted from ADA   consensus guidelines.         Accompanied by self  Previous contact lens wearer? Yes: renzo total 1  Comfort of contact lenses :good  Satisfied with current lenses: Yes        Last Eye Exam: 10-  Dilated Previously: Yes    What are you currently using to see?  Glasses     Distance Vision Acuity: Satisfied with vision    Near Vision Acuity: Satisfied with vision while reading  with glasses but takes off glasses most of the time for computer and reading    Eye Comfort: good  Do you use eye drops? : No  Occupation or Hobbies: teacher       Ibeth Martin Optometric Assistant, A.B.O.C.     Medical, surgical and family histories reviewed and updated 1/26/2021.       OBJECTIVE: See Ophthalmology exam    ASSESSMENT:    ICD-10-CM    1. Examination of eyes and vision  Z01.00 EYE EXAM (SIMPLE-NONBILLABLE)   2. Myopia of both eyes  H52.13 REFRACTION     CONTACT LENS FITTING,BILAT w/ signed waiver   3. Regular astigmatism of both eyes  H52.223 REFRACTION     CONTACT LENS FITTING,BILAT w/ signed waiver   4. Presbyopia  H52.4 REFRACTION   5. Allergic conjunctivitis of both eyes  H10.13 olopatadine (PATADAY) 0.2 % ophthalmic solution      PLAN:     Patient Instructions   Eyeglass prescription given.    Contact lens prescription given and form signed.  Trials of multifocal dispensed. Ok to order if satisfied otherwise let me know if you are interested in trying monovision.    Return in 1  year for a complete eye exam or sooner if needed.    Zac Sandhu, OD                           Again, thank you for allowing me to participate in the care of your patient.        Sincerely,        Zac Sandhu, OD

## 2021-01-27 PROBLEM — R73.03 PREDIABETES: Status: ACTIVE | Noted: 2021-01-27

## 2021-02-01 ENCOUNTER — TELEPHONE (OUTPATIENT)
Dept: NUTRITION | Facility: CLINIC | Age: 51
End: 2021-02-01

## 2021-02-01 NOTE — TELEPHONE ENCOUNTER
Left message for patient to return clinic call regarding scheduling. Patient needs a video return  appointment for nutrition with Savanah Romero around 2/26/2021. Number to clinic given, please assist in scheduling once patient returns clinic call.     Call Center OKAY TO SCHEDULE.    Thanks,   Nicole Dailey   Lead  [facility] [place]

## 2021-02-17 ENCOUNTER — ANCILLARY PROCEDURE (OUTPATIENT)
Dept: MAMMOGRAPHY | Facility: CLINIC | Age: 51
End: 2021-02-17
Attending: FAMILY MEDICINE
Payer: COMMERCIAL

## 2021-02-17 DIAGNOSIS — Z12.31 VISIT FOR SCREENING MAMMOGRAM: ICD-10-CM

## 2021-02-17 PROCEDURE — 77067 SCR MAMMO BI INCL CAD: CPT | Mod: GC | Performed by: STUDENT IN AN ORGANIZED HEALTH CARE EDUCATION/TRAINING PROGRAM

## 2021-02-17 PROCEDURE — 77063 BREAST TOMOSYNTHESIS BI: CPT | Mod: GC | Performed by: STUDENT IN AN ORGANIZED HEALTH CARE EDUCATION/TRAINING PROGRAM

## 2021-03-25 ENCOUNTER — PRE VISIT (OUTPATIENT)
Dept: ORTHOPEDICS | Facility: CLINIC | Age: 51
End: 2021-03-25

## 2021-03-25 DIAGNOSIS — M25.562 PAIN IN BOTH KNEES, UNSPECIFIED CHRONICITY: Primary | ICD-10-CM

## 2021-03-25 DIAGNOSIS — M25.561 PAIN IN BOTH KNEES, UNSPECIFIED CHRONICITY: Primary | ICD-10-CM

## 2021-03-25 NOTE — TELEPHONE ENCOUNTER
PREVISIT INFORMATION                                                    [place] Packer scheduled for future visit at [facility] specialty clinics.    LM to come in early and bring paperwork    Patient is scheduled to see Dr. Hernandez on 3/30  Reason for visit: Bilateral knee pain  Referring provider Tj Mac  Has patient seen previous specialist? No  Medical Records:  Available in chart.  Patient was previously seen at a [facility] or [facility] facility.    REVIEW                                                      New patient packet mailed to patient: Yes  Medication reconciliation complete: Yes      Current Outpatient Medications   Medication Sig Dispense Refill     acetaminophen (TYLENOL ARTHRITIS PAIN) 650 MG CR tablet Take 650-1,300 mg by mouth every 8 hours as needed for mild pain or fever       albuterol (PROAIR HFA/PROVENTIL HFA/VENTOLIN HFA) 108 (90 Base) MCG/ACT inhaler Inhale 2 puffs into the lungs every 4 hours as needed for shortness of breath / dyspnea or wheezing 1 Inhaler 0     amLODIPine (NORVASC) 5 MG tablet Take 1 tablet (5 mg) by mouth daily 90 tablet 3     ASPIRIN LOW DOSE 81 MG EC tablet TAKE 1 TABLET BY MOUTH ONCE DAILY 100 tablet 0     Blood Pressure Monitor KIT 1 Device 2 times daily 1 kit 0     calcium carbonate (OS-EVGENY 600 MG [place]. CA) 1500 (600 CA) MG tablet Take 1 tablet (600 mg) by mouth 2 times daily 180 tablet 3     cetirizine (ZYRTEC) 10 MG tablet Take 10 mg by mouth daily as needed for allergies       cholecalciferol 2000 UNITS CAPS Take 2,000 Units by mouth daily 90 capsule 3     eplerenone (INSPRA) 50 MG tablet Take 2 tablets (100 mg) by mouth 2 times daily 360 tablet 3     famotidine (PEPCID) 20 MG tablet Take 1 tablet (20 mg) by mouth 2 times daily (Patient taking differently: Take 20 mg by mouth 2 times daily as needed ) 180 tablet 1     labetalol (NORMODYNE) 300 MG tablet Take 1 tablet (300 mg) by mouth 2 times daily 180 tablet 3     olopatadine  (PATADAY) 0.2 % ophthalmic solution Place 1 drop into both eyes daily 2.5 mL 11     polyethylene glycol (MIRALAX/GLYCOLAX) Packet Take 17 g by mouth daily as needed        SUMAtriptan (IMITREX) 25 MG tablet TAKE 1 TO 2 TABLETS BY MOUTH AT ONSET OF HEADACHE FOR MIGRAINE. MAY REPEAT DOSE IN 2 HOURS. MAX OF 200MG OR 2 DOSES IN 24 HOURS 18 tablet 0       Allergies: Sulfa drugs, Hydrochlorothiazide w/triamterene, Maxzide [hydrochlorothiazide w/triamterene], Metoprolol, and Seasonal allergies    Needs new XR, ordered    PLAN/FOLLOW-UP NEEDED                                                      Previsit review complete.  Patient will see provider at future scheduled appointment.     Patient Reminders Given:  Please, make sure you bring an updated list of your medications.   If you are having a procedure, please, present 15 minutes early.  If you need to cancel or reschedule,please call [phone].    Josette Dumont RN

## 2021-03-26 ENCOUNTER — TRANSFERRED RECORDS (OUTPATIENT)
Dept: HEALTH INFORMATION MANAGEMENT | Facility: CLINIC | Age: 51
End: 2021-03-26

## 2021-03-29 ENCOUNTER — OFFICE VISIT (OUTPATIENT)
Dept: OBGYN | Facility: CLINIC | Age: 51
End: 2021-03-29
Payer: COMMERCIAL

## 2021-03-29 VITALS
HEART RATE: 69 BPM | HEIGHT: 61 IN | OXYGEN SATURATION: 98 % | WEIGHT: 243.8 LBS | BODY MASS INDEX: 46.03 KG/M2 | DIASTOLIC BLOOD PRESSURE: 85 MMHG | SYSTOLIC BLOOD PRESSURE: 149 MMHG

## 2021-03-29 DIAGNOSIS — E66.01 MORBID OBESITY WITH BMI OF 45.0-49.9, ADULT (H): ICD-10-CM

## 2021-03-29 DIAGNOSIS — Z00.00 ANNUAL PHYSICAL EXAM: Primary | ICD-10-CM

## 2021-03-29 DIAGNOSIS — Z13.220 LIPID SCREENING: ICD-10-CM

## 2021-03-29 DIAGNOSIS — N18.31 TYPE 2 DIABETES MELLITUS WITH STAGE 3A CHRONIC KIDNEY DISEASE, WITHOUT LONG-TERM CURRENT USE OF INSULIN (H): ICD-10-CM

## 2021-03-29 DIAGNOSIS — N18.31 STAGE 3A CHRONIC KIDNEY DISEASE (H): ICD-10-CM

## 2021-03-29 DIAGNOSIS — E11.22 TYPE 2 DIABETES MELLITUS WITH STAGE 3A CHRONIC KIDNEY DISEASE, WITHOUT LONG-TERM CURRENT USE OF INSULIN (H): ICD-10-CM

## 2021-03-29 DIAGNOSIS — Z12.31 ENCOUNTER FOR SCREENING MAMMOGRAM FOR BREAST CANCER: ICD-10-CM

## 2021-03-29 DIAGNOSIS — Z00.00 ROUTINE GENERAL MEDICAL EXAMINATION AT A HEALTH CARE FACILITY: ICD-10-CM

## 2021-03-29 DIAGNOSIS — E21.0 PRIMARY HYPERPARATHYROIDISM (H): ICD-10-CM

## 2021-03-29 DIAGNOSIS — Z13.1 SCREENING FOR DIABETES MELLITUS: ICD-10-CM

## 2021-03-29 DIAGNOSIS — Z13.29 SCREENING FOR THYROID DISORDER: ICD-10-CM

## 2021-03-29 LAB
CHOLEST SERPL-MCNC: 209 MG/DL
GLUCOSE SERPL-MCNC: 96 MG/DL (ref 70–99)
HDLC SERPL-MCNC: 77 MG/DL
LDLC SERPL CALC-MCNC: 120 MG/DL
NONHDLC SERPL-MCNC: 132 MG/DL
TRIGL SERPL-MCNC: 61 MG/DL
TSH SERPL DL<=0.005 MIU/L-ACNC: 1.46 MU/L (ref 0.4–4)

## 2021-03-29 PROCEDURE — 80061 LIPID PANEL: CPT | Performed by: OBSTETRICS & GYNECOLOGY

## 2021-03-29 PROCEDURE — 82947 ASSAY GLUCOSE BLOOD QUANT: CPT | Performed by: OBSTETRICS & GYNECOLOGY

## 2021-03-29 PROCEDURE — 36415 COLL VENOUS BLD VENIPUNCTURE: CPT | Performed by: OBSTETRICS & GYNECOLOGY

## 2021-03-29 PROCEDURE — 99386 PREV VISIT NEW AGE 40-64: CPT | Performed by: OBSTETRICS & GYNECOLOGY

## 2021-03-29 PROCEDURE — 84443 ASSAY THYROID STIM HORMONE: CPT | Performed by: OBSTETRICS & GYNECOLOGY

## 2021-03-29 ASSESSMENT — PAIN SCALES - GENERAL: PAINLEVEL: NO PAIN (0)

## 2021-03-29 ASSESSMENT — MIFFLIN-ST. JEOR: SCORE: 1654.24

## 2021-03-29 NOTE — PROGRESS NOTES
Ad[place]ida is a 50 year old female, , using abstinence, who is here for her annual exam and is new to the [facility] g[facility] dept.  She c/o heartburn after last week's colonoscopy so will contact her PCP if this does not resolve.  She is s/p vaginal hysterectomy for benign fibroids so does not need a pap smear.  She is due for labwork and is in a fasting state.  She denies any gyn issues or gyn cancer in her family.    ROS: Ten point review of systems was reviewed and negative except the above.    Health Maintenance   Topic Date Due     ADVANCE CARE PLANNING  Never done     Pneumococcal Vaccine: Pediatrics (0 to 5 Years) and At-Risk Patients (6 to 64 Years) (1 of 2 - PPSV23) Never done     COLORECTAL CANCER SCREENING  Never done     COVID-19 Vaccine (1) Never done     HEPATITIS C SCREENING  Never done     HEPATITIS B IMMUNIZATION (3 of 3 - Risk 3-dose series) 2002     DIABETIC FOOT EXAM  08/15/2018     ZOSTER IMMUNIZATION (1 of 2) Never done     INFLUENZA VACCINE (1) 2020     PREVENTIVE CARE VISIT  2020     MICROALBUMIN  2020     A1C  2021     BMP  2021     DTAP/TDAP/TD IMMUNIZATION (5 - Td) 2022     EYE EXAM  2022     MAMMO SCREENING  2022     LIPID  2022     HIV SCREENING  Completed     MIGRAINE ACTION PLAN  Completed     PHQ-2  Completed     IPV IMMUNIZATION  Aged Out     MENINGITIS IMMUNIZATION  Aged Out     PAP  Discontinued      Last pap: not needed s/p hysterectomy for benign dx  Last Mammogram: due 2022  Last Dexa: not due  Last Colonoscopy: due in 10 years - 3/2031    Lab Results   Component Value Date    CHOL 164 2019     Lab Results   Component Value Date    HDL 59 2019     Lab Results   Component Value Date    LDL 95 2019     Lab Results   Component Value Date    TRIG 48 2019     Lab Results   Component Value Date    CHOLHDLRATIO 2.9 05/15/2015     OBHX:      PSH:   Past Surgical History:   Procedure Laterality Date     C  "ANESTH,KNEE AREA SURGERY  01/2001     C GASTROPLASTY,OBESITY,VERT BAND      removed 2009     HC REMOVE TONSILS/ADENOIDS,12+ Y/O  1995     HEAD & NECK SURGERY  3/2013    Parathyroidectomy--had to go back in 6 days later for a possible bleed     HYSTERECTOMY, VAGINAL  12/2005    hysterectomy- fibroids     ORTHOPEDIC SURGERY       PMH: Her past medical, surgical, and obstetric histories were reviewed and are documented in their appropriate chart areas.    ALL/Meds: Her medication and allergy histories were reviewed and are documented in their appropriate chart areas.    SH/FMH: Her social and family history was reviewed and documented in its appropriate chart area.    PE: BP (!) 149/85 (BP Location: Right arm, Patient Position: Chair, Cuff Size: Adult Large)   Pulse 69   Ht 1.543 m (5' 0.75\")   Wt 110.6 kg (243 lb 12.8 oz)   LMP 08/13/2005   SpO2 98%   Breastfeeding No   BMI 46.45 kg/m    Body mass index is 46.45 kg/m .    General Appearance:  healthy, alert, active, no distress  Cardiovascular:  Regular rate and Rhythm without murmur  Neck: Supple, no adenopathy and thyroid normal  Lungs:  Clear, without wheeze, rale or rhonchi  Breast: normal breast exam  Abdomen: Benign, Soft, flat, non-tender, No masses, organomegaly, No inguinal nodes and Bowel sounds normoactive.   Pelvic:       - Ext: Vulva and perineum are normal without lesion, mass or discharge        - Urethra: normal without discharge        - Urethral Meatus: normal appearance       - Bladder: no tenderness, no masses       - Vagina: Normal mucosa, no discharge        - Cervix: Surgically absent       - Uterus: Surgically absent        - Adnexa: Normal without masses or tenderness       - Rectal: deferred since she just had her colonoscopy last week    A/P:  Well Woman Exam, Heartburn, Chronic Hypertension     -  I discussed the new pap recommendations regarding screening.  Explained the rationale for increased intervals between paps.  Questions " asked and answered.  She does agree to this regiment.  Pap was not collected since not due s/p hysterectomy for benign disease   -  BC: Abstinent and s/p hysterectomy so not applicable   -  Check fasting labwork today since due   -  Schedule a mammogram for 2/2022   -  Next colonoscopy due in 10 years - 3/2031   -  F/u with PCP for hypertension and heartburn issues  Orders Placed This Encounter   Procedures     Lipid Profile (Chol, Trig, HDL, LDL calc)     Glucose     TSH with free T4 reflex      - Encouraged self-breast exam   - Encouraged low fat diet, regular exercise, and adequate calcium intake.    Ruth Romero, DO  FACOG, FACS

## 2021-03-30 ENCOUNTER — ANCILLARY PROCEDURE (OUTPATIENT)
Dept: GENERAL RADIOLOGY | Facility: CLINIC | Age: 51
End: 2021-03-30
Attending: ORTHOPAEDIC SURGERY
Payer: COMMERCIAL

## 2021-03-30 ENCOUNTER — OFFICE VISIT (OUTPATIENT)
Dept: ORTHOPEDICS | Facility: CLINIC | Age: 51
End: 2021-03-30
Payer: COMMERCIAL

## 2021-03-30 VITALS
WEIGHT: 247.4 LBS | SYSTOLIC BLOOD PRESSURE: 149 MMHG | HEART RATE: 69 BPM | HEIGHT: 62 IN | BODY MASS INDEX: 45.53 KG/M2 | DIASTOLIC BLOOD PRESSURE: 87 MMHG

## 2021-03-30 DIAGNOSIS — M25.561 PAIN IN BOTH KNEES, UNSPECIFIED CHRONICITY: ICD-10-CM

## 2021-03-30 DIAGNOSIS — G89.29 CHRONIC PAIN OF RIGHT KNEE: Primary | ICD-10-CM

## 2021-03-30 DIAGNOSIS — M25.562 PAIN IN BOTH KNEES, UNSPECIFIED CHRONICITY: ICD-10-CM

## 2021-03-30 DIAGNOSIS — M25.561 CHRONIC PAIN OF RIGHT KNEE: Primary | ICD-10-CM

## 2021-03-30 PROCEDURE — 73562 X-RAY EXAM OF KNEE 3: CPT | Mod: GC | Performed by: RADIOLOGY

## 2021-03-30 PROCEDURE — 99214 OFFICE O/P EST MOD 30 MIN: CPT | Performed by: ORTHOPAEDIC SURGERY

## 2021-03-30 ASSESSMENT — MIFFLIN-ST. JEOR: SCORE: 1690.45

## 2021-03-30 NOTE — LETTER
3/30/2021         RE: Adelaida Packer  [address] [facility] [place] [address]        Dear Colleague,    Thank you for referring your patient, Adelaida Packer, to the [facility]. Please see a copy of my visit note below.    Assessment: This is a 50 year old with advanced knee OA and severe symptoms despite extensive non-operative management. We discussed the diagnosis and the treatment options including living with it and total knee. We discussed the barriers to safer surgical management including elevated BMI. We discussed that not all orthopaedic surgeons will utilize a BMI formal cut off and that body habitus-- where the patient carriers their weight-- is also frequently part of the conversation.     Plan:  She is going to think about her options, work on fitness in the meantime, happy to see back. Discussed a three month timeframe or when she chooses.       Chief Complaint: Pain of the Left Knee and Pain of the Right Knee      Physician:  Tj Crouch    HPI: Adelaida Packer is a 50 year old female who presents today for evaluation of    Symptom Profile  Location of symptoms:  medail greater than lateral joint line   Onset: insidious  Trend: getting worse   Duration of symptoms:several years   Quality of symptoms: aching, sharp/stabbing  Severity:severe  Alleviate:activity modification   Exacerbating: activities  Previous Treatments: Previous treatments include activity modification, oral pain medication,     Current Status:  Results of the patient s Hip Disability and Osteoarthritis Outcome Score (HOOS)  are as follows (0-100 scales with 100 being the theoretical best):  Pain: 14   Symptoms:50   ADLs:46   Sports/Recreation:15   Quality of Life:6   ([email or web address].[email or web address]/)    MEDICAL HISTORY:   Past Medical History:   Diagnosis Date     Allergic rhinitis due to other allergen     seasonal     Arthritis      Diabetes (H)      Localized osteoarthritis of both knees       Migraine, unspecified, without mention of intractable migraine without mention of status migrainosus      Monoclonal gammopathy      Morbid obesity (H)      Unspecified essential hypertension     dx around age 31     Renal insufficiency     Medications:     Current Outpatient Medications:      acetaminophen (TYLENOL ARTHRITIS PAIN) 650 MG CR tablet, Take 650-1,300 mg by mouth every 8 hours as needed for mild pain or fever, Disp: , Rfl:      albuterol (PROAIR HFA/PROVENTIL HFA/VENTOLIN HFA) 108 (90 Base) MCG/ACT inhaler, Inhale 2 puffs into the lungs every 4 hours as needed for shortness of breath / dyspnea or wheezing, Disp: 1 Inhaler, Rfl: 0     amLODIPine (NORVASC) 5 MG tablet, Take 1 tablet (5 mg) by mouth daily, Disp: 90 tablet, Rfl: 3     ASPIRIN LOW DOSE 81 MG EC tablet, TAKE 1 TABLET BY MOUTH ONCE DAILY, Disp: 100 tablet, Rfl: 0     Blood Pressure Monitor KIT, 1 Device 2 times daily, Disp: 1 kit, Rfl: 0     calcium carbonate (OS-EVGENY 600 MG "[employer]. [place]) 1500 (600 CA) MG tablet, Take 1 tablet (600 mg) by mouth 2 times daily, Disp: 180 tablet, Rfl: 3     cetirizine (ZYRTEC) 10 MG tablet, Take 10 mg by mouth daily as needed for allergies, Disp: , Rfl:      cholecalciferol 2000 UNITS CAPS, Take 2,000 Units by mouth daily, Disp: 90 capsule, Rfl: 3     eplerenone (INSPRA) 50 MG tablet, Take 2 tablets (100 mg) by mouth 2 times daily, Disp: 360 tablet, Rfl: 3     famotidine (PEPCID) 20 MG tablet, Take 1 tablet (20 mg) by mouth 2 times daily (Patient taking differently: Take 20 mg by mouth 2 times daily as needed ), Disp: 180 tablet, Rfl: 1     labetalol (NORMODYNE) 300 MG tablet, Take 1 tablet (300 mg) by mouth 2 times daily, Disp: 180 tablet, Rfl: 3     olopatadine (PATADAY) 0.2 % ophthalmic solution, Place 1 drop into both eyes daily, Disp: 2.5 mL, Rfl: 11     polyethylene glycol (MIRALAX/GLYCOLAX) Packet, Take 17 g by mouth daily as needed , Disp: , Rfl:      SUMAtriptan (IMITREX) 25 MG tablet, TAKE 1 TO 2 TABLETS BY  MOUTH AT ONSET OF HEADACHE FOR MIGRAINE. MAY REPEAT DOSE IN 2 HOURS. MAX OF 200MG OR 2 DOSES IN 24 HOURS, Disp: 18 tablet, Rfl: 0    Current Facility-Administered Medications:      hylan (SYNVISC ONE) injection 48 mg, 48 mg, , , Tj Mac Eros, DO, 48 mg at 03/03/20 0829     hylan (SYNVISC ONE) injection 48 mg, 48 mg, , , Bubba, Tj Eros, DO, 48 mg at 03/03/20 0829     triamcinolone (KENALOG-40) injection 40 mg, 40 mg, , , Mac, Tj Eros, DO, 40 mg at 08/24/20 1700     triamcinolone (KENALOG-40) injection 40 mg, 40 mg, , , Mac, Tj Eros, DO, 40 mg at 08/24/20 1700     triamcinolone (KENALOG-40) injection 40 mg, 40 mg, , , Bubba, Tj Eros, DO, 40 mg at 01/24/20 1142     triamcinolone (KENALOG-40) injection 40 mg, 40 mg, , , Bubba, Tj Eros, DO, 40 mg at 01/24/20 1142    Allergies: Sulfa drugs, Hydrochlorothiazide w/triamterene, Maxzide [hydrochlorothiazide w/triamterene], Metoprolol, and Seasonal allergies    SURGICAL HISTORY:   Past Surgical History:   Procedure Laterality Date     C ANESTH,KNEE AREA SURGERY  01/2001     C GASTROPLASTY,OBESITY,VERT BAND      removed 2009     HC REMOVE TONSILS/ADENOIDS,12+ Y/O  1995     HEAD & NECK SURGERY  3/2013    Parathyroidectomy--had to go back in 6 days later for a possible bleed     HYSTERECTOMY, VAGINAL  12/2005    hysterectomy- fibroids     ORTHOPEDIC SURGERY         FAMILY HISTORY:   Family History   Problem Relation Age of Onset     Hypertension Mother      Gynecology Mother         hysterectomy     Gastrointestinal Disease Mother         bowel upstruction     Thyroid Disease Mother      Neurologic Disorder Mother      Osteoporosis Mother      Hypertension Father      Lipids Father      Arthritis Father      Heart Disease Father      Prostate Cancer Brother      Alcohol/Drug Brother      Circulatory Brother      Arthritis Paternal Grandmother      Cancer Maternal Grandfather         bone     Eye Disorder Maternal Grandfather      Prostate Cancer Maternal  "Grandfather      Glaucoma Maternal Grandfather      Cancer Maternal Grandmother         tumor-head     Obesity Maternal Grandmother      Respiratory Daughter         asthma     Diabetes Sister      Cerebrovascular Disease Sister      Macular Degeneration No family hx of        SOCIAL HISTORY:   Social History     Tobacco Use     Smoking status: Never Smoker     Smokeless tobacco: Never Used   Substance Use Topics     Alcohol use: No        REVIEW OF SYSTEMS:  The comprehensive review of systems from the intake form was reviewed with the patient.  No fever, weight change or fatigue. No dry eyes. No oral ulcers, sore throat or voice change. No palpitations, syncope, angina or edema.  No chest pain, excessive sleepiness, shortness of breath or hemoptysis.   No abdominal pain, nausea, vomiting, diarrhea or heartburn.  No skin rash. No focal weakness or numbness. No bleeding or lymphadenopathy. No rhinitis or hives.     Exam:  On physical examination the patient appears the stated age, is in no acute distress, affectThe is appropriate, and breathing is non-labored.  Vitals are documented in the EMR and have been reviewed:    BP (!) 149/87   Pulse 69   Ht 1.575 m (5' 2\")   Wt 112.2 kg (247 lb 6.4 oz)   LMP 08/13/2005   BMI 45.25 kg/m    5' 2\"  Body mass index is 45.25 kg/m .    Rises from chair:  Gait:  Trendelenburg test:  Gains the exam table:    Right Knee  Appearance: benign  Clinical alignment: obscured with habitus  Effusion:no  Tenderness to palpation: medical greater than lateral   Extension: 3  Flexion: 110  Collateral ligaments: intact  Cruciate ligaments: grossly intact     Distally, the circulatory, motor, and sensation exam is intact with 5/5 EHL, gastroc-soleus, and tibialis anterior.  Sensation to light touch is intact.  Dorsalis pedis and posterior tibialis pulses are palpable.  There are no sores on the feet, no bruising, and no lymphedema.    X-rays:   Advanced right knee medial " gonarthrosis withv arus deformity and moderate bone loss.           Again, thank you for allowing me to participate in the care of your patient.        Sincerely,        Dylan Hernandez MD

## 2021-03-30 NOTE — NURSING NOTE
"Adelaida Packer's chief complaint for this visit includes:  Chief Complaint   Patient presents with     Left Knee - Pain     Right Knee - Pain     PCP: Windy Gordillo    Referring Provider:  Tj Crouch, [address] [place] [address]    BP (!) 149/87   Pulse 69   Ht 1.575 m (5' 2\")   Wt 112.2 kg (247 lb 6.4 oz)   LMP 08/13/2005   BMI 45.25 kg/m    Data Unavailable     Do you need any medication refills at today's visit? No    "

## 2021-04-25 NOTE — PATIENT INSTRUCTIONS
1. Increase eplerenone to 100 mg in the AM and 75 mg in the PM  2. Repeat labs today   3. Labs in 6 months   4. Follow-up in 1 year.  5. Echo Thursday as planned.    No

## 2021-04-26 ENCOUNTER — IMMUNIZATION (OUTPATIENT)
Dept: NURSING | Facility: CLINIC | Age: 51
End: 2021-04-26
Payer: COMMERCIAL

## 2021-04-26 PROCEDURE — 91300 PR COVID VAC PFIZER DIL RECON 30 MCG/0.3 ML IM: CPT

## 2021-04-26 PROCEDURE — 0001A PR COVID VAC PFIZER DIL RECON 30 MCG/0.3 ML IM: CPT

## 2021-05-17 ENCOUNTER — IMMUNIZATION (OUTPATIENT)
Dept: NURSING | Facility: CLINIC | Age: 51
End: 2021-05-17
Attending: INTERNAL MEDICINE
Payer: COMMERCIAL

## 2021-05-17 PROCEDURE — 0002A PR COVID VAC PFIZER DIL RECON 30 MCG/0.3 ML IM: CPT

## 2021-05-17 PROCEDURE — 91300 PR COVID VAC PFIZER DIL RECON 30 MCG/0.3 ML IM: CPT

## 2021-05-20 NOTE — PATIENT INSTRUCTIONS
If you have any questions regarding your visit, Please contact your care team.    [facility]: [phone]      Women s Health CLINIC HOURS TELEPHONE NUMBER   Ruth Romero DO.    Cele -  Kiah -     MELY Flaherty RN     Monday, Wednesday, Thursday and [facility]  8:30a.m-5:00 p.m   [facility]  [address] [place] [address]  [phone] ask for [facility]    Imaging [phone]       Urgent Care locations:    [facility] Saturday and Sunday   9 am - 5 pm    Monday-Friday   11 pm - 7 pm  Saturday and Sunday   9 am - 5 pm   [phone]     [facility] Labor and Delivery:  [phone]    If you need a medication refill, please contact your pharmacy. Please allow 3 business days for your refill to be completed.  As always, Thank you for trusting us with your healthcare needs!    Preventive Health Recommendations  Female Ages 50 - 64    Yearly exam: See your health care provider every year in order to  o Review health changes.   o Discuss preventive care.    o Review your medicines if your doctor has prescribed any.      Get a Pap test every three years (unless you have an abnormal result and your provider advises testing more often).    If you get Pap tests with HPV test, you only need to test every 5 years, unless you have an abnormal result.     You do not need a Pap test if your uterus was removed (hysterectomy) and you have not had cancer.    You should be tested each year for STDs (sexually transmitted diseases) if you're at risk.     Have a mammogram every 1 to 2 years.    Have a colonoscopy at age 50, or have a yearly FIT test (stool test). These exams screen for colon cancer.      Have a cholesterol test every 5 years, or more often if advised.    Have a diabetes test (fasting glucose) every three  (0) No visual loss years. If you are at risk for diabetes, you should have this test more often.     If you are at risk for osteoporosis (brittle bone disease), think about having a bone density scan (DEXA).    Shots: Get a flu shot each year. Get a tetanus shot every 10 years.    Nutrition:     Eat at least 5 servings of fruits and vegetables each day.    Eat whole-grain bread, whole-wheat pasta and brown rice instead of white grains and rice.    Get adequate Calcium and Vitamin D.     Lifestyle    Exercise at least 150 minutes a week (30 minutes a day, 5 days a week). This will help you control your weight and prevent disease.    Limit alcohol to one drink per day.    No smoking.     Wear sunscreen to prevent skin cancer.     See your dentist every six months for an exam and cleaning.    See your eye doctor every 1 to 2 years.

## 2021-05-27 NOTE — PROGRESS NOTES
Adelaida is a 51 year old who is being evaluated via a billable telephone visit.      What phone number would you like to be contacted at? [phone]   How would you like to obtain your AVS? Luis Daniel    Assessment & Plan     Morbid obesity with body mass index of 45.0-49.9 in adult (H)  Wt Readings from Last 5 Encounters:   03/30/21 112.2 kg (247 lb 6.4 oz)   03/29/21 110.6 kg (243 lb 12.8 oz)   12/03/20 113.9 kg (251 lb)   11/24/20 112.5 kg (248 lb)   08/24/20 113.9 kg (251 lb)     Will be tried both Wellbutrin and Topamax, Adelaida was not able to tolerate the combination due to side effects.  Though she also had some tingling and numbness while on Topamax, she is willing to give it a try now.  We will start on Topamax 25 mg twice a day for the first week followed by 50 mg twice a day.  Will refer to the bariatric medical weight management team to help her with the presurgical weight loss.  She will also discuss with him about the possibility for liposuction as she is desiring before her knee surgery.  She will try the Optifast, but instead of 3 times a day may be one time for meal replacement just for a month and see if it makes any difference in her weight.  Continue with portion control, regular exercises, healthy eating.  Patient verbalised understanding and is agreeable to the plan.    - COMPREHENSIVE WEIGHT MANAGEMENT  - topiramate (TOPAMAX) 25 MG tablet; Take 2 tablets (50 mg) by mouth 2 times daily    Primary osteoarthritis of both knees  Patient was seen by Dr. Hernandez to discuss about knee replacement surgery and was informed to try to lose 25 to 30 pounds before the surgery.    - COMPREHENSIVE WEIGHT MANAGEMENT    Stage 3a chronic kidney disease  Last Comprehensive Metabolic Panel:  Sodium   Date Value Ref Range Status   11/30/2020 139 133 - 144 mmol/L Final     Potassium   Date Value Ref Range Status   11/30/2020 4.7 3.4 - 5.3 mmol/L Final     Chloride   Date Value Ref Range Status   11/30/2020 107 94 -  "109 mmol/L Final     Carbon Dioxide   Date Value Ref Range Status   11/30/2020 28 20 - 32 mmol/L Final     Anion Gap   Date Value Ref Range Status   11/30/2020 4 3 - 14 mmol/L Final     Glucose   Date Value Ref Range Status   03/29/2021 96 70 - 99 mg/dL Final     Comment:     Fasting specimen     Urea Nitrogen   Date Value Ref Range Status   11/30/2020 20 7 - 30 mg/dL Final     Creatinine   Date Value Ref Range Status   11/30/2020 1.17 (H) 0.52 - 1.04 mg/dL Final     GFR Estimate   Date Value Ref Range Status   11/30/2020 54 (L) >60 mL/min/[1.73_m2] Final     Comment:     Non  GFR Calc  Starting 12/18/2018, serum creatinine based estimated GFR (eGFR) will be   calculated using the Chronic Kidney Disease Epidemiology Collaboration   (CKD-EPI) equation.       Calcium   Date Value Ref Range Status   11/30/2020 9.5 8.5 - 10.1 mg/dL Final     Reviewed with patient on the concern for meal replacement with protein shakes and possible worsening of kidney functions.    - COMPREHENSIVE WEIGHT MANAGEMENT    Prediabetes  Lab Results   Component Value Date    A1C 6.1 11/30/2020    A1C 5.5 11/19/2019    A1C 5.3 01/29/2019    A1C 5.2 08/06/2018    A1C 5.2 08/15/2017     Related to obesity.  Expect improvement in resolution with weight loss  - COMPREHENSIVE WEIGHT MANAGEMENT    [id number]}     BMI:   Estimated body mass index is 45.25 kg/m  as calculated from the following:    Height as of 3/30/21: 1.575 m (5' 2\").    Weight as of 3/30/21: 112.2 kg (247 lb 6.4 oz).   Weight management plan: Patient referred to endocrine and/or weight management specialty    Work on weight loss  Regular exercise  Chart documentation done in part with Dragon Voice recognition Software. Although reviewed after completion, some word and grammatical error may remain.    See Patient Instructions    No follow-ups on file.    Windy Gordillo MD  [facility]    Shannan Son is a 51 year old who presents for the " following health issues   Patient is here for a telephone visit instead of in person visit due to the current COVID-19 pandemic.  Patient with past medical history significant for hypertension, chronic kidney disease stage III, migraine headaches, chronic bilateral knee pain from severe degenerative osteoarthritis of knees is here for telephone visit to discuss about management of her weight.  Patient has ongoing and progress loosening pain in both knees was seen by Dr. Mac in sports medicine diagnosed with severe degenerative primary osteoarthritis of knees, requested by Dr. Hernandez to discuss about her knee replacement surgery.  Patient was informed to lose 25 to 30 pounds before her knee surgery.  Patient is wondering if she can try Optifast through [facility], a liquid meal replacement system to help with faster weight loss.  She has tried Wellbutrin and Topamax in the past that has helped somewhat with the weight loss but she was not able to tolerate the side effects mainly of Wellbutrin  Had experience of tingling and some numbness while on Topamax and hence both medications were stopped.  Patient is also wondering if getting a liposuction will help with actual additional weight loss, since she think she carries most of her weight on the waist and thighs.  Patient tries to stick with the low calorie diet, has joined a gym for routine exercise program.  HPI     Patient would like to discuss weight loss and upcoming knee surgery.      Review of Systems   CONSTITUTIONAL:as above  RESP: NEGATIVE for significant cough or SOB  CV: NEGATIVE for chest pain, palpitations or peripheral edema  CV: Hx HTN  MUSCULOSKELETAL: Bilateral knee osteoarthritis  NEURO: NEGATIVE for weakness, dizziness or paresthesias  ENDOCRINE: NEGATIVE for temperature intolerance, skin/hair changes and history of prediabetes  PSYCHIATRIC: NEGATIVE for changes in mood or affect      Objective           Vitals:  No vitals were obtained today due to  virtual visit.    Physical Exam   healthy, alert and no distress  PSYCH: Alert and oriented times 3; coherent speech, normal   rate and volume, able to articulate logical thoughts, able   to abstract reason, no tangential thoughts, no hallucinations   or delusions  Her affect is normal  RESP: No cough, no audible wheezing, able to talk in full sentences  Remainder of exam unable to be completed due to telephone visits                Phone call duration: 19 minutes from 7:21am through 7:40 am through [employer] phone call

## 2021-05-28 ENCOUNTER — VIRTUAL VISIT (OUTPATIENT)
Dept: FAMILY MEDICINE | Facility: CLINIC | Age: 51
End: 2021-05-28
Payer: COMMERCIAL

## 2021-05-28 DIAGNOSIS — N18.31 STAGE 3A CHRONIC KIDNEY DISEASE (H): ICD-10-CM

## 2021-05-28 DIAGNOSIS — M17.0 PRIMARY OSTEOARTHRITIS OF BOTH KNEES: ICD-10-CM

## 2021-05-28 DIAGNOSIS — E66.01 MORBID OBESITY WITH BODY MASS INDEX OF 45.0-49.9 IN ADULT (H): Primary | ICD-10-CM

## 2021-05-28 DIAGNOSIS — R73.03 PREDIABETES: ICD-10-CM

## 2021-05-28 PROCEDURE — 99214 OFFICE O/P EST MOD 30 MIN: CPT | Mod: TEL | Performed by: FAMILY MEDICINE

## 2021-05-28 RX ORDER — TOPIRAMATE 25 MG/1
50 TABLET, FILM COATED ORAL 2 TIMES DAILY
Qty: 360 TABLET | Refills: 0 | Status: SHIPPED | OUTPATIENT
Start: 2021-05-28 | End: 2021-07-19

## 2021-05-28 NOTE — PATIENT INSTRUCTIONS
Call  to schedule for bariatric medical weight management consult  Start on Topamax-take 25 mg twice a day for 1 week followed by 50 mg twice a day

## 2021-06-01 VITALS — HEIGHT: 63 IN | BODY MASS INDEX: 36.86 KG/M2 | WEIGHT: 208 LBS

## 2021-06-02 VITALS — BODY MASS INDEX: 38.62 KG/M2 | WEIGHT: 218 LBS | HEIGHT: 63 IN

## 2021-06-02 VITALS — WEIGHT: 218.6 LBS | BODY MASS INDEX: 41.3 KG/M2

## 2021-06-08 NOTE — PROGRESS NOTES
Subjective findings: The patient  presented to the clinic  today complaining of some swelling on the right ankle and moderate to severe pain.  She states that she has been exercising regularly for the past 2 months.  She has had to modify  her activities due to her painful right foot.  She stated that  it is difficult to describe the pain other than the fact  that it does ache a little bit after she has been standing for a long time.   She also stated that she has a large firm lump near her painful bunion of her right foot.   She has no pain while at rest.  She stated that she has been diagnosed in the past with bunion deformities.  Her bunion are becoming more painful and she is finding it difficult to wear shoes without pain.  Her right ankle pain has also increased over the past several years.      OBJECTIVE FINDINGS:Nails bilateral feet, normal growth and color. Skin bilaterally warm, supple, and intact.   DP and PT pulses +2/4 bilaterally. Capillary refill less than 2 seconds  bilaterally. Negative clonus. Negative Babinski bilaterally. Range of  motion within normal limits bilaterally. Muscle power +5/5 bilaterally  within all compartments. There is some moderate edema noted, medial and  lateral aspects of the right ankle. There is pain on palpation along the course of the posterior  tibial tendon, right ankle. There is severe flattening of the medial  longitudinal arch noted bilaterally upon weightbearing with medial ptosis of  talar head, bilateral feet. Large, firm subcutaneous mass, medial aspect  head of 1st metatarsal, bilateral feet.  There is also a second subcutaneous mass just proximal  The head of the first metatarsal right foot.      ASSESSMENT:   1. Collapsing pes valgo planus.   2. Posterior tibial tendinitis.   3. Hallux abductor valgus.  4.  Ganglionic cyst right foot      PLAN: X-rays of both were taken today.  I have informed the patient she would require surgery to correct her painful  deformities.  I recommended a bunionectomy with first metatarsal osteotomy with internal screw fixation of the right foot.  I have also recommended excision of the ganglion cyst right foot.  I have also recommended a subtalar joint arthroereisis of the right foot.  I recommended a Lapidus bunionectomy of the left foot.  She was told that the procedures will be done under general anesthesia.  She was told that the procedures will take approximately 1 hour to perform.  She will be discharged in a cam walker.  She was told she will be able to partially weight-bear in a Cam Walker for one month.  She was asked to go nothing by mouth the night prior to the procedure.  She was asked to see her primary care physician for preoperative consultation.  She was told that a Lapidus bunionectomy of the left foot would be performed at a later date.  She was given a written list of potential postoperative complications with the procedures.

## 2021-06-14 ENCOUNTER — TELEPHONE (OUTPATIENT)
Dept: ENDOCRINOLOGY | Facility: CLINIC | Age: 51
End: 2021-06-14

## 2021-06-14 NOTE — TELEPHONE ENCOUNTER
Called informing pt to complete questionnaire on MyChart prior to visit.  Pt is scheduled with Dr Mitchell at 9:15 am and an appt with the dietician at 10:30 am    I called the patient and informed her that there will be a pre-visit check in 20-30 minutes prior to her appointment.

## 2021-06-15 ENCOUNTER — VIRTUAL VISIT (OUTPATIENT)
Dept: ENDOCRINOLOGY | Facility: CLINIC | Age: 51
End: 2021-06-15
Payer: COMMERCIAL

## 2021-06-15 VITALS — HEIGHT: 62 IN | BODY MASS INDEX: 44.9 KG/M2 | WEIGHT: 244 LBS

## 2021-06-15 DIAGNOSIS — N18.31 TYPE 2 DIABETES MELLITUS WITH STAGE 3A CHRONIC KIDNEY DISEASE, WITHOUT LONG-TERM CURRENT USE OF INSULIN (H): ICD-10-CM

## 2021-06-15 DIAGNOSIS — N18.30 CKD (CHRONIC KIDNEY DISEASE) STAGE 3, GFR 30-59 ML/MIN (H): ICD-10-CM

## 2021-06-15 DIAGNOSIS — E66.01 MORBID OBESITY (H): Primary | ICD-10-CM

## 2021-06-15 DIAGNOSIS — E66.01 MORBID OBESITY (H): ICD-10-CM

## 2021-06-15 DIAGNOSIS — Z71.3 NUTRITIONAL COUNSELING: Primary | ICD-10-CM

## 2021-06-15 DIAGNOSIS — E11.22 TYPE 2 DIABETES MELLITUS WITH STAGE 3A CHRONIC KIDNEY DISEASE, WITHOUT LONG-TERM CURRENT USE OF INSULIN (H): ICD-10-CM

## 2021-06-15 PROCEDURE — 99204 OFFICE O/P NEW MOD 45 MIN: CPT | Mod: GT | Performed by: INTERNAL MEDICINE

## 2021-06-15 PROCEDURE — 97802 MEDICAL NUTRITION INDIV IN: CPT | Mod: GT | Performed by: DIETITIAN, REGISTERED

## 2021-06-15 ASSESSMENT — PAIN SCALES - GENERAL: PAINLEVEL: NO PAIN (0)

## 2021-06-15 ASSESSMENT — MIFFLIN-ST. JEOR: SCORE: 1670.03

## 2021-06-15 NOTE — PATIENT INSTRUCTIONS
Start Ozempic 0.25 mg weekly for 2 weeks and increase to 0.5 mg weekly thereafter    See Lauren Bloch in 6 weeks  See Dr.Tasma RECIO in 3 months    If you have any questions, please do not hesitate to call Weight management clinic at [phone] or [phone].  If you need to fax, please fax to [phone].    Sincerely,    Stephen Null MD  Endocrinology

## 2021-06-15 NOTE — NURSING NOTE
"Chief Complaint   Patient presents with     Consult     Consultation Weight Management       Vitals:    06/15/21 0851   Weight: 110.7 kg (244 lb)   Height: 1.575 m (5' 2\")       Body mass index is 44.63 kg/m .                            "
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
## 2021-06-15 NOTE — LETTER
"6/15/2021       RE: Adelaida Packer  [address] [facility] [place] [address]     Dear Colleague,    Thank you for referring your patient, Adelaida Packer, to the [facility] [place] at [facility]. Please see a copy of my visit note below.    Adelaida Packer is a 51 year old female who is being evaluated via a billable video visit.      The patient has been notified of following:      \"This video visit will be conducted via a call between you and your physician/provider. We have found that certain health care needs can be provided without the need for an in-person physical exam.  This service lets us provide the care you need with a video conversation.  If a prescription is necessary we can send it directly to your pharmacy.  If lab work is needed we can place an order for that and you can then stop by our lab to have the test done at a later time.    Video visits are billed at different rates depending on your insurance coverage.  Please reach out to your insurance provider with any questions.    If during the course of the call the physician/provider feels a video visit is not appropriate, you will not be charged for this service.\"    How would you like to obtain your AVS? MyChart  If you are dropped from the video visit, the video invite should be resent to: [phone]  Will anyone else be joining your video visit? No  {If patient encounters technical issues they should call [phone]       Video-Visit Details    Type of service:  Video Visit    Video Start Time: 10:35 AM  Video End Time: 11:07 PM    Originating Location (pt. Location): Home    Distant Location (provider location):  [facility]     Platform used for Video Visit: [employer]Tel    During this virtual visit the patient is located in [place], patient verifies this as the location during the entirety of this visit.     New " "Weight Management Nutrition Consultation    Adelaida Packer is a 51 year old female presents today for new weight management nutrition consultation.  Patient referred by Dr. Gordillo. Seeing MD Stephen starting 6/15/21    Patient with Co-morbidities of obesity including:  Type II DM yes  Renal Failure stage 3 CKD per chart  Sleep apnea no  Hypertension yes  Dyslipidemia yes, high LDL hx  Joint pain osteoarthritis and knee pain per chart  Back pain no  GERD yes    PMH includes: migraines, vit d deficiency, hyperparathyroidism, needs knee replacement     Recommended 30-40 lb weight loss for knee surgery    Anthropometrics:  Weight when started with me 6/15/21: 244 lbs    Estimated body mass index is 45.25 kg/m  as calculated from the following:    Height as of 3/30/21: 1.575 m (5' 2\").    Weight as of 3/30/21: 112.2 kg (247 lb 6.4 oz).     Current weight: 244 lbs per pt report on home scale    30 lb weight loss recommended for knee replacement    Medications for Weight Loss:  Ozempic (semaglutide) prescribed 6/15/21    Tried meds in past - didn't work.  Then tried increasing topiramate, was tingly/didn't feel good.     NUTRITION HISTORY  See MD note for details.    Tried meal kits - got sick of it, expensive.  Tried Noom - 1200 kcal/day  Did WW in past, hard virtually. Dr. Mitchell recommended again.   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  Was on 1200 kcal when doing Noom    Nutrition Diagnosis    Obesity r/t long history of self-monitoring deficit and excessive " energy intake aeb BMI >30.    Nutrition Intervention  Reviewed current eating habits.  Education on plate method and foods to incorporate.  AVS via [employer]    Patient demonstrates understanding.    Expected Engagement: fair    Nutrition Goals  1) Workout 3-4x/week     2) Trial Plate Method     The Plate Method  [email or web address]    Protein Sources for Weight Loss  [email or web address]     Carbohydrates  [email or web address]     Mindful Eating  [email or web address]     Summary of Volumetrics Eating Plan  [email or web address]     Follow-Up:  1 month, PRN    Time spent with patient: 32 minutes.  SAKSHI Salomon RD, LD
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
## 2021-06-15 NOTE — PROGRESS NOTES
Adelaida is a 51 year old who is being evaluated via a billable video visit.      How would you like to obtain your AVS? MyChart  If the video visit is dropped, the invitation should be resent by: Text to cell phone: [phone]  Will anyone else be joining your video visit? No      Video-Visit Details    Type of service:  Video Visit    Originating Location (pt. Location): Home    Distant Location (provider location):  [facility] [place]     Platform used for Video Visit: [employer]

## 2021-06-15 NOTE — PROGRESS NOTES
"Adelaida Packer is a 51 year old female who is being evaluated via a billable video visit.      The patient has been notified of following:      \"This video visit will be conducted via a call between you and your physician/provider. We have found that certain health care needs can be provided without the need for an in-person physical exam.  This service lets us provide the care you need with a video conversation.  If a prescription is necessary we can send it directly to your pharmacy.  If lab work is needed we can place an order for that and you can then stop by our lab to have the test done at a later time.    Video visits are billed at different rates depending on your insurance coverage.  Please reach out to your insurance provider with any questions.    If during the course of the call the physician/provider feels a video visit is not appropriate, you will not be charged for this service.\"    How would you like to obtain your AVS? MyChart  If you are dropped from the video visit, the video invite should be resent to: [phone]  Will anyone else be joining your video visit? No  {If patient encounters technical issues they should call [phone]       Video-Visit Details    Type of service:  Video Visit    Video Start Time: 10:35 AM  Video End Time: 11:07 PM    Originating Location (pt. Location): Home    Distant Location (provider location):  [facility] [place]     Platform used for Video Visit: KelDoc    During this virtual visit the patient is located in [place], patient verifies this as the location during the entirety of this visit.     New Weight Management Nutrition Consultation    Adelaida Packer is a 51 year old female presents today for new weight management nutrition consultation.  Patient referred by Dr. Gordillo. Soy Mitchell MD starting 6/15/21    Patient with Co-morbidities of obesity including:  Type II DM yes  Renal Failure stage 3 CKD per chart  Sleep apnea " "no  Hypertension yes  Dyslipidemia yes, high LDL hx  Joint pain osteoarthritis and knee pain per chart  Back pain no  GERD yes    PMH includes: migraines, vit d deficiency, hyperparathyroidism, needs knee replacement     Recommended 30-40 lb weight loss for knee surgery    Anthropometrics:  Weight when started with me 6/15/21: 244 lbs    Estimated body mass index is 45.25 kg/m  as calculated from the following:    Height as of 3/30/21: 1.575 m (5' 2\").    Weight as of 3/30/21: 112.2 kg (247 lb 6.4 oz).     Current weight: 244 lbs per pt report on home scale    30 lb weight loss recommended for knee replacement    Medications for Weight Loss:  Ozempic (semaglutide) prescribed 6/15/21    Tried meds in past - didn't work.  Then tried increasing topiramate, was tingly/didn't feel good.     NUTRITION HISTORY  See MD note for details.    Tried meal kits - got sick of it, expensive.  Tried Noom - 1200 kcal/day  Did WW in past, hard virtually. Dr. Mitchell recommended again.   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  Was on 1200 kcal when doing Noom    Nutrition Diagnosis    Obesity r/t long history of self-monitoring deficit and excessive energy intake aeb BMI >30.    Nutrition Intervention  Reviewed current eating habits.  Education on plate method and foods to incorporate.  AVS via [employer]MyMove    Patient demonstrates understanding.    Expected Engagement: fair    Nutrition Goals  1) Workout 3-4x/week     2) Trial Plate Method     The Plate " Method  [email or web address]    Protein Sources for Weight Loss  [email or web address]     Carbohydrates  [email or web address]     Mindful Eating  [email or web address]     Summary of Volumetrics Eating Plan  [email or web address]     Follow-Up:  1 month, PRN    Time spent with patient: 32 minutes.  SAKSHI Salomon RD, LD

## 2021-06-15 NOTE — TELEPHONE ENCOUNTER
Writer received a refill request from: [facility] in [place], [place]. [phone]    Medication: amLODIPine (NORVASC) 5 MG tablet     Sig: Take 1 tablet (5 mg) by mouth daily     Date last dispensed: 3/11/2021      Pt's last office visit: 12/01/20  Next scheduled office visit: none      Per the RN/LPN medication refill protocol, writer is to refill this request.     (If able to refill, please call the pt to inform them the RX was sent to the pharmacy. If unable to refill, route this encounter to the prescribing physician for authorization or further instructions)

## 2021-06-16 RX ORDER — AMLODIPINE BESYLATE 5 MG/1
5 TABLET ORAL DAILY
Qty: 90 TABLET | Refills: 3 | Status: SHIPPED | OUTPATIENT
Start: 2021-06-16 | End: 2022-03-30

## 2021-06-18 NOTE — PROGRESS NOTES
Subjective findings: The patient   return to the clinic today with continued complaints of foot pain both feet.  The patient was examined and was previously diagnosed with multiple foot deformities.  At that time it was recommended she undergo surgical correction of her multiple deformities.  The patient stated that she is ready to move forward with the surgical treatment plan.      OBJECTIVE FINDINGS:Nails bilateral feet, normal growth and color. Skin bilaterally warm, supple, and intact.   DP and PT pulses +2/4 bilaterally. Capillary refill less than 2 seconds  bilaterally. Negative clonus. Negative Babinski bilaterally. Range of  motion within normal limits bilaterally. Muscle power +5/5 bilaterally  within all compartments. There is some moderate edema noted, medial and  lateral aspects of the right ankle. There is pain on palpation along the course of the posterior  tibial tendon, right ankle. There is severe flattening of the medial  longitudinal arch noted bilaterally upon weightbearing with medial ptosis of  talar head, bilateral feet. Large, firm subcutaneous mass, medial aspect  head of 1st metatarsal, bilateral feet.  There is also a second subcutaneous mass just proximal  the head of the first metatarsal right foot.      ASSESSMENT:   1. Collapsing pes valgo planus.   2. Posterior tibial tendinitis.   3. Hallux abductor valgus.  4.  Ganglionic cyst right foot    PLAN: I have informed the patient she would require surgery to correct her painful deformities.  I recommended a bunionectomy with first metatarsal osteotomy with internal screw fixation of the right foot.  I have also recommended excision of the ganglion cyst right foot.  I have also recommended a subtalar joint arthroereisis of the right foot.  I have also recommended a gastrocnemius recession right lower extremity. I recommended a Lapidus bunionectomy of the left foot.  She was told that the procedures will be done under general anesthesia  right foot.  The anesthesia for the left foot will be done under local anesthetic with IV sedation. She was told that the procedures will take approximately 1 hour to perform.  She will be discharged in a cam walker.  She was told she will be able to partially weight-bear in a Cam Walker for one month.  She was asked to go nothing by mouth the night prior to the procedure.  She was asked to see her primary care physician for preoperative consultation.  She was told that a Lapidus bunionectomy of the left foot would be performed at a later date.  She was given a written list of potential postoperative complications with the procedures.PLAN: X-rays of both were taken today.  I have informed the patient she would require surgery to correct her painful deformities.  I recommended a bunionectomy with first metatarsal osteotomy with internal screw fixation of the right foot.  I have also recommended excision of the ganglion cyst right foot.  I have also recommended a subtalar joint arthroereisis of the right foot.  I recommended a Lapidus bunionectomy of the left foot.  She was told that the procedures will be done under general anesthesia.  She was told that the procedures will take approximately 1 hour to perform.  She will be discharged in a cam walker.  She was told she will be able to partially weight-bear in a Cam Walker for one month.  She was asked to go nothing by mouth the night prior to the procedure.  She was asked to see her primary care physician for preoperative consultation.  She was told that a Lapidus bunionectomy of the left foot would be performed at a later date.  She was given a written list of potential postoperative complications with the procedures.

## 2021-06-19 NOTE — ANESTHESIA POSTPROCEDURE EVALUATION
Patient: Adelaida Packer  EXCISION OF THE GANGLION CYST, BUNIONECTOMU WITH FIRST METATARSAL  OSTEOTOMY WITH INTERNAL SCREW FIXATION, A SUBTALAR JIONT ARTHROERESIS OF THE RIGHT FOOT AND GASTROCNEMIUS RECESSION RIGHT LOWER EXTREMITY, BUNIONECTOMY  Anesthesia type: general    Patient location: Phase II Recovery  Last vitals:   Vitals:    07/06/18 1245   BP: 164/78   Pulse: (!) 59   Resp: 16   Temp:    SpO2: 100%     Post vital signs: stable  Level of consciousness: awake and responds to simple questions  Post-anesthesia pain: pain controlled  Post-anesthesia nausea and vomiting: no  Pulmonary: unassisted, return to baseline  Cardiovascular: stable and blood pressure at baseline  Hydration: adequate  Anesthetic events: no    QCDR Measures:  ASA# 11 - Carol-op Cardiac Arrest: ASA11B - Patient did NOT experience unanticipated cardiac arrest  ASA# 12 - Carol-op Mortality Rate: ASA12B - Patient did NOT die  ASA# 13 - PACU Re-Intubation Rate: NA - No ETT / LMA used for case  ASA# 10 - Composite Anes Safety: ASA10A - No serious adverse event    Additional Notes:

## 2021-06-19 NOTE — PROGRESS NOTES
Subjective findings: The patient presented to the clinic today for postop visit #1, 1 week status post bunionectomy, subtalar joint arthroereisis, gastrocnemius recession all left lower extremity.     The patient is a good spirits and she had no complaints.      Objective findings: The dressings were removed and the wound margins are well coaptated and maintained.  There is no edema, erythema, cellulitis, drainage or bleeding noted.  Neurovascular status is intact.  Vital signs are stable.  Surgical correction maintained.      Assessment: Hallux abductovalgus, contracted Achilles tendon, pronation deformity all left lower extremity        Plan: Applied a sterile dressing to the left lower extremity.  The patient was instructed to return to the clinic in 1 week for postop visit #2 at which time the sutures will be  removed and a postop x-ray will be taken

## 2021-06-19 NOTE — PROGRESS NOTES
Subjective findings: The patient presented to the clinic today for postop visit #2, 2 weeks status post bunionectomy, subtalar joint arthroereisis, gastrocnemius recession all left lower extremity.     The patient is a good spirits and she had no complaints.      Objective findings: The dressings were removed and the wound margins are well healed.    There is no edema, erythema, cellulitis, drainage or bleeding noted.  Neurovascular status is intact.  Vital signs are stable.  Surgical correction maintained.      Assessment: Hallux abductovalgus, contracted Achilles tendon, pronation deformity all left lower extremity        Plan: Removed all sutures today.  A sterile dressing was applied to the left foot today.  A postop x-ray was taken of the left foot today.   She is to return to the clinic in 2 weeks for postop visit #3.  She is to remain total nonweightbearing for the next 2 weeks.

## 2021-06-19 NOTE — ANESTHESIA PREPROCEDURE EVALUATION
Anesthesia Evaluation      Patient summary reviewed   History of anesthetic complications (PONV)     Airway   Mallampati: II  Neck ROM: full   Pulmonary - negative ROS and normal exam    breath sounds clear to auscultation                         Cardiovascular   (+) hypertension, ,     ECG reviewed  Rhythm: regular  Rate: normal,         Neuro/Psych - negative ROS     Endo/Other    (+) obesity,      GI/Hepatic/Renal - negative ROS           Dental - normal exam                        Anesthesia Plan  Planned anesthetic: general endotracheal  Propofol infusion ~50 mcg/kg/min  Ketamine 50 mg pre-incision  Toradol 30mg IV pre-emergence if ok with surgeon    ASA 2   Induction: intravenous   Anesthetic plan and risks discussed with: patient  Anesthesia plan special considerations: antiemetics,   Post-op plan: routine recovery

## 2021-06-19 NOTE — PROGRESS NOTES
Subjective findings: The patient presented to the clinic today for postop visit #3, 4 weeks status post bunionectomy, subtalar joint arthroereisis, gastrocnemius recession all right lower extremity.     The patient is a good spirits and she had no complaints.      Objective findings: The dressings were removed and the wound margins are well healed.    There is no edema, erythema, cellulitis, drainage or bleeding noted.  Neurovascular status is intact.  Vital signs are stable.  Surgical correction maintained.      Assessment: Hallux abductovalgus, contracted Achilles tendon, pronation deformity all right lower extremity        Plan:  A postop x-ray was taken of the right foot today.   She is to return to the clinic in 3 weeks for postop visit #4 which time an order will be placed for her orthotics.  She may now begin to weight-bear in the cam boot for 2 more weeks.  He may now begin to bathe her foot in a normal manner.

## 2021-06-19 NOTE — ANESTHESIA CARE TRANSFER NOTE
Last vitals:   Vitals:    07/06/18 1030   BP: (P) 138/72   Pulse: (P) 69   Resp: (P) 18   Temp: (P) 36.6  C (97.9  F)   SpO2: (P) 100%     Patient's level of consciousness is drowsy  Spontaneous respirations: yes  Maintains airway independently: yes  Dentition unchanged: yes  Oropharynx: oropharynx clear of all foreign objects    QCDR Measures:  ASA# 20 - Surgical Safety Checklist: WHO surgical safety checklist completed prior to induction  PQRS# 430 - Adult PONV Prevention: 4558F - Pt received => 2 anti-emetic agents (different classes) preop & intraop  ASA# 8 - Peds PONV Prevention: NA - Not pediatric patient, not GA or 2 or more risk factors NOT present  PQRS# 424 - Carol-op Temp Management: 4559F - At least one body temp DOCUMENTED => 35.5C or 95.9F within required timeframe  PQRS# 426 - PACU Transfer Protocol: - Transfer of care checklist used  ASA# 14 - Acute Post-op Pain: ASA14B - Patient did NOT experience pain >= 7 out of 10

## 2021-06-20 NOTE — PROGRESS NOTES
Subjective findings: The patient presented to the clinic today for postop visit #4, 6 weeks status post bunionectomy, subtalar joint arthroereisis, gastrocnemius recession all right ower extremity.     The patient stated that there is a small opening in the wound on the back of the right lower leg.  Is a good spirits and she had no complaints.      Objective findings: The dressings were removed and the wound margins are well healed.    There is no edema, erythema, cellulitis, drainage or bleeding noted.  Neurovascular status is intact.  Vital signs are stable.  Surgical correction maintained.      Assessment: Hallux abductovalgus, contracted Achilles tendon, pronation deformity all right lower extremity, pronation deformity    Plan: I recommended the patient continue to ambulate in the cam walker  for 1 more week.  She may then return to normal shoe gear.  She was told that the small opening in the incision on the back of the leg should granulate well and it may take several weeks.  She is to return to the clinic if she has not sudden increase in pain, edema, cellulitis, drainage or bleeding.  I have also recommended orthotics.

## 2021-06-22 ENCOUNTER — TELEPHONE (OUTPATIENT)
Dept: ORTHOPEDICS | Facility: CLINIC | Age: 51
End: 2021-06-22

## 2021-06-22 ENCOUNTER — OFFICE VISIT (OUTPATIENT)
Dept: ORTHOPEDICS | Facility: CLINIC | Age: 51
End: 2021-06-22
Payer: COMMERCIAL

## 2021-06-22 VITALS
SYSTOLIC BLOOD PRESSURE: 139 MMHG | WEIGHT: 246.9 LBS | HEART RATE: 70 BPM | HEIGHT: 62 IN | DIASTOLIC BLOOD PRESSURE: 82 MMHG | BODY MASS INDEX: 45.43 KG/M2

## 2021-06-22 DIAGNOSIS — G89.29 CHRONIC PAIN OF RIGHT KNEE: Primary | ICD-10-CM

## 2021-06-22 DIAGNOSIS — M25.561 CHRONIC PAIN OF RIGHT KNEE: Primary | ICD-10-CM

## 2021-06-22 PROCEDURE — 99214 OFFICE O/P EST MOD 30 MIN: CPT | Performed by: ORTHOPAEDIC SURGERY

## 2021-06-22 ASSESSMENT — PAIN SCALES - GENERAL: PAINLEVEL: SEVERE PAIN (6)

## 2021-06-22 ASSESSMENT — MIFFLIN-ST. JEOR: SCORE: 1683.31

## 2021-06-22 NOTE — PROGRESS NOTES
Subjective findings: The patient presented to the clinic today complaining of a pain involving her right foot.  She is status post subtalar joint arthroereisis.  The patient stated she felt a click.  She then developed sharp pain in the right foot.        Objective findings: There is a well-healed surgical incision on the lateral aspect of the right foot near the sinus tarsi.  There is no edema or erythema noted.  Neurovascular status is intact.  There is some mild pain on range of motion of the subtalar joint.  No crepitus is noted.  No palpable masses noted.  Surgical correction is maintained.    Assessment: Right foot pain    Plan: An x-ray of the right foot was taken today.  I informed the patient that the implant appears to be well seated.  There is no sign of any osseous or soft to abnormalities.  The correction has been maintained.  I recommended the patient monitor her condition.  If she continues to have discomfort she is to return to the clinic for follow-up visit.  I informed the patient that the implant is seated correctly.

## 2021-06-22 NOTE — LETTER
6/22/2021         RE: Adelaida Packer  [address] [place] [address]        Dear Colleague,    Thank you for referring your patient, Adelaida Packer, to the [facility]. Please see a copy of my visit note below.    Chief Complaint: Pain of the Left Knee ( Bilateral knee follow up ) and Pain of the Right Knee ( Bilateral knee follow up )       HPI: Adelaida Packer returns today in follow-up for her knees. We talked about total knee and class III+ obesity at her last visit and she has met with the nutrition program. Efforts at weight loss over the last three months show one pound decrease. She is painful and frustrated and here today to discuss proceeding with total knee.     Medications and allergies are documented in the EMR and have been reviewed.    Current Outpatient Medications:      acetaminophen (TYLENOL ARTHRITIS PAIN) 650 MG CR tablet, Take 650-1,300 mg by mouth every 8 hours as needed for mild pain or fever, Disp: , Rfl:      amLODIPine (NORVASC) 5 MG tablet, Take 1 tablet (5 mg) by mouth daily, Disp: 90 tablet, Rfl: 3     calcium carbonate (OS-EVGENY 600 MG [place]. [place]) 1500 (600 CA) MG tablet, Take 1 tablet (600 mg) by mouth 2 times daily, Disp: 180 tablet, Rfl: 3     cetirizine (ZYRTEC) 10 MG tablet, Take 10 mg by mouth daily as needed for allergies, Disp: , Rfl:      cholecalciferol 2000 UNITS CAPS, Take 2,000 Units by mouth daily, Disp: 90 capsule, Rfl: 3     eplerenone (INSPRA) 50 MG tablet, Take 2 tablets (100 mg) by mouth 2 times daily, Disp: 360 tablet, Rfl: 3     famotidine (PEPCID) 20 MG tablet, Take 1 tablet (20 mg) by mouth 2 times daily (Patient taking differently: Take 20 mg by mouth 2 times daily as needed ), Disp: 180 tablet, Rfl: 1     labetalol (NORMODYNE) 300 MG tablet, Take 1 tablet (300 mg) by mouth 2 times daily, Disp: 180 tablet, Rfl: 3     olopatadine (PATADAY) 0.2 % ophthalmic solution, Place 1 drop into both eyes daily, Disp: 2.5 mL,  "Rfl: 11     polyethylene glycol (MIRALAX/GLYCOLAX) Packet, Take 17 g by mouth daily as needed , Disp: , Rfl:      Semaglutide,0.25 or 0.5MG/DOS, 2 MG/1.5ML SOPN, Start 0.25 mg weekly for 2 weeks and increase to 0.5 mg weekly thereafter, Disp: 4.5 mL, Rfl: 3     SUMAtriptan (IMITREX) 25 MG tablet, TAKE 1 TO 2 TABLETS BY MOUTH AT ONSET OF HEADACHE FOR MIGRAINE. MAY REPEAT DOSE IN 2 HOURS. MAX OF 200MG OR 2 DOSES IN 24 HOURS, Disp: 18 tablet, Rfl: 0     Blood Pressure Monitor KIT, 1 Device 2 times daily, Disp: 1 kit, Rfl: 0     topiramate (TOPAMAX) 25 MG tablet, Take 2 tablets (50 mg) by mouth 2 times daily (Patient not taking: Reported on 6/22/2021), Disp: 360 tablet, Rfl: 0    Current Facility-Administered Medications:      hylan (SYNVISC ONE) injection 48 mg, 48 mg, , , Tj Mac Eros, DO, 48 mg at 03/03/20 0829     hylan (SYNVISC ONE) injection 48 mg, 48 mg, , , Tj Mac Eros, DO, 48 mg at 03/03/20 0829     triamcinolone (KENALOG-40) injection 40 mg, 40 mg, , , Tj Mac Eros, DO, 40 mg at 08/24/20 1700     triamcinolone (KENALOG-40) injection 40 mg, 40 mg, , , jT Mac Eros, DO, 40 mg at 08/24/20 1700     triamcinolone (KENALOG-40) injection 40 mg, 40 mg, , , Tj Mac Eros, DO, 40 mg at 01/24/20 1142     triamcinolone (KENALOG-40) injection 40 mg, 40 mg, , , Tj Mac Eros, DO, 40 mg at 01/24/20 1142  Allergies: Sulfa drugs, Hydrochlorothiazide w/triamterene, Maxzide [hydrochlorothiazide w/triamterene], Metoprolol, and Seasonal allergies    Physical Exam:  On physical examination the patient appears the stated age, is in no acute distress, affect is appropriate, and breathing is non-labored.  /82 (BP Location: Left arm, Patient Position: Sitting, Cuff Size: Adult Regular)   Pulse 70   Ht 1.575 m (5' 2.01\")   Wt 112 kg (246 lb 14.4 oz)   LMP 08/13/2005   BMI 45.15 kg/m    Body mass index is 45.15 kg/m .    Assessment: Severe OA bilateral knees with bone loss associated with severe symptoms " for several years that limit ADLs despite comprehensive, long-term non-operative management. Decision making includes patient BMI of 45. We discussed the options of living with it, weight loss to facilitated total knee with general population risks of perioperative complication, or accepting higher surgical and perioperative risks and proceeding. We spent approximately 20 minutes discussing the risks and benefits of total knee arthroplasty.  We reviewed the proposed surgical plan.  The discussion included but was not limited to the following:  Blood clots, blood clots to the lungs, injury to blood vessels and nerves, the literature as it pertains to known problems with leg length discrepancy, stiffness, anterior knee pain, and infection. We discussed the post-operative recovery for the typical patient, return to driving, and return to normal activities.  All the patient's questions were answered to the best of my ability and would like to proceed.    Plan: right total knee  Twenty minutes were spent with the patient with greater than 50% on counseling and coordination of care.       Tj Crouch        Again, thank you for allowing me to participate in the care of your patient.        Sincerely,        Dylan Hernandez MD

## 2021-06-22 NOTE — PATIENT INSTRUCTIONS
[facility]  [address] [place] [address]  Phone [phone]  Fax [phone]    SURGICAL INFORMATION & INSTRUCTIONS  Dr. Dylan Hernandez  Name of Surgery: Right total knee arthroplasty    Date of Surgery:     If you need to reschedule/schedule your surgery, please contact Jesenia, our surgery scheduler at [place], at [phone].    Arrival Time:     Time of Surgery:      Please arrive early so that we can prepare you for surgery. If you arrive later than your scheduled arrival time, your surgery may be cancelled.  Please note that scheduled times may change, but you will always be notified if there is a change.     Location of Surgery:     ? [facility], [facility]  [address] [place], [place] [address]. [address], 3rd floor for check-in  Phone [phone]  Fax [phone]  Bring parking ticket with you for validation and reduced parking rate  [email or web address]    Prior to surgery    ? Medical Leave Forms  Please fax any medical leave forms from your employer/school to [phone] (if seen in [place]). It can take up to 5 business days to complete the forms. We will fax them back to the number listed on the forms, if you would like a copy, please let us know and we will mail a copy to you. Do not bring with on day of surgery as the forms may get lost.    ? Call your insurance company  Ask if you need pre-approval for your surgery.  If pre-approval is needed, please call our surgery scheduler for assistance with the pre-approval process.   If you do not have insurance, please let us know.     ? [place] for Surgery (if on [facility])  10 days before surgery, call [phone] to register with the surgery center. Have your insurance card ready.     Total Joint Replacement:  - Joint Replacement Class:  If you are scheduled to have a joint replacement, you (and any family/friends that will be  helping to care for you) are expected to attend an educational class at least two weeks prior to your surgery.  To register for the class please call the Patient Learning Center at [phone] or register online at [email or web address]. Classes are held at multiple locations as well as online.  You must know the date of your surgery before you can register for a class (approximate date OK). If registering online, please select hip or knee as surgery type as shoulder has not been made an option at this time.     o This is also a good opportunity to think about where you would like to have physical therapy after your surgery. You may also be able to set up appointments in advance so that everything is ready to go after surgery.    - Antibiotics Prophylaxis:  Certain procedures such as dental cleaning/work, colonoscopies and other surgeries can cause bacteria to enter the bloodstream.  These bacteria can attach to joint replacement devices.  To reduce this risk, you will need to take antibiotics before you have invasive procedures or dental work.  This is known as antibiotic prophylaxis.    - Dental Visit:  You may want to schedule an appointment with your dentist for a cleaning or needed work before your surgery.  We advise no dental work or cleaning until 6 months after your surgery with implants to hip(s), knee(s), or shoulder(s).  Once you are 6 months past your surgery, you will need to take prophylactic (preventative) antibiotics for the rest of your life before having any dental cleaning or work.  If dental work is needed before surgery, make sure everything is completely healed prior to surgery.  It may cause delays or cancellation of surgery if not healed completely. This is also for any other invasive procedures you may have such as a colonoscopy.  Please notify the clinic if you have any questions.    ? Schedule an appointment with a Primary Care Provider for a Pre-Op Physical.  This should be done  within 30 days of surgery  If you do not have a primary care provider, you may call [facility] at [phone], for an appointment.  Please have your office note and any labs or tests faxed to the facility where you are having surgery. Please be sure to request a copy of your pre-op physical and bring it with you on the day of surgery.      Tell your provider if you have any of the following:   - IF you have a pacemaker or an ICD (implanted cardiac defibrillator). If you do, please bring the ID card with you on the day of surgery  - IF you're a smoker. People who smoke have a higher risk of infection after surgery. Ask your provider how you can quit smoking.  - If you have diabetes, work with your provider to control your blood sugar. If its not well-controlled, we may need to delay surgery (or you may have problems healing afterward).  - If your surgeon asks you to see your dentist: You'll need to complete any dental work before surgery. Your dentist must send a letter to your surgery center saying it's ok to do the surgery.    ? Blood Bank Pre-Admission order (total joint replacement only):    You will need to have a Blood Type and Screen drawn at a [facility] or [facility] location before your surgery.  Please check your form included in your packet to determine if it needs to be done 1-30 days before surgery or 1-3 days before surgery.    ? Pre-Op Phone Call  You will receive a pre-op phone call 1-3 days before surgery to review your eating and drinking restrictions, review medications, and confirm surgery times.      ? 7-10 days BEFORE surgery  ? Stop taking aspirin, Plavix or aspirin products 10 days before surgery or as directed by your doctor.  ? Stop taking non-steroidal anti-inflammatory medications (naproxen/Aleve, ibuprofen/Advil/Motrin, celecoxib/Celebrex, meloxicam/Mobic) 3 days before surgery or as directed by your surgeon.  This will reduce the risk of bleeding during surgery.  ? Stop taking  fish oil (Omega-3-fatty acid) 1 week before surgery.  ? It is OK to take acetaminophen (Tylenol) up until 2 hours prior to surgery.  ? Take prescription medications as directed by your doctor.  Discuss which medications to take or hold prior to your surgery, with your primary care doctor.   ? If you have diabetes, ask your primary care doctor or endocrinologist how you should take your medication(s).    ? COVID-19 Testing Prior to Surgery (see included handout)  o 3-4 days prior to surgery  o Call [phone] or [phone] to schedule  o Please stay at home and out of contact with other people after your COVID test    ? 24 hours BEFORE surgery  Stop drinking alcohol (beer, wine, liquor) at least 24-hours before and after your surgery.     ? Evening BEFORE surgery  - You may eat a normal meal the night before surgery, but eat nothing after midnight.     - Take a shower - to help wash away bacteria (germs) from your skin.  It s normal to have bacteria on your skin and skin protects us from these germs.  When you have surgery, we cut the skin.  Sometimes germs get into the cuts and cause infection (illness caused by germs).  By following the showering instructions and using the special soap, you will lower the number of germs on your skin.  This decreases your chance of an infection.    - Buy or get 8 ounces of antiseptic surgical soap called 4% CHG.  Common brands of this soap are Hibiclens and Exidine.    - You can find it this soap at your local pharmacy, clinic or retail store.  If you have trouble finding it, ask your pharmacist to help you find the right substitute.  OK to use generic unscented soap if not able to purchase surgical soap.  - Do not shave within 12 inches of your incision (surgical cut) area for at least 3 days before surgery.  Shaving can make small cuts in the skin. This puts you at a higher risk of infection.    Items you will need for each shower:   - Newly washed towel  - 4 ounces of one of  the recommended soaps    Follow these instructions, the evening before surgery  - Wash your hair and body with your regular shampoo and soap. Make sure you rinse the shampoo and soap from your hair and body.  - Using clean hands, apply about 2 ounces of soap gently on your skin from the neck to your toes. Use on your groin area last. Do not use this soap on your face or head. If you get any soap in your eyes, ears or mouth, rinse right away.  - Once the soap has been on your skin for at least 1 minute, rinse off completely and repeat washing with the surgical soap one more time.  - Rinse well and dry off using a clean towel.  If you feel any tingling, itching or other irritation, rinse right away. It is normal to feel some coolness on the skin after using the antiseptic soap. Your skin may feel a bit dry after the shower, but do not use any lotions, creams or moisturizers. Do not use hair spray or other products in your hair.  - Dress in freshly washed clothes or pajamas. Use fresh pillowcases and sheets on your bed.    ? Day of Surgery  - You may drink clear liquids from midnight up to 2 hours before surgery or as directed on your pre-op phone call.  Clear liquids include: Water, Pedialyte, Gatorade, apple juice and liquids you can read through. Please avoid liquids that are red or orange in color.   - Do NOT drink: milk, liquids that have pulp, orange juice, and lemonade or tomato juice.   - Do NOT chew gum, chew tobacco or suck on hard candy.    - Take another shower          Follow these instructions:      - Wash your hair and body with your regular shampoo and soap. Make sure you rinse the shampoo and soap from your hair and body.  - Using clean hands, apply about 2 ounces of soap gently on your skin from the neck to your toes. Use on your groin area last. Do not use this soap on your face or head. If you get any soap in your eyes, ears or mouth, rinse right away.  - Once the soap has been on your skin for at  least 1 minute, rinse off completely and repeat washing with the surgical soap one more time.  - Rinse well and dry off using a clean towel.  If you feel any tingling, itching or other irritation, rinse right away. It is normal to feel some coolness on the skin after using the antiseptic soap. Your skin may feel a bit dry after the shower, but do not use any lotions, creams or moisturizers. Do not use hair spray or other products in your hair.  - Dress in freshly washed clothes.  - Do not wear deodorant, cologne, lotion, makeup, nail polish or jewelry to surgery.    ? If there is any change in your health PRIOR to your surgery, please contact your surgeon's office.  Such as a fever, body aches, fatigue, any flu-like symptoms, rash, or any new injury to related body part.    ? Arrange for someone to drive you home after discharge.    will need to be a responsible adult (18 years or older) that will provide transportation to and from surgery and stay in the waiting room during your surgery. You may not drive yourself or take public transportation to and from surgery.    ? Arrange for someone to stay with you for 24 hours after you go home.   This person must be a responsible adult (18 years or older).    ? Bring these items to the hospital/surgery center:   ? Insurance card(s)  ? Money for parking and co-pays, if needed  ? A list of all the medications you take, including vitamins, minerals, herbs and over the counter medications.    ? A copy of your Advance Health Care Directive, if you have one.  This tells us what treatment(s) you would or would not want, and who would make health care decisions, if you could no longer speak for yourself.    ? A case for glasses, contact lenses, hearing aids or dentures.   ? Your inhaler or CPAP machine, if you use these at home    ? Leave extra cash, jewelry and other valuables at home.         When you arrive for surgery  When you get to the surgery center/hospital, you  will:  - Check in. If you are under age 18, you must be with a parent or legal guardian.  - Sign consent forms, if you haven t already. These forms state that you know the risks and benefits of surgery. When you sign the forms, you give us permission to do the surgery. Do not sign them unless you understand what will happen during and after your surgery. If you have any questions about your surgery, ask to speak with your doctor before you sign the forms. If you don t understand the answers, ask again.  - Receive a copy of the Patient s Bill of Rights. If you do not receive a copy, please ask for one.  - Change into hospital clothes. Your belongings will be placed in a bag. We will return them to you after surgery.  - Meet with the anesthesia provider. He or she will tell you what kind of anesthesia (medicine) will be used to keep you comfortable during surgery.  - Remember: it s okay to remind doctors and nurses to wash their hands before touching you.  - In most cases, your surgeon will use a marker to write his or her initials on the surgery site. This ensures that the exact site is operated on.  - For safety reasons, we will ask you the same questions many times. For example, we may ask your name and birth date over and over again.  - Friends and family can stay with you until it s time for surgery. While you re in surgery, they will be in the waiting area. Please note that cell phones are not allowed in some patient care areas.  - If you have questions about what will happen in the operating room, talk to your care team.  - You will meet with an anesthesiologist, before your surgery.  He or she may reference types of anesthesia commonly used for surgeries:   o General:  This involves the use of an IV for injection of medication and anesthesia. You are put into a sleep and have a breathing tube to assist you with breathing.  o Sedation:  You are asleep, but not so deply that you need a breathing tube.   o Local  "or Regional: a nerve is injected to numb the surgical area.  o Spinal: you are numbed from the waist down with medicine injected into your back.  o Femoral Nerve Block:  Anesthesia injected into the groin of leg which you are having surgery on.      After surgery  We will move you to a recovery room, where we will watch you closely. If you have any pain or discomfort, tell your nurse. He or she will try to make you comfortable.    - We will move you to your hospital room after you are awake and stable. If you having any pain or discomfort, please let your nursing staff know.  - Please wash your hands every time you touch the wound or change bandages or dressings.  - Do not submerge the wound in water for 3 months after surgery.  You may not use a bathtub, hot tub, pool, or lake. Showering is ok. Any open area can be a source of incoming bacteria, which can lead to infection. Keep all dressings clean and dry.   - Remove your post op Aquacel/pressure dressing at 1 week post op. It is important for this to be removed to incision to \"breathe\"as well as minimize skin issues related to the dressing being in contact with your skin for so long (can cause skin tears).   - Elevate your leg(s) as much as possible to help reduce swelling. You will also receive compression stockings for the surgical leg. Please wear these during the day and fully remove them at night. Continue to wear these for approximately 4 weeks after surgery or until your swelling has resolved.  - You may put ice on the surgical area for comfort, keeping ice on area for up to 20 minutes then off for 20 minutes.  You may do this the first few days after surgery to help reduce pain and swelling.    - Drink at least 8-10 glasses of liquid each day to help your body heal.  - Keep your lungs clear by coughing and taking deep breaths every couple hours.  This is especially important the first 48 hours after surgery.  - Typically, you will be able to start driving " once you are fully off narcotics and able to do a the quick stop test (slamming on the brake) with your right leg without experiencing much pain.  - You will start physical therapy while in the hospital. Once you leave the hospital, you will need to continue going to physical therapy to maintain and improve your range of motion and strength. Please call the physical therapy clinic of your choice to set up an appointment within 1 week of being discharged from the hospital.    - Notify your doctor if you have any of the following:   o Fever of 101 F or higher  o Numbness and/or tingling  o Increased pain, redness or swelling  o Drainage from wound  o Prolonged or uncontrolled bleeding  o Difficulty with movement       Follow-up Appointments, in Clinic  If you don't already have an appointment scheduled, please call to set up an appointment at [phone].      ? Nurse Only Appointment (2 weeks post op)  ? Post-Op appointments with provider (6 weeks post op)    Dealing with pain  A nurse will check your comfort level often during your stay. He or she will work with you to manage your pain.  It s expected that you will have pain after surgery.  Our goal is to reduce or minimize pain by:   - Medicine doesn t work the same for everyone. If your medicine isn t working, tell your nurse. There may be other medicines or treatments we can try.  - Medication Refills.  If you need refills on your pain medication, please call the clinic as soon as possible.  It may take 72-business hours to obtain a refill.  Refills must be picked up at check-in 2, [facility] or mailed to a pharmacy of your choice.    - It is expected that you will wean off the pain medications in a timely manner.   - Constipation is a common side effect of pain medication, decreased activity and anesthesia from surgery.  Take a stool softener as prescribed by your doctor at the time of discharge.  You may also use over the counter  medications as needed.  Be sure to increase your fiber (fruits and vegetables) and your water intake.      Please call the clinic at [phone], if you experience any problems or have questions.  If you are having an emergency, always call [phone] or seek immediate evaluation at the Emergency Room.    Thank you for selecting [facility] for your care!  ---------------------------------------------        Thanks for coming today.  Ortho[facility] Medicine Clinic  [address] [place] [address]    To schedule future appointments in Ortho Clinic, you may call [phone].    To schedule ordered imaging by your provider:   Call Central Imaging [phone]    To schedule an injection ordered by your provider:  Call Central Imaging Injection scheduling line: [phone]  [employer] available online at:  [email or web address]    Please call if any further questions or concerns ([phone]).  Clinic hours 8 am to 5 pm.    Return to clinic (call) if symptoms worsen or fail to improve.

## 2021-06-22 NOTE — TELEPHONE ENCOUNTER
Procedure: RTKA  Facility: [facility]  Length: 120 minutes  Anesthesia: Choice  Post-op appointments needed: 2 weeks nurse only, 6 weeks with provider only.  Surgery packet/instructions given to patient?  Yes   Not reviewed with patient      Josette Dumont RN

## 2021-06-22 NOTE — PROGRESS NOTES
Chief Complaint: Pain of the Left Knee ( Bilateral knee follow up ) and Pain of the Right Knee ( Bilateral knee follow up )       HPI: Adelaida Packer returns today in follow-up for her knees. We talked about total knee and class III+ obesity at her last visit and she has met with the nutrition program. Efforts at weight loss over the last three months show one pound decrease. She is painful and frustrated and here today to discuss proceeding with total knee.     Medications and allergies are documented in the EMR and have been reviewed.    Current Outpatient Medications:      acetaminophen (TYLENOL ARTHRITIS PAIN) 650 MG CR tablet, Take 650-1,300 mg by mouth every 8 hours as needed for mild pain or fever, Disp: , Rfl:      amLODIPine (NORVASC) 5 MG tablet, Take 1 tablet (5 mg) by mouth daily, Disp: 90 tablet, Rfl: 3     calcium carbonate (OS-EVGENY 600 MG [place]. CA) 1500 (600 CA) MG tablet, Take 1 tablet (600 mg) by mouth 2 times daily, Disp: 180 tablet, Rfl: 3     cetirizine (ZYRTEC) 10 MG tablet, Take 10 mg by mouth daily as needed for allergies, Disp: , Rfl:      cholecalciferol 2000 UNITS CAPS, Take 2,000 Units by mouth daily, Disp: 90 capsule, Rfl: 3     eplerenone (INSPRA) 50 MG tablet, Take 2 tablets (100 mg) by mouth 2 times daily, Disp: 360 tablet, Rfl: 3     famotidine (PEPCID) 20 MG tablet, Take 1 tablet (20 mg) by mouth 2 times daily (Patient taking differently: Take 20 mg by mouth 2 times daily as needed ), Disp: 180 tablet, Rfl: 1     labetalol (NORMODYNE) 300 MG tablet, Take 1 tablet (300 mg) by mouth 2 times daily, Disp: 180 tablet, Rfl: 3     olopatadine (PATADAY) 0.2 % ophthalmic solution, Place 1 drop into both eyes daily, Disp: 2.5 mL, Rfl: 11     polyethylene glycol (MIRALAX/GLYCOLAX) Packet, Take 17 g by mouth daily as needed , Disp: , Rfl:      Semaglutide,0.25 or 0.5MG/DOS, 2 MG/1.5ML SOPN, Start 0.25 mg weekly for 2 weeks and increase to 0.5 mg weekly thereafter, Disp: 4.5 mL, Rfl: 3      "SUMAtriptan (IMITREX) 25 MG tablet, TAKE 1 TO 2 TABLETS BY MOUTH AT ONSET OF HEADACHE FOR MIGRAINE. MAY REPEAT DOSE IN 2 HOURS. MAX OF 200MG OR 2 DOSES IN 24 HOURS, Disp: 18 tablet, Rfl: 0     Blood Pressure Monitor KIT, 1 Device 2 times daily, Disp: 1 kit, Rfl: 0     topiramate (TOPAMAX) 25 MG tablet, Take 2 tablets (50 mg) by mouth 2 times daily (Patient not taking: Reported on 6/22/2021), Disp: 360 tablet, Rfl: 0    Current Facility-Administered Medications:      hylan (SYNVISC ONE) injection 48 mg, 48 mg, , , Tj Mac Eros, DO, 48 mg at 03/03/20 0829     hylan (SYNVISC ONE) injection 48 mg, 48 mg, , , Bubba, Tj Eros, DO, 48 mg at 03/03/20 0829     triamcinolone (KENALOG-40) injection 40 mg, 40 mg, , , Tj Mac Eros, DO, 40 mg at 08/24/20 1700     triamcinolone (KENALOG-40) injection 40 mg, 40 mg, , , Bubba, Tj Eros, DO, 40 mg at 08/24/20 1700     triamcinolone (KENALOG-40) injection 40 mg, 40 mg, , , Bubba, Tj Eros, DO, 40 mg at 01/24/20 1142     triamcinolone (KENALOG-40) injection 40 mg, 40 mg, , , Bubba, Tj Eros, DO, 40 mg at 01/24/20 1142  Allergies: Sulfa drugs, Hydrochlorothiazide w/triamterene, Maxzide [hydrochlorothiazide w/triamterene], Metoprolol, and Seasonal allergies    Physical Exam:  On physical examination the patient appears the stated age, is in no acute distress, affect is appropriate, and breathing is non-labored.  /82 (BP Location: Left arm, Patient Position: Sitting, Cuff Size: Adult Regular)   Pulse 70   Ht 1.575 m (5' 2.01\")   Wt 112 kg (246 lb 14.4 oz)   LMP 08/13/2005   BMI 45.15 kg/m    Body mass index is 45.15 kg/m .    Assessment: Severe OA bilateral knees with bone loss associated with severe symptoms for several years that limit ADLs despite comprehensive, long-term non-operative management. Decision making includes patient BMI of 45. We discussed the options of living with it, weight loss to facilitated total knee with general population risks of " perioperative complication, or accepting higher surgical and perioperative risks and proceeding. We spent approximately 20 minutes discussing the risks and benefits of total knee arthroplasty.  We reviewed the proposed surgical plan.  The discussion included but was not limited to the following:  Blood clots, blood clots to the lungs, injury to blood vessels and nerves, the literature as it pertains to known problems with leg length discrepancy, stiffness, anterior knee pain, and infection. We discussed the post-operative recovery for the typical patient, return to driving, and return to normal activities.  All the patient's questions were answered to the best of my ability and would like to proceed.    Plan: right total knee  Twenty minutes were spent with the patient with greater than 50% on counseling and coordination of care.       Tj Crouch

## 2021-06-22 NOTE — NURSING NOTE
"Adelaida Packer's chief complaint for this visit includes:  Chief Complaint   Patient presents with     Left Knee - Pain      Bilateral knee follow up      Right Knee - Pain      Bilateral knee follow up      PCP: Windy Gordillo    Referring Provider:  Tj Crouch, [facility]  [address] [place] [id number]    /82 (BP Location: Left arm, Patient Position: Sitting, Cuff Size: Adult Regular)   Pulse 70   Ht 1.575 m (5' 2.01\")   Wt 112 kg (246 lb 14.4 oz)   LMP 08/13/2005   BMI 45.15 kg/m    Severe Pain (6)     Do you need any medication refills at today's visit?    no  "

## 2021-06-23 DIAGNOSIS — Z11.59 ENCOUNTER FOR SCREENING FOR OTHER VIRAL DISEASES: ICD-10-CM

## 2021-06-23 NOTE — TELEPHONE ENCOUNTER
Date Scheduled: 8-11-21  Facility: [facility]  Surgeon: Dr. Hernandez   Post-op appointment scheduled:   Next 5 appointments (look out 90 days)    Jul 19, 2021  1:00 PM  Pre-Op physical with Windy Gordillo MD  [facility] ([facility] ) [address] [facility] [address]  [phone]   Aug 09, 2021  8:30 AM  Pre-procedure Covid with [facility] COVID LAB  [facility] ([facility] ) [address] [facility] [id number] [phone]   Aug 27, 2021  9:30 AM  Nurse Only with MG NURSE ONLY ORTHO  [facility] ([facility]) [id number] [place] [phone] [phone]           scheduled?: No  Surgery packet/instructions confirmed received?  Yes  Special Considerations:   Jesenia Miranda, Surgery Scheduling Coordinator

## 2021-06-23 NOTE — PROGRESS NOTES
Subjective findings: The patient presented to the clinic today  with continued complaint g of  pain involving her right foot.  She is status post subtalar joint arthroereisis.  The patient stated that the pain is mild to moderate.  She describes it as a dull ache. .        Objective findings: There is a well-healed surgical incision on the lateral aspect of the right foot near the sinus tarsi.  There is no edema or erythema noted.  Neurovascular status is intact.  There is some mild pain on range of motion of the subtalar joint.  No crepitus is noted.  No palpable masses noted.  Surgical correction is maintained.     Assessment: Right foot pain     Plan: The patient was given a cortisone injection at the level of the sinus tarsi right foot consisting of 1/2 cc of dexamethasone sodium phosphate and 1/2 cc of 2% lidocaine plain.  I informed the patient that I would like for her to continue to monitor her condition.  I would like for the implant to remain in place for the next several months.  If the patient does not improve I told the patient that the long-term plan may be to remove the implant from the sinus tarsi.  The patient is to return to the clinic as needed.

## 2021-07-04 ENCOUNTER — HEALTH MAINTENANCE LETTER (OUTPATIENT)
Age: 51
End: 2021-07-04

## 2021-07-19 ENCOUNTER — OFFICE VISIT (OUTPATIENT)
Dept: FAMILY MEDICINE | Facility: CLINIC | Age: 51
End: 2021-07-19
Payer: COMMERCIAL

## 2021-07-19 VITALS
WEIGHT: 240.6 LBS | SYSTOLIC BLOOD PRESSURE: 122 MMHG | HEIGHT: 61 IN | TEMPERATURE: 98.1 F | BODY MASS INDEX: 45.42 KG/M2 | DIASTOLIC BLOOD PRESSURE: 78 MMHG | HEART RATE: 74 BPM

## 2021-07-19 DIAGNOSIS — E26.09 PRIMARY HYPERALDOSTERONISM (H): ICD-10-CM

## 2021-07-19 DIAGNOSIS — N18.31 STAGE 3A CHRONIC KIDNEY DISEASE (H): ICD-10-CM

## 2021-07-19 DIAGNOSIS — E66.01 MORBID OBESITY (H): ICD-10-CM

## 2021-07-19 DIAGNOSIS — Z01.818 PREOP GENERAL PHYSICAL EXAM: Primary | ICD-10-CM

## 2021-07-19 DIAGNOSIS — I12.9 HYPERTENSION, RENAL: ICD-10-CM

## 2021-07-19 DIAGNOSIS — M17.11 PRIMARY OSTEOARTHRITIS OF RIGHT KNEE: ICD-10-CM

## 2021-07-19 DIAGNOSIS — G43.709 CHRONIC MIGRAINE WITHOUT AURA WITHOUT STATUS MIGRAINOSUS, NOT INTRACTABLE: ICD-10-CM

## 2021-07-19 DIAGNOSIS — R73.03 PREDIABETES: ICD-10-CM

## 2021-07-19 DIAGNOSIS — R10.13 DYSPEPSIA: ICD-10-CM

## 2021-07-19 LAB
ANION GAP SERPL CALCULATED.3IONS-SCNC: 1 MMOL/L (ref 3–14)
BASOPHILS # BLD AUTO: 0 10E3/UL (ref 0–0.2)
BASOPHILS NFR BLD AUTO: 0 %
BUN SERPL-MCNC: 20 MG/DL (ref 7–30)
CALCIUM SERPL-MCNC: 9.8 MG/DL (ref 8.5–10.1)
CHLORIDE BLD-SCNC: 109 MMOL/L (ref 94–109)
CO2 SERPL-SCNC: 29 MMOL/L (ref 20–32)
CREAT SERPL-MCNC: 1.18 MG/DL (ref 0.52–1.04)
EOSINOPHIL # BLD AUTO: 0.4 10E3/UL (ref 0–0.7)
EOSINOPHIL NFR BLD AUTO: 8 %
ERYTHROCYTE [DISTWIDTH] IN BLOOD BY AUTOMATED COUNT: 13.2 % (ref 10–15)
GFR SERPL CREATININE-BSD FRML MDRD: 53 ML/MIN/1.73M2
GLUCOSE BLD-MCNC: 138 MG/DL (ref 70–99)
HBA1C MFR BLD: 5.5 % (ref 0–5.6)
HCT VFR BLD AUTO: 37.2 % (ref 35–47)
HGB BLD-MCNC: 12.5 G/DL (ref 11.7–15.7)
LYMPHOCYTES # BLD AUTO: 2.1 10E3/UL (ref 0.8–5.3)
LYMPHOCYTES NFR BLD AUTO: 42 %
MCH RBC QN AUTO: 30.5 PG (ref 26.5–33)
MCHC RBC AUTO-ENTMCNC: 33.6 G/DL (ref 31.5–36.5)
MCV RBC AUTO: 91 FL (ref 78–100)
MONOCYTES # BLD AUTO: 0.4 10E3/UL (ref 0–1.3)
MONOCYTES NFR BLD AUTO: 9 %
NEUTROPHILS # BLD AUTO: 2 10E3/UL (ref 1.6–8.3)
NEUTROPHILS NFR BLD AUTO: 40 %
PLATELET # BLD AUTO: 187 10E3/UL (ref 150–450)
POTASSIUM BLD-SCNC: 4.1 MMOL/L (ref 3.4–5.3)
RBC # BLD AUTO: 4.1 10E6/UL (ref 3.8–5.2)
SODIUM SERPL-SCNC: 139 MMOL/L (ref 133–144)
WBC # BLD AUTO: 4.9 10E3/UL (ref 4–11)

## 2021-07-19 PROCEDURE — 83036 HEMOGLOBIN GLYCOSYLATED A1C: CPT | Performed by: FAMILY MEDICINE

## 2021-07-19 PROCEDURE — 36415 COLL VENOUS BLD VENIPUNCTURE: CPT | Performed by: FAMILY MEDICINE

## 2021-07-19 PROCEDURE — 99215 OFFICE O/P EST HI 40 MIN: CPT | Performed by: FAMILY MEDICINE

## 2021-07-19 PROCEDURE — 85025 COMPLETE CBC W/AUTO DIFF WBC: CPT | Performed by: FAMILY MEDICINE

## 2021-07-19 PROCEDURE — 80048 BASIC METABOLIC PNL TOTAL CA: CPT | Performed by: FAMILY MEDICINE

## 2021-07-19 PROCEDURE — 93000 ELECTROCARDIOGRAM COMPLETE: CPT | Performed by: FAMILY MEDICINE

## 2021-07-19 ASSESSMENT — PAIN SCALES - GENERAL: PAINLEVEL: MODERATE PAIN (5)

## 2021-07-19 ASSESSMENT — MIFFLIN-ST. JEOR: SCORE: 1638.73
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
## 2021-07-19 NOTE — RESULT ENCOUNTER NOTE
Kelby Son,  Your EKG looks unchanged from your previous EKGs. This is reassuring  Let us make sure to continue to control high blood pressure as before.   Let me know if you have any questions. Take care.  Windy Gordillo MD
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
## 2021-07-19 NOTE — PATIENT INSTRUCTIONS

## 2021-07-20 NOTE — RESULT ENCOUNTER NOTE
Dear Adelaida,  Your lab test showed normal A1c-diabetes lab and hemoglobin.  Your kidney functions are slightly abnormal as before but stable  These are reassuring.   Let me know if you have any questions. Take care.  Windy Gordillo MD

## 2021-07-30 RX ORDER — CELECOXIB 200 MG/1
400 CAPSULE ORAL ONCE
Status: CANCELLED | OUTPATIENT
Start: 2021-07-30 | End: 2021-07-30

## 2021-08-03 ENCOUNTER — OFFICE VISIT (OUTPATIENT)
Dept: ORTHOPEDICS | Facility: CLINIC | Age: 51
End: 2021-08-03
Payer: COMMERCIAL

## 2021-08-03 VITALS — SYSTOLIC BLOOD PRESSURE: 123 MMHG | HEART RATE: 73 BPM | DIASTOLIC BLOOD PRESSURE: 85 MMHG | OXYGEN SATURATION: 97 %

## 2021-08-03 DIAGNOSIS — S76.212A STRAIN OF ADDUCTOR MUSCLE, FASCIA AND TENDON OF LEFT THIGH, INITIAL ENCOUNTER: Primary | ICD-10-CM

## 2021-08-03 PROCEDURE — 99212 OFFICE O/P EST SF 10 MIN: CPT | Performed by: ORTHOPAEDIC SURGERY

## 2021-08-03 NOTE — NURSING NOTE
Adelaida Packer's chief complaint for this visit includes:  Chief Complaint   Patient presents with     RECHECK     Medial aspect of left thigh cramping. Surgery for R knee next week     PCP: Windy Gordillo    Referring Provider:  No referring provider defined for this encounter.    LMP 08/13/2005   Data Unavailable     Do you need any medication refills at today's visit? No    Ely Isaacs MA, ATC

## 2021-08-03 NOTE — PROGRESS NOTES
Chief Complaint: RECHECK (Medial aspect of left thigh cramping. Surgery for R knee next week)       HPI: Adelaida Packer returns today to look at left distal-medial thigh soreness. Reports started about a week ago. No injury. Does not radiate and not associated with LBP. No change in ambulation. Wants to know if it will affect her upcoming TKA. Getting a little better.     Medications and allergies are documented in the EMR and have been reviewed.    Current Outpatient Medications:      acetaminophen (TYLENOL ARTHRITIS PAIN) 650 MG CR tablet, Take 650-1,300 mg by mouth every 8 hours as needed for mild pain or fever, Disp: , Rfl:      amLODIPine (NORVASC) 5 MG tablet, Take 1 tablet (5 mg) by mouth daily, Disp: 90 tablet, Rfl: 3     Blood Pressure Monitor KIT, 1 Device 2 times daily, Disp: 1 kit, Rfl: 0     calcium carbonate (OS-EVGENY 600 MG [place]. [place]) 1500 (600 [place]) MG tablet, Take 1 tablet (600 mg) by mouth 2 times daily, Disp: 180 tablet, Rfl: 3     cetirizine (ZYRTEC) 10 MG tablet, Take 10 mg by mouth daily as needed for allergies, Disp: , Rfl:      cholecalciferol 2000 UNITS CAPS, Take 2,000 Units by mouth daily, Disp: 90 capsule, Rfl: 3     eplerenone (INSPRA) 50 MG tablet, Take 2 tablets (100 mg) by mouth 2 times daily, Disp: 360 tablet, Rfl: 3     famotidine (PEPCID) 20 MG tablet, Take 1 tablet (20 mg) by mouth 2 times daily (Patient taking differently: Take 20 mg by mouth 2 times daily as needed ), Disp: 180 tablet, Rfl: 1     labetalol (NORMODYNE) 300 MG tablet, Take 1 tablet (300 mg) by mouth 2 times daily, Disp: 180 tablet, Rfl: 3     olopatadine (PATADAY) 0.2 % ophthalmic solution, Place 1 drop into both eyes daily, Disp: 2.5 mL, Rfl: 11     polyethylene glycol (MIRALAX/GLYCOLAX) Packet, Take 17 g by mouth daily as needed , Disp: , Rfl:      Semaglutide,0.25 or 0.5MG/DOS, 2 MG/1.5ML SOPN, Start 0.25 mg weekly for 2 weeks and increase to 0.5 mg weekly thereafter, Disp: 4.5 mL, Rfl: 3     SUMAtriptan (IMITREX)  25 MG tablet, TAKE 1 TO 2 TABLETS BY MOUTH AT ONSET OF HEADACHE FOR MIGRAINE. MAY REPEAT DOSE IN 2 HOURS. MAX OF 200MG OR 2 DOSES IN 24 HOURS, Disp: 18 tablet, Rfl: 0    Current Facility-Administered Medications:      hylan (SYNVISC ONE) injection 48 mg, 48 mg, , , Mac, Tj Eros, DO, 48 mg at 03/03/20 0829     hylan (SYNVISC ONE) injection 48 mg, 48 mg, , , Mac, Tj Eros, DO, 48 mg at 03/03/20 0829     triamcinolone (KENALOG-40) injection 40 mg, 40 mg, , , Mac, Tj Eros, DO, 40 mg at 08/24/20 1700     triamcinolone (KENALOG-40) injection 40 mg, 40 mg, , , Mac, Tj Eros, DO, 40 mg at 08/24/20 1700     triamcinolone (KENALOG-40) injection 40 mg, 40 mg, , , Mac, Tj Eros, DO, 40 mg at 01/24/20 1142     triamcinolone (KENALOG-40) injection 40 mg, 40 mg, , , Mac, Tj Eros, DO, 40 mg at 01/24/20 1142  Allergies: Sulfa drugs, Hydrochlorothiazide w/triamterene, Maxzide [hydrochlorothiazide w/triamterene], Metoprolol, and Seasonal allergies    Physical Exam:  On physical examination the patient appears the stated age, is in no acute distress, affect is appropriate, and breathing is non-labored.  /85   Pulse 73   LMP 08/13/2005   SpO2 97%   There is no height or weight on file to calculate BMI.  Gait: normal   Left Knee  Appearance: benign  Clinical alignment: varus  Effusion: no   Tenderness to palpation: medial joint line but this does not reproduce her symptoms. Know severe OA here   Extension:3  Flexion: 110  Collateral ligaments: intact  Cruciate ligaments: grossly intact   Palpation along the adductor reproduces her symptoms mild-moderate    Assessment: adductor strain. Discussed that it is appropriate to obsserve and appropriate keep her surgery date  Plan: will call is worsens/changes. Follo-wup as scheduled     No ref. provider found

## 2021-08-03 NOTE — LETTER
8/3/2021         RE: Adelaida Packer  [address] [facility] [address] [place] [address]        Dear Colleague,    Thank you for referring your patient, Adelaida Packer, to the [facility]. Please see a copy of my visit note below.    Chief Complaint: RECHECK (Medial aspect of left thigh cramping. Surgery for R knee next week)       HPI: Adelaida Packer returns today to look at left distal-medial thigh soreness. Reports started about a week ago. No injury. Does not radiate and not associated with LBP. No change in ambulation. Wants to know if it will affect her upcoming TKA. Getting a little better.     Medications and allergies are documented in the EMR and have been reviewed.    Current Outpatient Medications:      acetaminophen (TYLENOL ARTHRITIS PAIN) 650 MG CR tablet, Take 650-1,300 mg by mouth every 8 hours as needed for mild pain or fever, Disp: , Rfl:      amLODIPine (NORVASC) 5 MG tablet, Take 1 tablet (5 mg) by mouth daily, Disp: 90 tablet, Rfl: 3     Blood Pressure Monitor KIT, 1 Device 2 times daily, Disp: 1 kit, Rfl: 0     calcium carbonate (OS-EVGENY 600 MG Teller. CA) 1500 (600 CA) MG tablet, Take 1 tablet (600 mg) by mouth 2 times daily, Disp: 180 tablet, Rfl: 3     cetirizine (ZYRTEC) 10 MG tablet, Take 10 mg by mouth daily as needed for allergies, Disp: , Rfl:      cholecalciferol 2000 UNITS CAPS, Take 2,000 Units by mouth daily, Disp: 90 capsule, Rfl: 3     eplerenone (INSPRA) 50 MG tablet, Take 2 tablets (100 mg) by mouth 2 times daily, Disp: 360 tablet, Rfl: 3     famotidine (PEPCID) 20 MG tablet, Take 1 tablet (20 mg) by mouth 2 times daily (Patient taking differently: Take 20 mg by mouth 2 times daily as needed ), Disp: 180 tablet, Rfl: 1     labetalol (NORMODYNE) 300 MG tablet, Take 1 tablet (300 mg) by mouth 2 times daily, Disp: 180 tablet, Rfl: 3     olopatadine (PATADAY) 0.2 % ophthalmic solution, Place 1 drop into both eyes daily, Disp: 2.5 mL, Rfl: 11      polyethylene glycol (MIRALAX/GLYCOLAX) Packet, Take 17 g by mouth daily as needed , Disp: , Rfl:      Semaglutide,0.25 or 0.5MG/DOS, 2 MG/1.5ML SOPN, Start 0.25 mg weekly for 2 weeks and increase to 0.5 mg weekly thereafter, Disp: 4.5 mL, Rfl: 3     SUMAtriptan (IMITREX) 25 MG tablet, TAKE 1 TO 2 TABLETS BY MOUTH AT ONSET OF HEADACHE FOR MIGRAINE. MAY REPEAT DOSE IN 2 HOURS. MAX OF 200MG OR 2 DOSES IN 24 HOURS, Disp: 18 tablet, Rfl: 0    Current Facility-Administered Medications:      hylan (SYNVISC ONE) injection 48 mg, 48 mg, , , Bubba, Tj Eros, DO, 48 mg at 03/03/20 0829     hylan (SYNVISC ONE) injection 48 mg, 48 mg, , , Bubba, Tj Eros, DO, 48 mg at 03/03/20 0829     triamcinolone (KENALOG-40) injection 40 mg, 40 mg, , , Mac, Tj Eros, DO, 40 mg at 08/24/20 1700     triamcinolone (KENALOG-40) injection 40 mg, 40 mg, , , Mac, Tj Eros, DO, 40 mg at 08/24/20 1700     triamcinolone (KENALOG-40) injection 40 mg, 40 mg, , , Bubba, Tj Eros, DO, 40 mg at 01/24/20 1142     triamcinolone (KENALOG-40) injection 40 mg, 40 mg, , , Bubba, Tj Eros, DO, 40 mg at 01/24/20 1142  Allergies: Sulfa drugs, Hydrochlorothiazide w/triamterene, Maxzide [hydrochlorothiazide w/triamterene], Metoprolol, and Seasonal allergies    Physical Exam:  On physical examination the patient appears the stated age, is in no acute distress, affect is appropriate, and breathing is non-labored.  /85   Pulse 73   LMP 08/13/2005   SpO2 97%   There is no height or weight on file to calculate BMI.  Gait: normal   Left Knee  Appearance: benign  Clinical alignment: varus  Effusion: no   Tenderness to palpation: medial joint line but this does not reproduce her symptoms. Know severe OA here   Extension:3  Flexion: 110  Collateral ligaments: intact  Cruciate ligaments: grossly intact   Palpation along the adductor reproduces her symptoms mild-moderate    Assessment: adductor strain. Discussed that it is appropriate to obsserve and  appropriate keep her surgery date  Plan: will call is worsens/changes. Follo-wup as scheduled     No ref. provider found        Again, thank you for allowing me to participate in the care of your patient.        Sincerely,        Dylan Hernandez MD

## 2021-08-05 ENCOUNTER — ANESTHESIA EVENT (OUTPATIENT)
Dept: SURGERY | Facility: CLINIC | Age: 51
End: 2021-08-05
Payer: COMMERCIAL

## 2021-08-09 ENCOUNTER — LAB (OUTPATIENT)
Dept: LAB | Facility: CLINIC | Age: 51
End: 2021-08-09
Payer: COMMERCIAL

## 2021-08-09 DIAGNOSIS — Z11.59 ENCOUNTER FOR SCREENING FOR OTHER VIRAL DISEASES: ICD-10-CM

## 2021-08-09 LAB — SARS-COV-2 RNA RESP QL NAA+PROBE: NEGATIVE

## 2021-08-09 PROCEDURE — U0005 INFEC AGEN DETEC AMPLI PROBE: HCPCS

## 2021-08-09 PROCEDURE — U0003 INFECTIOUS AGENT DETECTION BY NUCLEIC ACID (DNA OR RNA); SEVERE ACUTE RESPIRATORY SYNDROME CORONAVIRUS 2 (SARS-COV-2) (CORONAVIRUS DISEASE [COVID-19]), AMPLIFIED PROBE TECHNIQUE, MAKING USE OF HIGH THROUGHPUT TECHNOLOGIES AS DESCRIBED BY CMS-2020-01-R: HCPCS

## 2021-08-09 RX ORDER — CHLORAL HYDRATE 500 MG
2 CAPSULE ORAL DAILY
COMMUNITY
End: 2022-06-03

## 2021-08-09 RX ORDER — MULTIVIT-MIN/IRON/FOLIC ACID/K 18-600-40
1 CAPSULE ORAL DAILY
COMMUNITY
End: 2022-06-17

## 2021-08-09 RX ORDER — LANOLIN ALCOHOL/MO/W.PET/CERES
1000 CREAM (GRAM) TOPICAL DAILY
COMMUNITY
End: 2022-06-17

## 2021-08-11 ENCOUNTER — ANESTHESIA (OUTPATIENT)
Dept: SURGERY | Facility: CLINIC | Age: 51
End: 2021-08-11
Payer: COMMERCIAL

## 2021-08-11 ENCOUNTER — APPOINTMENT (OUTPATIENT)
Dept: GENERAL RADIOLOGY | Facility: CLINIC | Age: 51
End: 2021-08-11
Attending: ORTHOPAEDIC SURGERY
Payer: COMMERCIAL

## 2021-08-11 ENCOUNTER — HOSPITAL ENCOUNTER (OUTPATIENT)
Facility: CLINIC | Age: 51
Discharge: HOME HEALTH | End: 2021-08-12
Attending: ORTHOPAEDIC SURGERY | Admitting: ORTHOPAEDIC SURGERY
Payer: COMMERCIAL

## 2021-08-11 ENCOUNTER — APPOINTMENT (OUTPATIENT)
Dept: PHYSICAL THERAPY | Facility: CLINIC | Age: 51
End: 2021-08-11
Attending: ORTHOPAEDIC SURGERY
Payer: COMMERCIAL

## 2021-08-11 DIAGNOSIS — M25.561 CHRONIC PAIN OF RIGHT KNEE: ICD-10-CM

## 2021-08-11 DIAGNOSIS — M17.0 PRIMARY OSTEOARTHRITIS OF BOTH KNEES: Primary | ICD-10-CM

## 2021-08-11 DIAGNOSIS — G89.29 CHRONIC PAIN OF RIGHT KNEE: ICD-10-CM

## 2021-08-11 DIAGNOSIS — G89.18 ACUTE POST-OPERATIVE PAIN: ICD-10-CM

## 2021-08-11 LAB
ABO/RH(D): NORMAL
ANTIBODY SCREEN: NEGATIVE
GLUCOSE BLDC GLUCOMTR-MCNC: 120 MG/DL (ref 70–99)
GLUCOSE BLDC GLUCOMTR-MCNC: 88 MG/DL (ref 70–99)
SPECIMEN EXPIRATION DATE: NORMAL

## 2021-08-11 PROCEDURE — 250N000011 HC RX IP 250 OP 636: Performed by: ANESTHESIOLOGY

## 2021-08-11 PROCEDURE — 250N000011 HC RX IP 250 OP 636: Performed by: ORTHOPAEDIC SURGERY

## 2021-08-11 PROCEDURE — 97161 PT EVAL LOW COMPLEX 20 MIN: CPT | Mod: GP | Performed by: PHYSICAL THERAPIST

## 2021-08-11 PROCEDURE — 27447 TOTAL KNEE ARTHROPLASTY: CPT | Mod: RT | Performed by: ORTHOPAEDIC SURGERY

## 2021-08-11 PROCEDURE — C1776 JOINT DEVICE (IMPLANTABLE): HCPCS | Performed by: ORTHOPAEDIC SURGERY

## 2021-08-11 PROCEDURE — 97530 THERAPEUTIC ACTIVITIES: CPT | Mod: GP | Performed by: PHYSICAL THERAPIST

## 2021-08-11 PROCEDURE — 258N000003 HC RX IP 258 OP 636: Performed by: NURSE ANESTHETIST, CERTIFIED REGISTERED

## 2021-08-11 PROCEDURE — 999N000141 HC STATISTIC PRE-PROCEDURE NURSING ASSESSMENT: Performed by: ORTHOPAEDIC SURGERY

## 2021-08-11 PROCEDURE — 272N000001 HC OR GENERAL SUPPLY STERILE: Performed by: ORTHOPAEDIC SURGERY

## 2021-08-11 PROCEDURE — 258N000003 HC RX IP 258 OP 636: Performed by: ORTHOPAEDIC SURGERY

## 2021-08-11 PROCEDURE — 250N000013 HC RX MED GY IP 250 OP 250 PS 637: Performed by: STUDENT IN AN ORGANIZED HEALTH CARE EDUCATION/TRAINING PROGRAM

## 2021-08-11 PROCEDURE — 250N000009 HC RX 250: Performed by: NURSE ANESTHETIST, CERTIFIED REGISTERED

## 2021-08-11 PROCEDURE — 86900 BLOOD TYPING SEROLOGIC ABO: CPT | Performed by: ANESTHESIOLOGY

## 2021-08-11 PROCEDURE — 278N000051 HC OR IMPLANT GENERAL: Performed by: ORTHOPAEDIC SURGERY

## 2021-08-11 PROCEDURE — 250N000009 HC RX 250: Performed by: ANESTHESIOLOGY

## 2021-08-11 PROCEDURE — 250N000013 HC RX MED GY IP 250 OP 250 PS 637: Performed by: ORTHOPAEDIC SURGERY

## 2021-08-11 PROCEDURE — 250N000011 HC RX IP 250 OP 636: Performed by: NURSE ANESTHETIST, CERTIFIED REGISTERED

## 2021-08-11 PROCEDURE — 360N000077 HC SURGERY LEVEL 4, PER MIN: Performed by: ORTHOPAEDIC SURGERY

## 2021-08-11 PROCEDURE — 250N000013 HC RX MED GY IP 250 OP 250 PS 637: Performed by: ANESTHESIOLOGY

## 2021-08-11 PROCEDURE — 258N000003 HC RX IP 258 OP 636: Performed by: ANESTHESIOLOGY

## 2021-08-11 PROCEDURE — 250N000011 HC RX IP 250 OP 636: Performed by: STUDENT IN AN ORGANIZED HEALTH CARE EDUCATION/TRAINING PROGRAM

## 2021-08-11 PROCEDURE — 999N000065 XR KNEE PORT RIGHT 1/2 VIEWS: Mod: RT

## 2021-08-11 PROCEDURE — 36415 COLL VENOUS BLD VENIPUNCTURE: CPT | Performed by: ANESTHESIOLOGY

## 2021-08-11 PROCEDURE — 73560 X-RAY EXAM OF KNEE 1 OR 2: CPT | Mod: 26 | Performed by: RADIOLOGY

## 2021-08-11 PROCEDURE — 97116 GAIT TRAINING THERAPY: CPT | Mod: GP | Performed by: PHYSICAL THERAPIST

## 2021-08-11 PROCEDURE — 710N000010 HC RECOVERY PHASE 1, LEVEL 2, PER MIN: Performed by: ORTHOPAEDIC SURGERY

## 2021-08-11 PROCEDURE — 370N000017 HC ANESTHESIA TECHNICAL FEE, PER MIN: Performed by: ORTHOPAEDIC SURGERY

## 2021-08-11 DEVICE — IMP COMP FEMORAL SNN GEN II CR SZ 4 RT 71420022: Type: IMPLANTABLE DEVICE | Site: KNEE | Status: FUNCTIONAL

## 2021-08-11 DEVICE — IMP BASEPLATE TIBIAL GENESIS II SZ 3 RT TI 71420182: Type: IMPLANTABLE DEVICE | Site: KNEE | Status: FUNCTIONAL

## 2021-08-11 DEVICE — IMP INSERT S&N LEGION CR XLPE SZ3-4 10MM 71453183: Type: IMPLANTABLE DEVICE | Site: KNEE | Status: FUNCTIONAL

## 2021-08-11 DEVICE — BONE CEMENT SIMPLEX W/TOBRAMYCIN 6197-9-001: Type: IMPLANTABLE DEVICE | Site: KNEE | Status: FUNCTIONAL

## 2021-08-11 RX ORDER — HYDROMORPHONE HCL IN WATER/PF 6 MG/30 ML
0.2 PATIENT CONTROLLED ANALGESIA SYRINGE INTRAVENOUS
Status: DISCONTINUED | OUTPATIENT
Start: 2021-08-11 | End: 2021-08-12 | Stop reason: HOSPADM

## 2021-08-11 RX ORDER — NALOXONE HYDROCHLORIDE 0.4 MG/ML
0.4 INJECTION, SOLUTION INTRAMUSCULAR; INTRAVENOUS; SUBCUTANEOUS
Status: DISCONTINUED | OUTPATIENT
Start: 2021-08-11 | End: 2021-08-11 | Stop reason: HOSPADM

## 2021-08-11 RX ORDER — HYDROMORPHONE HCL IN WATER/PF 6 MG/30 ML
0.4 PATIENT CONTROLLED ANALGESIA SYRINGE INTRAVENOUS
Status: DISCONTINUED | OUTPATIENT
Start: 2021-08-11 | End: 2021-08-12 | Stop reason: HOSPADM

## 2021-08-11 RX ORDER — NALOXONE HYDROCHLORIDE 0.4 MG/ML
0.4 INJECTION, SOLUTION INTRAMUSCULAR; INTRAVENOUS; SUBCUTANEOUS
Status: DISCONTINUED | OUTPATIENT
Start: 2021-08-11 | End: 2021-08-12 | Stop reason: HOSPADM

## 2021-08-11 RX ORDER — NALOXONE HYDROCHLORIDE 0.4 MG/ML
0.2 INJECTION, SOLUTION INTRAMUSCULAR; INTRAVENOUS; SUBCUTANEOUS
Status: DISCONTINUED | OUTPATIENT
Start: 2021-08-11 | End: 2021-08-11 | Stop reason: HOSPADM

## 2021-08-11 RX ORDER — BISACODYL 10 MG
10 SUPPOSITORY, RECTAL RECTAL DAILY PRN
Status: DISCONTINUED | OUTPATIENT
Start: 2021-08-11 | End: 2021-08-12 | Stop reason: HOSPADM

## 2021-08-11 RX ORDER — SODIUM CHLORIDE, SODIUM LACTATE, POTASSIUM CHLORIDE, CALCIUM CHLORIDE 600; 310; 30; 20 MG/100ML; MG/100ML; MG/100ML; MG/100ML
INJECTION, SOLUTION INTRAVENOUS CONTINUOUS
Status: DISCONTINUED | OUTPATIENT
Start: 2021-08-11 | End: 2021-08-12 | Stop reason: HOSPADM

## 2021-08-11 RX ORDER — BUPIVACAINE HYDROCHLORIDE 2.5 MG/ML
INJECTION, SOLUTION EPIDURAL; INFILTRATION; INTRACAUDAL
Status: COMPLETED | OUTPATIENT
Start: 2021-08-11 | End: 2021-08-11

## 2021-08-11 RX ORDER — LIDOCAINE 40 MG/G
CREAM TOPICAL
Status: DISCONTINUED | OUTPATIENT
Start: 2021-08-11 | End: 2021-08-11

## 2021-08-11 RX ORDER — PROCHLORPERAZINE MALEATE 5 MG
10 TABLET ORAL EVERY 6 HOURS PRN
Status: DISCONTINUED | OUTPATIENT
Start: 2021-08-11 | End: 2021-08-12 | Stop reason: HOSPADM

## 2021-08-11 RX ORDER — ONDANSETRON 2 MG/ML
INJECTION INTRAMUSCULAR; INTRAVENOUS PRN
Status: DISCONTINUED | OUTPATIENT
Start: 2021-08-11 | End: 2021-08-11

## 2021-08-11 RX ORDER — ACETAMINOPHEN 325 MG/1
975 TABLET ORAL ONCE
Status: COMPLETED | OUTPATIENT
Start: 2021-08-11 | End: 2021-08-11

## 2021-08-11 RX ORDER — FENTANYL CITRATE 50 UG/ML
25-50 INJECTION, SOLUTION INTRAMUSCULAR; INTRAVENOUS
Status: DISCONTINUED | OUTPATIENT
Start: 2021-08-11 | End: 2021-08-11 | Stop reason: HOSPADM

## 2021-08-11 RX ORDER — SODIUM CHLORIDE, SODIUM LACTATE, POTASSIUM CHLORIDE, CALCIUM CHLORIDE 600; 310; 30; 20 MG/100ML; MG/100ML; MG/100ML; MG/100ML
INJECTION, SOLUTION INTRAVENOUS CONTINUOUS
Status: DISCONTINUED | OUTPATIENT
Start: 2021-08-11 | End: 2021-08-11 | Stop reason: HOSPADM

## 2021-08-11 RX ORDER — ACETAMINOPHEN 325 MG/1
975 TABLET ORAL ONCE
Status: DISCONTINUED | OUTPATIENT
Start: 2021-08-11 | End: 2021-08-11

## 2021-08-11 RX ORDER — OXYCODONE HYDROCHLORIDE 5 MG/1
5 TABLET ORAL EVERY 4 HOURS PRN
Status: DISCONTINUED | OUTPATIENT
Start: 2021-08-11 | End: 2021-08-11 | Stop reason: HOSPADM

## 2021-08-11 RX ORDER — ASCORBIC ACID 500 MG
500 TABLET ORAL DAILY
Status: DISCONTINUED | OUTPATIENT
Start: 2021-08-11 | End: 2021-08-12 | Stop reason: HOSPADM

## 2021-08-11 RX ORDER — CEFAZOLIN SODIUM 2 G/100ML
2 INJECTION, SOLUTION INTRAVENOUS EVERY 8 HOURS
Status: COMPLETED | OUTPATIENT
Start: 2021-08-11 | End: 2021-08-12

## 2021-08-11 RX ORDER — EPHEDRINE SULFATE 50 MG/ML
INJECTION, SOLUTION INTRAVENOUS PRN
Status: DISCONTINUED | OUTPATIENT
Start: 2021-08-11 | End: 2021-08-11

## 2021-08-11 RX ORDER — NALOXONE HYDROCHLORIDE 0.4 MG/ML
0.2 INJECTION, SOLUTION INTRAMUSCULAR; INTRAVENOUS; SUBCUTANEOUS
Status: DISCONTINUED | OUTPATIENT
Start: 2021-08-11 | End: 2021-08-12 | Stop reason: HOSPADM

## 2021-08-11 RX ORDER — HYDROMORPHONE HCL IN WATER/PF 6 MG/30 ML
0.4 PATIENT CONTROLLED ANALGESIA SYRINGE INTRAVENOUS EVERY 5 MIN PRN
Status: DISCONTINUED | OUTPATIENT
Start: 2021-08-11 | End: 2021-08-11 | Stop reason: HOSPADM

## 2021-08-11 RX ORDER — ONDANSETRON 2 MG/ML
4 INJECTION INTRAMUSCULAR; INTRAVENOUS EVERY 30 MIN PRN
Status: DISCONTINUED | OUTPATIENT
Start: 2021-08-11 | End: 2021-08-11 | Stop reason: HOSPADM

## 2021-08-11 RX ORDER — CEFAZOLIN SODIUM 2 G/100ML
2 INJECTION, SOLUTION INTRAVENOUS
Status: COMPLETED | OUTPATIENT
Start: 2021-08-11 | End: 2021-08-11

## 2021-08-11 RX ORDER — ACETAMINOPHEN 325 MG/1
975 TABLET ORAL EVERY 8 HOURS
Status: DISCONTINUED | OUTPATIENT
Start: 2021-08-11 | End: 2021-08-12 | Stop reason: HOSPADM

## 2021-08-11 RX ORDER — SODIUM CHLORIDE, SODIUM LACTATE, POTASSIUM CHLORIDE, CALCIUM CHLORIDE 600; 310; 30; 20 MG/100ML; MG/100ML; MG/100ML; MG/100ML
INJECTION, SOLUTION INTRAVENOUS CONTINUOUS PRN
Status: DISCONTINUED | OUTPATIENT
Start: 2021-08-11 | End: 2021-08-11

## 2021-08-11 RX ORDER — VITAMIN B COMPLEX
2000 TABLET ORAL DAILY
Status: DISCONTINUED | OUTPATIENT
Start: 2021-08-11 | End: 2021-08-12 | Stop reason: HOSPADM

## 2021-08-11 RX ORDER — AMOXICILLIN 250 MG
1 CAPSULE ORAL 2 TIMES DAILY
Status: DISCONTINUED | OUTPATIENT
Start: 2021-08-11 | End: 2021-08-12 | Stop reason: HOSPADM

## 2021-08-11 RX ORDER — ONDANSETRON 2 MG/ML
4 INJECTION INTRAMUSCULAR; INTRAVENOUS EVERY 6 HOURS PRN
Status: DISCONTINUED | OUTPATIENT
Start: 2021-08-11 | End: 2021-08-12 | Stop reason: HOSPADM

## 2021-08-11 RX ORDER — OXYCODONE HYDROCHLORIDE 5 MG/1
10 TABLET ORAL EVERY 4 HOURS PRN
Status: DISCONTINUED | OUTPATIENT
Start: 2021-08-11 | End: 2021-08-12 | Stop reason: HOSPADM

## 2021-08-11 RX ORDER — FENTANYL CITRATE 50 UG/ML
50 INJECTION, SOLUTION INTRAMUSCULAR; INTRAVENOUS EVERY 5 MIN PRN
Status: DISCONTINUED | OUTPATIENT
Start: 2021-08-11 | End: 2021-08-11 | Stop reason: HOSPADM

## 2021-08-11 RX ORDER — ONDANSETRON 4 MG/1
4 TABLET, ORALLY DISINTEGRATING ORAL EVERY 30 MIN PRN
Status: DISCONTINUED | OUTPATIENT
Start: 2021-08-11 | End: 2021-08-11 | Stop reason: HOSPADM

## 2021-08-11 RX ORDER — LIDOCAINE 40 MG/G
CREAM TOPICAL
Status: DISCONTINUED | OUTPATIENT
Start: 2021-08-11 | End: 2021-08-11 | Stop reason: HOSPADM

## 2021-08-11 RX ORDER — CEFAZOLIN SODIUM 2 G/100ML
2 INJECTION, SOLUTION INTRAVENOUS SEE ADMIN INSTRUCTIONS
Status: DISCONTINUED | OUTPATIENT
Start: 2021-08-11 | End: 2021-08-11 | Stop reason: HOSPADM

## 2021-08-11 RX ORDER — HYDROXYZINE HYDROCHLORIDE 25 MG/1
25 TABLET, FILM COATED ORAL EVERY 6 HOURS PRN
Status: DISCONTINUED | OUTPATIENT
Start: 2021-08-11 | End: 2021-08-12 | Stop reason: HOSPADM

## 2021-08-11 RX ORDER — KETOROLAC TROMETHAMINE 30 MG/ML
15 INJECTION, SOLUTION INTRAMUSCULAR; INTRAVENOUS EVERY 6 HOURS
Status: DISPENSED | OUTPATIENT
Start: 2021-08-11 | End: 2021-08-12

## 2021-08-11 RX ORDER — OXYCODONE HYDROCHLORIDE 5 MG/1
5 TABLET ORAL EVERY 4 HOURS PRN
Status: DISCONTINUED | OUTPATIENT
Start: 2021-08-11 | End: 2021-08-12 | Stop reason: HOSPADM

## 2021-08-11 RX ORDER — ACETAMINOPHEN 325 MG/1
650 TABLET ORAL EVERY 4 HOURS PRN
Status: DISCONTINUED | OUTPATIENT
Start: 2021-08-14 | End: 2021-08-12 | Stop reason: HOSPADM

## 2021-08-11 RX ORDER — ASPIRIN 81 MG/1
162 TABLET ORAL DAILY
Status: DISCONTINUED | OUTPATIENT
Start: 2021-08-12 | End: 2021-08-12 | Stop reason: HOSPADM

## 2021-08-11 RX ORDER — UREA 10 %
1000 LOTION (ML) TOPICAL DAILY
Status: DISCONTINUED | OUTPATIENT
Start: 2021-08-11 | End: 2021-08-12 | Stop reason: HOSPADM

## 2021-08-11 RX ORDER — FLUMAZENIL 0.1 MG/ML
0.2 INJECTION, SOLUTION INTRAVENOUS
Status: DISCONTINUED | OUTPATIENT
Start: 2021-08-11 | End: 2021-08-11 | Stop reason: HOSPADM

## 2021-08-11 RX ORDER — FAMOTIDINE 20 MG/1
20 TABLET, FILM COATED ORAL 2 TIMES DAILY
Status: DISCONTINUED | OUTPATIENT
Start: 2021-08-11 | End: 2021-08-12 | Stop reason: HOSPADM

## 2021-08-11 RX ORDER — LIDOCAINE 40 MG/G
CREAM TOPICAL
Status: DISCONTINUED | OUTPATIENT
Start: 2021-08-11 | End: 2021-08-12 | Stop reason: HOSPADM

## 2021-08-11 RX ORDER — AMLODIPINE BESYLATE 5 MG/1
5 TABLET ORAL DAILY
Status: DISCONTINUED | OUTPATIENT
Start: 2021-08-11 | End: 2021-08-12 | Stop reason: HOSPADM

## 2021-08-11 RX ORDER — PROPOFOL 10 MG/ML
INJECTION, EMULSION INTRAVENOUS CONTINUOUS PRN
Status: DISCONTINUED | OUTPATIENT
Start: 2021-08-11 | End: 2021-08-11

## 2021-08-11 RX ORDER — DEXAMETHASONE SODIUM PHOSPHATE 10 MG/ML
INJECTION, SOLUTION INTRAMUSCULAR; INTRAVENOUS
Status: COMPLETED | OUTPATIENT
Start: 2021-08-11 | End: 2021-08-11

## 2021-08-11 RX ORDER — OLOPATADINE HYDROCHLORIDE 1 MG/ML
1 SOLUTION/ DROPS OPHTHALMIC DAILY
Status: DISCONTINUED | OUTPATIENT
Start: 2021-08-11 | End: 2021-08-12 | Stop reason: HOSPADM

## 2021-08-11 RX ORDER — POLYETHYLENE GLYCOL 3350 17 G/17G
17 POWDER, FOR SOLUTION ORAL DAILY
Status: DISCONTINUED | OUTPATIENT
Start: 2021-08-12 | End: 2021-08-12 | Stop reason: HOSPADM

## 2021-08-11 RX ORDER — ONDANSETRON 4 MG/1
4 TABLET, ORALLY DISINTEGRATING ORAL EVERY 6 HOURS PRN
Status: DISCONTINUED | OUTPATIENT
Start: 2021-08-11 | End: 2021-08-12 | Stop reason: HOSPADM

## 2021-08-11 RX ORDER — TRANEXAMIC ACID 650 MG/1
1950 TABLET ORAL ONCE
Status: COMPLETED | OUTPATIENT
Start: 2021-08-11 | End: 2021-08-11

## 2021-08-11 RX ORDER — BUPIVACAINE HYDROCHLORIDE 7.5 MG/ML
INJECTION, SOLUTION INTRASPINAL
Status: COMPLETED | OUTPATIENT
Start: 2021-08-11 | End: 2021-08-11

## 2021-08-11 RX ORDER — SUMATRIPTAN 50 MG/1
50 TABLET, FILM COATED ORAL EVERY 8 HOURS PRN
Status: DISCONTINUED | OUTPATIENT
Start: 2021-08-11 | End: 2021-08-12 | Stop reason: HOSPADM

## 2021-08-11 RX ORDER — METHOCARBAMOL 750 MG/1
750 TABLET, FILM COATED ORAL EVERY 6 HOURS PRN
Status: DISCONTINUED | OUTPATIENT
Start: 2021-08-11 | End: 2021-08-12 | Stop reason: HOSPADM

## 2021-08-11 RX ORDER — CETIRIZINE HYDROCHLORIDE 10 MG/1
10 TABLET ORAL DAILY PRN
Status: DISCONTINUED | OUTPATIENT
Start: 2021-08-11 | End: 2021-08-12 | Stop reason: HOSPADM

## 2021-08-11 RX ADMIN — MIDAZOLAM 1 MG: 1 INJECTION INTRAMUSCULAR; INTRAVENOUS at 07:14

## 2021-08-11 RX ADMIN — OXYCODONE HYDROCHLORIDE 5 MG: 5 TABLET ORAL at 14:48

## 2021-08-11 RX ADMIN — PROPOFOL 50 MCG/KG/MIN: 10 INJECTION, EMULSION INTRAVENOUS at 08:08

## 2021-08-11 RX ADMIN — CYANOCOBALAMIN TAB 500 MCG 1000 MCG: 500 TAB at 14:39

## 2021-08-11 RX ADMIN — OXYCODONE HYDROCHLORIDE AND ACETAMINOPHEN 500 MG: 500 TABLET ORAL at 14:39

## 2021-08-11 RX ADMIN — EPHEDRINE SULFATE 5 MG: 50 INJECTION, SOLUTION INTRAVENOUS at 08:38

## 2021-08-11 RX ADMIN — PHENYLEPHRINE HYDROCHLORIDE 0.1 MCG/KG/MIN: 10 INJECTION INTRAVENOUS at 08:18

## 2021-08-11 RX ADMIN — BUPIVACAINE HYDROCHLORIDE 20 ML: 2.5 INJECTION, SOLUTION EPIDURAL; INFILTRATION; INTRACAUDAL at 07:20

## 2021-08-11 RX ADMIN — LABETALOL HYDROCHLORIDE 300 MG: 200 TABLET, FILM COATED ORAL at 21:02

## 2021-08-11 RX ADMIN — Medication 20 MCG: at 07:20

## 2021-08-11 RX ADMIN — OXYCODONE HYDROCHLORIDE 5 MG: 5 TABLET ORAL at 19:02

## 2021-08-11 RX ADMIN — FENTANYL CITRATE 50 MCG: 50 INJECTION, SOLUTION INTRAMUSCULAR; INTRAVENOUS at 07:14

## 2021-08-11 RX ADMIN — EPHEDRINE SULFATE 5 MG: 50 INJECTION, SOLUTION INTRAVENOUS at 10:16

## 2021-08-11 RX ADMIN — BUPIVACAINE HYDROCHLORIDE IN DEXTROSE 1.2 ML: 7.5 INJECTION, SOLUTION SUBARACHNOID at 08:00

## 2021-08-11 RX ADMIN — ACETAMINOPHEN 975 MG: 325 TABLET, FILM COATED ORAL at 14:38

## 2021-08-11 RX ADMIN — PHENYLEPHRINE HYDROCHLORIDE 100 MCG: 10 INJECTION INTRAVENOUS at 10:55

## 2021-08-11 RX ADMIN — SODIUM CHLORIDE, POTASSIUM CHLORIDE, SODIUM LACTATE AND CALCIUM CHLORIDE: 600; 310; 30; 20 INJECTION, SOLUTION INTRAVENOUS at 07:18

## 2021-08-11 RX ADMIN — EPHEDRINE SULFATE 5 MG: 50 INJECTION, SOLUTION INTRAVENOUS at 08:30

## 2021-08-11 RX ADMIN — TRANEXAMIC ACID 1950 MG: 650 TABLET ORAL at 06:51

## 2021-08-11 RX ADMIN — FAMOTIDINE 20 MG: 20 TABLET ORAL at 21:03

## 2021-08-11 RX ADMIN — FENTANYL CITRATE 50 MCG: 50 INJECTION, SOLUTION INTRAMUSCULAR; INTRAVENOUS at 11:36

## 2021-08-11 RX ADMIN — KETOROLAC TROMETHAMINE 15 MG: 30 INJECTION, SOLUTION INTRAMUSCULAR; INTRAVENOUS at 17:41

## 2021-08-11 RX ADMIN — CEFAZOLIN SODIUM 2 G: 2 INJECTION, SOLUTION INTRAVENOUS at 16:10

## 2021-08-11 RX ADMIN — OXYCODONE HYDROCHLORIDE 10 MG: 5 TABLET ORAL at 23:01

## 2021-08-11 RX ADMIN — ONDANSETRON 4 MG: 2 INJECTION INTRAMUSCULAR; INTRAVENOUS at 10:16

## 2021-08-11 RX ADMIN — Medication 2000 UNITS: at 14:39

## 2021-08-11 RX ADMIN — ACETAMINOPHEN 975 MG: 325 TABLET, FILM COATED ORAL at 06:51

## 2021-08-11 RX ADMIN — EPHEDRINE SULFATE 5 MG: 50 INJECTION, SOLUTION INTRAVENOUS at 10:55

## 2021-08-11 RX ADMIN — DEXAMETHASONE SODIUM PHOSPHATE 2 MG: 10 INJECTION, SOLUTION INTRAMUSCULAR; INTRAVENOUS at 07:20

## 2021-08-11 RX ADMIN — PHENYLEPHRINE HYDROCHLORIDE 100 MCG: 10 INJECTION INTRAVENOUS at 10:16

## 2021-08-11 RX ADMIN — ACETAMINOPHEN 975 MG: 325 TABLET, FILM COATED ORAL at 23:01

## 2021-08-11 RX ADMIN — METHOCARBAMOL 750 MG: 750 TABLET ORAL at 16:14

## 2021-08-11 RX ADMIN — SODIUM CHLORIDE, POTASSIUM CHLORIDE, SODIUM LACTATE AND CALCIUM CHLORIDE: 600; 310; 30; 20 INJECTION, SOLUTION INTRAVENOUS at 07:15

## 2021-08-11 RX ADMIN — Medication 2 G: at 08:06

## 2021-08-11 RX ADMIN — DOCUSATE SODIUM AND SENNOSIDES 1 TABLET: 8.6; 5 TABLET ORAL at 21:03

## 2021-08-11 RX ADMIN — Medication 600 MG: at 21:03

## 2021-08-11 ASSESSMENT — MIFFLIN-ST. JEOR: SCORE: 1610.25

## 2021-08-11 ASSESSMENT — ENCOUNTER SYMPTOMS: DYSRHYTHMIAS: 0

## 2021-08-11 NOTE — ANESTHESIA PROCEDURE NOTES
Intrathecal injection Procedure Note  Pre-Procedure   Staff -        Anesthesiologist:  Allegra Ware MD       Performed By: anesthesiologist       Location: OR       Pre-Anesthestic Checklist: patient identified, IV checked, risks and benefits discussed, informed consent, monitors and equipment checked, pre-op evaluation, at physician/surgeon's request and post-op pain management  Timeout:       Correct Patient: Yes        Correct Procedure: Yes        Correct Site: Yes        Correct Position: Yes   Procedure Documentation  Procedure: intrathecal injection       Patient Position: sitting       Patient Prep/Sterile Barriers: sterile gloves, mask, patient draped       Skin prep: Chloraprep       Insertion Site: L3-4, L4-5 (L4-5 unsuccessful). (midline approach).       Needle Gauge: 25.        Spinal Needle Type: Willow tip       Introducer used       Introducer: 20 G       # of attempts: 3 and  # of redirects:  3    Assessment/Narrative         Paresthesias: No.       CSF fluid: clear.    Medication(s) Administered   0.75% Hyperbaric Bupivacaine (Intrathecal), 1.2 mL  Medication Administration Time: 8/11/2021 8:00 AM

## 2021-08-11 NOTE — PLAN OF CARE
[facility]      OUTPATIENT PHYSICAL THERAPY EVALUATION  PLAN OF TREATMENT FOR OUTPATIENT REHABILITATION  (COMPLETE FOR INITIAL CLAIMS ONLY)  Patient's Last Name, First Name, M.I.  YOB: 1970  Adelaida Packer                        Provider's Name  [facility] Medical Record No.  [id number]                               Onset Date:  08/12/21   Start of Care Date:  08/11/21      Type:     _X_PT   ___OT   ___SLP Medical Diagnosis:  R TKA                        PT Diagnosis:  weakness   Visits from SOC:  1   _________________________________________________________________________________  Plan of Treatment/Functional Goals    Planned Interventions: strengthening, stair training, gait training, transfer training     Goals: See Physical Therapy Goals on Care Plan in [facility] electronic health record.    Therapy Frequency: 2x/day  Predicted Duration of Therapy Intervention: 2  _________________________________________________________________________________    I CERTIFY THE NEED FOR THESE SERVICES FURNISHED UNDER        THIS PLAN OF TREATMENT AND WHILE UNDER MY CARE     (Physician co-signature of this document indicates review and certification of the therapy plan).              Certification date from: 08/11/21, Certification date to: 08/12/21    Referring Physician: Dr TRACY Hernandez MD            Initial Assessment        See Physical Therapy evaluation dated 08/11/21 in Epic electronic health record.

## 2021-08-11 NOTE — OR NURSING
PACU to Inpatient Nursing Handoff    Patient Adelaida Packer is a 51 year old female who speaks English.   Procedure Procedure(s):  ARTHROPLASTY, KNEE, TOTAL RIGHT   Surgeon(s) Primary: Dylan Hernandez MD  Resident - Assisting: Federico Curtis MD     Allergies   Allergen Reactions     Sulfa Drugs Shortness Of Breath and Rash     Hydrochlorothiazide W/Triamterene Other (See Comments)     Renal insuff     Maxzide [Hydrochlorothiazide W/Triamterene] Other (See Comments)     Renal insuff     Metoprolol Other (See Comments)     Other reaction(s): Bradycardia  At high dose only  At high dose only     Seasonal Allergies      Hayfever, tree pollens       Isolation  [unfilled]     Past Medical History   has a past medical history of Allergic rhinitis due to other allergen, Arthritis, Diabetes (H), Localized osteoarthritis of both knees, Migraine, unspecified, without mention of intractable migraine without mention of status migrainosus, Monoclonal gammopathy, Morbid obesity (H), Type 2 diabetes mellitus with stage 3a chronic kidney disease, without long-term current use of insulin (H) (8/9/2016), and Unspecified essential hypertension.    Anesthesia Spinal   Dermatome Level Dermatomes Left: S1  Dermatomes Right: S1   Preop Meds acetaminophen (Tylenol) - time given: 0651  1950mg TXA at 0651 - time given: 0651   Nerve block Adductor canal.  Location:left. Med:bupivacaine and precedex and decadron . Time given: 0720   Intraop Meds ondansetron (Zofran): last given at 1016  phenyl drip for blood pressure support   Local Meds Yes - Local Cocktail (morphine, ropivacaine, epinephrine, Toradol)   Antibiotics cefazolin (Ancef) - last given at 0806     Pain Patient Currently in Pain: yes   PACU meds  fentanyl (Sublimaze): 50 mcg (total dose) last given at 1136    PCA / epidural No   Capnography Respiratory Monitoring (EtCO2): 41 mmHg  Integrated Pulmonary Index (IPI): 10   Telemetry ECG Rhythm: Sinus rhythm   Inpatient  Telemetry Monitor Ordered? No        Labs Glucose Lab Results   Component Value Date     08/11/2021     07/19/2021    GLC 96 03/29/2021       Hgb Lab Results   Component Value Date    HGB 12.5 07/19/2021    HGB 12.5 11/30/2020       INR Lab Results   Component Value Date    INR 1.09 01/18/2016      PACU Imaging Completed     Wound/Incision Incision/Surgical Site 08/11/21 Right Knee (Active)   Incision Assessment UTV 08/11/21 1112   Closure ANNE 08/11/21 1112   Incision Drainage Amount None 08/11/21 1112   Dressing Intervention Clean, dry, intact 08/11/21 1112   Number of days: 0      CMS     Intact, pulses 2+ bilaterally in BLE, sensation returning post spinal/block, see flowsheets   Equipment ice pack   Other LDA       IV Access Peripheral IV 08/11/21 Anterior;Right Hand (Active)   Site Assessment WDL 08/11/21 1112   Line Status Infusing 08/11/21 1112   Dressing Intervention New dressing  08/11/21 0715   Phlebitis Scale 0-->no symptoms 08/11/21 1112   Infiltration Scale 0 08/11/21 1112   Number of days: 0      Blood Products Not applicable EBL 50 mL   Intake/Output Date 08/11/21 0700 - 08/12/21 0659   Shift 7294-4993 5471-8970 8140-3705 24 Hour Total   INTAKE   P.O. 30   30   I.V. 900   900   Shift Total(mL/kg) 930(8.88)   930(8.88)   OUTPUT   Urine 1200   1200   Blood 50   50   Shift Total(mL/kg) 1250(11.94)   1250(11.94)   Weight (kg) 104.7 104.7 104.7 104.7      Drains / Angela     Time of void PreOp Void Prior to Procedure: 0600 (08/11/21 0622)    PostOp Straight cath: 1200 mL (08/11/21 1200)    Diapered? No   Bladder Scan Bladder Scan Volume (mL): 1289 ml (08/11/21 1145)   PO 30 mL (08/11/21 1200)  water and ice chips     Vitals    B/P: 110/78  T: 98.2  F (36.8  C)    Temp src: Oral  P:  Pulse: 64 (08/11/21 1215)          R: 17  O2:  SpO2: 99 %    O2 Device: Nasal cannula (08/11/21 1145)    Oxygen Delivery: 2 LPM (08/11/21 1145)         Family/support present Son, Brennen   Patient belongings  [facility] tote bag and white belongings bag   Patient transported on bed   DC meds/scripts (obs/outpt) Not applicable   Inpatient Pain Meds Released? Yes       Special needs/considerations Glucose checked at 1133 and it was 120, see results tab   Tasks needing completion None       Wade Carlin  ASCOM [id number]

## 2021-08-11 NOTE — PLAN OF CARE
Pt arrived to unit around 1300. Received spinal and nerve block, EBL of 50.    VS: VSS. Denies CP/SOB. A/O x4.    O2: >90% on RA   Output: Voiding adequate amounts w/o pain or difficulty. Last PVR 32    Last BM: 8/9   Activity: Up with 1 assist, GB and walker. WBAT. Ambulating to bathroom, ambulated in sams with therapies. Tolerating well.    Up for meals? Declined    Skin: R knee incision    Pain: Pain in R knee, managing well with PRN oxy q 4. PRN robaxin given. Ice packs. Scheduled tylenol and toradol.    CMS: Numbness/tingling in RLE improving.    Dressing: CDI   Diet: Regular, tolerating well.    LDA: PIV infusing    Equipment: IV pole, PCDs, capno   Plan: TBD, potential discharge home tomorrow pending therapies and pain control   Additional Info:      Patient vital signs are at baseline: Yes  Patient able to ambulate as they were prior to admission or with assist devices provided by therapies during their stay:  Yes  Patient MUST void prior to discharge:  Yes  Patient able to tolerate oral intake:  Yes  Pain has adequate pain control using Oral analgesics:  Yes, but still receiving scheduled Toradol.

## 2021-08-11 NOTE — PROGRESS NOTES
08/11/21 1500   Quick Adds   Quick Adds Certification   Type of Visit Initial PT Evaluation   Living Environment   People in home child(anand), adult   Current Living Arrangements condominium   Home Accessibility stairs to enter home;stairs within home   Number of Stairs, Main Entrance 1   Stair Railings, Main Entrance none   Number of Stairs, Within Home, Primary greater than 10 stairs   Stair Railings, Within Home, Primary railing on left side (ascending)   Transportation Anticipated family or friend will provide   Self-Care   Usual Activity Tolerance fair   Current Activity Tolerance fair   Regular Exercise No   Equipment Currently Used at Home none   Disability/Function   Hearing Difficulty or Deaf no   Wear Glasses or Blind yes   Vision Management eye glasses   Concentrating, Remembering or Making Decisions Difficulty no   Difficulty Communicating no   Difficulty Eating/Swallowing no   Walking or Climbing Stairs Difficulty yes   Walking or Climbing Stairs   (none)   Dressing/Bathing Difficulty no   Toileting issues no   Doing Errands Independently Difficulty (such as shopping) no   Fall history within last six months no   Change in Functional Status Since Onset of Current Illness/Injury no   General Information   Onset of Illness/Injury or Date of Surgery 08/12/21   Referring Physician Dr TRACY Hernandez MD   Patient/Family Therapy Goals Statement (PT) PLOF   Pertinent History of Current Problem (include personal factors and/or comorbidities that impact the POC) Pt is a 51 year old fem s/p R TKA.    Weight-Bearing Status - RLE weight-bearing as tolerated   Cognition   Orientation Status (Cognition) oriented x 4   Affect/Mental Status (Cognition) WFL   Pain Assessment   Patient Currently in Pain Yes, see Vital Sign flowsheet   Integumentary/Edema   Integumentary/Edema no deficits were identifed   Posture    Posture Forward head position;Protracted shoulders;Kyphosis   Range of Motion (ROM)   ROM Comment 0-0-90    Strength   Strength Comments not formally assessed   Bed Mobility   Comment (Bed Mobility) SBA for bed mob   Transfers   Transfer Safety Comments SBA for transfers w 2ww   Gait/Stairs (Locomotion)   Antelope Level (Gait) set up;supervision   Assistive Device (Gait) walker, front-wheeled   Distance in Feet (Required for LE Total Joints) 100   Pattern (Gait) step-to   Comment (Gait/Stairs) Pt has not completed stair goal   Balance   Balance no deficits were identified   Clinical Impression   Criteria for Skilled Therapeutic Intervention yes, treatment indicated   PT Diagnosis (PT) weakness   Influenced by the following impairments pain   Functional limitations due to impairments gait abnormality   Clinical Presentation Stable/Uncomplicated   Clinical Presentation Rationale Pt presents as medically diagnosed   Clinical Decision Making (Complexity) low complexity   Therapy Frequency (PT) 2x/day   Predicted Duration of Therapy Intervention (days/wks) 2   Planned Therapy Interventions (PT) strengthening;stair training;gait training;transfer training   Anticipated Equipment Needs at Discharge (PT) walker, rolling   Risk & Benefits of therapy have been explained evaluation/treatment results reviewed;care plan/treatment goals reviewed;risks/benefits reviewed;current/potential barriers reviewed;participants voiced agreement with care plan;participants included;patient;mother   PT Discharge Planning    PT Discharge Recommendation (DC Rec) home with home care physical therapy   PT Rationale for DC Rec Pt does not have support for getting to OP PT for the first couple of weeks   PT Brief overview of current status  SBA for bed mob, transfers, and gait   Therapy Certification   Start of care date 08/11/21   Certification date from 08/11/21   Certification date to 08/12/21   Medical Diagnosis R TKA   Total Evaluation Time   Total Evaluation Time (Minutes) 10

## 2021-08-11 NOTE — ANESTHESIA POSTPROCEDURE EVALUATION
Patient: Adelaida Packer    Procedure(s):  ARTHROPLASTY, KNEE, TOTAL RIGHT    Diagnosis:Chronic pain of right knee [M25.561, G89.29]  Diagnosis Additional Information: No value filed.    Anesthesia Type:  Spinal    Note:  Disposition: Inpatient   Postop Pain Control: Uneventful            Sign Out: Well controlled pain   PONV: No   Neuro/Psych: Uneventful            Sign Out: Acceptable/Baseline neuro status   Airway/Respiratory: Uneventful            Sign Out: Acceptable/Baseline resp. status   CV/Hemodynamics: Uneventful            Sign Out: Acceptable CV status; No obvious hypovolemia; No obvious fluid overload   Other NRE: NONE   DID A NON-ROUTINE EVENT OCCUR? No           Last vitals:  Vitals Value Taken Time   /78 08/11/21 1217   Temp 36.8  C (98.2  F) 08/11/21 1215   Pulse 65 08/11/21 1220   Resp 15 08/11/21 1220   SpO2 99 % 08/11/21 1220   Vitals shown include unvalidated device data.    Electronically Signed By: Allegra Ware MD  August 11, 2021  12:22 PM

## 2021-08-11 NOTE — ANESTHESIA CARE TRANSFER NOTE
Patient: Adelaida Packer    Procedure(s):  ARTHROPLASTY, KNEE, TOTAL RIGHT    Diagnosis: Chronic pain of right knee [M25.561, G89.29]  Diagnosis Additional Information: No value filed.    Anesthesia Type:   Spinal     Note:    Oropharynx: oropharynx clear of all foreign objects and spontaneously breathing  Level of Consciousness: drowsy  Oxygen Supplementation: face mask  Level of Supplemental Oxygen (L/min / FiO2): 6  Independent Airway: airway patency satisfactory and stable  Dentition: dentition unchanged  Vital Signs Stable: post-procedure vital signs reviewed and stable  Report to RN Given: handoff report given  Patient transferred to: PACU    Handoff Report: Identifed the Patient, Identified the Reponsible Provider, Reviewed the pertinent medical history, Discussed the surgical course, Reviewed Intra-OP anesthesia mangement and issues during anesthesia, Set expectations for post-procedure period and Allowed opportunity for questions and acknowledgement of understanding      Vitals:  Vitals Value Taken Time   BP 88/53 08/11/21 1112   Temp     Pulse 80 08/11/21 1115   Resp 23 08/11/21 1115   SpO2 97 % 08/11/21 1115   Vitals shown include unvalidated device data.    Electronically Signed By: PEREZ Renteria CRNA  August 11, 2021  11:17 AM

## 2021-08-11 NOTE — OP NOTE
OPERATIVE REPORT    DATE OF SERVICE: 8/19/21    SURGEON: Dylan Hernandez MD.    ASSISTANT(S):  Federico Curtis MS    PREOPERATIVE DIAGNOSIS:  Osteoarthritis    POSTOPERATIVE DIAGNOSIS:  Osteoarthritis    OPERATION PERFORMED:  Right total knee arthroplasty    IMPLANTS:    Implant Name Type Inv. Item Serial No.  Lot No. LRB No. Used Action   BONE CEMENT SIMPLEX W/TOBRAMYCIN 6197-9-001 - [id number] Cement, Bone BONE CEMENT SIMPLEX W/TOBRAMYCIN 6197-9-001  [employer] [id number] Right 2 Implanted   IMP BASEPLATE TIBIAL ISATU II SZ 3 RT TI [id number] - [id number] Total Joint Component/Insert IMP BASEPLATE TIBIAL ISATU II SZ 3 RT TI [id number]  [employer] [id number] Right 1 Implanted   IMP COMP FEMORAL SNN GEN II CR SZ 4 RT [id number] - [id number] Total Joint Component/Insert IMP COMP FEMORAL SNN GEN II CR SZ 4 RT [id number]  [employer]  [id number] Right 1 Implanted   IMP INSERT S&N LEGION CR XLPE SZ3-4 10MM [id number] - [id number] Total Joint Component/Insert IMP INSERT S&N LEGION CR XLPE SZ3-4 10MM [id number]  [employer] [id number] Right 1 Implanted       ANESTHETIC: spinal    OPERATIVE FINDINGS:  End stage arthrosis of the knee. Patella appropriate for patellar retention.     BLOOD LOSS: about 50 ml     TOURNIQUET TIME: about 70 minutes     COMPLICATIONS:  None apparent    OPERATIVE INDICATIONS:  The patient has a long history of debilitating pain secondary to ostearthritis of the knee.  Despite comprehensive non-operative management these symptoms continued to interfere with activities of daily living.  After discussion of further treatment options including the risks and benefits that patient elected to proceed with a total knee.    DESCRIPTION OF THE PROCEDURE:  The patient was identified in the preoperative holding area.  The consent form including the risks and benefits were reviewed with the patient.  The operative limb was identified and marked.  The patient was brought back to the operating  room and placed on the operating table.  A spinal anesthetic was induced by the anesthesia team.   The patient was prepped and draped in the normal standard fashion for a knee replacement.  A time-out was called.  Antibiotics were given.The tourniquet was inflated.  We utilized an approximately 15 cm curvilinear incision, centered on the patella ridge, and performed a standard parapatellar approach to the knee. There was normal appearing joint fluid seen upon arthrotomy. A modest medial release was performed. The patella was everted. Medial and lateral retractors were placed. The knee was brought into flexion. The AP axis of the knee was marked and an intra-medullary femoral guide roxie was placed as templated. The epicondylar axis was then marked. The anterior femoral cutting guide was placed and the epicondylar and AP axes were used to set the rotation of the jig. A stylus was then used to set the depth of the resection. Retractors were used to protect the skin and the anterior cut was made. The distal femoral cutting guide was then placed and pinned. The resection was set to remove the cartilage from the intra-condylar notch. The femoral sizing guide was then placed; the knee sized well to a 4. The four-in-one femoral cutting guide was then placed and pinned. The cuts were made. The trial component was well fit. Attention was then turned to the tibia. A PCL retractor was then placed and used to bring the tibia anterior. Medial and lateral retractors were placed. A tibial intra-medullary guide roxie was placed. The tibial cutting guide was then assembled on the guide roxie. The slope was set to nearly mirror the anatomic slope. The tibial cut was first checked with a two-and-nine guide and the cut was then made. A lamina  was then used to remove the necessary bone from the posterior condyles and then the remaining meniscus was removed. The tibia and was sized and it sized well to a 3. The trial components were  then assembled jose david  9 mm trial poly was used for trialing. The knee came out into full extension and the knee had greater than 120 degrees of flexion. It was well balanced in the coronal plane with varus and valgus stress at 0 and 30 degrees of flexion. Happy with the reconstruction the tibial rotation was marked, the trial components were removed, and all cancellous surfaces were cleaned with a pulse lavage and then dried. The components were cemented into place, a trial poly placed, the knee brought into extension, and the cement was allowed to dry. With the cement dry the knee was lavaged. The tourniquet was let down and hemostasis was achieved. The knee was then brought into extension and the final poly was placed. It was seen to lock into place. The soft tissues were then injected with analgesic. The capsule was closed over a drain. The capsule was closed with interrupted Vicryl, the dermis with interrupted Vicryl, and skin with running monocryl, Dermabond and steri-strips.  At the end of the procedure the sponge and needle counts were correct times two.  The patient tolerated the procedure well and returned to the PAR extubated and stable.    POSTOPERATIVE PLAN:  1. Weight bearing as tolerated  2. DVT prophylaxis   4. 24 hours of prophylactic antibiotics  5. Follow-up:  Wound clinic in 2 weeks and with David in clinic in 6 weeks for x-rays and a rehabilitation check.

## 2021-08-11 NOTE — ANESTHESIA PREPROCEDURE EVALUATION
Anesthesia Pre-Procedure Evaluation    Patient: Adelaida Packer   MRN: [id number] : 1970        Preoperative Diagnosis: Chronic pain of right knee [M25.561, G89.29]   Procedure : Procedure(s):  ARTHROPLASTY, KNEE, TOTAL RIGHT     Past Medical History:   Diagnosis Date     Allergic rhinitis due to other allergen     seasonal     Arthritis      Diabetes (H)      Localized osteoarthritis of both knees      Migraine, unspecified, without mention of intractable migraine without mention of status migrainosus      Monoclonal gammopathy      Morbid obesity (H)      Type 2 diabetes mellitus with stage 3a chronic kidney disease, without long-term current use of insulin (H) 2016     Unspecified essential hypertension     dx around age 31      Past Surgical History:   Procedure Laterality Date     BUNIONECTOMY Right 2018    Procedure: BUNIONECTOMY;  Surgeon: Erik Bazan DPM;  Location: [facility];  Service:      C ANESTH,KNEE AREA SURGERY  2001     C GASTROPLASTY,OBESITY,VERT BAND      removed 2009     EXCISE GANGLION WRIST Right 2018    Procedure: EXCISION OF THE GANGLION CYST, BUNIONECTOMU WITH FIRST METATARSAL  OSTEOTOMY WITH INTERNAL SCREW FIXATION, A SUBTALAR JIONT ARTHROERESIS OF THE RIGHT FOOT AND GASTROCNEMIUS RECESSION RIGHT LOWER EXTREMITY;  Surgeon: Erik Bazan DPM;  Location: [facility];  Service:       REMOVE TONSILS/ADENOIDS,11+ Y/O       HEAD & NECK SURGERY  3/2013    Parathyroidectomy--had to go back in 6 days later for a possible bleed     HYSTERECTOMY       HYSTERECTOMY, VAGINAL  2005    hysterectomy- fibroids     KNEE SURGERY Right      ORTHOPEDIC SURGERY       PARATHYROIDECTOMY       TONSILLECTOMY        Allergies   Allergen Reactions     Sulfa Drugs Shortness Of Breath and Rash     Hydrochlorothiazide W/Triamterene Other (See Comments)     Renal insuff     Maxzide [Hydrochlorothiazide W/Triamterene] Other (See Comments)     Renal insuff      Metoprolol Other (See Comments)     Other reaction(s): Bradycardia  At high dose only  At high dose only     Seasonal Allergies      Hayfever, tree pollens      Social History     Tobacco Use     Smoking status: Never Smoker     Smokeless tobacco: Never Used   Substance Use Topics     Alcohol use: No      Wt Readings from Last 1 Encounters:   08/11/21 104.7 kg (230 lb 13.2 oz)        Anesthesia Evaluation   Pt has had prior anesthetic. Type: General.        ROS/MED HX  ENT/Pulmonary:     (+) allergic rhinitis,  (-) asthma and recent URI   Neurologic:     (+) migraines,     Cardiovascular:     (+) hypertension----- (-) syncope, arrhythmias and irregular heartbeat/palpitations   METS/Exercise Tolerance: 4 - Raking leaves, gardening    Hematologic:       Musculoskeletal:   (+) arthritis,     GI/Hepatic: Comment: Gastroplasty vertical band    (+) GERD (GERD especially with spicy foods), Symptomatic,  (-) liver disease   Renal/Genitourinary:     (+) renal disease (stage 3), type: CRI, Pt does not require dialysis,     Endo: Comment: Primary hyperaldosteronism on Inspira  Hyperparathyroidism          (+) type II DM, Last HgA1c: 5.5, date: 7/19/21, - not using insulin pump. not previously admitted for DM/DKA. Obesity (bmi 42),  (-) Type I DM, thyroid disease and chronic steroid usage   Psychiatric/Substance Use:  - neg psychiatric ROS     Infectious Disease:  - neg infectious disease ROS     Malignancy:       Other:  - neg other ROS   (-) Any chance pregnant       Physical Exam    Airway        Mallampati: III   TM distance: > 3 FB   Neck ROM: full   Mouth opening: > 3 cm    Respiratory Devices and Support         Dental  no notable dental history         Cardiovascular   cardiovascular exam normal       Rhythm and rate: regular and normal     Pulmonary   pulmonary exam normal        breath sounds clear to auscultation           OUTSIDE LABS:  CBC:   CBC RESULTS: Recent Labs   Lab Test 07/19/21  1356 11/30/20  0659  11/19/19  0734 06/11/19  1013 08/06/18  0910 08/10/17  0906 02/21/17  0810 02/24/16  0803   WBC 4.9  --   --   --  2.8* 3.8*  --  4.2   HGB 12.5 12.5 13.0 11.2* 12.1 12.0  --  12.7   HCT 37.2  --   --   --  36.9 35.3 36.8 36.3     --   --   --  154 177  --  155     Last Comprehensive Metabolic Panel:  Recent Labs   Lab Test 08/11/21  0600 07/19/21  1356 03/29/21  1010 11/30/20  0659 11/19/19  0734 06/11/19  1013   NA  --  139  --  139 140 139   POTASSIUM  --  4.1  --  4.7 4.4 4.4   CHLORIDE  --  109  --  107 107 108   CO2  --  29  --  28 27 23   ANIONGAP  --  1*  --  4 6 8   BUN  --  20  --  20 19 14   CR  --  1.18*  --  1.17* 1.13* 1.03   GFRESTIMATED  --  53*  --  54* 56* 63   GLC 88 138* 96 121* 106* 91   EVGENY  --  9.8  --  9.5 9.5 8.9        Lab Results   Component Value Date    WBC 4.9 07/19/2021    WBC 2.8 (L) 08/06/2018    HGB 12.5 07/19/2021    HGB 12.5 11/30/2020    HCT 37.2 07/19/2021    HCT 36.9 08/06/2018     07/19/2021     08/06/2018     BMP:   Lab Results   Component Value Date     07/19/2021     11/30/2020    POTASSIUM 4.1 07/19/2021    POTASSIUM 4.7 11/30/2020    CHLORIDE 109 07/19/2021    CHLORIDE 107 11/30/2020    CO2 29 07/19/2021    CO2 28 11/30/2020    BUN 20 07/19/2021    BUN 20 11/30/2020    CR 1.18 (H) 07/19/2021    CR 1.17 (H) 11/30/2020    GLC 88 08/11/2021     (H) 07/19/2021     COAGS:   Lab Results   Component Value Date    INR 1.09 01/18/2016     POC:   Lab Results   Component Value Date     (H) 01/18/2016    HCG Negative 01/18/2016     HEPATIC:   Lab Results   Component Value Date    ALBUMIN 3.7 11/30/2020    PROTTOTAL 7.5 08/06/2018    ALT 30 08/06/2018    AST 19 08/06/2018    ALKPHOS 50 08/06/2018    BILITOTAL 0.3 08/06/2018     OTHER:   Lab Results   Component Value Date    A1C 5.5 07/19/2021    EVGENY 9.8 07/19/2021    PHOS 3.5 11/30/2020    MAG 2.1 04/02/2013    LIPASE 101 05/13/2005    TSH 1.46 03/29/2021       Anesthesia Plan    ASA  Status:  3   NPO Status:  NPO Appropriate    Anesthesia Type: Spinal.   Induction: Propofol, Intravenous.   Maintenance: TIVA.        Consents    Anesthesia Plan(s) and associated risks, benefits, and realistic alternatives discussed. Questions answered and patient/representative(s) expressed understanding.     - Discussed with:  Patient      - Extended Intubation/Ventilatory Support Discussed: No.      - Patient is DNR/DNI Status: No         Postoperative Care    Pain management: IV analgesics, Oral pain medications, Multi-modal analgesia, Peripheral nerve block (Single Shot), Neuraxial analgesia.   PONV prophylaxis: Ondansetron (or other 5HT-3)     Comments:                Allegra Ware MD

## 2021-08-11 NOTE — BRIEF OP NOTE
Orthopaedic Surgery Brief Op-Note      Patient: Adelaida Packer  : 1970  Date of Service: 2021 11:08 AM    Pre-operative Diagnosis: Chronic pain of right knee [M25.561, G89.29]  Post-operative Diagnosis: same    Procedure(s) Performed: Procedure(s):  ARTHROPLASTY, KNEE, TOTAL RIGHT    Staff: Dr. Hernandez  Assistants: Federico Zhu MD    Anesthesia: General  EBL: 50 cc  UOP: see anesthesia record    Implants:   Implant Name Type Inv. Item Serial No.  Lot No. LRB No. Used Action   BONE CEMENT SIMPLEX W/TOBRAMYCIN 6197-9-001 - [id number] Cement, Bone BONE CEMENT SIMPLEX W/TOBRAMYCIN 6197-9-001  [employer] [id number] Right 2 Implanted   IMP BASEPLATE TIBIAL IASTU II SZ 3 RT TI [id number] - [id number] Total Joint Component/Insert IMP BASEPLATE TIBIAL ISATU II SZ 3 RT TI [id number]  [employer] [id number] Right 1 Implanted   IMP COMP FEMORAL SNN GEN II CR SZ 4 RT [id number] - [id number] Total Joint Component/Insert IMP COMP FEMORAL SNN GEN II CR SZ 4 RT [id number]  [employer]  [id number] Right 1 Implanted   IMP INSERT S&N LEGION CR XLPE SZ3-4 10MM [id number] - [id number] Total Joint Component/Insert IMP INSERT S&N LEGION CR XLPE SZ3-4 10MM [id number]  [employer] [id number] Right 1 Implanted     Drains: АЛЕКСАНДР drain  Intra-op Labs/Cxs/Specimens: * No specimens in log *  Complications: No apparent complications during procedure  Findings: Please see dictated operative note for details    Disposition: Stable to PACU, then admit to Orthopaedics.      Assessment: Adelaida Packer is a 51 year old female s/p Procedure(s):  ARTHROPLASTY, KNEE, TOTAL RIGHT on 2021 with Dr. Quinones.    Plan:  Activity: Up with assist and assistive devices as needed until independent. Knee ROM as tolerated.   Weight bearing status: WBAT    Antibiotics: Cefazolin x 24 hours   Diet: Regular. Bowel regimen. Anti-emetics PRN.    DVT prophylaxis: Aspirin 162mg daily x 4 weeks, starting morning of POD 1  Elevation:  Elevate heels off of bed on pillows, no pillows behind the knee at any time    Wound Care: Dressing keep c/d/i x7 days  Pain management: Orals PRN, IV for breakthrough only  X-rays: AP/Lat knee XR in PACU  Physical Therapy: Mobilization, ROM, ADL's  Occupational Therapy: ADL's  Labs: POD1 HGB  Cultures: None  Consults: PT, OT. Hospitalist, appreciate assistance in caring for this patient throughout the perioperative period  Disposition: Pending progress with therapies, pain control on orals, and medical stability, anticipate discharge to home on POD #1.    Future Appointments   Date Time Provider Department [place]   8/11/2021  3:15 PM Vaibhav Pedraza, PT [facility] [place]   8/12/2021  8:00 AM [facility]   8/12/2021  9:00 AM Delia Jiménez OT [facility] [place]   8/12/2021  1:00 PM Ranulfo Madrigal, PT [facility] [place]   8/27/2021  9:30 AM MG NURSE ONLY [facility]TH[facility] [place]   9/28/2021 10:00 AM Dylan Hernandez MD [facility] [place] Jaren LaGreca, MD  Orthopaedic Surgery PGY4  Pager: [phone]

## 2021-08-11 NOTE — ANESTHESIA PROCEDURE NOTES
Adductor canal Procedure Note  Pre-Procedure   Staff -        Anesthesiologist:  Letty Kaplan MD       Performed By: anesthesiologist       Location: pre-op       Procedure Start/Stop Times: 8/11/2021 7:14 AM and 8/11/2021 7:20 AM       Pre-Anesthestic Checklist: patient identified, IV checked, site marked, risks and benefits discussed, informed consent, monitors and equipment checked, pre-op evaluation, at physician/surgeon's request and post-op pain management  Timeout:       Correct Patient: Yes        Correct Procedure: Yes        Correct Site: Yes        Correct Position: Yes        Correct Laterality: Yes        Site Marked: Yes  Procedure Documentation  Procedure: Adductor canal       Diagnosis: POST OPERATIVE PAIN       Laterality: right       Patient Position: supine       Patient Prep/Sterile Barriers: sterile gloves, mask       Skin prep: Chloraprep       Needle Type: short bevel       Needle Gauge: 21.        Needle Length (millimeters): 110        Ultrasound guided       1. Ultrasound was used to identify targeted nerve, plexus, vascular marker, or fascial plane and place a needle adjacent to it in real-time.       2. Ultrasound was used to visualize the spread of anesthetic in close proximity to the above referenced structure.       3. A permanent image is entered into the patient's record.    Assessment/Narrative         The placement was negative for: blood aspirated, painful injection and site bleeding       Paresthesias: No.     Bolus given via needle..        Secured via.        Insertion/Infusion Method: Single Shot       Complications: none       Injection made incrementally with aspirations every 5 mL.    Medication(s) Administered   Bupivacaine 0.25% PF (Infiltration), 20 mL  Dexmedetomidine 4 mcg/mL (Perineural), 20 mcg  Dexamethasone 10 mg/mL PF (Perineural), 2 mg  Medication Administration Time: 8/11/2021 7:20 AM    Comments:  Discussed risks of nerve block, including nerve injury,  bleeding, infection, incomplete analgesia. Discussed anticipated areas of sensory and motor block, limb precautions, and fall precautions. Discussed alternative of not performing a nerve block. Ensured understanding, invited questions and all questions were answered. Patient wishes to proceed.  Informed consent was obtained.   Patient tolerated well. Incremental aspiration every 5 mL. No paresthesia, no heme. Needle tip visualized throughout with appropriate spread of local anesthetic around nerves.   Block was placed at the surgeon's request for post operative pain control.

## 2021-08-12 ENCOUNTER — APPOINTMENT (OUTPATIENT)
Dept: PHYSICAL THERAPY | Facility: CLINIC | Age: 51
End: 2021-08-12
Attending: ORTHOPAEDIC SURGERY
Payer: COMMERCIAL

## 2021-08-12 VITALS
WEIGHT: 230.82 LBS | OXYGEN SATURATION: 96 % | SYSTOLIC BLOOD PRESSURE: 113 MMHG | RESPIRATION RATE: 16 BRPM | DIASTOLIC BLOOD PRESSURE: 64 MMHG | HEIGHT: 62 IN | TEMPERATURE: 96.1 F | BODY MASS INDEX: 42.48 KG/M2 | HEART RATE: 67 BPM

## 2021-08-12 LAB
HGB BLD-MCNC: 11.5 G/DL (ref 11.7–15.7)
HOLD SPECIMEN: NORMAL

## 2021-08-12 PROCEDURE — 999N000111 HC STATISTIC OT IP EVAL DEFER

## 2021-08-12 PROCEDURE — 85018 HEMOGLOBIN: CPT | Performed by: STUDENT IN AN ORGANIZED HEALTH CARE EDUCATION/TRAINING PROGRAM

## 2021-08-12 PROCEDURE — 36415 COLL VENOUS BLD VENIPUNCTURE: CPT | Performed by: STUDENT IN AN ORGANIZED HEALTH CARE EDUCATION/TRAINING PROGRAM

## 2021-08-12 PROCEDURE — 250N000013 HC RX MED GY IP 250 OP 250 PS 637: Performed by: STUDENT IN AN ORGANIZED HEALTH CARE EDUCATION/TRAINING PROGRAM

## 2021-08-12 PROCEDURE — 36415 COLL VENOUS BLD VENIPUNCTURE: CPT | Performed by: ORTHOPAEDIC SURGERY

## 2021-08-12 PROCEDURE — 250N000011 HC RX IP 250 OP 636: Performed by: STUDENT IN AN ORGANIZED HEALTH CARE EDUCATION/TRAINING PROGRAM

## 2021-08-12 RX ORDER — ACETAMINOPHEN 325 MG/1
650 TABLET ORAL EVERY 4 HOURS PRN
Qty: 50 TABLET | Refills: 0 | Status: SHIPPED | OUTPATIENT
Start: 2021-08-14 | End: 2021-08-17

## 2021-08-12 RX ORDER — OXYCODONE HYDROCHLORIDE 5 MG/1
5-10 TABLET ORAL EVERY 4 HOURS PRN
Qty: 30 TABLET | Refills: 0 | Status: SHIPPED | OUTPATIENT
Start: 2021-08-12 | End: 2021-08-17

## 2021-08-12 RX ORDER — AMOXICILLIN 250 MG
1 CAPSULE ORAL 2 TIMES DAILY PRN
Qty: 30 TABLET | Refills: 0 | Status: SHIPPED | OUTPATIENT
Start: 2021-08-12 | End: 2021-08-27

## 2021-08-12 RX ORDER — METHOCARBAMOL 750 MG/1
750 TABLET, FILM COATED ORAL EVERY 6 HOURS PRN
Qty: 15 TABLET | Refills: 0 | Status: SHIPPED | OUTPATIENT
Start: 2021-08-12 | End: 2021-08-17

## 2021-08-12 RX ADMIN — OXYCODONE HYDROCHLORIDE 10 MG: 5 TABLET ORAL at 07:02

## 2021-08-12 RX ADMIN — Medication 2000 UNITS: at 08:22

## 2021-08-12 RX ADMIN — KETOROLAC TROMETHAMINE 15 MG: 30 INJECTION, SOLUTION INTRAMUSCULAR; INTRAVENOUS at 06:09

## 2021-08-12 RX ADMIN — OXYCODONE HYDROCHLORIDE 10 MG: 5 TABLET ORAL at 03:08

## 2021-08-12 RX ADMIN — AMLODIPINE BESYLATE 5 MG: 5 TABLET ORAL at 08:23

## 2021-08-12 RX ADMIN — ASPIRIN 162 MG: 81 TABLET, DELAYED RELEASE ORAL at 08:22

## 2021-08-12 RX ADMIN — FAMOTIDINE 20 MG: 20 TABLET ORAL at 08:22

## 2021-08-12 RX ADMIN — CEFAZOLIN SODIUM 2 G: 2 INJECTION, SOLUTION INTRAVENOUS at 00:06

## 2021-08-12 RX ADMIN — ACETAMINOPHEN 975 MG: 325 TABLET, FILM COATED ORAL at 08:21

## 2021-08-12 RX ADMIN — OXYCODONE HYDROCHLORIDE AND ACETAMINOPHEN 500 MG: 500 TABLET ORAL at 08:22

## 2021-08-12 RX ADMIN — KETOROLAC TROMETHAMINE 15 MG: 30 INJECTION, SOLUTION INTRAMUSCULAR; INTRAVENOUS at 00:05

## 2021-08-12 RX ADMIN — METHOCARBAMOL 750 MG: 750 TABLET ORAL at 00:10

## 2021-08-12 RX ADMIN — LABETALOL HYDROCHLORIDE 300 MG: 200 TABLET, FILM COATED ORAL at 10:59

## 2021-08-12 RX ADMIN — OXYCODONE HYDROCHLORIDE 10 MG: 5 TABLET ORAL at 10:58

## 2021-08-12 RX ADMIN — POLYETHYLENE GLYCOL 3350 17 G: 17 POWDER, FOR SOLUTION ORAL at 08:21

## 2021-08-12 RX ADMIN — Medication 600 MG: at 08:22

## 2021-08-12 RX ADMIN — CYANOCOBALAMIN TAB 500 MCG 1000 MCG: 500 TAB at 08:22

## 2021-08-12 RX ADMIN — METHOCARBAMOL 750 MG: 750 TABLET ORAL at 07:02

## 2021-08-12 RX ADMIN — DOCUSATE SODIUM AND SENNOSIDES 1 TABLET: 8.6; 5 TABLET ORAL at 08:22

## 2021-08-12 NOTE — DISCHARGE SUMMARY
ORTHOPAEDIC SURGERY DISCHARGE SUMMARY     Date of Admission: 8/11/2021  Date of Discharge: 8/12/2021 12:34 PM  Disposition: Home  Staff Physician: No att. providers found  Primary Care Provider: Windy Gordillo    DISCHARGE DIAGNOSIS:  Chronic pain of right knee [M25.561, G89.29]    PROCEDURES: Procedure(s):  ARTHROPLASTY, KNEE, TOTAL RIGHT on 8/11/2021    BRIEF HISTORY:  Adelaida Packer is a 52 yo female with chronic right knee pain osteoarthritis that has been recalcitrant to nonoperative treatment modalities. Due to her continued pain, failure of nonoperative treatment and continued limitations she opted to proceed with right total knee arthroplasty.    HOSPITAL COURSE:    The patient was admitted following the above listed procedures for pain control and rehabilitation. Adelaida Packer did well post-operatively. The patient received routine nursing cares and at the time of discharge was medically stable. Vital signs were stable throughout admission. The patient is tolerating a regular diet and is voiding spontaneously. All PT/OT goals have been met for safe mobility. Pain is now controlled on oral medications which will be available on discharge. Stool softeners have been used while taking pain medications to help prevent constipation. Adelaida Packer is deemed medically safe to discharge.     Antibiotics:  Ancef given periop and 24 hours postop.   DVT prophylaxis:  Aspirin initiated after surgery and will be continued for 4 weeks.   PT Progress:  Has met PT/OT goals for safe mobility.   Pain Meds:  Weaned off all IV pain meds by discharge.  Inpatient Events: No significant events or complications.     PHYSICAL EXAM:    Exam:  Gen: No acute distress, resting comfortably in bed.  Resp: Non-labored breathing  MSK:  RLE:  - Knee dressings c/d/i  - SILT femoral/tibial/sural/saphenous/DP/SP nerves  - Fires Quad, TA, EHL, FHL, GaSC  - PT/DP pulses 2+, foot wwp       FOLLOWUP:    Follow up with Dr. Hernandez postop  nurse clinic visit on 8/27/21.    Future Appointments   Date Time Provider Department Center   8/27/2021  9:30 AM [facility] N[facility]SE ONLY [facility] [facility]   9/28/2021 10:00 AM Dylan Hernandez MD [facility] [place]       Orthopaedic Surgery appointments are at the [facility] ([address] [place] [address]). Call [phone] to schedule a follow-up appointment at this location with your provider.     PLANNED DISCHARGE ORDERS:      Discharge Medication List as of 8/12/2021 11:20 AM      START taking these medications    Details   acetaminophen (TYLENOL) 325 MG tablet Take 2 tablets (650 mg) by mouth every 4 hours as needed for other (For optimal non-opioid multimodal pain management to improve pain control.), Disp-50 tablet, R-0, E-Prescribe      aspirin (ASA) 81 MG EC tablet Take 2 tablets (162 mg) by mouth daily, Disp-56 tablet, R-0, E-Prescribe      methocarbamol (ROBAXIN) 750 MG tablet Take 1 tablet (750 mg) by mouth every 6 hours as needed for muscle spasms, Disp-15 tablet, R-0, E-Prescribe      oxyCODONE (ROXICODONE) 5 MG tablet Take 1-2 tablets (5-10 mg) by mouth every 4 hours as needed for severe pain, Disp-30 tablet, R-0, E-Prescribe      senna-docusate (SENOKOT-S/PERICOLACE) 8.6-50 MG tablet Take 1 tablet by mouth 2 times daily as needed for constipation, Disp-30 tablet, R-0, E-Prescribe         CONTINUE these medications which have NOT CHANGED    Details   amLODIPine (NORVASC) 5 MG tablet Take 1 tablet (5 mg) by mouth daily, Disp-90 tablet, R-3, E-Prescribe      Ascorbic Acid (VITAMIN C) 500 MG CAPS Take 1 tablet by mouth daily, Historical      Blood Pressure Monitor KIT 1 Device 2 times daily, Disp-1 kit, R-0, E-Prescribe      calcium carbonate (OS-EVGENY 600 MG [place]. [place]) 1500 (600 CA) MG tablet Take 1 tablet (600 mg) by mouth 2 times daily, Disp-180 tablet, R-3, E-Prescribe      cetirizine (ZYRTEC) 10 MG tablet Take 10 mg by mouth daily as needed for allergies, Historical       cholecalciferol 2000 UNITS CAPS Take 2,000 Units by mouth daily, Disp-90 capsule, R-3, E-Prescribe      cyanocobalamin (VITAMIN B-12) 1000 MCG tablet Take 1,000 mcg by mouth daily, Historical      eplerenone (INSPRA) 50 MG tablet Take 2 tablets (100 mg) by mouth 2 times daily, Disp-360 tablet, R-3, E-Prescribe      famotidine (PEPCID) 20 MG tablet Take 1 tablet (20 mg) by mouth 2 times daily, Disp-180 tablet, R-1, E-Prescribe      fish oil-omega-3 fatty acids 1000 MG capsule Take 2 g by mouth daily, Historical      labetalol (NORMODYNE) 300 MG tablet Take 1 tablet (300 mg) by mouth 2 times daily, Disp-180 tablet, R-3, E-Prescribe      Multiple Vitamin (MULTIVITAMIN ADULT PO) Take 1 Tablespoonful by mouth daily, Historical      olopatadine (PATADAY) 0.2 % ophthalmic solution Place 1 drop into both eyes daily, Disp-2.5 mL, R-11, E-Prescribe      polyethylene glycol (MIRALAX/GLYCOLAX) Packet Take 17 g by mouth daily as needed , Historical      Semaglutide,0.25 or 0.5MG/DOS, 2 MG/1.5ML SOPN Start 0.25 mg weekly for 2 weeks and increase to 0.5 mg weekly thereafter, Disp-4.5 mL, R-3, E-Prescribe      SUMAtriptan (IMITREX) 25 MG tablet TAKE 1 TO 2 TABLETS BY MOUTH AT ONSET OF HEADACHE FOR MIGRAINE. MAY REPEAT DOSE IN 2 HOURS. MAX OF 200MG OR 2 DOSES IN 24 HOURS, Disp-18 tablet, R-0, E-Prescribe         STOP taking these medications       acetaminophen (TYLENOL ARTHRITIS PAIN) 650 MG CR tablet Comments:   Reason for Stopping:                 Discharge Procedure Orders   Home Care PT Referral for Hospital Discharge   Referral Priority: Routine Referral Type: Home Health Therapies & Aides   Requested Specialty: Orthopaedic Surgery   Number of Visits Requested: 1     Home care nursing referral     Care Coordination Referral   Referral Priority: Routine Referral Type: Care Coordination   Number of Visits Requested: 1     Reason for your hospital stay   Order Comments: You stayed in the hospital overnight following your  "surgery     Contact Surgeon Team   Order Comments: You may experience symptoms that require follow-up before your scheduled appointment. Refer to the \"[employer]\" for instructions on when to contact Dr. Hernandez's office if you are concerned about pain control, blood clots, constipation, or if you are unable to urinate.    Regular business hours (Monday - Friday, 8am - 5pm):  [facility]: [phone]  [facility]: [phone]  [facility]: [phone]    After hours and weekends:  [facility] on call Orthopedic resident: [phone]     Orthopedic Urgent Care   Order Comments: If you are not able to reach Dr. Hernandez's office and you need immediate care, go to the Orthopedic Urgent Care at your Surgeon's office.  Do NOT go to the Emergency Room unless you have shortness of breath, chest pain, or other signs of a medical emergency.     Call 911   Order Comments: Call 911 immediately if you experience sudden-onset chest pain, arm weakness/numbness, slurred speech, or shortness of breath     Breathing exercises   Order Comments: Perform breathing exercises using your Incentive Spirometer 10 times per hour while awake for 2 weeks.     Fever Management   Order Comments: A low grade fever can be expected after surgery.  Use acetaminophen (TYLENOL) as needed for fever management.  Contact your Surgeon Team if you have a fever greater than 101.5 F, chills, and/or night sweats.     Constipation management   Order Comments: Constipation (hard, dry bowel movements) is expected after surgery due to the combination of being less active, the anesthetic, and the opioid pain medication.  You can do the following to help reduce constipation:  ~  FLUIDS:  Drink clear liquids (water or Gatorade), or juice (apple/prune).  ~  DIET:  Eat a fiber rich diet.    ~  ACTIVITY:  Get up and move around several times a day.  " Increase your activity as you are able.  MEDICATIONS:  Reduce the risk of constipation by starting medications before you are constipated.  You can take Miralax   (1 packet as directed) and/or a stool softener (Senokot 1-2 tablets 1-2 times a day).  If you already have constipation and these medications are not working, you can get magnesium citrate and use as directed.  If you continue to have constipation you can try an over the counter suppository or enema.  Call your Surgeon Team if it has been greater than 3 days since your last bowel movement.     Reduced Urine Output   Order Comments: Changes in the amount of fluids you drank before and after surgery may result in problems urinating.  It is important to stay well-hydrated after surgery and drink plenty of water. If it has been greater than 8 hours since you have urinated despite drinking plenty of water, call your Surgeon Team.     Anticoagulation - aspirin   Order Comments: Take the aspirin as prescribed for a total of four weeks after surgery.  This is given to help minimize your risk of blood clot.     Activity - Exercises to prevent blood clots   Order Comments: Unless otherwise directed by your Surgeon team, perform the following exercises at least three times per day for the first four weeks after surgery to prevent blood clots in your legs: 1) Point and flex your feet (Ankle Pumps), 2) Move your ankle around in big circles, 3) Wiggle your toes, 4) Walk, even for short distances, several times a day, will help decrease the risk of blood clots.     Order Specific Question Answer Comments   Is discharge order? Yes      Pain after Surgery   Order Comments: Pain after surgery is normal and expected.  You will have some amount of pain for several weeks after surgery.  Your pain will improve with time.  There are several things you can do to help reduce your pain including: rest, ice, elevation, and using pain medications as needed. Contact your Surgeon Team  "if you have pain that persists or worsens after surgery despite rest, ice, elevation, and taking your medication(s) as prescribed. Contact your Surgeon Team if you have new numbness, tingling, or weakness in your operative extremity.     Swelling after Surgery   Order Comments: Swelling and/or bruising of the surgical extremity is common and may persist for several months after surgery. In addition to frequent icing and elevation, gentle compressive support with an ACE wrap or tubigrip may help with swelling. Apply compression regularly, removing at least twice daily to perform skin checks. Contact your Surgeon Team if your swelling increases and is NOT associated with an increase in your activity level, or if your swelling increases and is associated with redness and pain.     LOWER Extremity Elevation   Order Comments: Swelling is expected for several months after surgery. This type of swelling is usually associated with gravity and activity, and can be improved with elevation.   The best way to do this is to get your \"toes above your nose\" by laying down and placing several pillows lengthwise under your calf and heel. When elevating your leg keep your knee completely straight. Perform this elevation as often as possible especially for the first two weeks after surgery.     Cold therapy   Order Comments: Ice can be used to control swelling and discomfort after surgery. Place a thin towel over your operative site and apply the ice pack overtop. Leave ice pack in place for 20 minutes, then remove for 20 minutes. Repeat this 20 minutes on/20 minutes off routine as often as tolerated.     acetaminophen (TYLENOL) Instructions   Order Comments: You were discharged with acetaminophen (TYLENOL) for pain management after surgery. Acetaminophen most effectively manages pain symptoms when it is taken on a schedule without missing doses (every four, six, or eight hours). Your Provider will prescribe a safe daily dose between " 3000 - 4000 mg.  Do NOT exceed this daily dose. Most patients use acetaminophen for pain control for the first four weeks after surgery.  You can wean from this medication as your pain decreases.     NSAID Instructions   Order Comments: You were discharged with an anti-inflammatory medication for pain management to use in combination with acetaminophen (TYLENOL) and the narcotic pain medication.  Take this medication exactly as directed.  You should only take one anti-inflammatory at a time.  Some common anti-inflammatories include: ibuprofen (ADVIL, MOTRIN), naproxen (ALEVE, NAPROSYN), celecoxib (CELEBREX), meloxicam (MOBIC), ketorolac (TORADOL).  Take this medication with food and water.     Opioids - Tapering Instructions   Order Comments: In the first three days following surgery, your symptoms may warrant use of the narcotic pain medication every four to six hours as prescribed. This is normal. As your pain symptoms improve, focus your efforts on decreasing (tapering) use of narcotic medications. The most successful tapering strategy is to first, decrease the number of tablets you take every 4-6 hours to the minimum prescribed. Then, increase the amount of time between doses.  For example:  First, taper to   or 1 tablet every 4-6 hours.  Then, taper to   or 1 tablet every 6-8 hours.  Then, taper to   or 1 tablet every 8-10 hours.  Then, taper to   or 1 tablet every 10-12 hours.  Then, taper to   or 1 tablet at bedtime.  The bedtime dose can help with comfort during sleep and is typically the last dose to be discontinued after surgery.     Follow Up Care   Order Comments: You are scheduled for a post operative wound check with Dr. Hernandez's clinic approximately two weeks after surgery. At approximately six weeks after surgery, you will see Dr. Hernandez in clinic. During this visit, repeat X rays of your operative hip will be performed.      Your follow up appointments will be at the location that you regularly see  Dr. Hernandez:    [facility]  [address] [place] [address]  [phone]    [facility]  [address] [address] [place] [id number] [phone]    [facility]  [address] [place] [address]  [phone]     Activity - Total Knee Arthroplasty     Order Specific Question Answer Comments   Is discharge order? Yes      Return to Driving   Order Comments: Return to driving - Driving is NOT permitted until directed by your provider. Under no circumstance are you permitted to drive while using narcotic pain medications.     Order Specific Question Answer Comments   Is discharge order? Yes      Dressing / Wound Care - Wound   Order Comments: You have a clean dressing on your surgical wound. Dressing change instructions as follows: remove your dressing in 7 days, and leave incision open to air. Contact your Surgeon Team if you have increased redness, warmth around the surgical wound, and/or drainage from the surgical wound.     Dressing / Wound Care - NO Tub Bathing   Order Comments: Tub bathing, swimming, or any other activities that will cause your incision to be submerged in water should be avoided until allowed by your Surgeon.     Return to Driving   Order Comments: Return to driving - Driving is NOT permitted until directed by your provider. Under no circumstance are you permitted to drive while using narcotic pain medications.     Order Specific Question Answer Comments   Is discharge order? Yes      Dressing / Wound Care - Wound   Order Comments: You have a clean dressing on your surgical wound. Dressing change instructions as follows: remove your dressing in 7 days, and leave incision open to air. and dressing will be removed at your follow-up appointment. Contact your Surgeon Team if you have increased redness, warmth around the surgical wound, and/or drainage from the surgical  wound.     Dressing / Wound Care - NO Tub Bathing   Order Comments: Tub bathing, swimming, or any other activities that will cause your incision to be submerged in water should be avoided until allowed by your Surgeon.     Opioid Instructions (Less than 65 years)   Order Comments: You were discharged with an opioid medication (hydromorphone, oxycodone, hydrocodone, or tramadol). This medication should only be taken for breakthrough pain that is not controlled with acetaminophen (TYLENOL). If you rate your pain less than 3 you do not need this medication.  Pain rating 0-3:  You do not need this medication.  Pain rating 4-6:  Take 1 tablet every 4-6 hours as needed  Pain rating 7-10:  Take 2 tablets every 4-6 hours as needed.  Do not exceed 6 tablets per day     Weight bearing as tolerated   Order Comments: Weight bearing as tolerated on your operative extremity.     Order Specific Question Answer Comments   Is discharge order? Yes      Shower with wound/dressing covered   Order Comments: You must COVER your dressing or incision with saran wrap (or any other non-permeable covering) to allow the incision to remain dry while showering.  You may shower 3 days after surgery as long as the surgical wound stays dry. Continue to cover your dressing or incision for showering until your first office visit.  You are strictly prohibited from soaking   or submerging the surgical wound underwater.     No precautions   Order Comments: No precautions directed by your Provider.  You may perform range of motion activities as tolerated.     Order Specific Question Answer Comments   Is discharge order? Yes      Weight bearing as tolerated   Order Comments: Weight bearing as tolerated on your operative extremity.     Order Specific Question Answer Comments   Is discharge order? Yes      Shower with wound/dressing covered   Order Comments: You must COVER your dressing or incision with saran wrap (or any other non-permeable covering) to  allow the incision to remain dry while showering.  You may shower 3 days after surgery as long as the surgical wound stays dry. Continue to cover your dressing or incision for showering until your first office visit.  You are strictly prohibited from soaking   or submerging the surgical wound underwater.     MD face to face encounter   Order Comments: Documentation of Face to Face and Certification for Home Health Services    I certify that patient: Adelaida Packer is under my care and that I, or a nurse practitioner or physician's assistant working with me, had a face-to-face encounter that meets the physician face-to-face encounter requirements with this patient on: 8/12/2021.    This encounter with the patient was in whole, or in part, for the following medical condition, which is the primary reason for home health care: TOTAL KNEE ARTHROPLASTY    I certify that, based on my findings, the following services are medically necessary home health services:  Physical therapy  My clinical findings support the need for the above services because: Physical Therapy needed to assess and treat the above functional impairments: decreases mobility need for continued physical therapy for strengthening/ROM and increase independence with all functional motility/gait.     Further, I certify that my clinical findings support that this patient is homebound (i.e. absences from home require considerable and taxing effort and are for medical reasons or [employer] or infrequently or of short duration when for other reasons) because: Requires assistance of another person or specialized equipment to access medical services because patient: Patient unable to drive and range of motion limitations prevents ability to exit home safely and requires supervision of another for safe transfer.    Based on the above findings. I certify that this patient is confined to the home and needs intermittent skilled nursing care, physical therapy  and/or speech therapy.  The patient is under my care, and I have initiated the establishment of the plan of care.  This patient will be followed by a physician who will periodically review the plan of care.  Physician/Provider to provide follow up care: Windy Gordillo    Attending hospital physician (the Medicare certified [facility] provider): Dylan Hernandez MD  Physician Signature: See electronic signature associated with these discharge orders.  Date: 8/12/2021     Crutches DME   Order Comments: DME Documentation: Describe the reason for need to support medical necessity: Impaired gait status post knee surgery. I, the undersigned, certify that the above prescribed supplies are medically necessary for this patient and is both reasonable and necessary in reference to accepted standards of medical practice in the treatment of this patient's condition and is not prescribed as a convenience.     Order Specific Question Answer Comments   DME Provider: [facility]    Crutch Type: Standard    Crutches Add On: NA    Length of Need: Lifetime      Cane DME   Order Comments: DME Documentation: Describe the reason for need to support medical necessity: Impaired gait status post knee surgery. I, the undersigned, certify that the above prescribed supplies are medically necessary for this patient and is both reasonable and necessary in reference to accepted standards of medical practice in the treatment of this patient's condition and is not prescribed as a convenience.     Order Specific Question Answer Comments   DME Provider: [facility]    Cane Type: Single Tip    Reminder: Patient can typically get 1 every 5 years      Walker DME   Order Comments: DME Documentation: Describe the reason for need to support medical necessity: Impaired gait status post knee surgery. I, the undersigned, certify that the above prescribed supplies are medically necessary for this patient and is both reasonable and necessary in reference  to accepted standards of medical practice in the treatment of this patient's condition and is not prescribed as a convenience.     Order Specific Question Answer Comments   DME Provider: [facility]    Walker Type: Standard (2 Wheel)    Accessories: N/A      Walker Order for DME - ONLY FOR DME   Order Comments: I, the undersigned, certify that the above prescribed supplies are medically necessary for this patient and is both reasonable and necessary in reference to accepted standards of medical and necessary in reference to accepted standards of medical practice in the treatment of this patient's condition and is not prescribed as a convenience.      Order Specific Question Answer Comments   DME Provider: [facility]    Walker Type: Standard (2 Wheel)      Regular Diet Adult     Order Specific Question Answer Comments   Is discharge order? Yes        Federico Zhu MD  Orthopaedic Surgery Resident, PGY-4  Pager: [phone]

## 2021-08-12 NOTE — PLAN OF CARE
OT: Orders received and acknowledged, chart reviewed. Per PT, no skilled OT needs indicated as patient is mobilizing well, no concerns with ADLs. Will defer and complete OT orders.

## 2021-08-12 NOTE — CONSULTS
Care Management Discharge Note    Discharge Date: 08/12/2021       Discharge Disposition:  home    Discharge Services:  Home PT    Discharge DME:  None    Discharge Transportation: Son    Private pay costs discussed: Not applicable        Education Provided on the Discharge Plan:  yes  Persons Notified of Discharge Plans: patient  Patient/Family in Agreement with the Plan:  yes    Handoff Referral Completed: Yes    Additional Information:This RNCC spoke with patient to discuss discharge planning, patient doesn't appear to have any complex needs during admission or at discharge and is progressing with mobility. Plan is for patient to return to previous living situation and would benefit from further skilled home physical therapy for strengthening and increase independence with all functional mobility/gait. Son to provide transport home and will stay  with patient for 2 weeks and then daughter will come to stay. Patient okay with [facility]  Home Care referral, denies any further significant concerns related to DC.    Referral sent to Chanel/STAR ANN RN CCM  RN Care Coordinator [address] [place] [id number]  [email or web address]   Office: [phone]   Pager: [phone]    To contact Weekend RNCC, dial [phone] and enter job code [phone] at prompt. This pager can not be contacted by text page or outside line.

## 2021-08-12 NOTE — PROGRESS NOTES
Orthopaedic Surgery Progress Note 08/12/2021    S: No acute events overnight.  Pain well controlled. Denies numbness or tingling. Denies chest pain, SOB, nausea/vomiting. Tolerating PO diet. Voiding spontaneously.  Ambulating in hallway, working with PT - will need to do stair training today.    O:  Temp: (!) 96.6  F (35.9  C) Temp src: Oral BP: 108/62 Pulse: 76   Resp: 16 SpO2: 96 % O2 Device: None (Room air) Oxygen Delivery: 2 LPM    Exam:  Gen: No acute distress, resting comfortably in bed.  Resp: Non-labored breathing  MSK:  RLE:  - Knee dressings c/d/i  - SILT femoral/tibial/sural/saphenous/DP/SP nerves  - Fires Quad, TA, EHL, FHL, GaSC  - PT/DP pulses 2+, foot wwp      No lab results found in last 7 days.    Invalid input(s): SEDRATE    XR R knee: interval TKA placement, implants appropriately aligned - no evidence of acute implant failure.    Assessment: Adelaida Packer is a 51 year old female s/p Procedure(s):  ARTHROPLASTY, KNEE, TOTAL RIGHT on 8/11/2021 with Dr. Quinones.     Plan:  Activity: Up with assist and assistive devices as needed until independent. Knee ROM as tolerated.   Weight bearing status: WBAT    Antibiotics: Cefazolin x 24 hours   Diet: Regular. Bowel regimen. Anti-emetics PRN.    DVT prophylaxis: Aspirin 162mg daily x 4 weeks, starting morning of POD 1  Elevation: Elevate heels off of bed on pillows, no pillows behind the knee at any time    Wound Care: Dressing keep c/d/i x7 days  Pain management: Orals PRN, IV for breakthrough only  X-rays: AP/Lat knee XR in PACU (complete)  Physical Therapy: Mobilization, ROM, ADL's  Occupational Therapy: ADL's  Labs: POD1 HGB  Cultures: None  Consults: PT, OT. Hospitalist, appreciate assistance in caring for this patient throughout the perioperative period  Disposition: Pending progress with therapies, pain control on orals, and medical stability, anticipate discharge to home with home care PT on POD #1.  Future Appointments   Date Time Provider  Department Center   8/12/2021  8:00 AM UR PT WAITLIST [facility] [place]   8/12/2021  9:00 AM Delia Jiménez, OT [facility] [place]   8/12/2021  1:00 PM Ranulfo Madrigal, PT [facility] [place]   8/27/2021  9:30 AM MG NURSE ONLY [facility] [place]   9/28/2021 10:00 AM Dylan Hernandez MD [facility]       Patient discussed with Dr. Hernandez.    Federico Zhu MD  Orthopaedic Surgery PGY-4  Pager [phone]    Please page me directly with any questions/concerns during regular weekday hours before 5pm. If there is no response, if it is a weekend, or if it is during evening hours then please page the orthopaedic surgery resident on call.

## 2021-08-12 NOTE — PLAN OF CARE
VS: VSS and capno: stable   O2: Sats >90% on RA   Output: Voiding spontaneously without difficulty    Last BM: 8/9. Senna given    Activity: Up with 1 assist, GB and walker. WBAT.    Skin: R knee incision    Pain: Pain in R knee, managing well with PRN oxy q 4. PRN robaxin given. Ice packs. Scheduled tylenol and toradol.    CMS: Denies numbness and tingling    Dressing: CDI   Diet: Regular   LDA: PIV- SL   Equipment: IV pole, PCDs, capno   Plan: Possible discharge to home 8/12    Additional Info:        Patient vital signs are at baseline: Yes  Patient able to ambulate as they were prior to admission or with assist devices provided by therapies during their stay:  Yes  Patient MUST void prior to discharge:  Yes  Patient able to tolerate oral intake:  Yes  Pain has adequate pain control using Oral analgesics:  Yes, but still receiving scheduled Toradol.

## 2021-08-12 NOTE — PLAN OF CARE
Physical Therapy Discharge Summary    Reason for therapy discharge:    Discharged to home with home therapy.    Progress towards therapy goal(s). See goals on Care Plan in [facility] electronic health record for goal details.  Goals met    Therapy recommendation(s):    Continued therapy is recommended.  Rationale/Recommendations:  to promote independent function..

## 2021-08-12 NOTE — PLAN OF CARE
"  VS: /64   Pulse 67   Temp (!) 96.1  F (35.6  C) (Oral)   Resp 16   Ht 1.575 m (5' 2\")   Wt 104.7 kg (230 lb 13.2 oz)   LMP 08/13/2005   SpO2 96%   BMI 42.22 kg/m    Denies SOB and chest pain    O2: 96% at room air   Output: Voiding without difficulty    Last BM: 08/09/21 per pt report    Activity: Assist of 1, walker & gaitbelt    Up for meals In  bed    Skin: Incision on R knee   Pain: Managed by 10mg oxycodone    CMS: Numbness on R BLE improving    Dressing: CDI    Diet: Regular    LDA: Removed    Equipment: Walker, gait belt, personal belongings   Plan: Discharge at 11:30am    Additional Info: Discharge summary discussed with patient , discharge home medication given to patient   Patient vital signs are at baseline: Yes  Patient able to ambulate as they were prior to admission or with assist devices provided by therapies during their stay:  Yes  Patient MUST void prior to discharge:  Yes  Patient able to tolerate oral intake:  Yes  Pain has adequate pain control using Oral analgesics:  yes      "

## 2021-08-13 ENCOUNTER — DOCUMENTATION ONLY (OUTPATIENT)
Dept: CARE COORDINATION | Facility: CLINIC | Age: 51
End: 2021-08-13
Payer: COMMERCIAL

## 2021-08-13 NOTE — PROGRESS NOTES
[facility] now requests orders and shares plan of care/discharge summaries for some patients through [employer].  Please REPLY TO THIS MESSAGE OR ROUTE BACK TO THE AUTHOR in order to give authorization for orders when needed.  This is considered a verbal order, you will still receive a faxed copy of orders for signature.  Thank you for your assistance in improving collaboration for our patients.    FYI only: Patient requesting visit on 8/16/21 for admission to home care services.

## 2021-08-16 ENCOUNTER — TELEPHONE (OUTPATIENT)
Dept: ORTHOPEDICS | Facility: CLINIC | Age: 51
End: 2021-08-16

## 2021-08-16 ENCOUNTER — MYC MEDICAL ADVICE (OUTPATIENT)
Dept: ORTHOPEDICS | Facility: CLINIC | Age: 51
End: 2021-08-16

## 2021-08-16 ENCOUNTER — PATIENT OUTREACH (OUTPATIENT)
Dept: NURSING | Facility: CLINIC | Age: 51
End: 2021-08-16
Payer: COMMERCIAL

## 2021-08-16 ENCOUNTER — TELEPHONE (OUTPATIENT)
Dept: OTHER | Facility: CLINIC | Age: 51
End: 2021-08-16

## 2021-08-16 DIAGNOSIS — M17.0 PRIMARY OSTEOARTHRITIS OF BOTH KNEES: ICD-10-CM

## 2021-08-16 DIAGNOSIS — G89.18 ACUTE POST-OPERATIVE PAIN: ICD-10-CM

## 2021-08-16 NOTE — PROGRESS NOTES
Clinic Care Coordination Contact  Community Health Worker Initial Outreach    CHW Initial Information Gathering:  Referral Source: PCP  CHW Additional Questions  If ED/Hospital discharge, follow-up appointment scheduled as recommended?: Yes  Medication changes made following ED/Hospital discharge?: No  MyChart active?: Yes  Patient sent Social Determinants of Health questionnaire?: No    Patient accepts CC: No, Patient declined CCC no questions or concerns at this time.. Patient will be sent Care Coordination introduction letter for future reference.      [facility]  SITUATION                                                      Admission:    Admission Date: 08/11/21   Reason for Admission: Chronic pain of right knee  Discharge:   Discharge Date: 08/12/21  Discharge Diagnosis: Arthroplasty knee total right    BACKGROUND                                                          ASSESSMENT      Discharge Assessment  How are you doing now that you are home?: good  Do you feel your condition is stable enough to be safe at home until your provider visit?: Yes  Does the patient have their discharge instructions? : Yes  Does the patient have questions regarding their discharge instructions? : Yes, questions answered by outreach staff  Were you started on any new medications or were there changes to any of your previous medications? : No  Does the patient have all of their medications?: No or Other - Connect patient to RN (Patient said she was not given enough pain medications. she has called providers office about this.)  Do you have questions regarding any of your medications? : No  Discharge follow-up appointment scheduled within 14 calendar days? : Yes  Discharge Follow Up Appointment Date: 08/27/21  Discharge Follow Up Appointment Scheduled with?: Specialty Care Provider    Post-op  Did the patient have surgery or a procedure: Yes    Care Management       Care Mgmt General  Assessment  Referral  Referral Source: PCP      Megan AGUILA, BRUCEW  Clinic Care Coordination  [facility] Clinics: [place], [place] & [place]  Phone: [phone]                   PLAN                                                      Outpatient Plan:  Home care referral for PT placed.     Future Appointments   Date Time Provider Department Center   8/27/2021  9:30 AM MG NURSE ONLY [facility] [place]   9/28/2021 10:00 AM Dylan Hernandez MD [facility] [place]         For any urgent concerns, please contact our 24 hour nurse triage line: [phone] ([address] [place] [address])

## 2021-08-16 NOTE — TELEPHONE ENCOUNTER
[facility] Call Center    Phone Message    May a detailed message be left on voicemail: yes     Reason for Call: Order(s): Home Care Orders: Physical Therapy (PT): 2x week for 2 weeks     [facility] orders 1x week for 2 weeks    Action Taken: Message routed to:  Adult Clinics: Orthopedics p [id number]    Travel Screening: Not Applicable

## 2021-08-16 NOTE — TELEPHONE ENCOUNTER
Health Call Center    Phone Message    May a detailed message be left on voicemail: no     Reason for Call: Medication Refill Request    Has the patient contacted the pharmacy for the refill? Pain medication refill. Surgery 8.11.21 with Dr Hernandez.   Pt states there are 3 refills in her chart.  She did not know the names.        [id number] - [place], [place] - [address] [place] [address] AT [address]    Please let patient know status.       Action Taken: Message routed to:  Adult Clinics: Orthopedics p [id number]    Travel Screening: Not Applicable

## 2021-08-16 NOTE — TELEPHONE ENCOUNTER
CHW contacted patient for post hospital discharge and patient stared she will be out of pended medications today.     CHW stated she will notify care team od refill requests.    To provider to advise.    CARYL Mcgrath  Clinic Care Coordination  [facility]: [place], [place] & [place]  Phone: [phone]

## 2021-08-16 NOTE — TELEPHONE ENCOUNTER
Brief Ortho Note    Paged by patient regarding post-operative pain. In brief, this patient is a 51 year old female s/p R TKA 8/11/2021 with Dr. Hernandez with ongoing post-operative pain. She reports that she ran out of her 5 day course of post-operative oxycodone and she is continuing to have severe pain in her knee. She called the ortho clinic twice during business hours this morning as well as messaged in [employer] without response until after hours and she was directed to page the ortho resident on call. I informed that patient that I did not have privileges to refill any narcotic medication. I apologized that she was unable to be contacted back and have her concerns addressed. I did order 15# 750 mg Robaxin Q6h PRN to be sent to [facility] pharmacy in [place] on [facility]. I told her to additionally continue alternation Tylenol and Ibuprofen. Encouraged her to continue ice and elevation. Patient agreeable to above plan. I told her to call back to the clinic in the morning and I will message the clinic staff to get in touch with her.        Martín Quintero MD  Orthopaedic Surgery PGY1  Pager: [phone]

## 2021-08-16 NOTE — TELEPHONE ENCOUNTER
Patient was contacted today, after two telephone calls and a mychart. The call apologized that no one had gotten back to her today. It was explained to the patient that her messages and refill request was sent to Dr. Hernandez. She is concerned  as she is out of pain medication and having a lot of pain. She is to start PT tomorrow.  She was given the ortho on call number to see if she can get a refill on her pain medication. The patient was thankful for the call and will try the on call ortho number.

## 2021-08-17 ENCOUNTER — TELEPHONE (OUTPATIENT)
Dept: ORTHOPEDICS | Facility: CLINIC | Age: 51
End: 2021-08-17

## 2021-08-17 RX ORDER — OXYCODONE HYDROCHLORIDE 5 MG/1
5-10 TABLET ORAL EVERY 4 HOURS PRN
Qty: 30 TABLET | Refills: 0 | Status: SHIPPED | OUTPATIENT
Start: 2021-08-17 | End: 2021-08-19

## 2021-08-17 RX ORDER — ACETAMINOPHEN 325 MG/1
650 TABLET ORAL EVERY 4 HOURS PRN
Qty: 50 TABLET | Refills: 0 | Status: ON HOLD | OUTPATIENT
Start: 2021-08-17 | End: 2022-07-28

## 2021-08-17 NOTE — TELEPHONE ENCOUNTER
PA Initiation    Medication: oxyCODONE (ROXICODONE) 5 MG tablet   Insurance Company: [facility] [employer] - Phone [phone] Fax [phone]  Pharmacy Filling the Rx: [id number] - [place] - [address] [place] [address] AT [address]  Filling Pharmacy Phone: [phone]  Filling Pharmacy Fax: [phone]  Start Date: 8/17/2021

## 2021-08-18 NOTE — TELEPHONE ENCOUNTER
PRIOR AUTHORIZATION DENIED    Medication: oxyCODONE (ROXICODONE) 5 MG tablet--QUANTITY DENIED    Denial Date: 8/17/2021    Denial Rational: Per pharmacy that the way script is written they are processing 30 tablets for a 3 day supply.  Plan denied request and will only cover up to 9 tablets a day.  Per pharmacy patient picked up med with a coupon card on 8/17/21    Appeal Information:

## 2021-08-19 ENCOUNTER — MYC REFILL (OUTPATIENT)
Dept: ORTHOPEDICS | Facility: CLINIC | Age: 51
End: 2021-08-19

## 2021-08-19 DIAGNOSIS — G89.18 ACUTE POST-OPERATIVE PAIN: ICD-10-CM

## 2021-08-19 DIAGNOSIS — Z96.651 HISTORY OF TOTAL RIGHT KNEE REPLACEMENT: Primary | ICD-10-CM

## 2021-08-20 ENCOUNTER — MYC MEDICAL ADVICE (OUTPATIENT)
Dept: ORTHOPEDICS | Facility: CLINIC | Age: 51
End: 2021-08-20

## 2021-08-20 DIAGNOSIS — Z96.651 HISTORY OF TOTAL RIGHT KNEE REPLACEMENT: Primary | ICD-10-CM

## 2021-08-20 DIAGNOSIS — I12.9 HYPERTENSION, RENAL: ICD-10-CM

## 2021-08-20 RX ORDER — LABETALOL 300 MG/1
300 TABLET, FILM COATED ORAL 2 TIMES DAILY
Qty: 180 TABLET | Refills: 3 | Status: SHIPPED | OUTPATIENT
Start: 2021-08-20 | End: 2022-08-25

## 2021-08-20 RX ORDER — OXYCODONE HYDROCHLORIDE 5 MG/1
5 TABLET ORAL EVERY 4 HOURS PRN
Qty: 30 TABLET | Refills: 0 | Status: SHIPPED | OUTPATIENT
Start: 2021-08-20 | End: 2021-08-27

## 2021-08-27 ENCOUNTER — ALLIED HEALTH/NURSE VISIT (OUTPATIENT)
Dept: NURSING | Facility: CLINIC | Age: 51
End: 2021-08-27
Payer: COMMERCIAL

## 2021-08-27 DIAGNOSIS — Z96.651 HISTORY OF TOTAL RIGHT KNEE REPLACEMENT: ICD-10-CM

## 2021-08-27 DIAGNOSIS — Z96.651 HISTORY OF TOTAL RIGHT KNEE REPLACEMENT: Primary | ICD-10-CM

## 2021-08-27 DIAGNOSIS — G89.18 ACUTE POST-OPERATIVE PAIN: ICD-10-CM

## 2021-08-27 PROCEDURE — 99024 POSTOP FOLLOW-UP VISIT: CPT

## 2021-08-27 RX ORDER — GABAPENTIN 100 MG/1
100 CAPSULE ORAL 3 TIMES DAILY
Qty: 30 CAPSULE | Refills: 0 | Status: SHIPPED | OUTPATIENT
Start: 2021-08-27 | End: 2021-09-09

## 2021-08-27 RX ORDER — HYDROXYZINE HYDROCHLORIDE 25 MG/1
25 TABLET, FILM COATED ORAL EVERY 6 HOURS PRN
Qty: 30 TABLET | Refills: 0 | Status: SHIPPED | OUTPATIENT
Start: 2021-08-27 | End: 2022-06-03

## 2021-08-27 RX ORDER — OXYCODONE HYDROCHLORIDE 5 MG/1
5 TABLET ORAL EVERY 4 HOURS PRN
Qty: 42 TABLET | Refills: 0 | Status: SHIPPED | OUTPATIENT
Start: 2021-08-27 | End: 2021-09-08

## 2021-08-27 NOTE — PATIENT INSTRUCTIONS
"No NSAIDs post op while on aspirin. This includes Advil (ibuprofen), Aleve (naproxen), and Mobic (meloxicam).    Continue anticoagulation plan of:  Aspirin 162mg once daily for 28 days.      If incision is dry, OK to leave open to air (no bandage). If incision is draining, cover with gauze and keep clean and dry and call the office. Please do not apply any ointments or lotions to incision. No soaking in tubs or pools for 3 months after surgery.    If any swelling in leg(s), elevate surgical leg above heart (lay flat, elevate leg on blankets or pillows). Continue to wear compression stockings during the day as long you are having leg swelling. May stop wearing or wear intermittently if not having swelling.      *Please call if a sharp increase in pain, fall, change in movement or sensation, chest pain, calf pain, shortness of breath, fevers greater than 101.4, redness, erythema around the incision sites.    *If needing refills on pain meds, please call clinic at least 3 days before you run out.    *Continue physical therapy as directed.  If needing orders for Outpatient Physical therapy, please call the clinic.    *Continue to use assistive device: cane or crutch until no limp. Discuss with therapist if questions.    *Dr. Hernandez advises no dental work or cleaning until 6 months after any surgery with implants to hips or knees unless emergent issue arises. This includes total joint surgeries. Once you are 6 months past your surgery, you will need prophylactic antibiotics for the rest of your lifetime with any cleaning or dental work.  This is also for any other \"dirty\" procedures that you may have, such as a colonoscopy.    If you have any questions, call the clinic at [phone] and ask for the Orthopaedics dept.     ----------------------------------------------------------------------        Thanks for coming today.  Ortho/Sports Medicine Clinic  [address] [place] [address]    To schedule future " appointments in Ortho Clinic, you may call [phone].    To schedule ordered imaging by your provider:   Call Central Imaging [phone]    To schedule an injection ordered by your provider:  Call Central Imaging Injection scheduling line: [phone]  MyChart available online at:  [email or web address]    Please call if any further questions or concerns ([phone]).  Clinic hours 8 am to 5 pm.    Return to clinic (call) if symptoms worsen or fail to improve.

## 2021-08-27 NOTE — PROGRESS NOTES
Adelaida Packer comes into clinic today at the request of Dr. Hernandez for suture removal and wound check. The patient is status post RTKA on 8/11/21.     Incision clean, dry and intact.  Steri-strips removed. Incision cleaned. Pt instructed on wound care and symptoms of infection to watch for.     Discussed anticoagulation. Pt is currently taking Aspirin 162mg. Pt advised against any NSAIDs while on any anticoagulation med (ASA, Lovenox,or Coumadin).      Discussed current pain medication regime. Pt currently taking Oxycodone, she was using it sparingly as she thought her insurance was going to deny any future refills due to a letter she received. Reviewed the letter and this was sent before we started the PA process, she can get refills for 1 month from 8/11/21.  She is having a lot of pain and seems to be more nerve related. Complains of a shooting pain from her foot to her back, touch sensation pain in her lower leg, and general nerve/tingling pain at her knee. Discussed with Dr. Hernandez and we can start gabapentin. Oxycodone refilled and also gave prescription for hydroxyzine as patient is having a hard time with pain relief.     Discussed current physical therapy program. Pt is home care then [facility]    Patient using walker to aid in ambulation    Discussed edema. Patient has moderate edema at knee, none in lower leg      Total time spent with Pt: 45 minutes.    Josette Dumont RN

## 2021-08-30 ENCOUNTER — THERAPY VISIT (OUTPATIENT)
Dept: PHYSICAL THERAPY | Facility: CLINIC | Age: 51
End: 2021-08-30
Attending: ORTHOPAEDIC SURGERY
Payer: COMMERCIAL

## 2021-08-30 DIAGNOSIS — R60.0 LOCALIZED EDEMA: ICD-10-CM

## 2021-08-30 DIAGNOSIS — Z96.651 AFTERCARE FOLLOWING RIGHT KNEE JOINT REPLACEMENT SURGERY: Primary | ICD-10-CM

## 2021-08-30 DIAGNOSIS — Z96.651 PRESENCE OF TOTAL RIGHT KNEE JOINT PROSTHESIS: ICD-10-CM

## 2021-08-30 DIAGNOSIS — R26.9 ABNORMAL GAIT: ICD-10-CM

## 2021-08-30 DIAGNOSIS — Z96.651 HISTORY OF TOTAL RIGHT KNEE REPLACEMENT: ICD-10-CM

## 2021-08-30 DIAGNOSIS — Z47.1 AFTERCARE FOLLOWING RIGHT KNEE JOINT REPLACEMENT SURGERY: Primary | ICD-10-CM

## 2021-08-30 PROCEDURE — 97110 THERAPEUTIC EXERCISES: CPT | Mod: GP | Performed by: PHYSICAL THERAPIST

## 2021-08-30 PROCEDURE — 97016 VASOPNEUMATIC DEVICE THERAPY: CPT | Mod: GP | Performed by: PHYSICAL THERAPIST

## 2021-08-30 PROCEDURE — 97161 PT EVAL LOW COMPLEX 20 MIN: CPT | Mod: GP | Performed by: PHYSICAL THERAPIST

## 2021-08-30 ASSESSMENT — ACTIVITIES OF DAILY LIVING (ADL)
GIVING WAY, BUCKLING OR SHIFTING OF KNEE: THE SYMPTOM AFFECTS MY ACTIVITY MODERATELY
SIT WITH YOUR KNEE BENT: ACTIVITY IS SOMEWHAT DIFFICULT
STIFFNESS: THE SYMPTOM AFFECTS MY ACTIVITY SEVERELY
WEAKNESS: THE SYMPTOM AFFECTS MY ACTIVITY SLIGHTLY
GO DOWN STAIRS: ACTIVITY IS FAIRLY DIFFICULT
KNEE_ACTIVITY_OF_DAILY_LIVING_SUM: 33
SQUAT: I AM UNABLE TO DO THE ACTIVITY
STAND: ACTIVITY IS MINIMALLY DIFFICULT
SWELLING: THE SYMPTOM AFFECTS MY ACTIVITY SEVERELY
AS_A_RESULT_OF_YOUR_KNEE_INJURY,_HOW_WOULD_YOU_RATE_YOUR_CURRENT_LEVEL_OF_DAILY_ACTIVITY?: ABNORMAL
HOW_WOULD_YOU_RATE_THE_CURRENT_FUNCTION_OF_YOUR_KNEE_DURING_YOUR_USUAL_DAILY_ACTIVITIES_ON_A_SCALE_FROM_0_TO_100_WITH_100_BEING_YOUR_LEVEL_OF_KNEE_FUNCTION_PRIOR_TO_YOUR_INJURY_AND_0_BEING_THE_INABILITY_TO_PERFORM_ANY_OF_YOUR_USUAL_DAILY_ACTIVITIES?: 50
WALK: ACTIVITY IS MINIMALLY DIFFICULT
KNEE_ACTIVITY_OF_DAILY_LIVING_SCORE: 47.14
RISE FROM A CHAIR: ACTIVITY IS MINIMALLY DIFFICULT
LIMPING: THE SYMPTOM AFFECTS MY ACTIVITY SLIGHTLY
PAIN: THE SYMPTOM AFFECTS MY ACTIVITY MODERATELY
HOW_WOULD_YOU_RATE_THE_OVERALL_FUNCTION_OF_YOUR_KNEE_DURING_YOUR_USUAL_DAILY_ACTIVITIES?: ABNORMAL
GO UP STAIRS: ACTIVITY IS MINIMALLY DIFFICULT
KNEEL ON THE FRONT OF YOUR KNEE: I AM UNABLE TO DO THE ACTIVITY
RAW_SCORE: 33

## 2021-08-30 NOTE — PROGRESS NOTES
Physical Therapy Initial Evaluation  Subjective:    Patient Health History  Adelaida Packer being seen for right knee rehab following TKA.     Problem began: 8/11/2021 (DOS).   Problem occurred: DJD / progressive wear and tear     General health as reported by patient is good.  Pertinent medical history includes: high blood pressure, overweight, osteoarthritis and pain at night/rest.   Red flags:  None as reported by patient.  Medical allergies: see EMR.   Surgeries: Right knee arthroscopy, hysterectomy, right bunionectomy, parathyroidectomy, R wrist ganglion cyst removal, tonsillectomy.    Current medications:  High blood pressure medication, muscle relaxants and pain medication.    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   Primary job tasks include:  Computer work, prolonged sitting, prolonged standing and repetitive tasks.                  Therapist Generated HPI Evaluation         Type of problem:  Right knee.    This is a new condition.  Condition occurred with:  Degenerative joint disease.  Where condition occurred: for unknown reasons.  Patient reports pain:  In the joint, anterior, posterior, lateral and lower leg.  Pain is described as aching and is constant (rated as 5/10).  Pain is worse during the night.  Since onset symptoms are gradually improving.  Associated symptoms:  Loss of motion/stiffness, buckling/giving out, edema, loss of strength, numbness and tingling. Symptoms are exacerbated by sitting  and relieved by activity/movement, ice and other (walking, elevating leg).  Special tests included:  X-ray.  Previous treatment includes physical therapy (home PT for 2 weeks following surgery). There was moderate improvement following previous treatment.  Restrictions due to condition include:  Currently not working due to present treatment.  Barriers include:  Stairs and transportation (lives in 3 level [facility]; currently staying on main level; daughter is temporarily staying with her  to help out).      Patient's Goals:  To recover ROM and walk normally                 Objective:    Gait:    Gait Type:  Antalgic   Assistive Devices:  Walker                                                        Knee Evaluation:  ROM:    AROM    Hyperextension: Left:     Right: 6  Extension: Left:    Right:  0  Flexion: Left:   Right: 78  PROM    Hyperextension: Left:   Right:  8  Extension: Left:   Right:  0  Flexion: Left:   Right:  90      Strength:     Extension:  Left: 5/5    Pain:-      Right: 3-/5    Pain:+  Flexion:  Left: 5/5    Pain:-      Right: 4+/5    Pain:+      Quad Set Right: Poor    Pain:        Edema:  Edema of the knee: Mild/moderate effusion R knee with egg sized pocket of swelling over lateral aspect of knee.    Mobility Testing:      Patellofemoral Medial:  Right: normal  Patellofemoral Lateral:  Right: normal  Patellofemoral Superior:  Right: hypomobile  Patellofemoral Inferior:  Right: hypomobile        General   Able to perform flexion SLR w/o extensor lag  ROS    Assessment/Plan:    Patient is a 51 year old female with right knee complaints.    Patient has the following significant findings with corresponding treatment plan.                Diagnosis 1:  Right Knee Pain S/P Right TKA   Pain -  self management, education and home program  Decreased ROM/flexibility - manual therapy and therapeutic exercise  Decreased strength - therapeutic exercise and therapeutic activities  Edema - vasopneumatics and self management/home program  Impaired gait - gait training and assistive devices  Impaired muscle performance - neuro re-education  Decreased function - therapeutic activities and home program    Cumulative Therapy Evaluation is: Low complexity.    Previous and current functional limitations:  (See Goal Flow Sheet for this information)    Short term and Long term goals: (See Goal Flow Sheet for this information)     Communication ability:  Patient appears to be able to clearly communicate and  understand verbal and written communication and follow directions correctly.  Treatment Explanation - The following has been discussed with the patient:    RX ordered/plan of care  Anticipated outcomes  Possible risks and side effects  This patient would benefit from PT intervention to resume normal activities.   Rehab potential is good.    Frequency:  2 X week, once daily  Duration:  for 6 weeks tapering to 1 X a week over 4 weeks  Discharge Plan:  Achieve all LTG.  Independent in home treatment program.  Reach maximal therapeutic benefit.    Please refer to the daily flowsheet for treatment today, total treatment time and time spent performing 1:1 timed codes.

## 2021-09-02 ENCOUNTER — THERAPY VISIT (OUTPATIENT)
Dept: PHYSICAL THERAPY | Facility: CLINIC | Age: 51
End: 2021-09-02
Payer: COMMERCIAL

## 2021-09-02 DIAGNOSIS — Z96.651 PRESENCE OF TOTAL RIGHT KNEE JOINT PROSTHESIS: ICD-10-CM

## 2021-09-02 DIAGNOSIS — Z47.1 AFTERCARE FOLLOWING RIGHT KNEE JOINT REPLACEMENT SURGERY: ICD-10-CM

## 2021-09-02 DIAGNOSIS — R26.9 ABNORMAL GAIT: ICD-10-CM

## 2021-09-02 DIAGNOSIS — Z96.651 AFTERCARE FOLLOWING RIGHT KNEE JOINT REPLACEMENT SURGERY: ICD-10-CM

## 2021-09-02 DIAGNOSIS — R60.0 LOCALIZED EDEMA: ICD-10-CM

## 2021-09-02 PROCEDURE — 97110 THERAPEUTIC EXERCISES: CPT | Mod: GP

## 2021-09-07 ENCOUNTER — THERAPY VISIT (OUTPATIENT)
Dept: PHYSICAL THERAPY | Facility: CLINIC | Age: 51
End: 2021-09-07
Payer: COMMERCIAL

## 2021-09-07 DIAGNOSIS — R26.9 ABNORMAL GAIT: ICD-10-CM

## 2021-09-07 DIAGNOSIS — Z47.1 AFTERCARE FOLLOWING RIGHT KNEE JOINT REPLACEMENT SURGERY: ICD-10-CM

## 2021-09-07 DIAGNOSIS — Z96.651 AFTERCARE FOLLOWING RIGHT KNEE JOINT REPLACEMENT SURGERY: ICD-10-CM

## 2021-09-07 DIAGNOSIS — R60.0 LOCALIZED EDEMA: ICD-10-CM

## 2021-09-07 DIAGNOSIS — Z96.651 PRESENCE OF TOTAL RIGHT KNEE JOINT PROSTHESIS: ICD-10-CM

## 2021-09-07 PROCEDURE — 97016 VASOPNEUMATIC DEVICE THERAPY: CPT | Mod: GP | Performed by: PHYSICAL THERAPIST

## 2021-09-07 PROCEDURE — 97112 NEUROMUSCULAR REEDUCATION: CPT | Mod: GP | Performed by: PHYSICAL THERAPIST

## 2021-09-07 PROCEDURE — 97110 THERAPEUTIC EXERCISES: CPT | Mod: GP | Performed by: PHYSICAL THERAPIST

## 2021-09-08 ENCOUNTER — MYC MEDICAL ADVICE (OUTPATIENT)
Dept: ORTHOPEDICS | Facility: CLINIC | Age: 51
End: 2021-09-08

## 2021-09-08 DIAGNOSIS — Z96.651 HISTORY OF TOTAL RIGHT KNEE REPLACEMENT: ICD-10-CM

## 2021-09-08 DIAGNOSIS — G89.18 ACUTE POST-OPERATIVE PAIN: ICD-10-CM

## 2021-09-08 NOTE — TELEPHONE ENCOUNTER
S/P RTKA 8/11/21    Called and talked with patient.   She had a few drops of drainage yesterday before she went to PT. No drainage after PT. This morning noted a small spots again of drainage and then called in. Drainage is white/slightly yellow per patient.   No fevers or chills. Does have an increase in pain and nerve pain, the nerve pain did improve with the gabapentin.   Will send message to Dr. Hernandez and call patient back with answer.  [facility]   Refill Oxycodone and gabapentin also requested.

## 2021-09-09 ENCOUNTER — THERAPY VISIT (OUTPATIENT)
Dept: PHYSICAL THERAPY | Facility: CLINIC | Age: 51
End: 2021-09-09
Payer: COMMERCIAL

## 2021-09-09 DIAGNOSIS — Z96.651 PRESENCE OF TOTAL RIGHT KNEE JOINT PROSTHESIS: ICD-10-CM

## 2021-09-09 DIAGNOSIS — Z96.651 AFTERCARE FOLLOWING RIGHT KNEE JOINT REPLACEMENT SURGERY: ICD-10-CM

## 2021-09-09 DIAGNOSIS — Z47.89 ORTHOPEDIC AFTERCARE: Primary | ICD-10-CM

## 2021-09-09 DIAGNOSIS — R26.9 ABNORMAL GAIT: ICD-10-CM

## 2021-09-09 DIAGNOSIS — Z47.1 AFTERCARE FOLLOWING RIGHT KNEE JOINT REPLACEMENT SURGERY: ICD-10-CM

## 2021-09-09 DIAGNOSIS — R60.0 LOCALIZED EDEMA: ICD-10-CM

## 2021-09-09 PROCEDURE — 97016 VASOPNEUMATIC DEVICE THERAPY: CPT | Mod: GP

## 2021-09-09 PROCEDURE — 97110 THERAPEUTIC EXERCISES: CPT | Mod: GP

## 2021-09-09 RX ORDER — CEPHALEXIN 500 MG/1
500 CAPSULE ORAL 2 TIMES DAILY
Qty: 28 CAPSULE | Refills: 0 | Status: SHIPPED | OUTPATIENT
Start: 2021-09-09 | End: 2021-09-23

## 2021-09-09 RX ORDER — GABAPENTIN 100 MG/1
100 CAPSULE ORAL 3 TIMES DAILY
Qty: 30 CAPSULE | Refills: 0 | Status: SHIPPED | OUTPATIENT
Start: 2021-09-09 | End: 2022-06-03

## 2021-09-09 RX ORDER — OXYCODONE HYDROCHLORIDE 5 MG/1
5 TABLET ORAL EVERY 6 HOURS PRN
Qty: 30 TABLET | Refills: 0 | Status: SHIPPED | OUTPATIENT
Start: 2021-09-09 | End: 2022-06-03

## 2021-09-13 ENCOUNTER — THERAPY VISIT (OUTPATIENT)
Dept: PHYSICAL THERAPY | Facility: CLINIC | Age: 51
End: 2021-09-13
Payer: COMMERCIAL

## 2021-09-13 DIAGNOSIS — R60.0 LOCALIZED EDEMA: ICD-10-CM

## 2021-09-13 DIAGNOSIS — Z96.651 PRESENCE OF TOTAL RIGHT KNEE JOINT PROSTHESIS: ICD-10-CM

## 2021-09-13 DIAGNOSIS — Z47.1 AFTERCARE FOLLOWING RIGHT KNEE JOINT REPLACEMENT SURGERY: ICD-10-CM

## 2021-09-13 DIAGNOSIS — R26.9 ABNORMAL GAIT: ICD-10-CM

## 2021-09-13 DIAGNOSIS — Z96.651 AFTERCARE FOLLOWING RIGHT KNEE JOINT REPLACEMENT SURGERY: ICD-10-CM

## 2021-09-13 PROCEDURE — 97110 THERAPEUTIC EXERCISES: CPT | Mod: GP

## 2021-09-13 PROCEDURE — 97016 VASOPNEUMATIC DEVICE THERAPY: CPT | Mod: GP

## 2021-09-15 ENCOUNTER — MYC MEDICAL ADVICE (OUTPATIENT)
Dept: PHARMACY | Facility: CLINIC | Age: 51
End: 2021-09-15

## 2021-09-15 ENCOUNTER — VIRTUAL VISIT (OUTPATIENT)
Dept: PHARMACY | Facility: CLINIC | Age: 51
End: 2021-09-15
Payer: COMMERCIAL

## 2021-09-15 DIAGNOSIS — E66.01 CLASS 3 SEVERE OBESITY DUE TO EXCESS CALORIES WITH SERIOUS COMORBIDITY AND BODY MASS INDEX (BMI) OF 40.0 TO 44.9 IN ADULT (H): ICD-10-CM

## 2021-09-15 DIAGNOSIS — Z96.651 HISTORY OF TOTAL RIGHT KNEE REPLACEMENT: ICD-10-CM

## 2021-09-15 DIAGNOSIS — K21.9 GERD WITHOUT ESOPHAGITIS: ICD-10-CM

## 2021-09-15 DIAGNOSIS — E66.813 CLASS 3 SEVERE OBESITY DUE TO EXCESS CALORIES WITH SERIOUS COMORBIDITY AND BODY MASS INDEX (BMI) OF 40.0 TO 44.9 IN ADULT (H): ICD-10-CM

## 2021-09-15 DIAGNOSIS — I10 ESSENTIAL HYPERTENSION: ICD-10-CM

## 2021-09-15 DIAGNOSIS — R73.03 PREDIABETES: Primary | ICD-10-CM

## 2021-09-15 PROCEDURE — 99607 MTMS BY PHARM ADDL 15 MIN: CPT | Performed by: PHARMACIST

## 2021-09-15 PROCEDURE — 99605 MTMS BY PHARM NP 15 MIN: CPT | Performed by: PHARMACIST

## 2021-09-15 NOTE — PATIENT INSTRUCTIONS
Recommendations from today's MTM visit:                                                    MTM (medication therapy management) is a service provided by a clinical pharmacist designed to help you get the most of out of your medicines.   Today we reviewed what your medicines are for, how to know if they are working, that your medicines are safe and how to make your medicine regimen as easy as possible.        1. Take famotidine 20 mg twice daily as prescribed. Refrain from eating at least 3 hours before bedtime. Stay away from trigger foods. Elevate pillow/bed when laying down.     2. Can consider decrease Ozempic to 0.25 mg weekly for 2 weeks if heartburn continues despite the above changes, then increase to 0.5 mg weekly thereafter if heartburn improved.     Follow-up: 1 month    It was great to speak with you today.  I value your experience and would be very thankful for your time with providing feedback on our clinic survey. You may receive a survey via email or text message in the next few days.       My Clinical Pharmacist's contact information:                                                      Please feel free to contact me with any questions or concerns you have.      Lauren Bloch, PharmD  Medication Therapy Management Pharmacist   [facility] [place]

## 2021-09-15 NOTE — PROGRESS NOTES
Medication Therapy Management (MTM) Encounter    ASSESSMENT:                            Medication Adherence/Access: No issues identified    Obesity/Prediabetes: Progressing, can continue Ozempic for now. Could consider Ozempic decreased dose for short term to then retrial current dose if the below items not helpful for heartburn.     Knee Replacement: Improving. Defer to ortho.    GERD: Current treatment is not effective. Patient would benefit from the following changes: H2 blocker increase dose to twice daily as prescribed. and lifestyle change including: last meal at least 3 hours before bedtime and elevate head of bed.    Hypertension: blood pressure at goal <140/90 mmHg.     PLAN:                            1. Take famotidine 20 mg twice daily as prescribed. Refrain from eating at least 3 hours before bedtime. Stay away from trigger foods. Elevate pillow/bed when laying down.     2. Can consider decrease Ozempic to 0.25 mg weekly for 2 weeks if heartburn continues despite the above changes, then increase to 0.5 mg weekly thereafter if heartburn improved.     Follow-up: 1 month - Will complete the rest of comprehensive review of medications at that time.     SUBJECTIVE/OBJECTIVE:                          Adelaida Packer is a 51 year old female called for an initial visit. She was referred to me from Dr. Mitchell. Visit was scheduled as follow up rather than initial follow up so assessed the below medications and plan to assess remaining at follow up due to lack of time.     Reason for visit: Ozempic check in.    Allergies/ADRs: Reviewed in chart  Past Medical History: Reviewed in chart  Tobacco: She reports that she has never smoked. She has never used smokeless tobacco.  Alcohol: not currently using  Caffeine: occasional coffee/day     Medication Adherence/Access: no issues reported    Obesity/Prediabetes:   Ozempic 0.5 mg weekly .     Followed by Dr. Stephen Null, seen 6/15/21 for New Medical Weight  "Management. Started on Ozempic at that time. Patient is experiencing the follow side effects: heartburn. Constipation due to potentially narcotics, has been taking miralax every couple days to keep regular and working effectively. She reports she isn't eating as much. No symptoms of low blood sugar. Doesn't test blood sugar.   Diet/Eating Habits: Patient reports she is eating 5-6 small meals per day due to being jaimes faster.    Exercise/Activity: Patient reports mobility limited due to knee replacement surgery.     Wt Readings from Last 4 Encounters:   08/11/21 230 lb 13.2 oz (104.7 kg)   07/19/21 240 lb 9.6 oz (109.1 kg)   06/22/21 246 lb 14.4 oz (112 kg)   06/15/21 244 lb (110.7 kg)     Estimated body mass index is 42.22 kg/m  as calculated from the following:    Height as of 8/11/21: 5' 2\" (1.575 m).    Weight as of 8/11/21: 230 lb 13.2 oz (104.7 kg).    Hemoglobin A1C   Date Value Ref Range Status   07/19/2021 5.5 0.0 - 5.6 % Final     Comment:     Normal <5.7%   Prediabetes 5.7-6.4%    Diabetes 6.5% or higher     Note: Adopted from ADA consensus guidelines.   11/30/2020 6.1 (H) 0 - 5.6 % Final     Comment:     Normal <5.7% Prediabetes 5.7-6.4%  Diabetes 6.5% or higher - adopted from ADA   consensus guidelines.       Knee Replacement:   Aspirin 162 mg daily - almost completed   Oxycodone 5 mg as needed, using 2-3 times per day   Acetaiminophen 650 mg as needed  Methocarbamol 750 as needed - not using currently   Gabapentin 100 mg twice daily   Cephalexin 500 mg twice daily for 14 days     Knee replacement of right knee 8/11/21. Trying to taper off oxycodone. Using acetaiminophen otherwise and somewhat helpful. Had weight loss goal requirements before knee replacement surgery. She does report that nerve pain comes and goes, weeks when has more nerve pain is worse. She has not noticed side effects with gabapentin One of the incisions abscessed, so on antibiotic for infection. No major issues of bleeding or " bruising. Upset stomach, no diarrhea, when taking cephalexin, taking with food. No black tarry stools. No abdominal pain.     GERD:   Pepcid (famotidine) 20 mg daily at night.     Patient feels that current regimen is not effective. She finds that acid reflux is worse at night. Triggers: red sauce, so cut out recently. She has previous used twice daily and found more helpful than current regimen. She reports that it is hard to tell what has impacted heartburn, ozempic versus alternative option.     Hypertension:   Amlodipine 5 mg daily  Labetolol 300 mg twice daily   Epleronone 100 mg twice daily     Patient does self-monitor blood pressure, hasn't taken yet.  Patient reports no current medication side effects. No dizziness or lightheadedness. Hypertension secondary to hyperaldosteronism. Followed by Endocrinology.   BP Readings from Last 3 Encounters:   08/12/21 113/64   08/03/21 123/85   07/19/21 122/78     ----------------  Post Discharge Medication Reconciliation Status: discharge medications reconciled, continue medications without change.    I spent 40 minutes with this patient today. A copy of the visit note was provided to the patient's referring provider.    The patient was sent via [email or web address] a summary of these recommendations.     Lauren Bloch, PharmD  Medication Therapy Management Pharmacist   [facility] [place]    Telemedicine Visit Details  Type of service:  Telephone visit  Start Time: 3:30 PM  End Time: 4:10 PM  Originating Location (patient location): [place]  Distant Location (provider location):  [facility] MANAGEMENT [place]     Medication Therapy Recommendations  GERD without esophagitis    Current Medication: famotidine (PEPCID) 20 MG tablet   Rationale: Frequency inappropriate - Dosage too low - Effectiveness   Recommendation: Increase Frequency - 1 tablet twice daily   Status: Patient Agreed - Adherence/Education

## 2021-09-15 NOTE — TELEPHONE ENCOUNTER
Changed visit to phone.     Lauren Bloch, PharmD  Medication Therapy Management Pharmacist   [facility] Management [place]

## 2021-09-16 ENCOUNTER — THERAPY VISIT (OUTPATIENT)
Dept: PHYSICAL THERAPY | Facility: CLINIC | Age: 51
End: 2021-09-16
Payer: COMMERCIAL

## 2021-09-16 DIAGNOSIS — Z96.651 PRESENCE OF TOTAL RIGHT KNEE JOINT PROSTHESIS: ICD-10-CM

## 2021-09-16 DIAGNOSIS — R26.9 ABNORMAL GAIT: ICD-10-CM

## 2021-09-16 DIAGNOSIS — Z47.1 AFTERCARE FOLLOWING RIGHT KNEE JOINT REPLACEMENT SURGERY: ICD-10-CM

## 2021-09-16 DIAGNOSIS — Z96.651 AFTERCARE FOLLOWING RIGHT KNEE JOINT REPLACEMENT SURGERY: ICD-10-CM

## 2021-09-16 DIAGNOSIS — R60.0 LOCALIZED EDEMA: ICD-10-CM

## 2021-09-16 PROCEDURE — 97110 THERAPEUTIC EXERCISES: CPT | Mod: GP

## 2021-09-16 PROCEDURE — 97530 THERAPEUTIC ACTIVITIES: CPT | Mod: GP

## 2021-09-16 PROCEDURE — 97016 VASOPNEUMATIC DEVICE THERAPY: CPT | Mod: GP

## 2021-09-20 ENCOUNTER — THERAPY VISIT (OUTPATIENT)
Dept: PHYSICAL THERAPY | Facility: CLINIC | Age: 51
End: 2021-09-20
Payer: COMMERCIAL

## 2021-09-20 DIAGNOSIS — R26.9 ABNORMAL GAIT: ICD-10-CM

## 2021-09-20 DIAGNOSIS — Z47.1 AFTERCARE FOLLOWING RIGHT KNEE JOINT REPLACEMENT SURGERY: ICD-10-CM

## 2021-09-20 DIAGNOSIS — Z96.651 PRESENCE OF TOTAL RIGHT KNEE JOINT PROSTHESIS: ICD-10-CM

## 2021-09-20 DIAGNOSIS — Z96.651 AFTERCARE FOLLOWING RIGHT KNEE JOINT REPLACEMENT SURGERY: ICD-10-CM

## 2021-09-20 DIAGNOSIS — R60.0 LOCALIZED EDEMA: ICD-10-CM

## 2021-09-20 PROCEDURE — 97530 THERAPEUTIC ACTIVITIES: CPT | Mod: GP

## 2021-09-20 PROCEDURE — 97016 VASOPNEUMATIC DEVICE THERAPY: CPT | Mod: GP

## 2021-09-20 PROCEDURE — 97110 THERAPEUTIC EXERCISES: CPT | Mod: GP

## 2021-09-21 ENCOUNTER — VIRTUAL VISIT (OUTPATIENT)
Dept: ENDOCRINOLOGY | Facility: CLINIC | Age: 51
End: 2021-09-21
Payer: COMMERCIAL

## 2021-09-21 VITALS — HEIGHT: 62 IN | BODY MASS INDEX: 42.69 KG/M2 | WEIGHT: 232 LBS

## 2021-09-21 DIAGNOSIS — E66.813 CLASS 3 SEVERE OBESITY DUE TO EXCESS CALORIES WITH SERIOUS COMORBIDITY AND BODY MASS INDEX (BMI) OF 40.0 TO 44.9 IN ADULT (H): Primary | ICD-10-CM

## 2021-09-21 DIAGNOSIS — Z96.651 HISTORY OF TOTAL RIGHT KNEE REPLACEMENT: Primary | ICD-10-CM

## 2021-09-21 DIAGNOSIS — E66.01 CLASS 3 SEVERE OBESITY DUE TO EXCESS CALORIES WITH SERIOUS COMORBIDITY AND BODY MASS INDEX (BMI) OF 40.0 TO 44.9 IN ADULT (H): Primary | ICD-10-CM

## 2021-09-21 PROCEDURE — 99214 OFFICE O/P EST MOD 30 MIN: CPT | Mod: TEL | Performed by: INTERNAL MEDICINE

## 2021-09-21 ASSESSMENT — PAIN SCALES - GENERAL: PAINLEVEL: NO PAIN (0)

## 2021-09-21 ASSESSMENT — MIFFLIN-ST. JEOR: SCORE: 1615.6

## 2021-09-21 NOTE — PROGRESS NOTES
Adelaida is a 51 year old who is being evaluated via a billable phone visit.      How would you like to obtain your AVS? MyChart  If the video visit is dropped, the invitation should be resent by: Text to cell phone: [phone]  Will anyone else be joining your video visit? No

## 2021-09-21 NOTE — PATIENT INSTRUCTIONS
- continue with reduced dose of Ozempic 0.25 mg weekly and monitor heartburn. If it is getting, will stop  - continue to work on diet modification  - exercise as tolerated    FOLLOW-UP:    3 months.    If you have any questions, please do not hesitate to call Weight management clinic at [phone] or [phone].  If you need to fax, please fax to [phone].    Sincerely,    Stephen Null MD  Endocrinology

## 2021-09-21 NOTE — LETTER
"2021       RE: Adelaida Packer  [address] [place] [address]     Dear Colleague,    Thank you for referring your patient, Adelaida Packer, to the [facility] WEIGHT MANAGEMENT CLINIC [facility]. Please see a copy of my visit note below.      Return Medical Weight Management Note     Adelaida Packer  MRN:  [id number]  :  1970  JUSTUS:  2021    Dear Windy Gordillo MD,    I had the pleasure of seeing your patient Adelaida Packer. She is a 51 year old female who I am continuing to see for treatment of obesity related to: prediabetes, hypertension, OA       6/15/2021   I have the following health issues associated with obesity: Pre-Diabetes, High Blood Pressure, Osteoarthritis (joint disease)   I have the following symptoms associated with obesity: Knee Pain, Hip Pain       INTERVAL HISTORY:  Last seen 2021. She underwent right knee replacement in Aug 2021. She was started on Ozempic injection. She said it helped cutting down her appetite. She lost 12 lbs in the past 3 months. However, she reports having heart burn and constipation.  Met with Lauren Bloch, MTM pharmacist who recommended to decrease Ozempic to 0.25 mg to see if heartburn continues.    CURRENT WEIGHT:   232 lbs 0 oz    Initial Weight (lbs): 244 lbs  Last Visits Weight: 110.7 kg (244 lb)  Cumulative weight loss (lbs): 12  Weight Loss Percentage: 4.92%    Changes and Difficulties 2021   I have made the following changes to my diet since my last visit: Eat less calories   With regards to my diet, I am still struggling with: Unsure   I have made the following changes to my activity/exercise since my last visit: biking more   With regards to my activity/exercise, I am still struggling with: Don t it daily       VITALS:  Ht 1.575 m (5' 2\")   Wt 105.2 kg (232 lb)   LMP 2005   BMI 42.43 kg/m      MEDICATIONS:   Current Outpatient " Medications   Medication Sig Dispense Refill     acetaminophen (TYLENOL) 325 MG tablet Take 2 tablets (650 mg) by mouth every 4 hours as needed for other (For optimal non-opioid multimodal pain management to improve pain control.) 50 tablet 0     amLODIPine (NORVASC) 5 MG tablet Take 1 tablet (5 mg) by mouth daily 90 tablet 3     Ascorbic Acid (VITAMIN C) 500 MG CAPS Take 1 tablet by mouth daily       aspirin (ASA) 81 MG EC tablet Take 2 tablets (162 mg) by mouth daily 56 tablet 0     Blood Pressure Monitor KIT 1 Device 2 times daily 1 kit 0     calcium carbonate (OS-EVGENY 600 MG [place]. CA) 1500 (600 CA) MG tablet Take 1 tablet (600 mg) by mouth 2 times daily 180 tablet 3     cephALEXin (KEFLEX) 500 MG capsule Take 1 capsule (500 mg) by mouth 2 times daily for 14 days 28 capsule 0     cetirizine (ZYRTEC) 10 MG tablet Take 10 mg by mouth daily as needed for allergies       cholecalciferol 2000 UNITS CAPS Take 2,000 Units by mouth daily 90 capsule 3     cyanocobalamin (VITAMIN B-12) 1000 MCG tablet Take 1,000 mcg by mouth daily       eplerenone (INSPRA) 50 MG tablet Take 2 tablets (100 mg) by mouth 2 times daily 360 tablet 3     famotidine (PEPCID) 20 MG tablet Take 1 tablet (20 mg) by mouth 2 times daily 180 tablet 1     fish oil-omega-3 fatty acids 1000 MG capsule Take 2 g by mouth daily       gabapentin (NEURONTIN) 100 MG capsule Take 1 capsule (100 mg) by mouth 3 times daily Start with 1 tablet twice daily. Ok to increase to 3 times daily if not having side effects 30 capsule 0     hydrOXYzine (ATARAX) 25 MG tablet Take 1 tablet (25 mg) by mouth every 6 hours as needed for itching Or severe pain 30 tablet 0     labetalol (NORMODYNE) 300 MG tablet Take 1 tablet (300 mg) by mouth 2 times daily 180 tablet 3     methocarbamol (ROBAXIN) 750 MG tablet Take 1 tablet (750 mg) by mouth every 6 hours as needed for muscle spasms 30 tablet 0     Multiple Vitamin (MULTIVITAMIN ADULT PO) Take 1 Tablespoonful by mouth daily        olopatadine (PATADAY) 0.2 % ophthalmic solution Place 1 drop into both eyes daily 2.5 mL 11     oxyCODONE (ROXICODONE) 5 MG tablet Take 1 tablet (5 mg) by mouth every 6 hours as needed for severe pain 30 tablet 0     polyethylene glycol (MIRALAX/GLYCOLAX) Packet Take 17 g by mouth daily as needed        Semaglutide,0.25 or 0.5MG/DOS, 2 MG/1.5ML SOPN Start 0.25 mg weekly for 2 weeks and increase to 0.5 mg weekly thereafter (Patient taking differently: Inject 0.5 mg Subcutaneous every 7 days ) 4.5 mL 3     SUMAtriptan (IMITREX) 25 MG tablet TAKE 1 TO 2 TABLETS BY MOUTH AT ONSET OF HEADACHE FOR MIGRAINE. MAY REPEAT DOSE IN 2 HOURS. MAX OF 200MG OR 2 DOSES IN 24 HOURS 18 tablet 0       Weight Loss Medication History Reviewed With Patient 9/21/2021   Which weight loss medications are you currently taking on a regular basis?  Ozempic   If you are not taking a weight loss medication that was prescribed to you, please indicate why: The cost is too much for me, I did not like the side effects   Are you having any side effects from the weight loss medication that we have prescribed you? Yes   If you are having side effects please describe: Heart burn       ASSESSMENT:   Adelaida Packer. She is a 51 year old female who I am continuing to see for treatment of obesity related to: prediabetes, hypertension, OA    - started Ozempic in June 2021 -- lost 12 lbs in 3 months.  - developed heartburn -- Ozempic was reduced to 0.25 mg weekly last week.    PLAN:   - continue with reduced dose of Ozempic 0.25 mg weekly and monitor heartburn. If it is getting, will stop  - continue to work on diet modification  - exercise as tolerated    FOLLOW-UP:    3 months.    Phone start 1124 AM  Phone end 1134 AM  Phone duration 10 minutes    External notes/medical records independently reviewed, labs and imaging independently reviewed, medical management and tests to be discussed/communicated to patient.    Time: I spent 30 minutes spent on the date  of the encounter preparing to see patient (including chart review and preparation), obtaining and or reviewing additional medical history, performing a physical exam and evaluation, documenting clinical information in the electronic health record, independently interpreting results, communicating results to the patient and coordinating care.      Sincerely,    Stephen Null MD

## 2021-09-21 NOTE — PROGRESS NOTES
"    Return Medical Weight Management Note     Adelaida Packer  MRN:  [id number]  :  1970  JUSTUS:  2021    Dear Windy Gordillo MD,    I had the pleasure of seeing your patient Adelaida Packer. She is a 51 year old female who I am continuing to see for treatment of obesity related to: prediabetes, hypertension, OA       6/15/2021   I have the following health issues associated with obesity: Pre-Diabetes, High Blood Pressure, Osteoarthritis (joint disease)   I have the following symptoms associated with obesity: Knee Pain, Hip Pain       INTERVAL HISTORY:  Last seen 2021. She underwent right knee replacement in Aug 2021. She was started on Ozempic injection. She said it helped cutting down her appetite. She lost 12 lbs in the past 3 months. However, she reports having heart burn and constipation.  Met with Lauren Bloch, MTM pharmacist who recommended to decrease Ozempic to 0.25 mg to see if heartburn continues.    CURRENT WEIGHT:   232 lbs 0 oz    Initial Weight (lbs): 244 lbs  Last Visits Weight: 110.7 kg (244 lb)  Cumulative weight loss (lbs): 12  Weight Loss Percentage: 4.92%    Changes and Difficulties 2021   I have made the following changes to my diet since my last visit: Eat less calories   With regards to my diet, I am still struggling with: Unsure   I have made the following changes to my activity/exercise since my last visit: biking more   With regards to my activity/exercise, I am still struggling with: Don t it daily       VITALS:  Ht 1.575 m (5' 2\")   Wt 105.2 kg (232 lb)   LMP 2005   BMI 42.43 kg/m      MEDICATIONS:   Current Outpatient Medications   Medication Sig Dispense Refill     acetaminophen (TYLENOL) 325 MG tablet Take 2 tablets (650 mg) by mouth every 4 hours as needed for other (For optimal non-opioid multimodal pain management to improve pain control.) 50 tablet 0     amLODIPine (NORVASC) 5 MG tablet Take 1 tablet (5 mg) by mouth daily 90 tablet 3     Ascorbic " Acid (VITAMIN C) 500 MG CAPS Take 1 tablet by mouth daily       aspirin (ASA) 81 MG EC tablet Take 2 tablets (162 mg) by mouth daily 56 tablet 0     Blood Pressure Monitor KIT 1 Device 2 times daily 1 kit 0     calcium carbonate (OS-EVGENY 600 MG [place]. CA) 1500 (600 CA) MG tablet Take 1 tablet (600 mg) by mouth 2 times daily 180 tablet 3     cephALEXin (KEFLEX) 500 MG capsule Take 1 capsule (500 mg) by mouth 2 times daily for 14 days 28 capsule 0     cetirizine (ZYRTEC) 10 MG tablet Take 10 mg by mouth daily as needed for allergies       cholecalciferol 2000 UNITS CAPS Take 2,000 Units by mouth daily 90 capsule 3     cyanocobalamin (VITAMIN B-12) 1000 MCG tablet Take 1,000 mcg by mouth daily       eplerenone (INSPRA) 50 MG tablet Take 2 tablets (100 mg) by mouth 2 times daily 360 tablet 3     famotidine (PEPCID) 20 MG tablet Take 1 tablet (20 mg) by mouth 2 times daily 180 tablet 1     fish oil-omega-3 fatty acids 1000 MG capsule Take 2 g by mouth daily       gabapentin (NEURONTIN) 100 MG capsule Take 1 capsule (100 mg) by mouth 3 times daily Start with 1 tablet twice daily. Ok to increase to 3 times daily if not having side effects 30 capsule 0     hydrOXYzine (ATARAX) 25 MG tablet Take 1 tablet (25 mg) by mouth every 6 hours as needed for itching Or severe pain 30 tablet 0     labetalol (NORMODYNE) 300 MG tablet Take 1 tablet (300 mg) by mouth 2 times daily 180 tablet 3     methocarbamol (ROBAXIN) 750 MG tablet Take 1 tablet (750 mg) by mouth every 6 hours as needed for muscle spasms 30 tablet 0     Multiple Vitamin (MULTIVITAMIN ADULT PO) Take 1 Tablespoonful by mouth daily       olopatadine (PATADAY) 0.2 % ophthalmic solution Place 1 drop into both eyes daily 2.5 mL 11     oxyCODONE (ROXICODONE) 5 MG tablet Take 1 tablet (5 mg) by mouth every 6 hours as needed for severe pain 30 tablet 0     polyethylene glycol (MIRALAX/GLYCOLAX) Packet Take 17 g by mouth daily as needed        Semaglutide,0.25 or 0.5MG/DOS, 2  MG/1.5ML SOPN Start 0.25 mg weekly for 2 weeks and increase to 0.5 mg weekly thereafter (Patient taking differently: Inject 0.5 mg Subcutaneous every 7 days ) 4.5 mL 3     SUMAtriptan (IMITREX) 25 MG tablet TAKE 1 TO 2 TABLETS BY MOUTH AT ONSET OF HEADACHE FOR MIGRAINE. MAY REPEAT DOSE IN 2 HOURS. MAX OF 200MG OR 2 DOSES IN 24 HOURS 18 tablet 0       Weight Loss Medication History Reviewed With Patient 9/21/2021   Which weight loss medications are you currently taking on a regular basis?  Ozempic   If you are not taking a weight loss medication that was prescribed to you, please indicate why: The cost is too much for me, I did not like the side effects   Are you having any side effects from the weight loss medication that we have prescribed you? Yes   If you are having side effects please describe: Heart burn       ASSESSMENT:   Adelaida Packer. She is a 51 year old female who I am continuing to see for treatment of obesity related to: prediabetes, hypertension, OA    - started Ozempic in June 2021 -- lost 12 lbs in 3 months.  - developed heartburn -- Ozempic was reduced to 0.25 mg weekly last week.    PLAN:   - continue with reduced dose of Ozempic 0.25 mg weekly and monitor heartburn. If it is getting, will stop  - continue to work on diet modification  - exercise as tolerated    FOLLOW-UP:    3 months.    Phone start 1124 AM  Phone end 1134 AM  Phone duration 10 minutes    External notes/medical records independently reviewed, labs and imaging independently reviewed, medical management and tests to be discussed/communicated to patient.    Time: I spent 30 minutes spent on the date of the encounter preparing to see patient (including chart review and preparation), obtaining and or reviewing additional medical history, performing a physical exam and evaluation, documenting clinical information in the electronic health record, independently interpreting results, communicating results to the patient and coordinating  care.      Sincerely,    Stephen Null MD

## 2021-09-21 NOTE — PROGRESS NOTES
"Adelaida is a 51 year old who is being evaluated via a billable video visit.      How would you like to obtain your AVS? {AVS Preference:486875}  If the video visit is dropped, the invitation should be resent by: {video visit invitation:577384}  Will anyone else be joining your video visit? {:377103}  {If patient encounters technical issues they should call [phone] :215169}    Video Start Time: {video visit start/end time for provider to select:665374}  Video-Visit Details    Type of service:  Video Visit    Video End Time:{video visit start/end time for provider to select:679947}    Originating Location (pt. Location): {video visit patient location:786561::\"Home\"}    Distant Location (provider location):  [facility] WEIGHT MANAGEMENT CLINIC [place]     Platform used for Video Visit: {Virtual Visit Platforms:872263::\"Sogou\"}  "

## 2021-09-21 NOTE — NURSING NOTE
"Chief Complaint   Patient presents with     Follow Up     3 Months follow-up Weight Management       Vitals:    09/21/21 1103   Weight: 105.2 kg (232 lb)   Height: 1.575 m (5' 2\")       Body mass index is 42.43 kg/m .                          "

## 2021-09-24 ENCOUNTER — THERAPY VISIT (OUTPATIENT)
Dept: PHYSICAL THERAPY | Facility: CLINIC | Age: 51
End: 2021-09-24
Payer: COMMERCIAL

## 2021-09-24 DIAGNOSIS — Z96.651 PRESENCE OF TOTAL RIGHT KNEE JOINT PROSTHESIS: ICD-10-CM

## 2021-09-24 DIAGNOSIS — R60.0 LOCALIZED EDEMA: ICD-10-CM

## 2021-09-24 DIAGNOSIS — R26.9 ABNORMAL GAIT: ICD-10-CM

## 2021-09-24 DIAGNOSIS — Z47.1 AFTERCARE FOLLOWING RIGHT KNEE JOINT REPLACEMENT SURGERY: ICD-10-CM

## 2021-09-24 DIAGNOSIS — Z96.651 AFTERCARE FOLLOWING RIGHT KNEE JOINT REPLACEMENT SURGERY: ICD-10-CM

## 2021-09-24 PROCEDURE — 97112 NEUROMUSCULAR REEDUCATION: CPT | Mod: GP | Performed by: PHYSICAL THERAPIST

## 2021-09-24 PROCEDURE — 97530 THERAPEUTIC ACTIVITIES: CPT | Mod: GP | Performed by: PHYSICAL THERAPIST

## 2021-09-24 PROCEDURE — 97110 THERAPEUTIC EXERCISES: CPT | Mod: GP | Performed by: PHYSICAL THERAPIST

## 2021-09-27 ENCOUNTER — THERAPY VISIT (OUTPATIENT)
Dept: PHYSICAL THERAPY | Facility: CLINIC | Age: 51
End: 2021-09-27
Payer: COMMERCIAL

## 2021-09-27 DIAGNOSIS — Z96.651 PRESENCE OF TOTAL RIGHT KNEE JOINT PROSTHESIS: ICD-10-CM

## 2021-09-27 DIAGNOSIS — R26.9 ABNORMAL GAIT: ICD-10-CM

## 2021-09-27 DIAGNOSIS — Z96.651 AFTERCARE FOLLOWING RIGHT KNEE JOINT REPLACEMENT SURGERY: ICD-10-CM

## 2021-09-27 DIAGNOSIS — Z47.1 AFTERCARE FOLLOWING RIGHT KNEE JOINT REPLACEMENT SURGERY: ICD-10-CM

## 2021-09-27 DIAGNOSIS — R60.0 LOCALIZED EDEMA: ICD-10-CM

## 2021-09-27 PROCEDURE — 97110 THERAPEUTIC EXERCISES: CPT | Mod: GP | Performed by: PHYSICAL THERAPIST

## 2021-09-27 ASSESSMENT — ACTIVITIES OF DAILY LIVING (ADL)
GO UP STAIRS: ACTIVITY IS SOMEWHAT DIFFICULT
RAW_SCORE: 41
KNEE_ACTIVITY_OF_DAILY_LIVING_SUM: 41
WALK: ACTIVITY IS SOMEWHAT DIFFICULT
PAIN: THE SYMPTOM AFFECTS MY ACTIVITY MODERATELY
RISE FROM A CHAIR: ACTIVITY IS MINIMALLY DIFFICULT
HOW_WOULD_YOU_RATE_THE_CURRENT_FUNCTION_OF_YOUR_KNEE_DURING_YOUR_USUAL_DAILY_ACTIVITIES_ON_A_SCALE_FROM_0_TO_100_WITH_100_BEING_YOUR_LEVEL_OF_KNEE_FUNCTION_PRIOR_TO_YOUR_INJURY_AND_0_BEING_THE_INABILITY_TO_PERFORM_ANY_OF_YOUR_USUAL_DAILY_ACTIVITIES?: 50
KNEEL ON THE FRONT OF YOUR KNEE: I AM UNABLE TO DO THE ACTIVITY
GO DOWN STAIRS: ACTIVITY IS FAIRLY DIFFICULT
SQUAT: ACTIVITY IS MINIMALLY DIFFICULT
LIMPING: I DO NOT HAVE THE SYMPTOM
SWELLING: THE SYMPTOM AFFECTS MY ACTIVITY SLIGHTLY
WEAKNESS: THE SYMPTOM AFFECTS MY ACTIVITY MODERATELY
STIFFNESS: THE SYMPTOM AFFECTS MY ACTIVITY MODERATELY
SIT WITH YOUR KNEE BENT: ACTIVITY IS MINIMALLY DIFFICULT
GIVING WAY, BUCKLING OR SHIFTING OF KNEE: I HAVE THE SYMPTOM BUT IT DOES NOT AFFECT MY ACTIVITY
STAND: ACTIVITY IS SOMEWHAT DIFFICULT
AS_A_RESULT_OF_YOUR_KNEE_INJURY,_HOW_WOULD_YOU_RATE_YOUR_CURRENT_LEVEL_OF_DAILY_ACTIVITY?: NEARLY NORMAL
KNEE_ACTIVITY_OF_DAILY_LIVING_SCORE: 58.57
HOW_WOULD_YOU_RATE_THE_OVERALL_FUNCTION_OF_YOUR_KNEE_DURING_YOUR_USUAL_DAILY_ACTIVITIES?: NEARLY NORMAL

## 2021-09-27 NOTE — PROGRESS NOTES
Subjective:  HPI  Physical Exam                    Objective:  System    Physical Exam    General     ROS    Assessment/Plan:    PROGRESS  REPORT    Progress reporting period is from 8/11/21 to 9/27/21.       SUBJECTIVE  Patient reports significant improvement in her right knee since the start of therapy.  She is currently 7 weeks post-op.  Now able to stand  10-15 minutes for meal preparation and finds that sitting has gotten more comfortable as well.  Still some difficulty negotiating stairs in her tri-level [facility] where she has 32 stairs total (4 flights of 8).        Current pain level is 3/10  .     Previous pain level was  5/10  .   Changes in function:  Yes (See Goal flowsheet attached for changes in current functional level)  Adverse reaction to treatment or activity: None    OBJECTIVE  AROM R Knee: 10-0-98.  Passive knee flexion 102.  Strength: Quads 5/5, HS 4/5, Hip abductors 4/5.   Gait: ambulates with increased JOLIE using single ended cane.  Exhibits positive R Trendelenberg when ambulating independently.   Swelling: minimal  Incisional scar healed.         ASSESSMENT/PLAN  Updated problem list and treatment plan: Diagnosis 1:  Right Knee Pain S/P TKA   Pain -  self management, education and home program  Decreased ROM/flexibility - therapeutic exercise  Decreased strength - therapeutic exercise and therapeutic activities  Impaired gait - gait training and assistive devices  Decreased function - therapeutic activities    STG/LTGs have been met or progress has been made towards goals:  Yes (See Goal flow sheet completed today.)  Assessment of Progress: The patient's condition is improving.  Patient is meeting short term goals and is progressing towards long term goals.  Self Management Plans:  Patient has been instructed in a home exercise program.  I have re-evaluated this patient and find that the nature, scope, duration and intensity of the therapy is appropriate for the medical condition of the  patientMonserrat Son continues to require the following intervention to meet STG and LTG's:  PT    Recommendations:  This patient would benefit from continued therapy to further increase mobility, strength, and function.     Frequency:  2 X week, once daily  Duration:  for 4 weeks        Please refer to the daily flowsheet for treatment today, total treatment time and time spent performing 1:1 timed codes.

## 2021-09-28 ENCOUNTER — OFFICE VISIT (OUTPATIENT)
Dept: ORTHOPEDICS | Facility: CLINIC | Age: 51
End: 2021-09-28
Payer: COMMERCIAL

## 2021-09-28 ENCOUNTER — ANCILLARY PROCEDURE (OUTPATIENT)
Dept: GENERAL RADIOLOGY | Facility: CLINIC | Age: 51
End: 2021-09-28
Attending: ORTHOPAEDIC SURGERY
Payer: COMMERCIAL

## 2021-09-28 DIAGNOSIS — Z47.89 ORTHOPEDIC AFTERCARE: Primary | ICD-10-CM

## 2021-09-28 DIAGNOSIS — Z96.651 HISTORY OF TOTAL RIGHT KNEE REPLACEMENT: ICD-10-CM

## 2021-09-28 PROCEDURE — 99024 POSTOP FOLLOW-UP VISIT: CPT | Performed by: ORTHOPAEDIC SURGERY

## 2021-09-28 PROCEDURE — 73562 X-RAY EXAM OF KNEE 3: CPT | Mod: RT | Performed by: RADIOLOGY

## 2021-09-28 NOTE — NURSING NOTE
Adelaida Packer's chief complaint for this visit includes:  Chief Complaint   Patient presents with     Surgical Followup     6 wks s/p right total knee arthroplasty DOS: 8/11/21; trouble sleeping     PCP: Windy Gordillo    Referring Provider:  No referring provider defined for this encounter.    LMP 08/13/2005   Data Unavailable     Do you need any medication refills at today's visit? no    Ely Isaacs MA, ATC

## 2021-09-28 NOTE — PROGRESS NOTES
Chief Complaint: No chief complaint on file.       HPI: Adelaida Packer returns today in follow-up for her knee. She reports that things are going OK. She rates her symptoms as 3/10 and is using a cane/walker for distances. Her ROM in PT is . Pain medication use= tylenol and oxycodone at bedtime.     Medications and allergies are documented in the EMR and have been reviewed.    Current Outpatient Medications:      acetaminophen (TYLENOL) 325 MG tablet, Take 2 tablets (650 mg) by mouth every 4 hours as needed for other (For optimal non-opioid multimodal pain management to improve pain control.), Disp: 50 tablet, Rfl: 0     amLODIPine (NORVASC) 5 MG tablet, Take 1 tablet (5 mg) by mouth daily, Disp: 90 tablet, Rfl: 3     Ascorbic Acid (VITAMIN C) 500 MG CAPS, Take 1 tablet by mouth daily, Disp: , Rfl:      aspirin (ASA) 81 MG EC tablet, Take 2 tablets (162 mg) by mouth daily, Disp: 56 tablet, Rfl: 0     Blood Pressure Monitor KIT, 1 Device 2 times daily, Disp: 1 kit, Rfl: 0     calcium carbonate (OS-EVGENY 600 MG [place]. CA) 1500 (600 CA) MG tablet, Take 1 tablet (600 mg) by mouth 2 times daily, Disp: 180 tablet, Rfl: 3     cetirizine (ZYRTEC) 10 MG tablet, Take 10 mg by mouth daily as needed for allergies, Disp: , Rfl:      cholecalciferol 2000 UNITS CAPS, Take 2,000 Units by mouth daily, Disp: 90 capsule, Rfl: 3     cyanocobalamin (VITAMIN B-12) 1000 MCG tablet, Take 1,000 mcg by mouth daily, Disp: , Rfl:      eplerenone (INSPRA) 50 MG tablet, Take 2 tablets (100 mg) by mouth 2 times daily, Disp: 360 tablet, Rfl: 3     famotidine (PEPCID) 20 MG tablet, Take 1 tablet (20 mg) by mouth 2 times daily, Disp: 180 tablet, Rfl: 1     fish oil-omega-3 fatty acids 1000 MG capsule, Take 2 g by mouth daily, Disp: , Rfl:      gabapentin (NEURONTIN) 100 MG capsule, Take 1 capsule (100 mg) by mouth 3 times daily Start with 1 tablet twice daily. Ok to increase to 3 times daily if not having side effects, Disp: 30 capsule, Rfl:  0     hydrOXYzine (ATARAX) 25 MG tablet, Take 1 tablet (25 mg) by mouth every 6 hours as needed for itching Or severe pain, Disp: 30 tablet, Rfl: 0     labetalol (NORMODYNE) 300 MG tablet, Take 1 tablet (300 mg) by mouth 2 times daily, Disp: 180 tablet, Rfl: 3     methocarbamol (ROBAXIN) 750 MG tablet, Take 1 tablet (750 mg) by mouth every 6 hours as needed for muscle spasms, Disp: 30 tablet, Rfl: 0     Multiple Vitamin (MULTIVITAMIN ADULT PO), Take 1 Tablespoonful by mouth daily, Disp: , Rfl:      olopatadine (PATADAY) 0.2 % ophthalmic solution, Place 1 drop into both eyes daily, Disp: 2.5 mL, Rfl: 11     oxyCODONE (ROXICODONE) 5 MG tablet, Take 1 tablet (5 mg) by mouth every 6 hours as needed for severe pain, Disp: 30 tablet, Rfl: 0     polyethylene glycol (MIRALAX/GLYCOLAX) Packet, Take 17 g by mouth daily as needed , Disp: , Rfl:      Semaglutide,0.25 or 0.5MG/DOS, 2 MG/1.5ML SOPN, Start 0.25 mg weekly for 2 weeks and increase to 0.5 mg weekly thereafter (Patient taking differently: Inject 0.5 mg Subcutaneous every 7 days ), Disp: 4.5 mL, Rfl: 3     SUMAtriptan (IMITREX) 25 MG tablet, TAKE 1 TO 2 TABLETS BY MOUTH AT ONSET OF HEADACHE FOR MIGRAINE. MAY REPEAT DOSE IN 2 HOURS. MAX OF 200MG OR 2 DOSES IN 24 HOURS, Disp: 18 tablet, Rfl: 0  Allergies: Sulfa drugs, Hydrochlorothiazide w/triamterene, Maxzide [hydrochlorothiazide w/triamterene], Metoprolol, and Seasonal allergies    Physical Exam:  On physical examination the patient appears the stated age, is in no acute distress, affect is appropriate, and breathing is non-labored.  LMP 08/13/2005   There is no height or weight on file to calculate BMI.  Gait: normal over short distances   Incision: healed  ROM: 0-100  Sciatic function is normal and symmetric     X-rays:    I reviewed the x-rays dated today.  Previous films reviewed.    Findings:  Normal progression for a total knee arthroplasty without evidence of loosening or subsidence.    Assessment: doing very  well   Plan: cont PT, RTC in 6 weeks, sooner if issues. Virtual is appropriate.     No ref. provider found

## 2021-09-28 NOTE — LETTER
9/28/2021         RE: Adelaida Packer  [address] [place] [address]        Dear Colleague,    Thank you for referring your patient, Adelaida Packer, to the [facility]. Please see a copy of my visit note below.    Chief Complaint: No chief complaint on file.       HPI: Adelaida Packer returns today in follow-up for her knee. She reports that things are going OK. She rates her symptoms as 3/10 and is using a cane/walker for distances. Her ROM in PT is . Pain medication use= tylenol and oxycodone at bedtime.     Medications and allergies are documented in the EMR and have been reviewed.    Current Outpatient Medications:      acetaminophen (TYLENOL) 325 MG tablet, Take 2 tablets (650 mg) by mouth every 4 hours as needed for other (For optimal non-opioid multimodal pain management to improve pain control.), Disp: 50 tablet, Rfl: 0     amLODIPine (NORVASC) 5 MG tablet, Take 1 tablet (5 mg) by mouth daily, Disp: 90 tablet, Rfl: 3     Ascorbic Acid (VITAMIN C) 500 MG CAPS, Take 1 tablet by mouth daily, Disp: , Rfl:      aspirin (ASA) 81 MG EC tablet, Take 2 tablets (162 mg) by mouth daily, Disp: 56 tablet, Rfl: 0     Blood Pressure Monitor KIT, 1 Device 2 times daily, Disp: 1 kit, Rfl: 0     calcium carbonate (OS-EVGENY 600 MG [place]. CA) 1500 (600 CA) MG tablet, Take 1 tablet (600 mg) by mouth 2 times daily, Disp: 180 tablet, Rfl: 3     cetirizine (ZYRTEC) 10 MG tablet, Take 10 mg by mouth daily as needed for allergies, Disp: , Rfl:      cholecalciferol 2000 UNITS CAPS, Take 2,000 Units by mouth daily, Disp: 90 capsule, Rfl: 3     cyanocobalamin (VITAMIN B-12) 1000 MCG tablet, Take 1,000 mcg by mouth daily, Disp: , Rfl:      eplerenone (INSPRA) 50 MG tablet, Take 2 tablets (100 mg) by mouth 2 times daily, Disp: 360 tablet, Rfl: 3     famotidine (PEPCID) 20 MG tablet, Take 1 tablet (20 mg) by mouth 2 times daily, Disp: 180 tablet, Rfl: 1     fish oil-omega-3 fatty acids  1000 MG capsule, Take 2 g by mouth daily, Disp: , Rfl:      gabapentin (NEURONTIN) 100 MG capsule, Take 1 capsule (100 mg) by mouth 3 times daily Start with 1 tablet twice daily. Ok to increase to 3 times daily if not having side effects, Disp: 30 capsule, Rfl: 0     hydrOXYzine (ATARAX) 25 MG tablet, Take 1 tablet (25 mg) by mouth every 6 hours as needed for itching Or severe pain, Disp: 30 tablet, Rfl: 0     labetalol (NORMODYNE) 300 MG tablet, Take 1 tablet (300 mg) by mouth 2 times daily, Disp: 180 tablet, Rfl: 3     methocarbamol (ROBAXIN) 750 MG tablet, Take 1 tablet (750 mg) by mouth every 6 hours as needed for muscle spasms, Disp: 30 tablet, Rfl: 0     Multiple Vitamin (MULTIVITAMIN ADULT PO), Take 1 Tablespoonful by mouth daily, Disp: , Rfl:      olopatadine (PATADAY) 0.2 % ophthalmic solution, Place 1 drop into both eyes daily, Disp: 2.5 mL, Rfl: 11     oxyCODONE (ROXICODONE) 5 MG tablet, Take 1 tablet (5 mg) by mouth every 6 hours as needed for severe pain, Disp: 30 tablet, Rfl: 0     polyethylene glycol (MIRALAX/GLYCOLAX) Packet, Take 17 g by mouth daily as needed , Disp: , Rfl:      Semaglutide,0.25 or 0.5MG/DOS, 2 MG/1.5ML SOPN, Start 0.25 mg weekly for 2 weeks and increase to 0.5 mg weekly thereafter (Patient taking differently: Inject 0.5 mg Subcutaneous every 7 days ), Disp: 4.5 mL, Rfl: 3     SUMAtriptan (IMITREX) 25 MG tablet, TAKE 1 TO 2 TABLETS BY MOUTH AT ONSET OF HEADACHE FOR MIGRAINE. MAY REPEAT DOSE IN 2 HOURS. MAX OF 200MG OR 2 DOSES IN 24 HOURS, Disp: 18 tablet, Rfl: 0  Allergies: Sulfa drugs, Hydrochlorothiazide w/triamterene, Maxzide [hydrochlorothiazide w/triamterene], Metoprolol, and Seasonal allergies    Physical Exam:  On physical examination the patient appears the stated age, is in no acute distress, affect is appropriate, and breathing is non-labored.  LMP 08/13/2005   There is no height or weight on file to calculate BMI.  Gait: normal over short distances   Incision:  healed  ROM: 0-100  Sciatic function is normal and symmetric     X-rays:    I reviewed the x-rays dated today.  Previous films reviewed.    Findings:  Normal progression for a total knee arthroplasty without evidence of loosening or subsidence.    Assessment: doing very well   Plan: cont PT, RTC in 6 weeks, sooner if issues. Virtual is appropriate.     No ref. provider found        Again, thank you for allowing me to participate in the care of your patient.        Sincerely,        Dylan Hernandez MD

## 2021-09-30 ENCOUNTER — THERAPY VISIT (OUTPATIENT)
Dept: PHYSICAL THERAPY | Facility: CLINIC | Age: 51
End: 2021-09-30
Payer: COMMERCIAL

## 2021-09-30 DIAGNOSIS — R60.0 LOCALIZED EDEMA: ICD-10-CM

## 2021-09-30 DIAGNOSIS — Z96.651 PRESENCE OF TOTAL RIGHT KNEE JOINT PROSTHESIS: ICD-10-CM

## 2021-09-30 DIAGNOSIS — Z96.651 AFTERCARE FOLLOWING RIGHT KNEE JOINT REPLACEMENT SURGERY: ICD-10-CM

## 2021-09-30 DIAGNOSIS — Z47.1 AFTERCARE FOLLOWING RIGHT KNEE JOINT REPLACEMENT SURGERY: ICD-10-CM

## 2021-09-30 DIAGNOSIS — R26.9 ABNORMAL GAIT: ICD-10-CM

## 2021-09-30 PROCEDURE — 97530 THERAPEUTIC ACTIVITIES: CPT | Mod: GP | Performed by: PHYSICAL THERAPIST

## 2021-09-30 PROCEDURE — 97110 THERAPEUTIC EXERCISES: CPT | Mod: GP | Performed by: PHYSICAL THERAPIST

## 2021-09-30 PROCEDURE — 97010 HOT OR COLD PACKS THERAPY: CPT | Mod: GP | Performed by: PHYSICAL THERAPIST

## 2021-10-04 ENCOUNTER — THERAPY VISIT (OUTPATIENT)
Dept: PHYSICAL THERAPY | Facility: CLINIC | Age: 51
End: 2021-10-04
Payer: COMMERCIAL

## 2021-10-04 DIAGNOSIS — R60.0 LOCALIZED EDEMA: ICD-10-CM

## 2021-10-04 DIAGNOSIS — Z96.651 PRESENCE OF TOTAL RIGHT KNEE JOINT PROSTHESIS: ICD-10-CM

## 2021-10-04 DIAGNOSIS — Z47.1 AFTERCARE FOLLOWING RIGHT KNEE JOINT REPLACEMENT SURGERY: ICD-10-CM

## 2021-10-04 DIAGNOSIS — R26.9 ABNORMAL GAIT: ICD-10-CM

## 2021-10-04 DIAGNOSIS — Z96.651 AFTERCARE FOLLOWING RIGHT KNEE JOINT REPLACEMENT SURGERY: ICD-10-CM

## 2021-10-04 PROCEDURE — 97112 NEUROMUSCULAR REEDUCATION: CPT | Mod: GP | Performed by: PHYSICAL THERAPIST

## 2021-10-04 PROCEDURE — 97110 THERAPEUTIC EXERCISES: CPT | Mod: GP | Performed by: PHYSICAL THERAPIST

## 2021-10-06 ENCOUNTER — THERAPY VISIT (OUTPATIENT)
Dept: PHYSICAL THERAPY | Facility: CLINIC | Age: 51
End: 2021-10-06
Payer: COMMERCIAL

## 2021-10-06 DIAGNOSIS — R26.9 ABNORMAL GAIT: ICD-10-CM

## 2021-10-06 DIAGNOSIS — Z96.651 AFTERCARE FOLLOWING RIGHT KNEE JOINT REPLACEMENT SURGERY: ICD-10-CM

## 2021-10-06 DIAGNOSIS — Z96.651 PRESENCE OF TOTAL RIGHT KNEE JOINT PROSTHESIS: ICD-10-CM

## 2021-10-06 DIAGNOSIS — Z47.1 AFTERCARE FOLLOWING RIGHT KNEE JOINT REPLACEMENT SURGERY: ICD-10-CM

## 2021-10-06 DIAGNOSIS — R60.0 LOCALIZED EDEMA: ICD-10-CM

## 2021-10-06 PROCEDURE — 97110 THERAPEUTIC EXERCISES: CPT | Mod: GP | Performed by: PHYSICAL THERAPIST

## 2021-10-06 PROCEDURE — 97112 NEUROMUSCULAR REEDUCATION: CPT | Mod: GP | Performed by: PHYSICAL THERAPIST

## 2021-10-12 ENCOUNTER — THERAPY VISIT (OUTPATIENT)
Dept: PHYSICAL THERAPY | Facility: CLINIC | Age: 51
End: 2021-10-12
Payer: COMMERCIAL

## 2021-10-12 DIAGNOSIS — R60.0 LOCALIZED EDEMA: ICD-10-CM

## 2021-10-12 DIAGNOSIS — R26.9 ABNORMAL GAIT: ICD-10-CM

## 2021-10-12 DIAGNOSIS — Z96.651 PRESENCE OF TOTAL RIGHT KNEE JOINT PROSTHESIS: ICD-10-CM

## 2021-10-12 DIAGNOSIS — Z96.651 AFTERCARE FOLLOWING RIGHT KNEE JOINT REPLACEMENT SURGERY: ICD-10-CM

## 2021-10-12 DIAGNOSIS — Z47.1 AFTERCARE FOLLOWING RIGHT KNEE JOINT REPLACEMENT SURGERY: ICD-10-CM

## 2021-10-12 PROCEDURE — 97112 NEUROMUSCULAR REEDUCATION: CPT | Mod: GP | Performed by: PHYSICAL THERAPIST

## 2021-10-12 PROCEDURE — 97110 THERAPEUTIC EXERCISES: CPT | Mod: GP | Performed by: PHYSICAL THERAPIST

## 2021-10-15 ENCOUNTER — E-VISIT (OUTPATIENT)
Dept: FAMILY MEDICINE | Facility: CLINIC | Age: 51
End: 2021-10-15
Payer: COMMERCIAL

## 2021-10-15 DIAGNOSIS — Z20.822 SUSPECTED 2019 NOVEL CORONAVIRUS INFECTION: Primary | ICD-10-CM

## 2021-10-15 PROCEDURE — 99421 OL DIG E/M SVC 5-10 MIN: CPT | Performed by: FAMILY MEDICINE

## 2021-10-18 ENCOUNTER — THERAPY VISIT (OUTPATIENT)
Dept: PHYSICAL THERAPY | Facility: CLINIC | Age: 51
End: 2021-10-18
Payer: COMMERCIAL

## 2021-10-18 DIAGNOSIS — Z96.651 AFTERCARE FOLLOWING RIGHT KNEE JOINT REPLACEMENT SURGERY: ICD-10-CM

## 2021-10-18 DIAGNOSIS — Z47.1 AFTERCARE FOLLOWING RIGHT KNEE JOINT REPLACEMENT SURGERY: ICD-10-CM

## 2021-10-18 DIAGNOSIS — Z96.651 PRESENCE OF TOTAL RIGHT KNEE JOINT PROSTHESIS: ICD-10-CM

## 2021-10-18 DIAGNOSIS — R26.9 ABNORMAL GAIT: ICD-10-CM

## 2021-10-18 DIAGNOSIS — R60.0 LOCALIZED EDEMA: ICD-10-CM

## 2021-10-18 PROCEDURE — 97110 THERAPEUTIC EXERCISES: CPT | Mod: GP

## 2021-10-18 PROCEDURE — 97112 NEUROMUSCULAR REEDUCATION: CPT | Mod: GP

## 2021-10-20 ENCOUNTER — VIRTUAL VISIT (OUTPATIENT)
Dept: PHARMACY | Facility: CLINIC | Age: 51
End: 2021-10-20
Payer: COMMERCIAL

## 2021-10-20 DIAGNOSIS — Z96.651 HISTORY OF TOTAL RIGHT KNEE REPLACEMENT: ICD-10-CM

## 2021-10-20 DIAGNOSIS — E66.813 CLASS 3 SEVERE OBESITY DUE TO EXCESS CALORIES WITH SERIOUS COMORBIDITY AND BODY MASS INDEX (BMI) OF 40.0 TO 44.9 IN ADULT (H): ICD-10-CM

## 2021-10-20 DIAGNOSIS — R73.03 PREDIABETES: Primary | ICD-10-CM

## 2021-10-20 DIAGNOSIS — E66.01 CLASS 3 SEVERE OBESITY DUE TO EXCESS CALORIES WITH SERIOUS COMORBIDITY AND BODY MASS INDEX (BMI) OF 40.0 TO 44.9 IN ADULT (H): ICD-10-CM

## 2021-10-20 DIAGNOSIS — K21.9 GERD WITHOUT ESOPHAGITIS: ICD-10-CM

## 2021-10-20 PROCEDURE — 99607 MTMS BY PHARM ADDL 15 MIN: CPT | Performed by: PHARMACIST

## 2021-10-20 PROCEDURE — 99606 MTMS BY PHARM EST 15 MIN: CPT | Performed by: PHARMACIST

## 2021-10-20 NOTE — PROGRESS NOTES
"Medication Therapy Management (MTM) Encounter    ASSESSMENT:                            Medication Adherence/Access: No issues identified    Obesity/Prediabetes: progressing, no changes for now.     GERD: Stable.     Knee Replacement: Stable. To follow with ortho.      PLAN:                            1. Continue current regimen.     Follow-up: Return in about 3 months (around 1/20/2022) for Medication Therapy Management Pharmacist Visit.    SUBJECTIVE/OBJECTIVE:                          Adelaida Packer is a 51 year old female called for a follow-up visit. She was referred to me from Dr. Mitchell.  Today's visit is a follow-up MTM visit from 9/15/21.      Reason for visit: Ozempic follow up.    Allergies/ADRs: Reviewed in chart  Past Medical History: Reviewed in chart  Tobacco: She reports that she has never smoked. She has never used smokeless tobacco.  Alcohol: not currently using    Medication Adherence/Access: no issues reported    Obesity/Prediabetes:   Ozempic 0.5 mg weekly, just went back up this week     Followed by Dr. Stephen Null, seen 6/15/21 for [facility]. Started on Ozempic at that time. Patient is experiencing the follow side effects: None. No constipation. No symptoms of low blood sugar. Doesn't test blood sugar.  She is feeling benefits from Ozempic but reports hasn't seen weight loss yet. She believes this is from sedentary activity due to recovery from knee replacement.   Diet/Eating Habits: Patient reports she is eating 5-6 small meals per day due to being jaimes faster.    Exercise/Activity: Patient reports mobility limited due to knee replacement surgery.     Weight: 232 lb     Wt Readings from Last 4 Encounters:   09/21/21 232 lb (105.2 kg)   08/11/21 230 lb 13.2 oz (104.7 kg)   07/19/21 240 lb 9.6 oz (109.1 kg)   06/22/21 246 lb 14.4 oz (112 kg)     Estimated body mass index is 42.43 kg/m  as calculated from the following:    Height as of 9/21/21: 5' 2\" (1.575 m).    " "Weight as of 9/21/21: 232 lb (105.2 kg).    GERD:   Pepcid (famotidine)  20 mg twice daily.     Pt reports no current symptoms unless having trigger foods.  Patient feels that current regimen is effective.    Knee Replacement:      - not using   Acetaiminophen 650 mg as needed  Methocarbamol 750 as needed - not using     Hydroxyzine 25 mg as needed    Knee replacement of right knee 8/11/21. Continues to work with PT, reports it is going good. Stopped taking oxycodone. Using Acetaminophen as needed for pain and somewhat helpful. Had weight loss goal requirements before knee replacement surgery. No black tarry stools. No abdominal pain. She has now moved back to bedroom which is on the third floor. She is going up and down the stairs. Going down is fine, but going up stairs is somewhat \"heavy\".   ----------------      I spent 20 minutes with this patient today. A copy of the visit note was provided to the patient's referring provider.    The patient was sent via Revee a summary of these recommendations.     Lauren Bloch, PharmD  Medication Therapy Management Pharmacist   [facility] [place]    Telemedicine Visit Details  Type of service:  Telephone visit  Start Time: 3:00 PM  End Time: 3:20 PM  Originating Location (patient location): Home  Distant Location (provider location):  [facility] [place]     Medication Therapy Recommendations  No medication therapy recommendations to display       "

## 2021-10-24 ENCOUNTER — HEALTH MAINTENANCE LETTER (OUTPATIENT)
Age: 51
End: 2021-10-24

## 2021-10-25 ENCOUNTER — THERAPY VISIT (OUTPATIENT)
Dept: PHYSICAL THERAPY | Facility: CLINIC | Age: 51
End: 2021-10-25
Payer: COMMERCIAL

## 2021-10-25 DIAGNOSIS — R26.9 ABNORMAL GAIT: ICD-10-CM

## 2021-10-25 DIAGNOSIS — R60.0 LOCALIZED EDEMA: ICD-10-CM

## 2021-10-25 DIAGNOSIS — Z96.651 AFTERCARE FOLLOWING RIGHT KNEE JOINT REPLACEMENT SURGERY: ICD-10-CM

## 2021-10-25 DIAGNOSIS — Z96.651 PRESENCE OF TOTAL RIGHT KNEE JOINT PROSTHESIS: ICD-10-CM

## 2021-10-25 DIAGNOSIS — Z47.1 AFTERCARE FOLLOWING RIGHT KNEE JOINT REPLACEMENT SURGERY: ICD-10-CM

## 2021-10-25 PROCEDURE — 97110 THERAPEUTIC EXERCISES: CPT | Mod: GP

## 2021-10-25 NOTE — PROGRESS NOTES
Subjective:  HPI  Physical Exam                    Objective:  System    Physical Exam    General     ROS    Assessment/Plan:    PROGRESS  REPORT    Progress reporting period is from 9-27-21 to 10-25-21, 15 visits since SOC    SUBJECTIVE  Adelaida c/o pain above her knee and behind/laterally,  this happens when she drives. Pt reports both left and right knee are in equal discomfort. She found that doing her ex's before getting out of bed  is helpful. She returns to work next week. Pt doing 1 mile on bike  6 min  .  Current Pain level: 6/10 (different pain above knee and clicking )    Changes in function:  Yes (See Goal flowsheet attached for changes in current functional level)     Adverse reaction to treatment or activity: None     OBJECTIVE  Yes,  Objective: Strength, ext 4+/5  flex 3/5.  PROM:  0-0-123 (no limp or assistive device with walking. ) Progressing strength

## 2021-10-25 NOTE — PROGRESS NOTES
Subjective:  HPI  Physical Exam                    Objective:  System    Physical Exam    General     ROS    Assessment/Plan:    ASSESSMENT/PLAN  Updated problem list and treatment plan: Diagnosis 1:  S/p R TKA  Pain -  hot/cold therapy, self management, education and home program  Decreased ROM/flexibility - manual therapy, therapeutic exercise and home program  Decreased strength - therapeutic exercise, therapeutic activities and home program  Impaired gait - gait training and home program  Impaired muscle performance - neuro re-education and home program  Decreased function - therapeutic activities and home program  STG/LTGs have been met:  Yes (See Goal flow sheet completed today.)  Progress toward STG/LTGs have been made:  Yes (See Goal flow sheet completed today.)  Assessment of Progress: The patient's condition is improving.  Self Management Plans:  Patient has been instructed in a home treatment program.  Patient  has been instructed in self management of symptoms.  I have re-evaluated this patient and find that the nature, scope, duration and intensity of the therapy is appropriate for the medical condition of the patient.  Adelaida continues to require the following intervention to meet STG and LT's:  PT    Recommendations:  This patient would benefit from continued therapy.   Adelaida will f/u with Dr Hernandez on 10-26 at 2pm  Frequency:  1 X week, once daily  Duration:  for 4-6 weeks        Please refer to the daily flowsheet for treatment today, total treatment time and time spent performing 1:1 timed codes.

## 2021-10-26 ENCOUNTER — OFFICE VISIT (OUTPATIENT)
Dept: ORTHOPEDICS | Facility: CLINIC | Age: 51
End: 2021-10-26
Payer: COMMERCIAL

## 2021-10-26 DIAGNOSIS — M17.12 PRIMARY LOCALIZED OSTEOARTHRITIS OF LEFT KNEE: Primary | ICD-10-CM

## 2021-10-26 PROCEDURE — 99024 POSTOP FOLLOW-UP VISIT: CPT | Performed by: ORTHOPAEDIC SURGERY

## 2021-10-26 NOTE — LETTER
October 26, 2021      RE: Adelaida Packer  [address] [place] [address]        To whom it may concern:    Adelaida Packer is under my professional care. She can return to work without restrictions on 11/4/21.       Sincerely,      Dylan Hernandez MD

## 2021-10-26 NOTE — LETTER
10/26/2021         RE: Adelaida Packer  [address] [place] [address]        Dear Colleague,    Thank you for referring your patient, Adelaida Packer, to the [facility]. Please see a copy of my visit note below.    Chief Complaint: Surgical Followup (11 wks s/p right total knee arthroplasty DOS: 8/11/21)       HPI: Adelaida Packer returns today in follow-up for her knee. She reports that she is doing very well. No pain medication, no assist device. Limited by her contra-lateral knee.  Happy with how she is doing so far.     Medications and allergies are documented in the EMR and have been reviewed.    Current Outpatient Medications:      acetaminophen (TYLENOL) 325 MG tablet, Take 2 tablets (650 mg) by mouth every 4 hours as needed for other (For optimal non-opioid multimodal pain management to improve pain control.), Disp: 50 tablet, Rfl: 0     amLODIPine (NORVASC) 5 MG tablet, Take 1 tablet (5 mg) by mouth daily, Disp: 90 tablet, Rfl: 3     Ascorbic Acid (VITAMIN C) 500 MG CAPS, Take 1 tablet by mouth daily, Disp: , Rfl:      aspirin (ASA) 81 MG EC tablet, Take 2 tablets (162 mg) by mouth daily, Disp: 56 tablet, Rfl: 0     Blood Pressure Monitor KIT, 1 Device 2 times daily, Disp: 1 kit, Rfl: 0     calcium carbonate (OS-EVGENY 600 MG [place]. CA) 1500 (600 CA) MG tablet, Take 1 tablet (600 mg) by mouth 2 times daily, Disp: 180 tablet, Rfl: 3     cetirizine (ZYRTEC) 10 MG tablet, Take 10 mg by mouth daily as needed for allergies, Disp: , Rfl:      cholecalciferol 2000 UNITS CAPS, Take 2,000 Units by mouth daily, Disp: 90 capsule, Rfl: 3     cyanocobalamin (VITAMIN B-12) 1000 MCG tablet, Take 1,000 mcg by mouth daily, Disp: , Rfl:      eplerenone (INSPRA) 50 MG tablet, Take 2 tablets (100 mg) by mouth 2 times daily, Disp: 360 tablet, Rfl: 3     famotidine (PEPCID) 20 MG tablet, Take 1 tablet (20 mg) by mouth 2 times daily, Disp: 180 tablet, Rfl: 1     fish oil-omega-3  fatty acids 1000 MG capsule, Take 2 g by mouth daily, Disp: , Rfl:      gabapentin (NEURONTIN) 100 MG capsule, Take 1 capsule (100 mg) by mouth 3 times daily Start with 1 tablet twice daily. Ok to increase to 3 times daily if not having side effects (Patient not taking: Reported on 9/28/2021), Disp: 30 capsule, Rfl: 0     hydrOXYzine (ATARAX) 25 MG tablet, Take 1 tablet (25 mg) by mouth every 6 hours as needed for itching Or severe pain, Disp: 30 tablet, Rfl: 0     labetalol (NORMODYNE) 300 MG tablet, Take 1 tablet (300 mg) by mouth 2 times daily, Disp: 180 tablet, Rfl: 3     methocarbamol (ROBAXIN) 750 MG tablet, Take 1 tablet (750 mg) by mouth every 6 hours as needed for muscle spasms, Disp: 30 tablet, Rfl: 0     Multiple Vitamin (MULTIVITAMIN ADULT PO), Take 1 Tablespoonful by mouth daily, Disp: , Rfl:      olopatadine (PATADAY) 0.2 % ophthalmic solution, Place 1 drop into both eyes daily, Disp: 2.5 mL, Rfl: 11     oxyCODONE (ROXICODONE) 5 MG tablet, Take 1 tablet (5 mg) by mouth every 6 hours as needed for severe pain (Patient not taking: Reported on 10/26/2021), Disp: 30 tablet, Rfl: 0     polyethylene glycol (MIRALAX/GLYCOLAX) Packet, Take 17 g by mouth daily as needed , Disp: , Rfl:      Semaglutide,0.25 or 0.5MG/DOS, 2 MG/1.5ML SOPN, Start 0.25 mg weekly for 2 weeks and increase to 0.5 mg weekly thereafter (Patient taking differently: Inject 0.5 mg Subcutaneous every 7 days ), Disp: 4.5 mL, Rfl: 3     SUMAtriptan (IMITREX) 25 MG tablet, TAKE 1 TO 2 TABLETS BY MOUTH AT ONSET OF HEADACHE FOR MIGRAINE. MAY REPEAT DOSE IN 2 HOURS. MAX OF 200MG OR 2 DOSES IN 24 HOURS, Disp: 18 tablet, Rfl: 0  Allergies: Sulfa drugs, Hydrochlorothiazide w/triamterene, Maxzide [hydrochlorothiazide w/triamterene], Metoprolol, and Seasonal allergies    Physical Exam:  On physical examination the patient appears the stated age, is in no acute distress, affect is appropriate, and breathing is non-labored.  LMP 08/13/2005   There is no  height or weight on file to calculate BMI.  Gait: mild antalgic on the left  Incision: healed   ROM:0-120    Assessment: doing very well  Plan: for the right RTC in one year for radiographs, sooner if issues. For the left she is approriate to move forward with total knee when she feels ready to do so. If it is greater than 6 months from now i'd ask her to follow-up and revisit the conversation and answer any questions that she has.     No ref. provider found        Again, thank you for allowing me to participate in the care of your patient.        Sincerely,        Dylan Hernandez MD

## 2021-10-26 NOTE — TELEPHONE ENCOUNTER
Prior Authorization Retail Medication Request    Medication/Dose: Oxycodone 5mg  ICD code (if different than what is on RX):  OA right knee M17. Z96.653  Previously Tried and Failed:  Tylenol  Rationale:  Total knee arthroplasty on 8/11/21, this will be a short term up to 8 week course for pain medication for after surgery. She is not able to take ibuprofen due to aspirin dosing for DVT prevention    Insurance Name:  [employer] [place] advantage   Insurance ID:  [id number]       Pharmacy Information (if different than what is on RX)  Name:  [facility]  Phone:       yes

## 2021-10-26 NOTE — NURSING NOTE
Adeliada Packer's chief complaint for this visit includes:  Chief Complaint   Patient presents with     Surgical Followup     11 wks s/p right total knee arthroplasty DOS: 8/11/21     PCP: Windy Gordillo    Referring Provider:  No referring provider defined for this encounter.    [facility] 08/13/2005   Data Unavailable     Do you need any medication refills at today's visit? No    Ely Isaacs MA, ATC

## 2021-10-27 NOTE — PATIENT INSTRUCTIONS
Recommendations from today's MTM visit:                                                         1. Continue current regimen.       Follow-up: Return in about 3 months (around 1/20/2022) for Medication Therapy Management Pharmacist Visit.    It was great to speak with you today.  I value your experience and would be very thankful for your time with providing feedback on our clinic survey. You may receive a survey via email or text message in the next few days.       My Clinical Pharmacist's contact information:                                                      Please feel free to contact me with any questions or concerns you have.      Lauren Bloch, PharmD  Medication Therapy Management Pharmacist   [facility] [place]

## 2021-10-28 ENCOUNTER — THERAPY VISIT (OUTPATIENT)
Dept: PHYSICAL THERAPY | Facility: CLINIC | Age: 51
End: 2021-10-28
Payer: COMMERCIAL

## 2021-10-28 DIAGNOSIS — Z47.1 AFTERCARE FOLLOWING RIGHT KNEE JOINT REPLACEMENT SURGERY: ICD-10-CM

## 2021-10-28 DIAGNOSIS — Z96.651 AFTERCARE FOLLOWING RIGHT KNEE JOINT REPLACEMENT SURGERY: ICD-10-CM

## 2021-10-28 DIAGNOSIS — R60.0 LOCALIZED EDEMA: ICD-10-CM

## 2021-10-28 DIAGNOSIS — Z96.651 PRESENCE OF TOTAL RIGHT KNEE JOINT PROSTHESIS: ICD-10-CM

## 2021-10-28 DIAGNOSIS — R26.9 ABNORMAL GAIT: ICD-10-CM

## 2021-10-28 PROCEDURE — 97140 MANUAL THERAPY 1/> REGIONS: CPT | Mod: GP

## 2021-10-28 PROCEDURE — 97110 THERAPEUTIC EXERCISES: CPT | Mod: GP

## 2021-10-30 NOTE — PROGRESS NOTES
Chief Complaint: Surgical Followup (11 wks s/p right total knee arthroplasty DOS: 8/11/21)       HPI: Adelaida Packer returns today in follow-up for her knee. She reports that she is doing very well. No pain medication, no assist device. Limited by her contra-lateral knee.  Happy with how she is doing so far.     Medications and allergies are documented in the EMR and have been reviewed.    Current Outpatient Medications:      acetaminophen (TYLENOL) 325 MG tablet, Take 2 tablets (650 mg) by mouth every 4 hours as needed for other (For optimal non-opioid multimodal pain management to improve pain control.), Disp: 50 tablet, Rfl: 0     amLODIPine (NORVASC) 5 MG tablet, Take 1 tablet (5 mg) by mouth daily, Disp: 90 tablet, Rfl: 3     Ascorbic Acid (VITAMIN C) 500 MG CAPS, Take 1 tablet by mouth daily, Disp: , Rfl:      aspirin (ASA) 81 MG EC tablet, Take 2 tablets (162 mg) by mouth daily, Disp: 56 tablet, Rfl: 0     Blood Pressure Monitor KIT, 1 Device 2 times daily, Disp: 1 kit, Rfl: 0     calcium carbonate (OS-EVGENY 600 MG [place]. CA) 1500 (600 CA) MG tablet, Take 1 tablet (600 mg) by mouth 2 times daily, Disp: 180 tablet, Rfl: 3     cetirizine (ZYRTEC) 10 MG tablet, Take 10 mg by mouth daily as needed for allergies, Disp: , Rfl:      cholecalciferol 2000 UNITS CAPS, Take 2,000 Units by mouth daily, Disp: 90 capsule, Rfl: 3     cyanocobalamin (VITAMIN B-12) 1000 MCG tablet, Take 1,000 mcg by mouth daily, Disp: , Rfl:      eplerenone (INSPRA) 50 MG tablet, Take 2 tablets (100 mg) by mouth 2 times daily, Disp: 360 tablet, Rfl: 3     famotidine (PEPCID) 20 MG tablet, Take 1 tablet (20 mg) by mouth 2 times daily, Disp: 180 tablet, Rfl: 1     fish oil-omega-3 fatty acids 1000 MG capsule, Take 2 g by mouth daily, Disp: , Rfl:      gabapentin (NEURONTIN) 100 MG capsule, Take 1 capsule (100 mg) by mouth 3 times daily Start with 1 tablet twice daily. Ok to increase to 3 times daily if not having side effects (Patient not taking:  Reported on 9/28/2021), Disp: 30 capsule, Rfl: 0     hydrOXYzine (ATARAX) 25 MG tablet, Take 1 tablet (25 mg) by mouth every 6 hours as needed for itching Or severe pain, Disp: 30 tablet, Rfl: 0     labetalol (NORMODYNE) 300 MG tablet, Take 1 tablet (300 mg) by mouth 2 times daily, Disp: 180 tablet, Rfl: 3     methocarbamol (ROBAXIN) 750 MG tablet, Take 1 tablet (750 mg) by mouth every 6 hours as needed for muscle spasms, Disp: 30 tablet, Rfl: 0     Multiple Vitamin (MULTIVITAMIN ADULT PO), Take 1 Tablespoonful by mouth daily, Disp: , Rfl:      olopatadine (PATADAY) 0.2 % ophthalmic solution, Place 1 drop into both eyes daily, Disp: 2.5 mL, Rfl: 11     oxyCODONE (ROXICODONE) 5 MG tablet, Take 1 tablet (5 mg) by mouth every 6 hours as needed for severe pain (Patient not taking: Reported on 10/26/2021), Disp: 30 tablet, Rfl: 0     polyethylene glycol (MIRALAX/GLYCOLAX) Packet, Take 17 g by mouth daily as needed , Disp: , Rfl:      Semaglutide,0.25 or 0.5MG/DOS, 2 MG/1.5ML SOPN, Start 0.25 mg weekly for 2 weeks and increase to 0.5 mg weekly thereafter (Patient taking differently: Inject 0.5 mg Subcutaneous every 7 days ), Disp: 4.5 mL, Rfl: 3     SUMAtriptan (IMITREX) 25 MG tablet, TAKE 1 TO 2 TABLETS BY MOUTH AT ONSET OF HEADACHE FOR MIGRAINE. MAY REPEAT DOSE IN 2 HOURS. MAX OF 200MG OR 2 DOSES IN 24 HOURS, Disp: 18 tablet, Rfl: 0  Allergies: Sulfa drugs, Hydrochlorothiazide w/triamterene, Maxzide [hydrochlorothiazide w/triamterene], Metoprolol, and Seasonal allergies    Physical Exam:  On physical examination the patient appears the stated age, is in no acute distress, affect is appropriate, and breathing is non-labored.  LMP 08/13/2005   There is no height or weight on file to calculate BMI.  Gait: mild antalgic on the left  Incision: healed   ROM:0-120    Assessment: doing very well  Plan: for the right RTC in one year for radiographs, sooner if issues. For the left she is approriate to move forward with total knee  when she feels ready to do so. If it is greater than 6 months from now i'd ask her to follow-up and revisit the conversation and answer any questions that she has.     No ref. provider found

## 2021-11-03 ENCOUNTER — THERAPY VISIT (OUTPATIENT)
Dept: PHYSICAL THERAPY | Facility: CLINIC | Age: 51
End: 2021-11-03
Payer: COMMERCIAL

## 2021-11-03 DIAGNOSIS — Z47.1 AFTERCARE FOLLOWING RIGHT KNEE JOINT REPLACEMENT SURGERY: ICD-10-CM

## 2021-11-03 DIAGNOSIS — R60.0 LOCALIZED EDEMA: ICD-10-CM

## 2021-11-03 DIAGNOSIS — Z96.651 AFTERCARE FOLLOWING RIGHT KNEE JOINT REPLACEMENT SURGERY: ICD-10-CM

## 2021-11-03 DIAGNOSIS — R26.9 ABNORMAL GAIT: ICD-10-CM

## 2021-11-03 DIAGNOSIS — Z96.651 PRESENCE OF TOTAL RIGHT KNEE JOINT PROSTHESIS: ICD-10-CM

## 2021-11-03 PROCEDURE — 97140 MANUAL THERAPY 1/> REGIONS: CPT | Mod: GP | Performed by: PHYSICAL THERAPIST

## 2021-11-03 PROCEDURE — 97110 THERAPEUTIC EXERCISES: CPT | Mod: GP | Performed by: PHYSICAL THERAPIST

## 2021-11-08 ENCOUNTER — THERAPY VISIT (OUTPATIENT)
Dept: PHYSICAL THERAPY | Facility: CLINIC | Age: 51
End: 2021-11-08
Payer: COMMERCIAL

## 2021-11-08 DIAGNOSIS — Z96.651 AFTERCARE FOLLOWING RIGHT KNEE JOINT REPLACEMENT SURGERY: ICD-10-CM

## 2021-11-08 DIAGNOSIS — Z47.1 AFTERCARE FOLLOWING RIGHT KNEE JOINT REPLACEMENT SURGERY: ICD-10-CM

## 2021-11-08 DIAGNOSIS — R26.9 ABNORMAL GAIT: ICD-10-CM

## 2021-11-08 DIAGNOSIS — R60.0 LOCALIZED EDEMA: ICD-10-CM

## 2021-11-08 DIAGNOSIS — Z96.651 PRESENCE OF TOTAL RIGHT KNEE JOINT PROSTHESIS: ICD-10-CM

## 2021-11-08 PROCEDURE — 97140 MANUAL THERAPY 1/> REGIONS: CPT | Mod: GP

## 2021-11-08 PROCEDURE — 97110 THERAPEUTIC EXERCISES: CPT | Mod: GP

## 2021-11-22 ENCOUNTER — THERAPY VISIT (OUTPATIENT)
Dept: PHYSICAL THERAPY | Facility: CLINIC | Age: 51
End: 2021-11-22
Payer: COMMERCIAL

## 2021-11-22 DIAGNOSIS — R60.0 LOCALIZED EDEMA: ICD-10-CM

## 2021-11-22 DIAGNOSIS — R26.9 ABNORMAL GAIT: ICD-10-CM

## 2021-11-22 DIAGNOSIS — Z96.651 AFTERCARE FOLLOWING RIGHT KNEE JOINT REPLACEMENT SURGERY: ICD-10-CM

## 2021-11-22 DIAGNOSIS — Z96.651 PRESENCE OF TOTAL RIGHT KNEE JOINT PROSTHESIS: ICD-10-CM

## 2021-11-22 DIAGNOSIS — Z47.1 AFTERCARE FOLLOWING RIGHT KNEE JOINT REPLACEMENT SURGERY: ICD-10-CM

## 2021-11-22 PROCEDURE — 97110 THERAPEUTIC EXERCISES: CPT | Mod: GP

## 2021-11-26 DIAGNOSIS — E26.09 PRIMARY HYPERALDOSTERONISM (H): ICD-10-CM

## 2021-11-26 NOTE — TELEPHONE ENCOUNTER
Writer received a refill request from: [facility] in [place]. [phone]     Medication: eplerenone (INSPRA) 50 MG tablet     Sig: Take 2 tablets (100 mg) by mouth 2 times daily   Date last filled: 9/11/2021        Pt's last office visit: 12/01/2020  Next scheduled office visit: none

## 2021-11-29 RX ORDER — EPLERENONE 50 MG/1
100 TABLET, FILM COATED ORAL 2 TIMES DAILY
Qty: 360 TABLET | Refills: 3 | Status: SHIPPED | OUTPATIENT
Start: 2021-11-29 | End: 2022-12-15

## 2021-12-07 ENCOUNTER — THERAPY VISIT (OUTPATIENT)
Dept: PHYSICAL THERAPY | Facility: CLINIC | Age: 51
End: 2021-12-07
Payer: COMMERCIAL

## 2021-12-07 DIAGNOSIS — R60.0 LOCALIZED EDEMA: ICD-10-CM

## 2021-12-07 DIAGNOSIS — R26.9 ABNORMAL GAIT: ICD-10-CM

## 2021-12-07 DIAGNOSIS — Z47.1 AFTERCARE FOLLOWING RIGHT KNEE JOINT REPLACEMENT SURGERY: ICD-10-CM

## 2021-12-07 DIAGNOSIS — Z96.651 PRESENCE OF TOTAL RIGHT KNEE JOINT PROSTHESIS: ICD-10-CM

## 2021-12-07 DIAGNOSIS — Z96.651 AFTERCARE FOLLOWING RIGHT KNEE JOINT REPLACEMENT SURGERY: ICD-10-CM

## 2021-12-07 PROCEDURE — 97110 THERAPEUTIC EXERCISES: CPT | Mod: GP | Performed by: PHYSICAL THERAPIST

## 2021-12-07 PROCEDURE — 97112 NEUROMUSCULAR REEDUCATION: CPT | Mod: GP | Performed by: PHYSICAL THERAPIST

## 2021-12-07 ASSESSMENT — ACTIVITIES OF DAILY LIVING (ADL)
WALK: ACTIVITY IS MINIMALLY DIFFICULT
HOW_WOULD_YOU_RATE_THE_OVERALL_FUNCTION_OF_YOUR_KNEE_DURING_YOUR_USUAL_DAILY_ACTIVITIES?: NEARLY NORMAL
STIFFNESS: I HAVE THE SYMPTOM BUT IT DOES NOT AFFECT MY ACTIVITY
WEAKNESS: I DO NOT HAVE THE SYMPTOM
KNEE_ACTIVITY_OF_DAILY_LIVING_SCORE: 74.29
SQUAT: ACTIVITY IS SOMEWHAT DIFFICULT
KNEEL ON THE FRONT OF YOUR KNEE: ACTIVITY IS FAIRLY DIFFICULT
AS_A_RESULT_OF_YOUR_KNEE_INJURY,_HOW_WOULD_YOU_RATE_YOUR_CURRENT_LEVEL_OF_DAILY_ACTIVITY?: NEARLY NORMAL
RAW_SCORE: 52
STAND: ACTIVITY IS MINIMALLY DIFFICULT
GIVING WAY, BUCKLING OR SHIFTING OF KNEE: I HAVE THE SYMPTOM BUT IT DOES NOT AFFECT MY ACTIVITY
HOW_WOULD_YOU_RATE_THE_CURRENT_FUNCTION_OF_YOUR_KNEE_DURING_YOUR_USUAL_DAILY_ACTIVITIES_ON_A_SCALE_FROM_0_TO_100_WITH_100_BEING_YOUR_LEVEL_OF_KNEE_FUNCTION_PRIOR_TO_YOUR_INJURY_AND_0_BEING_THE_INABILITY_TO_PERFORM_ANY_OF_YOUR_USUAL_DAILY_ACTIVITIES?: 70
KNEE_ACTIVITY_OF_DAILY_LIVING_SUM: 52
GO DOWN STAIRS: ACTIVITY IS FAIRLY DIFFICULT
RISE FROM A CHAIR: ACTIVITY IS NOT DIFFICULT
SIT WITH YOUR KNEE BENT: ACTIVITY IS NOT DIFFICULT
SWELLING: I HAVE THE SYMPTOM BUT IT DOES NOT AFFECT MY ACTIVITY
GO UP STAIRS: ACTIVITY IS FAIRLY DIFFICULT
LIMPING: I HAVE THE SYMPTOM BUT IT DOES NOT AFFECT MY ACTIVITY
PAIN: I HAVE THE SYMPTOM BUT IT DOES NOT AFFECT MY ACTIVITY

## 2021-12-07 NOTE — PROGRESS NOTES
Subjective:  HPI  Physical Exam       Knee Activity of Daily Living Score: 74.29            Objective:  System    Physical Exam    General     ROS    Assessment/Plan:    DISCHARGE REPORT    Progress reporting period is from 8-30-21 to 12-7-21, 20 visits.       SUBJECTIVE  Subjective changes noted by patient:  .  Subjective: States she is doing well, just really tired with work. She has days where the R knee is really stiff. She does have intermittent pain daily on the sides and back of her knee, especially with driving. Sometimes using tylenol. States the strength feels good but stairs can be difficult still. The L knee is limiting her and she will have TKA next summer for that knee. Uses a stationary bike at home for ROM along with regular stretching     Current Pain level:  (3-4/10).       .   Changes in function:  Yes (See Goal flowsheet attached for changes in current functional level)  Adverse reaction to treatment or activity: None    OBJECTIVE  Changes noted in objective findings:  Yes,   Objective: Strength R knee flx 4+/5 with pain posterior, ext 5/5 and painfree.  Hip strength abd R 4-/5 L 4/5, hip ext B 5/5. Still walks with slight side to side limp, Asc desc stairs reciprocol but stiff desc. No increassed swelling or warmth over the R knee.      ASSESSMENT/PLAN  Updated problem list and treatment plan: Diagnosis 1:  R TKA continue with HEP    STG/LTGs have been met or progress has been made towards goals:  Yes (See Goal flow sheet completed today.)  Assessment of Progress: The patient's condition is improving.  Self Management Plans:  Patient is independent in a home treatment program.  Patient is independent in self management of symptoms.  I have re-evaluated this patient and find that the nature, scope, duration and intensity of the therapy is appropriate for the medical condition of the patient.  Adelaida continues to require the following intervention to meet STG and LTG's:  PT intervention is no  longer required to meet STG/LTG.    Recommendations:  This patient is ready to be discharged from therapy and continue their home treatment program.  Adelaida is consistent with her HEP. If she does not feel she is continuing to progress she may return for a few more PT visits. She was also given instruction for ex to work on the the L side as she will be having TKA next summer.     Please refer to the daily flowsheet for treatment today, total treatment time and time spent performing 1:1 timed codes.

## 2021-12-28 ENCOUNTER — TELEPHONE (OUTPATIENT)
Dept: NEPHROLOGY | Facility: CLINIC | Age: 51
End: 2021-12-28

## 2021-12-28 ENCOUNTER — VIRTUAL VISIT (OUTPATIENT)
Dept: ENDOCRINOLOGY | Facility: CLINIC | Age: 51
End: 2021-12-28
Payer: COMMERCIAL

## 2021-12-28 VITALS — WEIGHT: 250 LBS | BODY MASS INDEX: 46.01 KG/M2 | HEIGHT: 62 IN

## 2021-12-28 DIAGNOSIS — E11.22 TYPE 2 DIABETES MELLITUS WITH STAGE 3A CHRONIC KIDNEY DISEASE, WITHOUT LONG-TERM CURRENT USE OF INSULIN (H): ICD-10-CM

## 2021-12-28 DIAGNOSIS — N18.31 TYPE 2 DIABETES MELLITUS WITH STAGE 3A CHRONIC KIDNEY DISEASE, WITHOUT LONG-TERM CURRENT USE OF INSULIN (H): ICD-10-CM

## 2021-12-28 DIAGNOSIS — N18.30 CKD (CHRONIC KIDNEY DISEASE) STAGE 3, GFR 30-59 ML/MIN (H): Primary | ICD-10-CM

## 2021-12-28 DIAGNOSIS — E66.01 MORBID OBESITY (H): ICD-10-CM

## 2021-12-28 PROCEDURE — 99214 OFFICE O/P EST MOD 30 MIN: CPT | Mod: TEL | Performed by: INTERNAL MEDICINE

## 2021-12-28 ASSESSMENT — MIFFLIN-ST. JEOR: SCORE: 1697.24

## 2021-12-28 ASSESSMENT — PAIN SCALES - GENERAL: PAINLEVEL: NO PAIN (0)

## 2021-12-28 NOTE — PROGRESS NOTES
"    Return Medical Weight Management Note     Adelaida Packer  MRN:  [id number]  :  1970  JUTSUS:  2021    Dear Windy Gordillo MD,    I had the pleasure of seeing your patient Adelaida Packer. She is a 51 year old female who I am continuing to see for treatment of obesity related to: prediabetes, hypertension, OA       6/15/2021   I have the following health issues associated with obesity: Pre-Diabetes, High Blood Pressure, Osteoarthritis (joint disease)   I have the following symptoms associated with obesity: Knee Pain, Hip Pain     She underwent right knee replacement in Aug 2021.     INTERVAL HISTORY:  Last seen 2021. She was started on Ozempic injection in .  She initially lost 12 lbs. However, she reports having heart burn and constipation. Thus, Ozempic was decreased. She was also started on Pepcid for heartburn and it has helped a lot. She went back to work in 2021 after recovery from knee replacement. She has gained about 18 lbs in the past 2 months due to eating out more and drinking more coffee (from [employer]) and lack of regular exercise.    She is currently taking Ozempic 0.5 mg weekly.    She is a .    CURRENT WEIGHT:   250 lbs 0 oz                  Changes and Difficulties 2021   I have made the following changes to my diet since my last visit: Riding my bike, drinking more water   With regards to my diet, I am still struggling with: Over eating,   I have made the following changes to my activity/exercise since my last visit: Riding my bike   With regards to my activity/exercise, I am still struggling with: Consistency       VITALS:  Ht 1.575 m (5' 2\")   Wt 113.4 kg (250 lb)   LMP 2005   BMI 45.73 kg/m      MEDICATIONS:   Current Outpatient Medications   Medication Sig Dispense Refill     acetaminophen (TYLENOL) 325 MG tablet Take 2 tablets (650 mg) by mouth every 4 hours as needed for other (For optimal non-opioid multimodal pain " management to improve pain control.) 50 tablet 0     amLODIPine (NORVASC) 5 MG tablet Take 1 tablet (5 mg) by mouth daily 90 tablet 3     Ascorbic Acid (VITAMIN C) 500 MG CAPS Take 1 tablet by mouth daily       aspirin (ASA) 81 MG EC tablet Take 2 tablets (162 mg) by mouth daily 56 tablet 0     Blood Pressure Monitor KIT 1 Device 2 times daily 1 kit 0     calcium carbonate (OS-EVGENY 600 MG [place]. [place]) 1500 (600 [place]) MG tablet Take 1 tablet (600 mg) by mouth 2 times daily 180 tablet 3     cetirizine (ZYRTEC) 10 MG tablet Take 10 mg by mouth daily as needed for allergies       cholecalciferol 2000 UNITS CAPS Take 2,000 Units by mouth daily 90 capsule 3     cyanocobalamin (VITAMIN B-12) 1000 MCG tablet Take 1,000 mcg by mouth daily       eplerenone (INSPRA) 50 MG tablet Take 2 tablets (100 mg) by mouth 2 times daily 360 tablet 3     famotidine (PEPCID) 20 MG tablet Take 1 tablet (20 mg) by mouth 2 times daily 180 tablet 1     fish oil-omega-3 fatty acids 1000 MG capsule Take 2 g by mouth daily       hydrOXYzine (ATARAX) 25 MG tablet Take 1 tablet (25 mg) by mouth every 6 hours as needed for itching Or severe pain 30 tablet 0     labetalol (NORMODYNE) 300 MG tablet Take 1 tablet (300 mg) by mouth 2 times daily 180 tablet 3     methocarbamol (ROBAXIN) 750 MG tablet Take 1 tablet (750 mg) by mouth every 6 hours as needed for muscle spasms 30 tablet 0     Multiple Vitamin (MULTIVITAMIN ADULT PO) Take 1 Tablespoonful by mouth daily       olopatadine (PATADAY) 0.2 % ophthalmic solution Place 1 drop into both eyes daily 2.5 mL 11     polyethylene glycol (MIRALAX/GLYCOLAX) Packet Take 17 g by mouth daily as needed        Semaglutide,0.25 or 0.5MG/DOS, 2 MG/1.5ML SOPN Start 0.25 mg weekly for 2 weeks and increase to 0.5 mg weekly thereafter (Patient taking differently: Inject 0.5 mg Subcutaneous every 7 days ) 4.5 mL 3     SUMAtriptan (IMITREX) 25 MG tablet TAKE 1 TO 2 TABLETS BY MOUTH AT ONSET OF HEADACHE FOR MIGRAINE. MAY REPEAT  DOSE IN 2 HOURS. MAX OF 200MG OR 2 DOSES IN 24 HOURS 18 tablet 0     gabapentin (NEURONTIN) 100 MG capsule Take 1 capsule (100 mg) by mouth 3 times daily Start with 1 tablet twice daily. Ok to increase to 3 times daily if not having side effects (Patient not taking: Reported on 12/28/2021) 30 capsule 0     oxyCODONE (ROXICODONE) 5 MG tablet Take 1 tablet (5 mg) by mouth every 6 hours as needed for severe pain (Patient not taking: Reported on 12/28/2021) 30 tablet 0       Weight Loss Medication History Reviewed With Patient 12/28/2021   Which weight loss medications are you currently taking on a regular basis?  Ozempic   If you are not taking a weight loss medication that was prescribed to you, please indicate why: -   Are you having any side effects from the weight loss medication that we have prescribed you? Yes   If you are having side effects please describe: Heart burn       ASSESSMENT:   Adelaida Packer is a 51 year old female who I am continuing to see for treatment of obesity related to: prediabetes, hypertension, OA    - started Ozempic in June 2021 -- initially lost 12 lbs but regained about 18 lbs in the past 2 months due to poor diet and lack of regular exercise  - still taking Ozempic 0.5 mg weekly  - Pepcid helps with heartburn    PLAN:   - continue with Ozempic 0.5 mg weekly  - restart working on diet modification  - exercise as tolerated    FOLLOW-UP:    3 months.    Phone start 0903 AM  Phone end 0914 AM  Phone duration 11 minutes    External notes/medical records independently reviewed, labs and imaging independently reviewed, medical management and tests to be discussed/communicated to patient.    Time: I spent 31  minutes spent on the date of the encounter preparing to see patient (including chart review and preparation), obtaining and or reviewing additional medical history, performing a physical exam and evaluation, documenting clinical information in the electronic health record, independently  interpreting results, communicating results to the patient and coordinating care.      Sincerely,    Stephen Null MD

## 2021-12-28 NOTE — LETTER
2021       RE: Adelaida Packer  [id number] [place] [address]     Dear Colleague,    Thank you for referring your patient, Adelaida Packer, to the [facility] [place] [facility]. Please see a copy of my visit note below.    Adelaida is a 51 year old who is being evaluated via a billable telephone visit.      What phone number would you like to be contacted at? [phone]  How would you like to obtain your AVS? MyChart  Phone call duration:  minutes        Return Medical Weight Management Note     Adelaida Packer  MRN:  [id number]  :  1970  JUSTUS:  2021    Dear Windy Gordillo MD,    I had the pleasure of seeing your patient Adelaida Packer. She is a 51 year old female who I am continuing to see for treatment of obesity related to: prediabetes, hypertension, OA       6/15/2021   I have the following health issues associated with obesity: Pre-Diabetes, High Blood Pressure, Osteoarthritis (joint disease)   I have the following symptoms associated with obesity: Knee Pain, Hip Pain     She underwent right knee replacement in Aug 2021.     INTERVAL HISTORY:  Last seen 2021. She was started on Ozempic injection in .  She initially lost 12 lbs. However, she reports having heart burn and constipation. Thus, Ozempic was decreased. She was also started on Pepcid for heartburn and it has helped a lot. She went back to work in 2021 after recovery from knee replacement. She has gained about 18 lbs in the past 2 months due to eating out more and drinking more coffee (from [employer]) and lack of regular exercise.    She is currently taking Ozempic 0.5 mg weekly.    She is a .    CURRENT WEIGHT:   250 lbs 0 oz                  Changes and Difficulties 2021   I have made the following changes to my diet since my last visit: Riding my bike, drinking more water   With  "regards to my diet, I am still struggling with: Over eating,   I have made the following changes to my activity/exercise since my last visit: Riding my bike   With regards to my activity/exercise, I am still struggling with: Consistency       VITALS:  Ht 1.575 m (5' 2\")   Wt 113.4 kg (250 lb)   LMP 08/13/2005   BMI 45.73 kg/m      MEDICATIONS:   Current Outpatient Medications   Medication Sig Dispense Refill     acetaminophen (TYLENOL) 325 MG tablet Take 2 tablets (650 mg) by mouth every 4 hours as needed for other (For optimal non-opioid multimodal pain management to improve pain control.) 50 tablet 0     amLODIPine (NORVASC) 5 MG tablet Take 1 tablet (5 mg) by mouth daily 90 tablet 3     Ascorbic Acid (VITAMIN C) 500 MG CAPS Take 1 tablet by mouth daily       aspirin (ASA) 81 MG EC tablet Take 2 tablets (162 mg) by mouth daily 56 tablet 0     Blood Pressure Monitor KIT 1 Device 2 times daily 1 kit 0     calcium carbonate (OS-EVGENY 600 MG [place]. [place]) 1500 (600 [place]) MG tablet Take 1 tablet (600 mg) by mouth 2 times daily 180 tablet 3     cetirizine (ZYRTEC) 10 MG tablet Take 10 mg by mouth daily as needed for allergies       cholecalciferol 2000 UNITS CAPS Take 2,000 Units by mouth daily 90 capsule 3     cyanocobalamin (VITAMIN B-12) 1000 MCG tablet Take 1,000 mcg by mouth daily       eplerenone (INSPRA) 50 MG tablet Take 2 tablets (100 mg) by mouth 2 times daily 360 tablet 3     famotidine (PEPCID) 20 MG tablet Take 1 tablet (20 mg) by mouth 2 times daily 180 tablet 1     fish oil-omega-3 fatty acids 1000 MG capsule Take 2 g by mouth daily       hydrOXYzine (ATARAX) 25 MG tablet Take 1 tablet (25 mg) by mouth every 6 hours as needed for itching Or severe pain 30 tablet 0     labetalol (NORMODYNE) 300 MG tablet Take 1 tablet (300 mg) by mouth 2 times daily 180 tablet 3     methocarbamol (ROBAXIN) 750 MG tablet Take 1 tablet (750 mg) by mouth every 6 hours as needed for muscle spasms 30 tablet 0     Multiple Vitamin " (MULTIVITAMIN ADULT PO) Take 1 Tablespoonful by mouth daily       olopatadine (PATADAY) 0.2 % ophthalmic solution Place 1 drop into both eyes daily 2.5 mL 11     polyethylene glycol (MIRALAX/GLYCOLAX) Packet Take 17 g by mouth daily as needed        Semaglutide,0.25 or 0.5MG/DOS, 2 MG/1.5ML SOPN Start 0.25 mg weekly for 2 weeks and increase to 0.5 mg weekly thereafter (Patient taking differently: Inject 0.5 mg Subcutaneous every 7 days ) 4.5 mL 3     SUMAtriptan (IMITREX) 25 MG tablet TAKE 1 TO 2 TABLETS BY MOUTH AT ONSET OF HEADACHE FOR MIGRAINE. MAY REPEAT DOSE IN 2 HOURS. MAX OF 200MG OR 2 DOSES IN 24 HOURS 18 tablet 0     gabapentin (NEURONTIN) 100 MG capsule Take 1 capsule (100 mg) by mouth 3 times daily Start with 1 tablet twice daily. Ok to increase to 3 times daily if not having side effects (Patient not taking: Reported on 12/28/2021) 30 capsule 0     oxyCODONE (ROXICODONE) 5 MG tablet Take 1 tablet (5 mg) by mouth every 6 hours as needed for severe pain (Patient not taking: Reported on 12/28/2021) 30 tablet 0       Weight Loss Medication History Reviewed With Patient 12/28/2021   Which weight loss medications are you currently taking on a regular basis?  Ozempic   If you are not taking a weight loss medication that was prescribed to you, please indicate why: -   Are you having any side effects from the weight loss medication that we have prescribed you? Yes   If you are having side effects please describe: Heart burn       ASSESSMENT:   Adelaida Packer is a 51 year old female who I am continuing to see for treatment of obesity related to: prediabetes, hypertension, OA    - started Ozempic in June 2021 -- initially lost 12 lbs but regained about 18 lbs in the past 2 months due to poor diet and lack of regular exercise  - still taking Ozempic 0.5 mg weekly  - Pepcid helps with heartburn    PLAN:   - continue with Ozempic 0.5 mg weekly  - restart working on diet modification  - exercise as  tolerated    FOLLOW-UP:    3 months.    Phone start 0903 AM  Phone end 0914 AM  Phone duration 11 minutes    External notes/medical records independently reviewed, labs and imaging independently reviewed, medical management and tests to be discussed/communicated to patient.    Time: I spent 31  minutes spent on the date of the encounter preparing to see patient (including chart review and preparation), obtaining and or reviewing additional medical history, performing a physical exam and evaluation, documenting clinical information in the electronic health record, independently interpreting results, communicating results to the patient and coordinating care.      Sincerely,    Stephen Null MD

## 2021-12-28 NOTE — NURSING NOTE
"Chief Complaint   Patient presents with     RECHECK     3 Months Follow-up Weight Management       Vitals:    12/28/21 0830   Weight: 113.4 kg (250 lb)   Height: 1.575 m (5' 2\")       Body mass index is 45.73 kg/m .                        "

## 2021-12-28 NOTE — TELEPHONE ENCOUNTER
[facility] Call Center    Phone Message    May a detailed message be left on voicemail: yes     Reason for Call: Order(s): Other:   Reason for requested: labs for CKD, hypertension  Date needed: 3/14/22  Provider name: Dr Aleman      Action Taken: Message routed to:  [facility] ([facility]): [facility]    Travel Screening: Not Applicable

## 2021-12-28 NOTE — PROGRESS NOTES
Adelaida is a 51 year old who is being evaluated via a billable telephone visit.      What phone number would you like to be contacted at? [phone]  How would you like to obtain your AVS? [employer]  Phone call duration:  minutes

## 2022-02-13 ENCOUNTER — HEALTH MAINTENANCE LETTER (OUTPATIENT)
Age: 52
End: 2022-02-13

## 2022-02-24 ENCOUNTER — ANCILLARY PROCEDURE (OUTPATIENT)
Dept: GENERAL RADIOLOGY | Facility: CLINIC | Age: 52
End: 2022-02-24
Attending: ORTHOPAEDIC SURGERY
Payer: COMMERCIAL

## 2022-02-24 ENCOUNTER — OFFICE VISIT (OUTPATIENT)
Dept: ORTHOPEDICS | Facility: CLINIC | Age: 52
End: 2022-02-24
Payer: COMMERCIAL

## 2022-02-24 DIAGNOSIS — M17.0 PRIMARY OSTEOARTHRITIS OF BOTH KNEES: Primary | ICD-10-CM

## 2022-02-24 DIAGNOSIS — M17.0 PRIMARY OSTEOARTHRITIS OF BOTH KNEES: ICD-10-CM

## 2022-02-24 PROCEDURE — 99213 OFFICE O/P EST LOW 20 MIN: CPT | Performed by: ORTHOPAEDIC SURGERY

## 2022-02-24 PROCEDURE — 73562 X-RAY EXAM OF KNEE 3: CPT | Mod: LT | Performed by: RADIOLOGY

## 2022-02-24 ASSESSMENT — KOOS JR
STRAIGHTENING KNEE FULLY: MILD
KOOS JR SCORING: 50.01
HOW SEVERE IS YOUR KNEE STIFFNESS AFTER FIRST WAKING IN MORNING: MILD
GOING UP OR DOWN STAIRS: SEVERE
RISING FROM SITTING: MODERATE
BENDING TO THE FLOOR TO PICK UP OBJECT: SEVERE
STANDING UPRIGHT: MODERATE
TWISING OR PIVOTING ON KNEE: SEVERE

## 2022-02-24 NOTE — NURSING NOTE
Reason For Visit:   Chief Complaint   Patient presents with     RECHECK     Left knee upcoming surgery 6/6/22 - wants to discuss       LMP 08/13/2005     Pain Assessment  Patient Currently in Pain: Yes  0-10 Pain Scale: 7 (Left calf pain and right knee x-ray)    Tiffanie Schumacher ATC

## 2022-02-24 NOTE — PROGRESS NOTES
Chief Complaint:   1. 6 month follow up R knee, Left knee pain - L TKA scheduled     Procedures:  1. Right total knee arthroplasty - 8/11/22  2. Left total knee arthroplasty is scheduled for 6/6/22    History:  Adelaida Packer is a patient s/p above procedure here for 6 month follow up for right knee and left knee check. Her right knee is doing well, she completed therapy and pain is improved compared to prior. Left knee is getting worse, lateral knee and leg pain has developed over the last month. Denies fall or injury to the knee. 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 Taking tylenol for pain, which helps a little bit. She is icing and elevating her knees.       Exam:     General: Awake, Alert, and oriented. Articulates and communicates with a normal affect     Right Lower Extremity:  Incisions well healed without evidence of infection, No effusion or ecchymosis, range of motion 0-120, Neurovascularly intact  Left Lower Extremity: Tenderness to palpation over lateral joint line and lateral aspect of the leg, no effusion, erythema, or ecchymosis, range of motion 0-100 degrees  Antalgic gait     Imaging:  Radiographs of the left knee from 2/24/22 were independently reviewed by me and findings were discussed with the patient today. The imaging shows severe medial compartment narrowing, similar to prior.    Medications:     Current Outpatient Medications:      acetaminophen (TYLENOL) 325 MG tablet, Take 2 tablets (650 mg) by mouth every 4 hours as needed for other (For optimal non-opioid multimodal pain management to improve pain control.), Disp: 50 tablet, Rfl: 0     amLODIPine (NORVASC) 5 MG tablet, Take 1 tablet (5 mg) by mouth daily, Disp: 90 tablet, Rfl: 3     Ascorbic Acid (VITAMIN C) 500 MG CAPS, Take 1 tablet by mouth daily, Disp: , Rfl:      aspirin (ASA) 81 MG EC tablet, Take 2 tablets (162 mg) by mouth daily, Disp: 56 tablet, Rfl: 0     Blood Pressure Monitor  KIT, 1 Device 2 times daily, Disp: 1 kit, Rfl: 0     calcium carbonate (OS-EVGENY 600 MG [place]. CA) 1500 (600 CA) MG tablet, Take 1 tablet (600 mg) by mouth 2 times daily, Disp: 180 tablet, Rfl: 3     cetirizine (ZYRTEC) 10 MG tablet, Take 10 mg by mouth daily as needed for allergies, Disp: , Rfl:      cholecalciferol 2000 UNITS CAPS, Take 2,000 Units by mouth daily, Disp: 90 capsule, Rfl: 3     cyanocobalamin (VITAMIN B-12) 1000 MCG tablet, Take 1,000 mcg by mouth daily, Disp: , Rfl:      eplerenone (INSPRA) 50 MG tablet, Take 2 tablets (100 mg) by mouth 2 times daily, Disp: 360 tablet, Rfl: 3     famotidine (PEPCID) 20 MG tablet, Take 1 tablet (20 mg) by mouth 2 times daily, Disp: 180 tablet, Rfl: 1     fish oil-omega-3 fatty acids 1000 MG capsule, Take 2 g by mouth daily, Disp: , Rfl:      gabapentin (NEURONTIN) 100 MG capsule, Take 1 capsule (100 mg) by mouth 3 times daily Start with 1 tablet twice daily. Ok to increase to 3 times daily if not having side effects (Patient not taking: Reported on 12/28/2021), Disp: 30 capsule, Rfl: 0     hydrOXYzine (ATARAX) 25 MG tablet, Take 1 tablet (25 mg) by mouth every 6 hours as needed for itching Or severe pain, Disp: 30 tablet, Rfl: 0     labetalol (NORMODYNE) 300 MG tablet, Take 1 tablet (300 mg) by mouth 2 times daily, Disp: 180 tablet, Rfl: 3     methocarbamol (ROBAXIN) 750 MG tablet, Take 1 tablet (750 mg) by mouth every 6 hours as needed for muscle spasms, Disp: 30 tablet, Rfl: 0     Multiple Vitamin (MULTIVITAMIN ADULT PO), Take 1 Tablespoonful by mouth daily, Disp: , Rfl:      oxyCODONE (ROXICODONE) 5 MG tablet, Take 1 tablet (5 mg) by mouth every 6 hours as needed for severe pain (Patient not taking: Reported on 12/28/2021), Disp: 30 tablet, Rfl: 0     polyethylene glycol (MIRALAX/GLYCOLAX) Packet, Take 17 g by mouth daily as needed , Disp: , Rfl:      semaglutide (OZEMPIC) 2 MG/1.5ML SOPN pen, Inject 0.5 mg Subcutaneous every 7 days, Disp: 4.5 mL, Rfl: 3      SUMAtriptan (IMITREX) 25 MG tablet, TAKE 1 TO 2 TABLETS BY MOUTH AT ONSET OF HEADACHE FOR MIGRAINE. MAY REPEAT DOSE IN 2 HOURS. MAX OF 200MG OR 2 DOSES IN 24 HOURS, Disp: 18 tablet, Rfl: 0    Assesment:  1. Doing well 6 months s/p R TKA.   2. Right knee pain 2/2 advanced osteoarthritis     Plan:   -Conservative measures until left TKA in June including tylenol, ice, elevation, and use of assistive device     Rosie Puckett PA-C 2/24/2022 4:43 PM  Orthopedic Surgery

## 2022-02-24 NOTE — LETTER
2/24/2022         RE: Adelaida Packer  [address] [place] [address]        Dear Colleague,    Thank you for referring your patient, Adelaida Packer, to the [facility] [place]. Please see a copy of my visit note below.    Chief Complaint:   1. 6 month follow up R knee, Left knee pain - L TKA scheduled     Procedures:  1. Right total knee arthroplasty - 8/11/22  2. Left total knee arthroplasty is scheduled for 6/6/22    History:  Adelaida Packer is a patient s/p above procedure here for 6 month follow up for right knee and left knee check. Her right knee is doing well, she completed therapy and pain is improved compared to prior. Left knee is getting worse, lateral knee and leg pain has developed over the last month. Denies fall or injury to the knee. 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 Taking tylenol for pain, which helps a little bit. She is icing and elevating her knees.       Exam:     General: Awake, Alert, and oriented. Articulates and communicates with a normal affect     Right Lower Extremity:  Incisions well healed without evidence of infection, No effusion or ecchymosis, range of motion 0-120, Neurovascularly intact  Left Lower Extremity: Tenderness to palpation over lateral joint line and lateral aspect of the leg, no effusion, erythema, or ecchymosis, range of motion 0-100 degrees  Antalgic gait     Imaging:  Radiographs of the left knee from 2/24/22 were independently reviewed by me and findings were discussed with the patient today. The imaging shows severe medial compartment narrowing, similar to prior.    Medications:     Current Outpatient Medications:      acetaminophen (TYLENOL) 325 MG tablet, Take 2 tablets (650 mg) by mouth every 4 hours as needed for other (For optimal non-opioid multimodal pain management to improve pain control.), Disp: 50 tablet, Rfl: 0     amLODIPine (NORVASC) 5 MG  tablet, Take 1 tablet (5 mg) by mouth daily, Disp: 90 tablet, Rfl: 3     Ascorbic Acid (VITAMIN C) 500 MG CAPS, Take 1 tablet by mouth daily, Disp: , Rfl:      aspirin (ASA) 81 MG EC tablet, Take 2 tablets (162 mg) by mouth daily, Disp: 56 tablet, Rfl: 0     Blood Pressure Monitor KIT, 1 Device 2 times daily, Disp: 1 kit, Rfl: 0     calcium carbonate (OS-EVGENY 600 MG [place]. CA) 1500 (600 [place]) MG tablet, Take 1 tablet (600 mg) by mouth 2 times daily, Disp: 180 tablet, Rfl: 3     cetirizine (ZYRTEC) 10 MG tablet, Take 10 mg by mouth daily as needed for allergies, Disp: , Rfl:      cholecalciferol 2000 UNITS CAPS, Take 2,000 Units by mouth daily, Disp: 90 capsule, Rfl: 3     cyanocobalamin (VITAMIN B-12) 1000 MCG tablet, Take 1,000 mcg by mouth daily, Disp: , Rfl:      eplerenone (INSPRA) 50 MG tablet, Take 2 tablets (100 mg) by mouth 2 times daily, Disp: 360 tablet, Rfl: 3     famotidine (PEPCID) 20 MG tablet, Take 1 tablet (20 mg) by mouth 2 times daily, Disp: 180 tablet, Rfl: 1     fish oil-omega-3 fatty acids 1000 MG capsule, Take 2 g by mouth daily, Disp: , Rfl:      gabapentin (NEURONTIN) 100 MG capsule, Take 1 capsule (100 mg) by mouth 3 times daily Start with 1 tablet twice daily. Ok to increase to 3 times daily if not having side effects (Patient not taking: Reported on 12/28/2021), Disp: 30 capsule, Rfl: 0     hydrOXYzine (ATARAX) 25 MG tablet, Take 1 tablet (25 mg) by mouth every 6 hours as needed for itching Or severe pain, Disp: 30 tablet, Rfl: 0     labetalol (NORMODYNE) 300 MG tablet, Take 1 tablet (300 mg) by mouth 2 times daily, Disp: 180 tablet, Rfl: 3     methocarbamol (ROBAXIN) 750 MG tablet, Take 1 tablet (750 mg) by mouth every 6 hours as needed for muscle spasms, Disp: 30 tablet, Rfl: 0     Multiple Vitamin (MULTIVITAMIN ADULT PO), Take 1 Tablespoonful by mouth daily, Disp: , Rfl:      oxyCODONE (ROXICODONE) 5 MG tablet, Take 1 tablet (5 mg) by mouth every 6 hours as needed for severe pain (Patient  not taking: Reported on 12/28/2021), Disp: 30 tablet, Rfl: 0     polyethylene glycol (MIRALAX/GLYCOLAX) Packet, Take 17 g by mouth daily as needed , Disp: , Rfl:      semaglutide (OZEMPIC) 2 MG/1.5ML SOPN pen, Inject 0.5 mg Subcutaneous every 7 days, Disp: 4.5 mL, Rfl: 3     SUMAtriptan (IMITREX) 25 MG tablet, TAKE 1 TO 2 TABLETS BY MOUTH AT ONSET OF HEADACHE FOR MIGRAINE. MAY REPEAT DOSE IN 2 HOURS. MAX OF 200MG OR 2 DOSES IN 24 HOURS, Disp: 18 tablet, Rfl: 0    Assesment:  1. Doing well 6 months s/p R TKA.   2. Right knee pain 2/2 advanced osteoarthritis     Plan:   -Conservative measures until left TKA in June including tylenol, ice, elevation, and use of assistive device     Rosie Puckett PA-C 2/24/2022 4:43 PM  Orthopedic Surgery    I have personally examined this patient and have reviewed the clinical presentation and progress note with the resident. I agree with the treatment plan as outlined. The plan was formulated with the resident on the day of the resident's dictation.    Dylan Hernandez MD

## 2022-03-11 ENCOUNTER — LAB (OUTPATIENT)
Dept: LAB | Facility: CLINIC | Age: 52
End: 2022-03-11
Payer: COMMERCIAL

## 2022-03-11 DIAGNOSIS — N18.30 CKD (CHRONIC KIDNEY DISEASE) STAGE 3, GFR 30-59 ML/MIN (H): ICD-10-CM

## 2022-03-11 DIAGNOSIS — Z13.220 SCREENING FOR HYPERLIPIDEMIA: ICD-10-CM

## 2022-03-11 DIAGNOSIS — Z11.59 NEED FOR HEPATITIS C SCREENING TEST: ICD-10-CM

## 2022-03-11 LAB
ALBUMIN SERPL-MCNC: 3.9 G/DL (ref 3.4–5)
ALBUMIN UR-MCNC: NEGATIVE MG/DL
ANION GAP SERPL CALCULATED.3IONS-SCNC: 4 MMOL/L (ref 3–14)
APPEARANCE UR: CLEAR
BACTERIA #/AREA URNS HPF: ABNORMAL /HPF
BILIRUB UR QL STRIP: NEGATIVE
BUN SERPL-MCNC: 21 MG/DL (ref 7–30)
CALCIUM SERPL-MCNC: 10.1 MG/DL (ref 8.5–10.1)
CHLORIDE BLD-SCNC: 105 MMOL/L (ref 94–109)
CHOLEST SERPL-MCNC: 186 MG/DL
CO2 SERPL-SCNC: 27 MMOL/L (ref 20–32)
COLOR UR AUTO: YELLOW
CREAT SERPL-MCNC: 1.16 MG/DL (ref 0.52–1.04)
CREAT UR-MCNC: 133 MG/DL
FASTING STATUS PATIENT QL REPORTED: YES
GFR SERPL CREATININE-BSD FRML MDRD: 56 ML/MIN/1.73M2
GLUCOSE BLD-MCNC: 131 MG/DL (ref 70–99)
GLUCOSE UR STRIP-MCNC: NEGATIVE MG/DL
HBA1C MFR BLD: 5.9 % (ref 0–5.6)
HCV AB SERPL QL IA: NONREACTIVE
HDLC SERPL-MCNC: 67 MG/DL
HGB BLD-MCNC: 12.5 G/DL (ref 11.7–15.7)
HGB UR QL STRIP: NEGATIVE
KETONES UR STRIP-MCNC: NEGATIVE MG/DL
LDLC SERPL CALC-MCNC: 106 MG/DL
LEUKOCYTE ESTERASE UR QL STRIP: NEGATIVE
NITRATE UR QL: NEGATIVE
NONHDLC SERPL-MCNC: 119 MG/DL
PH UR STRIP: 5.5 [PH] (ref 5–7)
PHOSPHATE SERPL-MCNC: 3.2 MG/DL (ref 2.5–4.5)
POTASSIUM BLD-SCNC: 4.5 MMOL/L (ref 3.4–5.3)
PROT UR-MCNC: 0.07 G/L
PROT/CREAT 24H UR: 0.05 G/G CR (ref 0–0.2)
PTH-INTACT SERPL-MCNC: 58 PG/ML (ref 18–80)
RBC #/AREA URNS AUTO: ABNORMAL /HPF
SODIUM SERPL-SCNC: 136 MMOL/L (ref 133–144)
SP GR UR STRIP: 1.02 (ref 1–1.03)
SQUAMOUS #/AREA URNS AUTO: ABNORMAL /LPF
TRIGL SERPL-MCNC: 66 MG/DL
UROBILINOGEN UR STRIP-ACNC: 0.2 E.U./DL
WBC #/AREA URNS AUTO: ABNORMAL /HPF

## 2022-03-11 PROCEDURE — 86803 HEPATITIS C AB TEST: CPT

## 2022-03-11 PROCEDURE — 80069 RENAL FUNCTION PANEL: CPT

## 2022-03-11 PROCEDURE — 85018 HEMOGLOBIN: CPT

## 2022-03-11 PROCEDURE — 80061 LIPID PANEL: CPT

## 2022-03-11 PROCEDURE — 81001 URINALYSIS AUTO W/SCOPE: CPT

## 2022-03-11 PROCEDURE — 36415 COLL VENOUS BLD VENIPUNCTURE: CPT

## 2022-03-11 PROCEDURE — 84156 ASSAY OF PROTEIN URINE: CPT

## 2022-03-11 PROCEDURE — 83036 HEMOGLOBIN GLYCOSYLATED A1C: CPT

## 2022-03-11 PROCEDURE — 83970 ASSAY OF PARATHORMONE: CPT

## 2022-03-15 ENCOUNTER — VIRTUAL VISIT (OUTPATIENT)
Dept: NEPHROLOGY | Facility: CLINIC | Age: 52
End: 2022-03-15
Payer: COMMERCIAL

## 2022-03-15 DIAGNOSIS — N18.31 TYPE 2 DIABETES MELLITUS WITH STAGE 3A CHRONIC KIDNEY DISEASE, WITHOUT LONG-TERM CURRENT USE OF INSULIN (H): ICD-10-CM

## 2022-03-15 DIAGNOSIS — E11.22 TYPE 2 DIABETES MELLITUS WITH STAGE 3A CHRONIC KIDNEY DISEASE, WITHOUT LONG-TERM CURRENT USE OF INSULIN (H): ICD-10-CM

## 2022-03-15 DIAGNOSIS — E21.0 PRIMARY HYPERPARATHYROIDISM (H): ICD-10-CM

## 2022-03-15 DIAGNOSIS — I12.9 HYPERTENSION, RENAL: ICD-10-CM

## 2022-03-15 DIAGNOSIS — E26.09 PRIMARY HYPERALDOSTERONISM (H): ICD-10-CM

## 2022-03-15 DIAGNOSIS — N18.31 STAGE 3A CHRONIC KIDNEY DISEASE (H): Primary | ICD-10-CM

## 2022-03-15 PROCEDURE — 99214 OFFICE O/P EST MOD 30 MIN: CPT | Mod: GT | Performed by: INTERNAL MEDICINE

## 2022-03-15 NOTE — PATIENT INSTRUCTIONS
1. Decrease calcium to once a day  2. Labs in 3 months  3. Labs in 6 months  4. Follow-up in 12 months

## 2022-03-15 NOTE — PROGRESS NOTES
03/15/22   CC: HTN and CKD    HPI: Adelaida Packer is a 51 year old female who presents for follow-up of HTN/CKD.  Has some mild CKD which is felt to be related to hypertension as well as likely some effects from hypercalcemia in the past. Her hx includes hyperparathyroidism, primary for which she underwent parathyroidectomy on 3/21/13. Following that surgery, she did have a complication related to a hematoma but we have seen many benefits of her parathyroidectomy - blood pressure improved as well as kidney function.    Creatinine has been 1.03-1.16 in the past year.  She did not have proteinuria on last check.  She has been sick for 10 days and is currently on amoxicillin.  Her blood pressure has been 120s at home over 70s to 80s.  She feels that her blood pressure improvement may be related to her change in job this year as it has been less stress.    12/1/20: no swelling. No home readings recently. 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 No specific questions/compalints today.     03/15/22: video visit. No recent home BP readings or clinic readings. Only a few NSAID use times with her knee pain issues. She underwent right knee surgery - she reports she will need left knee done in the future as well. No swelling.      Allergies   Allergen Reactions     Sulfa Drugs Shortness Of Breath and Rash     Hydrochlorothiazide W/Triamterene Other (See Comments)     Renal insuff     Maxzide [Hydrochlorothiazide W/Triamterene] Other (See Comments)     Renal insuff     Metoprolol Other (See Comments)     Other reaction(s): Bradycardia  At high dose only  At high dose only     Seasonal Allergies      Hayfever, tree pollens         Current Outpatient Medications   Medication Sig Dispense Refill     calcium carbonate (OS-EVGENY) 1500 (600 Ca) MG tablet Take 1 tablet (600 mg) by mouth daily 90 tablet 3     acetaminophen (TYLENOL) 325 MG tablet Take 2 tablets (650 mg) by mouth every 4 hours as needed for  other (For optimal non-opioid multimodal pain management to improve pain control.) 50 tablet 0     amLODIPine (NORVASC) 5 MG tablet Take 1 tablet (5 mg) by mouth daily 90 tablet 3     Ascorbic Acid (VITAMIN C) 500 MG CAPS Take 1 tablet by mouth daily       aspirin (ASA) 81 MG EC tablet Take 2 tablets (162 mg) by mouth daily 56 tablet 0     Blood Pressure Monitor KIT 1 Device 2 times daily 1 kit 0     cetirizine (ZYRTEC) 10 MG tablet Take 10 mg by mouth daily as needed for allergies       cholecalciferol 2000 UNITS CAPS Take 2,000 Units by mouth daily 90 capsule 3     cyanocobalamin (VITAMIN B-12) 1000 MCG tablet Take 1,000 mcg by mouth daily       eplerenone (INSPRA) 50 MG tablet Take 2 tablets (100 mg) by mouth 2 times daily 360 tablet 3     famotidine (PEPCID) 20 MG tablet Take 1 tablet (20 mg) by mouth 2 times daily 180 tablet 1     fish oil-omega-3 fatty acids 1000 MG capsule Take 2 g by mouth daily       gabapentin (NEURONTIN) 100 MG capsule Take 1 capsule (100 mg) by mouth 3 times daily Start with 1 tablet twice daily. Ok to increase to 3 times daily if not having side effects (Patient not taking: Reported on 12/28/2021) 30 capsule 0     hydrOXYzine (ATARAX) 25 MG tablet Take 1 tablet (25 mg) by mouth every 6 hours as needed for itching Or severe pain 30 tablet 0     labetalol (NORMODYNE) 300 MG tablet Take 1 tablet (300 mg) by mouth 2 times daily 180 tablet 3     methocarbamol (ROBAXIN) 750 MG tablet Take 1 tablet (750 mg) by mouth every 6 hours as needed for muscle spasms 30 tablet 0     Multiple Vitamin (MULTIVITAMIN ADULT PO) Take 1 Tablespoonful by mouth daily       oxyCODONE (ROXICODONE) 5 MG tablet Take 1 tablet (5 mg) by mouth every 6 hours as needed for severe pain (Patient not taking: Reported on 12/28/2021) 30 tablet 0     polyethylene glycol (MIRALAX/GLYCOLAX) Packet Take 17 g by mouth daily as needed        semaglutide (OZEMPIC) 2 MG/1.5ML SOPN pen Inject 0.5 mg Subcutaneous every 7 days 4.5 mL 3      SUMAtriptan (IMITREX) 25 MG tablet TAKE 1 TO 2 TABLETS BY MOUTH AT ONSET OF HEADACHE FOR MIGRAINE. MAY REPEAT DOSE IN 2 HOURS. MAX OF 200MG OR 2 DOSES IN 24 HOURS 18 tablet 0     Exam:  Gen - alert and oriented, appears comfortable. At work at time of visit.     Result  No visits with results within 1 Day(s) from this visit.   Latest known visit with results is:   Lab on 03/11/2022   Component Date Value Ref Range Status     Hemoglobin 03/11/2022 12.5  11.7 - 15.7 g/dL Final     Color Urine 03/11/2022 Yellow  Colorless, Straw, Light Yellow, Yellow Final     Appearance Urine 03/11/2022 Clear  Clear Final     Glucose Urine 03/11/2022 Negative  Negative mg/dL Final     Bilirubin Urine 03/11/2022 Negative  Negative Final     Ketones Urine 03/11/2022 Negative  Negative mg/dL Final     Specific Gravity Urine 03/11/2022 1.020  1.003 - 1.035 Final     Blood Urine 03/11/2022 Negative  Negative Final     pH Urine 03/11/2022 5.5  5.0 - 7.0 Final     Protein Albumin Urine 03/11/2022 Negative  Negative mg/dL Final     Urobilinogen Urine 03/11/2022 0.2  0.2, 1.0 E.U./dL Final     Nitrite Urine 03/11/2022 Negative  Negative Final     Leukocyte Esterase Urine 03/11/2022 Negative  Negative Final     Total Protein Random Urine g/L 03/11/2022 0.07  g/L Final    The reference range has not been established for total protein in random urine samples.  The result should be integrated into the clinical context for interpretation.     Total Protein Urine g/gr Creatinine 03/11/2022 0.05  0.00 - 0.20 g/g Cr Final     Creatinine Urine mg/dL 03/11/2022 133  mg/dL Final     Parathyroid Hormone Intact 03/11/2022 58  18 - 80 pg/mL Final     Sodium 03/11/2022 136  133 - 144 mmol/L Final     Potassium 03/11/2022 4.5  3.4 - 5.3 mmol/L Final     Chloride 03/11/2022 105  94 - 109 mmol/L Final     Carbon Dioxide (CO2) 03/11/2022 27  20 - 32 mmol/L Final     Anion Gap 03/11/2022 4  3 - 14 mmol/L Final     Urea Nitrogen 03/11/2022 21  7 - 30 mg/dL  Final     Creatinine 03/11/2022 1.16 (A) 0.52 - 1.04 mg/dL Final     Calcium 03/11/2022 10.1  8.5 - 10.1 mg/dL Final     Glucose 03/11/2022 131 (A) 70 - 99 mg/dL Final     Albumin 03/11/2022 3.9  3.4 - 5.0 g/dL Final     Phosphorus 03/11/2022 3.2  2.5 - 4.5 mg/dL Final     GFR Estimate 03/11/2022 56 (A) >60 mL/min/1.73m2 Final    Effective December 21, 2021 eGFRcr in adults is calculated using the 2021 CKD-EPI creatinine equation which includes age and gender (Kiran et al., NE, DOI: 10.1056/CVTEml6347550)     Hepatitis C Antibody 03/11/2022 Nonreactive  Nonreactive Final     Hemoglobin A1C 03/11/2022 5.9 (A) 0.0 - 5.6 % Final    Normal <5.7%   Prediabetes 5.7-6.4%    Diabetes 6.5% or higher     Note: Adopted from ADA consensus guidelines.     Cholesterol 03/11/2022 186  <200 mg/dL Final     Triglycerides 03/11/2022 66  <150 mg/dL Final     Direct Measure HDL 03/11/2022 67  >=50 mg/dL Final     LDL Cholesterol Calculated 03/11/2022 106 (A) <=100 mg/dL Final     Non HDL Cholesterol 03/11/2022 119  <130 mg/dL Final     Patient Fasting > 8hrs? 03/11/2022 Yes   Final     Bacteria Urine 03/11/2022 Few (A) None Seen /HPF Final     RBC Urine 03/11/2022 0-2  0-2 /HPF /HPF Final     WBC Urine 03/11/2022 0-5  0-5 /HPF /HPF Final     Squamous Epithelials Urine 03/11/2022 Few (A) None Seen /LPF Final         Assessment/Plan:  1. Hypertension: aldosterone level has been high on evaluation by Dr. Quintanilla with a low renin. In the past I was suspicious for hyperaldosteronism as well but CT scan was without adrenal adenoma appreciated. Agree with potassium sparing diuretic for BP control given presumed adrenal hyperplasia. No recent BP readings - goal is less than 130/80.    2. CKD: likely some chronic kidney changes related to longstanding hypertension and hypercalcemia.     3. Hypercalcemia: s/p parathyroidectomy on 3/21/13. Calcium is at the upper limit of normal while taking calcium supplement BID - will lower to once daily  dosing to avoid hypercalcemia as hypercalcemia can caused afferent vessel vasoconstriction/MARNI.     4. 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 Rodas/[facility] - yearly follow-up was recommended by Dr. Rodas at either [place] or here.  I don't see she has had this follow-up. Will mychart her as was not noted until after our visit.     5. DM: A1C much improved at 5.9% in March    Patient Instructions   1. Decrease calcium to once a day  2. Labs in 3 months  3. Labs in 6 months  4. Follow-up in 12 months       Video visit: [id number] AM video visit via PHILIP Aleman DO

## 2022-03-15 NOTE — RESULT ENCOUNTER NOTE
Kelby Son,  Your lab test showed improved fasting cholesterol and A1c numbers in the early prediabetes range.    Your hepatitis C screening test is negative, this is good .   Let me know if you have any questions. Take care.  Windy Gordillo MD

## 2022-03-15 NOTE — PROGRESS NOTES
Adelaida is a 51 year old who is being evaluated via a billable video visit.      How would you like to obtain your AVS? MyChart  If the video visit is dropped, the invitation should be resent by: Text to cell phone: [phone]  Will anyone else be joining your video visit? No

## 2022-03-16 NOTE — NURSING NOTE
2/16/22 Message sent to Procedure  to contact pt and assist with scheduling Future appts recommended by Dr. Aleman:    Instructions:  1. Decrease calcium to once a day  2. Labs in 3 months  3. Labs in 6 months  4. Follow-up in 12 months     Alise Nesbitt LPN

## 2022-03-22 NOTE — MR AVS SNAPSHOT
After Visit Summary   3/7/2017    Adelaida Packer    MRN: [id number]           Patient Information     Date Of Birth          1970        Visit Information        Provider Department      3/7/2017 4:30 PM Dia Aleman MD [facility]        Today's Diagnoses     CKD (chronic kidney disease) stage 3, GFR 30-59 ml/min          Care Instructions    1. Increase eplerenone to 100 mg in the AM and 75 mg in the PM  2. Repeat labs today   3. Labs in 6 months   4. Follow-up in 1 year.  5. Echo Thursday as planned.         Follow-ups after your visit        Your next 10 appointments already scheduled     Mar 09, 2017  3:30 PM CST   Ech Complete with [id number], MG ECHO TECH   [facility])    [address] [place] [address] [phone]           1.  Please bring or wear a comfortable two-piece outfit. 2.  You may eat, drink and take your normal medicines. 3.  For any questions that cannot be answered, please contact the ordering physician            Aug 10, 2017  9:00 AM CDT   LAB with LAB FIRST FLOOR [facility])    [address] [place] [address] [phone]           Patient must bring picture ID.  Patient should be prepared to give a urine specimen  Please do not eat 10-12 hours before your appointment if you are coming in fasting for labs on lipids, cholesterol, or glucose (sugar).  Pregnant women should follow their Care Team instructions. Water with medications is okay. Do not drink coffee or other fluids.   If you have concerns about taking  your medications, please ask at office or if scheduling via Ahonya, send a message by clicking on Secure Messaging, Message Your Care Team.            Aug 15, 2017  9:15 AM CDT   Return Visit with Cele Myers MD, MG ENDO NURSE   [facility] ([facility])     Airway  Urgency: emergent    Difficult airway    General Information and Staff    Patient location during procedure: ED  Anesthesiologist: Byron Eaton MD  Performed: anesthesiologist     Consent for Airway (if performed for an anesthetic, see related documentation for consents)  Patient identity confirmed: per hospital policy  Consent: The procedure was performed in an emergent situation. Verbal consent not obtained. Written consent not obtained. Risks and benefits: risks, benefits and alternatives were not discussed      Code status verified:yes  Indications and Patient Condition  Indications for airway management: airway protection, hypoxemia, CNS depression and respiratory distress  Spontaneous ventilation: present  Sedation level: deep  Preoxygenated: yes  Patient position: sniffing  MILS not maintained throughout  Mask difficulty assessment: vent by bag mask    Final Airway Details  Final airway type: endotracheal airway      Successful airway: ETT  Cuffed: yes   Successful intubation technique: video laryngoscopy  Facilitating devices/methods: intubating stylet  Endotracheal tube insertion site: oral  Blade type: Glidescope.   Blade size: #4  ETT size (mm): 6.5  Cormack-Lehane Classification: grade IIb - view of arytenoids or posterior of glottis only  Placement verified by: chest auscultation and capnometry   Measured from: gums  ETT to gums (cm): 23  Number of attempts at approach: 2  Ventilation between attempts: bag mask    Additional Comments  Patient intubated with glide blade 4 and metal stylet, initial attempt with glide blade 3 and bougie unsuccessful, vital signs stable throughout, O2 above 90% throughout, ENT present and on standby, sedation and CXR ordered post intubation, ICU bed obtained  no [address] [address] [place] [address]   [phone]            Aug 17, 2017  2:30 PM CDT   LAB with LAB FIRST FLOOR [facility] ([facility])    [address] [address] [address]   [phone]           Patient must bring picture ID.  Patient should be prepared to give a urine specimen  Please do not eat 10-12 hours before your appointment if you are coming in fasting for labs on lipids, cholesterol, or glucose (sugar).  Pregnant women should follow their Care Team instructions. Water with medications is okay. Do not drink coffee or other fluids.   If you have concerns about taking  your medications, please ask at office or if scheduling via [employer], send a message by clicking on Secure Messaging, Message Your Care Team.            Aug 17, 2017  3:00 PM CDT   Return Visit with Kristi Rodas MD   [facility])    [address] [place] [address]   [phone]            Mar 06, 2018  4:00 PM CST   LAB with LAB FIRST FLOOR [facility] ([facility])    [address] [address] [place] [address]   [phone]           Patient must bring picture ID.  Patient should be prepared to give a urine specimen  Please do not eat 10-12 hours before your appointment if you are coming in fasting for labs on lipids, cholesterol, or glucose (sugar).  Pregnant women should follow their Care Team instructions. Water with medications is okay. Do not drink coffee or other fluids.   If you have concerns about taking  your medications, please ask at office or if scheduling via [employer], send a message by clicking on Secure Messaging, Message Your Care Team.            Mar 06, 2018  4:30 PM CST   Return Visit with Dia Aleman MD   [facility])    [id number] [facility] [address]   [phone]               Who to contact     If you have questions or need follow up information about today's clinic visit or your schedule please contact [facility] directly at [phone].  Normal or non-critical lab and imaging results will be communicated to you by Photos to Photoshart, letter or phone within 4 business days after the clinic has received the results. If you do not hear from us within 7 days, please contact the clinic through Photos to Photoshart or phone. If you have a critical or abnormal lab result, we will notify you by phone as soon as possible.  Submit refill requests through [facility] or call your pharmacy and they will forward the refill request to us. Please allow 3 business days for your refill to be completed.          Additional Information About Your Visit        [facility] Information     [facility] gives you secure access to your electronic health record. If you see a primary care provider, you can also send messages to your care team and make appointments. If you have questions, please call your primary care clinic.  If you do not have a primary care provider, please call [phone] and they will assist you.      [facility] is an electronic gateway that provides easy, online access to your medical records. With [facility], you can request a clinic appointment, read your test results, renew a prescription or communicate with your care team.     To access your existing account, please contact your [facility] or call [phone] for assistance.        Care EveryWhere ID     This is your Care EveryWhere ID. This could be used by other organizations to access your [place] medical records  [id number]        Your Vitals Were     Pulse Last Period BMI (Body Mass Index)             63 08/13/2005 42.92 kg/m2          Blood Pressure from Last 3 Encounters:   03/07/17 154/89   02/21/17 (!) 142/92   02/12/17 (!) 138/92    Weight from Last 3 Encounters:   03/07/17 103.9 kg (229 lb)   02/21/17  104.2 kg (229 lb 11.5 oz)   02/12/17 104.8 kg (231 lb)              We Performed the Following     Hemoglobin     Parathyroid Hormone Intact     Protein  random urine     Renal panel          Today's Medication Changes          These changes are accurate as of: 3/7/17  4:48 PM.  If you have any questions, ask your nurse or doctor.               These medicines have changed or have updated prescriptions.        Dose/Directions    ranitidine 150 MG tablet   Commonly known as:  ZANTAC   This may have changed:    - when to take this  - reasons to take this   Used for:  Gastroesophageal reflux disease without esophagitis        Dose:  150 mg   Take 1 tablet (150 mg) by mouth 2 times daily   Quantity:  60 tablet   Refills:  2                Primary Care Provider Office Phone # Fax #    Windy Gordillo [phone]       [facility] [address]  [facility] [address]        Thank you!     Thank you for choosing [facility]  for your care. Our goal is always to provide you with excellent care. Hearing back from our patients is one way we can continue to improve our services. Please take a few minutes to complete the written survey that you may receive in the mail after your visit with us. Thank you!             Your Updated Medication List - Protect others around you: Learn how to safely use, store and throw away your medicines at www.disposemymeds.org.          This list is accurate as of: 3/7/17  4:48 PM.  Always use your most recent med list.                   Brand Name Dispense Instructions for use    acetaminophen 325 MG tablet    TYLENOL    100 tablet    Take 3 tablets (975 mg) by mouth every 8 hours as needed for mild pain or headaches       ASPIRIN LOW DOSE 81 MG EC tablet   Generic drug:  aspirin     100 tablet    TAKE 1 TABLET BY MOUTH ONCE DAILY       blood glucose monitoring lancets     1 Box    Use to test blood sugar 4-5 times daily or as directed.        Blood Pressure Monitor Kit     1 kit    1 Device 2 times daily       calcium carbonate 1500 (600 CA) MG tablet    OS-EVGENY 600 mg [place]. Ca    180 tablet    Take 1 tablet (600 mg) by mouth 2 times daily       cetirizine 10 MG tablet    zyrTEC     Take 10 mg by mouth daily as needed for allergies       cholecalciferol 2000 UNITS Caps     90 capsule    Take 2,000 Units by mouth daily       eplerenone 25 MG tablet    INSPRA    540 tablet    Take 3 tablets (75 mg) by mouth 2 times daily       fluticasone 50 MCG/ACT spray    FLONASE    16 g    Spray 2 sprays into both nostrils daily       labetalol 300 MG tablet    NORMODYNE    180 tablet    Take 1 tablet (300 mg) by mouth every 12 hours       polyethylene glycol Packet    MIRALAX/GLYCOLAX     Take 17 g by mouth daily       ranitidine 150 MG tablet    ZANTAC    60 tablet    Take 1 tablet (150 mg) by mouth 2 times daily       simvastatin 10 MG tablet    ZOCOR    90 tablet    Take 1 tablet (10 mg) by mouth At Bedtime       SUMAtriptan 25 MG tablet    IMITREX    18 tablet    Take 1-2 tablets (25-50 mg) by mouth at onset of headache for migraine May repeat dose in 2 hours.  Do not exceed 200 mg or 2 doses in 24 hours

## 2022-03-30 DIAGNOSIS — N18.30 CKD (CHRONIC KIDNEY DISEASE) STAGE 3, GFR 30-59 ML/MIN (H): ICD-10-CM

## 2022-03-30 RX ORDER — AMLODIPINE BESYLATE 5 MG/1
5 TABLET ORAL DAILY
Qty: 90 TABLET | Refills: 3 | Status: SHIPPED | OUTPATIENT
Start: 2022-03-30 | End: 2023-06-08

## 2022-04-19 ENCOUNTER — VIRTUAL VISIT (OUTPATIENT)
Dept: ENDOCRINOLOGY | Facility: CLINIC | Age: 52
End: 2022-04-19
Payer: COMMERCIAL

## 2022-04-19 VITALS — WEIGHT: 242 LBS | BODY MASS INDEX: 44.53 KG/M2 | HEIGHT: 62 IN

## 2022-04-19 DIAGNOSIS — E66.01 MORBID OBESITY (H): Primary | ICD-10-CM

## 2022-04-19 PROCEDURE — 99214 OFFICE O/P EST MOD 30 MIN: CPT | Mod: TEL | Performed by: INTERNAL MEDICINE

## 2022-04-19 RX ORDER — PHENTERMINE HYDROCHLORIDE 15 MG/1
15 CAPSULE ORAL EVERY MORNING
Qty: 30 CAPSULE | Refills: 2 | Status: SHIPPED | OUTPATIENT
Start: 2022-04-19 | End: 2022-06-17

## 2022-04-19 ASSESSMENT — PAIN SCALES - GENERAL: PAINLEVEL: NO PAIN (0)

## 2022-04-19 NOTE — PROGRESS NOTES
Adelaida is a 52 year old who is being evaluated via a billable telephone visit.       What phone number would you like to be contacted at? [phone]  How would you like to obtain your AVS? Luis Daniel    During this phone visit the patient is located in [place], patient verifies this as the location during the entirety of this visit.

## 2022-04-19 NOTE — PROGRESS NOTES
"  Return Medical Weight Management Note     Adelaida Packer  MRN:  [id number]  :  1970  JUSTUS:  2022    Dear Windy Gordillo MD,    I had the pleasure of seeing your patient Adelaida Packer. She is a 52 year old female who I am continuing to see for treatment of obesity related to: prediabetes, hypertension, OA       6/15/2021   I have the following health issues associated with obesity: Pre-Diabetes, High Blood Pressure, Osteoarthritis (joint disease)   I have the following symptoms associated with obesity: Knee Pain, Hip Pain     She underwent right knee replacement in Aug 2021.     INTERVAL HISTORY:  Last seen 2021. Stopped Ozempic due to severe heartburn and nausea.     She has been started intermittent fasting along with Chirothin drop B12 (commercial weight loss program)  Weight down 10 lbs in a week. She started  healthy food choice -- 4oz protein, 4 oz fruit and unlimited vegetable  Drinks about 100-120 oz of water  She is a .    CURRENT WEIGHT:   242 lbs 0 oz    Initial Weight (lbs): 244 lbs  Last Visits Weight: 113.4 kg (250 lb)  Cumulative weight loss (lbs): 2  Weight Loss Percentage: 0.82%    Changes and Difficulties 2022   I have made the following changes to my diet since my last visit: Intermittent fasting   With regards to my diet, I am still struggling with: Nothing   I have made the following changes to my activity/exercise since my last visit: Biking a mile a day   With regards to my activity/exercise, I am still struggling with: Busy schedule       VITALS:  Ht 1.575 m (5' 2\")   Wt 109.8 kg (242 lb)   LMP 2005   BMI 44.26 kg/m      MEDICATIONS:   Current Outpatient Medications   Medication Sig Dispense Refill     acetaminophen (TYLENOL) 325 MG tablet Take 2 tablets (650 mg) by mouth every 4 hours as needed for other (For optimal non-opioid multimodal pain management to improve pain control.) 50 tablet 0     amLODIPine (NORVASC) 5 MG tablet Take 1 " tablet (5 mg) by mouth daily 90 tablet 3     Ascorbic Acid (VITAMIN C) 500 MG CAPS Take 1 tablet by mouth daily       aspirin (ASA) 81 MG EC tablet Take 2 tablets (162 mg) by mouth daily 56 tablet 0     Blood Pressure Monitor KIT 1 Device 2 times daily 1 kit 0     calcium carbonate (OS-EVGENY) 1500 (600 Ca) MG tablet Take 1 tablet (600 mg) by mouth daily 90 tablet 3     cetirizine (ZYRTEC) 10 MG tablet Take 10 mg by mouth daily as needed for allergies       cholecalciferol 2000 UNITS CAPS Take 2,000 Units by mouth daily 90 capsule 3     cyanocobalamin (VITAMIN B-12) 1000 MCG tablet Take 1,000 mcg by mouth daily       eplerenone (INSPRA) 50 MG tablet Take 2 tablets (100 mg) by mouth 2 times daily 360 tablet 3     famotidine (PEPCID) 20 MG tablet Take 1 tablet (20 mg) by mouth 2 times daily 180 tablet 1     fish oil-omega-3 fatty acids 1000 MG capsule Take 2 g by mouth daily       gabapentin (NEURONTIN) 100 MG capsule Take 1 capsule (100 mg) by mouth 3 times daily Start with 1 tablet twice daily. Ok to increase to 3 times daily if not having side effects (Patient not taking: Reported on 12/28/2021) 30 capsule 0     hydrOXYzine (ATARAX) 25 MG tablet Take 1 tablet (25 mg) by mouth every 6 hours as needed for itching Or severe pain 30 tablet 0     labetalol (NORMODYNE) 300 MG tablet Take 1 tablet (300 mg) by mouth 2 times daily 180 tablet 3     methocarbamol (ROBAXIN) 750 MG tablet Take 1 tablet (750 mg) by mouth every 6 hours as needed for muscle spasms 30 tablet 0     Multiple Vitamin (MULTIVITAMIN ADULT PO) Take 1 Tablespoonful by mouth daily       oxyCODONE (ROXICODONE) 5 MG tablet Take 1 tablet (5 mg) by mouth every 6 hours as needed for severe pain (Patient not taking: Reported on 12/28/2021) 30 tablet 0     polyethylene glycol (MIRALAX/GLYCOLAX) Packet Take 17 g by mouth daily as needed        semaglutide (OZEMPIC) 2 MG/1.5ML SOPN pen Inject 0.5 mg Subcutaneous every 7 days 4.5 mL 3     SUMAtriptan (IMITREX) 25 MG  tablet TAKE 1 TO 2 TABLETS BY MOUTH AT ONSET OF HEADACHE FOR MIGRAINE. MAY REPEAT DOSE IN 2 HOURS. MAX OF 200MG OR 2 DOSES IN 24 HOURS 18 tablet 0       Weight Loss Medication History Reviewed With Patient 4/19/2022   Which weight loss medications are you currently taking on a regular basis?  None   If you are not taking a weight loss medication that was prescribed to you, please indicate why: -   Are you having any side effects from the weight loss medication that we have prescribed you? No   If you are having side effects please describe: -       ASSESSMENT:   Adelaida Packer is a 51 year old female who I am continuing to see for treatment of obesity related to: prediabetes, hypertension, OA    - started Ozempic in June 2021 -- initially lost 12 lbs but regained about 18 lbs in the past 2 months due to poor diet and lack of regular exercise. Stopped Ozempic earlier this year due to severe heartburn and nausea.  - currently doing intermittent fasting -- lost 10 lbs in 1 week    PLAN:   - start phentermine 15 mg daily  - continue working on diet modification  - exercise as tolerated    FOLLOW-UP:    3 months    Phone start 0432 pm  Phone end 0443 pm  Phone duration 11 minutes    External notes/medical records independently reviewed, labs and imaging independently reviewed, medical management and tests to be discussed/communicated to patient.    Time: I spent 33 minutes spent on the date of the encounter preparing to see patient (including chart review and preparation), obtaining and or reviewing additional medical history, performing a physical exam and evaluation, documenting clinical information in the electronic health record, independently interpreting results, communicating results to the patient and coordinating care.      Sincerely,    Stephen Null MD

## 2022-04-19 NOTE — PATIENT INSTRUCTIONS
Try phentermine 15 mg daily    Return in 2-3 months    If you have any questions, please do not hesitate to call Weight management clinic at [phone] or [phone].  If you need to fax, please fax to [phone].    Sincerely,    Stephen Null MD  Endocrinology
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
## 2022-04-19 NOTE — LETTER
2022       RE: Adelaida Packer  [address] [facility] [place] [address]     Dear Colleague,    Thank you for referring your patient, Adelaida Packer, to the [facility] [place] at [facility]. Please see a copy of my visit note below.    Adelaida is a 52 year old who is being evaluated via a billable telephone visit.       What phone number would you like to be contacted at? [phone]  How would you like to obtain your AVS? MyChart    During this phone visit the patient is located in [place], patient verifies this as the location during the entirety of this visit.         Return Medical Weight Management Note     Adelaida Packer  MRN:  [id number]  :  1970  JUSTUS:  2022    Dear Windy Gordillo MD,    I had the pleasure of seeing your patient Adelaida Packer. She is a 52 year old female who I am continuing to see for treatment of obesity related to: prediabetes, hypertension, OA       6/15/2021   I have the following health issues associated with obesity: Pre-Diabetes, High Blood Pressure, Osteoarthritis (joint disease)   I have the following symptoms associated with obesity: Knee Pain, Hip Pain     She underwent right knee replacement in Aug 2021.     INTERVAL HISTORY:  Last seen 2021. Stopped Ozempic due to severe heartburn and nausea.     She has been started intermittent fasting along with Chirothin drop B12 (commercial weight loss program)  Weight down 10 lbs in a week. She started  healthy food choice -- 4oz protein, 4 oz fruit and unlimited vegetable  Drinks about 100-120 oz of water  She is a .    CURRENT WEIGHT:   242 lbs 0 oz    Initial Weight (lbs): 244 lbs  Last Visits Weight: 113.4 kg (250 lb)  Cumulative weight loss (lbs): 2  Weight Loss Percentage: 0.82%    Changes and Difficulties 2022   I have made the following changes to my diet since my last visit: Intermittent fasting  "  With regards to my diet, I am still struggling with: Nothing   I have made the following changes to my activity/exercise since my last visit: Biking a mile a day   With regards to my activity/exercise, I am still struggling with: Busy schedule       VITALS:  Ht 1.575 m (5' 2\")   Wt 109.8 kg (242 lb)   LMP 08/13/2005   BMI 44.26 kg/m      MEDICATIONS:   Current Outpatient Medications   Medication Sig Dispense Refill     acetaminophen (TYLENOL) 325 MG tablet Take 2 tablets (650 mg) by mouth every 4 hours as needed for other (For optimal non-opioid multimodal pain management to improve pain control.) 50 tablet 0     amLODIPine (NORVASC) 5 MG tablet Take 1 tablet (5 mg) by mouth daily 90 tablet 3     Ascorbic Acid (VITAMIN C) 500 MG CAPS Take 1 tablet by mouth daily       aspirin (ASA) 81 MG EC tablet Take 2 tablets (162 mg) by mouth daily 56 tablet 0     Blood Pressure Monitor KIT 1 Device 2 times daily 1 kit 0     calcium carbonate (OS-EVGENY) 1500 (600 Ca) MG tablet Take 1 tablet (600 mg) by mouth daily 90 tablet 3     cetirizine (ZYRTEC) 10 MG tablet Take 10 mg by mouth daily as needed for allergies       cholecalciferol 2000 UNITS CAPS Take 2,000 Units by mouth daily 90 capsule 3     cyanocobalamin (VITAMIN B-12) 1000 MCG tablet Take 1,000 mcg by mouth daily       eplerenone (INSPRA) 50 MG tablet Take 2 tablets (100 mg) by mouth 2 times daily 360 tablet 3     famotidine (PEPCID) 20 MG tablet Take 1 tablet (20 mg) by mouth 2 times daily 180 tablet 1     fish oil-omega-3 fatty acids 1000 MG capsule Take 2 g by mouth daily       gabapentin (NEURONTIN) 100 MG capsule Take 1 capsule (100 mg) by mouth 3 times daily Start with 1 tablet twice daily. Ok to increase to 3 times daily if not having side effects (Patient not taking: Reported on 12/28/2021) 30 capsule 0     hydrOXYzine (ATARAX) 25 MG tablet Take 1 tablet (25 mg) by mouth every 6 hours as needed for itching Or severe pain 30 tablet 0     labetalol (NORMODYNE) " 300 MG tablet Take 1 tablet (300 mg) by mouth 2 times daily 180 tablet 3     methocarbamol (ROBAXIN) 750 MG tablet Take 1 tablet (750 mg) by mouth every 6 hours as needed for muscle spasms 30 tablet 0     Multiple Vitamin (MULTIVITAMIN ADULT PO) Take 1 Tablespoonful by mouth daily       oxyCODONE (ROXICODONE) 5 MG tablet Take 1 tablet (5 mg) by mouth every 6 hours as needed for severe pain (Patient not taking: Reported on 12/28/2021) 30 tablet 0     polyethylene glycol (MIRALAX/GLYCOLAX) Packet Take 17 g by mouth daily as needed        semaglutide (OZEMPIC) 2 MG/1.5ML SOPN pen Inject 0.5 mg Subcutaneous every 7 days 4.5 mL 3     SUMAtriptan (IMITREX) 25 MG tablet TAKE 1 TO 2 TABLETS BY MOUTH AT ONSET OF HEADACHE FOR MIGRAINE. MAY REPEAT DOSE IN 2 HOURS. MAX OF 200MG OR 2 DOSES IN 24 HOURS 18 tablet 0       Weight Loss Medication History Reviewed With Patient 4/19/2022   Which weight loss medications are you currently taking on a regular basis?  None   If you are not taking a weight loss medication that was prescribed to you, please indicate why: -   Are you having any side effects from the weight loss medication that we have prescribed you? No   If you are having side effects please describe: -       ASSESSMENT:   Adelaida Packer is a 51 year old female who I am continuing to see for treatment of obesity related to: prediabetes, hypertension, OA    - started Ozempic in June 2021 -- initially lost 12 lbs but regained about 18 lbs in the past 2 months due to poor diet and lack of regular exercise. Stopped Ozempic earlier this year due to severe heartburn and nausea.  - currently doing intermittent fasting -- lost 10 lbs in 1 week    PLAN:   - start phentermine 15 mg daily  - continue working on diet modification  - exercise as tolerated    FOLLOW-UP:    3 months    Phone start 0432 pm  Phone end 0443 pm  Phone duration 11 minutes    External notes/medical records independently reviewed, labs and imaging independently  reviewed, medical management and tests to be discussed/communicated to patient.    Time: I spent 33 minutes spent on the date of the encounter preparing to see patient (including chart review and preparation), obtaining and or reviewing additional medical history, performing a physical exam and evaluation, documenting clinical information in the electronic health record, independently interpreting results, communicating results to the patient and coordinating care.      Sincerely,    Stephen Null MD

## 2022-04-21 ENCOUNTER — TELEPHONE (OUTPATIENT)
Dept: FAMILY MEDICINE | Facility: CLINIC | Age: 52
End: 2022-04-21
Payer: COMMERCIAL

## 2022-05-27 DIAGNOSIS — Z11.59 ENCOUNTER FOR SCREENING FOR OTHER VIRAL DISEASES: Primary | ICD-10-CM

## 2022-06-03 ENCOUNTER — OFFICE VISIT (OUTPATIENT)
Dept: FAMILY MEDICINE | Facility: CLINIC | Age: 52
End: 2022-06-03
Payer: COMMERCIAL

## 2022-06-03 VITALS
OXYGEN SATURATION: 100 % | SYSTOLIC BLOOD PRESSURE: 130 MMHG | BODY MASS INDEX: 45.61 KG/M2 | WEIGHT: 232.3 LBS | TEMPERATURE: 97.2 F | DIASTOLIC BLOOD PRESSURE: 82 MMHG | RESPIRATION RATE: 16 BRPM | HEIGHT: 60 IN | HEART RATE: 69 BPM

## 2022-06-03 DIAGNOSIS — Z11.59 ENCOUNTER FOR SCREENING FOR OTHER VIRAL DISEASES: ICD-10-CM

## 2022-06-03 DIAGNOSIS — R10.13 DYSPEPSIA: ICD-10-CM

## 2022-06-03 DIAGNOSIS — Z23 NEED FOR PNEUMOCOCCAL VACCINATION: ICD-10-CM

## 2022-06-03 DIAGNOSIS — Z23 NEED FOR TDAP VACCINATION: ICD-10-CM

## 2022-06-03 DIAGNOSIS — M17.12 PRIMARY OSTEOARTHRITIS OF LEFT KNEE: ICD-10-CM

## 2022-06-03 DIAGNOSIS — Z01.818 PREOP GENERAL PHYSICAL EXAM: Primary | ICD-10-CM

## 2022-06-03 DIAGNOSIS — G43.709 CHRONIC MIGRAINE WITHOUT AURA WITHOUT STATUS MIGRAINOSUS, NOT INTRACTABLE: ICD-10-CM

## 2022-06-03 DIAGNOSIS — E66.01 MORBID OBESITY WITH BODY MASS INDEX OF 45.0-49.9 IN ADULT (H): ICD-10-CM

## 2022-06-03 DIAGNOSIS — I12.9 HYPERTENSION, RENAL: ICD-10-CM

## 2022-06-03 DIAGNOSIS — E26.09 PRIMARY HYPERALDOSTERONISM (H): ICD-10-CM

## 2022-06-03 LAB
ANION GAP SERPL CALCULATED.3IONS-SCNC: 5 MMOL/L (ref 3–14)
BUN SERPL-MCNC: 11 MG/DL (ref 7–30)
CALCIUM SERPL-MCNC: 9.8 MG/DL (ref 8.5–10.1)
CHLORIDE BLD-SCNC: 106 MMOL/L (ref 94–109)
CO2 SERPL-SCNC: 27 MMOL/L (ref 20–32)
CREAT SERPL-MCNC: 1.18 MG/DL (ref 0.52–1.04)
ERYTHROCYTE [DISTWIDTH] IN BLOOD BY AUTOMATED COUNT: 12.8 % (ref 10–15)
GFR SERPL CREATININE-BSD FRML MDRD: 55 ML/MIN/1.73M2
GLUCOSE BLD-MCNC: 120 MG/DL (ref 70–99)
HCT VFR BLD AUTO: 36.7 % (ref 35–47)
HGB BLD-MCNC: 12.1 G/DL (ref 11.7–15.7)
MCH RBC QN AUTO: 29.9 PG (ref 26.5–33)
MCHC RBC AUTO-ENTMCNC: 33 G/DL (ref 31.5–36.5)
MCV RBC AUTO: 91 FL (ref 78–100)
PLATELET # BLD AUTO: 197 10E3/UL (ref 150–450)
POTASSIUM BLD-SCNC: 4.2 MMOL/L (ref 3.4–5.3)
RBC # BLD AUTO: 4.05 10E6/UL (ref 3.8–5.2)
SARS-COV-2 RNA RESP QL NAA+PROBE: NEGATIVE
SODIUM SERPL-SCNC: 138 MMOL/L (ref 133–144)
WBC # BLD AUTO: 5 10E3/UL (ref 4–11)

## 2022-06-03 PROCEDURE — 80048 BASIC METABOLIC PNL TOTAL CA: CPT | Performed by: FAMILY MEDICINE

## 2022-06-03 PROCEDURE — U0003 INFECTIOUS AGENT DETECTION BY NUCLEIC ACID (DNA OR RNA); SEVERE ACUTE RESPIRATORY SYNDROME CORONAVIRUS 2 (SARS-COV-2) (CORONAVIRUS DISEASE [COVID-19]), AMPLIFIED PROBE TECHNIQUE, MAKING USE OF HIGH THROUGHPUT TECHNOLOGIES AS DESCRIBED BY CMS-2020-01-R: HCPCS | Performed by: FAMILY MEDICINE

## 2022-06-03 PROCEDURE — 85027 COMPLETE CBC AUTOMATED: CPT | Performed by: FAMILY MEDICINE

## 2022-06-03 PROCEDURE — 36415 COLL VENOUS BLD VENIPUNCTURE: CPT | Performed by: FAMILY MEDICINE

## 2022-06-03 PROCEDURE — U0005 INFEC AGEN DETEC AMPLI PROBE: HCPCS | Performed by: FAMILY MEDICINE

## 2022-06-03 PROCEDURE — 90472 IMMUNIZATION ADMIN EACH ADD: CPT | Performed by: FAMILY MEDICINE

## 2022-06-03 PROCEDURE — 99215 OFFICE O/P EST HI 40 MIN: CPT | Mod: 25 | Performed by: FAMILY MEDICINE

## 2022-06-03 PROCEDURE — 90715 TDAP VACCINE 7 YRS/> IM: CPT | Performed by: FAMILY MEDICINE

## 2022-06-03 PROCEDURE — 90677 PCV20 VACCINE IM: CPT | Performed by: FAMILY MEDICINE

## 2022-06-03 PROCEDURE — 90471 IMMUNIZATION ADMIN: CPT | Performed by: FAMILY MEDICINE

## 2022-06-03 RX ORDER — FAMOTIDINE 20 MG/1
20 TABLET, FILM COATED ORAL 2 TIMES DAILY
Qty: 180 TABLET | Refills: 1 | Status: SHIPPED | OUTPATIENT
Start: 2022-06-03 | End: 2022-11-23

## 2022-06-03 RX ORDER — SUMATRIPTAN 25 MG/1
TABLET, FILM COATED ORAL
Qty: 18 TABLET | Refills: 0 | Status: SHIPPED | OUTPATIENT
Start: 2022-06-03 | End: 2023-04-17

## 2022-06-03 ASSESSMENT — PAIN SCALES - GENERAL: PAINLEVEL: NO PAIN (0)
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
## 2022-06-03 NOTE — RESULT ENCOUNTER NOTE
Kelby Son,  Your lab test showed normal potassium, slightly abnormal but stable kidney functions, normal hemoglobin and blood counts  These are reassuring. Let me know if you have any questions. Take care.  Windy Gordillo MD

## 2022-06-03 NOTE — PROGRESS NOTES
Prior to immunization administration, verified patients identity using patient s name and date of birth. Please see Immunization Activity for additional information.     Screening Questionnaire for Adult Immunization    Are you sick today?   No   Do you have allergies to medications, food, a vaccine component or latex?   No   Have you ever had a serious reaction after receiving a vaccination?   No   Do you have a long-term health problem with heart, lung, kidney, or metabolic disease (e.g., diabetes), asthma, a blood disorder, no spleen, complement component deficiency, a cochlear implant, or a spinal fluid leak?  Are you on long-term aspirin therapy?   No   Do you have cancer, leukemia, HIV/AIDS, or any other immune system problem?   No   Do you have a parent, brother, or sister with an immune system problem?   No   In the past 3 months, have you taken medications that affect  your immune system, such as prednisone, other steroids, or anticancer drugs; drugs for the treatment of rheumatoid arthritis, Crohn s disease, or psoriasis; or have you had radiation treatments?   No   Have you had a seizure, or a brain or other nervous system problem?   No   During the past year, have you received a transfusion of blood or blood    products, or been given immune (gamma) globulin or antiviral drug?   No   For women: Are you pregnant or is there a chance you could become       pregnant during the next month?   No   Have you received any vaccinations in the past 4 weeks?   Yes (covid booster)     Immunization questionnaire answers were all negative.        Per orders of Dr. Gordillo, injection of Tdap &Pneumococcal 20 given by Magali Craft RN. Patient instructed to remain in clinic for 15 minutes afterwards, and to report any adverse reaction to me immediately.       Screening performed by Magali Craft RN on 6/3/2022 at 9:27 AM.

## 2022-06-05 ENCOUNTER — HEALTH MAINTENANCE LETTER (OUTPATIENT)
Age: 52
End: 2022-06-05

## 2022-06-05 ENCOUNTER — ANESTHESIA EVENT (OUTPATIENT)
Dept: SURGERY | Facility: CLINIC | Age: 52
End: 2022-06-05
Payer: COMMERCIAL

## 2022-06-06 ENCOUNTER — HOSPITAL ENCOUNTER (OUTPATIENT)
Facility: CLINIC | Age: 52
Discharge: HOME | End: 2022-06-06
Attending: ORTHOPAEDIC SURGERY | Admitting: ORTHOPAEDIC SURGERY
Payer: COMMERCIAL

## 2022-06-06 ENCOUNTER — ANESTHESIA (OUTPATIENT)
Dept: SURGERY | Facility: CLINIC | Age: 52
End: 2022-06-06
Payer: COMMERCIAL

## 2022-06-06 VITALS
OXYGEN SATURATION: 98 % | WEIGHT: 232.14 LBS | HEIGHT: 60 IN | TEMPERATURE: 98.1 F | SYSTOLIC BLOOD PRESSURE: 131 MMHG | HEART RATE: 64 BPM | BODY MASS INDEX: 45.58 KG/M2 | RESPIRATION RATE: 15 BRPM | DIASTOLIC BLOOD PRESSURE: 68 MMHG

## 2022-06-06 DIAGNOSIS — M17.12 PRIMARY OSTEOARTHRITIS OF LEFT KNEE: Primary | ICD-10-CM

## 2022-06-06 LAB — GLUCOSE BLDC GLUCOMTR-MCNC: 118 MG/DL (ref 70–99)

## 2022-06-06 PROCEDURE — 250N000013 HC RX MED GY IP 250 OP 250 PS 637

## 2022-06-06 PROCEDURE — 999N000141 HC STATISTIC PRE-PROCEDURE NURSING ASSESSMENT: Performed by: ORTHOPAEDIC SURGERY

## 2022-06-06 PROCEDURE — 999N000001 HC CANCELLED SURGERY UP TO 15 MINS: Performed by: ORTHOPAEDIC SURGERY

## 2022-06-06 RX ORDER — CEFAZOLIN SODIUM/WATER 2 G/20 ML
2 SYRINGE (ML) INTRAVENOUS SEE ADMIN INSTRUCTIONS
Status: DISCONTINUED | OUTPATIENT
Start: 2022-06-06 | End: 2022-06-06 | Stop reason: HOSPADM

## 2022-06-06 RX ORDER — FENTANYL CITRATE 50 UG/ML
25-50 INJECTION, SOLUTION INTRAMUSCULAR; INTRAVENOUS
Status: DISCONTINUED | OUTPATIENT
Start: 2022-06-06 | End: 2022-06-06 | Stop reason: HOSPADM

## 2022-06-06 RX ORDER — ACETAMINOPHEN 325 MG/1
975 TABLET ORAL ONCE
Status: COMPLETED | OUTPATIENT
Start: 2022-06-06 | End: 2022-06-06

## 2022-06-06 RX ORDER — NALOXONE HYDROCHLORIDE 0.4 MG/ML
0.2 INJECTION, SOLUTION INTRAMUSCULAR; INTRAVENOUS; SUBCUTANEOUS
Status: DISCONTINUED | OUTPATIENT
Start: 2022-06-06 | End: 2022-06-06 | Stop reason: HOSPADM

## 2022-06-06 RX ORDER — TRANEXAMIC ACID 650 MG/1
1950 TABLET ORAL ONCE
Status: COMPLETED | OUTPATIENT
Start: 2022-06-06 | End: 2022-06-06

## 2022-06-06 RX ORDER — CEFAZOLIN SODIUM/WATER 2 G/20 ML
2 SYRINGE (ML) INTRAVENOUS
Status: DISCONTINUED | OUTPATIENT
Start: 2022-06-06 | End: 2022-06-06 | Stop reason: HOSPADM

## 2022-06-06 RX ORDER — NALOXONE HYDROCHLORIDE 0.4 MG/ML
0.4 INJECTION, SOLUTION INTRAMUSCULAR; INTRAVENOUS; SUBCUTANEOUS
Status: DISCONTINUED | OUTPATIENT
Start: 2022-06-06 | End: 2022-06-06 | Stop reason: HOSPADM

## 2022-06-06 RX ORDER — FLUMAZENIL 0.1 MG/ML
0.2 INJECTION, SOLUTION INTRAVENOUS
Status: DISCONTINUED | OUTPATIENT
Start: 2022-06-06 | End: 2022-06-06 | Stop reason: HOSPADM

## 2022-06-06 RX ADMIN — TRANEXAMIC ACID 1950 MG: 650 TABLET ORAL at 06:33

## 2022-06-06 RX ADMIN — ACETAMINOPHEN 975 MG: 325 TABLET ORAL at 06:33

## 2022-06-06 ASSESSMENT — ACTIVITIES OF DAILY LIVING (ADL): ADLS_ACUITY_SCORE: 21

## 2022-06-06 NOTE — OR NURSING
Pt taking Phentermine until 6/4. Dr Wells and Dr Hernandez discussed risks with patient. Planning to reschedule and hold medication for 4 days to decrease risk of difficulty maintaining blood pressures during anesthesia.    Patient would like to have Dr Wells for anesthesia when she reschedules if she is available that day.

## 2022-06-06 NOTE — ANESTHESIA PREPROCEDURE EVALUATION
Anesthesia Pre-Procedure Evaluation    Patient: Adelaida Packer   MRN: [id number] : 1970        Procedure : Procedure(s):  ARTHROPLASTY, KNEE, TOTAL LEFT          Past Medical History:   Diagnosis Date     Allergic rhinitis due to other allergen     seasonal     Arthritis      Diabetes (H)      Localized osteoarthritis of both knees      Migraine, unspecified, without mention of intractable migraine without mention of status migrainosus      Monoclonal gammopathy      Morbid obesity (H)      Type 2 diabetes mellitus with stage 3a chronic kidney disease, without long-term current use of insulin (H) 2016     Unspecified essential hypertension     dx around age 31      Past Surgical History:   Procedure Laterality Date     ARTHROPLASTY KNEE Right 2021    Procedure: ARTHROPLASTY, KNEE, TOTAL RIGHT;  Surgeon: Dylan Hernandez MD;  Location:  OR     BUNIONECTOMY Right 2018    Procedure: BUNIONECTOMY;  Surgeon: Erik Bazan DPM;  Location: [facility];  Service:      EXCISE GANGLION WRIST Right 2018    Procedure: EXCISION OF THE GANGLION CYST, BUNIONECTOMU WITH FIRST METATARSAL  OSTEOTOMY WITH INTERNAL SCREW FIXATION, A SUBTALAR JIONT ARTHROERESIS OF THE RIGHT FOOT AND GASTROCNEMIUS RECESSION RIGHT LOWER EXTREMITY;  Surgeon: Erik Bazan DPM;  Location: [facility];  Service:      HC REMOVE TONSILS/ADENOIDS,11+ Y/O       HEAD & NECK SURGERY  3/2013    Parathyroidectomy--had to go back in 6 days later for a possible bleed     HYSTERECTOMY       HYSTERECTOMY, VAGINAL  2005    hysterectomy- fibroids     KNEE SURGERY Right      ORTHOPEDIC SURGERY       PARATHYROIDECTOMY       TONSILLECTOMY       ZZC ANESTH,KNEE AREA SURGERY  2001     Z GASTROPLASTY,OBESITY,VERT BAND      removed       Allergies   Allergen Reactions     Sulfa Drugs Shortness Of Breath and Rash     Hydrochlorothiazide W/Triamterene Other (See Comments)     Renal insuff     Maxzide  [Hydrochlorothiazide W/Triamterene] Other (See Comments)     Renal insuff     Metoprolol Other (See Comments)     Other reaction(s): Bradycardia  At high dose only  At high dose only     Seasonal Allergies      Hayfever, tree pollens      Social History     Tobacco Use     Smoking status: Never Smoker     Smokeless tobacco: Never Used   Substance Use Topics     Alcohol use: No      Wt Readings from Last 1 Encounters:   06/03/22 105.4 kg (232 lb 4.8 oz)        Anesthesia Evaluation   Pt has had prior anesthetic.         ROS/MED HX  ENT/Pulmonary:     (+) LITZY risk factors, hypertension, obese,     Neurologic:       Cardiovascular:     (+) hypertension-----    METS/Exercise Tolerance:     Hematologic:       Musculoskeletal:       GI/Hepatic:       Renal/Genitourinary:     (+) renal disease, type: CRI,     Endo: Comment: Primary hyperaldosteronism    (+) type II DM, Obesity,     Psychiatric/Substance Use:       Infectious Disease:       Malignancy:       Other:               OUTSIDE LABS:  CBC:   Lab Results   Component Value Date    WBC 5.0 06/03/2022    WBC 4.9 07/19/2021    HGB 12.1 06/03/2022    HGB 12.5 03/11/2022    HCT 36.7 06/03/2022    HCT 37.2 07/19/2021     06/03/2022     07/19/2021     BMP:   Lab Results   Component Value Date     06/03/2022     03/11/2022    POTASSIUM 4.2 06/03/2022    POTASSIUM 4.5 03/11/2022    CHLORIDE 106 06/03/2022    CHLORIDE 105 03/11/2022    CO2 27 06/03/2022    CO2 27 03/11/2022    BUN 11 06/03/2022    BUN 21 03/11/2022    CR 1.18 (H) 06/03/2022    CR 1.16 (H) 03/11/2022     (H) 06/03/2022     (H) 03/11/2022     COAGS:   Lab Results   Component Value Date    INR 1.09 01/18/2016     POC:   Lab Results   Component Value Date     (H) 01/18/2016    HCG Negative 01/18/2016     HEPATIC:   Lab Results   Component Value Date    ALBUMIN 3.9 03/11/2022    PROTTOTAL 7.5 08/06/2018    ALT 30 08/06/2018    AST 19 08/06/2018    ALKPHOS 50 08/06/2018     BILITOTAL 0.3 08/06/2018     OTHER:   Lab Results   Component Value Date    A1C 5.9 (H) 03/11/2022    EVGENY 9.8 06/03/2022    PHOS 3.2 03/11/2022    MAG 2.1 04/02/2013    LIPASE 101 05/13/2005    TSH 1.46 03/29/2021       Anesthesia Plan    ASA Status:  3      Anesthesia Type: Spinal.      Maintenance: TIVA.        Consents            Postoperative Care    Pain management: Peripheral nerve block (Single Shot), Multi-modal analgesia.   PONV prophylaxis: Background Propofol Infusion, Ondansetron (or other 5HT-3)     Comments:    Other Comments: Patient on Phentermine and last took medication 1.5 days prior to her surgery. Hospital policy is to hold this medication for 4 days prior to surgery. After lengthy conversation with patient about the risks of difficult with blood pressure control intra-operatively, it was decided to reschedule the case with appropriate hold of phentermine. Her blood pressure is currently well controlled on several anti-hypertensive medications. However, due to history of hypertension and primary hyperaldosteronism in the setting of inadequate hold of phentermine, canceling this elective procedure is the safest option.             Bess Arrington MD

## 2022-06-08 ENCOUNTER — MYC MEDICAL ADVICE (OUTPATIENT)
Dept: ORTHOPEDICS | Facility: CLINIC | Age: 52
End: 2022-06-08
Payer: COMMERCIAL

## 2022-06-08 ENCOUNTER — TELEPHONE (OUTPATIENT)
Dept: ORTHOPEDICS | Facility: CLINIC | Age: 52
End: 2022-06-08

## 2022-06-08 NOTE — TELEPHONE ENCOUNTER
Spoke to the patient to let her know that I have requested additional surgery time on 6-20-22 to try to get her surgery done sooner as Dr. Hernandez's next opening currently isn't until September. She understood and appreciates us trying to get her in sooner. I told her that as soon as I get any information back I will let her know.    Jesenia Miranda, Surgery Scheduling Coordinator

## 2022-06-08 NOTE — TELEPHONE ENCOUNTER
"Patient called writer and left a voicemail stating she was supposed to have surgery on Monday, 6/6, but was unable to due to medication she was on. She wants to be rescheduled ASAP and is asking for a call back \"immediately.\" Routing to surgery scheduler for Dr. Hernandez.   "

## 2022-06-10 ENCOUNTER — HOSPITAL ENCOUNTER (OUTPATIENT)
Facility: CLINIC | Age: 52
End: 2022-06-10
Attending: ORTHOPAEDIC SURGERY | Admitting: ORTHOPAEDIC SURGERY
Payer: COMMERCIAL

## 2022-06-10 DIAGNOSIS — M17.12 PRIMARY OSTEOARTHRITIS OF LEFT KNEE: Primary | ICD-10-CM

## 2022-06-10 NOTE — TELEPHONE ENCOUNTER
Date Scheduled: 6-20-22 1:30pm  Facility: [facility]  Surgeon: Dr. Hernandez   Post-op appointment scheduled:   Next 5 appointments (look out 90 days)    Jun 16, 2022  3:00 PM  Pre-procedure Covid with BE COVID LAB  [facility] ([facility] ) [address] [facility] [address]  [phone]   Jul 05, 2022  3:00 PM  Nurse Only with MG NURSE ONLY ORTHO  [facility] ([facility]) [id number] [place] [address] [id number] [phone]   Aug 09, 2022  1:20 PM  (Arrive by 1:05 PM)  Return Visit with Dylan Hernandez MD  [facility] ([facility]) [address] [place] [address] [id number] [phone]           scheduled?: No  Surgery packet/instructions confirmed received?  Yes  Special Considerations:   Jesenia Miranda, Surgery Scheduling Coordinator

## 2022-06-14 ENCOUNTER — E-VISIT (OUTPATIENT)
Dept: FAMILY MEDICINE | Facility: CLINIC | Age: 52
End: 2022-06-14
Payer: COMMERCIAL

## 2022-06-14 DIAGNOSIS — J06.9 ACUTE UPPER RESPIRATORY INFECTION, UNSPECIFIED: Primary | ICD-10-CM

## 2022-06-14 PROCEDURE — 99421 OL DIG E/M SVC 5-10 MIN: CPT | Performed by: FAMILY MEDICINE

## 2022-06-15 RX ORDER — ACETAMINOPHEN 325 MG/1
975 TABLET ORAL ONCE
Status: CANCELLED | OUTPATIENT
Start: 2022-06-20

## 2022-06-15 RX ORDER — TRANEXAMIC ACID 650 MG/1
1950 TABLET ORAL ONCE
Status: CANCELLED | OUTPATIENT
Start: 2022-06-20

## 2022-06-15 RX ORDER — CEFAZOLIN SODIUM/WATER 2 G/20 ML
2 SYRINGE (ML) INTRAVENOUS
Status: CANCELLED | OUTPATIENT
Start: 2022-06-20

## 2022-06-15 RX ORDER — CEFAZOLIN SODIUM/WATER 2 G/20 ML
2 SYRINGE (ML) INTRAVENOUS SEE ADMIN INSTRUCTIONS
Status: CANCELLED | OUTPATIENT
Start: 2022-06-20

## 2022-06-16 ENCOUNTER — LAB (OUTPATIENT)
Dept: LAB | Facility: CLINIC | Age: 52
End: 2022-06-16
Payer: COMMERCIAL

## 2022-06-16 DIAGNOSIS — J06.9 ACUTE UPPER RESPIRATORY INFECTION, UNSPECIFIED: ICD-10-CM

## 2022-06-16 PROCEDURE — U0003 INFECTIOUS AGENT DETECTION BY NUCLEIC ACID (DNA OR RNA); SEVERE ACUTE RESPIRATORY SYNDROME CORONAVIRUS 2 (SARS-COV-2) (CORONAVIRUS DISEASE [COVID-19]), AMPLIFIED PROBE TECHNIQUE, MAKING USE OF HIGH THROUGHPUT TECHNOLOGIES AS DESCRIBED BY CMS-2020-01-R: HCPCS

## 2022-06-16 PROCEDURE — U0005 INFEC AGEN DETEC AMPLI PROBE: HCPCS

## 2022-06-17 ENCOUNTER — TELEPHONE (OUTPATIENT)
Dept: NEPHROLOGY | Facility: CLINIC | Age: 52
End: 2022-06-17
Payer: COMMERCIAL

## 2022-06-17 ENCOUNTER — TELEPHONE (OUTPATIENT)
Dept: NURSING | Facility: CLINIC | Age: 52
End: 2022-06-17
Payer: COMMERCIAL

## 2022-06-17 ENCOUNTER — VIRTUAL VISIT (OUTPATIENT)
Dept: FAMILY MEDICINE | Facility: CLINIC | Age: 52
End: 2022-06-17
Payer: COMMERCIAL

## 2022-06-17 DIAGNOSIS — D47.2 MONOCLONAL GAMMOPATHY: ICD-10-CM

## 2022-06-17 DIAGNOSIS — U07.1 INFECTION DUE TO 2019 NOVEL CORONAVIRUS: Primary | ICD-10-CM

## 2022-06-17 DIAGNOSIS — N18.31 STAGE 3A CHRONIC KIDNEY DISEASE (H): ICD-10-CM

## 2022-06-17 DIAGNOSIS — E66.01 MORBID OBESITY (H): ICD-10-CM

## 2022-06-17 PROBLEM — M17.11 PRIMARY OSTEOARTHRITIS OF RIGHT KNEE: Status: ACTIVE | Noted: 2017-04-26

## 2022-06-17 LAB — SARS-COV-2 RNA RESP QL NAA+PROBE: POSITIVE

## 2022-06-17 PROCEDURE — 99214 OFFICE O/P EST MOD 30 MIN: CPT | Mod: GT | Performed by: INTERNAL MEDICINE

## 2022-06-17 NOTE — PROGRESS NOTES
Adelaida is a 52 year old who is being evaluated via a billable video visit.      How would you like to obtain your AVS? Nyasiahart  If the video visit is dropped, the invitation should be resent by: Text to cell phone: [phone]  Will anyone else be joining your video visit? No    Assessment and Plan  1. Infection due to 2019 novel coronavirus  New problem, given pt risk factors will treat with Molnupiravir due to strong drug interaction of Paxlovid and Eplerinone. Shared decision on the new medication.   - I have given instructions on red flag symptoms when he should be in ER which is mentioned in AVS below. Reassured that current medication has been proven to decrease the rate of hospitalizations in COVID positive patients as per the studies done so far.  - molnupiravir (LAGEVRIO) 200 MG capsule; Take 4 capsules (800 mg) by mouth every 12 hours for 5 days  Dispense: 40 each; Refill: 0    2. Morbid obesity (H)  3. Monoclonal gammopathy  4. Stage 3a chronic kidney disease (H)  Risk factors for above      Patient Instructions   As discussed, given your risk factors and strong drug interactions with Paxlovid medication , we are considering Molnupiravir as alternative for your treatment of COVID, sent  to your pharmacy     ================    As you recover from COVID-19     Patients who have symptoms (cough, fever, or shortness of breath), need to isolate for 10 days from when symptoms started AND 72 hours after fever resolves (without fever reducing medications) AND improvement of respiratory symptoms (whichever is longer).     During this time:    Isolate yourself at home (in own room/own bathroom if possible)    Do not allow any visitors    Do not go to work or school    Do not go to [employer],  centers, shopping, or other public places    Do not shake hands    Avoid close and intimate contact with others (hugging, kissing)    Follow CDC recommendations for household cleaning of frequently touched services      After the initial 10 days, continue to isolate yourself from household members as much as possible. To continue decrease the risk of community spread and exposure, you and any members of your household should limit activities in public for 14 days after starting home isolation.      You can reference the following CDC link for helpful home isolation/care tips:  https://www.cdc.gov/coronavirus/2019-ncov/downloads/10Things.pdf     Protect Others:    Cover your mouth and nose with a mask, disposable tissue or wash cloth to avoid spreading germs.    Wash your hands and face often. Use soap and water     Call Back If: Breathing difficulty develops or you become worse.        For more information about COVID19 and options for caring for yourself at home, please visit the CDC website at https://www.cdc.gov/coronavirus/2019-ncov/about/steps-when-sick.html  For more options for care at [facility], please visit our website at [email or web address]    For information about [facility] COVID-19 Clinical Trials, please visit [email or web address]     For more information, please use the [place] Department of Health COVID-19 Website: https://www.health.[facility].[place]/diseases/coronavirus/index.html  [place] Department of Health ([facility]) COVID-19 Hotlines (Interpreters   available):                Health questions: Phone Number: [phone] or 1-905.275.9905 and Hours: 7 a.m. to 7 p.m.    Schools and  questions: Phone Number: [phone] or [phone] and Hours 7 a.m. to 7 p.m.         Coping with Life After COVID-19  Being in the hospital because of COVID-19 is scary. Going home can be scary, too. You may face changes to your life, the way you work or what you can eat. It s hard to adjust to change, and it s normal to feel afraid, frustrated or even angry. These feelings usually go away over time. If your feelings don t start to get  better, it s called  adjustment disorder.       What signs should I look out for?  Adjustment disorder can happen to anyone in a time of stress. It makes it hard to cope with daily life. You may feel lonely or fight with loved ones, even if you re glad to be home. Watch for these signs:    Fear or worry    Hard time focusing    Sadness or anger    Trouble talking to family or friends    Feeling like you don t fit in or isolating yourself    Problems with sleep     Drinking alcohol or taking drugs to cope     What can I do?  You can help yourself get better. Feeling you have control helps you move forward. You may wonder if you ll be able to do things you did before. Be patient. Do your best to make the most of every day. Try to build relationships, be as active as you can, eat right and keep a sense of humor. Avoid smoking and drinking too much alcohol. Call your family doctor or clinic if you re not sure what to do. They can guide you to care or other services.     When should I get help?  Think about getting counseling if your sadness or frustration gets worse. Together with a trained counselor, you can talk about your problems adjusting to life after your hospital stay. You can come up with new ways to handle changes that give you more control. Your family doctor or care team can help you find a counselor.      Get help if you re thinking about hurting yourself. If you need help right away, call 911 or go to the nearest emergency room. You can also try the Crisis Text Line.     Crisis Text Line: 781-903 (http://www.crisistextline.org)  The Crisis Text Line serves anyone, in any crisis. It gives free, 24/7 support. Here's how it works:  1. Text HOME to 479310 from anywhere in the [place], anytime, about any type of crisis.  2. A live, trained Crisis Counselor will text you back quickly.  3. The volunteer Crisis Counselor can help you move from a  hot moment  to a  cool moment.  They can also help you work out a safety  plan.     Return in about 10 days (around 6/27/2022), or if symptoms worsen or fail to improve, for Follow up of last visit, If symptoms persist.    Megan Marino MD  [facility] TUCKER Son is a 52 year old presenting for the following health issues:  Covid Concern      HPI       COVID-19 Symptom Review  How many days ago did these symptoms start? 06/13/2022 tested positive 06/16/2022 with Home test. - Also positive on the lab test    Are any of the following symptoms significant for you?  New or worsening difficulty breathing? Yes    Please describe what kind of difficulty you are having breathing:Mild dyspnea (able to do ADLs without difficulty, mild shortness of breath with activities such as climbing one or two flights of stairs or walking briskly)    Worsening cough? Yes, I am coughing up mucus.    Fever or chills? Yes, I felt feverish or had chills.    Headache: YES    Sore throat: YES    Chest pain: YES- just a little    Diarrhea: YES- in the beginning of the week, but now no.    Body aches? YES    What treatments has patient tried? Acetaminophen and [facility] [place] Cold & Flu  Does patient live in a nursing home, group home, or shelter? no  Does patient have a way to get food/medications during quarantined? Yes, I have a friend or family member who can help me.       Allergies   Allergen Reactions     Sulfa Drugs Shortness Of Breath and Rash     Hydrochlorothiazide W/Triamterene Other (See Comments)     Renal insuff     Maxzide [Hydrochlorothiazide W/Triamterene] Other (See Comments)     Renal insuff     Metoprolol Other (See Comments)     Other reaction(s): Bradycardia  At high dose only  At high dose only     Seasonal Allergies      Hayfever, tree pollens        Past Medical History:   Diagnosis Date     Allergic rhinitis due to other allergen     seasonal     Arthritis      Diabetes (H)      Localized osteoarthritis of both knees      Migraine, unspecified, without  mention of intractable migraine without mention of status migrainosus      Monoclonal gammopathy      Morbid obesity (H)      Type 2 diabetes mellitus with stage 3a chronic kidney disease, without long-term current use of insulin (H) 8/9/2016     Unspecified essential hypertension     dx around age 31       Past Surgical History:   Procedure Laterality Date     ARTHROPLASTY KNEE Right 8/11/2021    Procedure: ARTHROPLASTY, KNEE, TOTAL RIGHT;  Surgeon: Dylan Hernandez MD;  Location:  OR     BUNIONECTOMY Right 7/6/2018    Procedure: BUNIONECTOMY;  Surgeon: Erik Bazan DPM;  Location: [facility];  Service:      EXCISE GANGLION WRIST Right 7/6/2018    Procedure: EXCISION OF THE GANGLION CYST, BUNIONECTOMU WITH FIRST METATARSAL  OSTEOTOMY WITH INTERNAL SCREW FIXATION, A SUBTALAR JIONT ARTHROERESIS OF THE RIGHT FOOT AND GASTROCNEMIUS RECESSION RIGHT LOWER EXTREMITY;  Surgeon: Erik Bazan DPM;  Location: [facility];  Service:      HC REMOVE TONSILS/ADENOIDS,12+ Y/O  1995     HEAD & NECK SURGERY  3/2013    Parathyroidectomy--had to go back in 6 days later for a possible bleed     HYSTERECTOMY       HYSTERECTOMY, VAGINAL  12/2005    hysterectomy- fibroids     KNEE SURGERY Right      ORTHOPEDIC SURGERY       PARATHYROIDECTOMY       TONSILLECTOMY       ZZC ANESTH,KNEE AREA SURGERY  01/2001     [facility] GASTROPLASTY,OBESITY,VERT BAND      removed 2009       Family History   Problem Relation Age of Onset     Hypertension Mother      Gynecology Mother         hysterectomy     Gastrointestinal Disease Mother         bowel upstruction     Thyroid Disease Mother      Neurologic Disorder Mother      Osteoporosis Mother      Hypertension Father      Lipids Father      Arthritis Father      Heart Disease Father      Prostate Cancer Brother      Alcohol/Drug Brother      Circulatory Brother      Arthritis Paternal Grandmother      Cancer Maternal Grandfather         bone     Eye Disorder Maternal Grandfather       Prostate Cancer Maternal Grandfather      Glaucoma Maternal Grandfather      Cancer Maternal Grandmother         tumor-head     Obesity Maternal Grandmother      Respiratory Daughter         asthma     Diabetes Sister      Cerebrovascular Disease Sister      Macular Degeneration No family hx of        Social History     Tobacco Use     Smoking status: Never Smoker     Smokeless tobacco: Never Used   Substance Use Topics     Alcohol use: No        Current Outpatient Medications   Medication     acetaminophen (TYLENOL) 325 MG tablet     amLODIPine (NORVASC) 5 MG tablet     cetirizine (ZYRTEC) 10 MG tablet     eplerenone (INSPRA) 50 MG tablet     famotidine (PEPCID) 20 MG tablet     labetalol (NORMODYNE) 300 MG tablet     molnupiravir (LAGEVRIO) 200 MG capsule     polyethylene glycol (MIRALAX/GLYCOLAX) Packet     SUMAtriptan (IMITREX) 25 MG tablet     cholecalciferol 2000 UNITS CAPS     No current facility-administered medications for this visit.        Review of Systems   Constitutional, HEENT, cardiovascular, pulmonary, GI, , musculoskeletal, neuro, skin, endocrine and psych systems are negative, except as otherwise noted.      Objective           Vitals:  No vitals were obtained today due to virtual visit.    Physical Exam   GENERAL: Healthy, alert and no distress  EYES: Eyes grossly normal to inspection.  No discharge or erythema, or obvious scleral/conjunctival abnormalities.  RESP: No audible wheeze, cough, or visible cyanosis.  No visible retractions or increased work of breathing.    SKIN: Visible skin clear. No significant rash, abnormal pigmentation or lesions.  NEURO: Cranial nerves grossly intact.  Mentation and speech appropriate for age.  PSYCH: Mentation appears normal, affect normal/bright, judgement and insight intact, normal speech and appearance well-groomed.      Video-Visit Details    Video Start Time: Start: 06/17/2022 02:50 pm    Type of service:  Video Visit    Video End Time:Stop:  06/17/2022 03:01 pm    Originating Location (pt. Location): Home    Distant Location (provider location):  [facility]     Platform used for Video Visit: Delmi    Start: 06/17/2022 02:50 pm  Stop: 06/17/2022 03:01 pm

## 2022-06-17 NOTE — PATIENT INSTRUCTIONS
As discussed, given your risk factors and strong drug interactions with Paxlovid medication , we are considering Molnupiravir as alternative for your treatment of COVID, sent  to your pharmacy     ================    As you recover from COVID-19     Patients who have symptoms (cough, fever, or shortness of breath), need to isolate for 10 days from when symptoms started AND 72 hours after fever resolves (without fever reducing medications) AND improvement of respiratory symptoms (whichever is longer).     During this time:  Isolate yourself at home (in own room/own bathroom if possible)  Do not allow any visitors  Do not go to work or school  Do not go to [employer],  centers, shopping, or other public places  Do not shake hands  Avoid close and intimate contact with others (hugging, kissing)  Follow CDC recommendations for household cleaning of frequently touched services     After the initial 10 days, continue to isolate yourself from household members as much as possible. To continue decrease the risk of community spread and exposure, you and any members of your household should limit activities in public for 14 days after starting home isolation.      You can reference the following CDC link for helpful home isolation/care tips:  https://www.cdc.gov/coronavirus/2019-ncov/downloads/10Things.pdf     Protect Others:  Cover your mouth and nose with a mask, disposable tissue or wash cloth to avoid spreading germs.  Wash your hands and face often. Use soap and water     Call Back If: Breathing difficulty develops or you become worse.        For more information about COVID19 and options for caring for yourself at home, please visit the CDC website at https://www.cdc.gov/coronavirus/2019-ncov/about/steps-when-sick.html  For more options for care at [facility], please visit our website at [email or web address]    For information about [facility] COVID-19 Clinical  Trials, please visit [email or web address]     For more information, please use the [place] Department of Health COVID-19 Website: https://www.health.[facility].[place].us/diseases/coronavirus/index.html  [place] Department of Health ([facility]) COVID-19 Hotlines (Interpreters   available):              Health questions: Phone Number: [phone] or 1-721.930.5847 and Hours: 7 a.m. to 7 p.m.  Schools and  questions: Phone Number: [phone] or [phone] and Hours 7 a.m. to 7 p.m.       Coping with Life After COVID-19  Being in the hospital because of COVID-19 is scary. Going home can be scary, too. You may face changes to your life, the way you work or what you can eat. It s hard to adjust to change, and it s normal to feel afraid, frustrated or even angry. These feelings usually go away over time. If your feelings don t start to get better, it s called  adjustment disorder.       What signs should I look out for?  Adjustment disorder can happen to anyone in a time of stress. It makes it hard to cope with daily life. You may feel lonely or fight with loved ones, even if you re glad to be home. Watch for these signs:  Fear or worry  Hard time focusing  Sadness or anger  Trouble talking to family or friends  Feeling like you don t fit in or isolating yourself  Problems with sleep   Drinking alcohol or taking drugs to cope     What can I do?  You can help yourself get better. Feeling you have control helps you move forward. You may wonder if you ll be able to do things you did before. Be patient. Do your best to make the most of every day. Try to build relationships, be as active as you can, eat right and keep a sense of humor. Avoid smoking and drinking too much alcohol. Call your family doctor or clinic if you re not sure what to do. They can guide you to care or other services.     When should I get help?  Think about getting counseling if your sadness or frustration gets worse.  Together with a trained counselor, you can talk about your problems adjusting to life after your hospital stay. You can come up with new ways to handle changes that give you more control. Your family doctor or care team can help you find a counselor.      Get help if you re thinking about hurting yourself. If you need help right away, call 911 or go to the nearest emergency room. You can also try the Crisis Text Line.     Crisis Text Line: 967-646 (http://www.crisistextline.org)  The Crisis Text Line serves anyone, in any crisis. It gives free, 24/7 support. Here's how it works:  Text HOME to 290361 from anywhere in the [place], anytime, about any type of crisis.  A live, trained Crisis Counselor will text you back quickly.  The volunteer Crisis Counselor can help you move from a  hot moment  to a  cool moment.  They can also help you work out a safety plan.

## 2022-06-17 NOTE — TELEPHONE ENCOUNTER
6/17 Provided phone number [phone] to schedule follow up  in about 1 year (around 3/15/2023.      Kristine robles Procedure   Orthopedics, Podiatry, Sports Medicine, ENT/Eye Specialties  [facility]   [phone]

## 2022-06-17 NOTE — TELEPHONE ENCOUNTER
Coronavirus (COVID-19) Notification    Caller Name (Patient, parent, daughter/son, grandparent, etc)  Patient    Reason for call  Notify of Positive Coronavirus (COVID-19) lab results, assess symptoms,  review [facility] recommendations    Lab Result    Lab test:  2019-nCoV rRt-PCR or SARS-CoV-2 PCR    Oropharyngeal AND/OR nasopharyngeal swabs is POSITIVE for 2019-nCoV RNA/SARS-COV-2 PCR (COVID-19 virus)      Gather patient reported symptoms   Assessment   Current Symptoms at time of phone call, reported by patient: (if no symptoms, document: No symptoms] Congested, body aches, chills   Date of symptom(s) onset (if applicable) 6/13     If at time of call, Patients symptoms have worsened, the Patient should contact 911 or have someone drive them to Emergency Dept promptly:      If Patient calling 911, inform 911 personal that you have tested positive for the Coronavirus (COVID-19).  Place mask on and await 911 to arrive.    If Emergency Dept, If possible, please have another adult drive you to the Emergency Dept but you need to wear mask when in contact with other people.      Treatment Options:   Patient classified as COVID treatment eligible by Epic high risk algorithm: Yes  Is the patient symptomatic at the time of result notification? Yes. Was the onset of symptoms within the last 5 days? Yes.   There are now oral medications available for the treatment of COVID-19.  Taking one of these medications within the first five days of symptoms (when people may not yet feel severely ill) has been shown to make people feel better, prevent them from getting sicker, and preventing hospitalization and death.   Does the patient agree to have a visit with a provider to discuss treatment options? No.  Reason patient declined:  Other: already has appt today      Review information with Patient    Your result was positive. This means you have COVID-19 (coronavirus).    How can I protect others?    These guidelines are for  isolating before returning to work, school or .    If you DO have symptoms    Stay home and away from others     For at least 5 days after your symptoms started, AND    You are fever free for 24 hours (with no medicine that reduces fever), AND    Your other symptoms are better    Wear a mask for 10 full days anytime you are around others    If you DON'T have symptoms    Stay home and away from others for at least 5 days after your positive test    Wear a mask for 10 full days anytime you are around others    There may be different guidelines for healthcare facilities.  Please check with the specific sites before arriving.    If you have been told by a doctor that you were severely ill with COVID-19 or are immunocompromised, you should isolate for at least 10 days.    You should not go back to work until you meet the guidelines above for ending your home isolation. You don't need to be retested for COVID-19 before going back to work--studies show that you won't spread the virus if it's been at least 10 days since your symptoms started (or 20 days, if you have a weak immune system).    Employers, schools, and daycares: This is an official notice for this person's medical guidelines for returning in-person.  They must meet the above guidelines before going back to work, school or  in person.    You will receive a positive COVID-19 letter via [employer] or the mail soon with additional self-care information (exception, no letters will be sent to presurgical/preprocedure patients).    Would you like me to review some of that information with you now?  No    If you were tested for an upcoming procedure, please contact your provider for next steps.    Elena Haney

## 2022-06-24 ENCOUNTER — TELEPHONE (OUTPATIENT)
Dept: ORTHOPEDICS | Facility: CLINIC | Age: 52
End: 2022-06-24

## 2022-06-24 ENCOUNTER — MYC MEDICAL ADVICE (OUTPATIENT)
Dept: ORTHOPEDICS | Facility: CLINIC | Age: 52
End: 2022-06-24

## 2022-06-24 NOTE — TELEPHONE ENCOUNTER
Phoned patient to discuss surgery scheduling.   Patient stated she will not be able to take any OR slots for the remainder of the year.     Patient stated that she talked to Olya regarding a petition to get her in surgery before the end of August.

## 2022-06-27 NOTE — TELEPHONE ENCOUNTER
Called and talked with patient and relayed that Jesenia is talking with Daysi to try and add time for patients surgery. We are aware that she wants to get surgery moved up and are actively trying. Patient expressed understanding and will call back with questions.  Josette Dumont RN

## 2022-07-05 NOTE — TELEPHONE ENCOUNTER
Date Scheduled: 7-27-22  Facility: [facility]  Surgeon: Dr. Hernandez   Post-op appointment scheduled:   Next 5 appointments (look out 90 days)    Aug 09, 2022  3:00 PM  Nurse Only with MG NURSE ONLY ORTHO  [facility] ([facility] [address] [address] [place] [id number] [phone]           scheduled?: No  Surgery packet/instructions confirmed received?  Yes  Special Considerations:     Patient stated that she would like to do her H&P with the PAC clinic. Appt scheduled for 7-14-22 at 8:30am.    Jesenai Miranda, Surgery Scheduling Coordinator

## 2022-07-06 NOTE — TELEPHONE ENCOUNTER
FUTURE VISIT INFORMATION      SURGERY INFORMATION:    Date: 2022    Location: [facility] OR    Surgeon: Dylan Hernandez MD    Anesthesia Type:  Choice    Procedure: ARTHROPLASTY, LEFT  KNEE, TOTAL    Consult: 3/30/21    RECORDS REQUESTED FROM:       Primary Care Provider: Windy Gordillo MD ([facility])     Pertinent Medical History: Hypertension; CKD    Most recent EKG+ Tracin21 - EPIC    Most recent ECHO: 3/9/17 - EPIC

## 2022-07-14 ENCOUNTER — ANESTHESIA EVENT (OUTPATIENT)
Dept: SURGERY | Facility: CLINIC | Age: 52
End: 2022-07-14
Payer: COMMERCIAL

## 2022-07-14 ENCOUNTER — PRE VISIT (OUTPATIENT)
Dept: SURGERY | Facility: CLINIC | Age: 52
End: 2022-07-14

## 2022-07-14 ENCOUNTER — OFFICE VISIT (OUTPATIENT)
Dept: SURGERY | Facility: CLINIC | Age: 52
End: 2022-07-14
Payer: COMMERCIAL

## 2022-07-14 VITALS
BODY MASS INDEX: 45.27 KG/M2 | RESPIRATION RATE: 16 BRPM | DIASTOLIC BLOOD PRESSURE: 70 MMHG | SYSTOLIC BLOOD PRESSURE: 104 MMHG | HEART RATE: 70 BPM | HEIGHT: 62 IN | OXYGEN SATURATION: 94 % | TEMPERATURE: 97.8 F | WEIGHT: 246 LBS

## 2022-07-14 DIAGNOSIS — Z01.818 PREOP EXAMINATION: Primary | ICD-10-CM

## 2022-07-14 PROCEDURE — 99204 OFFICE O/P NEW MOD 45 MIN: CPT | Performed by: PHYSICIAN ASSISTANT

## 2022-07-14 ASSESSMENT — LIFESTYLE VARIABLES: TOBACCO_USE: 0

## 2022-07-14 ASSESSMENT — PAIN SCALES - GENERAL: PAINLEVEL: MODERATE PAIN (4)

## 2022-07-14 ASSESSMENT — ENCOUNTER SYMPTOMS: SEIZURES: 0

## 2022-07-14 NOTE — H&P
Pre-Operative H & P     CC:  Preoperative exam to assess for increased cardiopulmonary risk while undergoing surgery and anesthesia.    Date of Encounter: 7/14/2022  Primary Care Physician:  Windy Gordillo     Reason for Visit: Primary localized osteoarthritis of left knee    HPI  Adelaida Packer is a 52 year old female who presents for pre-operative H & P in preparation for  Procedure Information     Case: [id number] Date/Time: 07/27/22 1330    Procedure: ARTHROPLASTY, LEFT  KNEE, TOTAL (Left Knee)    Anesthesia type: Choice    Diagnosis: Primary localized osteoarthritis of left knee [M17.12]    Pre-op diagnosis: Primary localized osteoarthritis of left knee [M17.12]    [facility]    Providers: Dylan Hernandez MD          Patient is being evaluated for comorbid conditions of allergic rhinits, migraines, CKD, morbid obesity, primary hyperparathyroidism, monoclonal gammopathy.     Ms. Packer has a history of B/L OA of the knees. She is s/p right total knee arthroplasty on 8/11/22. 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 She now presents for the above procedure.    History was obtained from patient & chart review.     Hx of abnormal bleeding or anti-platelet use: denies    Menstrual history: Patient's last menstrual period was 08/13/2005.:       Past Medical History  Past Medical History:   Diagnosis Date     Allergic rhinitis due to other allergen     seasonal     Arthritis      Diabetes (H)      Localized osteoarthritis of both knees      Migraine, unspecified, without mention of intractable migraine without mention of status migrainosus      Monoclonal gammopathy      Morbid obesity (H)      Type 2 diabetes mellitus with stage 3a chronic kidney disease, without long-term current use of insulin (H) 8/9/2016     Unspecified essential hypertension     dx around age 31       Past Surgical History  Past Surgical  History:   Procedure Laterality Date     ARTHROPLASTY KNEE Right 8/11/2021    Procedure: ARTHROPLASTY, KNEE, TOTAL RIGHT;  Surgeon: Dylan Hernandez MD;  [facility]     BUNIONECTOMY Right 7/6/2018    Procedure: BUNIONECTOMY;  Surgeon: Erik Bazan DPM;  Location: [facility];  Service:      EXCISE GANGLION WRIST Right 7/6/2018    Procedure: EXCISION OF THE GANGLION CYST, BUNIONECTOMU WITH FIRST METATARSAL  OSTEOTOMY WITH INTERNAL SCREW FIXATION, A SUBTALAR JIONT ARTHROERESIS OF THE RIGHT FOOT AND GASTROCNEMIUS RECESSION RIGHT LOWER EXTREMITY;  Surgeon: Erik Bazan DPM;  Location: [facility];  Service:      HC REMOVE TONSILS/ADENOIDS,11+ Y/O  1995     HEAD & NECK SURGERY  3/2013    Parathyroidectomy--had to go back in 6 days later for a possible bleed     HYSTERECTOMY       HYSTERECTOMY, VAGINAL  12/2005    hysterectomy- fibroids     KNEE SURGERY Right      ORTHOPEDIC SURGERY       PARATHYROIDECTOMY       TONSILLECTOMY       ZZC ANESTH,KNEE AREA SURGERY  01/2001     [facility] GASTROPLASTY,OBESITY,VERT BAND      removed 2009       Prior to Admission Medications  Current Outpatient Medications   Medication Sig Dispense Refill     acetaminophen (TYLENOL) 325 MG tablet Take 2 tablets (650 mg) by mouth every 4 hours as needed for other (For optimal non-opioid multimodal pain management to improve pain control.) 50 tablet 0     amLODIPine (NORVASC) 5 MG tablet Take 1 tablet (5 mg) by mouth daily (Patient taking differently: Take 5 mg by mouth every evening) 90 tablet 3     cetirizine (ZYRTEC) 10 MG tablet Take 10 mg by mouth daily as needed for allergies       eplerenone (INSPRA) 50 MG tablet Take 2 tablets (100 mg) by mouth 2 times daily 360 tablet 3     famotidine (PEPCID) 20 MG tablet Take 1 tablet (20 mg) by mouth 2 times daily 180 tablet 1     labetalol (NORMODYNE) 300 MG tablet Take 1 tablet (300 mg) by mouth 2 times daily 180 tablet 3     polyethylene glycol (MIRALAX/GLYCOLAX) Packet Take  17 g by mouth daily as needed        cholecalciferol 2000 UNITS CAPS Take 2,000 Units by mouth daily (Patient not taking: No sig reported) 90 capsule 3     SUMAtriptan (IMITREX) 25 MG tablet TAKE 1 TO 2 TABLETS BY MOUTH AT ONSET OF HEADACHE FOR MIGRAINE. MAY REPEAT DOSE IN 2 HOURS. MAX OF 200MG OR 2 DOSES IN 24 HOURS (Patient taking differently: Take 25 mg by mouth at onset of headache TAKE 1 TO 2 TABLETS BY MOUTH AT ONSET OF HEADACHE FOR MIGRAINE. MAY REPEAT DOSE IN 2 HOURS. MAX OF 200MG OR 2 DOSES IN 24 HOURS) 18 tablet 0       Allergies  Allergies   Allergen Reactions     Sulfa Drugs Shortness Of Breath and Rash     Hydrochlorothiazide W/Triamterene Other (See Comments)     Renal insuff     Maxzide [Hydrochlorothiazide W/Triamterene] Other (See Comments)     Renal insuff     Metoprolol Other (See Comments)     Other reaction(s): Bradycardia  At high dose only  At high dose only     Seasonal Allergies      Hayfever, tree pollens       Social History  Social History     Socioeconomic History     Marital status: Single     Spouse name: Not on file     Number of children: 2     Years of education: Not on file     Highest education level: Not on file   Occupational History     Employer: "[employer]     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 No   Social History Narrative    Lives in [place], [place]        Social Documentation:        Balanced Diet: YES    Calcium intake: 1200 calcium per day    Caffeine: none per day    Exercise:  type of activity walking;3-4 times per week    Sunscreen: No    Seatbelts:  Yes    Self Breast Exam:  Yes    Physical/Emotional/Sexual  Abuse: No     Do you feel safe in your environment? Yes        Cholesterol screen up to date: Yes-2005    Eye Exam up to date: Yes    Dental Exam up to date: Yes    Pap smear up to date: Yes    Mammogram up to date: NO, but want a Mammogram    Dexa Scan up to date: Does Not Apply    Colonoscopy up to date: Does Not Apply    [facility]    Glucose screen if over 40:  NO        Aislinn Wilson MA     Social Determinants of Health     Financial Resource Strain: Not on file   Food Insecurity: Not on file   Transportation Needs: Not on file   Physical Activity: Not on file   Stress: Not on file   Social Connections: Not on file   Intimate Partner Violence: Not on file   Housing Stability: Not on file       Family History  Family History   Problem Relation Age of Onset     Hypertension Mother      Gynecology Mother         hysterectomy     Gastrointestinal Disease Mother         bowel upstruction     Thyroid Disease Mother      Neurologic Disorder Mother      Osteoporosis Mother      Anesthesia Reaction Mother         migraines     Hypertension Father      Lipids Father      Arthritis Father      Heart Disease Father      Diabetes Sister      Cerebrovascular Disease Sister      Prostate Cancer Brother      Alcohol/Drug Brother      Circulatory Brother      Cancer Maternal Grandmother         tumor-head     Obesity Maternal Grandmother      Cancer Maternal Grandfather         bone     Eye Disorder Maternal Grandfather      Prostate Cancer Maternal Grandfather      Glaucoma Maternal Grandfather      Arthritis Paternal Grandmother      Respiratory Daughter         asthma     Macular Degeneration No family hx of      Deep Vein Thrombosis (DVT) No family hx of        Review of Systems  The complete review of systems is negative other than noted in the HPI or here.     Anesthesia Evaluation   Pt has had prior anesthetic.     History of anesthetic complications   post op  "migraines.    ROS/MED HX  ENT/Pulmonary: Comment: +Covid 6/16/22 w/ fatigue, aches, mild cough. Resolved in several days.      (-) tobacco use, asthma and sleep apnea   Neurologic:     (+) migraines (Rare),  (-) no seizures and no CVA   Cardiovascular:     (+) hypertension-----Previous cardiac testing   Echo: Date: 2017 Results:  Global and regional left ventricular function is hyperkinetic with an EF of  65-70%.  Moderate concentric wall thickening consistent with left ventricular  hypertrophy is present.  Right ventricular function, chamber size, wall motion, and thickness are  normal.  The inferior vena cava is normal.  Stress Test: Date: Results:    ECG Reviewed: Date: 7/19/21 Results:  Sinus Rhythm -First degree A-V block   Ketan = 236  -Left axis -anterior fascicular block.    Voltage criteria for LVH  (R(aVL) exceeds 1.26 mV)  -Voltage criteria w/o ST/T abnormality may be normal.   ABNORMAL  Ventricular rate 63 bpm    Cath:  Date: Results:      METS/Exercise Tolerance: 3 - Able to walk 1-2 blocks without stopping Comment: Uses cane to ambulate. Activity limited due to knee pain.     Hematologic:  - neg hematologic  ROS  (-) history of blood clots and history of blood transfusion   Musculoskeletal: Comment: S/p right knee arthroplasty 8/2021    Severe left knee OA      GI/Hepatic:     (+) GERD, Asymptomatic on medication,  (-) liver disease   Renal/Genitourinary: Comment: Creatinine average 1-1.2    (+) renal disease, type: CRI,     Endo: Comment: Primary hyperparathyroidism, s/p parathyroidectomy 2013      (+) Obesity,  (-) Type II DM   Psychiatric/Substance Use:  - neg psychiatric ROS     Infectious Disease:  - neg infectious disease ROS     Malignancy:  - neg malignancy ROS     Other: Comment: Monoclonal gammopathy           /70 (BP Location: Right arm, Patient Position: Chair, Cuff Size: Adult Large)   Pulse 70   Temp 97.8  F (36.6  C) (Oral)   Resp 16   Ht 1.575 m (5' 2\")   Wt 111.6 kg (246 lb)  "  LMP 08/13/2005   SpO2 94%   Breastfeeding No   BMI 44.99 kg/m      Physical Exam  Constitutional: Awake, alert, cooperative, no apparent distress, and appears stated age.  Eyes: Pupils equal, round and reactive to light, extra ocular muscles intact, sclera clear, conjunctiva normal.  HENT: Normocephalic, oral pharynx with moist mucus membranes, good dentition. No goiter appreciated. No removable dental hardware.  Respiratory: Clear to auscultation bilaterally, no crackles or wheezing. No SOB when supine.  Cardiovascular: Regular rate and rhythm, normal S1 and S2, and no murmur noted.  Carotids +2, no bruits. No edema. Palpable pulses to radial, DP and PT arteries.   GI: Normal bowel sounds, soft, obese, non-tender, no masses palpated.    Lymph/Hematologic: No cervical lymphadenopathy and no supraclavicular lymphadenopathy.  Genitourinary:  deferred  Skin: Warm and dry.  No rashes.   Musculoskeletal: full ROM of neck. There is no redness, warmth, or swelling of the joints. Gross motor strength is normal.    Neurologic: Awake, alert, oriented to name, place and time. Cranial nerves II-XII are grossly intact. Gait is antalgic. Ambulates from chair to exam table, seats self, lies supine and sits back up w/o assistance.  Neuropsychiatric: Calm, cooperative. Normal affect. Pleasant. Answers questions appropriately, follows commands w/o difficulty.        PRIOR LABS/DIAGNOSTIC STUDIES:    All labs and imaging personally reviewed      Left knee radiographs 2/24/2022  Impression:  1. No acute osseous abnormality.  2. Severe medial compartment joint space loss, similar to prior.      EKG/ echocardiogram - please see in ROS above       The patient's records and results personally reviewed by this provider.       ** COVID testing not required per protocol due to positive result on 6/16/22      Assessment      Adelaida Packer is a 52 year old female seen as a PAC referral for risk assessment and optimization for  "anesthesia.    Plan/Recommendations  Pt will be optimized for the proposed procedure.  See below for details on the assessment, risk, and preoperative recommendations    NEUROLOGY  - No history of TIA, CVA or seizure    -Post Op delirium risk factors:  No risk identified    ENT  - No current airway concerns.  Will need to be reassessed day of surgery.  Mallampati: III  TM: > 3    CARDIAC  - No history of CAD and Afib   - HTN, will hold Inspra DOS, continue labetalol & norvasc  - Echo 2017: EF 65-70%, moderate LVH  - EKG 7/19/21: 1st degree block    - METS (Metabolic Equivalents)  Uses cane to ambulate. Activity limited due to knee pain.    Patient CANNOT perform 4 METS exercise without symptoms            Total Score: 1    Functional Capacity: Unable to complete 4 METS      RCRI-Very low risk: Class 1 0.4% complication rate            Total Score: 0        PULMONARY  LITZY Medium Risk            Total Score: 3    LITZY: Hypertension    LITZY: BMI over 35 kg/m2    LITZY: Over 50 ys old      - Denies asthma or inhaler use  - Tobacco History      History   Smoking Status     Never Smoker   Smokeless Tobacco     Never Used       GI  - GERD, asymptomatic on Pepcid  PONV High Risk  Total Score: 3           1 AN PONV: Pt is Female    1 AN PONV: Patient is not a current smoker    1 AN PONV: Intended Post Op Opioids        /RENAL  - Baseline Creatinine  1-1.2    ENDOCRINE    - BMI: Estimated body mass index is 44.99 kg/m  as calculated from the following:    Height as of this encounter: 1.575 m (5' 2\").    Weight as of this encounter: 111.6 kg (246 lb).  Class 3 Obesity (BMI > 40)  - No history of Diabetes Mellitus. A1c on 3/11/22 was 5.9  - Primary hyperparathyroidism, s/p parathyroidectomy 2013    HEME/IMMUNE  VTE Low Risk 0.26%            Total Score: 1    VTE: BMI greater than 39      - No history of abnormal bleeding or antiplatelet use.  - Monoclonal gammopathy    MSK  Patient is NOT Frail            Total Score: 0      - " S/p right knee arthroplasty 8/2021  - Severe left knee OA      The patient is optimized for their procedure. AVS with information on surgery time/arrival time, meds and NPO status given by nursing staff. No further diagnostic testing indicated.    Final plan per anesthesiologist on day of surgery.      On the day of service:     Prep time: 14 minutes  Visit time: 25 minutes  Documentation time: 11 minutes  ------------------------------------------  Total time: 50 minutes      Kay Lara PA-C  Preoperative Assessment Center  [facility]  Clinic and Surgery Center  Phone: [phone]  Fax: [phone]

## 2022-07-14 NOTE — PATIENT INSTRUCTIONS
Preparing for Your Surgery      Name:  Adelaida Packer   MRN:  [id number]   :  1970   Today's Date:  2022       Arriving for surgery:  Surgery date:  2022  Arrival time:  11:00AM    Restrictions due to COVID 19       Effective 22 [facility] is implementing the following visitor policy:     1 person may accompany the patient through the Pre-Op process.      That same person may wait in the Surgery Waiting room, provided there is enough room to social distance           Visitors must wear a mask.      Visitors must not be ill.        Inpatients are allowed 2 visitors per day for the duration of their stay.      Visiting hours are 8 am to 8 pm.    openPeople parking is available for anyone with mobility limitations or disabilities.  ([place]  24 hours/ 7 days a week; [facility]  7 am- 3:30 pm, Mon- Fri)    Please come to:     [facility] [address]  [address] [place], [place] [address]. [place]  [address]    -You can park your car in the Green Lot.      -Proceed to the 3rd floor, check in at the Adult Surgery Waiting Lounge. [phone]    If an escort is needed stop at the Information Desk in the lobby. Inform the information person that you are here for surgery. An escort to the Adult Surgery Waiting Lounge will be provided.    What can I eat or drink?  -  You may eat and drink normally for up to 8 hours before your surgery. (Until 5:30AM)  -  You may have clear liquids until 2 hours before surgery. (Until 11:00AM)    Examples of clear liquids:  Water  Clear broth  Juices (apple, white grape, white cranberry  and cider) without pulp  Noncarbonated, powder based beverages  (lemonade and Riley-Aid)  Sodas (Sprite, 7-Up, ginger ale and seltzer)  Coffee or tea (without milk or cream)  Gatorade    -  No Alcohol for at least 24 hours before surgery     Which medicines can I take?    Hold Aspirin for 7 days before surgery.   Hold Multivitamins for 7 days before  surgery.  Hold Supplements for 7 days before surgery.  Hold Ibuprofen (Advil, Motrin) for 1 day before surgery--unless otherwise directed by surgeon.  Hold Naproxen (Aleve) for 4 days before surgery.    **HOLD Imetrex 24 hours prior to surgery      -  DO NOT take these medications the day of surgery:  Eplerenone (Inspra)  Miralax  Vitamin D    -  PLEASE TAKE these medications the day of surgery:  Tylenol if needed  Zyrtec if needed  Labetalol  Amlodipine (PM)  Pepcid    How do I prepare myself?  - Please take 2 showers before surgery using Scrubcare or Hibiclens soap.    Use this soap only from the neck to your toes.     Leave the soap on your skin for one minute--then rinse thoroughly.      You may use your own shampoo and conditioner; no other hair products.   - Please remove all jewelry and body piercings.  - No lotions, deodorants or fragrance.  - No makeup or fingernail polish.   - Bring your ID and insurance card.    -If you have a Deep Brain Stimulator, Spinal Cord Stimulator or any neuro stimulator device---you must bring the remote control to the hospital       ALL PATIENTS GOING HOME THE SAME DAY OF SURGERY ARE REQUIRED TO HAVE A RESPONSIBLE ADULT TO DRIVE AND BE IN ATTENDANCE WITH THEM FOR 24 HOURS FOLLOWING SURGERY.      Questions or Concerns:    - For any questions regarding the day of surgery or your hospital stay, please contact the Pre Admission Nursing Office at [phone].       - If you have health changes between today and your surgery please call your surgeon.       For questions after surgery please call your surgeons office.

## 2022-07-19 ENCOUNTER — VIRTUAL VISIT (OUTPATIENT)
Dept: ENDOCRINOLOGY | Facility: CLINIC | Age: 52
End: 2022-07-19
Payer: COMMERCIAL

## 2022-07-19 VITALS — HEIGHT: 62 IN | BODY MASS INDEX: 45.27 KG/M2 | WEIGHT: 246 LBS

## 2022-07-19 DIAGNOSIS — E66.01 MORBID OBESITY WITH BODY MASS INDEX OF 45.0-49.9 IN ADULT (H): Primary | ICD-10-CM

## 2022-07-19 PROCEDURE — 99212 OFFICE O/P EST SF 10 MIN: CPT | Mod: TEL | Performed by: INTERNAL MEDICINE

## 2022-07-19 NOTE — PROGRESS NOTES
Adelaida Packer is a 52 year old year old who is being evaluated via a billable telephone visit.      What phone number would you like to be contacted at? [phone]  How would you like to obtain your AVS? JAKY JoyT

## 2022-07-19 NOTE — PATIENT INSTRUCTIONS
PLAN:   - restart phentermine 15 mg daily after knee surgery  - continue working on diet modification  - exercise as tolerated    FOLLOW-UP:    2-3 months    If you have any questions, please do not hesitate to call Weight management clinic at [phone] or [phone].  If you need to fax, please fax to [phone].    Sincerely,    Stephen Null MD  Endocrinology

## 2022-07-19 NOTE — NURSING NOTE
Chief Complaint   Patient presents with     Follow Up     Return weight management.         Vitals:    07/19/22 1314   Weight: 246 lb       Body mass index is 44.99 kg/m .      Satya Rene, EMT  Surgery Clinic

## 2022-07-19 NOTE — LETTER
2022       RE: Adelaida Packer  [id number] [facility] [place] [address]     Dear Colleague,    Thank you for referring your patient, Adelaida Packer, to the [facility] [place] [facility]. Please see a copy of my visit note below.      Return Medical Weight Management Note     Adelaida Packer  MRN:  [id number]  :  1970  JUSTUS:  2022    Dear Windy Gordillo MD,    I had the pleasure of seeing your patient Adelaida Packer. She is a 52 year old female who I am continuing to see for treatment of obesity related to: prediabetes, hypertension, OA       6/15/2021   I have the following health issues associated with obesity: Pre-Diabetes, High Blood Pressure, Osteoarthritis (joint disease)   I have the following symptoms associated with obesity: Knee Pain, Hip Pain     She underwent right knee replacement in Aug 2021.   Stopped Ozempic due to severe heartburn and nausea.     INTERVAL HISTORY:  Last seen 2022.     She was started on phentermine at the last visit. She said that it helped her not to feel hungry. She also continued on intermittent fasting. She has noticed weight loss. Weight was down to 232 lbs in 2022.     She was initially planned for left knee surgery in July but it got canceled since she was on phentermine. She also contracted covid earlier this month.  She has not been taking phentermine for a month. Weight is up to 246 lbs in a month.  Her left knee surgery is now move to the end of July.    Drinks about 100-120 oz of water  She is a .    CURRENT WEIGHT:   246 lbs 0 oz    Initial Weight (lbs): 244 lbs  Last Visits Weight: 109.8 kg (242 lb)  Cumulative weight loss (lbs): -2  Weight Loss Percentage: -0.82%    Changes and Difficulties 2022   I have made the following changes to my diet since my last visit: I had to stop taking the medication for a knee surgery and I  "have had more cravings.   With regards to my diet, I am still struggling with: Sugar and sweets.   I have made the following changes to my activity/exercise since my last visit: I haven't been doing much.   With regards to my activity/exercise, I am still struggling with: Waiting for knee surgery.       VITALS:  Ht 1.575 m (5' 2\")   Wt 111.6 kg (246 lb)   LMP 08/13/2005   BMI 44.99 kg/m      MEDICATIONS:   Current Outpatient Medications   Medication Sig Dispense Refill     acetaminophen (TYLENOL) 325 MG tablet Take 2 tablets (650 mg) by mouth every 4 hours as needed for other (For optimal non-opioid multimodal pain management to improve pain control.) 50 tablet 0     amLODIPine (NORVASC) 5 MG tablet Take 1 tablet (5 mg) by mouth daily (Patient taking differently: Take 5 mg by mouth every evening) 90 tablet 3     cetirizine (ZYRTEC) 10 MG tablet Take 10 mg by mouth daily as needed for allergies       cholecalciferol 2000 UNITS CAPS Take 2,000 Units by mouth daily (Patient not taking: No sig reported) 90 capsule 3     eplerenone (INSPRA) 50 MG tablet Take 2 tablets (100 mg) by mouth 2 times daily 360 tablet 3     famotidine (PEPCID) 20 MG tablet Take 1 tablet (20 mg) by mouth 2 times daily 180 tablet 1     labetalol (NORMODYNE) 300 MG tablet Take 1 tablet (300 mg) by mouth 2 times daily 180 tablet 3     polyethylene glycol (MIRALAX/GLYCOLAX) Packet Take 17 g by mouth daily as needed        SUMAtriptan (IMITREX) 25 MG tablet TAKE 1 TO 2 TABLETS BY MOUTH AT ONSET OF HEADACHE FOR MIGRAINE. MAY REPEAT DOSE IN 2 HOURS. MAX OF 200MG OR 2 DOSES IN 24 HOURS (Patient taking differently: Take 25 mg by mouth at onset of headache TAKE 1 TO 2 TABLETS BY MOUTH AT ONSET OF HEADACHE FOR MIGRAINE. MAY REPEAT DOSE IN 2 HOURS. MAX OF 200MG OR 2 DOSES IN 24 HOURS) 18 tablet 0       Weight Loss Medication History Reviewed With Patient 7/19/2022   Which weight loss medications are you currently taking on a regular basis?  None   If you " are not taking a weight loss medication that was prescribed to you, please indicate why: Other   Are you having any side effects from the weight loss medication that we have prescribed you? Yes   If you are having side effects please describe: Dry mouth.       ASSESSMENT:   Adelaida Packer is a 51 year old female who I am continuing to see for treatment of obesity related to: prediabetes, hypertension, OA    - Stopped Ozempic earlier this year due to severe heartburn and nausea.  - started on phentermine in April 2022 along with intermittent fasting --> lost 19 lbs in 3 months  - now her knee surgery is moving to the end of this month    PLAN:   - restart phentermine 15 mg after knee sugery  - continue working on diet modification  - exercise as tolerated    FOLLOW-UP:    2-3 months    Phone start 0432 pm  Phone end 0443 pm  Phone duration 11 minutes    External notes/medical records independently reviewed, labs and imaging independently reviewed, medical management and tests to be discussed/communicated to patient.    Time: I spent 13 minutes spent on the date of the encounter preparing to see patient (including chart review and preparation), obtaining and or reviewing additional medical history, performing a physical exam and evaluation, documenting clinical information in the electronic health record, independently interpreting results, communicating results to the patient and coordinating care.      Sincerely,    Stephen Null MD

## 2022-07-19 NOTE — PROGRESS NOTES
"  Return Medical Weight Management Note     Adelaida Packer  MRN:  [id number]  :  1970  JUSTUS:  2022    Dear Windy Gordillo MD,    I had the pleasure of seeing your patient Adelaida Packer. She is a 52 year old female who I am continuing to see for treatment of obesity related to: prediabetes, hypertension, OA       6/15/2021   I have the following health issues associated with obesity: Pre-Diabetes, High Blood Pressure, Osteoarthritis (joint disease)   I have the following symptoms associated with obesity: Knee Pain, Hip Pain     She underwent right knee replacement in Aug 2021.   Stopped Ozempic due to severe heartburn and nausea.     INTERVAL HISTORY:  Last seen 2022.     She was started on phentermine at the last visit. She said that it helped her not to feel hungry. She also continued on intermittent fasting. She has noticed weight loss. Weight was down to 232 lbs in 2022.     She was initially planned for left knee surgery in July but it got canceled since she was on phentermine. She also contracted covid earlier this month.  She has not been taking phentermine for a month. Weight is up to 246 lbs in a month.  Her left knee surgery is now move to the end of July.    Drinks about 100-120 oz of water  She is a .    CURRENT WEIGHT:   246 lbs 0 oz    Initial Weight (lbs): 244 lbs  Last Visits Weight: 109.8 kg (242 lb)  Cumulative weight loss (lbs): -2  Weight Loss Percentage: -0.82%    Changes and Difficulties 2022   I have made the following changes to my diet since my last visit: I had to stop taking the medication for a knee surgery and I have had more cravings.   With regards to my diet, I am still struggling with: Sugar and sweets.   I have made the following changes to my activity/exercise since my last visit: I haven't been doing much.   With regards to my activity/exercise, I am still struggling with: Waiting for knee surgery.       VITALS:  Ht 1.575 m (5' 2\")   " Wt 111.6 kg (246 lb)   LMP 08/13/2005   BMI 44.99 kg/m      MEDICATIONS:   Current Outpatient Medications   Medication Sig Dispense Refill     acetaminophen (TYLENOL) 325 MG tablet Take 2 tablets (650 mg) by mouth every 4 hours as needed for other (For optimal non-opioid multimodal pain management to improve pain control.) 50 tablet 0     amLODIPine (NORVASC) 5 MG tablet Take 1 tablet (5 mg) by mouth daily (Patient taking differently: Take 5 mg by mouth every evening) 90 tablet 3     cetirizine (ZYRTEC) 10 MG tablet Take 10 mg by mouth daily as needed for allergies       cholecalciferol 2000 UNITS CAPS Take 2,000 Units by mouth daily (Patient not taking: No sig reported) 90 capsule 3     eplerenone (INSPRA) 50 MG tablet Take 2 tablets (100 mg) by mouth 2 times daily 360 tablet 3     famotidine (PEPCID) 20 MG tablet Take 1 tablet (20 mg) by mouth 2 times daily 180 tablet 1     labetalol (NORMODYNE) 300 MG tablet Take 1 tablet (300 mg) by mouth 2 times daily 180 tablet 3     polyethylene glycol (MIRALAX/GLYCOLAX) Packet Take 17 g by mouth daily as needed        SUMAtriptan (IMITREX) 25 MG tablet TAKE 1 TO 2 TABLETS BY MOUTH AT ONSET OF HEADACHE FOR MIGRAINE. MAY REPEAT DOSE IN 2 HOURS. MAX OF 200MG OR 2 DOSES IN 24 HOURS (Patient taking differently: Take 25 mg by mouth at onset of headache TAKE 1 TO 2 TABLETS BY MOUTH AT ONSET OF HEADACHE FOR MIGRAINE. MAY REPEAT DOSE IN 2 HOURS. MAX OF 200MG OR 2 DOSES IN 24 HOURS) 18 tablet 0       Weight Loss Medication History Reviewed With Patient 7/19/2022   Which weight loss medications are you currently taking on a regular basis?  None   If you are not taking a weight loss medication that was prescribed to you, please indicate why: Other   Are you having any side effects from the weight loss medication that we have prescribed you? Yes   If you are having side effects please describe: Dry mouth.       ASSESSMENT:   Adelaida Packer is a 51 year old female who I am continuing  to see for treatment of obesity related to: prediabetes, hypertension, OA    - Stopped Ozempic earlier this year due to severe heartburn and nausea.  - started on phentermine in April 2022 along with intermittent fasting --> lost 19 lbs in 3 months  - now her knee surgery is moving to the end of this month    PLAN:   - restart phentermine 15 mg after knee sugery  - continue working on diet modification  - exercise as tolerated    FOLLOW-UP:    2-3 months    Phone start 0432 pm  Phone end 0443 pm  Phone duration 11 minutes    External notes/medical records independently reviewed, labs and imaging independently reviewed, medical management and tests to be discussed/communicated to patient.    Time: I spent 13 minutes spent on the date of the encounter preparing to see patient (including chart review and preparation), obtaining and or reviewing additional medical history, performing a physical exam and evaluation, documenting clinical information in the electronic health record, independently interpreting results, communicating results to the patient and coordinating care.      Sincerely,    Stephen Null MD

## 2022-07-26 ASSESSMENT — LIFESTYLE VARIABLES: TOBACCO_USE: 0

## 2022-07-26 ASSESSMENT — ENCOUNTER SYMPTOMS: SEIZURES: 0

## 2022-07-26 NOTE — ANESTHESIA PREPROCEDURE EVALUATION
Anesthesia Pre-Procedure Evaluation    Patient: Adelaida Packer   MRN: [id number] : 1970        Procedure : Procedure(s):  ARTHROPLASTY, LEFT  KNEE, TOTAL          Past Medical History:   Diagnosis Date     Allergic rhinitis due to other allergen     seasonal     Arthritis      Diabetes (H)      Localized osteoarthritis of both knees      Migraine, unspecified, without mention of intractable migraine without mention of status migrainosus      Monoclonal gammopathy      Morbid obesity (H)      Type 2 diabetes mellitus with stage 3a chronic kidney disease, without long-term current use of insulin (H) 2016     Unspecified essential hypertension     dx around age 31      Past Surgical History:   Procedure Laterality Date     ARTHROPLASTY KNEE Right 2021    Procedure: ARTHROPLASTY, KNEE, TOTAL RIGHT;  Surgeon: Dylan Hernandez MD;  Location:  OR     BUNIONECTOMY Right 2018    Procedure: BUNIONECTOMY;  Surgeon: Erik Bazan DPM;  Location: [facility];  Service:      EXCISE GANGLION WRIST Right 2018    Procedure: EXCISION OF THE GANGLION CYST, BUNIONECTOMU WITH FIRST METATARSAL  OSTEOTOMY WITH INTERNAL SCREW FIXATION, A SUBTALAR JIONT ARTHROERESIS OF THE RIGHT FOOT AND GASTROCNEMIUS RECESSION RIGHT LOWER EXTREMITY;  Surgeon: Erik Bazan DPM;  Location: [facility];  Service:      HC REMOVE TONSILS/ADENOIDS,12+ Y/O       HEAD & NECK SURGERY  3/2013    Parathyroidectomy--had to go back in 6 days later for a possible bleed     HYSTERECTOMY       HYSTERECTOMY, VAGINAL  2005    hysterectomy- fibroids     KNEE SURGERY Right      ORTHOPEDIC SURGERY       PARATHYROIDECTOMY       TONSILLECTOMY       ZZC ANESTH,KNEE AREA SURGERY  2001     Z GASTROPLASTY,OBESITY,VERT BAND      removed       Allergies   Allergen Reactions     Sulfa Drugs Shortness Of Breath and Rash     Hydrochlorothiazide W/Triamterene Other (See Comments)     Renal insuff     Maxzide  [Hydrochlorothiazide W/Triamterene] Other (See Comments)     Renal insuff     Metoprolol Other (See Comments)     Other reaction(s): Bradycardia  At high dose only  At high dose only     Seasonal Allergies      Hayfever, tree pollens      Social History     Tobacco Use     Smoking status: Never Smoker     Smokeless tobacco: Never Used   Substance Use Topics     Alcohol use: No      Wt Readings from Last 1 Encounters:   07/19/22 111.6 kg (246 lb)        Anesthesia Evaluation   Pt has had prior anesthetic.     History of anesthetic complications   post op migraines.    ROS/MED HX  ENT/Pulmonary: Comment: +Covid 6/16/22 w/ fatigue, aches, mild cough. Resolved in several days.       (+) LITZY risk factors, hypertension, obese,  (-) tobacco use, asthma and sleep apnea   Neurologic:     (+) migraines (Rare),  (-) no seizures and no CVA   Cardiovascular:     (+) hypertension-----Previous cardiac testing   Echo: Date: 2017 Results:  Global and regional left ventricular function is hyperkinetic with an EF of  65-70%.  Moderate concentric wall thickening consistent with left ventricular  hypertrophy is present.  Right ventricular function, chamber size, wall motion, and thickness are  normal.  The inferior vena cava is normal.  Stress Test: Date: Results:    ECG Reviewed: Date: 7/19/21 Results:  Sinus Rhythm -First degree A-V block   Ketan = 236  -Left axis -anterior fascicular block.    Voltage criteria for LVH  (R(aVL) exceeds 1.26 mV)  -Voltage criteria w/o ST/T abnormality may be normal.   ABNORMAL  Ventricular rate 63 bpm    Cath:  Date: Results:      METS/Exercise Tolerance: 3 - Able to walk 1-2 blocks without stopping Comment: Uses cane to ambulate. Activity limited due to knee pain.     Hematologic:  - neg hematologic  ROS  (-) history of blood clots and history of blood transfusion   Musculoskeletal: Comment: S/p right knee arthroplasty 8/2021    Severe left knee OA  (+) arthritis,     GI/Hepatic:     (+) GERD,  Asymptomatic on medication,  (-) liver disease   Renal/Genitourinary: Comment: Creatinine average 1-1.2    (+) renal disease, type: CRI,     Endo: Comment: Primary hyperparathyroidism, s/p parathyroidectomy 2013      (+) type II DM, Last HgA1c: 5.9, date: 3/11/22, Not using insulin, not previously admitted for DM/DKA. Obesity,     Psychiatric/Substance Use:  - neg psychiatric ROS     Infectious Disease:  - neg infectious disease ROS     Malignancy:  - neg malignancy ROS     Other: Comment: Monoclonal gammopathy           Physical Exam    Airway        Mallampati: III   TM distance: > 3 FB   Neck ROM: full   Mouth opening: > 3 cm    Respiratory Devices and Support         Dental  no notable dental history         Cardiovascular   cardiovascular exam normal       Rhythm and rate: regular and normal     Pulmonary   pulmonary exam normal        breath sounds clear to auscultation           OUTSIDE LABS:  CBC:   CBC RESULTS: Recent Labs   Lab Test 06/03/22  0947 03/11/22  0744 08/12/21  0616 07/19/21  1356 12/04/18  1549 08/06/18  0910 08/17/17  1433 08/10/17  0906   WBC 5.0  --   --  4.9  --  2.8*  --  3.8*   HGB 12.1 12.5 11.5* 12.5   < > 12.1   < > 12.0   HCT 36.7  --   --  37.2  --  36.9  --  35.3     --   --  187  --  154  --  177    < > = values in this interval not displayed.     Last Comprehensive Metabolic Panel:  Recent Labs   Lab Test 07/27/22  1217 07/27/22  1140 06/06/22  0614 06/03/22  0947 03/11/22  0744 08/11/21  0600 07/19/21  1356 03/29/21  1010 11/30/20  0659   NA  --   --   --  138 136  --  139  --  139   POTASSIUM 4.4  --   --  4.2 4.5  --  4.1  --  4.7   CHLORIDE  --   --   --  106 105  --  109  --  107   CO2  --   --   --  27 27  --  29  --  28   ANIONGAP  --   --   --  5 4  --  1*  --  4   BUN  --   --   --  11 21  --  20  --  20   CR 1.01  --   --  1.18* 1.16*  --  1.18*  --  1.17*   GFRESTIMATED 66  --   --  55* 56*  --  53*  --  54*   GLC  --  114* 118* 120* 131*   < > 138*   < > 121*    EVGENY  --   --   --  9.8 10.1  --  9.8  --  9.5    < > = values in this interval not displayed.        Lab Results   Component Value Date    WBC 5.0 06/03/2022    WBC 4.9 07/19/2021    HGB 12.1 06/03/2022    HGB 12.5 03/11/2022    HCT 36.7 06/03/2022    HCT 37.2 07/19/2021     06/03/2022     07/19/2021     BMP:   Lab Results   Component Value Date     06/03/2022     03/11/2022    POTASSIUM 4.2 06/03/2022    POTASSIUM 4.5 03/11/2022    CHLORIDE 106 06/03/2022    CHLORIDE 105 03/11/2022    CO2 27 06/03/2022    CO2 27 03/11/2022    BUN 11 06/03/2022    BUN 21 03/11/2022    CR 1.18 (H) 06/03/2022    CR 1.16 (H) 03/11/2022     (H) 06/06/2022     (H) 06/03/2022     COAGS:   Lab Results   Component Value Date    INR 1.09 01/18/2016     POC:   Lab Results   Component Value Date     (H) 01/18/2016    HCG Negative 01/18/2016     HEPATIC:   Lab Results   Component Value Date    ALBUMIN 3.9 03/11/2022    PROTTOTAL 7.5 08/06/2018    ALT 30 08/06/2018    AST 19 08/06/2018    ALKPHOS 50 08/06/2018    BILITOTAL 0.3 08/06/2018     OTHER:   Lab Results   Component Value Date    A1C 5.9 (H) 03/11/2022    EVGENY 9.8 06/03/2022    PHOS 3.2 03/11/2022    MAG 2.1 04/02/2013    LIPASE 101 05/13/2005    TSH 1.46 03/29/2021       Anesthesia Plan    ASA Status:  3      Anesthesia Type: Spinal.   Induction: Intravenous.   Maintenance: TIVA.   Techniques and Equipment:     - Lines/Monitors: BIS     Consents         - Extended Intubation/Ventilatory Support Discussed: No.      - Patient is DNR/DNI Status: No    Use of blood products discussed: No .     Postoperative Care    Pain management: IV analgesics, Oral pain medications, Neuraxial analgesia.   PONV prophylaxis: Ondansetron (or other 5HT-3), Background Propofol Infusion     Comments:                MD Allegra Wright MD

## 2022-07-27 ENCOUNTER — ANESTHESIA (OUTPATIENT)
Dept: SURGERY | Facility: CLINIC | Age: 52
End: 2022-07-27
Payer: COMMERCIAL

## 2022-07-27 ENCOUNTER — APPOINTMENT (OUTPATIENT)
Dept: GENERAL RADIOLOGY | Facility: CLINIC | Age: 52
End: 2022-07-27
Attending: ORTHOPAEDIC SURGERY
Payer: COMMERCIAL

## 2022-07-27 ENCOUNTER — HOSPITAL ENCOUNTER (OUTPATIENT)
Facility: CLINIC | Age: 52
Discharge: HOME | End: 2022-07-29
Attending: ORTHOPAEDIC SURGERY | Admitting: ORTHOPAEDIC SURGERY
Payer: COMMERCIAL

## 2022-07-27 DIAGNOSIS — M25.562 CHRONIC PAIN OF LEFT KNEE: Primary | ICD-10-CM

## 2022-07-27 DIAGNOSIS — M17.11 PRIMARY OSTEOARTHRITIS OF RIGHT KNEE: ICD-10-CM

## 2022-07-27 DIAGNOSIS — Z96.652 S/P TOTAL KNEE ARTHROPLASTY, LEFT: ICD-10-CM

## 2022-07-27 DIAGNOSIS — G89.29 CHRONIC PAIN OF LEFT KNEE: Primary | ICD-10-CM

## 2022-07-27 LAB
CREAT SERPL-MCNC: 1.01 MG/DL (ref 0.52–1.04)
GFR SERPL CREATININE-BSD FRML MDRD: 66 ML/MIN/1.73M2
GLUCOSE BLDC GLUCOMTR-MCNC: 114 MG/DL (ref 70–99)
POTASSIUM BLD-SCNC: 4.4 MMOL/L (ref 3.4–5.3)

## 2022-07-27 PROCEDURE — 250N000011 HC RX IP 250 OP 636: Performed by: ANESTHESIOLOGY

## 2022-07-27 PROCEDURE — 370N000017 HC ANESTHESIA TECHNICAL FEE, PER MIN: Performed by: ORTHOPAEDIC SURGERY

## 2022-07-27 PROCEDURE — 27447 TOTAL KNEE ARTHROPLASTY: CPT | Mod: LT | Performed by: ORTHOPAEDIC SURGERY

## 2022-07-27 PROCEDURE — 250N000011 HC RX IP 250 OP 636: Performed by: NURSE ANESTHETIST, CERTIFIED REGISTERED

## 2022-07-27 PROCEDURE — C1713 ANCHOR/SCREW BN/BN,TIS/BN: HCPCS | Performed by: ORTHOPAEDIC SURGERY

## 2022-07-27 PROCEDURE — 99204 OFFICE O/P NEW MOD 45 MIN: CPT | Performed by: INTERNAL MEDICINE

## 2022-07-27 PROCEDURE — 250N000013 HC RX MED GY IP 250 OP 250 PS 637: Performed by: STUDENT IN AN ORGANIZED HEALTH CARE EDUCATION/TRAINING PROGRAM

## 2022-07-27 PROCEDURE — 250N000013 HC RX MED GY IP 250 OP 250 PS 637: Performed by: INTERNAL MEDICINE

## 2022-07-27 PROCEDURE — 250N000011 HC RX IP 250 OP 636: Performed by: STUDENT IN AN ORGANIZED HEALTH CARE EDUCATION/TRAINING PROGRAM

## 2022-07-27 PROCEDURE — 258N000003 HC RX IP 258 OP 636: Performed by: STUDENT IN AN ORGANIZED HEALTH CARE EDUCATION/TRAINING PROGRAM

## 2022-07-27 PROCEDURE — 82565 ASSAY OF CREATININE: CPT | Performed by: ANESTHESIOLOGY

## 2022-07-27 PROCEDURE — C1776 JOINT DEVICE (IMPLANTABLE): HCPCS | Performed by: ORTHOPAEDIC SURGERY

## 2022-07-27 PROCEDURE — 84132 ASSAY OF SERUM POTASSIUM: CPT | Performed by: ANESTHESIOLOGY

## 2022-07-27 PROCEDURE — 258N000001 HC RX 258: Performed by: ORTHOPAEDIC SURGERY

## 2022-07-27 PROCEDURE — 999N000141 HC STATISTIC PRE-PROCEDURE NURSING ASSESSMENT: Performed by: ORTHOPAEDIC SURGERY

## 2022-07-27 PROCEDURE — 258N000003 HC RX IP 258 OP 636: Performed by: NURSE ANESTHETIST, CERTIFIED REGISTERED

## 2022-07-27 PROCEDURE — 250N000009 HC RX 250: Performed by: ANESTHESIOLOGY

## 2022-07-27 PROCEDURE — 360N000077 HC SURGERY LEVEL 4, PER MIN: Performed by: ORTHOPAEDIC SURGERY

## 2022-07-27 PROCEDURE — 258N000003 HC RX IP 258 OP 636: Performed by: ANESTHESIOLOGY

## 2022-07-27 PROCEDURE — 258N000003 HC RX IP 258 OP 636

## 2022-07-27 PROCEDURE — 250N000011 HC RX IP 250 OP 636

## 2022-07-27 PROCEDURE — 250N000013 HC RX MED GY IP 250 OP 250 PS 637

## 2022-07-27 PROCEDURE — 272N000001 HC OR GENERAL SUPPLY STERILE: Performed by: ORTHOPAEDIC SURGERY

## 2022-07-27 PROCEDURE — 710N000010 HC RECOVERY PHASE 1, LEVEL 2, PER MIN: Performed by: ORTHOPAEDIC SURGERY

## 2022-07-27 PROCEDURE — 82962 GLUCOSE BLOOD TEST: CPT

## 2022-07-27 PROCEDURE — 999N000063 XR KNEE PORT LEFT 1/2 VIEWS: Mod: LT

## 2022-07-27 DEVICE — LEGION CRUCIATE RETAINING HIGH                                    FLEX HIGHLY CROSS LINKED                                    POLYETHYLENE SIZE 3-4 9MM
Type: IMPLANTABLE DEVICE | Site: KNEE | Status: FUNCTIONAL
Brand: LEGION

## 2022-07-27 DEVICE — GENESIS II NON POROUS CRUCIATE                                    RETAINING FEMORAL SIZE 4 LEFT
Type: IMPLANTABLE DEVICE | Site: KNEE | Status: FUNCTIONAL
Brand: GENESIS II

## 2022-07-27 DEVICE — TOBRA FULL DOSE ANTIBIOTIC BONE CEMENT, 10 PACK CATALOG NUMBER IS 6197-9-010
Type: IMPLANTABLE DEVICE | Site: KNEE | Status: FUNCTIONAL
Brand: SIMPLEX

## 2022-07-27 DEVICE — GENESIS II NON-POROUS TIBIAL                                    BASEPLATE SIZE 3 LEFT
Type: IMPLANTABLE DEVICE | Site: KNEE | Status: FUNCTIONAL
Brand: GENESIS II

## 2022-07-27 RX ORDER — NALOXONE HYDROCHLORIDE 0.4 MG/ML
0.2 INJECTION, SOLUTION INTRAMUSCULAR; INTRAVENOUS; SUBCUTANEOUS
Status: DISCONTINUED | OUTPATIENT
Start: 2022-07-27 | End: 2022-07-29 | Stop reason: HOSPADM

## 2022-07-27 RX ORDER — BUPIVACAINE HYDROCHLORIDE 2.5 MG/ML
INJECTION, SOLUTION EPIDURAL; INFILTRATION; INTRACAUDAL
Status: COMPLETED | OUTPATIENT
Start: 2022-07-27 | End: 2022-07-27

## 2022-07-27 RX ORDER — PHENYLEPHRINE HCL IN 0.9% NACL 50MG/250ML
.5-1.25 PLASTIC BAG, INJECTION (ML) INTRAVENOUS CONTINUOUS
Status: DISCONTINUED | OUTPATIENT
Start: 2022-07-27 | End: 2022-07-29 | Stop reason: HOSPADM

## 2022-07-27 RX ORDER — POLYETHYLENE GLYCOL 3350 17 G/17G
1 POWDER, FOR SOLUTION ORAL DAILY
Qty: 7 PACKET | Refills: 0 | Status: SHIPPED | OUTPATIENT
Start: 2022-07-27

## 2022-07-27 RX ORDER — SODIUM CHLORIDE, SODIUM LACTATE, POTASSIUM CHLORIDE, CALCIUM CHLORIDE 600; 310; 30; 20 MG/100ML; MG/100ML; MG/100ML; MG/100ML
INJECTION, SOLUTION INTRAVENOUS CONTINUOUS
Status: DISCONTINUED | OUTPATIENT
Start: 2022-07-27 | End: 2022-07-28

## 2022-07-27 RX ORDER — PROPOFOL 10 MG/ML
INJECTION, EMULSION INTRAVENOUS CONTINUOUS PRN
Status: DISCONTINUED | OUTPATIENT
Start: 2022-07-27 | End: 2022-07-27

## 2022-07-27 RX ORDER — FAMOTIDINE 20 MG/1
20 TABLET, FILM COATED ORAL 2 TIMES DAILY
Status: DISCONTINUED | OUTPATIENT
Start: 2022-07-27 | End: 2022-07-29 | Stop reason: HOSPADM

## 2022-07-27 RX ORDER — DEXTROSE MONOHYDRATE 25 G/50ML
25-50 INJECTION, SOLUTION INTRAVENOUS
Status: DISCONTINUED | OUTPATIENT
Start: 2022-07-27 | End: 2022-07-29 | Stop reason: HOSPADM

## 2022-07-27 RX ORDER — BUPIVACAINE HYDROCHLORIDE 7.5 MG/ML
INJECTION, SOLUTION INTRASPINAL
Status: COMPLETED | OUTPATIENT
Start: 2022-07-27 | End: 2022-07-27

## 2022-07-27 RX ORDER — HYDROXYZINE HYDROCHLORIDE 25 MG/1
25 TABLET, FILM COATED ORAL EVERY 6 HOURS PRN
Qty: 30 TABLET | Refills: 0 | Status: SHIPPED | OUTPATIENT
Start: 2022-07-27 | End: 2022-08-02

## 2022-07-27 RX ORDER — SODIUM CHLORIDE, SODIUM LACTATE, POTASSIUM CHLORIDE, CALCIUM CHLORIDE 600; 310; 30; 20 MG/100ML; MG/100ML; MG/100ML; MG/100ML
INJECTION, SOLUTION INTRAVENOUS CONTINUOUS
Status: DISCONTINUED | OUTPATIENT
Start: 2022-07-27 | End: 2022-07-27 | Stop reason: HOSPADM

## 2022-07-27 RX ORDER — ONDANSETRON 4 MG/1
4 TABLET, ORALLY DISINTEGRATING ORAL EVERY 30 MIN PRN
Status: DISCONTINUED | OUTPATIENT
Start: 2022-07-27 | End: 2022-07-27 | Stop reason: HOSPADM

## 2022-07-27 RX ORDER — FENTANYL CITRATE 50 UG/ML
25 INJECTION, SOLUTION INTRAMUSCULAR; INTRAVENOUS EVERY 5 MIN PRN
Status: CANCELLED | OUTPATIENT
Start: 2022-07-27

## 2022-07-27 RX ORDER — NALOXONE HYDROCHLORIDE 0.4 MG/ML
0.2 INJECTION, SOLUTION INTRAMUSCULAR; INTRAVENOUS; SUBCUTANEOUS
Status: DISCONTINUED | OUTPATIENT
Start: 2022-07-27 | End: 2022-07-27 | Stop reason: HOSPADM

## 2022-07-27 RX ORDER — SODIUM CHLORIDE, SODIUM LACTATE, POTASSIUM CHLORIDE, CALCIUM CHLORIDE 600; 310; 30; 20 MG/100ML; MG/100ML; MG/100ML; MG/100ML
INJECTION, SOLUTION INTRAVENOUS CONTINUOUS PRN
Status: DISCONTINUED | OUTPATIENT
Start: 2022-07-27 | End: 2022-07-27

## 2022-07-27 RX ORDER — HYDROXYZINE HYDROCHLORIDE 25 MG/1
25 TABLET, FILM COATED ORAL EVERY 6 HOURS PRN
Status: DISCONTINUED | OUTPATIENT
Start: 2022-07-27 | End: 2022-07-28

## 2022-07-27 RX ORDER — HYDROMORPHONE HYDROCHLORIDE 1 MG/ML
0.4 INJECTION, SOLUTION INTRAMUSCULAR; INTRAVENOUS; SUBCUTANEOUS
Status: DISCONTINUED | OUTPATIENT
Start: 2022-07-27 | End: 2022-07-29 | Stop reason: HOSPADM

## 2022-07-27 RX ORDER — CEFAZOLIN SODIUM 2 G/100ML
2 INJECTION, SOLUTION INTRAVENOUS EVERY 8 HOURS
Status: COMPLETED | OUTPATIENT
Start: 2022-07-27 | End: 2022-07-28

## 2022-07-27 RX ORDER — OXYCODONE HYDROCHLORIDE 5 MG/1
5 TABLET ORAL EVERY 4 HOURS PRN
Status: DISCONTINUED | OUTPATIENT
Start: 2022-07-27 | End: 2022-07-27 | Stop reason: HOSPADM

## 2022-07-27 RX ORDER — TRANEXAMIC ACID 650 MG/1
1950 TABLET ORAL ONCE
Status: COMPLETED | OUTPATIENT
Start: 2022-07-27 | End: 2022-07-27

## 2022-07-27 RX ORDER — NALOXONE HYDROCHLORIDE 0.4 MG/ML
0.4 INJECTION, SOLUTION INTRAMUSCULAR; INTRAVENOUS; SUBCUTANEOUS
Status: DISCONTINUED | OUTPATIENT
Start: 2022-07-27 | End: 2022-07-29 | Stop reason: HOSPADM

## 2022-07-27 RX ORDER — ONDANSETRON 4 MG/1
4 TABLET, ORALLY DISINTEGRATING ORAL EVERY 6 HOURS PRN
Status: DISCONTINUED | OUTPATIENT
Start: 2022-07-27 | End: 2022-07-29 | Stop reason: HOSPADM

## 2022-07-27 RX ORDER — NALOXONE HYDROCHLORIDE 0.4 MG/ML
0.4 INJECTION, SOLUTION INTRAMUSCULAR; INTRAVENOUS; SUBCUTANEOUS
Status: DISCONTINUED | OUTPATIENT
Start: 2022-07-27 | End: 2022-07-27 | Stop reason: HOSPADM

## 2022-07-27 RX ORDER — BISACODYL 10 MG
10 SUPPOSITORY, RECTAL RECTAL DAILY PRN
Status: DISCONTINUED | OUTPATIENT
Start: 2022-07-27 | End: 2022-07-29 | Stop reason: HOSPADM

## 2022-07-27 RX ORDER — CEFAZOLIN SODIUM/WATER 2 G/20 ML
2 SYRINGE (ML) INTRAVENOUS
Status: COMPLETED | OUTPATIENT
Start: 2022-07-27 | End: 2022-07-27

## 2022-07-27 RX ORDER — FENTANYL CITRATE 50 UG/ML
25 INJECTION, SOLUTION INTRAMUSCULAR; INTRAVENOUS EVERY 5 MIN PRN
Status: DISCONTINUED | OUTPATIENT
Start: 2022-07-27 | End: 2022-07-27 | Stop reason: CLARIF

## 2022-07-27 RX ORDER — OXYCODONE HYDROCHLORIDE 5 MG/1
5 TABLET ORAL EVERY 4 HOURS PRN
Status: CANCELLED | OUTPATIENT
Start: 2022-07-27

## 2022-07-27 RX ORDER — CETIRIZINE HYDROCHLORIDE 5 MG/1
10 TABLET ORAL DAILY PRN
Status: DISCONTINUED | OUTPATIENT
Start: 2022-07-27 | End: 2022-07-29 | Stop reason: HOSPADM

## 2022-07-27 RX ORDER — LABETALOL 300 MG/1
300 TABLET, FILM COATED ORAL 2 TIMES DAILY
Status: DISCONTINUED | OUTPATIENT
Start: 2022-07-27 | End: 2022-07-29 | Stop reason: HOSPADM

## 2022-07-27 RX ORDER — HYDROMORPHONE HYDROCHLORIDE 1 MG/ML
0.2 INJECTION, SOLUTION INTRAMUSCULAR; INTRAVENOUS; SUBCUTANEOUS EVERY 5 MIN PRN
Status: CANCELLED | OUTPATIENT
Start: 2022-07-27

## 2022-07-27 RX ORDER — LIDOCAINE 40 MG/G
CREAM TOPICAL
Status: DISCONTINUED | OUTPATIENT
Start: 2022-07-27 | End: 2022-07-29 | Stop reason: HOSPADM

## 2022-07-27 RX ORDER — SODIUM CHLORIDE, SODIUM LACTATE, POTASSIUM CHLORIDE, CALCIUM CHLORIDE 600; 310; 30; 20 MG/100ML; MG/100ML; MG/100ML; MG/100ML
INJECTION, SOLUTION INTRAVENOUS CONTINUOUS
Status: CANCELLED | OUTPATIENT
Start: 2022-07-27

## 2022-07-27 RX ORDER — OXYCODONE HYDROCHLORIDE 10 MG/1
10 TABLET ORAL EVERY 4 HOURS PRN
Status: DISCONTINUED | OUTPATIENT
Start: 2022-07-27 | End: 2022-07-28

## 2022-07-27 RX ORDER — POLYETHYLENE GLYCOL 3350 17 G/17G
17 POWDER, FOR SOLUTION ORAL DAILY
Status: DISCONTINUED | OUTPATIENT
Start: 2022-07-28 | End: 2022-07-29 | Stop reason: HOSPADM

## 2022-07-27 RX ORDER — CEFAZOLIN SODIUM/WATER 2 G/20 ML
2 SYRINGE (ML) INTRAVENOUS SEE ADMIN INSTRUCTIONS
Status: DISCONTINUED | OUTPATIENT
Start: 2022-07-27 | End: 2022-07-27 | Stop reason: HOSPADM

## 2022-07-27 RX ORDER — ONDANSETRON 2 MG/ML
4 INJECTION INTRAMUSCULAR; INTRAVENOUS EVERY 6 HOURS PRN
Status: DISCONTINUED | OUTPATIENT
Start: 2022-07-27 | End: 2022-07-29 | Stop reason: HOSPADM

## 2022-07-27 RX ORDER — MEPERIDINE HYDROCHLORIDE 25 MG/ML
12.5 INJECTION INTRAMUSCULAR; INTRAVENOUS; SUBCUTANEOUS
Status: DISCONTINUED | OUTPATIENT
Start: 2022-07-27 | End: 2022-07-27 | Stop reason: HOSPADM

## 2022-07-27 RX ORDER — ACETAMINOPHEN 325 MG/1
975 TABLET ORAL EVERY 8 HOURS
Status: DISCONTINUED | OUTPATIENT
Start: 2022-07-27 | End: 2022-07-29 | Stop reason: HOSPADM

## 2022-07-27 RX ORDER — METHOCARBAMOL 750 MG/1
750 TABLET, FILM COATED ORAL EVERY 6 HOURS PRN
Status: DISCONTINUED | OUTPATIENT
Start: 2022-07-27 | End: 2022-07-29 | Stop reason: HOSPADM

## 2022-07-27 RX ORDER — ONDANSETRON 2 MG/ML
4 INJECTION INTRAMUSCULAR; INTRAVENOUS EVERY 30 MIN PRN
Status: CANCELLED | OUTPATIENT
Start: 2022-07-27

## 2022-07-27 RX ORDER — DEXMEDETOMIDINE HYDROCHLORIDE 4 UG/ML
INJECTION, SOLUTION INTRAVENOUS PRN
Status: DISCONTINUED | OUTPATIENT
Start: 2022-07-27 | End: 2022-07-27

## 2022-07-27 RX ORDER — ACETAMINOPHEN 325 MG/1
650 TABLET ORAL EVERY 4 HOURS PRN
Status: DISCONTINUED | OUTPATIENT
Start: 2022-07-30 | End: 2022-07-29 | Stop reason: HOSPADM

## 2022-07-27 RX ORDER — AMOXICILLIN 250 MG
1 CAPSULE ORAL 2 TIMES DAILY
Status: DISCONTINUED | OUTPATIENT
Start: 2022-07-27 | End: 2022-07-29 | Stop reason: HOSPADM

## 2022-07-27 RX ORDER — ONDANSETRON 4 MG/1
4 TABLET, ORALLY DISINTEGRATING ORAL EVERY 30 MIN PRN
Status: CANCELLED | OUTPATIENT
Start: 2022-07-27

## 2022-07-27 RX ORDER — LIDOCAINE 40 MG/G
CREAM TOPICAL
Status: DISCONTINUED | OUTPATIENT
Start: 2022-07-27 | End: 2022-07-27 | Stop reason: HOSPADM

## 2022-07-27 RX ORDER — ONDANSETRON 2 MG/ML
4 INJECTION INTRAMUSCULAR; INTRAVENOUS EVERY 30 MIN PRN
Status: DISCONTINUED | OUTPATIENT
Start: 2022-07-27 | End: 2022-07-27 | Stop reason: HOSPADM

## 2022-07-27 RX ORDER — DEXAMETHASONE SODIUM PHOSPHATE 10 MG/ML
INJECTION, SOLUTION INTRAMUSCULAR; INTRAVENOUS
Status: COMPLETED | OUTPATIENT
Start: 2022-07-27 | End: 2022-07-27

## 2022-07-27 RX ORDER — KETOROLAC TROMETHAMINE 30 MG/ML
15 INJECTION, SOLUTION INTRAMUSCULAR; INTRAVENOUS EVERY 6 HOURS
Status: DISCONTINUED | OUTPATIENT
Start: 2022-07-27 | End: 2022-07-27

## 2022-07-27 RX ORDER — FENTANYL CITRATE 50 UG/ML
25 INJECTION, SOLUTION INTRAMUSCULAR; INTRAVENOUS
Status: DISCONTINUED | OUTPATIENT
Start: 2022-07-27 | End: 2022-07-27 | Stop reason: HOSPADM

## 2022-07-27 RX ORDER — AMOXICILLIN 250 MG
1-2 CAPSULE ORAL 2 TIMES DAILY
Qty: 30 TABLET | Refills: 0 | Status: SHIPPED | OUTPATIENT
Start: 2022-07-27 | End: 2023-03-07

## 2022-07-27 RX ORDER — AMLODIPINE BESYLATE 5 MG/1
5 TABLET ORAL EVERY EVENING
Status: DISCONTINUED | OUTPATIENT
Start: 2022-07-27 | End: 2022-07-29 | Stop reason: HOSPADM

## 2022-07-27 RX ORDER — HYDROMORPHONE HYDROCHLORIDE 1 MG/ML
0.2 INJECTION, SOLUTION INTRAMUSCULAR; INTRAVENOUS; SUBCUTANEOUS EVERY 5 MIN PRN
Status: DISCONTINUED | OUTPATIENT
Start: 2022-07-27 | End: 2022-07-27 | Stop reason: CLARIF

## 2022-07-27 RX ORDER — ACETAMINOPHEN 325 MG/1
975 TABLET ORAL ONCE
Status: DISCONTINUED | OUTPATIENT
Start: 2022-07-27 | End: 2022-07-27 | Stop reason: HOSPADM

## 2022-07-27 RX ORDER — HYDROMORPHONE HYDROCHLORIDE 1 MG/ML
0.2 INJECTION, SOLUTION INTRAMUSCULAR; INTRAVENOUS; SUBCUTANEOUS
Status: DISCONTINUED | OUTPATIENT
Start: 2022-07-27 | End: 2022-07-29 | Stop reason: HOSPADM

## 2022-07-27 RX ORDER — OXYCODONE HYDROCHLORIDE 5 MG/1
5 TABLET ORAL EVERY 4 HOURS PRN
Status: DISCONTINUED | OUTPATIENT
Start: 2022-07-27 | End: 2022-07-28

## 2022-07-27 RX ORDER — FLUMAZENIL 0.1 MG/ML
0.2 INJECTION, SOLUTION INTRAVENOUS
Status: DISCONTINUED | OUTPATIENT
Start: 2022-07-27 | End: 2022-07-27 | Stop reason: HOSPADM

## 2022-07-27 RX ORDER — PROCHLORPERAZINE MALEATE 10 MG
10 TABLET ORAL EVERY 6 HOURS PRN
Status: DISCONTINUED | OUTPATIENT
Start: 2022-07-27 | End: 2022-07-29 | Stop reason: HOSPADM

## 2022-07-27 RX ORDER — ACETAMINOPHEN 325 MG/1
650 TABLET ORAL EVERY 4 HOURS PRN
Qty: 100 TABLET | Refills: 0 | Status: SHIPPED | OUTPATIENT
Start: 2022-07-27

## 2022-07-27 RX ORDER — DEXMEDETOMIDINE HYDROCHLORIDE 4 UG/ML
INJECTION, SOLUTION INTRAVENOUS
Status: COMPLETED | OUTPATIENT
Start: 2022-07-27 | End: 2022-07-27

## 2022-07-27 RX ORDER — NICOTINE POLACRILEX 4 MG
15-30 LOZENGE BUCCAL
Status: DISCONTINUED | OUTPATIENT
Start: 2022-07-27 | End: 2022-07-29 | Stop reason: HOSPADM

## 2022-07-27 RX ORDER — FENTANYL CITRATE 50 UG/ML
25-50 INJECTION, SOLUTION INTRAMUSCULAR; INTRAVENOUS
Status: DISCONTINUED | OUTPATIENT
Start: 2022-07-27 | End: 2022-07-27 | Stop reason: HOSPADM

## 2022-07-27 RX ADMIN — OXYCODONE HYDROCHLORIDE 10 MG: 10 TABLET ORAL at 22:30

## 2022-07-27 RX ADMIN — AMLODIPINE BESYLATE 5 MG: 5 TABLET ORAL at 21:07

## 2022-07-27 RX ADMIN — SODIUM CHLORIDE, POTASSIUM CHLORIDE, SODIUM LACTATE AND CALCIUM CHLORIDE: 600; 310; 30; 20 INJECTION, SOLUTION INTRAVENOUS at 14:33

## 2022-07-27 RX ADMIN — FAMOTIDINE 20 MG: 20 TABLET ORAL at 21:07

## 2022-07-27 RX ADMIN — DEXAMETHASONE SODIUM PHOSPHATE 2 MG: 10 INJECTION, SOLUTION INTRAMUSCULAR; INTRAVENOUS at 14:20

## 2022-07-27 RX ADMIN — Medication 2 G: at 14:52

## 2022-07-27 RX ADMIN — SODIUM CHLORIDE, POTASSIUM CHLORIDE, SODIUM LACTATE AND CALCIUM CHLORIDE: 600; 310; 30; 20 INJECTION, SOLUTION INTRAVENOUS at 19:07

## 2022-07-27 RX ADMIN — LABETALOL HYDROCHLORIDE 300 MG: 300 TABLET, FILM COATED ORAL at 21:18

## 2022-07-27 RX ADMIN — CEFAZOLIN SODIUM 2 G: 2 INJECTION, SOLUTION INTRAVENOUS at 22:33

## 2022-07-27 RX ADMIN — MIDAZOLAM HYDROCHLORIDE 1 MG: 1 INJECTION, SOLUTION INTRAMUSCULAR; INTRAVENOUS at 14:16

## 2022-07-27 RX ADMIN — MIDAZOLAM 1 MG: 1 INJECTION, SOLUTION INTRAMUSCULAR; INTRAVENOUS at 16:27

## 2022-07-27 RX ADMIN — FENTANYL CITRATE 50 MCG: 50 INJECTION, SOLUTION INTRAMUSCULAR; INTRAVENOUS at 14:16

## 2022-07-27 RX ADMIN — DEXMEDETOMIDINE HYDROCHLORIDE 10 MCG: 4 INJECTION, SOLUTION INTRAVENOUS at 17:18

## 2022-07-27 RX ADMIN — SODIUM CHLORIDE, POTASSIUM CHLORIDE, SODIUM LACTATE AND CALCIUM CHLORIDE: 600; 310; 30; 20 INJECTION, SOLUTION INTRAVENOUS at 16:43

## 2022-07-27 RX ADMIN — DEXMEDETOMIDINE HYDROCHLORIDE 10 MCG: 4 INJECTION, SOLUTION INTRAVENOUS at 16:44

## 2022-07-27 RX ADMIN — BUPIVACAINE HYDROCHLORIDE 20 ML: 2.5 INJECTION, SOLUTION EPIDURAL; INFILTRATION; INTRACAUDAL at 14:20

## 2022-07-27 RX ADMIN — DEXMEDETOMIDINE 20 MCG: 100 INJECTION, SOLUTION, CONCENTRATE INTRAVENOUS at 14:20

## 2022-07-27 RX ADMIN — PHENYLEPHRINE HYDROCHLORIDE 0.1 MCG/KG/MIN: 10 INJECTION INTRAVENOUS at 14:52

## 2022-07-27 RX ADMIN — ONDANSETRON 4 MG: 2 INJECTION INTRAMUSCULAR; INTRAVENOUS at 17:20

## 2022-07-27 RX ADMIN — OXYCODONE HYDROCHLORIDE 5 MG: 5 TABLET ORAL at 18:12

## 2022-07-27 RX ADMIN — BUPIVACAINE HYDROCHLORIDE IN DEXTROSE 1.6 ML: 7.5 INJECTION, SOLUTION SUBARACHNOID at 14:40

## 2022-07-27 RX ADMIN — ACETAMINOPHEN 975 MG: 325 TABLET, FILM COATED ORAL at 18:13

## 2022-07-27 RX ADMIN — HYDROXYZINE HYDROCHLORIDE 25 MG: 25 TABLET ORAL at 18:13

## 2022-07-27 RX ADMIN — TRANEXAMIC ACID 1950 MG: 650 TABLET ORAL at 12:48

## 2022-07-27 RX ADMIN — HYDROMORPHONE HYDROCHLORIDE 0.2 MG: 1 INJECTION, SOLUTION INTRAMUSCULAR; INTRAVENOUS; SUBCUTANEOUS at 21:08

## 2022-07-27 RX ADMIN — Medication 0.2 MCG/KG/MIN: at 15:45

## 2022-07-27 RX ADMIN — METHOCARBAMOL 750 MG: 750 TABLET ORAL at 22:30

## 2022-07-27 RX ADMIN — MIDAZOLAM 1 MG: 1 INJECTION, SOLUTION INTRAMUSCULAR; INTRAVENOUS at 16:15

## 2022-07-27 RX ADMIN — PROPOFOL 100 MCG/KG/MIN: 10 INJECTION, EMULSION INTRAVENOUS at 14:45

## 2022-07-27 NOTE — ANESTHESIA PROCEDURE NOTES
Intrathecal injection Procedure Note    Pre-Procedure   Staff -        Anesthesiologist:  Bonilla Doyle DO       Resident/Fellow: Abbey Mason MD       Other Anesthesia Staff: Roxanna Shepherd MD       Performed By: resident       Location: OR       Procedure Start/Stop Times: 7/27/2022 2:40 PM and 7/27/2022 2:45 PM       Pre-Anesthestic Checklist: patient identified, IV checked, risks and benefits discussed, informed consent, monitors and equipment checked, pre-op evaluation, at physician/surgeon's request and post-op pain management  Timeout:       Correct Patient: Yes        Correct Procedure: Yes        Correct Site: Yes        Correct Position: Yes   Procedure Documentation  Procedure: intrathecal injection       Patient Position: sitting       Skin prep: Chloraprep       Insertion Site: L3-4. (midline approach).       Needle Gauge: 25.        Needle Length (Inches): 3.5        Spinal Needle Type: Pencan       Introducer used       Introducer: 20 G       # of attempts: 1 and  # of redirects:  1    Assessment/Narrative         Paresthesias: No.       CSF fluid: clear.       Opening pressure was cmH2O while  Sitting.      Medication(s) Administered   0.75% Hyperbaric Bupivacaine (Intrathecal) - Intrathecal   1.6 mL - 7/27/2022 2:40:00 PM  Medication Administration Time: 7/27/2022 2:40 PM

## 2022-07-27 NOTE — ANESTHESIA POSTPROCEDURE EVALUATION
Patient: Adelaida Packer    Procedure: Procedure(s):  ARTHROPLASTY, LEFT  KNEE, TOTAL       Anesthesia Type:  Spinal, MAC    Note:  Disposition: Admission   Postop Pain Control: Uneventful            Sign Out: Well controlled pain   PONV: No   Neuro/Psych: Uneventful            Sign Out: Acceptable/Baseline neuro status   Airway/Respiratory: Uneventful            Sign Out: Acceptable/Baseline resp. status   CV/Hemodynamics: Uneventful            Sign Out: Acceptable CV status   Other NRE: NONE   DID A NON-ROUTINE EVENT OCCUR? No           Last vitals:  Vitals Value Taken Time   /67 07/27/22 1800   Temp 37  C (98.6  F) 07/27/22 1741   Pulse 66 07/27/22 1805   Resp 18 07/27/22 1805   SpO2 98 % 07/27/22 1805   Vitals shown include unvalidated device data.    Electronically Signed By: Ramon Landry DO  July 27, 2022  6:06 PM

## 2022-07-27 NOTE — BRIEF OP NOTE
[facility]    Brief Operative Note    Pre-operative diagnosis: Primary localized osteoarthritis of left knee [M17.12]  Post-operative diagnosis Same as pre-operative diagnosis    Procedure: Procedure(s):  ARTHROPLASTY, LEFT  KNEE, TOTAL  Surgeon: Surgeon(s) and Role:     * Dylan Hernandez MD - Primary     * Marcus Gomez MD - Resident - Assisting  Anesthesia: Spinal   Estimated Blood Loss: 300 mL from 7/27/2022  2:29 PM to 7/27/2022  5:39 PM      Drains: None  Specimens: * No specimens in log *  Findings:   See full dictated operative note .  Complications: None.  Implants:   Implant Name Type Inv. Item Serial No.  Lot No. LRB No. Used Action   BONE CEMENT SIMPLEX W/TOBRAMYCIN 6197-9-001 - [id number] Cement, Bone BONE CEMENT SIMPLEX W/TOBRAMYCIN 6197-9-001  [employer] [id number] Left 1 Implanted   BONE CEMENT SIMPLEX W/TOBRAMYCIN 6197-9-001 - [id number] Cement, Bone BONE CEMENT SIMPLEX W/TOBRAMYCIN 6197-9-001  [employer] [id number] Left 1 Implanted   IMP COMP FEMORAL SNN GEN II CR SZ 4 LT [id number] - [id number] Total Joint Component/Insert IMP COMP FEMORAL SNN GEN II CR SZ 4 LT [id number]  [employer] [id number] Left 1 Implanted   IMP BASEPLATE TIBIAL ISATU II SZ 3 LT TI [id number] - [id number] Total Joint Component/Insert IMP BASEPLATE TIBIAL ISATU II SZ 3 LT TI [id number]  [employer] [id number] Left 1 Implanted   IMP INSERT ARTICULAR S&N LGN CR XLPE SZ3-4 9MM [id number] - [id number] Total Joint Component/Insert IMP INSERT ARTICULAR S&N LGN CR XLPE SZ3-4 9MM [id number]  [employer] [id number] Left 1 Implanted         Ortho Primary  - Activity: Up with assist until independent. Knee ROMAT  - Weightbearing Status: WBAT.  - Pain Management: Transition from IV to PO as tolerated.   - Antibiotics: Ancef 2 g Q8H for 24 hours   - Diet: Begin with clear fluids and progress diet as tolerated.   - DVT Prophylaxis: Mechanical and   mg daily to start on POD1. Discharge with same dose x 4 weeks  - Imaging: XR of Left Knee in PACU  - Labs: Hgb and BMP in the am.   - Bracing/Splinting: None.  - Dressings: Keep Tegaderm C/D/I x 7 days  - Drains: None   - Angela catheter: None    - Physical Therapy/Occupational Therapy: Evaluation and treatment.  - Cultures: None   - Consults: Hospitalist, PT, OT.  - Follow-up: Clinic in 2 weeks for a wound check and with Dr. Hernandez in 6 weeks with repeat radiographs.  - Disposition: Pending progress with therapies, pain control on orals, and medical stability    Marcus Gomez MD  Orthopedic Surgery PGY4  [phone]    Please page me directly with any questions/concerns during regular weekday hours before 5 pm. If there is no response, if it is a weekend, or if it is during evening hours then please page the orthopedic surgery resident on call.

## 2022-07-27 NOTE — ANESTHESIA PROCEDURE NOTES
Adductor canal Procedure Note    Pre-Procedure   Staff -        Anesthesiologist:  Dagoberto Garcia MD       Resident/Fellow: Roxanna Shepherd MD       Performed By: resident       Location: pre-op       Procedure Start/Stop Times: 7/27/2022 2:20 PM and 7/27/2022 2:30 PM       Pre-Anesthestic Checklist: patient identified, IV checked, site marked, risks and benefits discussed, informed consent, monitors and equipment checked, pre-op evaluation, at physician/surgeon's request and post-op pain management  Timeout:       Correct Patient: Yes        Correct Procedure: Yes        Correct Site: Yes        Correct Position: Yes        Correct Laterality: Yes        Site Marked: Yes  Procedure Documentation  Procedure: Adductor canal       Diagnosis: PERIOPERATIVE PAIN       Laterality: left       Patient Position: supine       Patient Prep/Sterile Barriers: sterile gloves, mask       Skin prep: Chloraprep       Needle Type: short bevel       Needle Gauge: 21.        Needle Length (millimeters): 110        Ultrasound guided       1. Ultrasound was used to identify targeted nerve, plexus, vascular marker, or fascial plane and place a needle adjacent to it in real-time.       2. Ultrasound was used to visualize the spread of anesthetic in close proximity to the above referenced structure.    Assessment/Narrative         The placement was negative for: blood aspirated and painful injection       Paresthesias: No.       Bolus given via needle..        Secured via.        Insertion/Infusion Method: Single Shot       Complications: none    Medication(s) Administered   Bupivacaine 0.25% PF (Infiltration) - Infiltration   20 mL - 7/27/2022 2:20:00 PM  Dexmedetomidine 4 mcg/mL (Perineural) - Perineural   20 mcg - 7/27/2022 2:20:00 PM  Dexamethasone 10 mg/mL PF (Perineural) - Perineural   2 mg - 7/27/2022 2:20:00 PM  Medication Administration Time: 7/27/2022 2:20 PM

## 2022-07-27 NOTE — OR NURSING
PACU to Inpatient Nursing Handoff    Patient Adelaida Packer is a 52 year old female who speaks English.   Procedure Procedure(s):  ARTHROPLASTY, LEFT  KNEE, TOTAL   Surgeon(s) Primary: Dylan Hernandez MD  Resident - Assisting: Marcus Gomez MD     Allergies   Allergen Reactions     Sulfa Drugs Shortness Of Breath and Rash     Hydrochlorothiazide W/Triamterene Other (See Comments)     Renal insuff     Maxzide [Hydrochlorothiazide W/Triamterene] Other (See Comments)     Renal insuff     Metoprolol Other (See Comments)     Other reaction(s): Bradycardia  At high dose only  At high dose only     Seasonal Allergies      Hayfever, tree pollens       Isolation  [unfilled]     Past Medical History   has a past medical history of Allergic rhinitis due to other allergen, Arthritis, Diabetes (H), Localized osteoarthritis of both knees, Migraine, unspecified, without mention of intractable migraine without mention of status migrainosus, Monoclonal gammopathy, Morbid obesity (H), Type 2 diabetes mellitus with stage 3a chronic kidney disease, without long-term current use of insulin (H) (8/9/2016), and Unspecified essential hypertension.    Anesthesia Spinal   Dermatome Level     Preop Meds TXA 1950mg - time given: 1248   Nerve block Adductor canal.  Location:left. Med:bupivacaine. Time given: 1420   Intraop Meds propofol (DIPRIVAN) injection 10 mg/mL vial (mcg/kg/min)  Dosing weight:  114.3    Date/Time Rate/Dose/Volume Action Route Admin User    07/27/22 1445 100 mcg/kg/min - 68.58 mL/hr New Bag Intravenous Roxanna Shepherd MD    1500 75 mcg/kg/min - 51.435 mL/hr Rate Change Intravenous Laura Barajas APRN CRNA    1515 50 mcg/kg/min - 34.29 mL/hr Rate Change Intravenous Laura Barajas APRN CRNA    1634 75 mcg/kg/min - 51.435 mL/hr Rate Change Intravenous Vince Angela APRN CRNA    1736  Stopped Intravenous Vince Angela APRN CRNA        Bupivacaine 0.25% PF (Infiltration) (mL)    Date/Time Rate/Dose/Volume Action  Route Admin User    07/27/22 1420 20 mL Given Infiltration Roxanna Shepherd MD      Dexmedetomidine 4 mcg/mL (Perineural) (mcg)    Date/Time Rate/Dose/Volume Action Route Admin User    07/27/22 1420 20 mcg Given Perineural Roxanna Shepherd MD      dexmedetomidine (PRECEDEX) in NS syringe (4 mcg/mL) (mcg)    Date/Time Rate/Dose/Volume Action Route Admin User    07/27/22 1644 10 mcg Given Intravenous Vince Angela APRN CRNA    1718 10 mcg Given Intravenous Vince Angela APRN CRNA      Dexamethasone 10 mg/mL PF (Perineural) (mg)    Date/Time Rate/Dose/Volume Action Route Admin User    07/27/22 1420 2 mg Given Perineural Roxanna Shepherd MD      0.75% Hyperbaric Bupivacaine (Intrathecal) (mL)    Date/Time Rate/Dose/Volume Action Route Admin User    07/27/22 1440 1.6 mL Given Intrathecal Roxanna Shepherd MD      ceFAZolin Sodium (ANCEF) injection 2 g (g)  Dosing weight:  111.6    Date/Time Rate/Dose/Volume Action Route Admin User    07/27/22 1452 2 g Given Intravenous Roxanna Shepherd MD      phenylephrine 0.1 mg/mL infusion (mcg/kg/min) (mcg/kg/min)  Dosing weight:  114.3    Date/Time Rate/Dose/Volume Action Route Admin User    07/27/22 1452 0.1 mcg/kg/min - 6.858 mL/hr New Bag Intravenous Laura Barajas APRN CRNA    1524 0.2 mcg/kg/min - 13.716 mL/hr Rate Change Intravenous Laura Barajas APRN CRNA    1545  Stopped Intravenous Laura Barajas APRN CRNA      phenylephrine (MARGOTH-SYNEPHRINE) 50 mg in NaCl 0.9 % 250 mL infusion PERIPHERAL (mcg/kg/min)  Dosing weight:  114.3    Date/Time Rate/Dose/Volume Action Route Admin User    07/27/22 1545 0.2 mcg/kg/min - 6.858 mL/hr New Bag Intravenous Laura Barajas APRN CRNA    1622  Stopped Intravenous Laura Barajas APRN CRNA    1641 0.2 mcg/kg/min - 6.858 mL/hr Restarted Intravenous Laura Barajas APRN CRNA    1647 0.1 mcg/kg/min - 3.429 mL/hr Rate Change Intravenous Vince Angela APRN CRNA    1702  Stopped Intravenous Vince Angela APRN CRNA      midazolam 1 mg/mL  (mg)    Date/Time Rate/Dose/Volume Action Route Admin User    07/27/22 1615 1 mg Given Intravenous Laura Barajas APRN CRNA    1627 1 mg Given Intravenous Laura Barajas APRN CRNA      ondansetron (ZOFRAN) injection 4 mg (mg)  Dosing weight:  114.3    Date/Time Rate/Dose/Volume Action Route Admin User    07/27/22 1720 4 mg Given Intravenous Vince Angela APRN CRNA      LR (mL)    Date/Time Rate/Dose/Volume Action Route Admin User    07/27/22 1433  New Bag Intravenous Roxanna Shepherd MD    1530 500 mL Anesthesia Volume Adjustment Intravenous Laura Barajas APRN CRNA    1630 300 mL Anesthesia Volume Adjustment Intravenous Laura Barajas APRN CRNA    1643 200 mL New Bag Intravenous Vince Angela APRN CRNA         Local Meds Yes - Local Cocktail (morphine, ropivacaine, epinephrine, Toradol)   Antibiotics cefazolin (Ancef) - last given at 1452     Pain Patient Currently in Pain: denies   PACU meds  acetaminophen (Tylenol): 975 mg (total dose) last given at 1812   hydroxizine (Vistaril/Atarax): 25 mg (total dose) last given at 1812   oxycodone (Roxicodone): 5 mg (total dose) last given at 1812    PCA / epidural No   Capnography     Telemetry ECG Rhythm: Normal sinus rhythm   Inpatient Telemetry Monitor Ordered? No        Labs Glucose Lab Results   Component Value Date     07/27/2022     06/03/2022    GLC 96 03/29/2021       Hgb Lab Results   Component Value Date    HGB 12.1 06/03/2022    HGB 12.5 11/30/2020       INR Lab Results   Component Value Date    INR 1.09 01/18/2016      PACU Imaging Not completed - X-ray coming to PACU prior to discharge to floor     Wound/Incision Incision/Surgical Site 08/11/21 Right Knee (Active)   Number of days: 350       Incision/Surgical Site 07/27/22 Left Knee (Active)   Incision Assessment UTV 07/27/22 1741   Closure ANNE 07/27/22 1741   Dressing Intervention Clean, dry, intact 07/27/22 1741   Number of days: 0      CMS        Equipment ice pack   Other  LDA       IV Access Peripheral IV 07/27/22 Right Hand (Active)   Site Assessment WDL 07/27/22 1220   Line Status Saline locked 07/27/22 1220   Phlebitis Scale 0-->no symptoms 07/27/22 1220   Infiltration Scale 0 07/27/22 1220   Number of days: 0      Blood Products Not applicable  mL   Intake/Output Date 07/27/22 0700 - 07/28/22 0659   Shift 5700-1096 2372-4880 6481-6234 24 Hour Total   INTAKE   I.V.  1000  1000   Shift Total(mL/kg)  1000(8.75)  1000(8.75)   OUTPUT   Blood  300  300   Shift Total(mL/kg)  300(2.62)  300(2.62)   Weight (kg) 114.3 114.3 114.3 114.3      Drains / Angela     Time of void PreOp Void Prior to Procedure: 1200 (07/27/22 1210)    PostOp      Diapered? No   Bladder Scan     PO    tolerating sips, crackers and water     Vitals    B/P: 98/48  T: 98.6  F (37  C)    Temp src: Axillary  P:  Pulse: 68 (07/27/22 1741)          R: 17  O2:  SpO2: 100 %    O2 Device: None (Room air) (07/27/22 1132)    Oxygen Delivery: 5 LPM (07/27/22 1741)         Family/support present son   Patient belongings     Patient transported on bed   DC meds/scripts (obs/outpt) Not applicable   Inpatient Pain Meds Released? Yes       Special needs/considerations None   Tasks needing completion x-ray to be completed in PACU       Richard Mac RN  [facility] [id number]

## 2022-07-27 NOTE — ANESTHESIA CARE TRANSFER NOTE
Patient: Adelaida Packer    Procedure: Procedure(s):  ARTHROPLASTY, LEFT  KNEE, TOTAL       Diagnosis: Primary localized osteoarthritis of left knee [M17.12]  Diagnosis Additional Information: No value filed.    Anesthesia Type:   Spinal, MAC     Note:      Level of Consciousness: drowsy  Oxygen Supplementation: face mask  Level of Supplemental Oxygen (L/min / FiO2): 6  Independent Airway: airway patency satisfactory and stable  Dentition: dentition unchanged  Vital Signs Stable: post-procedure vital signs reviewed and stable  Report to RN Given: handoff report given  Patient transferred to: PACU    Handoff Report: Identifed the Patient, Identified the Reponsible Provider, Reviewed the pertinent medical history, Discussed the surgical course, Reviewed Intra-OP anesthesia mangement and issues during anesthesia, Set expectations for post-procedure period and Allowed opportunity for questions and acknowledgement of understanding      Vitals:  Vitals Value Taken Time   BP     Temp     Pulse     Resp     SpO2         Electronically Signed By: PEREZ Horner CRNA  July 27, 2022  5:46 PM

## 2022-07-28 ENCOUNTER — APPOINTMENT (OUTPATIENT)
Dept: PHYSICAL THERAPY | Facility: CLINIC | Age: 52
End: 2022-07-28
Attending: ORTHOPAEDIC SURGERY
Payer: COMMERCIAL

## 2022-07-28 ENCOUNTER — APPOINTMENT (OUTPATIENT)
Dept: OCCUPATIONAL THERAPY | Facility: CLINIC | Age: 52
End: 2022-07-28
Attending: ORTHOPAEDIC SURGERY
Payer: COMMERCIAL

## 2022-07-28 LAB
ANION GAP SERPL CALCULATED.3IONS-SCNC: 3 MMOL/L (ref 3–14)
BUN SERPL-MCNC: 22 MG/DL (ref 7–30)
CALCIUM SERPL-MCNC: 9 MG/DL (ref 8.5–10.1)
CHLORIDE BLD-SCNC: 109 MMOL/L (ref 94–109)
CO2 SERPL-SCNC: 26 MMOL/L (ref 20–32)
CREAT SERPL-MCNC: 1 MG/DL (ref 0.52–1.04)
FASTING STATUS PATIENT QL REPORTED: YES
GFR SERPL CREATININE-BSD FRML MDRD: 67 ML/MIN/1.73M2
GLUCOSE BLD-MCNC: 212 MG/DL (ref 70–99)
GLUCOSE BLD-MCNC: 212 MG/DL (ref 70–99)
GLUCOSE BLDC GLUCOMTR-MCNC: 147 MG/DL (ref 70–99)
GLUCOSE BLDC GLUCOMTR-MCNC: 169 MG/DL (ref 70–99)
GLUCOSE BLDC GLUCOMTR-MCNC: 173 MG/DL (ref 70–99)
HGB BLD-MCNC: 9.9 G/DL (ref 11.7–15.7)
POTASSIUM BLD-SCNC: 4.9 MMOL/L (ref 3.4–5.3)
SODIUM SERPL-SCNC: 138 MMOL/L (ref 133–144)

## 2022-07-28 PROCEDURE — 97116 GAIT TRAINING THERAPY: CPT | Mod: GP | Performed by: PHYSICAL THERAPIST

## 2022-07-28 PROCEDURE — 250N000013 HC RX MED GY IP 250 OP 250 PS 637: Performed by: ORTHOPAEDIC SURGERY

## 2022-07-28 PROCEDURE — 250N000013 HC RX MED GY IP 250 OP 250 PS 637: Performed by: CLINICAL NURSE SPECIALIST

## 2022-07-28 PROCEDURE — 82962 GLUCOSE BLOOD TEST: CPT

## 2022-07-28 PROCEDURE — 85018 HEMOGLOBIN: CPT | Performed by: STUDENT IN AN ORGANIZED HEALTH CARE EDUCATION/TRAINING PROGRAM

## 2022-07-28 PROCEDURE — 36415 COLL VENOUS BLD VENIPUNCTURE: CPT | Performed by: STUDENT IN AN ORGANIZED HEALTH CARE EDUCATION/TRAINING PROGRAM

## 2022-07-28 PROCEDURE — 97161 PT EVAL LOW COMPLEX 20 MIN: CPT | Mod: GP

## 2022-07-28 PROCEDURE — 97535 SELF CARE MNGMENT TRAINING: CPT | Mod: GO

## 2022-07-28 PROCEDURE — 97530 THERAPEUTIC ACTIVITIES: CPT | Mod: GP | Performed by: PHYSICAL THERAPIST

## 2022-07-28 PROCEDURE — 97110 THERAPEUTIC EXERCISES: CPT | Mod: GP

## 2022-07-28 PROCEDURE — 250N000013 HC RX MED GY IP 250 OP 250 PS 637: Performed by: STUDENT IN AN ORGANIZED HEALTH CARE EDUCATION/TRAINING PROGRAM

## 2022-07-28 PROCEDURE — 97165 OT EVAL LOW COMPLEX 30 MIN: CPT | Mod: GO

## 2022-07-28 PROCEDURE — 99214 OFFICE O/P EST MOD 30 MIN: CPT | Performed by: INTERNAL MEDICINE

## 2022-07-28 PROCEDURE — 80048 BASIC METABOLIC PNL TOTAL CA: CPT | Performed by: STUDENT IN AN ORGANIZED HEALTH CARE EDUCATION/TRAINING PROGRAM

## 2022-07-28 PROCEDURE — 97116 GAIT TRAINING THERAPY: CPT | Mod: GP

## 2022-07-28 PROCEDURE — 250N000013 HC RX MED GY IP 250 OP 250 PS 637: Performed by: INTERNAL MEDICINE

## 2022-07-28 PROCEDURE — 250N000011 HC RX IP 250 OP 636: Performed by: STUDENT IN AN ORGANIZED HEALTH CARE EDUCATION/TRAINING PROGRAM

## 2022-07-28 RX ORDER — CEPHALEXIN 500 MG/1
500 CAPSULE ORAL 4 TIMES DAILY
Qty: 56 CAPSULE | Refills: 0 | Status: SHIPPED | OUTPATIENT
Start: 2022-07-28 | End: 2022-08-11

## 2022-07-28 RX ORDER — OXYCODONE HYDROCHLORIDE 5 MG/1
5-10 TABLET ORAL EVERY 4 HOURS PRN
Qty: 40 TABLET | Refills: 0 | Status: SHIPPED | OUTPATIENT
Start: 2022-07-28 | End: 2022-07-29

## 2022-07-28 RX ORDER — HYDROXYZINE HYDROCHLORIDE 25 MG/1
25 TABLET, FILM COATED ORAL EVERY 4 HOURS PRN
Status: DISCONTINUED | OUTPATIENT
Start: 2022-07-28 | End: 2022-07-29 | Stop reason: HOSPADM

## 2022-07-28 RX ORDER — METHOCARBAMOL 750 MG/1
750 TABLET, FILM COATED ORAL EVERY 6 HOURS PRN
Qty: 40 TABLET | Refills: 0 | Status: SHIPPED | OUTPATIENT
Start: 2022-07-28 | End: 2022-08-09

## 2022-07-28 RX ORDER — HYDROMORPHONE HYDROCHLORIDE 4 MG/1
4 TABLET ORAL
Status: DISCONTINUED | OUTPATIENT
Start: 2022-07-28 | End: 2022-07-28

## 2022-07-28 RX ORDER — EPLERENONE 25 MG/1
100 TABLET, FILM COATED ORAL 2 TIMES DAILY
Status: DISCONTINUED | OUTPATIENT
Start: 2022-07-28 | End: 2022-07-29 | Stop reason: HOSPADM

## 2022-07-28 RX ORDER — HYDROMORPHONE HYDROCHLORIDE 4 MG/1
4 TABLET ORAL
Status: DISCONTINUED | OUTPATIENT
Start: 2022-07-28 | End: 2022-07-29 | Stop reason: HOSPADM

## 2022-07-28 RX ORDER — ACETAMINOPHEN 325 MG/1
650 TABLET ORAL EVERY 4 HOURS PRN
Qty: 60 TABLET | Refills: 0 | Status: SHIPPED | OUTPATIENT
Start: 2022-07-30 | End: 2022-08-11

## 2022-07-28 RX ORDER — HYDROMORPHONE HYDROCHLORIDE 2 MG/1
2 TABLET ORAL
Status: DISCONTINUED | OUTPATIENT
Start: 2022-07-28 | End: 2022-07-29 | Stop reason: HOSPADM

## 2022-07-28 RX ORDER — HYDROMORPHONE HYDROCHLORIDE 2 MG/1
2 TABLET ORAL
Status: DISCONTINUED | OUTPATIENT
Start: 2022-07-28 | End: 2022-07-28

## 2022-07-28 RX ADMIN — ASPIRIN 325 MG: 325 TABLET ORAL at 08:19

## 2022-07-28 RX ADMIN — ACETAMINOPHEN 975 MG: 325 TABLET, FILM COATED ORAL at 02:39

## 2022-07-28 RX ADMIN — OXYCODONE HYDROCHLORIDE 10 MG: 10 TABLET ORAL at 10:06

## 2022-07-28 RX ADMIN — METHOCARBAMOL 750 MG: 750 TABLET ORAL at 19:07

## 2022-07-28 RX ADMIN — HYDROMORPHONE HYDROCHLORIDE 4 MG: 4 TABLET ORAL at 19:07

## 2022-07-28 RX ADMIN — LABETALOL HYDROCHLORIDE 300 MG: 300 TABLET, FILM COATED ORAL at 20:41

## 2022-07-28 RX ADMIN — EPLERENONE 100 MG: 25 TABLET, FILM COATED ORAL at 12:44

## 2022-07-28 RX ADMIN — LABETALOL HYDROCHLORIDE 300 MG: 300 TABLET, FILM COATED ORAL at 08:19

## 2022-07-28 RX ADMIN — HYDROXYZINE HYDROCHLORIDE 25 MG: 25 TABLET ORAL at 15:34

## 2022-07-28 RX ADMIN — SENNOSIDES AND DOCUSATE SODIUM 1 TABLET: 50; 8.6 TABLET ORAL at 20:41

## 2022-07-28 RX ADMIN — FAMOTIDINE 20 MG: 20 TABLET ORAL at 08:19

## 2022-07-28 RX ADMIN — HYDROMORPHONE HYDROCHLORIDE 0.4 MG: 1 INJECTION, SOLUTION INTRAMUSCULAR; INTRAVENOUS; SUBCUTANEOUS at 00:01

## 2022-07-28 RX ADMIN — POLYETHYLENE GLYCOL 3350 17 G: 17 POWDER, FOR SOLUTION ORAL at 08:19

## 2022-07-28 RX ADMIN — FAMOTIDINE 20 MG: 20 TABLET ORAL at 20:41

## 2022-07-28 RX ADMIN — HYDROMORPHONE HYDROCHLORIDE 2 MG: 2 TABLET ORAL at 17:13

## 2022-07-28 RX ADMIN — HYDROXYZINE HYDROCHLORIDE 25 MG: 25 TABLET ORAL at 00:06

## 2022-07-28 RX ADMIN — HYDROXYZINE HYDROCHLORIDE 25 MG: 25 TABLET ORAL at 20:41

## 2022-07-28 RX ADMIN — HYDROMORPHONE HYDROCHLORIDE 2 MG: 2 TABLET ORAL at 12:43

## 2022-07-28 RX ADMIN — HYDROXYZINE HYDROCHLORIDE 25 MG: 25 TABLET ORAL at 06:33

## 2022-07-28 RX ADMIN — ACETAMINOPHEN 975 MG: 325 TABLET, FILM COATED ORAL at 10:05

## 2022-07-28 RX ADMIN — HYDROMORPHONE HYDROCHLORIDE 4 MG: 4 TABLET ORAL at 23:11

## 2022-07-28 RX ADMIN — METHOCARBAMOL 750 MG: 750 TABLET ORAL at 05:48

## 2022-07-28 RX ADMIN — HYDROMORPHONE HYDROCHLORIDE 2 MG: 2 TABLET ORAL at 15:34

## 2022-07-28 RX ADMIN — METHOCARBAMOL 750 MG: 750 TABLET ORAL at 12:43

## 2022-07-28 RX ADMIN — HYDROXYZINE HYDROCHLORIDE 25 MG: 25 TABLET ORAL at 11:20

## 2022-07-28 RX ADMIN — OXYCODONE HYDROCHLORIDE 10 MG: 10 TABLET ORAL at 02:39

## 2022-07-28 RX ADMIN — CEFAZOLIN SODIUM 2 G: 2 INJECTION, SOLUTION INTRAVENOUS at 05:47

## 2022-07-28 RX ADMIN — ACETAMINOPHEN 975 MG: 325 TABLET, FILM COATED ORAL at 19:07

## 2022-07-28 RX ADMIN — SENNOSIDES AND DOCUSATE SODIUM 1 TABLET: 50; 8.6 TABLET ORAL at 08:19

## 2022-07-28 RX ADMIN — OXYCODONE HYDROCHLORIDE 10 MG: 10 TABLET ORAL at 06:33

## 2022-07-28 RX ADMIN — AMLODIPINE BESYLATE 5 MG: 5 TABLET ORAL at 20:41

## 2022-07-28 RX ADMIN — EPLERENONE 100 MG: 25 TABLET, FILM COATED ORAL at 21:28

## 2022-07-28 NOTE — PLAN OF CARE
Goal Outcome Evaluation:    [id number]        VS: VSS and afebrile.  Capno: stable   O2: Sats >90% in RA.  Denies SOB and chest pain   Output: Voids spontaneously without difficulty. PVR: 0   Last BM: 7/26 ( prior surgery) and passing gas   Activity: WBAT-up to BR with A1, GB and walker   Skin: CDI except incision and old scar ( R knee)    Pain: Manageable with Oxycodone, Hydroxyzine,  IV dilaudid, Robaxin, ice pack and schedule Tylenol.    Neuro: Alert and oriented x4.  Numbness in L leg d/t anesthesia-improving overnight   Dressing: CDI   Diet: Regular with thin liquids   LDA: PIV L hand-infusing    Equipment: PCD, IV pump, pole, walker, cellphone and personal belongings at bedside   Plan: Hoping to discharge today after therapy.    Additional Info:

## 2022-07-28 NOTE — PROGRESS NOTES
[facility] - Internal Medicine Daily Note   Date of Service: 7/28/2022  Patient: Adelaida Packer  MRN: [id number]  Admission Date: 7/27/2022  Hospital Day # 0    Assessment & Plan      __________________Adelaida Packer is a 52 right hip female patient with history of hypertension, hypertensive renal disease with stage IIIa chronic kidney disease, diabetes, obesity and osteoarthritis who had a left total knee arthroplasty done today.     S/p left total knee arthroplasty, postop day 1.  Preoperative antibiotics, pain management, DVT and PE prophylaxis and bowel regimen per primary team.  Hypertension with hypertensive renal disease.  Patient on amlodipine and eplerenone as well as labetalol.    I will continue labetalol and amlodipine.  Resume eplerenone tomorrow when blood pressure allows.  Chronic kidney disease stage IIIa.  I would avoid Tylenol.  Diabetes type 2.  Patient currently not on medications.  States her diabetes is diet controlled with a recent A1c of 5.7.  We will start patient on sliding scale insulin, monitor blood sugar before meals and at bedtime.  History of monoclonal gammopathy  Osteoarthritis  Morbid obesity.  History of migraine headaches.  We will give PTA home meds I will order.  DVT and PE prophylaxis: Per the primary team  Diet: Carb controlled diet  I have discussed my findings & recommendations with the patient and the patient's RN.  I personally examined Adelaida today, and reviewed vital signs, medications and pertinent labs or imaging.  Thank you for this opportunity to be involved in your patient's care.       Immanuel Kim MD  Internal Medicine Staff Hospitalist  [facility]  Pager: [phone]  Please page the numbers (based on time of day) found in the sticky note with questions or additional concerns. _________________________________________________    Subjective & Interval Hx:      7/28/2022  Patient seen and examined.  She was working with physical  "therapy.  Vital signs stable.  She has normal oxygen saturation on room air.  She was on 5 L of oxygen yesterday.  Her home blood pressure medications have all been resumed.  Serum creatinine 1.0.  Patient historically has CKD stage IIIa.  However her serum creatinine level appears to be within upper limit of normal  Patient will require discharge home with physical therapy versus home with home PT.  PT and OT notes reviewed.    Last 24 hr care team notes reviewed.   ROS:  4 point ROS including Respiratory, CV, GI and , other than that noted in the HPI, is negative.    Medications: Reviewed in EPIC. List below for reference    Physical Exam:    /64 (BP Location: Right arm, Patient Position: Semi-Howell's)   Pulse 67   Temp (!) 96.6  F (35.9  C) (Oral)   Resp 16   Ht 1.575 m (5' 2\")   Wt 114.3 kg (251 lb 15.8 oz)   LMP 08/13/2005   SpO2 98%   BMI 46.09 kg/m       GENERAL: Alert and oriented x 3. NAD.   HEENT: Anicteric sclera. Mucous membranes moist.   CV: RRR. S1, S2. No murmurs appreciated.   RESPIRATORY: Effort normal Lungs CTAB with no wheezing, rales, rhonchi.   GI: Abdomen soft and non distended with normoactive bowel sounds present in all quadrants. No tenderness, rebound, guarding.   NEUROLOGICAL: No focal deficits. Moves all extremities.    EXTREMITIES: No peripheral edema. Intact bilateral pedal pulses.  Left knee and leg Ace wraps.  skin: No jaundice. No rashes.     Labs & Studies of Note: I personally reviewed the following studies:  Lab Results   Component Value Date    WBC 5.0 06/03/2022    WBC 2.8 08/06/2018     Lab Results   Component Value Date    RBC 4.05 06/03/2022    RBC 4.16 08/06/2018     Lab Results   Component Value Date    HGB 9.9 07/28/2022    HGB 12.5 11/30/2020     Lab Results   Component Value Date    HCT 36.7 06/03/2022    HCT 36.9 08/06/2018     No components found for: MCT  Lab Results   Component Value Date    MCV 91 06/03/2022    MCV 89 08/06/2018     Lab Results "   Component Value Date    MCH 29.9 06/03/2022    MCH 29.1 08/06/2018     Lab Results   Component Value Date    MCHC 33.0 06/03/2022    MCHC 32.8 08/06/2018     Lab Results   Component Value Date    RDW 12.8 06/03/2022    RDW 13.1 08/06/2018     Lab Results   Component Value Date     06/03/2022     08/06/2018     Last Comprehensive Metabolic Panel:  Sodium   Date Value Ref Range Status   07/28/2022 138 133 - 144 mmol/L Final   11/30/2020 139 133 - 144 mmol/L Final     Potassium   Date Value Ref Range Status   07/28/2022 4.9 3.4 - 5.3 mmol/L Final   11/30/2020 4.7 3.4 - 5.3 mmol/L Final     Chloride   Date Value Ref Range Status   07/28/2022 109 94 - 109 mmol/L Final   11/30/2020 107 94 - 109 mmol/L Final     Carbon Dioxide   Date Value Ref Range Status   11/30/2020 28 20 - 32 mmol/L Final     Carbon Dioxide (CO2)   Date Value Ref Range Status   07/28/2022 26 20 - 32 mmol/L Final     Anion Gap   Date Value Ref Range Status   07/28/2022 3 3 - 14 mmol/L Final   11/30/2020 4 3 - 14 mmol/L Final     Glucose   Date Value Ref Range Status   07/28/2022 212 (H) 70 - 99 mg/dL Final   07/28/2022 212 (H) 70 - 99 mg/dL Final   03/29/2021 96 70 - 99 mg/dL Final     Comment:     Fasting specimen     GLUCOSE BY METER POCT   Date Value Ref Range Status   07/28/2022 147 (H) 70 - 99 mg/dL Final     Urea Nitrogen   Date Value Ref Range Status   07/28/2022 22 7 - 30 mg/dL Final   11/30/2020 20 7 - 30 mg/dL Final     Creatinine   Date Value Ref Range Status   07/28/2022 1.00 0.52 - 1.04 mg/dL Final   11/30/2020 1.17 (H) 0.52 - 1.04 mg/dL Final     GFR Estimate   Date Value Ref Range Status   07/28/2022 67 >60 mL/min/1.73m2 Final     Comment:     Effective December 21, 2021 eGFRcr in adults is calculated using the 2021 CKD-EPI creatinine equation which includes age and gender ( et al., NEJM, DOI: 10.1056/ONGHdn0422845)   11/30/2020 54 (L) >60 mL/min/[1.73_m2] Final     Comment:     Non  GFR Calc  Starting  12/18/2018, serum creatinine based estimated GFR (eGFR) will be   calculated using the Chronic Kidney Disease Epidemiology Collaboration   (CKD-EPI) equation.       Calcium   Date Value Ref Range Status   07/28/2022 9.0 8.5 - 10.1 mg/dL Final   11/30/2020 9.5 8.5 - 10.1 mg/dL Final     Bilirubin Total   Date Value Ref Range Status   08/06/2018 0.3 0.2 - 1.3 mg/dL Final     Alkaline Phosphatase   Date Value Ref Range Status   08/06/2018 50 40 - 150 U/L Final     ALT   Date Value Ref Range Status   08/06/2018 30 0 - 50 U/L Final     AST   Date Value Ref Range Status   08/06/2018 19 0 - 45 U/L Final

## 2022-07-28 NOTE — PROGRESS NOTES
Care Management Discharge Note    Discharge Date: 07/28/2022       Discharge Disposition:  Home with home care, PT.    Discharge Services:      [facility], [place]   Phone  [phone]  Fax  [phone]      Discharge DME:      Discharge Transportation: car, drives self, family or friend will provide    Education Provided on the Discharge Plan: yes    Persons Notified of Discharge Plans: patient  Patient/Family in Agreement with the Plan:yes    Handoff Referral Completed: Yes    Additional Information:  Spoke with patient regarding discharge planning. Patient has been on service in the past with [facility]. A referral was sent to [facility] for physical therapy. No further discharge concerns at this time. RNCC available as needed.    Krystina Arshad RN, BSN  Care Coordinator, [facility]  Phone [phone]  Pager [phone]

## 2022-07-28 NOTE — CONSULTS
[facility] Initial Visit      Adelaida Packer MRN# [id number]   YOB: 1970 Age: 52 year old   Date of Admission: 7/27/2022  PCP is Windy Gordillo  Date of Service: 7/27/2022    Referring MD & Reason for Visit: I was asked by Dylan Hernandez MD, to see  Ms. Adelaida Packer for Medical management  [facility] Physician: Immanuel Kim MD         Assessment and Recommendations:   Adelaida Packer is a 52 right hip female patient with history of hypertension, hypertensive renal disease with stage IIIa chronic kidney disease, diabetes, obesity and osteoarthritis who had a left total knee arthroplasty done today.     S/p left total knee arthroplasty, postop day 0.  Preoperative antibiotics, pain management, DVT and PE prophylaxis and bowel regimen per primary team.  Hypertension with hypertensive renal disease.  Patient on amlodipine and eplerenone as well as labetalol.    I will continue labetalol and amlodipine.  Resume eplerenone tomorrow when blood pressure allows.  Chronic kidney disease stage IIIa.  I would avoid Tylenol.  Diabetes type 2.  Patient currently not on medications.  States her diabetes is diet controlled with a recent A1c of 5.7.  We will start patient on sliding scale insulin, monitor blood sugar before meals and at bedtime.  History of monoclonal gammopathy  Osteoarthritis  Morbid obesity.  History of migraine headaches.  We will give PTA home meds I will order.  DVT and PE prophylaxis: Per the primary team  Diet: Carb controlled diet  I have discussed my findings & recommendations with the patient and the patient's RN.  I personally examined Adelaida today, and reviewed vital signs, medications and pertinent labs or imaging.  Thank you for this opportunity to be involved in your patient's care.      Immanuel Kim MD  Internal Medicine Staff Hospitalist  [facility]  Pager: [phone]  Please page the numbers (based on time of day) found in  the sticky note with questions or additional concerns.      Reason for Visit:   Left knee arthroplasty         History of Present Illness:   History is obtained from the patient, and medical record.     This patient is a 52 year old female with left knee osteoarthritis admitted s/p left total knee arthroplasty.  History significant for CKD stage III, hypertension and monoclonal gammopathy.  She does also have morbid obesity.  Brief Hospital Course:  Patient today had left total knee arthroplasty done.  It was done under epidural anesthesia.  Patient does not have a Nagela catheter placed.  Bladder scan showed urinary retention.  Urology has been consulted to put a Angela catheter on the floor.  Patient however, denies any significant pain at this moment.  Vital signs stable.  She is satting okay on room air.    Internal Medicine service was asked to see for medical management.            Past Medical History:     Past Medical History:   Diagnosis Date     Allergic rhinitis due to other allergen     seasonal     Arthritis      Diabetes (H)      Localized osteoarthritis of both knees      Migraine, unspecified, without mention of intractable migraine without mention of status migrainosus      Monoclonal gammopathy      Morbid obesity (H)      Type 2 diabetes mellitus with stage 3a chronic kidney disease, without long-term current use of insulin (H) 8/9/2016     Unspecified essential hypertension     dx around age 31     Reviewed        Past Surgical History:     Past Surgical History:   Procedure Laterality Date     ARTHROPLASTY KNEE Right 8/11/2021    Procedure: ARTHROPLASTY, KNEE, TOTAL RIGHT;  Surgeon: Dylan Hernandez MD;  Location: [facility]     BUNIONECTOMY Right 7/6/2018    Procedure: BUNIONECTOMY;  Surgeon: Erik Bazan DPM;  Location: [facility];  Service:      EXCISE GANGLION WRIST Right 7/6/2018    Procedure: EXCISION OF THE GANGLION CYST, BUNIONECTOMU WITH FIRST METATARSAL  OSTEOTOMY WITH INTERNAL  SCREW FIXATION, A SUBTALAR JIONT ARTHROERESIS OF THE RIGHT FOOT AND GASTROCNEMIUS RECESSION RIGHT LOWER EXTREMITY;  Surgeon: Erik Bazan DPM;  Location: [facility];  Service:      HC REMOVE TONSILS/ADENOIDS,12+ Y/O  1995     HEAD & NECK SURGERY  3/2013    Parathyroidectomy--had to go back in 6 days later for a possible bleed     HYSTERECTOMY       HYSTERECTOMY, VAGINAL  12/2005    hysterectomy- fibroids     KNEE SURGERY Right      ORTHOPEDIC SURGERY       PARATHYROIDECTOMY       TONSILLECTOMY       [facility] ANESTH,KNEE AREA SURGERY  01/2001     [facility] GASTROPLASTY,OBESITY,VERT BAND      removed 2009             Social History:   From home.  Reviewed.        Family History:     Family History   Problem Relation Age of Onset     Hypertension Mother      Gynecology Mother         hysterectomy     Gastrointestinal Disease Mother         bowel upstruction     Thyroid Disease Mother      Neurologic Disorder Mother      Osteoporosis Mother      Anesthesia Reaction Mother         migraines     Hypertension Father      Lipids Father      Arthritis Father      Heart Disease Father      Diabetes Sister      Cerebrovascular Disease Sister      Prostate Cancer Brother      Alcohol/Drug Brother      Circulatory Brother      Cancer Maternal Grandmother         tumor-head     Obesity Maternal Grandmother      Cancer Maternal Grandfather         bone     Eye Disorder Maternal Grandfather      Prostate Cancer Maternal Grandfather      Glaucoma Maternal Grandfather      Arthritis Paternal Grandmother      Respiratory Daughter         asthma     Macular Degeneration No family hx of      Deep Vein Thrombosis (DVT) No family hx of      Reviewed        Allergies:     Allergies   Allergen Reactions     Sulfa Drugs Shortness Of Breath and Rash     Hydrochlorothiazide W/Triamterene Other (See Comments)     Renal insuff     Maxzide [Hydrochlorothiazide W/Triamterene] Other (See Comments)     Renal insuff     Metoprolol Other (See  Comments)     Other reaction(s): Bradycardia  At high dose only  At high dose only     Seasonal Allergies      Hayfever, tree pollens             Medications:   Home medications reviewed  Current Facility-Administered Medications   Medication     [START ON 7/30/2022] acetaminophen (TYLENOL) tablet 650 mg     acetaminophen (TYLENOL) tablet 975 mg     amLODIPine (NORVASC) tablet 5 mg     [START ON 7/28/2022] aspirin (ASA) EC tablet 325 mg     benzocaine-menthol (CEPACOL) 15-3.6 MG lozenge 1 lozenge     bisacodyl (DULCOLAX) suppository 10 mg     ceFAZolin (ANCEF) intermittent infusion 2 g in 100 mL dextrose PRE-MIX     cetirizine (zyrTEC) tablet 10 mg     glucose gel 15-30 g    Or     dextrose 50 % injection 25-50 mL    Or     glucagon injection 1 mg     famotidine (PEPCID) tablet 20 mg     fentaNYL (PF) (SUBLIMAZE) injection 25 mcg     HYDROmorphone (PF) (DILAUDID) injection 0.2 mg     HYDROmorphone (PF) (DILAUDID) injection 0.2 mg    Or     HYDROmorphone (PF) (DILAUDID) injection 0.4 mg     hydrOXYzine (ATARAX) tablet 25 mg     labetalol (NORMODYNE) tablet 300 mg     lactated ringers infusion     lidocaine (LMX4) cream     lidocaine 1 % 0.1-1 mL     magnesium hydroxide (MILK OF MAGNESIA) suspension 30 mL     methocarbamol (ROBAXIN) tablet 750 mg     naloxone (NARCAN) injection 0.2 mg    Or     naloxone (NARCAN) injection 0.4 mg    Or     naloxone (NARCAN) injection 0.2 mg    Or     naloxone (NARCAN) injection 0.4 mg     ondansetron (ZOFRAN ODT) ODT tab 4 mg    Or     ondansetron (ZOFRAN) injection 4 mg     oxyCODONE (ROXICODONE) tablet 5 mg    Or     oxyCODONE IR (ROXICODONE) tablet 10 mg     phenylephrine (MARGOTH-SYNEPHRINE) 50 mg in NaCl 0.9 % 250 mL infusion PERIPHERAL     [START ON 7/28/2022] polyethylene glycol (MIRALAX) Packet 17 g     prochlorperazine (COMPAZINE) injection 10 mg    Or     prochlorperazine (COMPAZINE) tablet 10 mg     senna-docusate (SENOKOT-S/PERICOLACE) 8.6-50 MG per tablet 1 tablet     sodium  "chloride (PF) 0.9% PF flush 3 mL     sodium chloride (PF) 0.9% PF flush 3 mL             Review of Systems:   A complete review of systems was performed and is negative other than what is stated in the HPI         Physical Exam:   Blood pressure 114/78, pulse 67, temperature (!) 96.5  F (35.8  C), temperature source Oral, resp. rate 18, height 1.575 m (5' 2\"), weight 114.3 kg (251 lb 15.8 oz), last menstrual period 08/13/2005, SpO2 99 %, not currently breastfeeding.    GENERAL: Alert and oriented x 3. NAD.   HEENT: Anicteric sclera. Mucous membranes moist.   CV: RRR. S1, S2. No murmurs appreciated.   RESPIRATORY: Effort normal . Lungs CTAB with no wheezing, rales, rhonchi.   GI: Abdomen soft and non distended with normoactive bowel sounds present in all quadrants. No tenderness, rebound, guarding.   NEUROLOGICAL: No focal deficits. Moves all extremities.    EXTREMITIES: No peripheral edema. Intact bilateral pedal pulses.  And straps on the left mid thigh to left ankle.  No Hemovac drain  SKIN: No jaundice. No rashes.             Data:   All laboratory data reviewed. Of note, potassium 4.4.  Serum creatinine 1.01.  GFR 66.  I have reviewed prior medications.     "

## 2022-07-28 NOTE — PROGRESS NOTES
07/28/22 0919   Quick Adds   Type of Visit Initial Occupational Therapy Evaluation   Living Environment   People in Home alone  (son will stay for the weekend, dtr lives in town)   Current Living Arrangements   ([facility])   Home Accessibility stairs to enter home   Number of Stairs, Within Home, Primary one;greater than 10 stairs   Transportation Anticipated car, drives self;family or friend will provide   Living Environment Comments Pt can stay on 1 level, has walk in shower, high toilet   Self-Care   Usual Activity Tolerance good   Current Activity Tolerance moderate   Equipment Currently Used at Home cane, straight;grab bar, toilet;grab bar, tub/shower   Fall history within last six months no   Activity/Exercise/Self-Care Comment Pt previously IND with ADLs   General Information   Onset of Illness/Injury or Date of Surgery 07/27/22   Referring Physician Dylan Hernandez MD   Patient/Family Therapy Goal Statement (OT) Not endorsed   Additional Occupational Profile Info/Pertinent History of Current Problem Adelaida Packer is a 52 year old female with PMH including Left knee pain now s/p left total knee arthroplasty on 7/27/22 with Dr. Hernandez.   Existing Precautions/Restrictions fall;no pivoting or twisting   Left Lower Extremity (Weight-bearing Status) weight-bearing as tolerated (WBAT)   Cognitive Status Examination   Orientation Status orientation to person, place and time   Visual Perception   Visual Impairment/Limitations WFL   Sensory   Sensory Comments LLE numbness   Pain Assessment   Patient Currently in Pain Yes, see Vital Sign flowsheet   Integumentary/Edema   Integumentary/Edema Comments Post op LLE swelling   Posture   Posture not impaired   Range of Motion Comprehensive   General Range of Motion bilateral upper extremity ROM WFL   Strength Comprehensive (MMT)   Comment, General Manual Muscle Testing (MMT) Assessment Pt is slightly deconditioned   Coordination   Upper Extremity Coordination No  deficits were identified   Bed Mobility   Bed Mobility supine-sit;sit-supine   Supine-Sit [place] (Bed Mobility) supervision;verbal cues;nonverbal cues (demo/gesture)   Sit-Supine [place] (Bed Mobility) supervision;verbal cues;nonverbal cues (demo/gesture)   Assistive Device (Bed Mobility) leg    Transfers   Transfers sit-stand transfer;toilet transfer   Sit-Stand Transfer   Sit-Stand [place] (Transfers) supervision;verbal cues;nonverbal cues (demo/gesture)   Assistive Device (Sit-Stand Transfers) walker, front-wheeled   Toilet Transfer   Type (Toilet Transfer) sit-stand;stand-sit   [place] Level (Toilet Transfer) supervision;verbal cues;nonverbal cues (demo/gesture)   Assistive Device (Toilet Transfer) walker, front-wheeled;grab bars/safety frame   Activities of Daily Living   BADL Assessment/Intervention lower body dressing;toileting   Lower Body Dressing Assessment/Training   Position (Lower Body Dressing) edge of bed sitting   Assistive Devices (Lower Body Dressing) reacher;sock-aid   [place] Level (Lower Body Dressing) supervision;verbal cues;nonverbal cues (demo/gesture)   Toileting   Assistive Devices (Toileting) grab bar, toilet   [place] Level (Toileting) supervision;verbal cues;nonverbal cues (demo/gesture)   Clinical Impression   Criteria for Skilled Therapeutic Interventions Met (OT) Yes, treatment indicated   OT Diagnosis Decreased ADL IND   OT Problem List-Impairments impacting ADL activity tolerance impaired;balance;mobility;range of motion (ROM);strength;post-surgical precautions;pain   Assessment of Occupational Performance 1-3 Performance Deficits   Identified Performance Deficits dressing, bathing, mobility   Planned Therapy Interventions (OT) ADL retraining;bed mobility training;transfer training;home program guidelines;progressive activity/exercise   Clinical Decision Making Complexity (OT) low complexity   Anticipated Equipment Needs Upon Discharge (OT) hip  kit   Risk & Benefits of therapy have been explained evaluation/treatment results reviewed;care plan/treatment goals reviewed;risks/benefits reviewed;current/potential barriers reviewed;participants voiced agreement with care plan;participants included;patient   OT Discharge Planning   OT Discharge Recommendation (DC Rec) home with assist   OT Rationale for DC Rec Patient progressing well in therapy and has undergone prior knee surgery in past. Writer anticipates pt can safely return home with PRN assist from family   Therapy Certification   Start of Care Date 07/28/22   Certification date from 07/28/22   Certification date to 07/28/22   Medical Diagnosis S/p L TKA   Total Evaluation Time (Minutes)   Total Evaluation Time (Minutes) 10   OT Goals   Therapy Frequency (OT) One time eval and treatment   OT Predicted Duration/Target Date for Goal Attainment 07/28/22   OT Goals Lower Body Dressing;Bed Mobility;Toilet Transfer/Toileting   OT: Lower Body Dressing Supervision/stand-by assist;using adaptive equipment;within precautions;Goal Met   OT: Bed Mobility Supervision/stand-by assist;within precautions;Goal Met;supine to/from sitting   OT: Toilet Transfer/Toileting Supervision/stand-by assist;toilet transfer;cleaning and garment management;using adaptive equipment;within precautions;Goal Met

## 2022-07-28 NOTE — OR NURSING
Bladder scan 698ml, Attempt to straight cath patient X 2, resistance met.  [facility] to ortho resident to place Urology consult.  MELY Son, 5th floor aware and okay with plan to transfer patient to inpatient room and have urology consult on the floor.

## 2022-07-28 NOTE — PLAN OF CARE
Physical Therapy Discharge Summary    Reason for therapy discharge:    All goals and outcomes met, no further needs identified.    Progress towards therapy goal(s). See goals on Care Plan in [facility] electronic health record for goal details.  Goals met    Therapy recommendation(s):    Continued therapy is recommended.  Rationale/Recommendations:  Home PT.

## 2022-07-28 NOTE — PROGRESS NOTES
Text page Ortho on call    529 HANNAH Packer. s/p L TKA. Urology consult was in place due to resistance met after unsuccessful straight cath attempt in PACU. Pt voided naturally without difficulty. Can you pls cancel the Urology consult?thanks.

## 2022-07-28 NOTE — PROGRESS NOTES
[facility]      OUTPATIENT OCCUPATIONAL THERAPY  EVALUATION  PLAN OF TREATMENT FOR OUTPATIENT REHABILITATION  (COMPLETE FOR INITIAL CLAIMS ONLY)  Patient's Last Name, First Name, M.I.  YOB: 1970  Adelaida Packer                          Provider's Name  [facility] Medical Record No.  [id number]                               Onset Date:  07/27/22   Start of Care Date:  (P) 07/28/22     Type:     ___PT   _X_OT   ___SLP Medical Diagnosis:  (P) S/p L TKA                        OT Diagnosis:  (P) Decreased ADL IND   Visits from SOC:  1   _________________________________________________________________________________  Plan of Treatment/Functional Goals    Planned Interventions: (P) ADL retraining, bed mobility training, transfer training, home program guidelines, progressive activity/exercise   Goals: See Occupational Therapy Goals on Care Plan in [facility] electronic health record.    Therapy Frequency: (P) One time eval and treatment  Predicted Duration of Therapy Intervention: (P) 07/28/22  _________________________________________________________________________________    I CERTIFY THE NEED FOR THESE SERVICES FURNISHED UNDER        THIS PLAN OF TREATMENT AND WHILE UNDER MY CARE     (Physician co-signature of this document indicates review and certification of the therapy plan).              Certification date from: (P) 07/28/22, Certification date to: (P) 07/28/22    Referring Physician: Dylan Hernandez MD            Initial Assessment        See Occupational Therapy evaluation dated (P) 07/28/22 in Epic electronic health record.

## 2022-07-28 NOTE — PROGRESS NOTES
"Orthopedic Surgery Progress Note: 07/28/2022    Subjective:   No acute events overnight. Pain initially poorly controlled, but much better now. Tolerating PO. Has been up to bathroom this morning. . No new concerns or complaints.    Objective:   /79 (BP Location: Right arm)   Pulse 74   Temp (!) 96.5  F (35.8  C) (Oral)   Resp 18   Ht 1.575 m (5' 2\")   Wt 114.3 kg (251 lb 15.8 oz)   LMP 08/13/2005   SpO2 98%   BMI 46.09 kg/m    No intake/output data recorded.  General: NAD. Resting comfortably in bed.  Respiratory: Breathing comfortably on 1L NC  Musculoskeletal:         LLE  Dressings clean and dry   SILT in sural, saphenous, superficial peroneal nerve, deep peroneal nerve, and tibial nerve distributions  Fires FHL/TA and EHL/GSC without issue  2+ DP Pulse. Foot is WWP      Laboratory Data:  Lab Results   Component Value Date    WBC 5.0 06/03/2022    HGB 12.1 06/03/2022     06/03/2022    INR 1.09 01/18/2016       Images:  PACU images reviewed. S/p total knee arthroplasty. No evidence of periprosthetic fracture    Assessment & Plan:   Adelaida Packer is a 52 year old female with PMH including Left knee pain now s/p left total knee arthroplasty on 7/27/22 with Dr. Hernandez.     Goals for 07/28/22:  Pain control  Up with PT/OT/Nursing  Possible discharge pending therapy approval and medicine clearance    Ortho Primary  - Activity: Up with assist until independent. Knee ROMAT  - Weightbearing Status: WBAT.  - Pain Management: Transition from IV to PO as tolerated.   - Antibiotics: Ancef 2 g Q8H for 24 hours   - Diet: Begin with clear fluids and progress diet as tolerated.   - DVT Prophylaxis: Mechanical and  mg daily to start on POD1. Discharge with same dose x 4 weeks  - Imaging: XR of Left Knee in PACU  - Labs: Hgb and BMP in the am.   - Bracing/Splinting: None.  - Dressings: Keep Tegaderm C/D/I x 7 days  - Drains: None   - Angela catheter: None    - Physical Therapy/Occupational Therapy: " Evaluation and treatment.  - Cultures: None   - Consults: Hospitalist, PT, OT.  - Follow-up: Clinic in 2 weeks for a wound check and with Dr. Hernandez in 6 weeks with repeat radiographs.  - Disposition: Pending progress with therapies, pain control on orals, and medical stability       Orthopedic surgery staff for this patient is Dr. Hernandez.    ------------------------------------------------------------------------------------------    Marcus Gomez MD  Orthopedic Surgery PGY1  [phone]    Please page me directly with any questions/concerns during regular weekday hours before 5 pm. If there is no response, if it is a weekend, or if it is during evening hours then please page the orthopedic surgery resident on call.    FOLLOWUP:    Future Appointments   Date Time Provider Department [place]   7/28/2022  9:15 AM Nicole Troncoso, PT [facility] [place]   7/28/2022 10:45 AM Yudy Christine OT [facility] [place]   7/28/2022  1:30 PM Rosie Chung Pt, PT [facility] [place]   8/9/2022  3:00 PM MG NURSE ONLY ORTHO [facility] [place]   8/30/2022  2:40 PM Dylan Hernandez MD [facility]

## 2022-07-28 NOTE — PROGRESS NOTES
[facility]      OUTPATIENT PHYSICAL THERAPY EVALUATION  PLAN OF TREATMENT FOR OUTPATIENT REHABILITATION  (COMPLETE FOR INITIAL CLAIMS ONLY)  Patient's Last Name, First Name, M.I.  YOB: 1970  PackerAdelaida MARIN                        Provider's Name  [facility] Medical Record No.  [id number]                               Onset Date:  07/27/22   Start of Care Date:  07/28/22      Type:     _X_PT   ___OT   ___SLP Medical Diagnosis:  s/p L TKA                        PT Diagnosis:  decreased functional independence   Visits from SOC:  1   _________________________________________________________________________________  Plan of Treatment/Functional Goals    Planned Interventions: gait training, home exercise program     Goals: See Physical Therapy Goals on Care Plan in [facility] electronic health record.    Therapy Frequency: 2x/day  Predicted Duration of Therapy Intervention: 07/29/22  _________________________________________________________________________________    I CERTIFY THE NEED FOR THESE SERVICES FURNISHED UNDER        THIS PLAN OF TREATMENT AND WHILE UNDER MY CARE     (Physician co-signature of this document indicates review and certification of the therapy plan).              Certification date from: 07/28/22, Certification date to: 07/29/22    Referring Physician: Marcus Gomez MD            Initial Assessment        See Physical Therapy evaluation dated 07/28/22 in Epic electronic health record.

## 2022-07-28 NOTE — PROGRESS NOTES
"   07/28/22 0940   Quick Adds   Type of Visit Initial PT Evaluation   Living Environment   People in Home alone  (son visiting & staying with pt until Aug 1, then daughter/[place] friends will be checking in or staying with pt)   Current Living Arrangements house  ([facility])   Home Accessibility stairs within home;stairs to enter home   Number of Stairs, Main Entrance 1  (platform step mto enter lower level)   Number of Stairs, Within Home, Primary other (see comments)  (16 to get to living/dining/kitchen, then another 16 to get to main bedroom)   Stair Railings, Within Home, Primary railing on left side (ascending)  (railing on 1 side (pt thinks left but unsure))   Living Environment Comments Pt plans to stay on lower level initially (set up with bed/bathroom, micorwave, fridge \"like a dorm room\") but pt's main level is 16 steps up from this and her usual bedroom is another 16 steps up.   Self-Care   Usual Activity Tolerance good   Current Activity Tolerance fair   Equipment Currently Used at Home cane, straight;grab bar, toilet;grab bar, tub/shower   Fall history within last six months no   Activity/Exercise/Self-Care Comment Pt reports she likes to bike but hasn't been able to since last year due to L LE pain. Pt has been using a cane since December 2022. She also owns a walker from previous R knee surgery   General Information   Onset of Illness/Injury or Date of Surgery 07/27/22   Referring Physician Marcus Gomez MD   Patient/Family Therapy Goals Statement (PT) not stated   Pertinent History of Current Problem (include personal factors and/or comorbidities that impact the POC) Pt is POD #1 L TKA. PMH significant for hypertension, hypertensive renal disease with stage IIIa chronic kidney disease, diabetes, obesity and osteoarthritis   Weight-Bearing Status - LLE weight-bearing as tolerated   General Observations Pt just finished working with OT, requests pain meds if available- RN updated & administered pain " meds partway through PT session   Cognition   Orientation Status (Cognition) oriented x 4   Pain Assessment   Patient Currently in Pain Yes, see Vital Sign flowsheet  (yes 5/10 L knee pain)   Integumentary/Edema   Integumentary/Edema Comments L LE wrapped, dressing not visualized   Range of Motion (ROM)   ROM Comment L knee ROM reduced post-op TKA, not formally measured.   Strength (Manual Muscle Testing)   Strength (Manual Muscle Testing) Able to perform L SLR   Strength Comments Pt demos some post op weakness L LE but is able to perform L SLR ind'lly   Bed Mobility   Comment, (Bed Mobility) IND supine<>sit with extra time   Transfers   Comment, (Transfers) Sit>stand from flat bed with CGA and walker, pt with L LE placed outside walker support for sit>stand but adjusts once standing in inside walker   Gait/Stairs (Locomotion)   Comment, (Gait/Stairs) Pt ambulated ~10' (for eval) with FWW and SBA with partial step-through pattern, slow pace, moderate reliance on walker support, antalgic L LE   Balance   Balance Comments Pt needs 1-2 UE support on walker for safe dynamic mobility currently   Sensory Examination   Sensory Perception Comments denies numbness, tingling   Clinical Impression   Criteria for Skilled Therapeutic Intervention Yes, treatment indicated   PT Diagnosis (PT) decreased functional independence   Influenced by the following impairments pain, decreased activity tolerance, post op weakness L LE, decreased balance   Functional limitations due to impairments decreased independence with transfers, ambulation, stairs   Clinical Presentation (PT Evaluation Complexity) Stable/Uncomplicated   Clinical Presentation Rationale clinical judgement   Clinical Decision Making (Complexity) low complexity   Planned Therapy Interventions (PT) gait training;home exercise program   Anticipated Equipment Needs at Discharge (PT)   (has walker and cane at home already)   Risk & Benefits of therapy have been explained  evaluation/treatment results reviewed;care plan/treatment goals reviewed;risks/benefits reviewed;current/potential barriers reviewed;participants included;patient   Clinical Impression Comments Pt moving fairly well POD #0, though somewhat limited by pain.   PT Discharge Planning   PT Discharge Recommendation (DC Rec) home with outpatient physical therapy;home with assist;home with home care physical therapy   PT Rationale for DC Rec Pt moving well overall but limited by pain & tolerating household distance ambulation only at time of eval (~50'). Anticipate when pain better managed pt will progress quickly & would be appropriate for OP PT. If still limited to household distances at time of discharge, pt may be candidate for home PT due to significant taxing effort to leave home.   PT Brief overview of current status Demonstrates mobility adequate for discharge home with SBA for step to enter home & assist as needed for household tasks. Mobility OK for discharge from PT perspective but if pt remains hospitalized, PT will continue to see to progress gait distance/ stair training.   Plan of Care Review   Plan of Care Reviewed With patient   Total Evaluation Time   Total Evaluation Time (Minutes) 10   Physical Therapy Goals   PT Frequency 2x/day   PT Predicted Duration/Target Date for Goal Attainment 07/29/22   PT Goals Bed Mobility;Transfers;Gait;Stairs   PT: Transfers Modified independent;Sit to/from stand;Assistive device   PT: Gait Modified independent;100 feet;Rolling walker   PT: Stairs Supervision/stand-by assist;1 stair;Assistive device;Rail on left  (1 platform step with walker AND 1 step with L rail & cane)

## 2022-07-29 VITALS
DIASTOLIC BLOOD PRESSURE: 68 MMHG | HEIGHT: 62 IN | RESPIRATION RATE: 16 BRPM | HEART RATE: 81 BPM | BODY MASS INDEX: 46.37 KG/M2 | SYSTOLIC BLOOD PRESSURE: 119 MMHG | TEMPERATURE: 98.8 F | WEIGHT: 251.99 LBS | OXYGEN SATURATION: 97 %

## 2022-07-29 LAB
HGB BLD-MCNC: 9.4 G/DL (ref 11.7–15.7)
HOLD SPECIMEN: NORMAL

## 2022-07-29 PROCEDURE — 99214 OFFICE O/P EST MOD 30 MIN: CPT | Performed by: INTERNAL MEDICINE

## 2022-07-29 PROCEDURE — 250N000013 HC RX MED GY IP 250 OP 250 PS 637: Performed by: INTERNAL MEDICINE

## 2022-07-29 PROCEDURE — 250N000013 HC RX MED GY IP 250 OP 250 PS 637: Performed by: ORTHOPAEDIC SURGERY

## 2022-07-29 PROCEDURE — 85018 HEMOGLOBIN: CPT | Performed by: STUDENT IN AN ORGANIZED HEALTH CARE EDUCATION/TRAINING PROGRAM

## 2022-07-29 PROCEDURE — 36415 COLL VENOUS BLD VENIPUNCTURE: CPT | Performed by: STUDENT IN AN ORGANIZED HEALTH CARE EDUCATION/TRAINING PROGRAM

## 2022-07-29 PROCEDURE — 250N000013 HC RX MED GY IP 250 OP 250 PS 637: Performed by: STUDENT IN AN ORGANIZED HEALTH CARE EDUCATION/TRAINING PROGRAM

## 2022-07-29 PROCEDURE — 250N000013 HC RX MED GY IP 250 OP 250 PS 637: Performed by: CLINICAL NURSE SPECIALIST

## 2022-07-29 RX ORDER — HYDROMORPHONE HYDROCHLORIDE 2 MG/1
2-4 TABLET ORAL EVERY 4 HOURS PRN
Qty: 45 TABLET | Refills: 0 | Status: SHIPPED | OUTPATIENT
Start: 2022-07-29 | End: 2022-08-11

## 2022-07-29 RX ADMIN — HYDROXYZINE HYDROCHLORIDE 25 MG: 25 TABLET ORAL at 05:56

## 2022-07-29 RX ADMIN — ACETAMINOPHEN 975 MG: 325 TABLET, FILM COATED ORAL at 09:56

## 2022-07-29 RX ADMIN — ASPIRIN 325 MG: 325 TABLET ORAL at 08:43

## 2022-07-29 RX ADMIN — SENNOSIDES AND DOCUSATE SODIUM 1 TABLET: 50; 8.6 TABLET ORAL at 08:44

## 2022-07-29 RX ADMIN — ACETAMINOPHEN 975 MG: 325 TABLET, FILM COATED ORAL at 02:33

## 2022-07-29 RX ADMIN — METHOCARBAMOL 750 MG: 750 TABLET ORAL at 01:06

## 2022-07-29 RX ADMIN — HYDROMORPHONE HYDROCHLORIDE 4 MG: 4 TABLET ORAL at 02:33

## 2022-07-29 RX ADMIN — POLYETHYLENE GLYCOL 3350 17 G: 17 POWDER, FOR SOLUTION ORAL at 08:44

## 2022-07-29 RX ADMIN — METHOCARBAMOL 750 MG: 750 TABLET ORAL at 08:07

## 2022-07-29 RX ADMIN — HYDROMORPHONE HYDROCHLORIDE 4 MG: 4 TABLET ORAL at 09:03

## 2022-07-29 RX ADMIN — HYDROMORPHONE HYDROCHLORIDE 2 MG: 2 TABLET ORAL at 06:01

## 2022-07-29 RX ADMIN — LABETALOL HYDROCHLORIDE 300 MG: 300 TABLET, FILM COATED ORAL at 08:43

## 2022-07-29 RX ADMIN — FAMOTIDINE 20 MG: 20 TABLET ORAL at 08:43

## 2022-07-29 RX ADMIN — HYDROXYZINE HYDROCHLORIDE 25 MG: 25 TABLET ORAL at 01:06

## 2022-07-29 RX ADMIN — HYDROXYZINE HYDROCHLORIDE 25 MG: 25 TABLET ORAL at 09:56

## 2022-07-29 NOTE — PLAN OF CARE
"Adelaida    VS: /51 (BP Location: Right arm, Patient Position: Semi-Howell's, Cuff Size: Adult Large)   Pulse 74   Temp 97.4  F (36.3  C) (Oral)   Resp 16   Ht 1.575 m (5' 2\")   Wt 114.3 kg (251 lb 15.8 oz)   LMP 08/13/2005   SpO2 97%   BMI 46.09 kg/m   (taken at 23:12)   O2: SpO2 97% and stable on RA. LS clear and equal bilaterally. Denies chest pain and SOB.    Output: Voids spontaneously without difficulty in the bathroom.   Last BM: 7/26/22, normoactive audible bowel sounds and pt reports passing flatus.   Activity: Up with assist of 1, walker and gait belt.    Skin: WDL except L knee incision.   Pain: Pain rated at 7-8/10, managed with Tylenol 975 mg at 0233, dilaudid 4 mg at 02:30 and dilaudid 2 mg at 06:00, robaxin 750 mg at 01:00.   CMS: Intact, AOx4. Denies numbness and tingling.    Dressing: ANNE under ACE wrap- CDI.   Diet: Regular diet. Denies nausea/vomiting.    LDA: R PIV saline locked. Incision/surg site: R and L knees.   Equipment: IV pole, personal belongings, gait belt, walker, ice packs.   Plan: Continue with plan of care. Call light in pt's reach. Wishes to discharge today before noon, stating her son will be her transportation. Home PT approved.   Additional Info:          "

## 2022-07-29 NOTE — PROGRESS NOTES
"Orthopedic Surgery Progress Note: 07/29/2022    Subjective:   No acute events overnight. Patient ready to discharge home. Tolerating PO. Has been up to bathroom this morning. Denies cp/sob/f/c . No new concerns or complaints.    Objective:   /51 (BP Location: Right arm, Patient Position: Semi-Howell's, Cuff Size: Adult Large)   Pulse 74   Temp 97.4  F (36.3  C) (Oral)   Resp 16   Ht 1.575 m (5' 2\")   Wt 114.3 kg (251 lb 15.8 oz)   LMP 08/13/2005   SpO2 97%   BMI 46.09 kg/m    No intake/output data recorded.  General: NAD. Resting comfortably in bed.  Respiratory: Breathing comfortably on RA  Musculoskeletal:         LLE  Dressings clean and dry   SILT in sural, saphenous, superficial peroneal nerve, deep peroneal nerve, and tibial nerve distributions  Fires FHL/TA and EHL/GSC without issue  2+ DP Pulse. Foot is WWP      Laboratory Data:  Lab Results   Component Value Date    WBC 5.0 06/03/2022    HGB 9.4 (L) 07/29/2022     06/03/2022    INR 1.09 01/18/2016       Images:  PACU images reviewed. S/p total knee arthroplasty. No evidence of periprosthetic fracture    Assessment & Plan:   Adelaida Packer is a 52 year old female with PMH including Left knee pain now s/p left total knee arthroplasty on 7/27/22 with Dr. Hernandez.     Goals for 07/29/22:  Okay to discharge from orthopaedics perspective     Ortho Primary  - Activity: Up with assist until independent. Knee ROMAT  - Weightbearing Status: WBAT.  - Pain Management: Transition from IV to PO as tolerated.   - Antibiotics: Ancef 2 g Q8H for 24 hours   - Diet: Begin with clear fluids and progress diet as tolerated.   - DVT Prophylaxis: Mechanical and  mg daily to start on POD1. Discharge with same dose x 4 weeks  - Imaging: XR of Left Knee in PACU  - Labs: Hgb and BMP in the am.   - Bracing/Splinting: None.  - Dressings: Keep Tegaderm C/D/I x 7 days  - Drains: None   - Angela catheter: None    - Physical Therapy/Occupational Therapy: " Evaluation and treatment.  - Cultures: None   - Consults: Hospitalist, PT, OT.  - Follow-up: Clinic in 2 weeks for a wound check and with Dr. Hernandez in 6 weeks with repeat radiographs.  - Disposition: Pending progress with therapies, pain control on orals, and medical stability       Orthopedic surgery staff for this patient is Dr. Hernandez.    ------------------------------------------------------------------------------------------    Marcus Gomez MD  Orthopedic Surgery PGY1  [phone]    Please page me directly with any questions/concerns during regular weekday hours before 5 pm. If there is no response, if it is a weekend, or if it is during evening hours then please page the orthopedic surgery resident on call.    FOLLOWUP:    Future Appointments   Date Time Provider Department [place]   7/28/2022  9:15 AM Nicole Troncoso, PT [facility] [place]   7/28/2022 10:45 AM Yudy Christine OT [facility] [place]   7/28/2022  1:30 PM Rosie Chung Pt, PT [facility] [place]   8/9/2022  3:00 PM MG NURSE ONLY [facility]THO [facility] [place]   8/30/2022  2:40 PM Dylan Hernandez MD [facility]

## 2022-07-29 NOTE — PLAN OF CARE
"Goal Outcome Evaluation:    Plan of Care Reviewed With: patient     Overall Patient Progress: improving    Outcome Evaluation: Approved for home PT today. Pt is very happy to have that when she discharges.    VS: VSS  Blood pressure 129/61, pulse 88, temperature 97.4  F (36.3  C), temperature source Oral, resp. rate 16, height 1.575 m (5' 2\"), weight 114.3 kg (251 lb 15.8 oz), last menstrual period 08/13/2005, SpO2 99 %, not currently breastfeeding.   O2: >95% on RA   Output: Voids spontaneously, up to bathroom   Last BM: 7/27/22   Activity: Ax1 with gait belt and walker   Skin: Intact except for left knee incision   Pain: Left knee pain currently not well managed   Switched oxycodone to PO dilaudid because patient stated she feels like the oxycodone doesn't do much to help her pain. Started with 2mg but later in afternoon patient felt this was not enough. Increased dose to 4mg at 7pm.   Feels the most relief from prn Atarax and Robaxin. Trying to get patient on a good schedule with pain meds all day.    Neuro/CMS: A+Ox4  CMS intact. Denies numbness and tingling.    Dressing: Left knee alginate dressing CDI   Diet: Regular   LDA: Left hand PIV, saline locked   Equipment: Personal belongings in room: cell phone, , clothing   Plan: Potentially discharge to home tomorrow pending pain management. Monitor how patient feels after getting 4mg dilaudid to see if it is effective.    Additional Info:        "

## 2022-07-29 NOTE — OP NOTE
OPERATIVE REPORT    DATE OF SERVICE: 7/27/22    SURGEON: Dylan Hernandez MD.    ASSISTANT(S):  Marcus Gomez MD     PREOPERATIVE DIAGNOSIS:  Osteoarthritis    POSTOPERATIVE DIAGNOSIS:  Osteoarthritis    OPERATION PERFORMED:  Left total knee arthroplasty    IMPLANTS:    Implant Name Type Inv. Item Serial No.  Lot No. LRB No. Used Action   BONE CEMENT SIMPLEX W/TOBRAMYCIN 6197-9-001 - [id number] Cement, Bone BONE CEMENT SIMPLEX W/TOBRAMYCIN 6197-9-001  [employer] [id number] Left 1 Implanted   BONE CEMENT SIMPLEX W/TOBRAMYCIN 6197-9-001 - [id number] Cement, Bone BONE CEMENT SIMPLEX W/TOBRAMYCIN 6197-9-001  [employer] [id number] Left 1 Implanted   IMP COMP FEMORAL SNN GEN II CR SZ 4 LT [id number] - [id number] Total Joint Component/Insert IMP COMP FEMORAL SNN GEN II CR SZ 4 LT [id number]  [employer] [id number] Left 1 Implanted   IMP BASEPLATE TIBIAL ISATU II SZ 3 LT TI [id number] - [id number] Total Joint Component/Insert IMP BASEPLATE TIBIAL ISATU II SZ 3 LT TI [id number]  [employer] [id number] Left 1 Implanted   IMP INSERT ARTICULAR S&N LGN CR XLPE SZ3-4 9MM [id number] - [id number] Total Joint Component/Insert IMP INSERT ARTICULAR S&N LGN CR XLPE SZ3-4 9MM [id number]  [employer] [id number] Left 1 Implanted       ANESTHETIC: spinal     OPERATIVE FINDINGS:  End stage arthrosis of the knee. Patella appropriate for patellar retention.     BLOOD LOSS: about 300 ml     TOURNIQUET TIME: about 60 minutes      COMPLICATIONS:  None apparent    OPERATIVE INDICATIONS:  The patient has a long history of debilitating pain secondary to ostearthritis of the knee.  Despite comprehensive non-operative management these symptoms continued to interfere with activities of daily living.  After discussion of further treatment options including the risks and benefits that patient elected to proceed with a total knee.    DESCRIPTION OF THE PROCEDURE:  The patient was identified in the preoperative holding  area.  The consent form including the risks and benefits were reviewed with the patient.  The operative limb was identified and marked.  The patient was brought back to the operating room and placed on the operating table.  A spinal anesthetic was induced by the anesthesia team.   The patient was prepped and draped in the normal standard fashion for a knee replacement.  A time-out was called.  Antibiotics were given.The tourniquet was inflated.  We utilized an approximately 15 cm curvilinear incision, centered on the patella ridge, and performed a standard parapatellar approach to the knee. There was normal appearing joint fluid seen upon arthrotomy. A modest medial release was performed. The patella was everted. Medial and lateral retractors were placed. The knee was brought into flexion. The AP axis of the knee was marked and an intra-medullary femoral guide roxie was placed as templated. The epicondylar axis was then marked. The anterior femoral cutting guide was placed and the epicondylar and AP axes were used to set the rotation of the jig. A stylus was then used to set the depth of the resection. Retractors were used to protect the skin and the anterior cut was made. The distal femoral cutting guide was then placed and pinned. The resection was set to remove the cartilage from the intra-condylar notch. The femoral sizing guide was then placed; the knee sized well to a 4. The four-in-one femoral cutting guide was then placed and pinned. The cuts were made. The trial component was well fit. Attention was then turned to the tibia. A PCL retractor was then placed and used to bring the tibia anterior. Medial and lateral retractors were placed. A tibial intra-medullary guide roxie was placed. The tibial cutting guide was then assembled on the guide roxie. The slope was set to nearly mirror the anatomic slope. The tibial cut was first checked with a two-and-nine guide and the cut was then made. A lamina  was then  used to remove the necessary bone from the posterior condyles and then the remaining meniscus was removed. The tibia and was sized and it sized well to a 3. The trial components were then assembled jose david  9 mm trial poly was used for trialing. The knee came out into full extension and the knee had greater than 120 degrees of flexion. It was well balanced in the coronal plane with varus and valgus stress at 0 and 30 degrees of flexion. Happy with the reconstruction the tibial rotation was marked, the trial components were removed, and all cancellous surfaces were cleaned with a pulse lavage and then dried. The components were cemented into place, a trial poly placed, the knee brought into extension, and the cement was allowed to dry. With the cement dry the knee was lavaged. The tourniquet was let down and hemostasis was achieved. The knee was then brought into extension and the final poly was placed. It was seen to lock into place. The soft tissues were then injected with analgesic. The capsule was closed with interrupted Vicryl, the dermis with interrupted Vicryl, and skin with running monocryl, Dermabond and steri-strips.  At the end of the procedure the sponge and needle counts were correct times two.  The patient tolerated the procedure well and returned to the PAR extubated and stable.    POSTOPERATIVE PLAN:  1. Weight bearing as tolerated  2. DVT prophylaxis   4. 24 hours of prophylactic antibiotics  5. Follow-up:  Wound clinic in 2 weeks and with David in clinic in 6 weeks for x-rays and a rehabilitation check.

## 2022-07-29 NOTE — PROGRESS NOTES
[facility] - Internal Medicine Daily Note   Date of Service: 7/28/2022  Patient: Adelaida Packer  MRN: [id number]  Admission Date: 7/27/2022  Hospital Day # 0    Assessment & Plan      Adelaida Packer is a 52 right hip female patient with history of hypertension, hypertensive renal disease with stage IIIa chronic kidney disease, diabetes, obesity and osteoarthritis who had a left total knee arthroplasty done today.     S/p left total knee arthroplasty, postop day 2.  Preoperative antibiotics, pain management, DVT and PE prophylaxis and bowel regimen per primary team.  Hypertension with hypertensive renal disease.  Patient on amlodipine and eplerenone as well as labetalol.    I will continue labetalol and amlodipine.  Resume eplerenone tomorrow when blood pressure allows.  Chronic kidney disease stage IIIa.  I would avoid Tylenol.  Diabetes type 2.  Patient currently not on medications.  States her diabetes is diet controlled with a recent A1c of 5.7.  We will start patient on sliding scale insulin, monitor blood sugar before meals and at bedtime.  History of monoclonal gammopathy  Osteoarthritis  Morbid obesity.  History of migraine headaches.  We will give PTA home meds I will order.  DVT and PE prophylaxis: Per the primary team  Diet: Carb controlled diet  I have discussed my findings & recommendations with the patient and the patient's RN.  I personally examined Adelaida today, and reviewed vital signs, medications and pertinent labs or imaging.  Thank you for this opportunity to be involved in your patient's care.       Immanuel Kim MD  Internal Medicine Staff Hospitalist  [facility]  Pager: [phone]  Please page the numbers (based on time of day) found in the sticky note with questions or additional concerns. _________________________________________________    Subjective & Interval Hx:      7/28/2022  Patient seen and examined.  She was working with physical therapy.  Vital  signs stable.  She has normal oxygen saturation on room air.  She was on 5 L of oxygen yesterday.  Her home blood pressure medications have all been resumed.  Serum creatinine 1.0.  Patient historically has CKD stage IIIa.  However her serum creatinine level appears to be within upper limit of normal  Patient will require discharge home with physical therapy versus home with home PT.  PT and OT notes reviewed.  7/29/22. Pt getting ready for discharge. VSS. Medications reviewed and reconciled for discharge. No acute over night issues. medically stable for discharge home  Last 24 hr care team notes reviewed.   ROS:  4 point ROS including Respiratory, CV, GI and , other than that noted in the HPI, is negative.    Medications: Reviewed in EPIC. List below for reference  Current Facility-Administered Medications   Medication     [START ON 7/30/2022] acetaminophen (TYLENOL) tablet 650 mg     acetaminophen (TYLENOL) tablet 975 mg     amLODIPine (NORVASC) tablet 5 mg     aspirin (ASA) EC tablet 325 mg     benzocaine-menthol (CEPACOL) 15-3.6 MG lozenge 1 lozenge     bisacodyl (DULCOLAX) suppository 10 mg     cetirizine (zyrTEC) tablet 10 mg     glucose gel 15-30 g    Or     dextrose 50 % injection 25-50 mL    Or     glucagon injection 1 mg     eplerenone (INSPRA) tablet 100 mg     famotidine (PEPCID) tablet 20 mg     HYDROmorphone (DILAUDID) tablet 2 mg    Or     HYDROmorphone (DILAUDID) tablet 4 mg     HYDROmorphone (PF) (DILAUDID) injection 0.2 mg    Or     HYDROmorphone (PF) (DILAUDID) injection 0.4 mg     hydrOXYzine (ATARAX) tablet 25 mg     labetalol (NORMODYNE) tablet 300 mg     lidocaine (LMX4) cream     lidocaine 1 % 0.1-1 mL     magnesium hydroxide (MILK OF MAGNESIA) suspension 30 mL     methocarbamol (ROBAXIN) tablet 750 mg     naloxone (NARCAN) injection 0.2 mg    Or     naloxone (NARCAN) injection 0.4 mg    Or     naloxone (NARCAN) injection 0.2 mg    Or     naloxone (NARCAN) injection 0.4 mg     ondansetron  "(ZOFRAN ODT) ODT tab 4 mg    Or     ondansetron (ZOFRAN) injection 4 mg     phenylephrine (MARGOTH-SYNEPHRINE) 50 mg in NaCl 0.9 % 250 mL infusion PERIPHERAL     polyethylene glycol (MIRALAX) Packet 17 g     prochlorperazine (COMPAZINE) injection 10 mg    Or     prochlorperazine (COMPAZINE) tablet 10 mg     senna-docusate (SENOKOT-S/PERICOLACE) 8.6-50 MG per tablet 1 tablet     sodium chloride (PF) 0.9% PF flush 3 mL     sodium chloride (PF) 0.9% PF flush 3 mL         Physical Exam:    /66 (BP Location: Right arm)   Pulse 72   Temp 98.8  F (37.1  C) (Oral)   Resp 16   Ht 1.575 m (5' 2\")   Wt 114.3 kg (251 lb 15.8 oz)   LMP 08/13/2005   SpO2 97%   BMI 46.09 kg/m       GENERAL: Alert and oriented x 3. NAD.   HEENT: Anicteric sclera. Mucous membranes moist.   CV: RRR. S1, S2. No murmurs appreciated.   RESPIRATORY: Effort normal Lungs CTAB with no wheezing, rales, rhonchi.   GI: Abdomen soft and non distended with normoactive bowel sounds present in all quadrants. No tenderness, rebound, guarding.   NEUROLOGICAL: No focal deficits. Moves all extremities.    EXTREMITIES: No peripheral edema. Intact bilateral pedal pulses.  Left knee and leg Ace wraps.  skin: No jaundice. No rashes.     Labs & Studies of Note: I personally reviewed the following studies:  Lab Results   Component Value Date    WBC 5.0 06/03/2022    WBC 2.8 08/06/2018     Lab Results   Component Value Date    RBC 4.05 06/03/2022    RBC 4.16 08/06/2018     Lab Results   Component Value Date    HGB 9.9 07/28/2022    HGB 12.5 11/30/2020     Lab Results   Component Value Date    HCT 36.7 06/03/2022    HCT 36.9 08/06/2018     No components found for: MCT  Lab Results   Component Value Date    MCV 91 06/03/2022    MCV 89 08/06/2018     Lab Results   Component Value Date    MCH 29.9 06/03/2022    MCH 29.1 08/06/2018     Lab Results   Component Value Date    MCHC 33.0 06/03/2022    [facility] 32.8 08/06/2018     Lab Results   Component Value Date    RDW 12.8 " 06/03/2022    RDW 13.1 08/06/2018     Lab Results   Component Value Date     06/03/2022     08/06/2018     Last Comprehensive Metabolic Panel:  Sodium   Date Value Ref Range Status   07/28/2022 138 133 - 144 mmol/L Final   11/30/2020 139 133 - 144 mmol/L Final     Potassium   Date Value Ref Range Status   07/28/2022 4.9 3.4 - 5.3 mmol/L Final   11/30/2020 4.7 3.4 - 5.3 mmol/L Final     Chloride   Date Value Ref Range Status   07/28/2022 109 94 - 109 mmol/L Final   11/30/2020 107 94 - 109 mmol/L Final     Carbon Dioxide   Date Value Ref Range Status   11/30/2020 28 20 - 32 mmol/L Final     Carbon Dioxide (CO2)   Date Value Ref Range Status   07/28/2022 26 20 - 32 mmol/L Final     Anion Gap   Date Value Ref Range Status   07/28/2022 3 3 - 14 mmol/L Final   11/30/2020 4 3 - 14 mmol/L Final     Glucose   Date Value Ref Range Status   07/28/2022 212 (H) 70 - 99 mg/dL Final   07/28/2022 212 (H) 70 - 99 mg/dL Final   03/29/2021 96 70 - 99 mg/dL Final     Comment:     Fasting specimen     GLUCOSE BY METER POCT   Date Value Ref Range Status   07/28/2022 169 (H) 70 - 99 mg/dL Final     Urea Nitrogen   Date Value Ref Range Status   07/28/2022 22 7 - 30 mg/dL Final   11/30/2020 20 7 - 30 mg/dL Final     Creatinine   Date Value Ref Range Status   07/28/2022 1.00 0.52 - 1.04 mg/dL Final   11/30/2020 1.17 (H) 0.52 - 1.04 mg/dL Final     GFR Estimate   Date Value Ref Range Status   07/28/2022 67 >60 mL/min/1.73m2 Final     Comment:     Effective December 21, 2021 eGFRcr in adults is calculated using the 2021 CKD-EPI creatinine equation which includes age and gender (Kiran garcía al., NEJM, DOI: 10.1056/VUYBbi2310339)   11/30/2020 54 (L) >60 mL/min/[1.73_m2] Final     Comment:     Non  GFR Calc  Starting 12/18/2018, serum creatinine based estimated GFR (eGFR) will be   calculated using the Chronic Kidney Disease Epidemiology Collaboration   (CKD-EPI) equation.       Calcium   Date Value Ref Range Status    07/28/2022 9.0 8.5 - 10.1 mg/dL Final   11/30/2020 9.5 8.5 - 10.1 mg/dL Final     Bilirubin Total   Date Value Ref Range Status   08/06/2018 0.3 0.2 - 1.3 mg/dL Final     Alkaline Phosphatase   Date Value Ref Range Status   08/06/2018 50 40 - 150 U/L Final     ALT   Date Value Ref Range Status   08/06/2018 30 0 - 50 U/L Final     AST   Date Value Ref Range Status   08/06/2018 19 0 - 45 U/L Final

## 2022-07-29 NOTE — DISCHARGE SUMMARY
ORTHOPAEDIC SURGERY DISCHARGE SUMMARY     Date of Admission: 7/27/2022  Date of Discharge: 7/29/2022 10:09 AM  Disposition: Home  Staff Physician: No att. providers found  Primary Care Provider: Windy Gordillo    DISCHARGE DIAGNOSIS:  Primary localized osteoarthritis of left knee [M17.12]    PROCEDURES: Procedure(s):  ARTHROPLASTY, LEFT  KNEE, TOTAL on 7/27/2022    BRIEF HISTORY:  Adelaida Packer is a 51 y/o woman with a long history of debilitating pain secondary to ostearthritis of the knee.  Despite comprehensive non-operative management these symptoms continued to interfere with activities of daily living.  After discussion of further treatment options including the risks and benefits that patient elected to proceed with a total knee replacement     HOSPITAL COURSE:    The patient was admitted following the above listed procedures for pain control and rehabilitation. Adelaida Packer did well post-operatively. Medicine was consulted post operatively to aid in management of medical co-morbidities. The patient received routine nursing cares and at the time of discharge was medically stable. Vital signs were stable throughout admission. The patient is tolerating a regular diet and is voiding spontaneously. All PT/OT goals have been met for safe mobility. Pain is now controlled on oral medications which will be available on discharge. Stool softeners have been used while taking pain medications to help prevent constipation. Adelaida Packer is deemed medically safe to discharge.     Antibiotics:  Ancef given periop and 24 hours postop and will be discharged on 2 weeks of PO Keflex  DVT prophylaxis:  ASA 325mg every day initiated after surgery and will be continued for 4 weeks.   PT Progress:  Has met PT/OT goals for safe mobility.    Pain Meds:  Weaned off all IV pain meds by discharge.  Inpatient Events: No significant events or complications.     PHYSICAL EXAM:    /51 (BP Location: Right arm, Patient  "Position: Semi-Howell's, Cuff Size: Adult Large)   Pulse 74   Temp 97.4  F (36.3  C) (Oral)   Resp 16   Ht 1.575 m (5' 2\")   Wt 114.3 kg (251 lb 15.8 oz)   LMP 08/13/2005   SpO2 97%   BMI 46.09 kg/m    No intake/output data recorded.  General: NAD. Resting comfortably in bed.  Respiratory: Breathing comfortably on RA  Musculoskeletal:            LLE  Dressings clean and dry   SILT in sural, saphenous, superficial peroneal nerve, deep peroneal nerve, and tibial nerve distributions  Fires FHL/TA and EHL/GSC without issue  2+ DP Pulse. Foot is WWP    FOLLOWUP:    Follow up with Dr. Hernandez on 8/30/22     Future Appointments   Date Time Provider Department Center   8/9/2022  3:00 PM [facility] NURSE ONLY [facility] [place]   8/30/2022  2:40 PM Dylan Hernandez MD [facility]       Orthopaedic Surgery appointments are at the [facility] ([address] [place] [address]). Call [phone] to schedule a follow-up appointment at this location with your provider.     PLANNED DISCHARGE ORDERS:     DVT Prophylaxis: ASA 325mg every day x 4 weeks     Activity: WBAT      Wound Care: see Below      Discharge Medication List as of 7/29/2022  9:05 AM      START taking these medications    Details   aspirin (ASA) 325 MG EC tablet Take 1 tablet (325 mg) by mouth daily, Disp-30 tablet, R-0, E-Prescribe      cephALEXin (KEFLEX) 500 MG capsule Take 1 capsule (500 mg) by mouth 4 times daily for 14 days, Disp-56 capsule, R-0, E-Prescribe      HYDROmorphone (DILAUDID) 2 MG tablet Take 1-2 tablets (2-4 mg) by mouth every 4 hours as needed (Moderate pain), Disp-45 tablet, R-0, E-Prescribe      hydrOXYzine (ATARAX) 25 MG tablet Take 1 tablet (25 mg) by mouth every 6 hours as needed for itching or anxiety (with pain, moderate pain), Disp-30 tablet, R-0, E-Prescribe      methocarbamol (ROBAXIN) 750 MG tablet Take 1 tablet (750 mg) by mouth every 6 hours as needed for muscle spasms, Disp-40 tablet, " R-0, E-Prescribe      senna-docusate (SENOKOT-S/PERICOLACE) 8.6-50 MG tablet Take 1-2 tablets by mouth 2 times daily Take while on oral narcotics to prevent or treat constipation., Disp-30 tablet, R-0, E-PrescribeWhile taking narcotics         CONTINUE these medications which have CHANGED    Details   !! acetaminophen (TYLENOL) 325 MG tablet Take 2 tablets (650 mg) by mouth every 4 hours as needed for other, Disp-60 tablet, R-0, E-PrescribeDischarge today      !! acetaminophen (TYLENOL) 325 MG tablet Take 2 tablets (650 mg) by mouth every 4 hours as needed for other (mild pain), Disp-100 tablet, R-0, E-Prescribe      polyethylene glycol (MIRALAX) 17 g packet Take 17 g by mouth daily, Disp-7 packet, R-0, E-Prescribe       !! - Potential duplicate medications found. Please discuss with provider.      CONTINUE these medications which have NOT CHANGED    Details   amLODIPine (NORVASC) 5 MG tablet Take 1 tablet (5 mg) by mouth daily, Disp-90 tablet, R-3, E-Prescribe      cetirizine (ZYRTEC) 10 MG tablet Take 10 mg by mouth daily as needed for allergies, Historical      cholecalciferol 2000 UNITS CAPS Take 2,000 Units by mouth daily, Disp-90 capsule, R-3, E-Prescribe      eplerenone (INSPRA) 50 MG tablet Take 2 tablets (100 mg) by mouth 2 times daily, Disp-360 tablet, R-3, E-Prescribe      famotidine (PEPCID) 20 MG tablet Take 1 tablet (20 mg) by mouth 2 times daily, Disp-180 tablet, R-1, E-Prescribe      labetalol (NORMODYNE) 300 MG tablet Take 1 tablet (300 mg) by mouth 2 times daily, Disp-180 tablet, R-3, E-Prescribe      SUMAtriptan (IMITREX) 25 MG tablet TAKE 1 TO 2 TABLETS BY MOUTH AT ONSET OF HEADACHE FOR MIGRAINE. MAY REPEAT DOSE IN 2 HOURS. MAX OF 200MG OR 2 DOSES IN 24 HOURS, Disp-18 tablet, R-0, E-Prescribe         STOP taking these medications       oxyCODONE (ROXICODONE) 5 MG tablet Comments:   Reason for Stopping:                 Discharge Procedure Orders   Home Care Referral   Referral Priority: Routine:  "Next available opening Referral Type: Home Health Therapies & Aides   Number of Visits Requested: 1     Reason for your hospital stay   Order Comments: Left knee replacement     When to call - Contact Surgeon Team   Order Comments: You may experience symptoms that require follow-up before your scheduled appointment. Refer to the \"Stoplight Tool\" for instructions on when to contact your Surgeon Team if you are concerned about pain control, blood clots, constipation, or if you are unable to urinate.     When to call - Reach out to Urgent Care   Order Comments: If you are not able to reach your Surgeon Team and you need immediate care, go to the Orthopedic Walk-in Clinic or Urgent Care at your Surgeon's office.  Do NOT go to the Emergency Room unless you have shortness of breath, chest pain, or other signs of a medical emergency.     When to call - Reasons to Call 911   Order Comments: Call 911 immediately if you experience sudden-onset chest pain, arm weakness/numbness, slurred speech, or shortness of breath     Discharge Instruction - Breathing exercises   Order Comments: Perform breathing exercises using your Incentive Spirometer 10 times per hour while awake for 2 weeks.     Symptoms - Fever Management   Order Comments: A low grade fever can be expected after surgery.  Use acetaminophen (TYLENOL) as needed for fever management.  Contact your Surgeon Team if you have a fever greater than 101.5 F, chills, and/or night sweats.     Symptoms - Constipation management   Order Comments: Constipation (hard, dry bowel movements) is expected after surgery due to the combination of being less active, the anesthetic, and the opioid pain medication.  You can do the following to help reduce constipation:  ~  FLUIDS:  Drink clear liquids (water or Gatorade), or juice (apple/prune).  ~  DIET:  Eat a fiber rich diet.    ~  ACTIVITY:  Get up and move around several times a day.  Increase your activity as you are able.  MEDICATIONS:  " Reduce the risk of constipation by starting medications before you are constipated.  You can take Miralax   (1 packet as directed) and/or a stool softener (Senokot 1-2 tablets 1-2 times a day).  If you already have constipation and these medications are not working, you can get magnesium citrate and use as directed.  If you continue to have constipation you can try an over the counter suppository or enema.  Call your Surgeon Team if it has been greater than 3 days since your last bowel movement.     Symptoms - Reduced Urine Output   Order Comments: Changes in the amount of fluids you drank before and after surgery may result in problems urinating.  It is important to stay well-hydrated after surgery and drink plenty of water. If it has been greater than 8 hours since you have urinated despite drinking plenty of water, call your Surgeon Team.     Activity - Exercises to prevent blood clots   Order Comments: Unless otherwise directed by your Surgeon team, perform the following exercises at least three times per day for the first four weeks after surgery to prevent blood clots in your legs: 1) Point and flex your feet (Ankle Pumps), 2) Move your ankle around in big circles, 3) Wiggle your toes, 4) Walk, even for short distances, several times a day, will help decrease the risk of blood clots.     Order Specific Question Answer Comments   Is discharge order? Yes      Comfort and Pain Management - Pain after Surgery   Order Comments: Pain after surgery is normal and expected.  You will have some amount of pain for several weeks after surgery.  Your pain will improve with time.  There are several things you can do to help reduce your pain including: rest, ice, elevation, and using pain medications as needed. Contact your Surgeon Team if you have pain that persists or worsens after surgery despite rest, ice, elevation, and taking your medication(s) as prescribed. Contact your Surgeon Team if you have new numbness,  tingling, or weakness in your operative extremity.     Comfort and Pain Management - Swelling after Surgery   Order Comments: Swelling and/or bruising of the surgical extremity is common and may persist for several months after surgery. In addition to frequent icing and elevation, gentle compressive support with an ACE wrap or tubigrip may help with swelling. Apply compression regularly, removing at least twice daily to perform skin checks. Contact your Surgeon Team if your swelling increases and is NOT associated with an increase in your activity level, or if your swelling increases and is associated with redness and pain.     Comfort and Pain Management - Cold therapy   Order Comments: Ice can be used to control swelling and discomfort after surgery. Place a thin towel over your operative site and apply the ice pack overtop. Leave ice pack in place for 20 minutes, then remove for 20 minutes. Repeat this 20 minutes on/20 minutes off routine as often as tolerated.     Medication Instructions - Acetaminophen (TYLENOL) Instructions   Order Comments: You were discharged with acetaminophen (TYLENOL) for pain management after surgery. Acetaminophen most effectively manages pain symptoms when it is taken on a schedule without missing doses (every four, six, or eight hours). Your Provider will prescribe a safe daily dose between 3000 - 4000 mg.  Do NOT exceed this daily dose. Most patients use acetaminophen for pain control for the first four weeks after surgery.  You can wean from this medication as your pain decreases.     Medication Instructions - NSAID Instructions   Order Comments: You were discharged with an anti-inflammatory medication for pain management to use in combination with acetaminophen (TYLENOL) and the narcotic pain medication.  Take this medication exactly as directed.  You should only take one anti-inflammatory at a time.  Some common anti-inflammatories include: ibuprofen (ADVIL, MOTRIN), naproxen  (ALEVE, NAPROSYN), celecoxib (CELEBREX), meloxicam (MOBIC), ketorolac (TORADOL).  Take this medication with food and water.     Medication Instructions - Opioids - Tapering Instructions   Order Comments: In the first three days following surgery, your symptoms may warrant use of the narcotic pain medication every four to six hours as prescribed. This is normal. As your pain symptoms improve, focus your efforts on decreasing (tapering) use of narcotic medications. The most successful tapering strategy is to first, decrease the number of tablets you take every 4-6 hours to the minimum prescribed. Then, increase the amount of time between doses.  For example:  First, taper to   or 1 tablet every 4-6 hours.  Then, taper to   or 1 tablet every 6-8 hours.  Then, taper to   or 1 tablet every 8-10 hours.  Then, taper to   or 1 tablet every 10-12 hours.  Then, taper to   or 1 tablet at bedtime.  The bedtime dose can help with comfort during sleep and is typically the last dose to be discontinued after surgery.     Follow Up Care   Order Comments: Follow-up with your Surgeon Team in 2 weeks for wound check.     Brief Discharge Instructions   Order Comments: Post Operative Instructions for Adelaida Packer is a 52 year old female    Surgery: Left Total Knee Replacement on 7/27/22.    Follow up appointment:   You are scheduled to follow up with Dr. Hernandez's care team approximately 2 weeks after your surgery.    Contact clinic if you have any questions about the date or time.    Anticoagulation:   Take the Aspirin prescribed for the total of 4 weeks.  This is given to help minimize your risk of blood clot.    Activity:   -Continue to weight bear on your operative leg as tolerated by pain.  As you are more comfortable, you can discontinue use of the walker and transition to a cane.  Once you are comfortable with the cane, you can also wean from the cane and progress to using no assistive devices.  Do not transition until you are  "comfortable and have good balance.      -Continue to work on knee range of motion to prevent stiffness.      -When you are sitting, \"bridge the leg\" and keep the leg fully straight, with a bump under the heel to prevent a flexion contracture and ensure that you can fully straighten the knee.    -Work on getting your leg fully straight while walking - landing on your heel and progressing over the knee normally to prevent a flexion contracture.        Pain Medications:   -Take pain medications as prescribed.  Wean off the the narcotic pain medications (Oxycodone, Dilaudid, etc) as tolerated by pain.  Only take the narcotic pain medication if not relieved by the other medications.  To help with this, take the Tylenol and Celebrex (if prescribed) to minimize the amount of narcotic pain medication you need to take.      -Do not drive while on pain medications or until your leg feels well enough to do so.      Bandage Care and Showering:   -You can shower with the adhesive bandage covering your knee at the time of discharge.    -Remove the adhesive bandage 10 days after your surgery.  This can be removed at home.  Once removed, it is common to have a few scabs.  Let the scabs fall off on their own - they will do so over the next week or two.  The sutures are resorbable and nothing needs to be removed.    --Once the bandage is removed, you can shower without covering the incision.  Do not soak or bathe the incision in water.  Do not scrub the incision.  Just let the water from the shower run over the incision.    --Contact Dr. Hernandez or his staff if there is any drainage from the incision or redness.    Leg Swelling and Icing  -Continue icing the knee 5-6 times per day for approximately 20 minutes each time.  This will help with swelling.    -Some swelling in the leg is normal after surgery.  This type of swelling is usually gravity dependent and is improved in the morning after sleeping.  If the swelling is painful in the " "calf or the swelling is getting worse, contact Dr. Mosley's office.  We may recommend presenting to the Emergency Department to obtain an ultrasound of the leg if there is concern for a DVT.      -Elevate the leg if you are sitting as this will help reduce swelling.    Contact clinic if you note any of the following:  -Fevers greater than 101.5 chills  -Increased pain, redness, swelling or discharge at the surgical site.   -During regular business hours call Dr Mosley's office and request to speak with his nurse or the triage nurses. If you see him at [facility] call [phone].   -After hours or on weekends call the hospital  at [phone] and ask to speak with the Orthopaedic resident on call.     Medication instructions -  Anticoagulation - aspirin   Order Comments: Take the aspirin as prescribed for a total of four weeks after surgery.  This is given to help minimize your risk of blood clot.     Comfort and Pain Management - LOWER Extremity Elevation   Order Comments: Swelling is expected for several months after surgery. This type of swelling is usually associated with gravity and activity, and can be improved with elevation.   The best way to do this is to get your \"toes above your nose\" by laying down and placing several pillows lengthwise under your calf and heel. When elevating your leg keep your knee completely straight. Perform this elevation as often as possible especially for the first two weeks after surgery.     Medication Instructions - Opioid Instructions (Less than 65 years)   Order Comments: You were discharged with an opioid medication (hydromorphone, oxycodone, hydrocodone, or tramadol). This medication should only be taken for breakthrough pain that is not controlled with acetaminophen (TYLENOL). If you rate your pain less than 3 you do not need this medication.  Pain rating 0-3:  You do not need this medication.  Pain rating 4-6:  Take 1 tablet every 4-6 hours as needed  Pain " rating 7-10:  Take 2 tablets every 4-6 hours as needed.  Do not exceed 6 tablets per day     Activity - Total Knee Arthroplasty     Order Specific Question Answer Comments   Is discharge order? Yes      Return to Driving   Order Comments: Return to driving - Driving is NOT permitted until directed by your provider. Under no circumstance are you permitted to drive while using narcotic pain medications.     Order Specific Question Answer Comments   Is discharge order? Yes      Dressing / Wound Care - Wound   Order Comments: You have a clean dressing on your surgical wound. Dressing change instructions as follows: remove your dressing in 10 days, and leave incision open to air. Contact your Surgeon Team if you have increased redness, warmth around the surgical wound, and/or drainage from the surgical wound.     Dressing / Wound Care - NO Tub Bathing   Order Comments: Tub bathing, swimming, or any other activities that will cause your incision to be submerged in water should be avoided until allowed by your Surgeon.     NO Precautions   Order Comments: No precautions directed by your Provider.  You may perform range of motion activities as tolerated.     Order Specific Question Answer Comments   Is discharge order? Yes      Weight bearing as tolerated   Order Comments: Weight bearing as tolerated on your operative extremity.     Order Specific Question Answer Comments   Is discharge order? Yes      Dressing Wound Care - Shower with wound/dressing NOT covered   Order Comments: You do not need to cover your dressing or incision in the shower, you may allow water and soap to run over top of the surgical dressing or incision. You may shower 3 days after surgery.  You are strictly prohibited from soaking or submerging the surgical wound underwater.     Crutches DME   Order Comments: DME Documentation: Describe the reason for need to support medical necessity: Impaired gait status post knee surgery. I, the undersigned,  certify that the above prescribed supplies are medically necessary for this patient and is both reasonable and necessary in reference to accepted standards of medical practice in the treatment of this patient's condition and is not prescribed as a convenience.     Order Specific Question Answer Comments   DME Provider: [facility]    Crutch Type: Standard    Crutches Add On: NA    Length of Need: Lifetime      Cane DME   Order Comments: DME Documentation: Describe the reason for need to support medical necessity: Impaired gait status post knee surgery. I, the undersigned, certify that the above prescribed supplies are medically necessary for this patient and is both reasonable and necessary in reference to accepted standards of medical practice in the treatment of this patient's condition and is not prescribed as a convenience.     Order Specific Question Answer Comments   DME Provider: [facility]    Cane Type: Single Tip    Reminder: Patient can typically get 1 every 5 years      Walker DME   Order Comments: DME Documentation: Describe the reason for need to support medical necessity: Impaired gait status post knee surgery. I, the undersigned, certify that the above prescribed supplies are medically necessary for this patient and is both reasonable and necessary in reference to accepted standards of medical practice in the treatment of this patient's condition and is not prescribed as a convenience.     Order Specific Question Answer Comments   DME Provider: [facility]    Walker Type: Standard (2 Wheel)    Accessories: N/A      Discharge Instruction - Regular Diet Adult   Order Comments: Return to your pre-surgery diet unless instructed otherwise     Order Specific Question Answer Comments   Is discharge order? Yes            Marcus Gomez MD  Orthopaedic Surgery, PGY4

## 2022-07-29 NOTE — PLAN OF CARE
Pt. discharged at 1000 via wheelchair to home.  Pt. was accompanied by Son and left with personal belongings. Prior to discharge, PIV was removed. Pt. received complete discharge paperwork and all medications as filled by discharge pharmacy. Pt. was given times of last dose for all discharge medications in writing on discharge medication sheets.  Discharge teaching included medication, pain management, activity restrictions, dressing changes, and signs and symptoms of infection. Pt. to follow up with primary provider on 8/9 . Pt. had no further questions at the time of discharge and no unmet needs were identified.    Michelle Weldon RN

## 2022-08-01 ENCOUNTER — TELEPHONE (OUTPATIENT)
Dept: ORTHOPEDICS | Facility: CLINIC | Age: 52
End: 2022-08-01

## 2022-08-01 ENCOUNTER — MEDICAL CORRESPONDENCE (OUTPATIENT)
Dept: HEALTH INFORMATION MANAGEMENT | Facility: CLINIC | Age: 52
End: 2022-08-01

## 2022-08-01 NOTE — TELEPHONE ENCOUNTER
Spoke to Diogo Kay with [facility], who is requesting verbal orders for 1 visit this week and 2 visits per week for the next 3 weeks. Verbal orders were approved. He had no further questions at this time.

## 2022-08-02 ENCOUNTER — MYC MEDICAL ADVICE (OUTPATIENT)
Dept: ORTHOPEDICS | Facility: CLINIC | Age: 52
End: 2022-08-02

## 2022-08-02 DIAGNOSIS — M17.11 PRIMARY OSTEOARTHRITIS OF RIGHT KNEE: ICD-10-CM

## 2022-08-02 DIAGNOSIS — G89.29 CHRONIC PAIN OF LEFT KNEE: ICD-10-CM

## 2022-08-02 DIAGNOSIS — M25.562 CHRONIC PAIN OF LEFT KNEE: ICD-10-CM

## 2022-08-02 RX ORDER — OXYCODONE HYDROCHLORIDE 5 MG/1
5 TABLET ORAL EVERY 6 HOURS PRN
Qty: 12 TABLET | Refills: 0 | Status: CANCELLED | OUTPATIENT
Start: 2022-08-02 | End: 2022-08-05

## 2022-08-02 RX ORDER — HYDROXYZINE HYDROCHLORIDE 25 MG/1
25 TABLET, FILM COATED ORAL EVERY 6 HOURS PRN
Qty: 30 TABLET | Refills: 0 | Status: SHIPPED | OUTPATIENT
Start: 2022-08-02 | End: 2022-08-11

## 2022-08-02 RX ORDER — OXYCODONE HYDROCHLORIDE 5 MG/1
5-10 TABLET ORAL EVERY 4 HOURS PRN
Qty: 60 TABLET | Refills: 0 | Status: SHIPPED | OUTPATIENT
Start: 2022-08-02 | End: 2022-08-11

## 2022-08-02 RX ORDER — HYDROMORPHONE HYDROCHLORIDE 2 MG/1
2 TABLET ORAL EVERY 4 HOURS PRN
Qty: 40 TABLET | Refills: 0 | Status: SHIPPED | OUTPATIENT
Start: 2022-08-02 | End: 2022-08-02

## 2022-08-02 NOTE — CONFIDENTIAL NOTE
Called and talked with patient, she had a lot of pain after surgery due to timing of pain meds after surgery. This has been more well controlled at home since she is able to do her own dosing.   We discussed changing to Oxycodone to prevent further insurance issues when we stop Dilaudid. Patient agreed with this. Prescription routed to covering provider.   Called pharmacy and cancelled the dilaudid prescription. Also approved for cash payment of Oxycodone until we can get insurance approval.   Josette Dumont RN

## 2022-08-08 ENCOUNTER — TELEPHONE (OUTPATIENT)
Dept: FAMILY MEDICINE | Facility: CLINIC | Age: 52
End: 2022-08-08

## 2022-08-08 NOTE — TELEPHONE ENCOUNTER
Our goal is to have forms completed within 72 hours, however some forms may require a visit or additional information.    What clinic location was the form placed at [facility]    Who is the form from? [facility]  Where did the form come from? Faxed to clinic   The form was placed in the inbox of Dr. Gordillo    Please fax to [phone]    Additional comments:

## 2022-08-09 ENCOUNTER — ALLIED HEALTH/NURSE VISIT (OUTPATIENT)
Dept: NURSING | Facility: CLINIC | Age: 52
End: 2022-08-09
Payer: COMMERCIAL

## 2022-08-09 ENCOUNTER — TELEPHONE (OUTPATIENT)
Dept: ORTHOPEDICS | Facility: CLINIC | Age: 52
End: 2022-08-09

## 2022-08-09 DIAGNOSIS — Z96.652 S/P TOTAL KNEE ARTHROPLASTY, LEFT: ICD-10-CM

## 2022-08-09 DIAGNOSIS — Z96.652 S/P TOTAL KNEE ARTHROPLASTY, LEFT: Primary | ICD-10-CM

## 2022-08-09 PROCEDURE — 99207 PR NO CHARGE NURSE ONLY: CPT

## 2022-08-09 RX ORDER — METHOCARBAMOL 750 MG/1
750 TABLET, FILM COATED ORAL EVERY 6 HOURS PRN
Qty: 40 TABLET | Refills: 0 | Status: SHIPPED | OUTPATIENT
Start: 2022-08-09 | End: 2022-08-22

## 2022-08-09 NOTE — PATIENT INSTRUCTIONS
"No NSAIDs post op while on aspirin. This includes Advil (ibuprofen), Aleve (naproxen), and Mobic (meloxicam).    Continue anticoagulation plan of:  Aspirin 325mg once daily for 28 days.      If incision is dry, OK to leave open to air (no bandage). If incision is draining, cover with gauze and keep clean and dry and call the office. If steri strips (tapes) applied, let fall off on their own. Please do not apply any ointments or lotions to incision. No soaking in tubs or pools for 3 months after surgery.    If any swelling in leg(s), elevate surgical leg above heart (lay flat, elevate leg on blankets or pillows). Continue to wear compression stockings during the day as long you are having leg swelling. May stop wearing or wear intermittently if not having swelling.     *Please call if a sharp increase in pain, fall, change in movement or sensation, chest pain, calf pain, shortness of breath, fevers greater than 101.4, redness, erythema around the incision sites.    *If needing refills on pain meds, please call clinic at least 3 days before you run out.    *Continue physical therapy as directed.  If needing orders for Outpatient Physical therapy, please call the clinic.    *Continue to use assistive device: cane or crutch until no limp. Discuss with therapist if questions.    *Dr. Hernandez advises no dental work or cleaning until 6 months after any surgery with implants to hips or knees unless emergent issue arises. This includes total joint surgeries. Once you are 6 months past your surgery, you will need prophylactic antibiotics for the rest of your lifetime with any cleaning or dental work.  This is also for any other \"dirty\" procedures that you may have, such as a colonoscopy.    If you have any questions, call the clinic at [phone] and ask for the Orthopaedics dept.     ----------------------------------------------------------------------        Thanks for coming today.  [facility]  [address] [place] [address]    To schedule future appointments in Ortho Clinic, you may call [phone].    To schedule ordered imaging or an injection ordered by your provider:  Call [facility] Injection scheduling line: [phone]    MyChart available online at:  [email or web address]    Please call if any further questions or concerns ([phone]).  Clinic hours 8 am to 5 pm.    Return to clinic (call) if symptoms worsen or fail to improve.

## 2022-08-09 NOTE — PROGRESS NOTES
Adelaida Packer comes into clinic today at the request of Dr. Hernandez for suture removal and wound check. The patient is status post LTKA on 7/27/22.     Incision clean, dry and intact. Cut bilateral suture ends to incision.  Steri-strips removed. Incision cleaned. Pt instructed on wound care and symptoms of infection to watch for.     Discussed anticoagulation. Pt is currently taking aspirin 325mg once daily . Pt advised against any NSAIDs while on any anticoagulation med (ASA, Lovenox,or Coumadin).      Discussed current pain medication regime. Pt currently taking Oxycodone 1-2 tabs every hours. Hydroxyzine and Methocarbamol every 6 hours. Tylenol 2 tabs occasionally.     Discussed current physical therapy program. Pt is home therapy for 3 weeks, may be able to go longer. Order placed for outpatient Pt.     Patient using walker to aid in ambulation    Discussed edema. Patient has moderate edema, we will give ace wraps in clinic for patient to start to wrap her leg. She is also elevating     Reviewed Hip precautions.    Total time spent with Pt: 30 minutes.    Josette Dumont RN

## 2022-08-11 ENCOUNTER — MYC MEDICAL ADVICE (OUTPATIENT)
Dept: ORTHOPEDICS | Facility: CLINIC | Age: 52
End: 2022-08-11

## 2022-08-11 DIAGNOSIS — Z53.9 DIAGNOSIS NOT YET DEFINED: Primary | ICD-10-CM

## 2022-08-11 DIAGNOSIS — M25.562 CHRONIC PAIN OF LEFT KNEE: ICD-10-CM

## 2022-08-11 DIAGNOSIS — G89.29 CHRONIC PAIN OF LEFT KNEE: ICD-10-CM

## 2022-08-11 DIAGNOSIS — M17.11 PRIMARY OSTEOARTHRITIS OF RIGHT KNEE: ICD-10-CM

## 2022-08-11 PROCEDURE — G0180 MD CERTIFICATION HHA PATIENT: HCPCS | Performed by: FAMILY MEDICINE

## 2022-08-11 RX ORDER — HYDROXYZINE HYDROCHLORIDE 25 MG/1
25 TABLET, FILM COATED ORAL EVERY 6 HOURS PRN
Qty: 30 TABLET | Refills: 0 | Status: SHIPPED | OUTPATIENT
Start: 2022-08-11 | End: 2022-08-22

## 2022-08-11 RX ORDER — OXYCODONE HYDROCHLORIDE 5 MG/1
5-10 TABLET ORAL EVERY 4 HOURS PRN
Qty: 60 TABLET | Refills: 0 | Status: SHIPPED | OUTPATIENT
Start: 2022-08-11 | End: 2022-08-22

## 2022-08-16 ENCOUNTER — NURSE TRIAGE (OUTPATIENT)
Dept: FAMILY MEDICINE | Facility: CLINIC | Age: 52
End: 2022-08-16

## 2022-08-16 ENCOUNTER — TELEPHONE (OUTPATIENT)
Dept: FAMILY MEDICINE | Facility: CLINIC | Age: 52
End: 2022-08-16

## 2022-08-16 NOTE — TELEPHONE ENCOUNTER
RN was contacted by [facility] (Diogo BOONE) at [phone]. He reports that at his visit with patient today at 2pm  she reported CP that radiated to her mid back while showering. He explains that this CP lasted for 30 minutes and resolved when she lied down in the bed and rested. Diogo reports her vitals were WNL B/P 110/84. Denied SOB and further complaints. RN contacted writer to follow up with patient.      RN contacted patient to F/U on symptoms. Patient states that she developed sharp chest discomfort (under left breast) that radiated to her mid back. Patient is receiving home care due to 3 wks post surgical knee. She denies SOB, jaw pain, arm pain, or HA at the time the symptoms started. She did C/O dizziness and feeling faint and this is why she laid down.       She denies current CP, SOB or feeling faint.  Denies anxiety  Denies pain   Denies cardiac HX but does have a family HX (father)    RN advised patient to call 911 if symptoms return. Patient is currently living alone and has no one in the house caring for her. Patient verbalized understanding.     RN will consult with a provider for further advise.        Reason for Disposition    Chest pain lasting longer than 5 minutes and ANY of the following:* Over 44 years old* Over 30 years old and at least one cardiac risk factor (e.g., diabetes mellitus, high blood pressure, high cholesterol, smoker, or strong family history of heart disease)* History of heart disease (i.e., angina, heart attack, heart failure, bypass surgery, takes nitroglycerin)* Pain is crushing, pressure-like, or heavy    Major surgery in the past month    Additional Information    Negative: SEVERE difficulty breathing (e.g., struggling for each breath, speaks in single words)    Negative: Passed out (i.e., fainted, collapsed and was not responding)    Negative: Difficult to awaken or acting confused (e.g., disoriented, slurred speech)    Negative: Shock suspected (e.g.,  cold/pale/clammy skin, too weak to stand, low BP, rapid pulse)    Negative: Pain also in shoulder(s) or arm(s) or jaw    Negative: SEVERE chest pain    Negative: Difficulty breathing    Negative: Cocaine use within last 3 days    Protocols used: CHEST PAIN-A-OH    Karolyn Blood RN, BSN   [facility]

## 2022-08-16 NOTE — TELEPHONE ENCOUNTER
The concern would be whether this could represent something like a clot in her lungs.  Not likely given the normal vital signs and the fact that it has resolved.  So if patient feels back to normal I think it would be reasonable to keep an eye on this.  But if she has some concerns then she absolutely needs to be seen through urgent care so that somebody can lay an eyeball on her.

## 2022-08-16 NOTE — TELEPHONE ENCOUNTER
Routed to provider for an FYI    RN contacted patient and relayed message below. Patient reports that she would like to be seen in the ED and family will bring her. Patient verbalizes understanding to call 911 if her symptoms worsen or return.     Karolyn Blood RN, BSN   [facility]

## 2022-08-17 ENCOUNTER — TELEPHONE (OUTPATIENT)
Dept: FAMILY MEDICINE | Facility: CLINIC | Age: 52
End: 2022-08-17

## 2022-08-22 ENCOUNTER — MYC REFILL (OUTPATIENT)
Dept: ORTHOPEDICS | Facility: CLINIC | Age: 52
End: 2022-08-22

## 2022-08-22 DIAGNOSIS — G89.29 CHRONIC PAIN OF LEFT KNEE: ICD-10-CM

## 2022-08-22 DIAGNOSIS — N18.31 TYPE 2 DIABETES MELLITUS WITH STAGE 3A CHRONIC KIDNEY DISEASE, WITHOUT LONG-TERM CURRENT USE OF INSULIN (H): Primary | ICD-10-CM

## 2022-08-22 DIAGNOSIS — Z96.652 S/P TOTAL KNEE ARTHROPLASTY, LEFT: ICD-10-CM

## 2022-08-22 DIAGNOSIS — M25.562 CHRONIC PAIN OF LEFT KNEE: ICD-10-CM

## 2022-08-22 DIAGNOSIS — M17.11 PRIMARY OSTEOARTHRITIS OF RIGHT KNEE: ICD-10-CM

## 2022-08-22 DIAGNOSIS — E11.22 TYPE 2 DIABETES MELLITUS WITH STAGE 3A CHRONIC KIDNEY DISEASE, WITHOUT LONG-TERM CURRENT USE OF INSULIN (H): Primary | ICD-10-CM

## 2022-08-23 DIAGNOSIS — I12.9 HYPERTENSION, RENAL: ICD-10-CM

## 2022-08-23 RX ORDER — METHOCARBAMOL 750 MG/1
750 TABLET, FILM COATED ORAL EVERY 6 HOURS PRN
Qty: 40 TABLET | Refills: 0 | Status: SHIPPED | OUTPATIENT
Start: 2022-08-23 | End: 2023-03-07

## 2022-08-23 RX ORDER — HYDROXYZINE HYDROCHLORIDE 25 MG/1
25 TABLET, FILM COATED ORAL EVERY 6 HOURS PRN
Qty: 30 TABLET | Refills: 0 | Status: SHIPPED | OUTPATIENT
Start: 2022-08-23 | End: 2023-03-07

## 2022-08-23 RX ORDER — OXYCODONE HYDROCHLORIDE 5 MG/1
5-10 TABLET ORAL EVERY 4 HOURS PRN
Qty: 60 TABLET | Refills: 0 | Status: SHIPPED | OUTPATIENT
Start: 2022-08-23 | End: 2023-03-07

## 2022-08-23 NOTE — TELEPHONE ENCOUNTER
Prescription approved per [facility] Refill Protocol.    Monika Zamarripa, RN, BSN

## 2022-08-24 ENCOUNTER — TELEPHONE (OUTPATIENT)
Dept: ORTHOPEDICS | Facility: CLINIC | Age: 52
End: 2022-08-24

## 2022-08-24 RX ORDER — LABETALOL 300 MG/1
300 TABLET, FILM COATED ORAL 2 TIMES DAILY
Qty: 180 TABLET | Refills: 3 | OUTPATIENT
Start: 2022-08-24

## 2022-08-24 NOTE — TELEPHONE ENCOUNTER
[facility] Call Center    Phone Message    May a detailed message be left on voicemail: yes     Reason for Call: Other: Please call Diogo Kay PT for a verbal on 2 more pt sessions the week of 08/29 [phone] please can leave a confidential vm      Action Taken: Other: ortho     Travel Screening: Not Applicable

## 2022-08-25 DIAGNOSIS — I12.9 HYPERTENSION, RENAL: ICD-10-CM

## 2022-08-25 RX ORDER — LABETALOL 300 MG/1
300 TABLET, FILM COATED ORAL 2 TIMES DAILY
Qty: 180 TABLET | Refills: 3 | Status: SHIPPED | OUTPATIENT
Start: 2022-08-25 | End: 2023-08-07

## 2022-08-25 NOTE — TELEPHONE ENCOUNTER
8/25 Called and spoke to patient. She is currently scheduled for physical therapy.     Kristine robles Procedure   Orthopedics, Podiatry, Sports Medicine, ENT/Eye Specialties  [facility]   [phone]

## 2022-08-26 DIAGNOSIS — Z47.89 ORTHOPEDIC AFTERCARE: Primary | ICD-10-CM

## 2022-08-30 ENCOUNTER — OFFICE VISIT (OUTPATIENT)
Dept: ORTHOPEDICS | Facility: CLINIC | Age: 52
End: 2022-08-30
Payer: COMMERCIAL

## 2022-08-30 ENCOUNTER — ANCILLARY PROCEDURE (OUTPATIENT)
Dept: GENERAL RADIOLOGY | Facility: CLINIC | Age: 52
End: 2022-08-30
Attending: ORTHOPAEDIC SURGERY
Payer: COMMERCIAL

## 2022-08-30 DIAGNOSIS — Z47.89 ORTHOPEDIC AFTERCARE: ICD-10-CM

## 2022-08-30 DIAGNOSIS — Z47.89 ORTHOPEDIC AFTERCARE: Primary | ICD-10-CM

## 2022-08-30 PROCEDURE — 99024 POSTOP FOLLOW-UP VISIT: CPT | Performed by: ORTHOPAEDIC SURGERY

## 2022-08-30 PROCEDURE — 73564 X-RAY EXAM KNEE 4 OR MORE: CPT | Mod: RT | Performed by: RADIOLOGY

## 2022-08-30 RX ORDER — HYDROCODONE BITARTRATE AND ACETAMINOPHEN 5; 325 MG/1; MG/1
1 TABLET ORAL EVERY 6 HOURS PRN
Qty: 28 TABLET | Refills: 0 | Status: SHIPPED | OUTPATIENT
Start: 2022-08-30 | End: 2022-09-16

## 2022-08-30 NOTE — LETTER
8/30/2022         RE: Adelaida Packer  [address] [place] [address]        Dear Colleague,    Thank you for referring your patient, Adelaida Packer, to the [facility]. Please see a copy of my visit note below.    Chief Complaint: Surgical Followup (6 wks s/p left total knee arthroplasty DOS: 7/27/22 - swelling still -- wrapping at night and still having a hard time sleeping due to the pain)       HPI: Adelaida Packer returns today in follow-up for her left knee. She reports that she is a little frustrated with her motion. She is using 1 pain pill a day at night. Has been working in PT. Reports extension going well, flexion an issue.     Medications and allergies are documented in the EMR and have been reviewed.    Current Outpatient Medications:      HYDROcodone-acetaminophen (NORCO) 5-325 MG tablet, Take 1 tablet by mouth every 6 hours as needed for pain, Disp: 28 tablet, Rfl: 0     acetaminophen (TYLENOL) 325 MG tablet, Take 2 tablets (650 mg) by mouth every 4 hours as needed for other (mild pain), Disp: 100 tablet, Rfl: 0     amLODIPine (NORVASC) 5 MG tablet, Take 1 tablet (5 mg) by mouth daily (Patient taking differently: Take 5 mg by mouth every evening), Disp: 90 tablet, Rfl: 3     aspirin (ASA) 325 MG EC tablet, Take 1 tablet (325 mg) by mouth daily, Disp: 90 tablet, Rfl: 1     blood glucose (NO BRAND SPECIFIED) lancets standard, Use to test blood sugar 1-2 times daily or as directed., Disp: 200 each, Rfl: 3     blood glucose (NO BRAND SPECIFIED) test strip, Use to test blood sugar 1-2 times daily or as directed., Disp: 200 strip, Rfl: 3     blood glucose monitoring (NO BRAND SPECIFIED) meter device kit, Use to test blood sugar 1-2 times daily or as directed., Disp: 1 kit, Rfl: 0     cetirizine (ZYRTEC) 10 MG tablet, Take 10 mg by mouth daily as needed for allergies, Disp: , Rfl:      cholecalciferol 2000 UNITS CAPS, Take 2,000 Units by mouth daily, Disp: 90  capsule, Rfl: 3     eplerenone (INSPRA) 50 MG tablet, Take 2 tablets (100 mg) by mouth 2 times daily, Disp: 360 tablet, Rfl: 3     famotidine (PEPCID) 20 MG tablet, Take 1 tablet (20 mg) by mouth 2 times daily, Disp: 180 tablet, Rfl: 1     gabapentin (NEURONTIN) 100 MG capsule, Take 1 capsule (100 mg) by mouth 3 times daily, Disp: 90 capsule, Rfl: 0     hydrOXYzine (ATARAX) 25 MG tablet, Take 1 tablet (25 mg) by mouth every 6 hours as needed for itching or anxiety (with pain, moderate pain), Disp: 30 tablet, Rfl: 0     labetalol (NORMODYNE) 300 MG tablet, Take 1 tablet (300 mg) by mouth 2 times daily, Disp: 180 tablet, Rfl: 3     methocarbamol (ROBAXIN) 750 MG tablet, Take 1 tablet (750 mg) by mouth every 6 hours as needed for muscle spasms, Disp: 40 tablet, Rfl: 0     oxyCODONE (ROXICODONE) 5 MG tablet, Take 1-2 tablets (5-10 mg) by mouth every 4 hours as needed for pain, Disp: 60 tablet, Rfl: 0     polyethylene glycol (MIRALAX) 17 g packet, Take 17 g by mouth daily, Disp: 7 packet, Rfl: 0     senna-docusate (SENOKOT-S/PERICOLACE) 8.6-50 MG tablet, Take 1-2 tablets by mouth 2 times daily Take while on oral narcotics to prevent or treat constipation., Disp: 30 tablet, Rfl: 0     SUMAtriptan (IMITREX) 25 MG tablet, TAKE 1 TO 2 TABLETS BY MOUTH AT ONSET OF HEADACHE FOR MIGRAINE. MAY REPEAT DOSE IN 2 HOURS. MAX OF 200MG OR 2 DOSES IN 24 HOURS (Patient taking differently: Take 25 mg by mouth at onset of headache TAKE 1 TO 2 TABLETS BY MOUTH AT ONSET OF HEADACHE FOR MIGRAINE. MAY REPEAT DOSE IN 2 HOURS. MAX OF 200MG OR 2 DOSES IN 24 HOURS), Disp: 18 tablet, Rfl: 0  Allergies: Sulfa drugs, Hydrochlorothiazide w/triamterene, Maxzide [hydrochlorothiazide w/triamterene], Metoprolol, and Seasonal allergies    Physical Exam:  On physical examination the patient appears the stated age, is in no acute distress, affect is appropriate, and breathing is non-labored.  LMP 08/13/2005   There is no height or weight on file to  calculate BMI.    Left Knee  Appearance: benign  Clinical alignment: neutral   Effusion: no   Tenderness to palpation: medial   Extension:0  Flexion: 75  Collateral ligaments: intact  Stable in in the sagittal plane in mid-flexion.  Sciatic function normal and symmetric    X-rays:    I reviewed the x-rays dated today.  Previous films reviewed.    Findings:  Normal progression for a total knee arthroplasty without evidence of loosening or subsidence.    Assessment and Plan: behind on flexion. Rx pain med to facilitated more aggressive flexion. Cont with PT. RTC in 2 weeks. Discussed would consider manipulation is appropriate at that time.     No ref. provider found        Again, thank you for allowing me to participate in the care of your patient.        Sincerely,        Dylan Hernandez MD

## 2022-08-30 NOTE — PROGRESS NOTES
Chief Complaint: Surgical Followup (6 wks s/p left total knee arthroplasty DOS: 7/27/22 - swelling still -- wrapping at night and still having a hard time sleeping due to the pain)       HPI: Adelaida Packer returns today in follow-up for her left knee. She reports that she is a little frustrated with her motion. She is using 1 pain pill a day at night. Has been working in PT. Reports extension going well, flexion an issue.     Medications and allergies are documented in the EMR and have been reviewed.    Current Outpatient Medications:      HYDROcodone-acetaminophen (NORCO) 5-325 MG tablet, Take 1 tablet by mouth every 6 hours as needed for pain, Disp: 28 tablet, Rfl: 0     acetaminophen (TYLENOL) 325 MG tablet, Take 2 tablets (650 mg) by mouth every 4 hours as needed for other (mild pain), Disp: 100 tablet, Rfl: 0     amLODIPine (NORVASC) 5 MG tablet, Take 1 tablet (5 mg) by mouth daily (Patient taking differently: Take 5 mg by mouth every evening), Disp: 90 tablet, Rfl: 3     aspirin (ASA) 325 MG EC tablet, Take 1 tablet (325 mg) by mouth daily, Disp: 90 tablet, Rfl: 1     blood glucose (NO BRAND SPECIFIED) lancets standard, Use to test blood sugar 1-2 times daily or as directed., Disp: 200 each, Rfl: 3     blood glucose (NO BRAND SPECIFIED) test strip, Use to test blood sugar 1-2 times daily or as directed., Disp: 200 strip, Rfl: 3     blood glucose monitoring (NO BRAND SPECIFIED) meter device kit, Use to test blood sugar 1-2 times daily or as directed., Disp: 1 kit, Rfl: 0     cetirizine (ZYRTEC) 10 MG tablet, Take 10 mg by mouth daily as needed for allergies, Disp: , Rfl:      cholecalciferol 2000 UNITS CAPS, Take 2,000 Units by mouth daily, Disp: 90 capsule, Rfl: 3     eplerenone (INSPRA) 50 MG tablet, Take 2 tablets (100 mg) by mouth 2 times daily, Disp: 360 tablet, Rfl: 3     famotidine (PEPCID) 20 MG tablet, Take 1 tablet (20 mg) by mouth 2 times daily, Disp: 180 tablet, Rfl: 1     gabapentin (NEURONTIN)  100 MG capsule, Take 1 capsule (100 mg) by mouth 3 times daily, Disp: 90 capsule, Rfl: 0     hydrOXYzine (ATARAX) 25 MG tablet, Take 1 tablet (25 mg) by mouth every 6 hours as needed for itching or anxiety (with pain, moderate pain), Disp: 30 tablet, Rfl: 0     labetalol (NORMODYNE) 300 MG tablet, Take 1 tablet (300 mg) by mouth 2 times daily, Disp: 180 tablet, Rfl: 3     methocarbamol (ROBAXIN) 750 MG tablet, Take 1 tablet (750 mg) by mouth every 6 hours as needed for muscle spasms, Disp: 40 tablet, Rfl: 0     oxyCODONE (ROXICODONE) 5 MG tablet, Take 1-2 tablets (5-10 mg) by mouth every 4 hours as needed for pain, Disp: 60 tablet, Rfl: 0     polyethylene glycol (MIRALAX) 17 g packet, Take 17 g by mouth daily, Disp: 7 packet, Rfl: 0     senna-docusate (SENOKOT-S/PERICOLACE) 8.6-50 MG tablet, Take 1-2 tablets by mouth 2 times daily Take while on oral narcotics to prevent or treat constipation., Disp: 30 tablet, Rfl: 0     SUMAtriptan (IMITREX) 25 MG tablet, TAKE 1 TO 2 TABLETS BY MOUTH AT ONSET OF HEADACHE FOR MIGRAINE. MAY REPEAT DOSE IN 2 HOURS. MAX OF 200MG OR 2 DOSES IN 24 HOURS (Patient taking differently: Take 25 mg by mouth at onset of headache TAKE 1 TO 2 TABLETS BY MOUTH AT ONSET OF HEADACHE FOR MIGRAINE. MAY REPEAT DOSE IN 2 HOURS. MAX OF 200MG OR 2 DOSES IN 24 HOURS), Disp: 18 tablet, Rfl: 0  Allergies: Sulfa drugs, Hydrochlorothiazide w/triamterene, Maxzide [hydrochlorothiazide w/triamterene], Metoprolol, and Seasonal allergies    Physical Exam:  On physical examination the patient appears the stated age, is in no acute distress, affect is appropriate, and breathing is non-labored.  LMP 08/13/2005   There is no height or weight on file to calculate BMI.    Left Knee  Appearance: benign  Clinical alignment: neutral   Effusion: no   Tenderness to palpation: medial   Extension:0  Flexion: 75  Collateral ligaments: intact  Stable in in the sagittal plane in mid-flexion.  Sciatic function normal and  symmetric    X-rays:    I reviewed the x-rays dated today.  Previous films reviewed.    Findings:  Normal progression for a total knee arthroplasty without evidence of loosening or subsidence.    Assessment and Plan: behind on flexion. Rx pain med to facilitated more aggressive flexion. Cont with PT. RTC in 2 weeks. Discussed would consider manipulation is appropriate at that time.     No ref. provider found

## 2022-09-02 ENCOUNTER — THERAPY VISIT (OUTPATIENT)
Dept: PHYSICAL THERAPY | Facility: CLINIC | Age: 52
End: 2022-09-02
Payer: COMMERCIAL

## 2022-09-02 DIAGNOSIS — G89.29 CHRONIC PAIN OF LEFT KNEE: ICD-10-CM

## 2022-09-02 DIAGNOSIS — M25.562 CHRONIC PAIN OF LEFT KNEE: ICD-10-CM

## 2022-09-02 DIAGNOSIS — Z47.1 AFTERCARE FOLLOWING LEFT KNEE JOINT REPLACEMENT SURGERY: ICD-10-CM

## 2022-09-02 DIAGNOSIS — Z96.652 AFTERCARE FOLLOWING LEFT KNEE JOINT REPLACEMENT SURGERY: ICD-10-CM

## 2022-09-02 DIAGNOSIS — Z96.652 S/P TOTAL KNEE ARTHROPLASTY, LEFT: ICD-10-CM

## 2022-09-02 DIAGNOSIS — R60.0 LOCALIZED EDEMA: ICD-10-CM

## 2022-09-02 PROCEDURE — 97161 PT EVAL LOW COMPLEX 20 MIN: CPT | Mod: GP | Performed by: PHYSICAL THERAPIST

## 2022-09-02 PROCEDURE — 97016 VASOPNEUMATIC DEVICE THERAPY: CPT | Mod: GP | Performed by: PHYSICAL THERAPIST

## 2022-09-02 PROCEDURE — 97110 THERAPEUTIC EXERCISES: CPT | Mod: GP | Performed by: PHYSICAL THERAPIST

## 2022-09-02 ASSESSMENT — ACTIVITIES OF DAILY LIVING (ADL)
KNEE_ACTIVITY_OF_DAILY_LIVING_SCORE: 40
STIFFNESS: THE SYMPTOM AFFECTS MY ACTIVITY SEVERELY
SWELLING: THE SYMPTOM AFFECTS MY ACTIVITY SEVERELY
KNEEL ON THE FRONT OF YOUR KNEE: I AM UNABLE TO DO THE ACTIVITY
HOW_WOULD_YOU_RATE_THE_CURRENT_FUNCTION_OF_YOUR_KNEE_DURING_YOUR_USUAL_DAILY_ACTIVITIES_ON_A_SCALE_FROM_0_TO_100_WITH_100_BEING_YOUR_LEVEL_OF_KNEE_FUNCTION_PRIOR_TO_YOUR_INJURY_AND_0_BEING_THE_INABILITY_TO_PERFORM_ANY_OF_YOUR_USUAL_DAILY_ACTIVITIES?: 10
RISE FROM A CHAIR: ACTIVITY IS MINIMALLY DIFFICULT
WALK: ACTIVITY IS SOMEWHAT DIFFICULT
LIMPING: THE SYMPTOM AFFECTS MY ACTIVITY SLIGHTLY
AS_A_RESULT_OF_YOUR_KNEE_INJURY,_HOW_WOULD_YOU_RATE_YOUR_CURRENT_LEVEL_OF_DAILY_ACTIVITY?: SEVERELY ABNORMAL
STAND: ACTIVITY IS SOMEWHAT DIFFICULT
HOW_WOULD_YOU_RATE_THE_OVERALL_FUNCTION_OF_YOUR_KNEE_DURING_YOUR_USUAL_DAILY_ACTIVITIES?: ABNORMAL
WEAKNESS: THE SYMPTOM AFFECTS MY ACTIVITY MODERATELY
RAW_SCORE: 28
KNEE_ACTIVITY_OF_DAILY_LIVING_SUM: 28
SIT WITH YOUR KNEE BENT: ACTIVITY IS VERY DIFFICULT
SQUAT: I AM UNABLE TO DO THE ACTIVITY
GO DOWN STAIRS: ACTIVITY IS SOMEWHAT DIFFICULT
GIVING WAY, BUCKLING OR SHIFTING OF KNEE: THE SYMPTOM AFFECTS MY ACTIVITY MODERATELY
PAIN: THE SYMPTOM AFFECTS MY ACTIVITY MODERATELY
GO UP STAIRS: ACTIVITY IS SOMEWHAT DIFFICULT

## 2022-09-02 NOTE — PROGRESS NOTES
Physical Therapy Initial Evaluation  Subjective:  The history is provided by the patient. No  was used.   Patient Health History  Adelaida Packer being seen for L TKA.     Problem began: 7/27/2022 (date of surgery).   Problem occurred: DJD   Pain is reported as 5/10 on pain scale.  General health as reported by patient is good.  Pertinent medical history includes: high blood pressure, kidney disease and overweight.   Red flags:  None as reported by patient.  Medical allergies: none.   Surgeries include:  Orthopedic surgery. Other surgery history details: B TKA.    Current medications:  High blood pressure medication, pain medication and muscle relaxants.    Current occupation is Teacher.   Primary job tasks include:  Prolonged standing, prolonged sitting and computer work.                  Therapist Generated HPI Evaluation  Problem details: Chronic knee pain due to DJD.  TKA performed on 7/27/22.  Has been working with home care PT for the last month.         Type of problem:  Left knee.    This is a chronic condition.  Condition occurred with:  Degenerative joint disease.  Where condition occurred: for unknown reasons.  Patient reports pain:  Anterior.  Pain is described as aching and is intermittent.  Pain is the same all the time.  Since onset symptoms are gradually improving.  Associated symptoms:  Loss of motion/stiffness, loss of strength and edema. Symptoms are exacerbated by ascending stairs, descending stairs, walking and bending/squatting  and relieved by ice and analgesics.  Special tests included:  X-ray.    Restrictions due to condition include:  Working in normal job without restrictions.  Barriers include:  Stairs.                        Objective:    Gait:    Gait Type:  Antalgic   Weight Bearing Status:  WBAT   Assistive Devices:  Cane  Deviations:  Knee:  Knee flexion decr L                                                      Knee Evaluation:  ROM:    AROM    Hyperextension:  Left:     Right: 2  Extension: Left: 1    Right:   Flexion: Left: 67    Right: 119  PROM        Flexion: Left: 74   Right:       Strength:         Quad Set Left:  Good    Pain: -   Quad Set Right:  Good    Pain: -        Edema:  Edema of the knee: 2 cm difference in jt line circumference.  Generalized edema above and below the knee in the soft tissues too.            General     ROS    Assessment/Plan:    Patient is a 52 year old female with left knee complaints.    Patient has the following significant findings with corresponding treatment plan.                Diagnosis 1:  Knee pain s/p TKA  Pain -  self management, education and home program  Decreased ROM/flexibility - manual therapy, therapeutic exercise and home program  Decreased strength - therapeutic exercise, therapeutic activities and home program  Edema - vasopneumatics and self management/home program  Impaired gait - gait training and home program  Decreased function - therapeutic activities and home program    Cumulative Therapy Evaluation is: Low complexity.    Previous and current functional limitations:  (See Goal Flow Sheet for this information)    Short term and Long term goals: (See Goal Flow Sheet for this information)     Communication ability:  Patient appears to be able to clearly communicate and understand verbal and written communication and follow directions correctly.  Treatment Explanation - The following has been discussed with the patient:   RX ordered/plan of care  Anticipated outcomes  Possible risks and side effects  This patient would benefit from PT intervention to resume normal activities.   Rehab potential is good.    Frequency:  2 X week, once daily  Duration:  for 6 weeks  Discharge Plan:  Achieve all LTG.  Independent in home treatment program.  Reach maximal therapeutic benefit.    Please refer to the daily flowsheet for treatment today, total treatment time and time spent performing 1:1 timed codes.

## 2022-09-08 ENCOUNTER — TELEPHONE (OUTPATIENT)
Dept: ORTHOPEDICS | Facility: CLINIC | Age: 52
End: 2022-09-08

## 2022-09-08 NOTE — TELEPHONE ENCOUNTER
Writer re-faxed forms. Writer emailed copy of forms to patient.   Dot Gnozalez on 9/8/2022 at 4:00 PM

## 2022-09-08 NOTE — TELEPHONE ENCOUNTER
[facility] Call Center    Phone Message    May a detailed message be left on voicemail: yes     Reason for Call Velia from [facility] sent form for Signature for Doctor. She never received it . Please call Velia   Action Taken: Message routed to:  [facility] chandler    Travel Screening: Not Applicable

## 2022-09-12 ENCOUNTER — THERAPY VISIT (OUTPATIENT)
Dept: PHYSICAL THERAPY | Facility: CLINIC | Age: 52
End: 2022-09-12
Payer: COMMERCIAL

## 2022-09-12 DIAGNOSIS — Z47.1 AFTERCARE FOLLOWING LEFT KNEE JOINT REPLACEMENT SURGERY: Primary | ICD-10-CM

## 2022-09-12 DIAGNOSIS — R60.0 LOCALIZED EDEMA: ICD-10-CM

## 2022-09-12 DIAGNOSIS — M25.562 CHRONIC PAIN OF LEFT KNEE: ICD-10-CM

## 2022-09-12 DIAGNOSIS — G89.29 CHRONIC PAIN OF LEFT KNEE: ICD-10-CM

## 2022-09-12 DIAGNOSIS — Z96.652 S/P TOTAL KNEE ARTHROPLASTY, LEFT: ICD-10-CM

## 2022-09-12 DIAGNOSIS — Z96.652 AFTERCARE FOLLOWING LEFT KNEE JOINT REPLACEMENT SURGERY: Primary | ICD-10-CM

## 2022-09-12 PROCEDURE — 97110 THERAPEUTIC EXERCISES: CPT | Mod: GP | Performed by: PHYSICAL THERAPIST

## 2022-09-13 ENCOUNTER — OFFICE VISIT (OUTPATIENT)
Dept: ORTHOPEDICS | Facility: CLINIC | Age: 52
End: 2022-09-13
Payer: COMMERCIAL

## 2022-09-13 DIAGNOSIS — Z47.89 ORTHOPEDIC AFTERCARE: Primary | ICD-10-CM

## 2022-09-13 PROCEDURE — 99024 POSTOP FOLLOW-UP VISIT: CPT | Performed by: ORTHOPAEDIC SURGERY

## 2022-09-13 NOTE — LETTER
9/13/2022         RE: Adelaida Pacekr  [address] [facility] [address] [place] [address]        Dear Colleague,    Thank you for referring your patient, Adelaida Packer, to the [facility]. Please see a copy of my visit note below.    Chief Complaint: Surgical Followup (7 wks s/p left total knee arthroplasty DOS: 7/27/22 - doing well)       HPI: Adelaida Packer returns today in follow-up for her knee. ROM has imporved to -1-90 but remains short of goal. Tylenol for pain. Reports that very much does not want a manipulation.    Medications and allergies are documented in the EMR and have been reviewed.    Current Outpatient Medications:      acetaminophen (TYLENOL) 325 MG tablet, Take 2 tablets (650 mg) by mouth every 4 hours as needed for other (mild pain), Disp: 100 tablet, Rfl: 0     amLODIPine (NORVASC) 5 MG tablet, Take 1 tablet (5 mg) by mouth daily (Patient taking differently: Take 5 mg by mouth every evening), Disp: 90 tablet, Rfl: 3     aspirin (ASA) 325 MG EC tablet, Take 1 tablet (325 mg) by mouth daily, Disp: 90 tablet, Rfl: 1     blood glucose (NO BRAND SPECIFIED) lancets standard, Use to test blood sugar 1-2 times daily or as directed., Disp: 200 each, Rfl: 3     blood glucose (NO BRAND SPECIFIED) test strip, Use to test blood sugar 1-2 times daily or as directed., Disp: 200 strip, Rfl: 3     blood glucose monitoring (NO BRAND SPECIFIED) meter device kit, Use to test blood sugar 1-2 times daily or as directed., Disp: 1 kit, Rfl: 0     cetirizine (ZYRTEC) 10 MG tablet, Take 10 mg by mouth daily as needed for allergies, Disp: , Rfl:      cholecalciferol 2000 UNITS CAPS, Take 2,000 Units by mouth daily, Disp: 90 capsule, Rfl: 3     eplerenone (INSPRA) 50 MG tablet, Take 2 tablets (100 mg) by mouth 2 times daily, Disp: 360 tablet, Rfl: 3     famotidine (PEPCID) 20 MG tablet, Take 1 tablet (20 mg) by mouth 2 times daily, Disp: 180 tablet, Rfl: 1     gabapentin (NEURONTIN) 100 MG  capsule, Take 1 capsule (100 mg) by mouth 3 times daily, Disp: 90 capsule, Rfl: 0     HYDROcodone-acetaminophen (NORCO) 5-325 MG tablet, Take 1 tablet by mouth every 6 hours as needed for pain, Disp: 28 tablet, Rfl: 0     hydrOXYzine (ATARAX) 25 MG tablet, Take 1 tablet (25 mg) by mouth every 6 hours as needed for itching or anxiety (with pain, moderate pain), Disp: 30 tablet, Rfl: 0     labetalol (NORMODYNE) 300 MG tablet, Take 1 tablet (300 mg) by mouth 2 times daily, Disp: 180 tablet, Rfl: 3     methocarbamol (ROBAXIN) 750 MG tablet, Take 1 tablet (750 mg) by mouth every 6 hours as needed for muscle spasms, Disp: 40 tablet, Rfl: 0     oxyCODONE (ROXICODONE) 5 MG tablet, Take 1-2 tablets (5-10 mg) by mouth every 4 hours as needed for pain, Disp: 60 tablet, Rfl: 0     polyethylene glycol (MIRALAX) 17 g packet, Take 17 g by mouth daily, Disp: 7 packet, Rfl: 0     senna-docusate (SENOKOT-S/PERICOLACE) 8.6-50 MG tablet, Take 1-2 tablets by mouth 2 times daily Take while on oral narcotics to prevent or treat constipation., Disp: 30 tablet, Rfl: 0     SUMAtriptan (IMITREX) 25 MG tablet, TAKE 1 TO 2 TABLETS BY MOUTH AT ONSET OF HEADACHE FOR MIGRAINE. MAY REPEAT DOSE IN 2 HOURS. MAX OF 200MG OR 2 DOSES IN 24 HOURS (Patient taking differently: Take 25 mg by mouth at onset of headache TAKE 1 TO 2 TABLETS BY MOUTH AT ONSET OF HEADACHE FOR MIGRAINE. MAY REPEAT DOSE IN 2 HOURS. MAX OF 200MG OR 2 DOSES IN 24 HOURS), Disp: 18 tablet, Rfl: 0  Allergies: Sulfa drugs, Hydrochlorothiazide w/triamterene, Maxzide [hydrochlorothiazide w/triamterene], Metoprolol, and Seasonal allergies    Physical Exam:  On physical examination the patient appears the stated age, is in no acute distress, affect is appropriate, and breathing is non-labored.  LMP 08/13/2005   There is no height or weight on file to calculate BMI.  Gait: normal   Incision: healed   ROM: 0-90    Assessment: short of goal but improving. This is about where she was on the  right side at 6 weeks and went on to  120 of flexion with physical therapy. Would like to do the same here   Plan: she is going to check in by phone in 2 weeks and we will review her progress.     No ref. provider found        Again, thank you for allowing me to participate in the care of your patient.        Sincerely,        Dylan Hernandez MD

## 2022-09-13 NOTE — PROGRESS NOTES
Chief Complaint: Surgical Followup (7 wks s/p left total knee arthroplasty DOS: 7/27/22 - doing well)       HPI: Adelaida Packer returns today in follow-up for her knee. ROM has imporved to -1-90 but remains short of goal. Tylenol for pain. Reports that very much does not want a manipulation.    Medications and allergies are documented in the EMR and have been reviewed.    Current Outpatient Medications:      acetaminophen (TYLENOL) 325 MG tablet, Take 2 tablets (650 mg) by mouth every 4 hours as needed for other (mild pain), Disp: 100 tablet, Rfl: 0     amLODIPine (NORVASC) 5 MG tablet, Take 1 tablet (5 mg) by mouth daily (Patient taking differently: Take 5 mg by mouth every evening), Disp: 90 tablet, Rfl: 3     aspirin (ASA) 325 MG EC tablet, Take 1 tablet (325 mg) by mouth daily, Disp: 90 tablet, Rfl: 1     blood glucose (NO BRAND SPECIFIED) lancets standard, Use to test blood sugar 1-2 times daily or as directed., Disp: 200 each, Rfl: 3     blood glucose (NO BRAND SPECIFIED) test strip, Use to test blood sugar 1-2 times daily or as directed., Disp: 200 strip, Rfl: 3     blood glucose monitoring (NO BRAND SPECIFIED) meter device kit, Use to test blood sugar 1-2 times daily or as directed., Disp: 1 kit, Rfl: 0     cetirizine (ZYRTEC) 10 MG tablet, Take 10 mg by mouth daily as needed for allergies, Disp: , Rfl:      cholecalciferol 2000 UNITS CAPS, Take 2,000 Units by mouth daily, Disp: 90 capsule, Rfl: 3     eplerenone (INSPRA) 50 MG tablet, Take 2 tablets (100 mg) by mouth 2 times daily, Disp: 360 tablet, Rfl: 3     famotidine (PEPCID) 20 MG tablet, Take 1 tablet (20 mg) by mouth 2 times daily, Disp: 180 tablet, Rfl: 1     gabapentin (NEURONTIN) 100 MG capsule, Take 1 capsule (100 mg) by mouth 3 times daily, Disp: 90 capsule, Rfl: 0     HYDROcodone-acetaminophen (NORCO) 5-325 MG tablet, Take 1 tablet by mouth every 6 hours as needed for pain, Disp: 28 tablet, Rfl: 0     hydrOXYzine (ATARAX) 25 MG tablet, Take 1  tablet (25 mg) by mouth every 6 hours as needed for itching or anxiety (with pain, moderate pain), Disp: 30 tablet, Rfl: 0     labetalol (NORMODYNE) 300 MG tablet, Take 1 tablet (300 mg) by mouth 2 times daily, Disp: 180 tablet, Rfl: 3     methocarbamol (ROBAXIN) 750 MG tablet, Take 1 tablet (750 mg) by mouth every 6 hours as needed for muscle spasms, Disp: 40 tablet, Rfl: 0     oxyCODONE (ROXICODONE) 5 MG tablet, Take 1-2 tablets (5-10 mg) by mouth every 4 hours as needed for pain, Disp: 60 tablet, Rfl: 0     polyethylene glycol (MIRALAX) 17 g packet, Take 17 g by mouth daily, Disp: 7 packet, Rfl: 0     senna-docusate (SENOKOT-S/PERICOLACE) 8.6-50 MG tablet, Take 1-2 tablets by mouth 2 times daily Take while on oral narcotics to prevent or treat constipation., Disp: 30 tablet, Rfl: 0     SUMAtriptan (IMITREX) 25 MG tablet, TAKE 1 TO 2 TABLETS BY MOUTH AT ONSET OF HEADACHE FOR MIGRAINE. MAY REPEAT DOSE IN 2 HOURS. MAX OF 200MG OR 2 DOSES IN 24 HOURS (Patient taking differently: Take 25 mg by mouth at onset of headache TAKE 1 TO 2 TABLETS BY MOUTH AT ONSET OF HEADACHE FOR MIGRAINE. MAY REPEAT DOSE IN 2 HOURS. MAX OF 200MG OR 2 DOSES IN 24 HOURS), Disp: 18 tablet, Rfl: 0  Allergies: Sulfa drugs, Hydrochlorothiazide w/triamterene, Maxzide [hydrochlorothiazide w/triamterene], Metoprolol, and Seasonal allergies    Physical Exam:  On physical examination the patient appears the stated age, is in no acute distress, affect is appropriate, and breathing is non-labored.  LMP 08/13/2005   There is no height or weight on file to calculate BMI.  Gait: normal   Incision: healed   ROM: 0-90    Assessment: short of goal but improving. This is about where she was on the right side at 6 weeks and went on to  120 of flexion with physical therapy. Would like to do the same here   Plan: she is going to check in by phone in 2 weeks and we will review her progress.     No ref. provider found

## 2022-09-15 ENCOUNTER — THERAPY VISIT (OUTPATIENT)
Dept: PHYSICAL THERAPY | Facility: CLINIC | Age: 52
End: 2022-09-15
Payer: COMMERCIAL

## 2022-09-15 DIAGNOSIS — Z96.652 S/P TOTAL KNEE ARTHROPLASTY, LEFT: ICD-10-CM

## 2022-09-15 DIAGNOSIS — Z47.1 AFTERCARE FOLLOWING LEFT KNEE JOINT REPLACEMENT SURGERY: Primary | ICD-10-CM

## 2022-09-15 DIAGNOSIS — G89.29 CHRONIC PAIN OF LEFT KNEE: ICD-10-CM

## 2022-09-15 DIAGNOSIS — Z96.652 AFTERCARE FOLLOWING LEFT KNEE JOINT REPLACEMENT SURGERY: Primary | ICD-10-CM

## 2022-09-15 DIAGNOSIS — M25.562 CHRONIC PAIN OF LEFT KNEE: ICD-10-CM

## 2022-09-15 DIAGNOSIS — R60.0 LOCALIZED EDEMA: ICD-10-CM

## 2022-09-15 PROCEDURE — 97110 THERAPEUTIC EXERCISES: CPT | Mod: GP | Performed by: PHYSICAL THERAPIST

## 2022-09-15 PROCEDURE — 97140 MANUAL THERAPY 1/> REGIONS: CPT | Mod: GP | Performed by: PHYSICAL THERAPIST

## 2022-09-16 ENCOUNTER — TELEPHONE (OUTPATIENT)
Dept: ORTHOPEDICS | Facility: CLINIC | Age: 52
End: 2022-09-16

## 2022-09-16 ENCOUNTER — MYC REFILL (OUTPATIENT)
Dept: ORTHOPEDICS | Facility: CLINIC | Age: 52
End: 2022-09-16

## 2022-09-16 DIAGNOSIS — Z47.89 ORTHOPEDIC AFTERCARE: ICD-10-CM

## 2022-09-16 DIAGNOSIS — M17.11 PRIMARY OSTEOARTHRITIS OF RIGHT KNEE: ICD-10-CM

## 2022-09-16 RX ORDER — GABAPENTIN 100 MG/1
100 CAPSULE ORAL 3 TIMES DAILY
Qty: 90 CAPSULE | Refills: 0 | Status: SHIPPED | OUTPATIENT
Start: 2022-09-16 | End: 2023-04-07

## 2022-09-16 RX ORDER — HYDROCODONE BITARTRATE AND ACETAMINOPHEN 5; 325 MG/1; MG/1
1 TABLET ORAL EVERY 6 HOURS PRN
Qty: 28 TABLET | Refills: 0 | Status: SHIPPED | OUTPATIENT
Start: 2022-09-16 | End: 2023-03-07

## 2022-09-16 NOTE — TELEPHONE ENCOUNTER
Called and talked with patient, we discussed her pain and what she is currently taking. She is mostly using Tylenol, but her pain with burning and shooting pain is hard to deal with at night. She had taken the gabapentin occasionally before and with her other surgery and this has helped with her other surgery. She also has taken an occasional Norco. She has been pushing herself to try and get her ROM back and has had an increase in pain.   Prescriptions sent to providers.  Also discussed going back to work and waiting until after her appointment on 9/27 to see if she needs to have a manipulation before going back work on 9/26.  Patient appreciated discussion. No other questions at this time.  Josette Dumont RN

## 2022-09-16 NOTE — TELEPHONE ENCOUNTER
[facility] Call Center    Phone Message    May a detailed message be left on voicemail: no     Reason for Call: Other: Requesting c/b from Dr Hernandez's nurse and also would like to schedule a telephone visit w/Dr Hernandez, but his template wouldn't allow me to do that    Action Taken: Message routed to:  [facility] ([facility]    Travel Screening: Not Applicable

## 2022-09-16 NOTE — LETTER
September 16, 2022      RE: Adelaida Packer  [address] [facility]  [place] [address]       To whom it may concern:    Adelaida Pcaker was seen in our clinic. She will need to remain off of work until her follow up on 9/27/22. At this time her return to work date will be reassessed.     Sincerely,      Dylan Hernandez MD

## 2022-09-19 ENCOUNTER — THERAPY VISIT (OUTPATIENT)
Dept: PHYSICAL THERAPY | Facility: CLINIC | Age: 52
End: 2022-09-19
Payer: COMMERCIAL

## 2022-09-19 DIAGNOSIS — Z96.652 AFTERCARE FOLLOWING LEFT KNEE JOINT REPLACEMENT SURGERY: Primary | ICD-10-CM

## 2022-09-19 DIAGNOSIS — Z96.652 S/P TOTAL KNEE ARTHROPLASTY, LEFT: ICD-10-CM

## 2022-09-19 DIAGNOSIS — M25.562 CHRONIC PAIN OF LEFT KNEE: ICD-10-CM

## 2022-09-19 DIAGNOSIS — R60.0 LOCALIZED EDEMA: ICD-10-CM

## 2022-09-19 DIAGNOSIS — Z47.1 AFTERCARE FOLLOWING LEFT KNEE JOINT REPLACEMENT SURGERY: Primary | ICD-10-CM

## 2022-09-19 DIAGNOSIS — G89.29 CHRONIC PAIN OF LEFT KNEE: ICD-10-CM

## 2022-09-19 PROCEDURE — 97110 THERAPEUTIC EXERCISES: CPT | Mod: GP | Performed by: PHYSICAL THERAPIST

## 2022-09-19 PROCEDURE — 97140 MANUAL THERAPY 1/> REGIONS: CPT | Mod: GP | Performed by: PHYSICAL THERAPIST

## 2022-09-21 ENCOUNTER — THERAPY VISIT (OUTPATIENT)
Dept: PHYSICAL THERAPY | Facility: CLINIC | Age: 52
End: 2022-09-21
Payer: COMMERCIAL

## 2022-09-21 DIAGNOSIS — R60.0 LOCALIZED EDEMA: ICD-10-CM

## 2022-09-21 DIAGNOSIS — Z96.652 AFTERCARE FOLLOWING LEFT KNEE JOINT REPLACEMENT SURGERY: Primary | ICD-10-CM

## 2022-09-21 DIAGNOSIS — Z47.1 AFTERCARE FOLLOWING LEFT KNEE JOINT REPLACEMENT SURGERY: Primary | ICD-10-CM

## 2022-09-21 DIAGNOSIS — G89.29 CHRONIC PAIN OF LEFT KNEE: ICD-10-CM

## 2022-09-21 DIAGNOSIS — M25.562 CHRONIC PAIN OF LEFT KNEE: ICD-10-CM

## 2022-09-21 DIAGNOSIS — Z96.652 S/P TOTAL KNEE ARTHROPLASTY, LEFT: ICD-10-CM

## 2022-09-21 PROCEDURE — 97110 THERAPEUTIC EXERCISES: CPT | Mod: GP | Performed by: PHYSICAL THERAPIST

## 2022-09-21 PROCEDURE — 97140 MANUAL THERAPY 1/> REGIONS: CPT | Mod: GP | Performed by: PHYSICAL THERAPIST

## 2022-09-23 ENCOUNTER — THERAPY VISIT (OUTPATIENT)
Dept: PHYSICAL THERAPY | Facility: CLINIC | Age: 52
End: 2022-09-23
Payer: COMMERCIAL

## 2022-09-23 DIAGNOSIS — M25.562 CHRONIC PAIN OF LEFT KNEE: ICD-10-CM

## 2022-09-23 DIAGNOSIS — Z96.652 AFTERCARE FOLLOWING LEFT KNEE JOINT REPLACEMENT SURGERY: Primary | ICD-10-CM

## 2022-09-23 DIAGNOSIS — G89.29 CHRONIC PAIN OF LEFT KNEE: ICD-10-CM

## 2022-09-23 DIAGNOSIS — R60.0 LOCALIZED EDEMA: ICD-10-CM

## 2022-09-23 DIAGNOSIS — Z96.652 S/P TOTAL KNEE ARTHROPLASTY, LEFT: ICD-10-CM

## 2022-09-23 DIAGNOSIS — Z47.1 AFTERCARE FOLLOWING LEFT KNEE JOINT REPLACEMENT SURGERY: Primary | ICD-10-CM

## 2022-09-23 PROCEDURE — 97110 THERAPEUTIC EXERCISES: CPT | Mod: GP

## 2022-09-23 PROCEDURE — 97140 MANUAL THERAPY 1/> REGIONS: CPT | Mod: GP

## 2022-09-26 ENCOUNTER — THERAPY VISIT (OUTPATIENT)
Dept: PHYSICAL THERAPY | Facility: CLINIC | Age: 52
End: 2022-09-26
Payer: COMMERCIAL

## 2022-09-26 DIAGNOSIS — Z96.652 S/P TOTAL KNEE ARTHROPLASTY, LEFT: ICD-10-CM

## 2022-09-26 DIAGNOSIS — G89.29 CHRONIC PAIN OF LEFT KNEE: ICD-10-CM

## 2022-09-26 DIAGNOSIS — Z96.652 AFTERCARE FOLLOWING LEFT KNEE JOINT REPLACEMENT SURGERY: Primary | ICD-10-CM

## 2022-09-26 DIAGNOSIS — R60.0 LOCALIZED EDEMA: ICD-10-CM

## 2022-09-26 DIAGNOSIS — M25.562 CHRONIC PAIN OF LEFT KNEE: ICD-10-CM

## 2022-09-26 DIAGNOSIS — Z47.1 AFTERCARE FOLLOWING LEFT KNEE JOINT REPLACEMENT SURGERY: Primary | ICD-10-CM

## 2022-09-26 PROCEDURE — 97140 MANUAL THERAPY 1/> REGIONS: CPT | Mod: GP | Performed by: PHYSICAL THERAPIST

## 2022-09-26 PROCEDURE — 97110 THERAPEUTIC EXERCISES: CPT | Mod: GP | Performed by: PHYSICAL THERAPIST

## 2022-09-26 NOTE — PROGRESS NOTES
"Subjective:  HPI  Physical Exam                    Objective:  System    Physical Exam    General     ROS    Assessment/Plan:    PROGRESS  REPORT    Progress reporting period is from 09/02/2022 to 09/26/2022.       SUBJECTIVE  Subjective: Pain is overall \"manageable.\"  Has been walking more.  Is frustrated, however, with lack of motion progress.    Current Pain level: 3/10.     Initial Pain level: 5/10.   Changes in function:  Yes (See Goal flowsheet attached for changes in current functional level)  Adverse reaction to treatment or activity: None    OBJECTIVE  Objective: Pt ambulates without device.  AROM 0-0-79 upon arrival, PROM flexion 83.  Following session, AROM / PROM flexion 88 / 93.     ASSESSMENT/PLAN  Updated problem list and treatment plan: Diagnosis 1:  S/p L TKA -- see plan below  STG/LTGs have been met or progress has been made towards goals:  Yes (See Goal flow sheet completed today.)  Assessment of Progress: The patient's condition has potential to improve.  Self Management Plans:  Patient has been instructed in a home treatment program.  I have re-evaluated this patient and find that the nature, scope, duration and intensity of the therapy is appropriate for the medical condition of the patient.  Adelaida continues to require the following intervention to meet STG and LTG's:  PT    Recommendations:  Pt improving functionally and symptomatically.  However, flexion ROM not progressing well.  Pt sees Dr Hernandez tomorrow; please advise.    Please refer to the daily flowsheet for treatment today, total treatment time and time spent performing 1:1 timed codes.          "

## 2022-09-27 ENCOUNTER — OFFICE VISIT (OUTPATIENT)
Dept: ORTHOPEDICS | Facility: CLINIC | Age: 52
End: 2022-09-27
Payer: COMMERCIAL

## 2022-09-27 VITALS — BODY MASS INDEX: 46.09 KG/M2 | HEIGHT: 62 IN

## 2022-09-27 DIAGNOSIS — M24.662 ARTHROFIBROSIS OF KNEE JOINT, LEFT: Primary | ICD-10-CM

## 2022-09-27 PROCEDURE — 99024 POSTOP FOLLOW-UP VISIT: CPT | Performed by: ORTHOPAEDIC SURGERY

## 2022-09-27 NOTE — PROGRESS NOTES
Chief Complaint: Consult (No Xr. follow up for ROM check; s/p left total knee arthroplasty DOS: 7/27/22)       HPI: Adelaida Packer returns today in follow-up for her left knee. Her best ROM now is 96 degrees. She needs some warming up to get there. She is now riding a bike pedaling all the way around but reports needing a little lift to do it. She is here to weigh the pluses and minuses of manipulation. She does not want to do a manipulation.    Medications and allergies are documented in the EMR and have been reviewed.    Current Outpatient Medications:      acetaminophen (TYLENOL) 325 MG tablet, Take 2 tablets (650 mg) by mouth every 4 hours as needed for other (mild pain), Disp: 100 tablet, Rfl: 0     amLODIPine (NORVASC) 5 MG tablet, Take 1 tablet (5 mg) by mouth daily (Patient taking differently: Take 5 mg by mouth every evening), Disp: 90 tablet, Rfl: 3     aspirin (ASA) 325 MG EC tablet, Take 1 tablet (325 mg) by mouth daily, Disp: 90 tablet, Rfl: 1     blood glucose (NO BRAND SPECIFIED) lancets standard, Use to test blood sugar 1-2 times daily or as directed., Disp: 200 each, Rfl: 3     blood glucose (NO BRAND SPECIFIED) test strip, Use to test blood sugar 1-2 times daily or as directed., Disp: 200 strip, Rfl: 3     blood glucose monitoring (NO BRAND SPECIFIED) meter device kit, Use to test blood sugar 1-2 times daily or as directed., Disp: 1 kit, Rfl: 0     cetirizine (ZYRTEC) 10 MG tablet, Take 10 mg by mouth daily as needed for allergies, Disp: , Rfl:      cholecalciferol 2000 UNITS CAPS, Take 2,000 Units by mouth daily, Disp: 90 capsule, Rfl: 3     eplerenone (INSPRA) 50 MG tablet, Take 2 tablets (100 mg) by mouth 2 times daily, Disp: 360 tablet, Rfl: 3     famotidine (PEPCID) 20 MG tablet, Take 1 tablet (20 mg) by mouth 2 times daily, Disp: 180 tablet, Rfl: 1     gabapentin (NEURONTIN) 100 MG capsule, Take 1 capsule (100 mg) by mouth 3 times daily, Disp: 90 capsule, Rfl: 0     HYDROcodone-acetaminophen  "(NORCO) 5-325 MG tablet, Take 1 tablet by mouth every 6 hours as needed for pain, Disp: 28 tablet, Rfl: 0     hydrOXYzine (ATARAX) 25 MG tablet, Take 1 tablet (25 mg) by mouth every 6 hours as needed for itching or anxiety (with pain, moderate pain), Disp: 30 tablet, Rfl: 0     labetalol (NORMODYNE) 300 MG tablet, Take 1 tablet (300 mg) by mouth 2 times daily, Disp: 180 tablet, Rfl: 3     methocarbamol (ROBAXIN) 750 MG tablet, Take 1 tablet (750 mg) by mouth every 6 hours as needed for muscle spasms, Disp: 40 tablet, Rfl: 0     oxyCODONE (ROXICODONE) 5 MG tablet, Take 1-2 tablets (5-10 mg) by mouth every 4 hours as needed for pain, Disp: 60 tablet, Rfl: 0     polyethylene glycol (MIRALAX) 17 g packet, Take 17 g by mouth daily, Disp: 7 packet, Rfl: 0     senna-docusate (SENOKOT-S/PERICOLACE) 8.6-50 MG tablet, Take 1-2 tablets by mouth 2 times daily Take while on oral narcotics to prevent or treat constipation., Disp: 30 tablet, Rfl: 0     SUMAtriptan (IMITREX) 25 MG tablet, TAKE 1 TO 2 TABLETS BY MOUTH AT ONSET OF HEADACHE FOR MIGRAINE. MAY REPEAT DOSE IN 2 HOURS. MAX OF 200MG OR 2 DOSES IN 24 HOURS (Patient taking differently: Take 25 mg by mouth at onset of headache TAKE 1 TO 2 TABLETS BY MOUTH AT ONSET OF HEADACHE FOR MIGRAINE. MAY REPEAT DOSE IN 2 HOURS. MAX OF 200MG OR 2 DOSES IN 24 HOURS), Disp: 18 tablet, Rfl: 0  Allergies: Sulfa drugs, Hydrochlorothiazide w/triamterene, Maxzide [hydrochlorothiazide w/triamterene], Metoprolol, and Seasonal allergies    Physical Exam:  On physical examination the patient appears the stated age, is in no acute distress, affect is appropriate, and breathing is non-labored.  Ht 1.575 m (5' 2\")   LMP 08/13/2005   BMI 46.09 kg/m    Body mass index is 46.09 kg/m .  Gait: normal   Incision:healed but with a scab at the superior pole from a spit stitch-- this area is ankit and benign and there is not redness, tenderness swelling   ROM: 0-90-95    Assessment:  Sub goal flexion. May " benefit from a maninpulation. It is isn't mandatory if she stays at or abot 95 in PT but may help and I'm very happy to do it. We discussed the risks and benefits including pain and swelling, and possibility of fracture or soft tissue evulsion.   Plan: I put in orders for a manipulation and she is going to consider it.      No ref. provider found

## 2022-09-27 NOTE — LETTER
9/27/2022         RE: Adelaida Packer  [address] [facility] [place] [address]        Dear Colleague,    Thank you for referring your patient, Adelaida Packer, to the [facility]. Please see a copy of my visit note below.    Chief Complaint: Consult (No Xr. follow up for ROM check; s/p left total knee arthroplasty DOS: 7/27/22)       HPI: Adelaida Packer returns today in follow-up for her left knee. Her best ROM now is 96 degrees. She needs some warming up to get there. She is now riding a bike pedaling all the way around but reports needing a little lift to do it. She is here to weigh the pluses and minuses of manipulation. She does not want to do a manipulation.    Medications and allergies are documented in the EMR and have been reviewed.    Current Outpatient Medications:      acetaminophen (TYLENOL) 325 MG tablet, Take 2 tablets (650 mg) by mouth every 4 hours as needed for other (mild pain), Disp: 100 tablet, Rfl: 0     amLODIPine (NORVASC) 5 MG tablet, Take 1 tablet (5 mg) by mouth daily (Patient taking differently: Take 5 mg by mouth every evening), Disp: 90 tablet, Rfl: 3     aspirin (ASA) 325 MG EC tablet, Take 1 tablet (325 mg) by mouth daily, Disp: 90 tablet, Rfl: 1     blood glucose (NO BRAND SPECIFIED) lancets standard, Use to test blood sugar 1-2 times daily or as directed., Disp: 200 each, Rfl: 3     blood glucose (NO BRAND SPECIFIED) test strip, Use to test blood sugar 1-2 times daily or as directed., Disp: 200 strip, Rfl: 3     blood glucose monitoring (NO BRAND SPECIFIED) meter device kit, Use to test blood sugar 1-2 times daily or as directed., Disp: 1 kit, Rfl: 0     cetirizine (ZYRTEC) 10 MG tablet, Take 10 mg by mouth daily as needed for allergies, Disp: , Rfl:      cholecalciferol 2000 UNITS CAPS, Take 2,000 Units by mouth daily, Disp: 90 capsule, Rfl: 3     eplerenone (INSPRA) 50 MG tablet, Take 2 tablets (100 mg) by mouth 2 times daily, Disp: 360  "tablet, Rfl: 3     famotidine (PEPCID) 20 MG tablet, Take 1 tablet (20 mg) by mouth 2 times daily, Disp: 180 tablet, Rfl: 1     gabapentin (NEURONTIN) 100 MG capsule, Take 1 capsule (100 mg) by mouth 3 times daily, Disp: 90 capsule, Rfl: 0     HYDROcodone-acetaminophen (NORCO) 5-325 MG tablet, Take 1 tablet by mouth every 6 hours as needed for pain, Disp: 28 tablet, Rfl: 0     hydrOXYzine (ATARAX) 25 MG tablet, Take 1 tablet (25 mg) by mouth every 6 hours as needed for itching or anxiety (with pain, moderate pain), Disp: 30 tablet, Rfl: 0     labetalol (NORMODYNE) 300 MG tablet, Take 1 tablet (300 mg) by mouth 2 times daily, Disp: 180 tablet, Rfl: 3     methocarbamol (ROBAXIN) 750 MG tablet, Take 1 tablet (750 mg) by mouth every 6 hours as needed for muscle spasms, Disp: 40 tablet, Rfl: 0     oxyCODONE (ROXICODONE) 5 MG tablet, Take 1-2 tablets (5-10 mg) by mouth every 4 hours as needed for pain, Disp: 60 tablet, Rfl: 0     polyethylene glycol (MIRALAX) 17 g packet, Take 17 g by mouth daily, Disp: 7 packet, Rfl: 0     senna-docusate (SENOKOT-S/PERICOLACE) 8.6-50 MG tablet, Take 1-2 tablets by mouth 2 times daily Take while on oral narcotics to prevent or treat constipation., Disp: 30 tablet, Rfl: 0     SUMAtriptan (IMITREX) 25 MG tablet, TAKE 1 TO 2 TABLETS BY MOUTH AT ONSET OF HEADACHE FOR MIGRAINE. MAY REPEAT DOSE IN 2 HOURS. MAX OF 200MG OR 2 DOSES IN 24 HOURS (Patient taking differently: Take 25 mg by mouth at onset of headache TAKE 1 TO 2 TABLETS BY MOUTH AT ONSET OF HEADACHE FOR MIGRAINE. MAY REPEAT DOSE IN 2 HOURS. MAX OF 200MG OR 2 DOSES IN 24 HOURS), Disp: 18 tablet, Rfl: 0  Allergies: Sulfa drugs, Hydrochlorothiazide w/triamterene, Maxzide [hydrochlorothiazide w/triamterene], Metoprolol, and Seasonal allergies    Physical Exam:  On physical examination the patient appears the stated age, is in no acute distress, affect is appropriate, and breathing is non-labored.  Ht 1.575 m (5' 2\")   LMP 08/13/2005   " BMI 46.09 kg/m    Body mass index is 46.09 kg/m .  Gait: normal   Incision:healed but with a scab at the superior pole from a spit stitch-- this area is ankit and benign and there is not redness, tenderness swelling   ROM: 0-90-95    Assessment:  Sub goal flexion. May benefit from a maninpulation. It is isn't mandatory if she stays at or abot 95 in PT but may help and I'm very happy to do it. We discussed the risks and benefits including pain and swelling, and possibility of fracture or soft tissue evulsion.   Plan: I put in orders for a manipulation and she is going to consider it.      No ref. provider found        Again, thank you for allowing me to participate in the care of your patient.        Sincerely,        Dylan Hernandez MD

## 2022-09-28 ENCOUNTER — THERAPY VISIT (OUTPATIENT)
Dept: PHYSICAL THERAPY | Facility: CLINIC | Age: 52
End: 2022-09-28
Payer: COMMERCIAL

## 2022-09-28 DIAGNOSIS — Z47.1 AFTERCARE FOLLOWING LEFT KNEE JOINT REPLACEMENT SURGERY: Primary | ICD-10-CM

## 2022-09-28 DIAGNOSIS — Z96.652 S/P TOTAL KNEE ARTHROPLASTY, LEFT: ICD-10-CM

## 2022-09-28 DIAGNOSIS — R60.0 LOCALIZED EDEMA: ICD-10-CM

## 2022-09-28 DIAGNOSIS — Z96.652 AFTERCARE FOLLOWING LEFT KNEE JOINT REPLACEMENT SURGERY: Primary | ICD-10-CM

## 2022-09-28 DIAGNOSIS — G89.29 CHRONIC PAIN OF LEFT KNEE: ICD-10-CM

## 2022-09-28 DIAGNOSIS — M25.562 CHRONIC PAIN OF LEFT KNEE: ICD-10-CM

## 2022-09-28 PROCEDURE — 97110 THERAPEUTIC EXERCISES: CPT | Mod: GP | Performed by: PHYSICAL THERAPIST

## 2022-09-28 PROCEDURE — 97140 MANUAL THERAPY 1/> REGIONS: CPT | Mod: GP | Performed by: PHYSICAL THERAPIST

## 2022-09-30 ENCOUNTER — E-VISIT (OUTPATIENT)
Dept: FAMILY MEDICINE | Facility: CLINIC | Age: 52
End: 2022-09-30

## 2022-09-30 ENCOUNTER — THERAPY VISIT (OUTPATIENT)
Dept: PHYSICAL THERAPY | Facility: CLINIC | Age: 52
End: 2022-09-30
Payer: COMMERCIAL

## 2022-09-30 DIAGNOSIS — G47.9 SLEEP DISORDER: Primary | ICD-10-CM

## 2022-09-30 DIAGNOSIS — R60.0 LOCALIZED EDEMA: ICD-10-CM

## 2022-09-30 DIAGNOSIS — Z47.1 AFTERCARE FOLLOWING LEFT KNEE JOINT REPLACEMENT SURGERY: Primary | ICD-10-CM

## 2022-09-30 DIAGNOSIS — G89.29 CHRONIC PAIN OF LEFT KNEE: ICD-10-CM

## 2022-09-30 DIAGNOSIS — M25.562 CHRONIC PAIN OF LEFT KNEE: ICD-10-CM

## 2022-09-30 DIAGNOSIS — Z96.652 S/P TOTAL KNEE ARTHROPLASTY, LEFT: ICD-10-CM

## 2022-09-30 DIAGNOSIS — Z96.652 AFTERCARE FOLLOWING LEFT KNEE JOINT REPLACEMENT SURGERY: Primary | ICD-10-CM

## 2022-09-30 PROCEDURE — 97110 THERAPEUTIC EXERCISES: CPT | Mod: GP

## 2022-09-30 PROCEDURE — 99207 PR NON-BILLABLE SERV PER CHARTING: CPT | Performed by: FAMILY MEDICINE

## 2022-09-30 PROCEDURE — 97140 MANUAL THERAPY 1/> REGIONS: CPT | Mod: GP

## 2022-10-04 ENCOUNTER — THERAPY VISIT (OUTPATIENT)
Dept: PHYSICAL THERAPY | Facility: CLINIC | Age: 52
End: 2022-10-04
Payer: COMMERCIAL

## 2022-10-04 DIAGNOSIS — Z47.1 AFTERCARE FOLLOWING LEFT KNEE JOINT REPLACEMENT SURGERY: Primary | ICD-10-CM

## 2022-10-04 DIAGNOSIS — Z96.652 AFTERCARE FOLLOWING LEFT KNEE JOINT REPLACEMENT SURGERY: Primary | ICD-10-CM

## 2022-10-04 DIAGNOSIS — Z96.652 S/P TOTAL KNEE ARTHROPLASTY, LEFT: ICD-10-CM

## 2022-10-04 DIAGNOSIS — G89.29 CHRONIC PAIN OF LEFT KNEE: ICD-10-CM

## 2022-10-04 DIAGNOSIS — M25.562 CHRONIC PAIN OF LEFT KNEE: ICD-10-CM

## 2022-10-04 DIAGNOSIS — R60.0 LOCALIZED EDEMA: ICD-10-CM

## 2022-10-04 PROCEDURE — 97140 MANUAL THERAPY 1/> REGIONS: CPT | Mod: GP | Performed by: PHYSICAL THERAPIST

## 2022-10-04 PROCEDURE — 97110 THERAPEUTIC EXERCISES: CPT | Mod: GP | Performed by: PHYSICAL THERAPIST

## 2022-10-06 ENCOUNTER — THERAPY VISIT (OUTPATIENT)
Dept: PHYSICAL THERAPY | Facility: CLINIC | Age: 52
End: 2022-10-06
Payer: COMMERCIAL

## 2022-10-06 DIAGNOSIS — Z96.652 AFTERCARE FOLLOWING LEFT KNEE JOINT REPLACEMENT SURGERY: Primary | ICD-10-CM

## 2022-10-06 DIAGNOSIS — Z47.1 AFTERCARE FOLLOWING LEFT KNEE JOINT REPLACEMENT SURGERY: Primary | ICD-10-CM

## 2022-10-06 DIAGNOSIS — M25.562 CHRONIC PAIN OF LEFT KNEE: ICD-10-CM

## 2022-10-06 DIAGNOSIS — Z96.652 S/P TOTAL KNEE ARTHROPLASTY, LEFT: ICD-10-CM

## 2022-10-06 DIAGNOSIS — G89.29 CHRONIC PAIN OF LEFT KNEE: ICD-10-CM

## 2022-10-06 DIAGNOSIS — R60.0 LOCALIZED EDEMA: ICD-10-CM

## 2022-10-06 PROCEDURE — 97110 THERAPEUTIC EXERCISES: CPT | Mod: GP | Performed by: PHYSICAL THERAPIST

## 2022-10-06 PROCEDURE — 97140 MANUAL THERAPY 1/> REGIONS: CPT | Mod: GP | Performed by: PHYSICAL THERAPIST

## 2022-10-10 ENCOUNTER — VIRTUAL VISIT (OUTPATIENT)
Dept: FAMILY MEDICINE | Facility: CLINIC | Age: 52
End: 2022-10-10
Payer: COMMERCIAL

## 2022-10-10 ENCOUNTER — THERAPY VISIT (OUTPATIENT)
Dept: PHYSICAL THERAPY | Facility: CLINIC | Age: 52
End: 2022-10-10
Payer: COMMERCIAL

## 2022-10-10 DIAGNOSIS — L63.9 ALOPECIA AREATA: ICD-10-CM

## 2022-10-10 DIAGNOSIS — R60.0 LOCALIZED EDEMA: ICD-10-CM

## 2022-10-10 DIAGNOSIS — Z96.652 S/P TOTAL KNEE ARTHROPLASTY, LEFT: ICD-10-CM

## 2022-10-10 DIAGNOSIS — F51.02 ADJUSTMENT INSOMNIA: Primary | ICD-10-CM

## 2022-10-10 DIAGNOSIS — L65.9 HAIR LOSS: ICD-10-CM

## 2022-10-10 DIAGNOSIS — Z47.1 AFTERCARE FOLLOWING LEFT KNEE JOINT REPLACEMENT SURGERY: Primary | ICD-10-CM

## 2022-10-10 DIAGNOSIS — Z96.652 AFTERCARE FOLLOWING LEFT KNEE JOINT REPLACEMENT SURGERY: Primary | ICD-10-CM

## 2022-10-10 DIAGNOSIS — G89.29 CHRONIC PAIN OF LEFT KNEE: ICD-10-CM

## 2022-10-10 DIAGNOSIS — N18.31 STAGE 3A CHRONIC KIDNEY DISEASE (H): ICD-10-CM

## 2022-10-10 DIAGNOSIS — M25.562 CHRONIC PAIN OF LEFT KNEE: ICD-10-CM

## 2022-10-10 PROCEDURE — 99214 OFFICE O/P EST MOD 30 MIN: CPT | Mod: TEL | Performed by: FAMILY MEDICINE

## 2022-10-10 PROCEDURE — 97140 MANUAL THERAPY 1/> REGIONS: CPT | Mod: GP | Performed by: PHYSICAL THERAPIST

## 2022-10-10 PROCEDURE — 97110 THERAPEUTIC EXERCISES: CPT | Mod: GP | Performed by: PHYSICAL THERAPIST

## 2022-10-10 RX ORDER — HYDROXYZINE HYDROCHLORIDE 25 MG/1
12.5-25 TABLET, FILM COATED ORAL
Qty: 30 TABLET | Refills: 3 | Status: SHIPPED | OUTPATIENT
Start: 2022-10-10 | End: 2023-10-24

## 2022-10-10 NOTE — PROGRESS NOTES
"Subjective:  HPI  Physical Exam                    Objective:  System    Physical Exam    General     ROS    Assessment/Plan:    PROGRESS  REPORT    Progress reporting period is from 09/02/2022 to 10/10/2022.       SUBJECTIVE  Subjective: Pt reports walking has been easier but the knee can still be \"achy and crabby.\"    Current Pain level:  (not rated numerically).     Initial Pain level: 5/10.   Changes in function:  Yes (See Goal flowsheet attached for changes in current functional level)  Adverse reaction to treatment or activity: None    OBJECTIVE  Objective: Pt ambulates without device.  No discomfort resisted flexion and extension.  AROM 0-0-94 upon arrival, 101 flexion afterwar.     ASSESSMENT/PLAN  Updated problem list and treatment plan: Diagnosis 1:  S/p L TKA -- see plan below  STG/LTGs have been met or progress has been made towards goals:  Yes (See Goal flow sheet completed today.)  Assessment of Progress: The patient's condition is improving.  Self Management Plans:  Patient has been instructed in a home treatment program.  I have re-evaluated this patient and find that the nature, scope, duration and intensity of the therapy is appropriate for the medical condition of the patient.  Adelaida continues to require the following intervention to meet STG and LTG's:  PT    Recommendations:  ROM progressing slowly.  Continue up to twice a week through November.    Please refer to the daily flowsheet for treatment today, total treatment time and time spent performing 1:1 timed codes.          "

## 2022-10-10 NOTE — PROGRESS NOTES
"Adelaida is a 52 year old who is being evaluated via a billable telephone visit.      What phone number would you like to be contacted at? See epic  How would you like to obtain your AVS? [email or web address]    Assessment & Plan     Adjustment insomnia    Reviewed sleep hygiene.  Recommended to give a trial of hydroxyzine 12.5 to 25 mg at bedtime as needed for sleep.  Patient will send a [email or web address] message for therapeutic update in 1 to 2 weeks  Dosing and potential medication side effects discussed.  Patient verbalised understanding and is agreeable to the plan.      - hydrOXYzine (ATARAX reviewed sleep hygiene.Reviewed sleep hygiene.) 25 MG tablet; Take 0.5-1 tablets (12.5-25 mg) by mouth nightly as needed for other (sleep)    Hair loss  ddx-stress-induced from recent surgery/screen for anemia/metabolic/vitamin deficiency/thyroid disorder/screen for lupus  Recommended patient to send a picture of the bald spots on the scalp through [email or web address] for provider's review    - CBC with platelets; Future  - Basic metabolic panel  (Ca, Cl, CO2, Creat, Gluc, K, Na, BUN); Future  - Ferritin; Future  - Vitamin B12; Future  - Vitamin D Deficiency; Future  - Anti Nuclear Bhumi IgG by IFA with Reflex; Future  -TSH with free T4 reflux    Stage 3a chronic kidney disease (H)    - CBC with platelets; Future  - Basic metabolic panel  (Ca, Cl, CO2, Creat, Gluc, K, Na, BUN); Future    Review of the result(s) of each unique test - CBC, BMP  [id number]}     BMI:   Estimated body mass index is 46.09 kg/m  as calculated from the following:    Height as of 9/27/22: 1.575 m (5' 2\").    Weight as of 7/27/22: 114.3 kg (251 lb 15.8 oz).       Chart documentation done in part with Dragon Voice recognition Software. Although reviewed after completion, some word and grammatical error may remain.    See Patient Instructions    Return in about 2 weeks (around 10/24/2022), or if symptoms worsen or fail to improve, for specilaist consult.    Windy Gordillo MD  [facility] " [facility] ([facility] AMBER Son is a 52 year old}, presenting for the following health issues:  No chief complaint on file.  Patient is here for a telephone visit instead of in person visit due to the current COVID-19 pandemic.  Patient with past medical history significant for renal hypertension, chronic kidney disease, s/p left knee arthroplasty is here for a telephone visit with concerns of having problems with sleep since she had surgery in July 2022.  Patient has tried melatonin with no relief of symptoms  She sleeps by herself and she is unaware of having snoring or concerns for sleep apnea but denies daytime sleepiness.  Stable had a sleep study done before.  Patient is currently taking gabapentin 200 mg at bedtime for postoperative pain that was prescribed Dr. Hernandez.  She denies concerns for depression, anxiety but is worried about her patchy hair loss that she noted since her surgery  Patient reported having thinning of hair in the frontal scalp after her right knee arthroplasty last year but noted worsening symptoms including noticing two, distinct bald spots on the scalp.  Denies itching, dandruff, previous similar symptoms in the past  Has no history of anemia, thyroid disorder.  Denies family or personal history of autoimmune inflammatory disorders including lupus.  Denies unexplained fatigue, weight loss, weight gain, joint swellings, joint pain other than postoperative knee pain, abnormal skin rashes.    HPI         Review of Systems   CONSTITUTIONAL: NEGATIVE for fever, chills, change in weight  INTEGUMENTARY/SKIN: as above  RESP: NEGATIVE for significant cough or SOB  CV: NEGATIVE for chest pain, palpitations or peripheral edema  CV: Hx HTN  MUSCULOSKELETAL: S/p left knee arthroplasty  NEURO: NEGATIVE for weakness, dizziness or paresthesias  ENDOCRINE: NEGATIVE for temperature intolerance, skin/hair changes  HEME/ALLERGY/IMMUNE: NEGATIVE for bleeding problems  PSYCHIATRIC: NEGATIVE  for changes in mood or affect      Objective           Vitals:  No vitals were obtained today due to virtual visit.    Physical Exam   healthy, alert and no distress  PSYCH: Alert and oriented times 3; coherent speech, normal   rate and volume, able to articulate logical thoughts, able   to abstract reason, no tangential thoughts, no hallucinations   or delusions  Her affect is normal  RESP: No cough, no audible wheezing, able to talk in full sentences  Remainder of exam unable to be completed due to telephone visits    Labs- ordered to have them done today            Phone call duration: 11 minutes,

## 2022-10-13 ENCOUNTER — THERAPY VISIT (OUTPATIENT)
Dept: PHYSICAL THERAPY | Facility: CLINIC | Age: 52
End: 2022-10-13
Payer: COMMERCIAL

## 2022-10-13 DIAGNOSIS — G89.29 CHRONIC PAIN OF LEFT KNEE: ICD-10-CM

## 2022-10-13 DIAGNOSIS — M25.562 CHRONIC PAIN OF LEFT KNEE: ICD-10-CM

## 2022-10-13 DIAGNOSIS — R60.0 LOCALIZED EDEMA: ICD-10-CM

## 2022-10-13 DIAGNOSIS — Z47.1 AFTERCARE FOLLOWING LEFT KNEE JOINT REPLACEMENT SURGERY: Primary | ICD-10-CM

## 2022-10-13 DIAGNOSIS — Z96.652 S/P TOTAL KNEE ARTHROPLASTY, LEFT: ICD-10-CM

## 2022-10-13 DIAGNOSIS — Z96.652 AFTERCARE FOLLOWING LEFT KNEE JOINT REPLACEMENT SURGERY: Primary | ICD-10-CM

## 2022-10-13 PROCEDURE — 97140 MANUAL THERAPY 1/> REGIONS: CPT | Mod: GP

## 2022-10-13 PROCEDURE — 97110 THERAPEUTIC EXERCISES: CPT | Mod: GP

## 2022-10-15 ENCOUNTER — HEALTH MAINTENANCE LETTER (OUTPATIENT)
Age: 52
End: 2022-10-15

## 2022-10-17 ENCOUNTER — LAB (OUTPATIENT)
Dept: LAB | Facility: CLINIC | Age: 52
End: 2022-10-17
Payer: COMMERCIAL

## 2022-10-17 ENCOUNTER — THERAPY VISIT (OUTPATIENT)
Dept: PHYSICAL THERAPY | Facility: CLINIC | Age: 52
End: 2022-10-17
Payer: COMMERCIAL

## 2022-10-17 DIAGNOSIS — N18.31 STAGE 3A CHRONIC KIDNEY DISEASE (H): ICD-10-CM

## 2022-10-17 DIAGNOSIS — L65.9 HAIR LOSS: ICD-10-CM

## 2022-10-17 DIAGNOSIS — Z47.1 AFTERCARE FOLLOWING LEFT KNEE JOINT REPLACEMENT SURGERY: Primary | ICD-10-CM

## 2022-10-17 DIAGNOSIS — Z96.652 S/P TOTAL KNEE ARTHROPLASTY, LEFT: ICD-10-CM

## 2022-10-17 DIAGNOSIS — G89.29 CHRONIC PAIN OF LEFT KNEE: ICD-10-CM

## 2022-10-17 DIAGNOSIS — R60.0 LOCALIZED EDEMA: ICD-10-CM

## 2022-10-17 DIAGNOSIS — M25.562 CHRONIC PAIN OF LEFT KNEE: ICD-10-CM

## 2022-10-17 DIAGNOSIS — Z96.652 AFTERCARE FOLLOWING LEFT KNEE JOINT REPLACEMENT SURGERY: Primary | ICD-10-CM

## 2022-10-17 LAB
ANION GAP SERPL CALCULATED.3IONS-SCNC: 6 MMOL/L (ref 3–14)
BUN SERPL-MCNC: 20 MG/DL (ref 7–30)
CALCIUM SERPL-MCNC: 9.4 MG/DL (ref 8.5–10.1)
CHLORIDE BLD-SCNC: 107 MMOL/L (ref 94–109)
CO2 SERPL-SCNC: 24 MMOL/L (ref 20–32)
CREAT SERPL-MCNC: 1 MG/DL (ref 0.52–1.04)
ERYTHROCYTE [DISTWIDTH] IN BLOOD BY AUTOMATED COUNT: 13.5 % (ref 10–15)
FERRITIN SERPL-MCNC: 26 NG/ML (ref 8–252)
GFR SERPL CREATININE-BSD FRML MDRD: 67 ML/MIN/1.73M2
GLUCOSE BLD-MCNC: 120 MG/DL (ref 70–99)
HCT VFR BLD AUTO: 35.9 % (ref 35–47)
HGB BLD-MCNC: 11.7 G/DL (ref 11.7–15.7)
MCH RBC QN AUTO: 28.9 PG (ref 26.5–33)
MCHC RBC AUTO-ENTMCNC: 32.6 G/DL (ref 31.5–36.5)
MCV RBC AUTO: 89 FL (ref 78–100)
PLATELET # BLD AUTO: 221 10E3/UL (ref 150–450)
POTASSIUM BLD-SCNC: 4.2 MMOL/L (ref 3.4–5.3)
RBC # BLD AUTO: 4.05 10E6/UL (ref 3.8–5.2)
SODIUM SERPL-SCNC: 137 MMOL/L (ref 133–144)
TSH SERPL DL<=0.005 MIU/L-ACNC: 1.63 MU/L (ref 0.4–4)
VIT B12 SERPL-MCNC: 992 PG/ML (ref 232–1245)
WBC # BLD AUTO: 5.3 10E3/UL (ref 4–11)

## 2022-10-17 PROCEDURE — 36415 COLL VENOUS BLD VENIPUNCTURE: CPT

## 2022-10-17 PROCEDURE — 97140 MANUAL THERAPY 1/> REGIONS: CPT | Mod: GP | Performed by: PHYSICAL THERAPIST

## 2022-10-17 PROCEDURE — 82728 ASSAY OF FERRITIN: CPT

## 2022-10-17 PROCEDURE — 84443 ASSAY THYROID STIM HORMONE: CPT

## 2022-10-17 PROCEDURE — 82607 VITAMIN B-12: CPT

## 2022-10-17 PROCEDURE — 82306 VITAMIN D 25 HYDROXY: CPT

## 2022-10-17 PROCEDURE — 97110 THERAPEUTIC EXERCISES: CPT | Mod: GP | Performed by: PHYSICAL THERAPIST

## 2022-10-17 PROCEDURE — 86038 ANTINUCLEAR ANTIBODIES: CPT

## 2022-10-17 PROCEDURE — 80048 BASIC METABOLIC PNL TOTAL CA: CPT

## 2022-10-17 PROCEDURE — 85027 COMPLETE CBC AUTOMATED: CPT

## 2022-10-18 LAB
ANA SER QL IF: NEGATIVE
DEPRECATED CALCIDIOL+CALCIFEROL SERPL-MC: 20 UG/L (ref 20–75)

## 2022-10-18 NOTE — RESULT ENCOUNTER NOTE
Kelby Son,  Your lab test showed normal hemoglobin and iron with no concern for anemia, normal thyroid test, B12, negative lupus screen, normal kidney functions and electrolytes.  These are good and reassuring.  His vitamin D is in the low normal range, he would benefit from taking over-the-counter vitamin D 5000 units daily.  Please proceed with a dermatology consult as we discussed for further management of this patchy hair loss.   Let me know if you have any questions. Take care.  Windy Gordillo MD

## 2022-10-20 ENCOUNTER — THERAPY VISIT (OUTPATIENT)
Dept: PHYSICAL THERAPY | Facility: CLINIC | Age: 52
End: 2022-10-20
Payer: COMMERCIAL

## 2022-10-20 DIAGNOSIS — Z96.652 S/P TOTAL KNEE ARTHROPLASTY, LEFT: ICD-10-CM

## 2022-10-20 DIAGNOSIS — R60.0 LOCALIZED EDEMA: ICD-10-CM

## 2022-10-20 DIAGNOSIS — Z47.1 AFTERCARE FOLLOWING LEFT KNEE JOINT REPLACEMENT SURGERY: Primary | ICD-10-CM

## 2022-10-20 DIAGNOSIS — Z96.652 AFTERCARE FOLLOWING LEFT KNEE JOINT REPLACEMENT SURGERY: Primary | ICD-10-CM

## 2022-10-20 DIAGNOSIS — G89.29 CHRONIC PAIN OF LEFT KNEE: ICD-10-CM

## 2022-10-20 DIAGNOSIS — M25.562 CHRONIC PAIN OF LEFT KNEE: ICD-10-CM

## 2022-10-20 PROCEDURE — 97110 THERAPEUTIC EXERCISES: CPT | Mod: GP | Performed by: PHYSICAL THERAPIST

## 2022-10-20 PROCEDURE — 97140 MANUAL THERAPY 1/> REGIONS: CPT | Mod: GP | Performed by: PHYSICAL THERAPIST

## 2022-10-22 ENCOUNTER — OFFICE VISIT (OUTPATIENT)
Dept: FAMILY MEDICINE | Facility: CLINIC | Age: 52
End: 2022-10-22
Payer: COMMERCIAL

## 2022-10-22 VITALS
RESPIRATION RATE: 20 BRPM | TEMPERATURE: 98.1 F | HEART RATE: 72 BPM | OXYGEN SATURATION: 98 % | DIASTOLIC BLOOD PRESSURE: 83 MMHG | SYSTOLIC BLOOD PRESSURE: 128 MMHG | BODY MASS INDEX: 46.09 KG/M2 | WEIGHT: 252 LBS

## 2022-10-22 DIAGNOSIS — J01.00 ACUTE NON-RECURRENT MAXILLARY SINUSITIS: Primary | ICD-10-CM

## 2022-10-22 PROCEDURE — 99213 OFFICE O/P EST LOW 20 MIN: CPT | Performed by: PHYSICIAN ASSISTANT

## 2022-10-22 RX ORDER — FLUTICASONE PROPIONATE 50 MCG
1 SPRAY, SUSPENSION (ML) NASAL DAILY
Qty: 9.9 ML | Refills: 0 | Status: SHIPPED | OUTPATIENT
Start: 2022-10-22 | End: 2022-11-21

## 2022-10-22 RX ORDER — CETIRIZINE HYDROCHLORIDE 10 MG/1
10 TABLET ORAL DAILY
Qty: 30 TABLET | Refills: 0 | Status: SHIPPED | OUTPATIENT
Start: 2022-10-22 | End: 2022-11-21
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
## 2022-10-22 NOTE — PATIENT INSTRUCTIONS
Over-the-counter nasal steroid spray, follow packaging directions  Over-the-counter Mucinex, follow packaging directions  Hot packs 3 times per day to the forehead and face over the tender sinuses  Distal saline salt water or saline irrigation with Jasmina Lomax  Antibiotic as written below.  Risks and benefits of the medication were gone over.  Indication for return to see urgent care or family practice provider for reevaluation and treatment.      Acute Bacterial Rhinosinusitis (ABRS)  Acute bacterial rhinosinusitis (ABRS) is an infection of your nasal cavity and sinuses. It s caused by bacteria. Acute means that you ve had symptoms for less than 12 weeks.  Understanding your sinuses  The nasal cavity is the large air-filled space behind your nose. The sinuses are a group of spaces formed by the bones of your face. They connect with your nasal cavity. ABRS causes the tissue lining these spaces to become inflamed. Mucus may not drain normally. This leads to facial pain and other symptoms.  What causes ABRS?  ABRS most often follows an upper respiratory infection caused by a virus. Bacteria then infect the lining of your nasal cavity and sinuses. But you can also get ABRS if you have:  Nasal allergies  Long-term nasal swelling and congestion not caused by allergies  Blockage in the nose  Symptoms of ABRS  The symptoms of ABRS may be different for each person, and can include:  Nasal congestion  Runny nose  Fluid draining from the nose down the throat (postnasal drip)  Headache  Cough  Pain in the sinuses  Thick, colored fluid from the nose (mucus)  Fever  Diagnosing ABRS  ABRS may be diagnosed if you ve had an upper respiratory infection like a cold and cough for longer than 10 to 14 days. Your health care provider will ask about your symptoms and your medical history. The provider will check your vital signs, including your temperature. You ll have a physical exam. The health care provider will check your ears,  nose, and throat. You likely won t need any tests. If ABRS comes back, you may have a culture or other tests.  Treatment for ABRS  Treatment may include:  Antibiotic medicine. This is for symptoms that last for at least 10 to 14 days.  Nasal corticosteroid medicine. Drops or spray used in the nose can lessen swelling and congestion.  Over-the-counter pain medicine. This is to lessen sinus pain and pressure.  Nasal decongestant medicine. Spray or drops may help to lessen congestion. Do not use them for more than a few days.  Salt wash (saline irrigation). This can help to loosen mucus.  Possible complications of ABRS  ABRS may come back or become long-term (chronic).  In rare cases, ABRS may cause complications such as:   Inflamed tissue around the brain and spinal cord (meningitis)  Inflamed tissue around the eyes (orbital cellulitis)  Inflamed bones around the sinuses (osteitis)  These problems may need to be treated in a hospital with intravenous (IV) antibiotic medicine or surgery.  When to call the health care provider  Call your health care provider if you have any of the following:  Symptoms that don t get better, or get worse  Symptoms that don t get better after 3 to 5 days on antibiotics  Trouble seeing  Swelling around your eyes  Confusion or trouble staying awake   Date Last Reviewed: 3/3/2015    4010-4845 [employer]. [address] [place] [address], [place] [address]. All rights reserved. This information is not intended as a substitute for professional medical care. Always follow your healthcare professional's instructions.      Use of over-the-counter Zyrtec.  Follow packaging directions  Use of over-the-counter Flonase  Frequent swallowing drinking and chewing gum to help create low-grade suction on the eustachian tube.  Elevate head at nighttime so it does not increase pressure  Use of over-the-counter Tylenol as needed for comfort.  Return to primary care provider for reevaluation treatment if  any complication or new symptoms should present.        Patient Education     Earache, No Infection (Adult)  Earaches can happen without an infection. This occurs when air and fluid build up behind the eardrum causing a feeling of fullness and discomfort and reduced hearing. This is called otitis media with effusion (OME) or serous otitis media. It means there is fluid in the middle ear. It is not the same as acute otitis media, which is typically from infection.  OME can happen when you have a cold if congestion blocks the passage that drains the middle ear. This passage is called the eustachian tube. OME may also occur with nasal allergies or after a bacterial middle ear infection.    The pain or discomfort may come and go. You may hear clicking or popping sounds when you chew or swallow. You may feel that your balance is off. Or you may hear ringing in the ear.  It often takes from several weeks up to 3 months for the fluid to clear on its own. Oral pain relievers and ear drops help if there is pain. Decongestants and antihistamines sometimes help. Antibiotics don't help since there is no infection. Your doctor may prescribe a nasal spray to help reduce swelling in the nose and eustachian tube. This can allow the ear to drain.  If your OME doesn't improve after 3 months, surgery may be used to drain the fluid and insert a small tube in the eardrum to allow continued drainage.  Because the middle ear fluid can become infected, it is important to watch for signs of an ear infection which may develop later. These signs include increased ear pain, fever, or drainage from the ear.  Home care  The following guidelines will help you care for yourself at home:  You may use over-the-counter medicine as directed to control pain, unless another medicine was prescribed. If you have chronic liver or kidney disease or ever had a stomach ulcer or GI bleeding, talk with your doctor before using these medicines. Aspirin should  never be used in anyone under 18 years of age who is ill with a fever. It may cause severe liver damage.  You may use over-the-counter decongestants such as phenylephrine or pseudoephedrine. But they are not always helpful. Don't use nasal spray decongestants more than 3 days. Longer use can make congestion worse. Prescription nasal sprays from your doctor don't typically have those restrictions.  Antihistamines may help if you are also having allergy symptoms.  You may use medicines such as guaifenesin to thin mucus and promote drainage.  Follow-up care  Follow up with your healthcare provider or as advised if you are not feeling better after 3 days.  When to seek medical advice  Call your healthcare provider right away if any of the following occur:  Your ear pain gets worse or does not start to improve   Fever of 100.4 F (38 C) or higher, or as directed by your healthcare provider  Fluid or blood draining from the ear  Headache or sinus pain  Stiff neck  Unusual drowsiness or confusion  Date Last Reviewed: 10/1/2016    0281-0748 [employer]. [address] [place] [address], [place] [address]. All rights reserved. This information is not intended as a substitute for professional medical care. Always follow your healthcare professional's instructions.

## 2022-10-23 ENCOUNTER — MYC MEDICAL ADVICE (OUTPATIENT)
Dept: PHYSICAL THERAPY | Facility: CLINIC | Age: 52
End: 2022-10-23

## 2022-10-23 DIAGNOSIS — E26.09 PRIMARY HYPERALDOSTERONISM (H): ICD-10-CM

## 2022-10-24 ENCOUNTER — VIRTUAL VISIT (OUTPATIENT)
Dept: DERMATOLOGY | Facility: CLINIC | Age: 52
End: 2022-10-24
Payer: COMMERCIAL

## 2022-10-24 DIAGNOSIS — L65.9 LOSS OF HAIR: Primary | ICD-10-CM

## 2022-10-24 PROCEDURE — 99204 OFFICE O/P NEW MOD 45 MIN: CPT | Mod: GT | Performed by: DERMATOLOGY

## 2022-10-24 RX ORDER — FLUOCINOLONE ACETONIDE 0.11 MG/ML
OIL TOPICAL
Qty: 60 ML | Refills: 1 | Status: SHIPPED | OUTPATIENT
Start: 2022-10-24
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
## 2022-10-24 NOTE — PATIENT INSTRUCTIONS
[facility] Dermatology Visit    Thank you for allowing us to participate in your care. Your findings, instructions and follow-up plan are as follows:   Prescription fluocinolone oil (derma-smoothe) goes on the scalp for 6-8 weeks  You could use the Head and Shoulders Royal Oils shampoo and conditioner. Can buy at [employer] online:              When should I call my doctor?  If you are worsening or not improving, please, contact us or seek urgent care as noted below.     Who should I call with questions (adults)?  [facility] (adult and pediatric): [phone]  [facility] (adult): [phone]  For urgent needs outside of business hours call the [facility] at [phone] and ask for the dermatology resident on call  If this is a medical emergency and you are unable to reach an ER, Call [phone]    Who should I call with questions (pediatric)?  [facility]- Pediatric Dermatology  Dr. Samara Gunderson, Dr. Wallace Trammell, Dr. Di Paris, Jackelyn Christopher, [place]  Dr. Teresa Galarza, Dr. Apryl Pereyra & Dr. Tj Patel  Non Urgent  Nurse Triage Line; [phone]- Nell and Akua BOONE Care Coordinators   Leah (/Complex ) [phone]    If you need a prescription refill, please contact your pharmacy. Refills are approved or denied by our physicians during normal business hours, Monday through Fridays  Per office policy, refills will not be granted if you have not been seen within the past year (or sooner depending on your child's condition).    Scheduling Information:  Pediatric Appointment Scheduling and Call Center [phone]  Radiology Scheduling- [phone]  Sedation Unit Scheduling- [phone]  [place] Scheduling- General [phone]; Pediatric Dermatology [phone]  Main  Services: [phone]  Angolan: [phone]  Rupesh:  [phone]  Ravi/Greek/Cypriot: [phone]  Preadmission Nursing Department Fax Number: [phone] (fax all pre-operative paperwork to this number)    For urgent matters arising during evenings, weekends, or holidays that cannot wait for normal business hours please call [phone] and ask for the dermatology resident on call to be paged.
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
## 2022-10-25 ENCOUNTER — TELEPHONE (OUTPATIENT)
Dept: DERMATOLOGY | Facility: CLINIC | Age: 52
End: 2022-10-25

## 2022-10-25 NOTE — TELEPHONE ENCOUNTER
Attempted to reach patient to schedule their follow-up with Dermatology.   I left the patient a voicemail to call the clinic for scheduling.     Wendy Hassan, Virtual Visit Facilitator

## 2022-10-27 ENCOUNTER — THERAPY VISIT (OUTPATIENT)
Dept: PHYSICAL THERAPY | Facility: CLINIC | Age: 52
End: 2022-10-27
Payer: COMMERCIAL

## 2022-10-27 DIAGNOSIS — M25.562 CHRONIC PAIN OF LEFT KNEE: ICD-10-CM

## 2022-10-27 DIAGNOSIS — Z47.1 AFTERCARE FOLLOWING LEFT KNEE JOINT REPLACEMENT SURGERY: Primary | ICD-10-CM

## 2022-10-27 DIAGNOSIS — Z96.652 AFTERCARE FOLLOWING LEFT KNEE JOINT REPLACEMENT SURGERY: Primary | ICD-10-CM

## 2022-10-27 DIAGNOSIS — G89.29 CHRONIC PAIN OF LEFT KNEE: ICD-10-CM

## 2022-10-27 DIAGNOSIS — R60.0 LOCALIZED EDEMA: ICD-10-CM

## 2022-10-27 DIAGNOSIS — Z96.652 S/P TOTAL KNEE ARTHROPLASTY, LEFT: ICD-10-CM

## 2022-10-27 PROCEDURE — 97110 THERAPEUTIC EXERCISES: CPT | Mod: GP | Performed by: PHYSICAL THERAPIST

## 2022-10-27 PROCEDURE — 97140 MANUAL THERAPY 1/> REGIONS: CPT | Mod: GP | Performed by: PHYSICAL THERAPIST

## 2022-11-01 ENCOUNTER — THERAPY VISIT (OUTPATIENT)
Dept: PHYSICAL THERAPY | Facility: CLINIC | Age: 52
End: 2022-11-01
Payer: COMMERCIAL

## 2022-11-01 DIAGNOSIS — M25.562 CHRONIC PAIN OF LEFT KNEE: ICD-10-CM

## 2022-11-01 DIAGNOSIS — Z96.652 AFTERCARE FOLLOWING LEFT KNEE JOINT REPLACEMENT SURGERY: Primary | ICD-10-CM

## 2022-11-01 DIAGNOSIS — R60.0 LOCALIZED EDEMA: ICD-10-CM

## 2022-11-01 DIAGNOSIS — Z47.1 AFTERCARE FOLLOWING LEFT KNEE JOINT REPLACEMENT SURGERY: Primary | ICD-10-CM

## 2022-11-01 DIAGNOSIS — G89.29 CHRONIC PAIN OF LEFT KNEE: ICD-10-CM

## 2022-11-01 DIAGNOSIS — Z96.652 S/P TOTAL KNEE ARTHROPLASTY, LEFT: ICD-10-CM

## 2022-11-01 PROCEDURE — 97110 THERAPEUTIC EXERCISES: CPT | Mod: GP | Performed by: PHYSICAL THERAPIST

## 2022-11-01 PROCEDURE — 97140 MANUAL THERAPY 1/> REGIONS: CPT | Mod: GP | Performed by: PHYSICAL THERAPIST

## 2022-11-01 NOTE — PROGRESS NOTES
Subjective:  HPI  Physical Exam                    Objective:  System    Physical Exam    General     ROS    Assessment/Plan:    SUBJECTIVE  Subjective: Pt indicates she has been walking quite a bit without the cane.  Feels like knee isn't painfree but is less than it used to be.   Current pain level: not rated numerically     Changes in function:  Yes (See Goal flowsheet attached for changes in current functional level)     Adverse reaction to treatment or activity:  None    OBJECTIVE  Objective: AROM flexion 96 upon arrival, 107 flexion afterward.     ASSESSMENT  Adelaida continues to require intervention to meet STG and LTG's: PT  Patient is progressing as expected.  Response to therapy has shown an improvement in  ROM   Progress made towards STG/LTG?  Yes (See Goal flowsheet attached for updates on achievement of STG and LTG)    PLAN  Continue current treatment plan until patient demonstrates readiness to progress to higher level exercises.    PTA/ATC plan:  N/A    Please refer to the daily flowsheet for treatment today, total treatment time and time spent performing 1:1 timed codes.

## 2022-11-02 ENCOUNTER — VIRTUAL VISIT (OUTPATIENT)
Dept: FAMILY MEDICINE | Facility: CLINIC | Age: 52
End: 2022-11-02
Payer: COMMERCIAL

## 2022-11-02 DIAGNOSIS — T36.95XA ANTIBIOTICS CAUSING ADVERSE EFFECT IN THERAPEUTIC USE, INITIAL ENCOUNTER: ICD-10-CM

## 2022-11-02 DIAGNOSIS — B37.31 YEAST INFECTION OF THE VAGINA: ICD-10-CM

## 2022-11-02 DIAGNOSIS — I12.9 HYPERTENSION, RENAL: ICD-10-CM

## 2022-11-02 DIAGNOSIS — J01.00 ACUTE NON-RECURRENT MAXILLARY SINUSITIS: ICD-10-CM

## 2022-11-02 DIAGNOSIS — H81.13 BPV (BENIGN POSITIONAL VERTIGO), BILATERAL: ICD-10-CM

## 2022-11-02 DIAGNOSIS — R42 DIZZINESS: Primary | ICD-10-CM

## 2022-11-02 PROCEDURE — 99214 OFFICE O/P EST MOD 30 MIN: CPT | Mod: TEL | Performed by: FAMILY MEDICINE

## 2022-11-02 RX ORDER — FLUCONAZOLE 150 MG/1
150 TABLET ORAL ONCE
Qty: 1 TABLET | Refills: 0 | Status: SHIPPED | OUTPATIENT
Start: 2022-11-02 | End: 2022-11-02

## 2022-11-02 RX ORDER — MECLIZINE HYDROCHLORIDE 25 MG/1
25 TABLET ORAL 3 TIMES DAILY PRN
Qty: 30 TABLET | Refills: 0 | Status: SHIPPED | OUTPATIENT
Start: 2022-11-02 | End: 2023-03-07

## 2022-11-02 NOTE — PROGRESS NOTES
Adelaida is a 52 year old who is being evaluated via a billable telephone visit.      What phone number would you like to be contacted at?  See epic  How would you like to obtain your AVS? MyChart    Assessment & Plan     Dizziness  ddx-?  Vertigo/medication side effect from Zyrtec been taking in the morning//hypo or  hypertension/anemia  - meclizine (ANTIVERT) 25 MG tablet; Take 1 tablet (25 mg) by mouth 3 times daily as needed for dizziness  Reviewed possible differential etiologies with patient  Reviewed recent normal hemoglobin, kidney functions, electrolytes and ferritin from 10/17/2022  Recommended to stop taking Zyrtec in the morning  Continue with Flonase nasal sprays  Take meclizine 25 mg 3 times daily as needed for dizziness  Follow caution while changing positions  RTC in 1week if no better by then or sooner prn.  Dosing and potential medication side effects discussed.  Emphasized on good hydration  Patient verbalised understanding and is agreeable to the plan.  Educational handouts attached with AVS  Lab Results   Component Value Date    WBC 5.3 10/17/2022    WBC 2.8 08/06/2018     Lab Results   Component Value Date    RBC 4.05 10/17/2022    RBC 4.16 08/06/2018     Lab Results   Component Value Date    HGB 11.7 10/17/2022    HGB 12.5 11/30/2020     Lab Results   Component Value Date    HCT 35.9 10/17/2022    HCT 36.9 08/06/2018     No components found for: MCT  Lab Results   Component Value Date    MCV 89 10/17/2022    MCV 89 08/06/2018     Lab Results   Component Value Date    MCH 28.9 10/17/2022    MCH 29.1 08/06/2018     Lab Results   Component Value Date    MCHC 32.6 10/17/2022    MCHC 32.8 08/06/2018     Lab Results   Component Value Date    RDW 13.5 10/17/2022    RDW 13.1 08/06/2018     Lab Results   Component Value Date     10/17/2022     08/06/2018     Last Comprehensive Metabolic Panel:  Sodium   Date Value Ref Range Status   10/17/2022 137 133 - 144 mmol/L Final   11/30/2020 139 133  - 144 mmol/L Final     Potassium   Date Value Ref Range Status   10/17/2022 4.2 3.4 - 5.3 mmol/L Final   11/30/2020 4.7 3.4 - 5.3 mmol/L Final     Chloride   Date Value Ref Range Status   10/17/2022 107 94 - 109 mmol/L Final   11/30/2020 107 94 - 109 mmol/L Final     Carbon Dioxide   Date Value Ref Range Status   11/30/2020 28 20 - 32 mmol/L Final     Carbon Dioxide (CO2)   Date Value Ref Range Status   10/17/2022 24 20 - 32 mmol/L Final     Anion Gap   Date Value Ref Range Status   10/17/2022 6 3 - 14 mmol/L Final   11/30/2020 4 3 - 14 mmol/L Final     Glucose   Date Value Ref Range Status   10/17/2022 120 (H) 70 - 99 mg/dL Final   03/29/2021 96 70 - 99 mg/dL Final     Comment:     Fasting specimen     Urea Nitrogen   Date Value Ref Range Status   10/17/2022 20 7 - 30 mg/dL Final   11/30/2020 20 7 - 30 mg/dL Final     Creatinine   Date Value Ref Range Status   10/17/2022 1.00 0.52 - 1.04 mg/dL Final   11/30/2020 1.17 (H) 0.52 - 1.04 mg/dL Final     GFR Estimate   Date Value Ref Range Status   10/17/2022 67 >60 mL/min/1.73m2 Final     Comment:     Effective December 21, 2021 eGFRcr in adults is calculated using the 2021 CKD-EPI creatinine equation which includes age and gender (Kiran garcía al., NEJ, DOI: 10.1056/REJKgt2350359)   11/30/2020 54 (L) >60 mL/min/[1.73_m2] Final     Comment:     Non  GFR Calc  Starting 12/18/2018, serum creatinine based estimated GFR (eGFR) will be   calculated using the Chronic Kidney Disease Epidemiology Collaboration   (CKD-EPI) equation.       Calcium   Date Value Ref Range Status   10/17/2022 9.4 8.5 - 10.1 mg/dL Final   11/30/2020 9.5 8.5 - 10.1 mg/dL Final       BPV (benign positional vertigo), bilateral  as above    - meclizine (ANTIVERT) 25 MG tablet; Take 1 tablet (25 mg) by mouth 3 times daily as needed for dizziness    Acute non-recurrent maxillary sinusitis  Patient just finished a 10-day course of Augmentin, reviewed urgent care visit note from [facility]    Yeast  "infection of the vagina  Patient reports getting yeast infection after antibiotics in the past  She just finished a 10-day course of Augmentin for sinus infection given in the urgent care  We will check a single dose of Diflucan today  RTC in 1week if no better by then or sooner prn.  Patient verbalised understanding and is agreeable to the plan.    - fluconazole (DIFLUCAN) 150 MG tablet; Take 1 tablet (150 mg) by mouth once for 1 dose    Antibiotics causing adverse effect in therapeutic use, initial encounter  as above    - fluconazole (DIFLUCAN) 150 MG tablet; Take 1 tablet (150 mg) by mouth once for 1 dose    Hypertension, renal  Recommended patient to check blood pressure when she gets home and send the readings through [email or web address] for provider's review to assure that her dizziness is not from blood pressure variations  Patient verbalised understanding and is agreeable to the plan.        Review of the result(s) of each unique test - CBC, ferritin, BMP         BMI:   Estimated body mass index is 46.09 kg/m  as calculated from the following:    Height as of 9/27/22: 1.575 m (5' 2\").    Weight as of 10/22/22: 114.3 kg (252 lb).       Chart documentation done in part with Dragon Voice recognition Software. Although reviewed after completion, some word and grammatical error may remain.    See Patient Instructions    Return in about 1 week (around 11/9/2022), or if symptoms worsen or fail to improve.    Windy Gordillo MD  [facility]    Shannan Son is a 52 year old presenting for the following health issues:  No chief complaint on file.  Patient is here for a telephone visit instead of in person visit due to the current COVID-19 pandemic.  Patient with past medical history significant for renal hypertension, morbid obesity, stage IIIa kidney disease, recent history of knee arthroplasty is here for a telephone visit with concerns of having ongoing intermittent dizziness for the past 3 " weeks.  She was treated 2 weeks ago at the urgent care for acute sinus infection with 10-day course of Augmentin.  Patient was relating her dizziness related to sinusitis, but since she did not notice any symptom improvement after antibiotic completion, please schedule this visit  Patient feels like her head is spinning and she feels a dizziness with no associated concerns for ringing, like the sensation of the years, headache, tingling, numbness or weakness of extremities, history of fall, nausea, vomiting, speech or vision concerns.  Denies abnormal bleeding, anemia  Recent labs done 2 weeks ago that were in normal range  Patient has been taking cetirizine 10 mg daily in the morning since her urgent care visit  She denies previous history of vertigo  Denies current concerns for chest pain, chest pressure, shortness of breath, palpitations, syncope, new onset of leg swelling.  Patient is also reporting having vaginal itching with no concerns for significant vaginal discharge, odor  Has history of yeast infections in the vagina after antibiotic use in the past  Denies abnormal vaginal bleeding  Patient is requesting prescription for antifungals for her current possible yeast infection.  Patient has not been checking her blood pressures since her dizziness started    History of Present Illness       Reason for visit:  Dizziness  Symptom onset:  1-2 weeks ago  Symptoms include:  Dizziness  Symptom intensity:  Moderate  Symptom progression:  Staying the same  Had these symptoms before:  No  What makes it worse:  Laying down  What makes it better:  Not really    She eats 2-3 servings of fruits and vegetables daily.She consumes 5 sweetened beverage(s) daily.She exercises with enough effort to increase her heart rate 10 to 19 minutes per day.  She exercises with enough effort to increase her heart rate 3 or less days per week.   She is taking medications regularly.           Review of Systems   CONSTITUTIONAL: NEGATIVE  for fever, chills, change in weight  INTEGUMENTARY/SKIN: NEGATIVE for worrisome rashes, moles or lesions  EYES: NEGATIVE for vision changes or irritation  ENT/MOUTH: as above  RESP: NEGATIVE for significant cough or SOB  CV: NEGATIVE for chest pain, palpitations or peripheral edema  CV: History of hypertension  GI: NEGATIVE for nausea, abdominal pain, heartburn, or change in bowel habits  : as above  MUSCULOSKELETAL: NEGATIVE for significant arthralgias or myalgia  NEURO: dizziness/lightheadedness  ENDOCRINE: NEGATIVE for temperature intolerance, skin/hair changes  HEME/ALLERGY/IMMUNE: NEGATIVE for bleeding problems  PSYCHIATRIC: NEGATIVE for changes in mood or affect      Objective           Vitals:  No vitals were obtained today due to virtual visit.    Physical Exam   healthy, alert and no distress  PSYCH: Alert and oriented times 3; coherent speech, normal   rate and volume, able to articulate logical thoughts, able   to abstract reason, no tangential thoughts, no hallucinations   or delusions  Her affect is normal  RESP: No cough, no audible wheezing, able to talk in full sentences  Remainder of exam unable to be completed due to telephone visits                Phone call duration: 9 minutes

## 2022-11-03 ENCOUNTER — THERAPY VISIT (OUTPATIENT)
Dept: PHYSICAL THERAPY | Facility: CLINIC | Age: 52
End: 2022-11-03
Payer: COMMERCIAL

## 2022-11-03 DIAGNOSIS — Z96.652 AFTERCARE FOLLOWING LEFT KNEE JOINT REPLACEMENT SURGERY: Primary | ICD-10-CM

## 2022-11-03 DIAGNOSIS — R60.0 LOCALIZED EDEMA: ICD-10-CM

## 2022-11-03 DIAGNOSIS — Z96.652 S/P TOTAL KNEE ARTHROPLASTY, LEFT: ICD-10-CM

## 2022-11-03 DIAGNOSIS — M25.562 CHRONIC PAIN OF LEFT KNEE: ICD-10-CM

## 2022-11-03 DIAGNOSIS — G89.29 CHRONIC PAIN OF LEFT KNEE: ICD-10-CM

## 2022-11-03 DIAGNOSIS — Z47.1 AFTERCARE FOLLOWING LEFT KNEE JOINT REPLACEMENT SURGERY: Primary | ICD-10-CM

## 2022-11-03 PROCEDURE — 97110 THERAPEUTIC EXERCISES: CPT | Mod: GP | Performed by: PHYSICAL THERAPIST

## 2022-11-03 PROCEDURE — 97140 MANUAL THERAPY 1/> REGIONS: CPT | Mod: GP | Performed by: PHYSICAL THERAPIST

## 2022-11-08 ENCOUNTER — THERAPY VISIT (OUTPATIENT)
Dept: PHYSICAL THERAPY | Facility: CLINIC | Age: 52
End: 2022-11-08
Payer: COMMERCIAL

## 2022-11-08 DIAGNOSIS — R60.0 LOCALIZED EDEMA: ICD-10-CM

## 2022-11-08 DIAGNOSIS — Z96.652 AFTERCARE FOLLOWING LEFT KNEE JOINT REPLACEMENT SURGERY: Primary | ICD-10-CM

## 2022-11-08 DIAGNOSIS — Z47.1 AFTERCARE FOLLOWING LEFT KNEE JOINT REPLACEMENT SURGERY: Primary | ICD-10-CM

## 2022-11-08 DIAGNOSIS — Z96.652 S/P TOTAL KNEE ARTHROPLASTY, LEFT: ICD-10-CM

## 2022-11-08 DIAGNOSIS — G89.29 CHRONIC PAIN OF LEFT KNEE: ICD-10-CM

## 2022-11-08 DIAGNOSIS — M25.562 CHRONIC PAIN OF LEFT KNEE: ICD-10-CM

## 2022-11-08 PROCEDURE — 97140 MANUAL THERAPY 1/> REGIONS: CPT | Mod: GP | Performed by: PHYSICAL THERAPIST

## 2022-11-10 ENCOUNTER — THERAPY VISIT (OUTPATIENT)
Dept: PHYSICAL THERAPY | Facility: CLINIC | Age: 52
End: 2022-11-10
Payer: COMMERCIAL

## 2022-11-10 DIAGNOSIS — Z96.652 AFTERCARE FOLLOWING LEFT KNEE JOINT REPLACEMENT SURGERY: Primary | ICD-10-CM

## 2022-11-10 DIAGNOSIS — Z47.1 AFTERCARE FOLLOWING LEFT KNEE JOINT REPLACEMENT SURGERY: Primary | ICD-10-CM

## 2022-11-10 DIAGNOSIS — G89.29 CHRONIC PAIN OF LEFT KNEE: ICD-10-CM

## 2022-11-10 DIAGNOSIS — M25.562 CHRONIC PAIN OF LEFT KNEE: ICD-10-CM

## 2022-11-10 DIAGNOSIS — R60.0 LOCALIZED EDEMA: ICD-10-CM

## 2022-11-10 DIAGNOSIS — Z96.652 S/P TOTAL KNEE ARTHROPLASTY, LEFT: ICD-10-CM

## 2022-11-10 PROCEDURE — 97140 MANUAL THERAPY 1/> REGIONS: CPT | Mod: GP | Performed by: PHYSICAL THERAPIST

## 2022-11-10 PROCEDURE — 97110 THERAPEUTIC EXERCISES: CPT | Mod: GP | Performed by: PHYSICAL THERAPIST

## 2022-11-13 DIAGNOSIS — J01.00 ACUTE NON-RECURRENT MAXILLARY SINUSITIS: ICD-10-CM

## 2022-11-14 RX ORDER — FLUTICASONE PROPIONATE 50 MCG
1 SPRAY, SUSPENSION (ML) NASAL DAILY
Qty: 16 ML | OUTPATIENT
Start: 2022-11-14 | End: 2022-12-14

## 2022-11-14 RX ORDER — CETIRIZINE HYDROCHLORIDE 10 MG/1
TABLET ORAL
Qty: 30 TABLET | Refills: 0 | OUTPATIENT
Start: 2022-11-14

## 2022-11-15 ENCOUNTER — THERAPY VISIT (OUTPATIENT)
Dept: PHYSICAL THERAPY | Facility: CLINIC | Age: 52
End: 2022-11-15
Payer: COMMERCIAL

## 2022-11-15 DIAGNOSIS — Z96.652 AFTERCARE FOLLOWING LEFT KNEE JOINT REPLACEMENT SURGERY: Primary | ICD-10-CM

## 2022-11-15 DIAGNOSIS — Z47.1 AFTERCARE FOLLOWING LEFT KNEE JOINT REPLACEMENT SURGERY: Primary | ICD-10-CM

## 2022-11-15 DIAGNOSIS — Z96.652 S/P TOTAL KNEE ARTHROPLASTY, LEFT: ICD-10-CM

## 2022-11-15 DIAGNOSIS — G89.29 CHRONIC PAIN OF LEFT KNEE: ICD-10-CM

## 2022-11-15 DIAGNOSIS — R60.0 LOCALIZED EDEMA: ICD-10-CM

## 2022-11-15 DIAGNOSIS — M25.562 CHRONIC PAIN OF LEFT KNEE: ICD-10-CM

## 2022-11-15 PROCEDURE — 97140 MANUAL THERAPY 1/> REGIONS: CPT | Mod: GP | Performed by: PHYSICAL THERAPIST

## 2022-11-15 PROCEDURE — 97110 THERAPEUTIC EXERCISES: CPT | Mod: GP | Performed by: PHYSICAL THERAPIST

## 2022-11-15 NOTE — PROGRESS NOTES
Subjective:  HPI  Physical Exam                    Objective:  System    Physical Exam    General     ROS    Assessment/Plan:    SUBJECTIVE  Subjective: Pt reports she is progressing well.  Realizes just how far she has come.  Feels like motion is finally getting somewhere.   Current Pain level:  (not rated numerically)   Changes in function:  Yes (See Goal flowsheet attached for changes in current functional level)     Adverse reaction to treatment or activity:  None    OBJECTIVE  Objective: AROM L knee flexion 107 upon arrival, 118 after session.  Pt ambulates without device.  Incision site closed without tenderness.     ASSESSMENT  Adelaida continues to require intervention to meet STG and LTG's: PT  Patient is progressing as expected.  Response to therapy has shown an improvement in  ROM   Progress made towards STG/LTG?  Yes (See Goal flowsheet attached for updates on achievement of STG and LTG)    PLAN  Current treatment program is being advanced to more complex exercises.  Advance HEP next session.    PTA/ATC plan:  N/A    Please refer to the daily flowsheet for treatment today, total treatment time and time spent performing 1:1 timed codes.

## 2022-11-17 ENCOUNTER — THERAPY VISIT (OUTPATIENT)
Dept: PHYSICAL THERAPY | Facility: CLINIC | Age: 52
End: 2022-11-17
Payer: COMMERCIAL

## 2022-11-17 DIAGNOSIS — Z96.652 AFTERCARE FOLLOWING LEFT KNEE JOINT REPLACEMENT SURGERY: Primary | ICD-10-CM

## 2022-11-17 DIAGNOSIS — M25.562 CHRONIC PAIN OF LEFT KNEE: ICD-10-CM

## 2022-11-17 DIAGNOSIS — R60.0 LOCALIZED EDEMA: ICD-10-CM

## 2022-11-17 DIAGNOSIS — Z96.652 S/P TOTAL KNEE ARTHROPLASTY, LEFT: ICD-10-CM

## 2022-11-17 DIAGNOSIS — Z47.1 AFTERCARE FOLLOWING LEFT KNEE JOINT REPLACEMENT SURGERY: Primary | ICD-10-CM

## 2022-11-17 DIAGNOSIS — G89.29 CHRONIC PAIN OF LEFT KNEE: ICD-10-CM

## 2022-11-17 PROCEDURE — 97140 MANUAL THERAPY 1/> REGIONS: CPT | Mod: GP | Performed by: PHYSICAL THERAPIST

## 2022-11-17 PROCEDURE — 97110 THERAPEUTIC EXERCISES: CPT | Mod: GP | Performed by: PHYSICAL THERAPIST

## 2022-11-23 ENCOUNTER — THERAPY VISIT (OUTPATIENT)
Dept: PHYSICAL THERAPY | Facility: CLINIC | Age: 52
End: 2022-11-23
Payer: COMMERCIAL

## 2022-11-23 DIAGNOSIS — R60.0 LOCALIZED EDEMA: ICD-10-CM

## 2022-11-23 DIAGNOSIS — M25.562 CHRONIC PAIN OF LEFT KNEE: ICD-10-CM

## 2022-11-23 DIAGNOSIS — G89.29 CHRONIC PAIN OF LEFT KNEE: ICD-10-CM

## 2022-11-23 DIAGNOSIS — Z96.652 AFTERCARE FOLLOWING LEFT KNEE JOINT REPLACEMENT SURGERY: Primary | ICD-10-CM

## 2022-11-23 DIAGNOSIS — Z47.1 AFTERCARE FOLLOWING LEFT KNEE JOINT REPLACEMENT SURGERY: Primary | ICD-10-CM

## 2022-11-23 DIAGNOSIS — R10.13 DYSPEPSIA: ICD-10-CM

## 2022-11-23 DIAGNOSIS — Z96.652 S/P TOTAL KNEE ARTHROPLASTY, LEFT: ICD-10-CM

## 2022-11-23 PROCEDURE — 97140 MANUAL THERAPY 1/> REGIONS: CPT | Mod: GP | Performed by: PHYSICAL THERAPIST

## 2022-11-23 PROCEDURE — 97530 THERAPEUTIC ACTIVITIES: CPT | Mod: GP | Performed by: PHYSICAL THERAPIST

## 2022-11-23 RX ORDER — FAMOTIDINE 20 MG/1
TABLET, FILM COATED ORAL
Qty: 180 TABLET | Refills: 1 | Status: SHIPPED | OUTPATIENT
Start: 2022-11-23 | End: 2023-04-17

## 2022-11-25 DIAGNOSIS — J01.00 ACUTE NON-RECURRENT MAXILLARY SINUSITIS: ICD-10-CM

## 2022-11-26 RX ORDER — FLUTICASONE PROPIONATE 50 MCG
1 SPRAY, SUSPENSION (ML) NASAL DAILY
Qty: 16 ML | OUTPATIENT
Start: 2022-11-26 | End: 2022-12-26

## 2022-11-26 NOTE — TELEPHONE ENCOUNTER
Outpatient Medication Detail     Disp Refills Start End MATTHEW   fluticasone (FLONASE) 50 MCG/ACT nasal spray 9.9 mL 0 10/22/2022 11/21/2022 --   Sig - Route: Spray 1 spray into both nostrils daily for 30 days - Both Nostrils   Sent to pharmacy as: Fluticasone Propionate 50 MCG/ACT Nasal Suspension (FLONASE)   Class: E-Prescribe   Order: [id number]   E-Prescribing Status: Receipt confirmed by pharmacy (10/22/2022 11:09 AM CDT)

## 2022-11-27 ENCOUNTER — HEALTH MAINTENANCE LETTER (OUTPATIENT)
Age: 52
End: 2022-11-27

## 2022-12-01 ENCOUNTER — THERAPY VISIT (OUTPATIENT)
Dept: PHYSICAL THERAPY | Facility: CLINIC | Age: 52
End: 2022-12-01
Payer: COMMERCIAL

## 2022-12-01 DIAGNOSIS — R60.0 LOCALIZED EDEMA: ICD-10-CM

## 2022-12-01 DIAGNOSIS — Z96.652 AFTERCARE FOLLOWING LEFT KNEE JOINT REPLACEMENT SURGERY: Primary | ICD-10-CM

## 2022-12-01 DIAGNOSIS — G89.29 CHRONIC PAIN OF LEFT KNEE: ICD-10-CM

## 2022-12-01 DIAGNOSIS — M25.562 CHRONIC PAIN OF LEFT KNEE: ICD-10-CM

## 2022-12-01 DIAGNOSIS — Z47.1 AFTERCARE FOLLOWING LEFT KNEE JOINT REPLACEMENT SURGERY: Primary | ICD-10-CM

## 2022-12-01 DIAGNOSIS — Z96.652 S/P TOTAL KNEE ARTHROPLASTY, LEFT: ICD-10-CM

## 2022-12-01 PROCEDURE — 97110 THERAPEUTIC EXERCISES: CPT | Mod: GP | Performed by: PHYSICAL THERAPIST

## 2022-12-01 NOTE — PROGRESS NOTES
Subjective:  HPI  Physical Exam                    Objective:  System    Physical Exam    General     ROS    Assessment/Plan:    PROGRESS  REPORT    Progress reporting period is from 09/02/2022 to 12/01/2022.       SUBJECTIVE  Subjective: Pt reports she remains stiffest first thing in the morning.  Has tried to be intentional about using the stairs with good mechanics.  Finds biking to be very helpful.    Current Pain level: 1/10.     Initial Pain level: 5/10.   Changes in function:  Yes (See Goal flowsheet attached for changes in current functional level)  Adverse reaction to treatment or activity: None    OBJECTIVE  Objective: AROM 109 flexion upon arrival.     ASSESSMENT/PLAN  Updated problem list and treatment plan: Diagnosis 1:  S/p L TKA -- see plan below  STG/LTGs have been met or progress has been made towards goals:  Yes (See Goal flow sheet completed today.)  Assessment of Progress: The patient's condition is improving.  Self Management Plans:  Patient has been instructed in a home treatment program.  I have re-evaluated this patient and find that the nature, scope, duration and intensity of the therapy is appropriate for the medical condition of the patient.  Adelaida continues to require the following intervention to meet STG and LTG's:  PT    Recommendations:  Pt has been able to sustain ROM gains at reduced frequency in clinic.  Focus on strengthening today and return to ROM work if pt unable to sustain.  Four visits over four weeks and, if ROM is plateaued, anticipate discharge.    Please refer to the daily flowsheet for treatment today, total treatment time and time spent performing 1:1 timed codes.

## 2022-12-08 ENCOUNTER — THERAPY VISIT (OUTPATIENT)
Dept: PHYSICAL THERAPY | Facility: CLINIC | Age: 52
End: 2022-12-08
Payer: COMMERCIAL

## 2022-12-08 DIAGNOSIS — Z96.652 S/P TOTAL KNEE ARTHROPLASTY, LEFT: ICD-10-CM

## 2022-12-08 DIAGNOSIS — G89.29 CHRONIC PAIN OF LEFT KNEE: ICD-10-CM

## 2022-12-08 DIAGNOSIS — Z96.652 AFTERCARE FOLLOWING LEFT KNEE JOINT REPLACEMENT SURGERY: Primary | ICD-10-CM

## 2022-12-08 DIAGNOSIS — R60.0 LOCALIZED EDEMA: ICD-10-CM

## 2022-12-08 DIAGNOSIS — M25.562 CHRONIC PAIN OF LEFT KNEE: ICD-10-CM

## 2022-12-08 DIAGNOSIS — Z47.1 AFTERCARE FOLLOWING LEFT KNEE JOINT REPLACEMENT SURGERY: Primary | ICD-10-CM

## 2022-12-08 PROCEDURE — 97110 THERAPEUTIC EXERCISES: CPT | Mod: GP | Performed by: PHYSICAL THERAPIST

## 2022-12-08 PROCEDURE — 97140 MANUAL THERAPY 1/> REGIONS: CPT | Mod: GP | Performed by: PHYSICAL THERAPIST

## 2022-12-13 ENCOUNTER — MYC MEDICAL ADVICE (OUTPATIENT)
Dept: FAMILY MEDICINE | Facility: CLINIC | Age: 52
End: 2022-12-13

## 2022-12-13 DIAGNOSIS — J31.0 CHRONIC RHINITIS: Primary | ICD-10-CM

## 2022-12-13 RX ORDER — FLUTICASONE PROPIONATE 50 MCG
1 SPRAY, SUSPENSION (ML) NASAL DAILY
Qty: 9.9 ML | Refills: 0 | Status: SHIPPED | OUTPATIENT
Start: 2022-12-13 | End: 2023-02-06

## 2022-12-15 ENCOUNTER — THERAPY VISIT (OUTPATIENT)
Dept: PHYSICAL THERAPY | Facility: CLINIC | Age: 52
End: 2022-12-15
Payer: COMMERCIAL

## 2022-12-15 DIAGNOSIS — Z96.652 S/P TOTAL KNEE ARTHROPLASTY, LEFT: ICD-10-CM

## 2022-12-15 DIAGNOSIS — Z96.652 AFTERCARE FOLLOWING LEFT KNEE JOINT REPLACEMENT SURGERY: Primary | ICD-10-CM

## 2022-12-15 DIAGNOSIS — Z47.1 AFTERCARE FOLLOWING LEFT KNEE JOINT REPLACEMENT SURGERY: Primary | ICD-10-CM

## 2022-12-15 DIAGNOSIS — M25.562 CHRONIC PAIN OF LEFT KNEE: ICD-10-CM

## 2022-12-15 DIAGNOSIS — G89.29 CHRONIC PAIN OF LEFT KNEE: ICD-10-CM

## 2022-12-15 DIAGNOSIS — R60.0 LOCALIZED EDEMA: ICD-10-CM

## 2022-12-15 PROCEDURE — 97530 THERAPEUTIC ACTIVITIES: CPT | Mod: GP | Performed by: PHYSICAL THERAPIST

## 2022-12-15 PROCEDURE — 97140 MANUAL THERAPY 1/> REGIONS: CPT | Mod: GP | Performed by: PHYSICAL THERAPIST

## 2022-12-15 RX ORDER — EPLERENONE 50 MG/1
100 TABLET, FILM COATED ORAL 2 TIMES DAILY
Qty: 360 TABLET | Refills: 3 | Status: SHIPPED | OUTPATIENT
Start: 2022-12-15 | End: 2023-11-30

## 2022-12-15 NOTE — PROGRESS NOTES
"Subjective:  HPI  Physical Exam                    Objective:  System    Physical Exam    General     ROS    Assessment/Plan:    SUBJECTIVE  Subjective: \"Functioning is good.\"  Walking more during the course of the day.   Current Pain level:  (\"can have its aches but there's really not a lot of pain\")   Changes in function:  Yes (See Goal flowsheet attached for changes in current functional level)     Adverse reaction to treatment or activity:  None    OBJECTIVE  Objective: AROM 107 flexion L knee upon arrival, 108 after session.     ASSESSMENT  Adelaida continues to require intervention to meet STG and LTG's: PT  Patient is progressing as expected.  Response to therapy has shown an improvement in  ROM  and function  Progress made towards STG/LTG?  Yes (See Goal flowsheet attached for updates on achievement of STG and LTG)    PLAN  One visit remains scheduled.  Reassess overall ability to sustain progress (given did not see ROM gains in session today) and determine further plan of care.  Consider discharge?    PTA/ATC plan:  N/A    Please refer to the daily flowsheet for treatment today, total treatment time and time spent performing 1:1 timed codes.        "

## 2022-12-15 NOTE — TELEPHONE ENCOUNTER
eplerenone (INSPRA) 50 MG tablet  Last Written Prescription Date:  11/29/2021   Last Fill Quantity: 360,   # refills: 3  Last Office Visit :  3/15/2022  Future Office visit:   3/28/2023    Routing refill request to provider for review/approval because:  We do not fill medications for this clinic.  Refer to clinic/Provider for review         Cele Hope RN  Central Triage Red Flags/Med Refills

## 2023-01-23 ENCOUNTER — THERAPY VISIT (OUTPATIENT)
Dept: PHYSICAL THERAPY | Facility: CLINIC | Age: 53
End: 2023-01-23
Payer: COMMERCIAL

## 2023-01-23 DIAGNOSIS — G89.29 CHRONIC PAIN OF LEFT KNEE: ICD-10-CM

## 2023-01-23 DIAGNOSIS — Z96.652 S/P TOTAL KNEE ARTHROPLASTY, LEFT: ICD-10-CM

## 2023-01-23 DIAGNOSIS — R60.0 LOCALIZED EDEMA: ICD-10-CM

## 2023-01-23 DIAGNOSIS — Z96.652 AFTERCARE FOLLOWING LEFT KNEE JOINT REPLACEMENT SURGERY: Primary | ICD-10-CM

## 2023-01-23 DIAGNOSIS — M25.562 CHRONIC PAIN OF LEFT KNEE: ICD-10-CM

## 2023-01-23 DIAGNOSIS — Z47.1 AFTERCARE FOLLOWING LEFT KNEE JOINT REPLACEMENT SURGERY: Primary | ICD-10-CM

## 2023-01-23 PROCEDURE — 97140 MANUAL THERAPY 1/> REGIONS: CPT | Mod: GP | Performed by: PHYSICAL THERAPIST

## 2023-01-23 PROCEDURE — 97110 THERAPEUTIC EXERCISES: CPT | Mod: GP | Performed by: PHYSICAL THERAPIST

## 2023-02-08 ENCOUNTER — THERAPY VISIT (OUTPATIENT)
Dept: PHYSICAL THERAPY | Facility: CLINIC | Age: 53
End: 2023-02-08
Payer: COMMERCIAL

## 2023-02-08 DIAGNOSIS — M25.562 CHRONIC PAIN OF LEFT KNEE: ICD-10-CM

## 2023-02-08 DIAGNOSIS — Z96.652 S/P TOTAL KNEE ARTHROPLASTY, LEFT: ICD-10-CM

## 2023-02-08 DIAGNOSIS — Z47.1 AFTERCARE FOLLOWING LEFT KNEE JOINT REPLACEMENT SURGERY: Primary | ICD-10-CM

## 2023-02-08 DIAGNOSIS — G89.29 CHRONIC PAIN OF LEFT KNEE: ICD-10-CM

## 2023-02-08 DIAGNOSIS — Z96.652 AFTERCARE FOLLOWING LEFT KNEE JOINT REPLACEMENT SURGERY: Primary | ICD-10-CM

## 2023-02-08 DIAGNOSIS — R60.0 LOCALIZED EDEMA: ICD-10-CM

## 2023-02-08 PROCEDURE — 97140 MANUAL THERAPY 1/> REGIONS: CPT | Mod: GP | Performed by: PHYSICAL THERAPIST

## 2023-02-08 PROCEDURE — 97110 THERAPEUTIC EXERCISES: CPT | Mod: GP | Performed by: PHYSICAL THERAPIST

## 2023-02-08 NOTE — PROGRESS NOTES
Assessment/Plan:    PROGRESS  REPORT    Progress reporting period is from 12/1/22 to 2/8/23.       SUBJECTIVE   Subjective: Pt states that she has been having some muslce spasms above the right knee and into bilateral calves. did get a massage on Friday and that did seem to help a little bit. right knee stiffness continues with prolonged sitting.  Was able to participate in water aerobics class x 1 prior to getting sick and plans to return to this in the next few days.   Current pain level is 0/10  .     Initial Pain level: 5/10.   Changes in function:  Yes (See Goal flowsheet attached for changes in current functional level)  Adverse reaction to treatment or activity: None    OBJECTIVE  Changes noted in objective findings:    Objective: AROM right knee 0-116, AROM Left knee 0-106 post treatment. non antalgic gait, able to ascend/descend stairs in reciprocal pattern with UE min assist.     ASSESSMENT/PLAN  Updated problem list and treatment plan: Diagnosis 1:  L TKA  Decreased ROM/flexibility - manual therapy and therapeutic exercise  Decreased joint mobility - manual therapy and therapeutic exercise  Decreased strength - therapeutic exercise and therapeutic activities  Impaired muscle performance - neuro re-education  STG/LTGs have been met or progress has been made towards goals:  Yes (See Goal flow sheet completed today.)  Assessment of Progress: The patient's condition has potential to improve.  Self Management Plans:  Patient has been instructed in a home treatment program.  Patient is independent in a home treatment program.  Patient  has been instructed in self management of symptoms.  I have re-evaluated this patient and find that the nature, scope, duration and intensity of the therapy is appropriate for the medical condition of the patient.  Adelaida continues to require the following intervention to meet STG and LTG's:  PT    Recommendations:  This patient would benefit from continued therapy.      Frequency:  2 X a month, once daily  Duration:  for 2-3 months        Please refer to the daily flowsheet for treatment today, total treatment time and time spent performing 1:1 timed codes.

## 2023-02-10 ENCOUNTER — OFFICE VISIT (OUTPATIENT)
Dept: URGENT CARE | Facility: URGENT CARE | Age: 53
End: 2023-02-10
Payer: COMMERCIAL

## 2023-02-10 VITALS
BODY MASS INDEX: 48.51 KG/M2 | TEMPERATURE: 97.9 F | DIASTOLIC BLOOD PRESSURE: 91 MMHG | WEIGHT: 265.25 LBS | OXYGEN SATURATION: 100 % | HEART RATE: 77 BPM | RESPIRATION RATE: 16 BRPM | SYSTOLIC BLOOD PRESSURE: 136 MMHG

## 2023-02-10 DIAGNOSIS — R73.03 PREDIABETES: ICD-10-CM

## 2023-02-10 DIAGNOSIS — R04.0 BLOODY NOSE: ICD-10-CM

## 2023-02-10 DIAGNOSIS — R05.1 ACUTE COUGH: ICD-10-CM

## 2023-02-10 DIAGNOSIS — J01.90 ACUTE SINUSITIS WITH SYMPTOMS > 10 DAYS: Primary | ICD-10-CM

## 2023-02-10 DIAGNOSIS — I10 ELEVATED BLOOD PRESSURE READING IN OFFICE WITH DIAGNOSIS OF HYPERTENSION: ICD-10-CM

## 2023-02-10 LAB — HBA1C MFR BLD: 13 % (ref 0–5.6)

## 2023-02-10 PROCEDURE — 99214 OFFICE O/P EST MOD 30 MIN: CPT | Mod: CS | Performed by: PHYSICIAN ASSISTANT

## 2023-02-10 PROCEDURE — 36416 COLLJ CAPILLARY BLOOD SPEC: CPT | Performed by: PHYSICIAN ASSISTANT

## 2023-02-10 PROCEDURE — 83036 HEMOGLOBIN GLYCOSYLATED A1C: CPT | Performed by: PHYSICIAN ASSISTANT

## 2023-02-10 PROCEDURE — U0003 INFECTIOUS AGENT DETECTION BY NUCLEIC ACID (DNA OR RNA); SEVERE ACUTE RESPIRATORY SYNDROME CORONAVIRUS 2 (SARS-COV-2) (CORONAVIRUS DISEASE [COVID-19]), AMPLIFIED PROBE TECHNIQUE, MAKING USE OF HIGH THROUGHPUT TECHNOLOGIES AS DESCRIBED BY CMS-2020-01-R: HCPCS | Performed by: PHYSICIAN ASSISTANT

## 2023-02-10 PROCEDURE — U0005 INFEC AGEN DETEC AMPLI PROBE: HCPCS | Performed by: PHYSICIAN ASSISTANT

## 2023-02-10 RX ORDER — FLUCONAZOLE 150 MG/1
150 TABLET ORAL ONCE
Qty: 1 TABLET | Refills: 0 | Status: SHIPPED | OUTPATIENT
Start: 2023-02-10 | End: 2023-02-10

## 2023-02-10 RX ORDER — DOXYCYCLINE 100 MG/1
100 CAPSULE ORAL 2 TIMES DAILY
Qty: 20 CAPSULE | Refills: 0 | Status: SHIPPED | OUTPATIENT
Start: 2023-02-10 | End: 2023-02-20

## 2023-02-10 RX ORDER — MUPIROCIN 20 MG/G
OINTMENT TOPICAL 2 TIMES DAILY
Qty: 15 G | Refills: 0 | Status: SHIPPED | OUTPATIENT
Start: 2023-02-10 | End: 2023-02-20

## 2023-02-11 NOTE — PROGRESS NOTES
Chief Complaint   Patient presents with     Urgent Care     Urgent care visit for possible sinus infection.     Sinus Problem     For about one week the patient has had postnasal drainage, headache, nasal congestion, no fever/chills. She was sick last week for 2 days and felt she had the flu. She has body aches etc. She still has some muscle spasms in her legs, but otherwise most of those symptoms resolved. She took an at-home COVID-19 test last week and it was negative.     Cough     Slight cough d/t postnasal drainage. The patient feels her glands are swollen. She does not have tonsils. She does not have a sore throat per her report, just swollen glands.               ASSESSMENT:     ICD-10-CM    1. Acute sinusitis with symptoms > 10 days  J01.90 doxycycline hyclate (VIBRAMYCIN) 100 MG capsule     fluconazole (DIFLUCAN) 150 MG tablet     mupirocin (BACTROBAN) 2 % external ointment      2. Bloody nose  R04.0 doxycycline hyclate (VIBRAMYCIN) 100 MG capsule     fluconazole (DIFLUCAN) 150 MG tablet     mupirocin (BACTROBAN) 2 % external ointment      3. Acute cough  R05.1 doxycycline hyclate (VIBRAMYCIN) 100 MG capsule     fluconazole (DIFLUCAN) 150 MG tablet     mupirocin (BACTROBAN) 2 % external ointment      4. Elevated blood pressure reading in office with diagnosis of hypertension  I10 doxycycline hyclate (VIBRAMYCIN) 100 MG capsule     fluconazole (DIFLUCAN) 150 MG tablet     mupirocin (BACTROBAN) 2 % external ointment      5. Prediabetes  R73.03 doxycycline hyclate (VIBRAMYCIN) 100 MG capsule     fluconazole (DIFLUCAN) 150 MG tablet     mupirocin (BACTROBAN) 2 % external ointment     Hemoglobin A1c              PLAN: Acute sinusitis treated with doxycycline.  Diflucan in case she gets yeast infection.  Bactroban ointment for irritated nasal turbinates/blood.  I have discussed clinical findings with patient.  Side effects of medications discussed.  Symptomatic care is discussed.  I have discussed the  possibility of  worsening symptoms and indication to RTC or go to the ER if they occur.  All questions are answered, patient indicates understanding of these issues and is in agreement with plan.   Patient care instructions are discussed/given at the end of visit.   Lots of rest and fluids.    Recheck blood pressure outside of clinic once feeling better and follow-up with primary for any concerns.  Fransisca Encinas PA-C      SUBJECTIVE:  52-year-old female presents for sinus congestion, cough, runny nose, postnasal drip, sinus pressure for about 2 weeks now.  Some blood when blowing nose.  History of sinusitis.  Last treated in October for one with Augmentin.  Also has prediabetes and would like her hemoglobin A1c checked.      Allergies   Allergen Reactions     Sulfa Drugs Shortness Of Breath and Rash     Hydrochlorothiazide W/Triamterene Other (See Comments)     Renal insuff     Maxzide [Hydrochlorothiazide W/Triamterene] Other (See Comments)     Renal insuff     Metoprolol Other (See Comments)     Other reaction(s): Bradycardia  At high dose only  At high dose only     Seasonal Allergies      Hayfever, tree pollens       Past Medical History:   Diagnosis Date     Allergic rhinitis due to other allergen     seasonal     Arthritis      Diabetes (H)      Localized osteoarthritis of both knees      Migraine, unspecified, without mention of intractable migraine without mention of status migrainosus      Monoclonal gammopathy      Morbid obesity (H)      Type 2 diabetes mellitus with stage 3a chronic kidney disease, without long-term current use of insulin (H) 8/9/2016     Unspecified essential hypertension     dx around age 31       acetaminophen (TYLENOL) 325 MG tablet, Take 2 tablets (650 mg) by mouth every 4 hours as needed for other (mild pain)  amLODIPine (NORVASC) 5 MG tablet, Take 1 tablet (5 mg) by mouth daily (Patient taking differently: Take 5 mg by mouth every evening)  cetirizine (ZYRTEC) 10 MG tablet,  Take 10 mg by mouth daily as needed for allergies  cholecalciferol 2000 UNITS CAPS, Take 2,000 Units by mouth daily  eplerenone (INSPRA) 50 MG tablet, Take 2 tablets (100 mg) by mouth 2 times daily  famotidine (PEPCID) 20 MG tablet, TAKE 1 TABLET BY MOUTH TWICE A DAY  Fluocinolone Acetonide Scalp (DERMA-SMOOTHE/FS SCALP) 0.01 % OIL oil, Apply nightly to the scalp for the 6 weeks  fluticasone (FLONASE) 50 MCG/ACT nasal spray, INSTILL 1 SPRAY INTO BOTH NOSTRILS DAILY  hydrOXYzine (ATARAX) 25 MG tablet, Take 0.5-1 tablets (12.5-25 mg) by mouth nightly as needed for other (sleep)  labetalol (NORMODYNE) 300 MG tablet, Take 1 tablet (300 mg) by mouth 2 times daily  polyethylene glycol (MIRALAX) 17 g packet, Take 17 g by mouth daily  aspirin (ASA) 325 MG EC tablet, Take 1 tablet (325 mg) by mouth daily (Patient not taking: Reported on 10/22/2022)  blood glucose (NO BRAND SPECIFIED) lancets standard, Use to test blood sugar 1-2 times daily or as directed. (Patient not taking: Reported on 2/10/2023)  blood glucose (NO BRAND SPECIFIED) test strip, Use to test blood sugar 1-2 times daily or as directed. (Patient not taking: Reported on 10/22/2022)  blood glucose monitoring (NO BRAND SPECIFIED) meter device kit, Use to test blood sugar 1-2 times daily or as directed. (Patient not taking: Reported on 10/22/2022)  gabapentin (NEURONTIN) 100 MG capsule, Take 1 capsule (100 mg) by mouth 3 times daily (Patient not taking: Reported on 2/10/2023)  HYDROcodone-acetaminophen (NORCO) 5-325 MG tablet, Take 1 tablet by mouth every 6 hours as needed for pain (Patient not taking: Reported on 2/10/2023)  hydrOXYzine (ATARAX) 25 MG tablet, Take 1 tablet (25 mg) by mouth every 6 hours as needed for itching or anxiety (with pain, moderate pain) (Patient not taking: Reported on 2/10/2023)  meclizine (ANTIVERT) 25 MG tablet, Take 1 tablet (25 mg) by mouth 3 times daily as needed for dizziness (Patient not taking: Reported on  2/10/2023)  methocarbamol (ROBAXIN) 750 MG tablet, Take 1 tablet (750 mg) by mouth every 6 hours as needed for muscle spasms (Patient not taking: Reported on 10/22/2022)  oxyCODONE (ROXICODONE) 5 MG tablet, Take 1-2 tablets (5-10 mg) by mouth every 4 hours as needed for pain (Patient not taking: Reported on 10/22/2022)  senna-docusate (SENOKOT-S/PERICOLACE) 8.6-50 MG tablet, Take 1-2 tablets by mouth 2 times daily Take while on oral narcotics to prevent or treat constipation. (Patient not taking: Reported on 2/10/2023)  SUMAtriptan (IMITREX) 25 MG tablet, TAKE 1 TO 2 TABLETS BY MOUTH AT ONSET OF HEADACHE FOR MIGRAINE. MAY REPEAT DOSE IN 2 HOURS. MAX OF 200MG OR 2 DOSES IN 24 HOURS (Patient not taking: Reported on 2/10/2023)    No current facility-administered medications on file prior to visit.      Social History     Tobacco Use     Smoking status: Never     Smokeless tobacco: Never   Substance Use Topics     Alcohol use: No       ROS:  CONSTITUTIONAL: Negative for fatigue or fever.  EYES: Negative for eye problems.  ENT: As above.  RESP: As above.  CV: Negative for chest pains.  GI: Negative for vomiting.  MUSCULOSKELETAL:  Negative for significant muscle or joint pains.  NEUROLOGIC: Negative for headaches.  SKIN: Negative for rash.  PSYCH: Normal mentation for age.    OBJECTIVE:  BP (!) 136/91 (BP Location: Left arm, Patient Position: Sitting, Cuff Size: Adult Large)   Pulse 77   Temp 97.9  F (36.6  C) (Tympanic)   Resp 16   Wt 120.3 kg (265 lb 4 oz)   LMP 08/13/2005   SpO2 100%   Breastfeeding No   BMI 48.51 kg/m    GENERAL APPEARANCE: Healthy, alert and no distress.  EYES:Conjunctiva/sclera clear.  EARS: No cerumen.   Ear canals w/o erythema.  TM's intact w/o erythema.    NOSE/MOUTH: Nasal turbinates are erythematous and inflamed.  Can see where the right side bled at 1 point.    SINUSES: Moderate  maxillary sinus tenderness.  THROAT: No erythema w/o tonsillar enlargement . No exudates.  NECK: Supple,  nontender, no lymphadenopathy.  RESP: Lungs clear to auscultation - no rales, rhonchi or wheezes  CV: Regular rate and rhythm, normal S1 S2, no murmur noted.  NEURO: Awake, alert    SKIN: No rashes        Fransisca Encinas PA-C

## 2023-02-12 LAB — SARS-COV-2 RNA RESP QL NAA+PROBE: NEGATIVE

## 2023-02-13 ENCOUNTER — VIRTUAL VISIT (OUTPATIENT)
Dept: FAMILY MEDICINE | Facility: CLINIC | Age: 53
End: 2023-02-13
Payer: COMMERCIAL

## 2023-02-13 DIAGNOSIS — E78.5 HYPERLIPIDEMIA LDL GOAL <100: ICD-10-CM

## 2023-02-13 DIAGNOSIS — E66.01 MORBID OBESITY WITH BODY MASS INDEX OF 45.0-49.9 IN ADULT (H): ICD-10-CM

## 2023-02-13 DIAGNOSIS — E11.65 UNCONTROLLED TYPE 2 DIABETES MELLITUS WITH HYPERGLYCEMIA (H): Primary | ICD-10-CM

## 2023-02-13 DIAGNOSIS — R25.2 MUSCLE CRAMPS: ICD-10-CM

## 2023-02-13 DIAGNOSIS — E26.09 PRIMARY HYPERALDOSTERONISM (H): ICD-10-CM

## 2023-02-13 PROBLEM — E11.9 DIABETES MELLITUS, TYPE 2 (H): Status: ACTIVE | Noted: 2023-02-13

## 2023-02-13 PROCEDURE — 99214 OFFICE O/P EST MOD 30 MIN: CPT | Mod: VID | Performed by: FAMILY MEDICINE

## 2023-02-13 RX ORDER — ATORVASTATIN CALCIUM 10 MG/1
10 TABLET, FILM COATED ORAL EVERY EVENING
Qty: 90 TABLET | Refills: 3 | Status: SHIPPED | OUTPATIENT
Start: 2023-02-13 | End: 2024-01-10

## 2023-02-13 RX ORDER — LIRAGLUTIDE 6 MG/ML
1.8 INJECTION SUBCUTANEOUS DAILY
Qty: 27 ML | Refills: 1 | Status: SHIPPED | OUTPATIENT
Start: 2023-02-13 | End: 2023-02-14

## 2023-02-13 NOTE — PROGRESS NOTES
Adelaida is a 52 year old who is being evaluated via a billable video visit.      How would you like to obtain your AVS? MyChart  If the video visit is dropped, the invitation should be resent by: Text to cell phone: [phone]  Will anyone else be joining your video visit? No        Assessment & Plan     Uncontrolled type 2 diabetes mellitus with hyperglycemia (H)  Lab Results   Component Value Date    A1C 13.0 02/10/2023    A1C 5.9 03/11/2022    A1C 5.5 07/19/2021    A1C 6.1 11/30/2020    A1C 5.5 11/19/2019    A1C 5.3 01/29/2019    A1C 5.2 08/06/2018    A1C 5.2 08/15/2017     Reviewed recent [id number]  Recommended to consult diabetic educator, start back on the Victoza at 1.8 mg daily, recommended to start on regular exercises, portion control, healthy eating, start on baby aspirin, continue with annual eye exams-patient will call to schedule for the exam with Dr. Sandhu next month, she is due.  Follow-up in 6 to 8 weeks along with previsit labs  Start on weight loss  Dosing and potential medication side effects discussed.  Patient verbalised understanding and is agreeable to the plan.    - liraglutide (VICTOZA) 18 MG/3ML solution; Inject 1.8 mg Subcutaneous daily  - blood glucose (NO BRAND SPECIFIED) test strip; Use to test blood sugar 2 times daily or as directed.  - blood glucose (NO BRAND SPECIFIED) lancets standard; Use to test blood sugar 2 times daily or as directed.  - aspirin (ASA) 81 MG EC tablet; Take 1 tablet (81 mg) by mouth daily  - AMB Adult Diabetes Educator Referral; Future  - Hemoglobin A1c FUTURE 3mo; Future  - Comprehensive metabolic panel (BMP + Alb, Alk Phos, ALT, AST, Total. Bili, TP); Future  - Albumin Random Urine Quantitative with Creat Ratio; Future    Morbid obesity with body mass index of 45.0-49.9 in adult (H)  Wt Readings from Last 5 Encounters:   02/10/23 120.3 kg (265 lb 4 oz)   10/22/22 114.3 kg (252 lb)   07/27/22 114.3 kg (251 lb 15.8 oz)   07/19/22 111.6 kg (246 lb)  "  07/14/22 111.6 kg (246 lb)   BMI of 48.51 with underlying history of diabetes, hyperlipidemia and hypertension  Emphasized on weight loss, portion control, low calorie and low fat diet, healthy eating, regular exercises.      Primary hyperaldosteronism (H)  Continue to follow-up with endocrinology team, on Inspra    Muscle cramps  Likely from current hyperglycemia  Continue with magnesium supplements, start on antidiabetic medications as mentioned above, improve hydration, start on regular exercises.    Hyperlipidemia LDL goal <100  LDL Cholesterol Calculated   Date Value Ref Range Status   03/11/2022 106 (H) <=100 mg/dL Final   03/29/2021 120 (H) <100 mg/dL Final     Comment:     Above desirable:  100-129 mg/dl  Borderline High:  130-159 mg/dL  High:             160-189 mg/dL  Very high:       >189 mg/dl       Patient is currently not on statin  Given the uncontrolled diabetes, recommended patient to start on Lipitor 10 mg daily along with baby aspirin, recheck fasting lipids at the next visit in 6 to 8 weeks  Dosing and potential medication side effects discussed.  Patient verbalised understanding and is agreeable to the plan.    - atorvastatin (LIPITOR) 10 MG tablet; Take 1 tablet (10 mg) by mouth every evening  - Lipid panel reflex to direct LDL Fasting; Future  - Comprehensive metabolic panel (BMP + Alb, Alk Phos, ALT, AST, Total. Bili, TP); Future    Review of the result(s) of each unique test - A1c, BMP  [id number]}     BMI:   Estimated body mass index is 48.51 kg/m  as calculated from the following:    Height as of 9/27/22: 1.575 m (5' 2\").    Weight as of 2/10/23: 120.3 kg (265 lb 4 oz).   Weight management plan: Discussed healthy diet and exercise guidelines    Work on weight loss  Regular exercise  Chart documentation done in part with Dragon Voice recognition Software. Although reviewed after completion, some word and grammatical error may remain.    See Patient Instructions    Return in about 6 weeks " (around 3/27/2023), or if symptoms worsen or fail to improve, for previsit labs, Diabetes recheck.    Windy Gordillo MD  [facility] AMBER Son is a 52 year old, presenting for the following health issues:  A1C  Patient is here for a video visit instead of in person visit due to the current COVID-19 pandemic.  Patient with past medical history significant for hypertension, lipidemia, chronic kidney disease stage III, status post recent knee arthroplasty with ongoing reduced range of motion, obesity is here for video visit to discuss about her recent elevated A1c that was done in the urgent care when she was seen for acute sinus infection.  Patient reported that since October 2022, she has been going through significant emotional distress that has led to unhealthy eating patterns and weight gain.  Patient has been having intense muscle cramping, polyuria, polydipsia, polyphagia and dry mouth that prompted her to request the urgent care provider from last Friday to get her A1c checked, it was significantly elevated  Patient is not checking blood sugars at home currently  She also noted blurred vision in the past few months, she is due for her eye exam next month with Dr. Sandhu.  Patient denies thinning, numbness or weakness of the extremities, nausea vomiting, abdominal pain  She was on several different antidiabetic medications from endocrinology in the team including Victoza, metformin, Bydureon.  Patient denies chest pain, shortness of breath, palpitations, dizziness, syncope    History of Present Illness       Reason for visit:  A1C being high    She eats 2-3 servings of fruits and vegetables daily.She consumes 1 sweetened beverage(s) daily.She exercises with enough effort to increase her heart rate 30 to 60 minutes per day.  She exercises with enough effort to increase her heart rate 3 or less days per week.   She is taking medications regularly.           Review of Systems    CONSTITUTIONAL: NEGATIVE for fever, chills, change in weight  RESP: NEGATIVE for significant cough or SOB  CV: NEGATIVE for chest pain, palpitations or peripheral edema  CV: History of hypertension  GI: NEGATIVE for nausea, abdominal pain, heartburn, or change in bowel habits  MUSCULOSKELETAL: Muscle cramping  NEURO: NEGATIVE for weakness, dizziness or paresthesias  ENDOCRINE: NEGATIVE for temperature intolerance, skin/hair changes and history of diabetes  HEME/ALLERGY/IMMUNE: NEGATIVE for bleeding problems  PSYCHIATRIC: NEGATIVE for changes in mood or affect      Objective    Vitals - Patient Reported  Pain Score: Mild Pain (2)  Pain Loc: Knee      Vitals:  No vitals were obtained today due to virtual visit.    Physical Exam   GENERAL: Healthy, alert and no distress  EYES: Eyes grossly normal to inspection  RESP: No audible wheeze, cough, or visible cyanosis.  No visible retractions or increased work of breathing.    NEURO: Cranial nerves grossly intact.  Mentation and speech appropriate for age.  PSYCH: Mentation appears normal, affect normal/bright, judgement and insight intact, normal speech and appearance well-groomed.                Video-Visit Details    Type of service:  Video Visit   7:45am-8:06am  Originating Location (pt. Location): Home  Distant Location (provider location):  Off-site  Platform used for Video Visit: Other: Video through [employer] and audio through Spreadsave

## 2023-02-14 ENCOUNTER — TELEPHONE (OUTPATIENT)
Dept: FAMILY MEDICINE | Facility: CLINIC | Age: 53
End: 2023-02-14
Payer: COMMERCIAL

## 2023-02-14 DIAGNOSIS — E66.01 MORBID OBESITY WITH BODY MASS INDEX OF 45.0-49.9 IN ADULT (H): Primary | ICD-10-CM

## 2023-02-14 DIAGNOSIS — E11.65 UNCONTROLLED TYPE 2 DIABETES MELLITUS WITH HYPERGLYCEMIA (H): ICD-10-CM

## 2023-02-14 RX ORDER — LIRAGLUTIDE 6 MG/ML
3 INJECTION SUBCUTANEOUS DAILY
Qty: 45 ML | Refills: 0 | Status: SHIPPED | OUTPATIENT
Start: 2023-02-14 | End: 2023-04-17

## 2023-02-14 RX ORDER — GLIMEPIRIDE 2 MG/1
2 TABLET ORAL
Qty: 180 TABLET | Refills: 0 | Status: SHIPPED | OUTPATIENT
Start: 2023-02-14 | End: 2023-04-17

## 2023-02-14 NOTE — TELEPHONE ENCOUNTER
Diabetes Education Scheduling Outreach #1:    Call to patient to schedule. Patient will call Cherelle Haynes to schedule.    Kristel Kim OnCall  Diabetes and Nutrition Scheduling

## 2023-02-14 NOTE — TELEPHONE ENCOUNTER
RN called patient and read provider message below to her. Patient verbalized good understanding and agreed to plan. RN confirmed the pharmacy both medications were sent to, patient confirmed and states she will pick those up today. RN advised patient to monitor symptoms and to call clinic with updates or if things worsen/do not change. Patient verbalized understanding and agreed to plan.    No further questions or concerns at this time.     Florinda Davis RN    [facility]

## 2023-02-14 NOTE — TELEPHONE ENCOUNTER
Patient calling to give update to PCP after virtual visit yesterday. Patient states she was able to take her BG at home with the new monitor and last night her BG was 587. She took the Victoza last night and this morning. Her BG this afternoon is 482.    Patient is endorsing muscle aches, some blurred vision, frequent urination, at times increased thirst, some nausea.     Patient denies palpitations, SOB, vomiting.     Routing to provider to review and advise.    Florinda Davis RN    [facility]

## 2023-02-14 NOTE — TELEPHONE ENCOUNTER
Please update patient  I would advise to increase the Victoza from 1.8 mg to 3mg daily as she did before  Prescription sent for glimepiride 2 mg to start taking twice a day  Patient should follow antidiabetic diet  Keep the appointment with PCP and endocrinology team as scheduled in April

## 2023-02-16 ENCOUNTER — MYC MEDICAL ADVICE (OUTPATIENT)
Dept: FAMILY MEDICINE | Facility: CLINIC | Age: 53
End: 2023-02-16
Payer: COMMERCIAL

## 2023-02-16 ENCOUNTER — NURSE TRIAGE (OUTPATIENT)
Dept: FAMILY MEDICINE | Facility: CLINIC | Age: 53
End: 2023-02-16
Payer: COMMERCIAL

## 2023-02-16 NOTE — TELEPHONE ENCOUNTER
"Patient calling to report that she's been having elevated BG readings since Friday. Is currently on a 10 day antibiotic for sinus infection.    Patient states that she continues to have nausea, feeling of heart burn, and had emesis today. Patient states she has not been able to eat much since 0730, only had a piece of toast. Is sipping on water throughout the day. For the heart burn she was did take 4 Tums during the night. She was waking up every hour last night due to this. States that she also has a metallic taste in her mouth, weakness, and mild chills.    See [facility] message 2/16/2023 for recent BG readings. Most recent reading taken at 1236 was 353. Patient does not test for ketones at home.     RN advised for patient to go to the ED due to symptoms but requesting provider to review message.    Patient inquiring if her antibiotic use causing her to have these side effects or if it's due to the elevated BG. As well as, if there are any medication changes that PCP recommends.    Routing to provider to review and advise.    Gudelia Wilson RN  [facility]      Reason for Disposition    [1] Blood glucose > 240 mg/dL (13.3 mmol/L) AND [2] vomiting AND [3] unable to check for ketones (in blood or urine)    Answer Assessment - Initial Assessment Questions  1. BLOOD GLUCOSE: \"What is your blood glucose level?\"       353  2. ONSET: \"When did you check the blood glucose?\"      12:36pm  3. USUAL RANGE: \"What is your glucose level usually?\" (e.g., usual fasting morning value, usual evening value)      Unsure, last Friday started having sinus infection. A1C 13 or 14, previously has been around 6  4. KETONES: \"Do you check for ketones (urine or blood test strips)?\" If yes, ask: \"What does the test show now?\"       No  5. TYPE 1 or 2:  \"Do you know what type of diabetes you have?\"  (e.g., Type 1, Type 2, Gestational; doesn't know)       Type 2  6. INSULIN: \"Do you take insulin?\" \"What type of " "insulin(s) do you use? What is the mode of delivery? (syringe, pen; injection or pump)?\"       Victoza, 3 mg  7. DIABETES PILLS: \"Do you take any pills for your diabetes?\" If yes, ask: \"Have you missed taking any pills recently?\"      Yesterday ameral 2 mg  8. OTHER SYMPTOMS: \"Do you have any symptoms?\" (e.g., fever, frequent urination, difficulty breathing, dizziness, weakness, vomiting)      Emesis this morning, nauseas, heart burn. Still taking the antibiotic for 10 days. Mild chills. No frequent urination, metallic taste in mouth, weakness    Protocols used: DIABETES - HIGH BLOOD SUGAR-A-AH      "

## 2023-02-16 NOTE — TELEPHONE ENCOUNTER
Provider Response to 2nd Level Triage Request    I have reviewed the RN documentation. My recommendation is:  Refer to ED     Antibiotics due not cause high blood sugars but infections can

## 2023-02-16 NOTE — TELEPHONE ENCOUNTER
RN called patient and relayed provider message below. Patient verbalized understanding and plans to go to nearest ED that is covered under her insurance. No further questions or concerns.    Gudelia Wilson RN  [facility]

## 2023-02-17 ENCOUNTER — TELEPHONE (OUTPATIENT)
Dept: FAMILY MEDICINE | Facility: CLINIC | Age: 53
End: 2023-02-17
Payer: COMMERCIAL

## 2023-02-17 NOTE — TELEPHONE ENCOUNTER
Patient calling to give update to PCP. Patient was seen in ED due to symptoms of gastritis and hyperglycemia. Was placed on IV fluids, given Zofran during admission, and prescribed Zofran.     Patient states that she is feeling much better today. States she left ED with BG of 217 and this morning at 286 before eating.     Patient did eat 1/2 turkey sandwich last night and immediately had heart burn. It was discussed during ED visit that maybe referral to gastroenterology and starting Omeprazole would be appropriate.    RN advised for patient to schedule ED f/u with PCP. Patient inquiring if it would be better if she came in person or if provider is okay with virtual visit.     Routing to provider to review and advise.    Gudelia Wilson RN  [facility]

## 2023-02-17 NOTE — TELEPHONE ENCOUNTER
RN called patient and relayed provider message below. Patient verbalized good understanding. No further questions or concerns.    RN assisted to schedule virtual appointment for 2/20/2023.    Gudelia Wilson RN  [facility]

## 2023-02-20 ENCOUNTER — VIRTUAL VISIT (OUTPATIENT)
Dept: FAMILY MEDICINE | Facility: CLINIC | Age: 53
End: 2023-02-20
Payer: COMMERCIAL

## 2023-02-20 DIAGNOSIS — R11.2 NAUSEA AND VOMITING, UNSPECIFIED VOMITING TYPE: ICD-10-CM

## 2023-02-20 DIAGNOSIS — G43.709 CHRONIC MIGRAINE WITHOUT AURA WITHOUT STATUS MIGRAINOSUS, NOT INTRACTABLE: ICD-10-CM

## 2023-02-20 DIAGNOSIS — H53.8 BLURRED VISION: ICD-10-CM

## 2023-02-20 DIAGNOSIS — N18.31 STAGE 3A CHRONIC KIDNEY DISEASE (H): ICD-10-CM

## 2023-02-20 DIAGNOSIS — E78.5 HYPERLIPIDEMIA LDL GOAL <100: ICD-10-CM

## 2023-02-20 DIAGNOSIS — E87.1 HYPONATREMIA: ICD-10-CM

## 2023-02-20 DIAGNOSIS — E11.65 UNCONTROLLED TYPE 2 DIABETES MELLITUS WITH HYPERGLYCEMIA (H): Primary | ICD-10-CM

## 2023-02-20 DIAGNOSIS — E66.01 MORBID OBESITY WITH BODY MASS INDEX OF 45.0-49.9 IN ADULT (H): ICD-10-CM

## 2023-02-20 DIAGNOSIS — E26.09 PRIMARY HYPERALDOSTERONISM (H): ICD-10-CM

## 2023-02-20 PROCEDURE — 99214 OFFICE O/P EST MOD 30 MIN: CPT | Mod: VID | Performed by: FAMILY MEDICINE

## 2023-02-20 NOTE — PROGRESS NOTES
Adelaida is a 52 year old who is being evaluated via a billable video visit.      How would you like to obtain your AVS? MyChart  If the video visit is dropped, the invitation should be resent by: Text to cell phone: [phone]  Will anyone else be joining your video visit? No        Assessment & Plan     Uncontrolled type 2 diabetes mellitus with hyperglycemia (H)  Reviewed home blood pressure readings that are gradually trending down from 400+ down to 200+ in the past few days  Reviewed documents and reports from care everywhere  Recommended to monitor blood sugars closely continue with current dose of Victoza 3 mg daily,  Continue with the glimepiride 2 mg twice a day  Add Lantus insulin 15 units at bedtime  Recommended to start seeing MTM specialist for ongoing care  Continue with diabetic diet, regular exercises  Keep the appointment with the diabetic educator and endocrinologist as scheduled  - insulin glargine (LANTUS PEN) 100 UNIT/ML pen; Inject 15 Units Subcutaneous At Bedtime  - Med Therapy Management Referral  -Basic metabolic panel (Ca, Cl, CO2, Creat,         Gluc, K, Na, BUN)       Hyponatremia  Comment:   Plan: Basic metabolic panel  (Ca, Cl, CO2, Creat,         Gluc, K, Na, BUN)        Reviewed documents and reports from care everywhere  Reviewed low sodium of 133 and abnormal kidney functions likely from dehydration and hyperglycemia   recommended to have a nonfasting lab recheck today or tomorrow        Nausea and vomiting, unspecified vomiting type  From doxycycline use  Allergy list updated  Symptoms are resolved at this time, continue to monitor  Continue with good hydration  - Med Therapy Management Referral    Blurred vision  Patient has appointment with Dr. Sandhu this week for further evaluation  - Med Therapy Management Referral    Hyperlipidemia LDL goal <100  Start back on the Lipitor and get the lipids rechecked in 2 to 3 months  - Med Therapy Management Referral    Primary  hyperaldosteronism (H)  Continue to follow-up with endocrinology and nephrology teams  - Med Therapy Management Referral    Stage 3a chronic kidney disease (H)  as above    - Med Therapy Management Referral    Morbid obesity with body mass index of 45.0-49.9 in adult (H)    - Med Therapy Management Referral    Chronic migraine without aura without status migrainosus, not intractable    - Med Therapy Management Referral    Review of the result(s) of each unique test - EKG, CBC, BMP, glucose from care everywhere       MED REC REQUIRED  Post Medication Reconciliation Status:   Work on weight loss  Regular exercise  Chart documentation done in part with Dragon Voice recognition Software. Although reviewed after completion, some word and grammatical error may remain.    See Patient Instructions    Return in about 1 day (around 2/21/2023), or if symptoms worsen or fail to improve, for non fasting labs.    Windy Gordillo MD  [facility] AMBER Son is a 52 year old, presenting for the following health issues:  No chief complaint on file.  Patient is here for a video visit instead of in person visit due to the current COVID-19 pandemic.  Patient with past medical history significant for hypertension, CKD stage III, morbid obesity, diabetes is here for video visit for follow-up on recent ER visit    HPI     ED/UC Followup:    Facility: [facility]  Date of visit: 2/16/2023  Reason for visit: Gastritis, hyperglycemia  Current Status: Improved and stable  Patient was seen at the [facility] emergency room after having significant nausea, vomiting along with hyperglycemia   she had a virtual visit on 2/13/2023 for uncontrolled type 2 diabetes, was started on Victoza 3 mg and glimepiride  Patient was also concurrently taking doxycycline for her sinus infection and noted severe nausea that progressed to vomiting every time when she took the doxycycline that prompted her to go to the  emergency room   patient's blood sugars were stable in the emergency room she was not started on insulin and was recommended to follow-up with PCP,  She had labs done that showed low sodium and abnormal kidney functions  Patient denies current concerns for nausea, vomiting, abdominal pain, change in bowel movements  Her blood sugars have been stabilizing in the past few days  Patient continues to have blurred vision, has appointment with optometrist this Friday  Has no double vision, headache, dizziness          Review of Systems   CONSTITUTIONAL: NEGATIVE for fever, chills, change in weight  INTEGUMENTARY/SKIN: NEGATIVE for worrisome rashes, moles or lesions  EYES: Blurred vision  RESP: NEGATIVE for significant cough or SOB  CV: NEGATIVE for chest pain, palpitations or peripheral edema  CV: History of hypertension  GI: as above  MUSCULOSKELETAL: NEGATIVE for significant arthralgias or myalgia  NEURO: NEGATIVE for weakness, dizziness or paresthesias  ENDOCRINE: History of diabetes  HEME/ALLERGY/IMMUNE: NEGATIVE for bleeding problems  PSYCHIATRIC: NEGATIVE for changes in mood or affect      Objective           Vitals:  No vitals were obtained today due to virtual visit.    Physical Exam   GENERAL: Healthy, alert and no distress  EYES: Eyes grossly normal to inspection  RESP: No audible wheeze, cough, or visible cyanosis.  No visible retractions or increased work of breathing.    NEURO: Cranial nerves grossly intact.  Mentation and speech appropriate for age.  PSYCH: Mentation appears normal, affect normal/bright, judgement and insight intact, normal speech and appearance well-groomed.    Labs-ordered            Video-Visit Details    Type of service:  Video Visit   5:12 PM-5:23 PM  Originating Location (pt. Location): Home  Distant Location (provider location):  On-site  Platform used for Video Visit: Delmi

## 2023-02-21 ENCOUNTER — TELEPHONE (OUTPATIENT)
Dept: FAMILY MEDICINE | Facility: CLINIC | Age: 53
End: 2023-02-21

## 2023-02-21 DIAGNOSIS — E11.65 UNCONTROLLED TYPE 2 DIABETES MELLITUS WITH HYPERGLYCEMIA (H): Primary | ICD-10-CM

## 2023-02-21 NOTE — TELEPHONE ENCOUNTER
Prior Authorization Request      Med:Lantus Solostar 100 unit/ml pen injectors    Message from Pharmacy: alternatives that don't need PA   Insulin degludec  Insulin glargine  Insulin lispro

## 2023-02-24 ENCOUNTER — TELEPHONE (OUTPATIENT)
Dept: FAMILY MEDICINE | Facility: CLINIC | Age: 53
End: 2023-02-24

## 2023-02-24 ENCOUNTER — ALLIED HEALTH/NURSE VISIT (OUTPATIENT)
Dept: EDUCATION SERVICES | Facility: CLINIC | Age: 53
End: 2023-02-24
Attending: FAMILY MEDICINE
Payer: COMMERCIAL

## 2023-02-24 DIAGNOSIS — E11.65 UNCONTROLLED TYPE 2 DIABETES MELLITUS WITH HYPERGLYCEMIA (H): ICD-10-CM

## 2023-02-24 PROCEDURE — G0108 DIAB MANAGE TRN  PER INDIV: HCPCS

## 2023-02-24 NOTE — TELEPHONE ENCOUNTER
MTM referral from: [facility] visit (referral by provider)    MTM referral outreach attempt #2 on February 24, 2023 at 10:42 AM      Outcome: Patient not reachable after several attempts, will route to [facility] Pharmacist/Provider as an FYI.  [facility] scheduling number is [phone].  Thank you for the referral.    Rome Krishnamurthy [facility]     Patient has  [employer] coverage

## 2023-02-24 NOTE — PROGRESS NOTES
Diabetes Self-Management Education & Support  Adelaida Packer presents today for education related to Type 2 diabetes    Patient is being treated with:  insulin  She is accompanied by self    Year of diagnosis: 2/10/2023  Referring provider:  Rand  Living Situation: Home alone  Employment: Teacher    PATIENT CONCERNS RELATED TO DIABETES SELF MANAGEMENT:   Controlling diabetes with diet and exercise       ASSESSMENT:    Taking Medication:   Current Diabetes Management per Patient: Glimepiride 2 mg BID, Toujeo U300 15 in the evening, Victoza 3 mg  Taking diabetes medications? Side effects? No     Monitoring  Patient glucose self monitoring as follows: three times daily  BG meter: Accu-Chek Guide  BG results:         Patient's most recent   Lab Results   Component Value Date    A1C 13.0 02/10/2023    A1C 6.1 11/30/2020      Patient's A1C goal: <8.0    Activity: Joined [employer], Water aerobics, Physical Therapy once every 2 weeks    Healthy Eating:   Patient currently eats 3 meals 1 snacks per day   Breakfast: Eggs, fruit, toast, Glycerna  Lunch:Turkey [place], salad, fruit  Dinner: Stroganoff, green beans  Snack: Nuts, cheese  Water throughout day  Alcohol: None    Problem Solving:    Patient is at risk of hypoglycemia?: NO  Hospitalizations for hyper or hypoglycemia: No    Healthy Coping and Stress Management:   Sources of stress identified by patient My health  My Job  Coping mechanisms identified by patient:  Eating too much  Watching television  Reading, see's therapist      EDUCATION and INSTRUCTION PROVIDED AT THIS VISIT:    T2 comprehensive review with recent A1C of 13 at the request of Dr. Gordillo. Patient was on Doxycycline 2 weeks ago for sinus infection. 20 pound weight gain from July, contributes to inactivity from knee replacement and stress eating. Major life event occurred and is seeking therapy, did not want to discuss further. History of T2; was on Metformin many years ago but couldn't tolerate due to  GI side effects, CKD, GFR 39. Has managed with Victoza 3 mg. Started Glimepiride 2 mg BID about one week ago and Toujeo U300 15 units, first dose yesterday evening. Responding well to glimepiride, fasting blood sugars this past week 185,137, 140, 163, 121. Reports many years ago had two episodes of hypoglycemia <55, felt faint, treated with juice. Blurry vision noted at 2/20 visit with PCP, has not worsened, will follow up 3/7 with Ophthalmology; advised to be seen sooner if worsens or changes. Reviewed: pathophysiology and progression of diagnosis, target blood sugar goals, effect that carbohydrates has on blood sugar, recommended exercise-limited from knee replacement 7/2022, complications from chronic high blood sugars, and what can increase blood sugar values such as steroid and illness. Discussed Valery 3 sensor; patient will think about it.     Patient-stated goal written and given to Adelaida Packer.  Verbalized and demonstrated understanding of instructions.     Di Duarte RN, Diabetes Educator  Diabetes Education Department  [facility], [place] and [place]  [phone]      PLAN:  *Continue Toujeo 15 units in evening. Can reduce to 10 units if fasting blood sugar <100  *Continue Glimepiride 2 mg twice a day-let me know if you are having lows during day  *Consider Valery 3 sensor  *Target fasting blood sugar   *Aim for 30 grams of carbs with meals and 15-30 gram for snack  *Walk as tolerated   *Glucose tablets can be purchased over the counter    FOLLOW-UP:    *Follow up with Di in 2 weeks (can be Virtual Appt)  *Establish care with Dee Tellez PA-C     See patient instructions-Via My Chart    Time spent with patient at today's visit was 60 minutes.      Any diabetes medication dose changes were made via the CDE Protocol and Collaborative Practice Agreement with [place] and  [place].  A copy of this encounter was provided to patient's referring provider.

## 2023-02-24 NOTE — PATIENT INSTRUCTIONS
PLAN:  *Continue Toujeo 15 units in evening. Can reduce to 10 units if fasting blood sugar <100  *Continue Glimepiride 2 mg twice a day-let me know if you are having lows during day  *Consider Valery 3 sensor  *Target fasting blood sugar   *Aim for 30 grams of carbs with meals and 15-30 gram for snack  *Walk as tolerated   *Glucose tablets can be purchased over the counter    FOLLOW-UP:    *Follow up with Di in 2 weeks (can be Virtual Appt)  *Establish care with Dee Tellez PA-C

## 2023-02-27 ENCOUNTER — THERAPY VISIT (OUTPATIENT)
Dept: PHYSICAL THERAPY | Facility: CLINIC | Age: 53
End: 2023-02-27
Payer: COMMERCIAL

## 2023-02-27 DIAGNOSIS — G89.29 CHRONIC PAIN OF LEFT KNEE: ICD-10-CM

## 2023-02-27 DIAGNOSIS — R60.0 LOCALIZED EDEMA: ICD-10-CM

## 2023-02-27 DIAGNOSIS — Z96.652 S/P TOTAL KNEE ARTHROPLASTY, LEFT: ICD-10-CM

## 2023-02-27 DIAGNOSIS — Z47.1 AFTERCARE FOLLOWING LEFT KNEE JOINT REPLACEMENT SURGERY: Primary | ICD-10-CM

## 2023-02-27 DIAGNOSIS — M25.562 CHRONIC PAIN OF LEFT KNEE: ICD-10-CM

## 2023-02-27 DIAGNOSIS — Z96.652 AFTERCARE FOLLOWING LEFT KNEE JOINT REPLACEMENT SURGERY: Primary | ICD-10-CM

## 2023-02-27 PROCEDURE — 97110 THERAPEUTIC EXERCISES: CPT | Mod: GP | Performed by: PHYSICAL THERAPIST

## 2023-02-27 PROCEDURE — 97140 MANUAL THERAPY 1/> REGIONS: CPT | Mod: GP | Performed by: PHYSICAL THERAPIST

## 2023-03-07 ENCOUNTER — OFFICE VISIT (OUTPATIENT)
Dept: OPTOMETRY | Facility: CLINIC | Age: 53
End: 2023-03-07
Payer: COMMERCIAL

## 2023-03-07 DIAGNOSIS — E11.65 UNCONTROLLED TYPE 2 DIABETES MELLITUS WITH HYPERGLYCEMIA (H): Primary | ICD-10-CM

## 2023-03-07 DIAGNOSIS — H52.4 PRESBYOPIA: ICD-10-CM

## 2023-03-07 DIAGNOSIS — H52.13 MYOPIA OF BOTH EYES: ICD-10-CM

## 2023-03-07 PROCEDURE — 92015 DETERMINE REFRACTIVE STATE: CPT | Performed by: OPTOMETRIST

## 2023-03-07 PROCEDURE — 92014 COMPRE OPH EXAM EST PT 1/>: CPT | Performed by: OPTOMETRIST

## 2023-03-07 ASSESSMENT — TONOMETRY
OS_IOP_MMHG: 18
IOP_METHOD: TONOPEN
OD_IOP_MMHG: 21

## 2023-03-07 ASSESSMENT — KERATOMETRY
OS_AXISANGLE_DEGREES: 088
OD_K2POWER_DIOPTERS: 46.00
OS_AXISANGLE2_DEGREES: 178
OD_AXISANGLE2_DEGREES: 171
OD_AXISANGLE_DEGREES: 081
OD_K1POWER_DIOPTERS: 45.00
OS_K1POWER_DIOPTERS: 45.25
OS_K2POWER_DIOPTERS: 46.25

## 2023-03-07 ASSESSMENT — CONF VISUAL FIELD
OD_SUPERIOR_TEMPORAL_RESTRICTION: 0
OS_INFERIOR_TEMPORAL_RESTRICTION: 0
OS_SUPERIOR_TEMPORAL_RESTRICTION: 0
OD_INFERIOR_NASAL_RESTRICTION: 0
OD_SUPERIOR_NASAL_RESTRICTION: 0
OD_NORMAL: 1
OS_NORMAL: 1
OD_INFERIOR_TEMPORAL_RESTRICTION: 0
OS_INFERIOR_NASAL_RESTRICTION: 0
OS_SUPERIOR_NASAL_RESTRICTION: 0

## 2023-03-07 ASSESSMENT — VISUAL ACUITY
OS_SC: 20/50
OS_SC: 20/25
OD_SC+: -1
OS_PH_SC: 20/20
METHOD: SNELLEN - LINEAR
OD_SC: 20/30
OD_SC: 20/30
OS_PH_SC+: -3
OD_PH_SC: 20/20
OS_SC+: -2

## 2023-03-07 ASSESSMENT — REFRACTION_CURRENTRX
OS_SPHERE: -4.00
OD_SPHERE: -2.00
OS_BRAND: ALCON DAILIES TOTAL 1 MULTIFOCAL BC 8.5, D 14.1
OS_SPHERE: -2.00
OD_ADD: MEDIUM
OD_BRAND: ALCON DAILIES TOTAL 1 MULTIFOCAL BC 8.5, D 14.1
OS_BRAND: ALCON DAILIES TOTAL 1 BC 8.5, D 14.1
OD_BRAND: ALCON DAILIES TOTAL 1 BC 8.5, D 14.1
OS_ADD: MEDIUM
OD_SPHERE: -4.25

## 2023-03-07 ASSESSMENT — REFRACTION_WEARINGRX
OD_ADD: +2.25
OS_ADD: +2.25
OD_SPHERE: -4.50
OD_CYLINDER: +0.25
OS_AXIS: 070
OD_AXIS: 175
OS_SPHERE: -4.75
OS_CYLINDER: +0.75

## 2023-03-07 ASSESSMENT — REFRACTION_MANIFEST
OS_ADD: +2.25
OD_AXIS: 099
OD_ADD: +2.25
OD_SPHERE: -2.25
OS_SPHERE: -2.00
OD_CYLINDER: +0.25
METHOD_AUTOREFRACTION: 1
OS_CYLINDER: SPHERE

## 2023-03-07 ASSESSMENT — EXTERNAL EXAM - RIGHT EYE: OD_EXAM: NORMAL

## 2023-03-07 ASSESSMENT — SLIT LAMP EXAM - LIDS
COMMENTS: NORMAL
COMMENTS: NORMAL

## 2023-03-07 ASSESSMENT — CUP TO DISC RATIO
OS_RATIO: 0.2
OD_RATIO: 0.2

## 2023-03-07 ASSESSMENT — EXTERNAL EXAM - LEFT EYE: OS_EXAM: NORMAL

## 2023-03-07 NOTE — PROGRESS NOTES
Chief Complaint   Patient presents with     Diabetic Eye Exam        Chief Complaint(s) and History of Present Illness(es)     Diabetic Eye Exam            Vision: is blurred for near and is blurred for distance    Diabetes Type: Type 2, on insulin and taking oral medications    Duration: 3 weeks    Blood Sugars: is uncontrolled               Lab Results   Component Value Date    A1C 13.0 02/10/2023    A1C 5.9 03/11/2022    A1C 5.5 07/19/2021    A1C 6.1 11/30/2020    A1C 5.5 11/19/2019    A1C 5.3 01/29/2019    A1C 5.2 08/06/2018    A1C 5.2 08/15/2017       Last Eye Exam: 1-  Dilated Previously: Yes    What are you currently using to see?  glasses    Distance Vision Acuity: Noticed gradual change in both eyes    Near Vision Acuity: Not satisfied     Eye Comfort: dry  Do you use eye drops? 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     Medical, surgical and family histories reviewed and updated 3/7/2023.       OBJECTIVE: See Ophthalmology exam    ASSESSMENT:    ICD-10-CM    1. Uncontrolled type 2 diabetes mellitus with hyperglycemia (H)  E11.65     Negative diabetic retinopathy both eyes      2. Myopia of both eyes  H52.13       3. Presbyopia  H52.4           PLAN:    Adelaida Packer aware  eye exam results will be sent to Windy Gordillo  Patient Instructions   There are not any signs of the diabetes affecting the eyes today.  It is important that you get your eyes dilated once yearly and keep good control of your diabetes.    The vision is blurry due to the blood sugar being high.  The vision may continue to change as the blood sugar continues to stabilize.  Wear contact lenses so we can easily make changes in prescription.    Return in 3 months for vision check if stable otherwise return monthly for recheck on vision and to update prescription.    Dispensed trial contacts multifocal both eyes or right eye lens only for distance right eye and no lens  for near left eye.    Zac Sandhu, OD

## 2023-03-07 NOTE — LETTER
3/7/2023         RE: Adelaida Packer  [address] [place] [address]        Dear Colleague,    Thank you for referring your patient, Adelaida Packer, to the [facility]. Please see a copy of my visit note below.    Chief Complaint   Patient presents with     Diabetic Eye Exam        Chief Complaint(s) and History of Present Illness(es)     Diabetic Eye Exam            Vision: is blurred for near and is blurred for distance    Diabetes Type: Type 2, on insulin and taking oral medications    Duration: 3 weeks    Blood Sugars: is uncontrolled               Lab Results   Component Value Date    A1C 13.0 02/10/2023    A1C 5.9 03/11/2022    A1C 5.5 07/19/2021    A1C 6.1 11/30/2020    A1C 5.5 11/19/2019    A1C 5.3 01/29/2019    A1C 5.2 08/06/2018    A1C 5.2 08/15/2017       Last Eye Exam: 1-  Dilated Previously: Yes    What are you currently using to see?  glasses    Distance Vision Acuity: Noticed gradual change in both eyes    Near Vision Acuity: Not satisfied     Eye Comfort: dry  Do you use eye drops? 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     Medical, surgical and family histories reviewed and updated 3/7/2023.       OBJECTIVE: See Ophthalmology exam    ASSESSMENT:    ICD-10-CM    1. Uncontrolled type 2 diabetes mellitus with hyperglycemia (H)  E11.65     Negative diabetic retinopathy both eyes      2. Myopia of both eyes  H52.13       3. Presbyopia  H52.4           PLAN:    Adelaida Packer aware  eye exam results will be sent to Windy Gordillo  Patient Instructions   There are not any signs of the diabetes affecting the eyes today.  It is important that you get your eyes dilated once yearly and keep good control of your diabetes.    The vision is blurry due to the blood sugar being high.  The vision may continue to change as the blood sugar continues to stabilize.  Wear contact lenses so we can  easily make changes in prescription.    Return in 3 months for vision check if stable otherwise return monthly for recheck on vision and to update prescription.    Dispensed trial contacts multifocal both eyes or right eye lens only for distance right eye and no lens for near left eye.    Zac Sandhu, OD                   Again, thank you for allowing me to participate in the care of your patient.        Sincerely,        Zac Sandhu, OD

## 2023-03-08 NOTE — PATIENT INSTRUCTIONS
There are not any signs of the diabetes affecting the eyes today.  It is important that you get your eyes dilated once yearly and keep good control of your diabetes.    The vision is blurry due to the blood sugar being high.  The vision may continue to change as the blood sugar continues to stabilize.  Wear contact lenses so we can easily make changes in prescription.    Return in 3 months for vision check if stable otherwise return monthly for recheck on vision and to update prescription.    Dispensed trial contacts multifocal both eyes or right eye lens only for distance right eye and no lens for near left eye.    Zac Sandhu, OD    The affects of the dilating drops last for 4- 6 hours.  You will be more sensitive to light and vision will be blurry up close.  Do not drive if you do not feel comfortable.  Mydriatic sunglasses were given if needed.    Patient Education   Diabetes weakens the blood vessels all over the body, including the eyes. Damage to the blood vessels in the eyes can cause swelling or bleeding into part of the eye (called the retina). This is called diabetic retinopathy (DENIS-tin--[facility]-thee). If not treated, this disease can cause vision loss or blindness.   Symptoms may include blurred or distorted vision, but many people have no symptoms. It's important to see your eye doctor regularly to check for problems.   Early treatment and good control can help protect your vision. Here are the things you can do to help prevent vision loss:      1. Keep your blood sugar levels under tight control.      2. Bring high blood pressure under control.      3. No smoking.      4. Have yearly dilated eye exams.       Optometry Providers       Clinic Locations                                 Telephone Number   Dr. Aliza [place]   [facility] [phone]     [place] [facility] Hours:                 [facility] Hours:       [facility] Hours:   [address]   [address] [address]     [address] [place] [address]   [place] [address]    [place] [address]  Phone: [phone]                    Phone: [phone]     Phone: [phone]                      Monday 8:00-6:00                          Monday 8:00-6:00                          Monday 8:00-6:00              Tuesday 8:00-6:00                          Tuesday 8:00-6:00                          Tuesday 8:00-6:00              Wednesday 8:00-6:00                  Wednesday 8:00-6:00                   Wednesday 8:00-6:00      Thursday 8:00-6:00                        Thursday 8:00-6:00                         Thursday 8:00-6:00            Friday 8:00-5:00                              Friday 8:00-5:00                              Friday 8:00-5:00    [facility] Hours:   [address] [address]  [phone]    Monday 9:00-6:00  Tuesday 9:00-6:00  Wednesday 9:00-6:00  Thursday 9:00-6:00  Friday 9:00-5:00  Please log on to [email or web address] to order your contact lenses.  The link is found on the Eye Care and Vision Services page.  As always, Thank you for trusting us with your health care needs!

## 2023-03-13 ENCOUNTER — THERAPY VISIT (OUTPATIENT)
Dept: PHYSICAL THERAPY | Facility: CLINIC | Age: 53
End: 2023-03-13
Payer: COMMERCIAL

## 2023-03-13 DIAGNOSIS — Z96.652 S/P TOTAL KNEE ARTHROPLASTY, LEFT: ICD-10-CM

## 2023-03-13 DIAGNOSIS — M25.562 CHRONIC PAIN OF LEFT KNEE: ICD-10-CM

## 2023-03-13 DIAGNOSIS — G89.29 CHRONIC PAIN OF LEFT KNEE: ICD-10-CM

## 2023-03-13 DIAGNOSIS — Z47.1 AFTERCARE FOLLOWING LEFT KNEE JOINT REPLACEMENT SURGERY: Primary | ICD-10-CM

## 2023-03-13 DIAGNOSIS — Z96.652 AFTERCARE FOLLOWING LEFT KNEE JOINT REPLACEMENT SURGERY: Primary | ICD-10-CM

## 2023-03-13 DIAGNOSIS — R60.0 LOCALIZED EDEMA: ICD-10-CM

## 2023-03-13 PROCEDURE — 97140 MANUAL THERAPY 1/> REGIONS: CPT | Mod: GP | Performed by: PHYSICAL THERAPIST

## 2023-03-13 PROCEDURE — 97110 THERAPEUTIC EXERCISES: CPT | Mod: GP | Performed by: PHYSICAL THERAPIST

## 2023-03-21 ENCOUNTER — ALLIED HEALTH/NURSE VISIT (OUTPATIENT)
Dept: EDUCATION SERVICES | Facility: CLINIC | Age: 53
End: 2023-03-21
Payer: COMMERCIAL

## 2023-03-21 ENCOUNTER — TELEPHONE (OUTPATIENT)
Dept: ENDOCRINOLOGY | Facility: CLINIC | Age: 53
End: 2023-03-21

## 2023-03-21 DIAGNOSIS — E11.65 UNCONTROLLED TYPE 2 DIABETES MELLITUS WITH HYPERGLYCEMIA (H): Primary | ICD-10-CM

## 2023-03-21 PROCEDURE — G0108 DIAB MANAGE TRN  PER INDIV: HCPCS

## 2023-03-21 RX ORDER — BLOOD-GLUCOSE SENSOR
1 EACH MISCELLANEOUS
Qty: 6 EACH | Refills: 3 | Status: SHIPPED | OUTPATIENT
Start: 2023-03-21 | End: 2023-04-17 | Stop reason: SINTOL

## 2023-03-21 NOTE — TELEPHONE ENCOUNTER
PA Initiation    Medication: Freestyle Valery 3 - PA Pending  Insurance Company: [employer] - Phone [phone] Fax [phone]  Pharmacy Filling the Rx:    Filling Pharmacy Phone:    Filling Pharmacy Fax:    Start Date: 3/21/2023

## 2023-03-21 NOTE — PROGRESS NOTES
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 Feels lows in 80's, carries carb and protein with to school    ASSESSMENT:    Taking Medication:   Current Diabetes Management per Patient: Glimepiride 2 mg BID, Toujeo U300 8 in the evening, Victoza 3 mg  Taking diabetes medications? Side effects? No     Monitoring  Patient glucose self monitoring as follows: three times daily  BG meter: Accu-Chek Guide  BG results: Fasting in morning, before lunch, at bedtime  Fasting AM: 103, 88, 101, 86, 95, 102, 91, 96, 96, 91  Before lunch: 76, 107, 104, 89, 126, 87, 109, 80,  Before bed: 121, 134, 81, 173, 119, 133, 91,102,115      Patient's most recent   Lab Results   Component Value Date    A1C 13.0 02/10/2023    A1C 6.1 11/30/2020      Patient's A1C goal: <8.0    Activity: Joined [employer], Water aerobics, Physical Therapy once every 2 weeks    Healthy Eating:   Patient currently eats 3 meals 1 snacks per day   Breakfast: Eggs, fruit, toast, Glycerna  Lunch:Turkey [place], salad, fruit  Dinner: Stroganoff, green beans  Snack: Nuts, cheese  Water throughout day  Alcohol: None    Problem Solving:    Patient is at risk of hypoglycemia?: NO  Hospitalizations for hyper or hypoglycemia: No  Feels lows in 80's    Healthy Coping and Stress Management:   Sources of stress identified by patient My health  My Job  Coping mechanisms identified by patient:  Eating too much  Watching television  Reading, see's therapist      EDUCATION and INSTRUCTION PROVIDED AT THIS VISIT:    Follow up T2. Reduced U300 Toujeo to 8 units approximately one week ago. Fasting BG all in target with exception of  one reading of 179 but did have ice cream. 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 Continues to struggle with weight loss. Consider changing from Victoza to [place]. Discussed side effects and dosing schedule. Patient requested to start Valery 3 sensor today.  Discussed with patient: purpose of sensor, variability between blood glucose and sensor values, meaning of trending arrows, phone cannot be further than 20 feet from sensor, cannot wear sensor when having MRI or CT scan, informed to not go through full body scanner at airport, and sensor is waterproof. Reminded that if sensor reading does not match symptoms, to check with fingerstick. Low alert set at 80. High alert OFF. Informed urgent low 55 or less, cannot be turned off. There is a 2 hour warm up period. Data is least accurate the first 12-24 hours. Patient demonstrated correct technique for placing sensor. Sensor was placed on left arm without difficulty.  LOT [id number]  Exp 8/31/2023     Di Duarte, RN Diabetes Educator  Diabetes Education Department  [facility], [place] [facility]  [phone]    Patient-stated goal written and given to Adelaida Packer.  Verbalized and demonstrated understanding of instructions.     PLAN:  *Continue Toujeo 8 units in evening. Discuss possibly changing to U100 at visit with Dee Tellez  *Let us know if you have persistent blood sugars <70  *If Valery sensor falls off prior to day 12, call [place] at [phone] to report sensor failure      FOLLOW-UP:    *4/21 with Dee Tellez PA-C    See patient instructions-Via My Chart    Time spent with patient at today's visit was 60 minutes.      Any diabetes medication dose changes were made via the CDE Protocol and Collaborative Practice Agreement with [place] and  [place].  A copy of this encounter was provided to patient's referring provider-[place]

## 2023-03-21 NOTE — PATIENT INSTRUCTIONS
LAKHWINDER:  *Continue Toujeo 8 units in evening. Discuss possibly changing to U100 at visit with Dee Tellez  *Let us know if you have persistent blood sugars <70  *If Valery sensor falls off prior to day 12, call [place] at [phone] to report sensor failure      FOLLOW-UP:    *4/21 with Dee Tellez PA-C

## 2023-03-22 DIAGNOSIS — N18.30 CKD (CHRONIC KIDNEY DISEASE) STAGE 3, GFR 30-59 ML/MIN (H): Primary | ICD-10-CM

## 2023-03-22 NOTE — TELEPHONE ENCOUNTER
PA denied for Freestyle Valery 3. Looks like plan wants billed DME. Order sent to pharmacy to have them bill DME. Closing encounter

## 2023-03-22 NOTE — TELEPHONE ENCOUNTER
PRIOR AUTHORIZATION DENIED    Medication: Freestyle Valery 3 - PA Denied    Denial Date: 3/21/2023    Denial Rational:     Appeal Information:

## 2023-03-23 ENCOUNTER — TELEPHONE (OUTPATIENT)
Dept: ENDOCRINOLOGY | Facility: CLINIC | Age: 53
End: 2023-03-23
Payer: COMMERCIAL

## 2023-03-23 ENCOUNTER — MYC MEDICAL ADVICE (OUTPATIENT)
Dept: EDUCATION SERVICES | Facility: CLINIC | Age: 53
End: 2023-03-23
Payer: COMMERCIAL

## 2023-03-23 DIAGNOSIS — E11.65 UNCONTROLLED TYPE 2 DIABETES MELLITUS WITH HYPERGLYCEMIA (H): Primary | ICD-10-CM

## 2023-03-23 NOTE — TELEPHONE ENCOUNTER
PA Initiation    Medication: Wegovy - PA Pending  Insurance Company: [employer] Phone [phone] Fax [phone]  Pharmacy Filling the Rx:    Filling Pharmacy Phone:    Filling Pharmacy Fax:    Start Date: 3/23/2023

## 2023-03-24 ENCOUNTER — LAB (OUTPATIENT)
Dept: LAB | Facility: CLINIC | Age: 53
End: 2023-03-24
Payer: COMMERCIAL

## 2023-03-24 DIAGNOSIS — N18.30 CKD (CHRONIC KIDNEY DISEASE) STAGE 3, GFR 30-59 ML/MIN (H): ICD-10-CM

## 2023-03-24 LAB
ALBUMIN MFR UR ELPH: 7.6 MG/DL (ref 1–14)
ALBUMIN SERPL-MCNC: 3.9 G/DL (ref 3.4–5)
ALBUMIN UR-MCNC: NEGATIVE MG/DL
ANION GAP SERPL CALCULATED.3IONS-SCNC: 2 MMOL/L (ref 3–14)
APPEARANCE UR: CLEAR
BILIRUB UR QL STRIP: NEGATIVE
BUN SERPL-MCNC: 21 MG/DL (ref 7–30)
CALCIUM SERPL-MCNC: 9.6 MG/DL (ref 8.5–10.1)
CHLORIDE BLD-SCNC: 111 MMOL/L (ref 94–109)
CO2 SERPL-SCNC: 27 MMOL/L (ref 20–32)
COLOR UR AUTO: YELLOW
CREAT SERPL-MCNC: 1.07 MG/DL (ref 0.52–1.04)
CREAT UR-MCNC: 119 MG/DL
CREAT UR-MCNC: 124 MG/DL
GFR SERPL CREATININE-BSD FRML MDRD: 62 ML/MIN/1.73M2
GLUCOSE BLD-MCNC: 109 MG/DL (ref 70–99)
GLUCOSE UR STRIP-MCNC: NEGATIVE MG/DL
HGB BLD-MCNC: 12.2 G/DL (ref 11.7–15.7)
HGB UR QL STRIP: NEGATIVE
KETONES UR STRIP-MCNC: NEGATIVE MG/DL
LEUKOCYTE ESTERASE UR QL STRIP: NEGATIVE
MICROALBUMIN UR-MCNC: <5 MG/L
MICROALBUMIN/CREAT UR: NORMAL MG/G{CREAT}
NITRATE UR QL: NEGATIVE
PH UR STRIP: 5.5 [PH] (ref 5–7)
PHOSPHATE SERPL-MCNC: 3.3 MG/DL (ref 2.5–4.5)
POTASSIUM BLD-SCNC: 4.4 MMOL/L (ref 3.4–5.3)
PROT/CREAT 24H UR: 0.06 MG/MG CR (ref 0–0.2)
PTH-INTACT SERPL-MCNC: 47 PG/ML (ref 15–65)
RBC #/AREA URNS AUTO: ABNORMAL /HPF
SODIUM SERPL-SCNC: 140 MMOL/L (ref 133–144)
SP GR UR STRIP: 1.02 (ref 1–1.03)
SQUAMOUS #/AREA URNS AUTO: ABNORMAL /LPF
UROBILINOGEN UR STRIP-ACNC: 0.2 E.U./DL
WBC #/AREA URNS AUTO: ABNORMAL /HPF

## 2023-03-24 PROCEDURE — 80069 RENAL FUNCTION PANEL: CPT

## 2023-03-24 PROCEDURE — 81001 URINALYSIS AUTO W/SCOPE: CPT

## 2023-03-24 PROCEDURE — 84156 ASSAY OF PROTEIN URINE: CPT

## 2023-03-24 PROCEDURE — 36415 COLL VENOUS BLD VENIPUNCTURE: CPT

## 2023-03-24 PROCEDURE — 82570 ASSAY OF URINE CREATININE: CPT

## 2023-03-24 PROCEDURE — 82043 UR ALBUMIN QUANTITATIVE: CPT

## 2023-03-24 PROCEDURE — 83970 ASSAY OF PARATHORMONE: CPT

## 2023-03-24 PROCEDURE — 85018 HEMOGLOBIN: CPT

## 2023-03-27 NOTE — TELEPHONE ENCOUNTER
Prior Authorization Approval    Authorization Effective Date: 3/23/2023  Authorization Expiration Date: 10/23/2023  Medication: Wegovy - [place] Approved  Approved Dose/Quantity: 1 month  Reference #: [id number] [id number]   Insurance Company: [employer] [phone] Fax [phone]  Expected CoPay:       CoPay Card Available:      Foundation Assistance Needed:    Which Pharmacy is filling the prescription (Not needed for infusion/clinic administered):    Pharmacy Notified:    Patient Notified:

## 2023-03-28 ENCOUNTER — VIRTUAL VISIT (OUTPATIENT)
Dept: NEPHROLOGY | Facility: CLINIC | Age: 53
End: 2023-03-28
Payer: COMMERCIAL

## 2023-03-28 DIAGNOSIS — N18.31 STAGE 3A CHRONIC KIDNEY DISEASE (H): Primary | ICD-10-CM

## 2023-03-28 DIAGNOSIS — N18.2 CHRONIC KIDNEY DISEASE, STAGE 2 (MILD): ICD-10-CM

## 2023-03-28 PROCEDURE — 99214 OFFICE O/P EST MOD 30 MIN: CPT | Mod: VID | Performed by: INTERNAL MEDICINE

## 2023-03-28 NOTE — NURSING NOTE
Is the patient currently in the state of [place]? YES    Visit mode:VIDEO    If the visit is dropped, the patient can be reconnected by: VIDEO VISIT: Send to e-mail at: [email or web address]    Will anyone else be joining the visit? NO      How would you like to obtain your AVS? MyChart    Are changes needed to the allergy or medication list? NO    Reason for visit: Follow up, Blood Pressure check and med check.

## 2023-03-28 NOTE — PATIENT INSTRUCTIONS
Please arrange a follow-up with Dr. Dennis in [place] [facility] (previously seen by Dr. Rodas)  Labs in 6 months  Follow-up in one year with labs prior.

## 2023-03-28 NOTE — Clinical Note
ADAM Son asked me about the safety of wegovy. In up to date it mentions MARNI as a potential adverse risk but also states taht people were with GI upset (vomiting, diarrhea) in these reports. From your perspective, are you seeing much change in kidney function? I don't feel like this has been an issue with ozempic but wanted to get your thoughts. Thanks, Dia

## 2023-03-28 NOTE — PROGRESS NOTES
Virtual Visit Details    Type of service:  Video Visit     Originating Location (pt. Location): Home    Distant Location (provider location):  Off-site  Platform used for Video Visit: Delmi      03/28/23   CC: HTN and CKD    HPI: Adelaida Packer is a 52 year old female who presents for follow-up of HTN/CKD.  Has some mild CKD which is felt to be related to hypertension as well as likely some effects from hypercalcemia in the past. Her hx includes hyperparathyroidism, primary for which she underwent parathyroidectomy on 3/21/13. Following that surgery, she did have a complication related to a hematoma but we have seen many benefits of her parathyroidectomy - blood pressure improved as well as kidney function.    Creatinine has been 1.03-1.16 in the past year.  She did not have proteinuria on last check.  She has been sick for 10 days and is currently on amoxicillin.  Her blood pressure has been 120s at home over 70s to 80s.  She feels that her blood pressure improvement may be related to her change in job this year as it has been less stress.    12/1/20: no swelling. No home readings recently. 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 No specific questions/compalints today.     03/15/22: video visit. No recent home BP readings or clinic readings. Only a few NSAID use times with her knee pain issues. She underwent right knee surgery - she reports she will need left knee done in the future as well. No swelling.     03/28/23: video visit. Now on Continuous glucose monitor. Much improved the past few weeks. No recent home BP readings. Now going back to the gym to be more active - water aerobics, biking. She has          Current Outpatient Medications   Medication Sig Dispense Refill     acetaminophen (TYLENOL) 325 MG tablet Take 2 tablets (650 mg) by mouth every 4 hours as needed for other (mild pain) 100 tablet 0     amLODIPine (NORVASC) 5 MG tablet Take 1 tablet (5 mg) by mouth daily  (Patient taking differently: Take 5 mg by mouth every evening) 90 tablet 3     aspirin (ASA) 81 MG EC tablet Take 1 tablet (81 mg) by mouth daily 90 tablet 3     atorvastatin (LIPITOR) 10 MG tablet Take 1 tablet (10 mg) by mouth every evening 90 tablet 3     blood glucose (NO BRAND SPECIFIED) lancets standard Use to test blood sugar 2 times daily or as directed. 200 lancet 3     blood glucose (NO BRAND SPECIFIED) lancets standard Use to test blood sugar 1-2 times daily or as directed. 200 each 3     blood glucose (NO BRAND SPECIFIED) test strip Use to test blood sugar 2 times daily or as directed. 200 strip 3     blood glucose (NO BRAND SPECIFIED) test strip Use to test blood sugar 1-2 times daily or as directed. 200 strip 3     blood glucose monitoring (NO BRAND SPECIFIED) meter device kit Use to test blood sugar 1-2 times daily or as directed. 1 kit 0     cetirizine (ZYRTEC) 10 MG tablet Take 10 mg by mouth daily as needed for allergies       cholecalciferol 2000 UNITS CAPS Take 2,000 Units by mouth daily 90 capsule 3     Continuous Blood Gluc Sensor (FREESTYLE NEAL 3 SENSOR) MISC 1 each every 14 days 6 each 3     eplerenone (INSPRA) 50 MG tablet Take 2 tablets (100 mg) by mouth 2 times daily 360 tablet 3     famotidine (PEPCID) 20 MG tablet TAKE 1 TABLET BY MOUTH TWICE A  tablet 1     Fluocinolone Acetonide Scalp (DERMA-SMOOTHE/FS SCALP) 0.01 % OIL oil Apply nightly to the scalp for the 6 weeks 60 mL 1     fluticasone (FLONASE) 50 MCG/ACT nasal spray INSTILL 1 SPRAY INTO BOTH NOSTRILS DAILY 16 mL 1     glimepiride (AMARYL) 2 MG tablet Take 1 tablet (2 mg) by mouth 2 times daily (before meals) 180 tablet 0     hydrOXYzine (ATARAX) 25 MG tablet Take 0.5-1 tablets (12.5-25 mg) by mouth nightly as needed for other (sleep) 30 tablet 3     insulin glargine U-300 (TOUJEO) 300 UNIT/ML (1 units dial) pen Inject 15 Units Subcutaneous At Bedtime 6 mL 0     labetalol (NORMODYNE) 300 MG tablet Take 1 tablet (300 mg)  by mouth 2 times daily 180 tablet 3     liraglutide (VICTOZA) 18 MG/3ML solution Inject 3 mg Subcutaneous daily Dose change 45 mL 0     polyethylene glycol (MIRALAX) 17 g packet Take 17 g by mouth daily 7 packet 0     Semaglutide-Weight Management (WEGOVY) 0.25 MG/0.5ML pen Inject 0.25 mg Subcutaneous once a week 2 mL 0     SUMAtriptan (IMITREX) 25 MG tablet TAKE 1 TO 2 TABLETS BY MOUTH AT ONSET OF HEADACHE FOR MIGRAINE. MAY REPEAT DOSE IN 2 HOURS. MAX OF 200MG OR 2 DOSES IN 24 HOURS 18 tablet 0     gabapentin (NEURONTIN) 100 MG capsule Take 1 capsule (100 mg) by mouth 3 times daily (Patient not taking: Reported on 3/28/2023) 90 capsule 0     Exam:  Gen - alert and oriented, appears comfortable.    Result  No visits with results within 1 Day(s) from this visit.   Latest known visit with results is:   Lab on 03/24/2023   Component Date Value Ref Range Status     Creatinine Urine mg/dL 03/24/2023 124  mg/dL Final     Albumin Urine mg/L 03/24/2023 <5  mg/L Final     Albumin Urine mg/g Cr 03/24/2023    Final    Unable to calculate, urine albumin or creatinine is outside the detectable limits.     Color Urine 03/24/2023 Yellow  Colorless, Straw, Light Yellow, Yellow Final     Appearance Urine 03/24/2023 Clear  Clear Final     Glucose Urine 03/24/2023 Negative  Negative mg/dL Final     Bilirubin Urine 03/24/2023 Negative  Negative Final     Ketones Urine 03/24/2023 Negative  Negative mg/dL Final     Specific Gravity Urine 03/24/2023 1.025  1.003 - 1.035 Final     Blood Urine 03/24/2023 Negative  Negative Final     pH Urine 03/24/2023 5.5  5.0 - 7.0 Final     Protein Albumin Urine 03/24/2023 Negative  Negative mg/dL Final     Urobilinogen Urine 03/24/2023 0.2  0.2, 1.0 E.U./dL Final     Nitrite Urine 03/24/2023 Negative  Negative Final     Leukocyte Esterase Urine 03/24/2023 Negative  Negative Final     Total Protein Urine mg/dL 03/24/2023 7.6  1.0 - 14.0 mg/dL Final    The reference ranges have not been established in  urine protein. The results should be integrated into the clinical context for interpretation.     Total Protein UR MG/MG CR 03/24/2023 0.06  0.00 - 0.20 mg/mg Cr Final     Creatinine Urine mg/dL 03/24/2023 119.0  mg/dL Final    The reference ranges have not been established in urine creatinine. The results should be integrated into the clinical context for interpretation.     Hemoglobin 03/24/2023 12.2  11.7 - 15.7 g/dL Final     Parathyroid Hormone Intact 03/24/2023 47  15 - 65 pg/mL Final     Sodium 03/24/2023 140  133 - 144 mmol/L Final     Potassium 03/24/2023 4.4  3.4 - 5.3 mmol/L Final     Chlor[place] 03/24/2023 111 (H)  94 - 109 mmol/L Final     Carbon Dioxide (CO2) 03/24/2023 27  20 - 32 mmol/L Final     Anion Gap 03/24/2023 2 (L)  3 - 14 mmol/L Final     Urea Nitrogen 03/24/2023 21  7 - 30 mg/dL Final     Creatinine 03/24/2023 1.07 (H)  0.52 - 1.04 mg/dL Final     Calcium 03/24/2023 9.6  8.5 - 10.1 mg/dL Final     Glucose 03/24/2023 109 (H)  70 - 99 mg/dL Final     Albumin 03/24/2023 3.9  3.4 - 5.0 g/dL Final     Phosphorus 03/24/2023 3.3  2.5 - 4.5 mg/dL Final     GFR Estimate 03/24/2023 62  >60 mL/min/1.73m2 Final    eGFR calculated using 2021 CKD-EPI equation.     RBC Urine 03/24/2023 0-2  0-2 /HPF /HPF Final     WBC Urine 03/24/2023 0-5  0-5 /HPF /HPF Final     Squamous Epithelials Urine 03/24/2023 Few (A)  None Seen /LPF Final          Assessment/Plan:  1. Hypertension: aldosterone level has been high on evaluation by Dr. Quintanilla with a low renin. In the past I was suspicious for hyperaldosteronism as well but CT scan was without adrenal adenoma appreciated. On potassium sparing diuretic for medical mgmt of presumed bilateral adrenal hyperplasia. BP at goal most recently.     2. CKD: likely some chronic kidney changes related to longstanding hypertension and hypercalcemia. At risk for diabetic nephropathy but no proteinuria which is reassuring. Educated on benefits of weight loss.     3. Hypercalcemia:  s/p parathyroidectomy on 3/21/13. Calcium now normal.     4. 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 Rodas/[facility] - yearly follow-up was recommended by Dr. Rodas at either [place] or here.  She has not had recent follow-up - will get appt with Dr. yen arranged to transition her care.     5. DM: A1C much higher at 13% on this last check Feb 2023. She feels it is improving with CGM. She is looking at trialing Wegovy for weight loss. There are reports of MARNI associated with this medication but it also states that some of these cases were in the setting of poor intake/diarrhea. Encoruaged good hydration should she trial this drug. Also placed a future bmp to assure stability once on it. If addt questions, would encourage her to discussed with DR. Null further.     Patient Instructions   1. Please arrange a follow-up with Dr. Yen in [place] [facility] (previously seen by Dr. Rodas)  2. Labs in 6 months  3. Follow-up in one year with labs prior.        823-849 AM video visit via [email or web address] - hospitals  31 minutes spent by me on the date of the encounter doing chart review, review of test results, interpretation of tests, patient visit and documentation       Dia Aleman, DO

## 2023-03-29 ENCOUNTER — TELEPHONE (OUTPATIENT)
Dept: NEPHROLOGY | Facility: CLINIC | Age: 53
End: 2023-03-29
Payer: COMMERCIAL

## 2023-03-29 ENCOUNTER — TRANSCRIBE ORDERS (OUTPATIENT)
Dept: OTHER | Age: 53
End: 2023-03-29

## 2023-03-29 DIAGNOSIS — D47.2 MGUS (MONOCLONAL GAMMOPATHY OF UNKNOWN SIGNIFICANCE): Primary | ICD-10-CM

## 2023-03-29 NOTE — CONFIDENTIAL NOTE
Left Voicemail (1st Attempt) for the patient to call back and schedule the following:    Appointment type: return nephrology  Provider:   Return date: 03/28/2024  Specialty phone number: [phone]  Additional appointment(s) needed: Please arrange a follow-up with Dr. Dennis in [place] [facility] (previously seen by Dr. Rodas) Labs in 6 months  Additonal Notes: Return in about 1 year (around 3/28/2024). With labs prior           Dulce robles Procedure   Cardiology, Nephrology, Rheumatology, GI, Pulmonology, Infectious Disease Specialties   [facility]

## 2023-03-30 ENCOUNTER — PATIENT OUTREACH (OUTPATIENT)
Dept: ONCOLOGY | Facility: CLINIC | Age: 53
End: 2023-03-30
Payer: COMMERCIAL

## 2023-03-30 ENCOUNTER — MYC MEDICAL ADVICE (OUTPATIENT)
Dept: EDUCATION SERVICES | Facility: CLINIC | Age: 53
End: 2023-03-30
Payer: COMMERCIAL

## 2023-03-30 NOTE — PROGRESS NOTES
[facility]: Cancer Care                                                                   Hem/Onc  Referral reviewed:     Generic External Data Department   Diagnoses: MGUS (monoclonal gammopathy of unknown significance)   Order: Adult Oncology/Hematology  Referral   Comment:   Referral called in per Pt via phone.  Previously a Patient of Dr Rodas at [place], last seen 2017. Wants FU for MGUS, current Dr Aleman notes Protein in blood and desire to transfer care to [facility]/[place].  Records in Epic. Patient requesting Dr Dennis at [place].    ASSESSMENT      Clinical History (per Nurse review of records provided):    52 year old female patient with history of MGUS.     Lives:  [address] [facility] [place] [address]    Insurance:  Payor: [employer] / Plan: [employer] / Product Type: HMO /     PCP:   Windy Gordillo      Records Location: [facility] and Care EveryWhere    Pertinent labs 8/06/18 and 3/24/23 -- BOOKMARKED    Referring provider note(s) 3/28/23-- BOOKMARKED    INTERVENTION(S)                                                      Oncology Nurse Navigator called patient to introduce the role or the nurse navigator  and to inform them that a referral for [facility] Hematology/Oncology department has been received for dx of MGUS by way. Identity verified. Patient confirms they are aware of the referral and ready to schedule. Patient transferred to NPS to schedule.     Preferred [facility] Hem/Onc clinic location:  [place] with Dr. Dennis     Records team will contact pt directly if ROIs needed for records, imaging, slides      Patient voiced understanding of above instructions and information and denied further questions and was provided interim callback phone number(s).  PLAN                                                      Hem/Onc consult:  Patient transferred to NPS to schedule next available apt at [place] with Dr. Dennis.      Libia Frias, RN, BSN  Hematology/Oncology New Patient Nurse Navigator   [facility]  [phone]  [phone]    Next 5 appointments (look out 90 days)    Apr 17, 2023  8:00 AM  (Arrive by 7:40 AM)  Provider Visit with Windy Gordillo MD  [facility] ([facility] ) [address] [address] [phone]   May 12, 2023  8:30 AM  (Arrive by 8:15 AM)  Return Visit with Faina Murillo MD  [facility] ([facility]) [id number] [place] [id number] [phone]

## 2023-03-31 NOTE — TELEPHONE ENCOUNTER
Blood sugars running low via Valery 2 sensor. Patient has been also checking with meter:        Last Toujeo U300 dose-8 units was Tuesday evening. Advised patient to trial discontinuing Toujeo. Reports has never been on U100 insulin.     Has 1.5 pens left. Plan made to resume Toujeo at 4 units if fasting morning blood sugars are consistently above 130.     Patient will establish care with Dee Tellez on 4/21.    Dr. Gordillo updated.    Di Duarte RN, Diabetes Educator  Diabetes Education Department  [facility] and [place]  [phone]

## 2023-04-01 NOTE — TELEPHONE ENCOUNTER
RECORDS STATUS - ALL OTHER DIAGNOSIS      RECORDS RECEIVED FROM: Epic   DATE RECEIVED:    NOTES STATUS DETAILS   OFFICE NOTE from referring provider SELF    OFFICE NOTE from medical oncologist Epic 08/17/17: Dr. Kristi Rodas   MEDICATION LIST [facility]    LABS     ANYTHING RELATED TO DIAGNOSIS Epic Most recent 04/17/23

## 2023-04-06 ENCOUNTER — TELEPHONE (OUTPATIENT)
Dept: ENDOCRINOLOGY | Facility: CLINIC | Age: 53
End: 2023-04-06

## 2023-04-07 ENCOUNTER — VIRTUAL VISIT (OUTPATIENT)
Dept: ENDOCRINOLOGY | Facility: CLINIC | Age: 53
End: 2023-04-07
Payer: COMMERCIAL

## 2023-04-07 VITALS — BODY MASS INDEX: 49.75 KG/M2 | WEIGHT: 272 LBS

## 2023-04-07 DIAGNOSIS — E66.01 CLASS 3 SEVERE OBESITY DUE TO EXCESS CALORIES WITH SERIOUS COMORBIDITY AND BODY MASS INDEX (BMI) OF 40.0 TO 44.9 IN ADULT (H): Primary | ICD-10-CM

## 2023-04-07 DIAGNOSIS — E66.813 CLASS 3 SEVERE OBESITY DUE TO EXCESS CALORIES WITH SERIOUS COMORBIDITY AND BODY MASS INDEX (BMI) OF 40.0 TO 44.9 IN ADULT (H): Primary | ICD-10-CM

## 2023-04-07 PROCEDURE — 99213 OFFICE O/P EST LOW 20 MIN: CPT | Mod: VID

## 2023-04-07 NOTE — PROGRESS NOTES
Adelaida is a 53 year old who is being evaluated via a billable video visit.      How would you like to obtain your AVS? MyChart  If the video visit is dropped, the invitation should be resent by: Text to cell phone: [phone]  Will anyone else be joining your video visit? No        Video-Visit Details    Type of service:  Video Visit     Originating Location (pt. Location): Home    Distant Location (provider location):  Off-site  Platform used for Video Visit: [facility] Weight Management Note     Adelaida Packer  MRN:  [id number]  :  1970  JUSTUS:  2023    Dear Windy Gordillo MD,    I had the pleasure of seeing your patient Adelaida Packer. She is a 53 year old female who I am continuing to see for treatment of obesity related to:        6/15/2021     2:14 AM   --   I have the following health issues associated with obesity Pre-Diabetes    High Blood Pressure    Osteoarthritis (joint disease)   I have the following symptoms associated with obesity Knee Pain    Hip Pain       Assessment & Plan   Problem List Items Addressed This Visit        Digestive    Class 3 severe obesity due to excess calories with serious comorbidity and body mass index (BMI) of 40.0 to 44.9 in adult (H) - Primary     Last seen by Dr. Mitchell 2022. Has regained 30lbs since last visit. Had a stressful fall that put weight and health on the backburner.     Would like to restart a medication to help with weight loss today.     Currently taking Victoza 3.0mg once daily for Type II Diabetes. Previously trialed Ozempic and had side effects of GERD that was not well controlled or tolerated. Currently has Wegovy approved and picked up the prescription, but did not realize it was the same medication as Ozempic. Did not start the medications. Discussed Semiglutide both ozempic and wegovy. Discussed transitioning from Victoza to Wegovy today, how is concerned for repeat side effects of GERD and would like to not start at  this time.     Previously trialed Topiramate for mirgaines, with side effects of parathesia that was not well tolerated. No effect on weight. Does not want to retrial again.     Phentermine previously trialed Summer 2022 with Dr. Mitchell. Was helpful with hunger. Had side effect of dry mouth, that was tolerated. Unsure if was helpful with weight. Stopped due to surgery and never restarted. No hx of anxiety. HTN well controlled. Would like to restart today. With history of CKD stage 2 and portal HTN, will discuss with nephrology prior to restarting.     Naltrexone could be started in the future. No contraindications No hx of liver disease. Not on opioids. Will start if Phentermine contraindicated.            1. Will reach out to Dr. Muniz about starting Phentermine. If any concerns, will start Naltrexone.   2. Continue Victoza 3.0mg once daily at this time. Can discuss with endocrinology able transitioning to Wegovy if would like.    3. Follow up with Yecenia Rivera 6/14/2023.     INTERVAL HISTORY:  [facility] - 6/15/2021  Last seen by Dr. Mitchell- 7/2022  Ozempic - heartburn and nausea  Phentermine - discontinued due to surgery.         Has gained 30lbs since last seen. Signed up for 24 week program for the summer.  Last fall had a stressful event that put her health to the wayside.       Anti-obesity medications:     Currently:   Victoza 3.0mg - Has been on this dose for a month. No side effects. Some help with hunger, decrease in portion sizes. Ulrich quicker.     Previous:   Phentermine - was helpful with hunger. Unsure if helpful with weight. Dry mouth. But no other side effects   Ozempic - had heartburn at the 0.5mg dose. Not well controlled.     Failed:   Topiramate - side effects of numbness and tingling. No effect on weight    Recent diet changes: Hunger under better control with Victoza. Aware she needs protein.     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 Has a  membership to [employer]. Still trying to get back with knee pain after surgery. Going to water aerobics. Goal of going 3xweek     Recent stressors: Stable, some increase stress with work. Just left a [employer] of 17 years, found out that the  was corrupt. Is in therapy and doing well.      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     Diabetes - A1C 13.0 1/2023. BS fasting . Lunch - 105-130. Night - 105-114. Had a CGM, but was not price affordable. Taking Victoza and glimepiride. No longer on insulin. Followed by Endocrinology.      HLD - well controlled on medications. Followed by PCP     Renal HTN - well controlled on medications. Does not check BP at home. Followed by nephrology     CKD stage 2 - followed by nephrology     Anti-obesity medication ROS:    HEENT  Hx of glaucoma: No    Cardiovascular  CAD:No  HTN:Yes    Gastrointestinal  GERD:Yes  Constipation:No  Liver Dz:No  H/O Pancreatitis:No    Psychiatric  Bipolar: No  Anxiety:No  Depression:No  History of alcohol/drug abuse: No  Hx of eating disorder:No    Endocrine  Personal or family hx of MTC or MEN2:No  Diabetes/prediabetes: Yes    Neurologic:  Hx of seizures: No  Hx of migraines: Yes  Memory Impairment: No      History of kidney stones: No  Kidney disease: Yes - CrCl 75  Current birth control: Hysterectomy    CURRENT WEIGHT:   272 lbs 0 oz    Initial Weight (lbs): 244 lbs  Last Visits Weight: 111.6 kg (246 lb)  Cumulative weight loss (lbs): -28  Weight Loss Percentage: -11.48%             4/7/2023     5:29 AM   Changes and Difficulties   I have made the following changes to my diet since my last visit: limiting carbs and sugary sweets   With regards to my diet, I am still struggling with: weight gain /diabetes   I have made the following changes to my activity/exercise since my last visit: increased exercise, walking more   With regards to my activity/exercise, I am still struggling with: being consistent         MEDICATIONS:   Current Outpatient Medications    Medication Sig Dispense Refill     acetaminophen (TYLENOL) 325 MG tablet Take 2 tablets (650 mg) by mouth every 4 hours as needed for other (mild pain) 100 tablet 0     amLODIPine (NORVASC) 5 MG tablet Take 1 tablet (5 mg) by mouth daily (Patient taking differently: Take 5 mg by mouth every evening) 90 tablet 3     aspirin (ASA) 81 MG EC tablet Take 1 tablet (81 mg) by mouth daily 90 tablet 3     atorvastatin (LIPITOR) 10 MG tablet Take 1 tablet (10 mg) by mouth every evening 90 tablet 3     blood glucose (NO BRAND SPECIFIED) lancets standard Use to test blood sugar 2 times daily or as directed. 200 lancet 3     blood glucose (NO BRAND SPECIFIED) lancets standard Use to test blood sugar 1-2 times daily or as directed. 200 each 3     blood glucose (NO BRAND SPECIFIED) test strip Use to test blood sugar 2 times daily or as directed. 200 strip 3     blood glucose (NO BRAND SPECIFIED) test strip Use to test blood sugar 1-2 times daily or as directed. 200 strip 3     blood glucose monitoring (NO BRAND SPECIFIED) meter device kit Use to test blood sugar 1-2 times daily or as directed. 1 kit 0     cetirizine (ZYRTEC) 10 MG tablet Take 10 mg by mouth daily as needed for allergies       cholecalciferol 2000 UNITS CAPS Take 2,000 Units by mouth daily 90 capsule 3     eplerenone (INSPRA) 50 MG tablet Take 2 tablets (100 mg) by mouth 2 times daily 360 tablet 3     famotidine (PEPCID) 20 MG tablet TAKE 1 TABLET BY MOUTH TWICE A  tablet 1     fluticasone (FLONASE) 50 MCG/ACT nasal spray INSTILL 1 SPRAY INTO BOTH NOSTRILS DAILY 16 mL 1     glimepiride (AMARYL) 2 MG tablet Take 1 tablet (2 mg) by mouth 2 times daily (before meals) 180 tablet 0     hydrOXYzine (ATARAX) 25 MG tablet Take 0.5-1 tablets (12.5-25 mg) by mouth nightly as needed for other (sleep) 30 tablet 3     labetalol (NORMODYNE) 300 MG tablet Take 1 tablet (300 mg) by mouth 2 times daily 180 tablet 3     liraglutide (VICTOZA) 18 MG/3ML solution Inject 3 mg  Subcutaneous daily Dose change 45 mL 0     polyethylene glycol (MIRALAX) 17 g packet Take 17 g by mouth daily 7 packet 0     SUMAtriptan (IMITREX) 25 MG tablet TAKE 1 TO 2 TABLETS BY MOUTH AT ONSET OF HEADACHE FOR MIGRAINE. MAY REPEAT DOSE IN 2 HOURS. MAX OF 200MG OR 2 DOSES IN 24 HOURS 18 tablet 0     Continuous Blood Gluc Sensor (FREESTYLE NEAL 3 SENSOR) MISC 1 each every 14 days 6 each 3     Fluocinolone Acetonide Scalp (DERMA-SMOOTHE/FS SCALP) 0.01 % OIL oil Apply nightly to the scalp for the 6 weeks 60 mL 1     insulin glargine U-300 (TOUJEO) 300 UNIT/ML (1 units dial) pen Inject 15 Units Subcutaneous At Bedtime 6 mL 0     Semaglutide-Weight Management (WEGOVY) 0.25 MG/0.5ML pen Inject 0.25 mg Subcutaneous once a week 2 mL 0           4/7/2023     5:29 AM   Weight Loss Medication History Reviewed With Patient   Which weight loss medications are you currently taking on a regular basis? Victoza (liraglutide)    Wegovy   If you are not taking a weight loss medication that was prescribed to you, please indicate why: Other             Objective    Wt 123.4 kg (272 lb)   LMP 08/13/2005   BMI 49.75 kg/m             Vitals:  No vitals were obtained today due to virtual visit.    PHYSICAL EXAM:  GENERAL: Healthy, alert and no distress  EYES: Eyes grossly normal to inspection.  No discharge or erythema, or obvious scleral/conjunctival abnormalities.  RESP: No audible wheeze, cough, or visible cyanosis.  No visible retractions or increased work of breathing.    SKIN: Visible skin clear. No significant rash, abnormal pigmentation or lesions.  NEURO: Cranial nerves grossly intact.  Mentation and speech appropriate for age.  PSYCH: Mentation appears normal, affect normal/bright, judgement and insight intact, normal speech and appearance well-groomed.        Sincerely,    LOLI PUTNAM PA-C      56 minutes spent by me on the date of the encounter doing chart review, history and exam, documentation and further activities  per the note

## 2023-04-07 NOTE — LETTER
2023       RE: Adelaida Packer  [address] [place] [address]     Dear Colleague,    Thank you for referring your patient, Adelaida Packer, to the [facility] [place] [facility]. Please see a copy of my visit note below.    Adelaida is a 53 year old who is being evaluated via a billable video visit.      How would you like to obtain your AVS? MyChart  If the video visit is dropped, the invitation should be resent by: Text to cell phone: [phone]  Will anyone else be joining your video visit? No        Video-Visit Details    Type of service:  Video Visit     Originating Location (pt. Location): Home    Distant Location (provider location):  Off-site  Platform used for Video Visit: [employer]    [facility] Weight Management Note     Adelaida Packer  MRN:  [id number]  :  1970  JUSTUS:  2023    Dear Windy Gordillo MD,    I had the pleasure of seeing your patient Adelaida Packer. She is a 53 year old female who I am continuing to see for treatment of obesity related to:        6/15/2021     2:14 AM   --   I have the following health issues associated with obesity Pre-Diabetes    High Blood Pressure    Osteoarthritis (joint disease)   I have the following symptoms associated with obesity Knee Pain    Hip Pain       Assessment & Plan   Problem List Items Addressed This Visit          Digestive    Class 3 severe obesity due to excess calories with serious comorbidity and body mass index (BMI) of 40.0 to 44.9 in adult (H) - Primary     Last seen by Dr. Mitchell 2022. Has regained 30lbs since last visit. Had a stressful fall that put weight and health on the backburner.     Would like to restart a medication to help with weight loss today.     Currently taking Victoza 3.0mg once daily for Type II Diabetes. Previously trialed Ozempic and had side effects of GERD that was not well controlled or tolerated.  Currently has Wegovy approved and picked up the prescription, but did not realize it was the same medication as Ozempic. Did not start the medications. Discussed Semiglutide both ozempic and wegovy. Discussed transitioning from Victoza to Wegovy today, how is concerned for repeat side effects of GERD and would like to not start at this time.     Previously trialed Topiramate for mirgaines, with side effects of parathesia that was not well tolerated. No effect on weight. Does not want to retrial again.     Phentermine previously trialed Summer 2022 with Dr. Mitchell. Was helpful with hunger. Had side effect of dry mouth, that was tolerated. Unsure if was helpful with weight. Stopped due to surgery and never restarted. No hx of anxiety. HTN well controlled. Would like to restart today. With history of CKD stage 2 and portal HTN, will discuss with nephrology prior to restarting.     Naltrexone could be started in the future. No contraindications No hx of liver disease. Not on opioids. Will start if Phentermine contraindicated.            1. Will reach out to Dr. Muniz about starting Phentermine. If any concerns, will start Naltrexone.   2. Continue Victoza 3.0mg once daily at this time. Can discuss with endocrinology able transitioning to Wegovy if would like.    3. Follow up with Yecenia Rivera 6/14/2023.     INTERVAL HISTORY:  [facility] - 6/15/2021  Last seen by Dr. Mitchell- 7/2022  Ozempic - heartburn and nausea  Phentermine - discontinued due to surgery.         Has gained 30lbs since last seen. Signed up for 24 week program for the summer.  Last fall had a stressful event that put her health to the wayside.       Anti-obesity medications:     Currently:   Victoza 3.0mg - Has been on this dose for a month. No side effects. Some help with hunger, decrease in portion sizes. Ulrich quicker.     Previous:   Phentermine - was helpful with hunger. Unsure if helpful with weight. Dry mouth. But no other side effects   Ozempic - had  heartburn at the 0.5mg dose. Not well controlled.     Failed:   Topiramate - side effects of numbness and tingling. No effect on weight    Recent diet changes: Hunger under better control with Victoza. Aware she needs protein.     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 Has a membership to [employer]. Still trying to get back with knee pain after surgery. Going to water aerobics. Goal of going 3xweek     Recent stressors: Stable, some increase stress with work. Just left a [employer] of 17 years, found out that the  was corrupt. Is in therapy and doing well.      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     Diabetes - A1C 13.0 1/2023. BS fasting . Lunch - 105-130. Night - 105-114. Had a CGM, but was not price affordable. Taking Victoza and glimepiride. No longer on insulin. Followed by Endocrinology.      HLD - well controlled on medications. Followed by PCP     Renal HTN - well controlled on medications. Does not check BP at home. Followed by nephrology     CKD stage 2 - followed by nephrology     Anti-obesity medication ROS:    HEENT  Hx of glaucoma: No    Cardiovascular  CAD:No  HTN:Yes    Gastrointestinal  GERD:Yes  Constipation:No  Liver Dz:No  H/O Pancreatitis:No    Psychiatric  Bipolar: No  Anxiety:No  Depression:No  History of alcohol/drug abuse: No  Hx of eating disorder:No    Endocrine  Personal or family hx of MTC or MEN2:No  Diabetes/prediabetes: Yes    Neurologic:  Hx of seizures: No  Hx of migraines: Yes  Memory Impairment: No      History of kidney stones: No  Kidney disease: Yes - CrCl 75  Current birth control: Hysterectomy    CURRENT WEIGHT:   272 lbs 0 oz    Initial Weight (lbs): 244 lbs  Last Visits Weight: 111.6 kg (246 lb)  Cumulative weight loss (lbs): -28  Weight Loss Percentage: -11.48%             4/7/2023     5:29 AM   Changes and Difficulties   I have made the following changes to my diet since my last visit: limiting carbs and sugary sweets   With  regards to my diet, I am still struggling with: weight gain /diabetes   I have made the following changes to my activity/exercise since my last visit: increased exercise, walking more   With regards to my activity/exercise, I am still struggling with: being consistent         MEDICATIONS:   Current Outpatient Medications   Medication Sig Dispense Refill    acetaminophen (TYLENOL) 325 MG tablet Take 2 tablets (650 mg) by mouth every 4 hours as needed for other (mild pain) 100 tablet 0    amLODIPine (NORVASC) 5 MG tablet Take 1 tablet (5 mg) by mouth daily (Patient taking differently: Take 5 mg by mouth every evening) 90 tablet 3    aspirin (ASA) 81 MG EC tablet Take 1 tablet (81 mg) by mouth daily 90 tablet 3    atorvastatin (LIPITOR) 10 MG tablet Take 1 tablet (10 mg) by mouth every evening 90 tablet 3    blood glucose (NO BRAND SPECIFIED) lancets standard Use to test blood sugar 2 times daily or as directed. 200 lancet 3    blood glucose (NO BRAND SPECIFIED) lancets standard Use to test blood sugar 1-2 times daily or as directed. 200 each 3    blood glucose (NO BRAND SPECIFIED) test strip Use to test blood sugar 2 times daily or as directed. 200 strip 3    blood glucose (NO BRAND SPECIFIED) test strip Use to test blood sugar 1-2 times daily or as directed. 200 strip 3    blood glucose monitoring (NO BRAND SPECIFIED) meter device kit Use to test blood sugar 1-2 times daily or as directed. 1 kit 0    cetirizine (ZYRTEC) 10 MG tablet Take 10 mg by mouth daily as needed for allergies      cholecalciferol 2000 UNITS CAPS Take 2,000 Units by mouth daily 90 capsule 3    eplerenone (INSPRA) 50 MG tablet Take 2 tablets (100 mg) by mouth 2 times daily 360 tablet 3    famotidine (PEPCID) 20 MG tablet TAKE 1 TABLET BY MOUTH TWICE A  tablet 1    fluticasone (FLONASE) 50 MCG/ACT nasal spray INSTILL 1 SPRAY INTO BOTH NOSTRILS DAILY 16 mL 1    glimepiride (AMARYL) 2 MG tablet Take 1 tablet (2 mg) by mouth 2 times daily  (before meals) 180 tablet 0    hydrOXYzine (ATARAX) 25 MG tablet Take 0.5-1 tablets (12.5-25 mg) by mouth nightly as needed for other (sleep) 30 tablet 3    labetalol (NORMODYNE) 300 MG tablet Take 1 tablet (300 mg) by mouth 2 times daily 180 tablet 3    liraglutide (VICTOZA) 18 MG/3ML solution Inject 3 mg Subcutaneous daily Dose change 45 mL 0    polyethylene glycol (MIRALAX) 17 g packet Take 17 g by mouth daily 7 packet 0    SUMAtriptan (IMITREX) 25 MG tablet TAKE 1 TO 2 TABLETS BY MOUTH AT ONSET OF HEADACHE FOR MIGRAINE. MAY REPEAT DOSE IN 2 HOURS. MAX OF 200MG OR 2 DOSES IN 24 HOURS 18 tablet 0    Continuous Blood Gluc Sensor (FREESTYLE NEAL 3 SENSOR) MISC 1 each every 14 days 6 each 3    Fluocinolone Acetonide Scalp (DERMA-SMOOTHE/FS SCALP) 0.01 % OIL oil Apply nightly to the scalp for the 6 weeks 60 mL 1    insulin glargine U-300 (TOUJEO) 300 UNIT/ML (1 units dial) pen Inject 15 Units Subcutaneous At Bedtime 6 mL 0    Semaglutide-Weight Management (WEGOVY) 0.25 MG/0.5ML pen Inject 0.25 mg Subcutaneous once a week 2 mL 0           4/7/2023     5:29 AM   Weight Loss Medication History Reviewed With Patient   Which weight loss medications are you currently taking on a regular basis? Victoza (liraglutide)    Wegovy   If you are not taking a weight loss medication that was prescribed to you, please indicate why: Other             Objective    Wt 123.4 kg (272 lb)   LMP 08/13/2005   BMI 49.75 kg/m             Vitals:  No vitals were obtained today due to virtual visit.    PHYSICAL EXAM:  GENERAL: Healthy, alert and no distress  EYES: Eyes grossly normal to inspection.  No discharge or erythema, or obvious scleral/conjunctival abnormalities.  RESP: No audible wheeze, cough, or visible cyanosis.  No visible retractions or increased work of breathing.    SKIN: Visible skin clear. No significant rash, abnormal pigmentation or lesions.  NEURO: Cranial nerves grossly intact.  Mentation and speech appropriate for  age.  PSYCH: Mentation appears normal, affect normal/bright, judgement and insight intact, normal speech and appearance well-groomed.        Sincerely,    LOLI PUTNAM PA-C

## 2023-04-10 PROBLEM — E66.01 CLASS 3 SEVERE OBESITY DUE TO EXCESS CALORIES WITH SERIOUS COMORBIDITY AND BODY MASS INDEX (BMI) OF 40.0 TO 44.9 IN ADULT (H): Status: ACTIVE | Noted: 2017-04-26

## 2023-04-10 PROBLEM — E66.813 CLASS 3 SEVERE OBESITY DUE TO EXCESS CALORIES WITH SERIOUS COMORBIDITY AND BODY MASS INDEX (BMI) OF 40.0 TO 44.9 IN ADULT (H): Status: ACTIVE | Noted: 2017-04-26

## 2023-04-10 NOTE — ASSESSMENT & PLAN NOTE
Last seen by Dr. Mitchell 7/2022. Has regained 30lbs since last visit. Had a stressful fall that put weight and health on the backburner.     Would like to restart a medication to help with weight loss today.     Currently taking Victoza 3.0mg once daily for Type II Diabetes. Previously trialed Ozempic and had side effects of GERD that was not well controlled or tolerated. Currently has Wegovy approved and picked up the prescription, but did not realize it was the same medication as Ozempic. Did not start the medications. Discussed Semiglutide both ozempic and wegovy. Discussed transitioning from Victoza to Wegovy today, how is concerned for repeat side effects of GERD and would like to not start at this time.     Previously trialed Topiramate for mirgaines, with side effects of parathesia that was not well tolerated. No effect on weight. Does not want to retrial again.     Phentermine previously trialed Summer 2022 with Dr. Mitchell. Was helpful with hunger. Had side effect of dry mouth, that was tolerated. Unsure if was helpful with weight. Stopped due to surgery and never restarted. No hx of anxiety. HTN well controlled. Would like to restart today. With history of CKD stage 2 and portal HTN, will discuss with nephrology prior to restarting.     Naltrexone could be started in the future. No contraindications No hx of liver disease. Not on opioids. Will start if Phentermine contraindicated.

## 2023-04-10 NOTE — PATIENT INSTRUCTIONS
"Thank you for allowing us the privilege of caring for you. We hope we provided you with the excellent service you deserve.   Please let us know if there is anything else we can do for you so that we can be sure you are completely satisfied with your care experience.    To ensure the quality of our services you may be receiving a patient satisfaction survey from an independent patient satisfaction monitoring company.    The greatest compliment you can give is a \"Likely to Recommend\"    Your visit was with LOLI PUTNAM PA-C today.    Instructions per today's visit:   1. Will reach out to Dr. Muniz about starting Phentermine. If any concerns, will start Naltrexone.   2. Continue Victoza 3.0mg once daily at this time. Can discuss with endocrinology able transitioning to Wegovy if would like.    3. Follow up with Loli Rivera 6/14/2023.     _________________________________________________________________________________________________________________________________________________________  Important contact and scheduling information:  Please call our contact center at [phone] to schedule your next appointments.  To find a lab location near you, please call [phone].  For any nursing questions or concerns call Lori Merino LPN at [phone] or Wendy Feldman RN at [phone]  Please call during clinic hours Monday through Friday 8:00a - 4:00p if you have questions or you can contact us via [employer] at anytime and we will reply during clinic hours.    Lab results will be communicated through My Chart or letter (if My Chart not used). Please call the clinic if you have not received communication after 1 week or if you have any questions.?  Clinic Fax: [phone]  _________________________________________________________________________________________________________________________________________________________  If you are asked by your clinic team to have your blood pressure checked:  [facility] do offer several locations for blood pressure checks. Please follow the below link to schedule an appointment. Scheduling an appointment at the pharmacy for a blood pressure check is now preferred.    Appointment Plus (appointment-plus.[employer])  _________________________________________________________________________________________________________________________________________________________  Meal Replacement Products:    Here is the link to our new e-store where you can purchase our meal replacement products     [employer]ix E-Store  [facility].Pricefalls/store    The one week starter kit is a great way to sample a variety of products and see what works for you.    If you want more information about the product go to: Fresh Steps Meals  [employer]    If you are an employee or [facility] or  [employer] please contact your care team for a 10% estore discount    Free Shipping for orders over $75     Benefits of meal replacements products:    Portion and calorie control  Improved nutrition  Structured eating  Simplified food choices  Avoid contact with trigger foods  _________________________________________________________________________________________________________________________________________________________  Interested in working with a health ?  Health coaches work with you to improve your overall health and wellbeing.  They look at the whole person, and may involve discussion of different areas of life, including, but not limited to the four pillars of health (sleep, exercise, nutrition, and stress management). Discuss with your care team if you would like to start working a health .  Health Coaching-3 Pack: Schedule by calling [phone]    $99 for three health coaching visits    Visits may be done in person or via phone    Coaching is a partnership between the  and the client; Coaches do not prescribe or diagnose    Coaching helps inspire the  client to reach his/her personal goals   _________________________________________________________________________________________________________________________________________________________  24 Week Healthy Lifestyle Plan:    Our mission in the 24-week Healthy Lifestyle Plan is to provide you with individualized care by giving you the tools, education and support you need to lose weight and maintain a healthy lifestyle. In your 24-week journey, you ll be supported by a dedicated weight loss team that includes registered dietitians, medical weight management providers, health coaches, and nurses -- all with special expertise in weight loss -- to help you every step of the way.     Monthly meetings with your registered dietician or medical weight management provider help to review your progress, update your care plan, and make any adjustments needed to ensure success. Between these visits, weekly and bi-weekly health  visits will help you focus on the four pillars of weight loss -- stress, sleep, nutrition, and exercise -- and how you can best adapt each to achieve sustainable weight loss results.    In addition, you will be given exclusive access to online wellbeing classes through [employer].  Your initial visit will be with a medical weight management provider who will help to understand your weight loss goals and ensure this program is the right fit for you. Please let our team know if you are interested in the 24 week plan by sending a message to your care team or calling [phone] to schedule.  _________________________________________________________________________________________________________________________________________________________    COMPREHENSIVE WEIGHT MANAGEMENT PROGRAM  VIRTUAL SUPPORT GROUPS    For Support Group Information:      We offer support groups for patients who are working on weight loss and considering, preparing for or have had weight loss surgery.   There is no cost  "for this opportunity.  You are invited to attend the?Virtual Support Groups?provided by any of the following locations:    [facility] via Microsoft Teams with Noni Valencia RN  2.   [place] via [employer] with Shreyas Boone, PhD, LP  3.   [place] via [employer] with Carrie Castro RN  4.   [facility] via [employer] Teams with Carrie Ness [facility]    The following Support Group information can also be found on our website:  [email or web address]    [facility] Weight Loss Surgery Support Group    [facility] Weight Loss Surgery Support Group  The support group is a patient-lead forum that meets monthly to share experiences, encouragement and education. It is open to those who have had weight loss surgery, are scheduled for surgery, and those who are considering surgery.   WHEN: This group meets on the 3rd Wednesday of each month from 5:00PM - 6:00PM virtually using Microsoft Teams.   FACILITATOR: Led by Noni Valencia RD, LD, RN, the program's Clinical Coordinator.   TO REGISTER: Please contact the clinic via POKKT or call the nurse line directly at [phone] to inform our staff that you would like an invite sent to you and to let us know the email you would like the invite sent to. Prior to the meeting, a link with directions on how to join the meeting will be sent to you.    2022 Meetings  Mago 15: \"Let's Talk\" a time for the group to share.  July 20: \"Let's Talk\" a time for the group to share.  August 17: \"Let's Talk\" a time for the group to share.  September 21: \"Let's Talk\" a time for the group to share.  October 19: Guest Speaker: Dr Tacos Leal MD Pulmonologist and Sleep Medicine Physician, \"Getting a Good Night's Sleep\".  November 16: \"Let's Talk\" a time for the group to share.  December 21: \"Let's Talk\" a time for the group to share.    [facility] Support " "Groups    Connections: Bariatric Care Support Group?  This is open to all [facility] (and those external to this program) pre- and post- operative bariatric surgery patients as well as their support system.   WHEN: This group meets the 2nd Tuesday of each month from 6:30 PM - 8:00 PM virtually using Microsoft Teams.   FACILITATOR: Led by Shreyas Boone, Ph.D who is a Licensed Psychologist with the [facility] Comprehensive Weight Management Program.   TO REGISTER: Please send an email to Shreyas Boone, Ph.D., LP at?[email or web address]?if you would like an invitation to the group and to learn about using Microsoft Teams.    2022 Meetings June 14: Sue Daley RD, LD at [facility], \"Nutritional Labeling\"  July 12 August 2 (Please Note Date Change)  September 13 October 11 November 8 December 13    Connections: Post-Operative Bariatric Surgery Support Group  This is a support group for [facility] bariatric patients (and those external to [facility]) who have had bariatric surgery and are at least 3 months post-surgery.  WHEN: This support group meets the 4th Wednesday of the month from 11:00 AM - 12:00 PM virtually using Microsoft Teams.   FACILITATOR: Led by Certified Bariatric Nurse, Carrie Castro RN.   TO REGISTER: Please send an email to Carrie at [email or web address] if you would like an invitation to the group and to learn about using Microsoft Teams.    2022 Meetings June 22 July 27 August 24 September 28 October 26 November 23 December 28      [facility] Healthy Lifestyle Virtual Support Group    Healthy Lifestyle Virtual Support Group?  This is 60 minutes of small group guided discussion, support and resources. All are welcome who want a healthy lifestyle.  WHEN: This group meets monthly on a Friday from 12:30 PM - 1:30 PM virtually using Microsoft Teams.   FACILITATOR: Led by National Board Certified Health and " ", Carrie Ness [facility].   TO REGISTER: Please send an email to Carrie [email or web address] to receive monthly invites to the group or if you have any questions about having a health .  Prior to the meeting, a link with directions on how to join the meeting will be sent to you.    2022 Meetings  June 24: Carrie Ness [facility], \"Setting Limits and Boundaries\".  Jul 29: Open Forum  August 26: Guest Speaker: Lakisha Almonte Registered Dietitian  September 30: Open Forum  October 28th: Guest Speaker: Letty Kramer [facility], Health , \"Gratitude Practices\".  November 18: Guest Speaker: Edel Jain RD Registered Dietitian, \"Navigating How to Eat around the Holidays\".  December 16: Guest Speaker: Ceci Zaman [facility], \"Changing Your Relationship with Movement\".    ____________________________________________________________________________________________________________________________________________________________________________  [place] [facility] Get Moving Program  Our team of physical therapists is trained to help you understand and take control of your condition. They will perform a thorough evaluation to determine your ability for activity and develop a customized plan to fit your goals and physical ability.  Scheduling: Unsure if the Get Moving program is right for you? Discuss the program with your medical provider or diabetes educator. You can also call us at [phone] to ask questions or schedule an appointment.   [facility] Get Moving Program  ____________________________________________________________________________________________________________________________________________________________________________  [facility] Diabetes Prevention Program (DPP)  If you have prediabetes and Medicare please contact us via MyChart to learn more about the Diabetes Prevention Program (DPP)  Program Details:  [facility] offers the year-long Diabetes Prevention " Program (DPP). The program helps you to make lifestyle changes that prevent or delay type 2 diabetes by supporting healthy eating, increased physical activity, stress reduction and use of coping skills.   On average, previous [facility] DPP cohorts have lost and maintained at least 5% of their starting weight throughout the program and averaged more than 150 minutes of physical activity per week.  Participants meet weekly for one-hour group sessions over sixteen weeks, every other week for the next 8 weeks, and monthly for the last six months.   A year-long maintenance program is also available for participants who complete the first year.   Location & Cost:   During the COVID-19 Public Health Emergency, the program is offered virtually. When in-person classes can resume, they will be held at [facility].  For people with Medicare, the program is covered in full. A self-pay option will also be available for those with non-Medicare insurance plans.   _________________________________________________________________________________________________________________________________________________________  Bluetooth Scale:    We hope to provide you with high quality virtual healthcare visits while social distancing for COVID-19 is necessary, as well as in the future when virtual visits may be more convenient for you.     Our technology team made it possible for Bluetooth scales to send weight measurements to our electronic medical record. This allows weights from you weighing at home to securely flow into the medical record, which will improve telephone and virtual visits.   Additionally, studies have shown that adults actually lose more weight when their weights are automatically sent to someone else, and also that this process is not stressful for those adults.    Below is a link for purchasing the scale, with a discount code for our patients. You may call your insurance company  to see if they will reimburse you for the cost of the scale, as a piece of durable medical equipment. The scales only go up to a weight of 400 pounds. This is an issue and we are working with the developer on increasing this. We found no scales that go over 400lb that have blue-tooth for connecting to [employer].    Scale to purchase: the [employer]  Body  Scale: https://[email or web address] [email or web address] / cTrader/us/en/body/shop?gclid=[email or web address]&gclsrc=aw.ds    Discount Code: We have a discount code for our patients to bring the cost down to $50, Discount code is: [place]_Scale_20%off  _______________________________________________________________________________________________________________________________________________________________________________    To work with a Behavioral Health Psychologist:    Call to schedule:    Catarino Ricardo - [phone]  Vanesa David - [phone]  Rosy Belle - [phone]  Nicole Quintero - [phone]   Sridevi Ramirez PhD (cannot accept Medicare) [phone]        Thank you,   [facility] Comprehensive Weight Management Team

## 2023-04-17 ENCOUNTER — LAB (OUTPATIENT)
Dept: LAB | Facility: CLINIC | Age: 53
End: 2023-04-17
Payer: COMMERCIAL

## 2023-04-17 ENCOUNTER — VIRTUAL VISIT (OUTPATIENT)
Dept: FAMILY MEDICINE | Facility: CLINIC | Age: 53
End: 2023-04-17
Payer: COMMERCIAL

## 2023-04-17 ENCOUNTER — MYC MEDICAL ADVICE (OUTPATIENT)
Dept: ENDOCRINOLOGY | Facility: CLINIC | Age: 53
End: 2023-04-17

## 2023-04-17 DIAGNOSIS — E11.65 UNCONTROLLED TYPE 2 DIABETES MELLITUS WITH HYPERGLYCEMIA (H): ICD-10-CM

## 2023-04-17 DIAGNOSIS — E11.65 UNCONTROLLED TYPE 2 DIABETES MELLITUS WITH HYPERGLYCEMIA (H): Primary | ICD-10-CM

## 2023-04-17 DIAGNOSIS — R10.13 DYSPEPSIA: ICD-10-CM

## 2023-04-17 DIAGNOSIS — G43.709 CHRONIC MIGRAINE WITHOUT AURA WITHOUT STATUS MIGRAINOSUS, NOT INTRACTABLE: ICD-10-CM

## 2023-04-17 DIAGNOSIS — E78.5 HYPERLIPIDEMIA LDL GOAL <100: ICD-10-CM

## 2023-04-17 DIAGNOSIS — E66.01 MORBID OBESITY WITH BODY MASS INDEX OF 45.0-49.9 IN ADULT (H): ICD-10-CM

## 2023-04-17 DIAGNOSIS — Z12.31 VISIT FOR SCREENING MAMMOGRAM: ICD-10-CM

## 2023-04-17 LAB
ALT SERPL W P-5'-P-CCNC: 26 U/L (ref 0–50)
CHOLEST SERPL-MCNC: 141 MG/DL
FASTING STATUS PATIENT QL REPORTED: YES
FASTING STATUS PATIENT QL REPORTED: YES
GLUCOSE BLD-MCNC: 115 MG/DL (ref 70–99)
HBA1C MFR BLD: 8 % (ref 0–5.6)
HDLC SERPL-MCNC: 71 MG/DL
LDLC SERPL CALC-MCNC: 51 MG/DL
NONHDLC SERPL-MCNC: 70 MG/DL
TRIGL SERPL-MCNC: 94 MG/DL

## 2023-04-17 PROCEDURE — 36415 COLL VENOUS BLD VENIPUNCTURE: CPT

## 2023-04-17 PROCEDURE — 83036 HEMOGLOBIN GLYCOSYLATED A1C: CPT

## 2023-04-17 PROCEDURE — 99214 OFFICE O/P EST MOD 30 MIN: CPT | Mod: VID | Performed by: FAMILY MEDICINE

## 2023-04-17 PROCEDURE — 80061 LIPID PANEL: CPT

## 2023-04-17 PROCEDURE — 82947 ASSAY GLUCOSE BLOOD QUANT: CPT

## 2023-04-17 PROCEDURE — 84460 ALANINE AMINO (ALT) (SGPT): CPT

## 2023-04-17 RX ORDER — SUMATRIPTAN 25 MG/1
TABLET, FILM COATED ORAL
Qty: 18 TABLET | Refills: 1 | Status: SHIPPED | OUTPATIENT
Start: 2023-04-17 | End: 2024-01-10

## 2023-04-17 RX ORDER — FAMOTIDINE 20 MG/1
20 TABLET, FILM COATED ORAL 2 TIMES DAILY
Qty: 180 TABLET | Refills: 1 | Status: SHIPPED | OUTPATIENT
Start: 2023-04-17 | End: 2024-01-10

## 2023-04-17 RX ORDER — GLIMEPIRIDE 2 MG/1
2 TABLET ORAL
Qty: 90 TABLET | Refills: 1 | Status: SHIPPED | OUTPATIENT
Start: 2023-04-17 | End: 2023-04-21 | Stop reason: ALTCHOICE

## 2023-04-17 RX ORDER — LIRAGLUTIDE 6 MG/ML
3 INJECTION SUBCUTANEOUS DAILY
Qty: 45 ML | Refills: 0 | Status: SHIPPED | OUTPATIENT
Start: 2023-04-17 | End: 2023-05-15 | Stop reason: ALTCHOICE

## 2023-04-17 NOTE — PROGRESS NOTES
Outcome for 04/19/23 7:52 AM: See 4/17/23 myclucerot to Dr. Marshall for up to date readings.  Outcome for 04/17/23 1:23 PM: [employer] message sent for meter readings.  Tawny Almonte [facility]  Adult Endocrinology  [facility] [place]

## 2023-04-17 NOTE — PROGRESS NOTES
Adelaida is a 53 year old who is being evaluated via a billable video visit.      How would you like to obtain your AVS? MyChart  If the video visit is dropped, the invitation should be resent by: Text to cell phone: [phone]  Will anyone else be joining your video visit? No        Assessment & Plan     Uncontrolled type 2 diabetes mellitus with hyperglycemia (H)  Lab Results   Component Value Date    A1C 8.0 04/17/2023    A1C 13.0 02/10/2023    A1C 5.9 03/11/2022    A1C 5.5 07/19/2021    A1C 6.1 11/30/2020    A1C 5.5 11/19/2019    A1C 5.3 01/29/2019    A1C 5.2 08/06/2018    A1C 5.2 08/15/2017     A1c is improved but still not at goal  Patient is currently taking Victoza 3 mg and glimepiride 2 mg once a day  She decreased the dose of Lamictal from twice a day to once a day 2 weeks ago stop taking the Lantus insulin at bedtime 3 weeks ago  Due to contact dermatitis from fran, will start back on self-monitoring of blood glucose at home 2-3 times a day.  Continue with annual eye exams  Continue with current dose of Victoza  Continue with current dose of glimepiride 2 mg once a day in the morning  We will start back on the Lantus insulin but will decrease the dose from 15 units down to 7 units  Follow for recheck in 3 months at the time of physical or sooner if needed  - liraglutide (VICTOZA) 18 MG/3ML solution; Inject 3 mg Subcutaneous daily Dose change  - blood glucose (NO BRAND SPECIFIED) test strip; Use to test blood sugar 2-3 times daily or as directed.  - Hemoglobin A1c; Future  - Glucose; Future    Chronic migraine without aura without status migrainosus, not intractable  Stable, patient has not used Imitrex in a while, profile prescription placed for refills  - SUMAtriptan (IMITREX) 25 MG tablet; Profile rx,TAKE 1 TO 2 TABLETS BY MOUTH AT ONSET OF HEADACHE FOR MIGRAINE. MAY REPEAT DOSE IN 2 HOURS. MAX OF 200MG OR 2 DOSES IN 24 HOURS    Dyspepsia  ,Stable, continue Pepcid 20 mg twice a day and reflux  precautions  - famotidine (PEPCID) 20 MG tablet; Take 1 tablet (20 mg) by mouth 2 times daily    Visit for screening mammogram    - MA SCREENING DIGITAL BILAT - Future  (s+30); Future    Hyperlipidemia LDL goal <100  Is currently on Lipitor 10 mg daily  - ALT; Future  - Lipid panel reflex to direct LDL Fasting; Future    Review of the result(s) of each unique test - A1c  [id number]}     Work on weight loss  Regular exercise  Chart documentation done in part with Dragon Voice recognition Software. Although reviewed after completion, some word and grammatical error may remain.    See Patient Instructions    Windy Gordillo MD  [facility]    Shannan Son is a 53 year old, presenting for the following health issues:  Diabetes  Patient is here for a video visit instead of in person visit due to the current COVID-19 pandemic.        4/17/2023     6:52 AM   Additional Questions   Roomed by Samantha     History of Present Illness       Diabetes:   She presents for follow up of diabetes.  She is checking home blood glucose four or more times daily. She checks blood glucose before and after meals.  Blood glucose is never over 200 and never under 70. She is aware of hypoglycemia symptoms including weakness and other. She has no concerns regarding her diabetes at this time.  She is not experiencing numbness or burning in feet, excessive thirst, blurry vision, weight changes or redness, sores or blisters on feet.         She eats 2-3 servings of fruits and vegetables daily.She consumes 0 sweetened beverage(s) daily.She exercises with enough effort to increase her heart rate 20 to 29 minutes per day.  She exercises with enough effort to increase her heart rate 3 or less days per week.   She is taking medications regularly.       Diabetes Follow-up      How often are you checking your blood sugar?  3-4 times a day     what concerns do you have today about your diabetes? None and Other: Recent low blood  sugars     Do you have any of these symptoms? (Select all that apply)  No numbness or tingling in feet.  No redness, sores or blisters on feet.  No complaints of excessive thirst.  No reports of blurry vision.  No significant changes to weight.      BP Readings from Last 2 Encounters:   02/10/23 (!) 136/91   10/22/22 128/83     Hemoglobin A1C (%)   Date Value   04/17/2023 8.0 (H)   02/10/2023 13.0 (H)   11/30/2020 6.1 (H)   11/19/2019 5.5     LDL Cholesterol Calculated (mg/dL)   Date Value   03/11/2022 106 (H)   03/29/2021 120 (H)   02/18/2019 95         Hyperlipidemia Follow-Up      Are you regularly taking any medication or supplement to lower your cholesterol?   Yes- Lipitor    Are you having muscle aches or other side effects that you think could be caused by your cholesterol lowering medication?  No          Review of Systems   CONSTITUTIONAL: NEGATIVE for fever, chills, change in weight  RESP: NEGATIVE for significant cough or SOB  CV: NEGATIVE for chest pain, palpitations or peripheral edema  CV: Hx HTN  GI: NEGATIVE for nausea, abdominal pain, heartburn, or change in bowel habits and Hx GERD  MUSCULOSKELETAL: NEGATIVE for significant arthralgias or myalgia  NEURO: NEGATIVE for weakness, dizziness or paresthesias  History of migraines  ENDOCRINE: NEGATIVE for temperature intolerance, skin/hair changes and Hx diabetes  HEME/ALLERGY/IMMUNE: NEGATIVE for bleeding problems  PSYCHIATRIC: NEGATIVE for changes in mood or affect      Objective    Vitals - Patient Reported  Pain Score: No Pain (0)      Vitals:  No vitals were obtained today due to virtual visit.    Physical Exam   GENERAL: Healthy, alert and no distress  EYES: Eyes grossly normal to inspection  RESP: No audible wheeze, cough, or visible cyanosis.  No visible retractions or increased work of breathing.    NEURO: Cranial nerves grossly intact.  Mentation and speech appropriate for age.  PSYCH: Mentation appears normal, affect normal/bright, judgement  and insight intact, normal speech and appearance well-groomed.    Hemoglobin A1C   Date Value Ref Range Status   04/17/2023 8.0 (H) 0.0 - 5.6 % Final   11/30/2020 6.1 (H) 0 - 5.6 % Final     Comment:     Normal <5.7% Prediabetes 5.7-6.4%  Diabetes 6.5% or higher - adopted from ADA   consensus guidelines.                     Video-Visit Details    Type of service:  Video Visit     Originating Location (pt. Location): Home  Distant Location (provider location):  Off-site  Platform used for Video Visit: Delmi

## 2023-04-17 NOTE — RESULT ENCOUNTER NOTE
Kelby Son,  Your A1c is elevated at 8.0 but much improved from before  Let us continue with glimepiride 2 mg once a day, Victoza at current dose.  I would recommend to start back on the Lantus insulin at 7 units at bedtime.  Let us follow-up in 3 months for in office recheck at the time of annual physical along with previsit labs  You can schedule through online or by calling the clinic at .   Let me know if you have any questions. Take care.  Windy Gordillo MD

## 2023-04-21 ENCOUNTER — OFFICE VISIT (OUTPATIENT)
Dept: ENDOCRINOLOGY | Facility: CLINIC | Age: 53
End: 2023-04-21
Payer: COMMERCIAL

## 2023-04-21 VITALS
BODY MASS INDEX: 51.64 KG/M2 | DIASTOLIC BLOOD PRESSURE: 81 MMHG | OXYGEN SATURATION: 100 % | HEART RATE: 74 BPM | SYSTOLIC BLOOD PRESSURE: 121 MMHG | HEIGHT: 62 IN | WEIGHT: 280.6 LBS

## 2023-04-21 DIAGNOSIS — E66.01 CLASS 3 SEVERE OBESITY DUE TO EXCESS CALORIES WITH SERIOUS COMORBIDITY AND BODY MASS INDEX (BMI) OF 40.0 TO 44.9 IN ADULT (H): ICD-10-CM

## 2023-04-21 DIAGNOSIS — N18.31 TYPE 2 DIABETES MELLITUS WITH STAGE 3A CHRONIC KIDNEY DISEASE, WITHOUT LONG-TERM CURRENT USE OF INSULIN (H): Primary | ICD-10-CM

## 2023-04-21 DIAGNOSIS — E66.813 CLASS 3 SEVERE OBESITY DUE TO EXCESS CALORIES WITH SERIOUS COMORBIDITY AND BODY MASS INDEX (BMI) OF 40.0 TO 44.9 IN ADULT (H): ICD-10-CM

## 2023-04-21 DIAGNOSIS — E11.22 TYPE 2 DIABETES MELLITUS WITH STAGE 3A CHRONIC KIDNEY DISEASE, WITHOUT LONG-TERM CURRENT USE OF INSULIN (H): Primary | ICD-10-CM

## 2023-04-21 DIAGNOSIS — E11.65 UNCONTROLLED TYPE 2 DIABETES MELLITUS WITH HYPERGLYCEMIA (H): ICD-10-CM

## 2023-04-21 PROCEDURE — 99214 OFFICE O/P EST MOD 30 MIN: CPT | Performed by: PHYSICIAN ASSISTANT

## 2023-04-21 RX ORDER — DAPAGLIFLOZIN 10 MG/1
10 TABLET, FILM COATED ORAL DAILY
Qty: 90 TABLET | Refills: 0 | Status: SHIPPED | OUTPATIENT
Start: 2023-04-21 | End: 2023-07-24

## 2023-04-21 NOTE — PATIENT INSTRUCTIONS
[facility]-Department of Endocrinology  Diabetes Educators:   Di Duarte, RN and Mikayla Werner RN  Clinic Nurse: MELY Duque  CMA's: Amber RICHARDSONN: Shannan  Scheduling/Clinic phone number : [phone]   Clinic Fax: [phone]  On-Call Endocrine at the [place] (after hours/weekends): [phone] option 4    Please call the number below to schedule your labs.    [facility] [phone]   [facility] [phone]   [place] [phone]   [facility]  [phone]   [facility] [phone]   [place] [phone]   [facility]) [phone]   [facility] Walk-In Only   [place] [phone]   [place] [phone]   [place] [phone]   [place] [phone]     Please reach out to the following centers to schedule your imaging appointment:       Imaging (DEXA, CT, MRI, XRAY)    [facility] ([facility], [place]) [phone]   [facility] ([place], [place]) [phone]   [facility] ([facility]) [phone]   [facility] ([facility]) [phone]     Appointment Reminders:  * Please bring meter with for staff to download  * If you are due ONLY for an A1C, it is scheduled with the nurse and will be done in clinic. You do not need to schedule a lab appointment. Fasting is not required for an A1C.  * Refill request should be submitted to your pharmacy. They will contact clinic for approval.

## 2023-04-21 NOTE — PROGRESS NOTES
Assessment/Plan :   1. Type 2 DM, uncontrolled. We discussed Adelaida's recent laboratory results and her current medications. She was considering switching to Wegovy but she was unsure how the medication differed from Victoza. We had a discussion about Wegovy and how it may affect her blood sugars and her weight. We also discussed the impact of weight loss on blood sugars. We also reviewed other medication options. She would like to give the Wegovy a try. I think she can start at a higher dose and I will send in a new prescription for 0.5 mg weekly.    I am also concerned about the possibility of hypoglycemia while she is taking both Toujeo and glimepiride. I understand why her primary care provider wants her to continue on Toujeo but I am not sure that it is the best option, at this time. I would like to switch the glimepiride to Farxiga 10 mg daily. I will send in a new prescription today. We reviewed the possible adverse effects and if she has any problems, she is to contact my office. She is also to hold the Toujeo if her morning blood sugar is under 100 mg/dl.    2. Obesity. I encouraged her to follow-up with the weight management clinic. We also discussed the role of phentermine in weight loss. I reassured her that it will not affect her current diabetes medications.    We will follow-up in 1-2 mos.      I have independently reviewed and interpreted labs, imaging as indicated.      Chief complaint:  Adelaida is a 53 year old female who comes to our office for follow-up of Type 2 DM.    I have reviewed Care Everywhere including [facility], [facility], [facility], [facility], [facility], [facility] , [facility], [place] lab reports, imaging reports and provider notes as indicated.      HISTORY OF PRESENT ILLNESS  Adelaida was recently diagnosed with Type 2 DM. She went through a period of emotional distress around October of last year. She was not eating healthy and she ended up gaining a significant amount  of weight. She then developed worsening blurred vision, body aches, polydipsia and polyuria. She went in to and Urgent Care on Feb 10th due to these ongoing problems. They checked a hemoglobin A1C and it was very elevated at 13%. She does not recall ever having an A1C that was that elevated. She had a follow-up appt with her primary care provider and they restarted Victoza. Her blood sugars remained elevated and so her primary care provider added Toujeo U-300 at 15 units nightly. Eventually, glimiperide was also added twice daily.    Recently, Adelaida has continued to struggle with weight gain. Her blood sugars are improving and she has experienced a few low blood sugars. She cut the glimepiride back down to 1 tablet daily and she even stopped the Toujeo for a few days. She was using a CGMS sensor but she had a reaction to the adhesive so she is back to fingerstick testing. She is also working with the weight management team.     Her diabetes is complicated by stage 3 CKD, hyperlipidemia, hypertension, and obesity. She has noticed some worsening blurred vision. Her vision was really blurry when she was first diagnosed and now it seems to have gotten worse, as her blood sugars have dropped. She does not have any problems with neuropathy.    She is frustrated with her continuous weight gain. She is also really worried about hypoglycemia. She had a recent follow-up with her primary care provider and she was told to restart the Toujeo. Her blood sugars have improved but she is waking up with morning readings around 80 mg/dl. She has also had a few episodes where she feels shaky during the day.    Endocrine relevant labs are as follows:   Latest Reference Range & Units 04/17/23 08:30   Hemoglobin A1C 0.0 - 5.6 % 8.0 (H)   (H): Data is abnormally high     Latest Reference Range & Units 03/24/23 07:51   Albumin Urine mg/L mg/L <5        REVIEW OF SYSTEMS    Endocrine: positive for diabetes and obesity  Skin: negative  Eyes:  positive for visual blurring  Ears/Nose/Throat: negative  Respiratory: No shortness of breath, dyspnea on exertion, cough, or hemoptysis  Cardiovascular: negative for, chest pain, lower extremity edema and exercise intolerance  Gastrointestinal: negative for, nausea, vomiting, constipation and diarrhea  Genitourinary: negative for, nocturia, dysuria, frequency and urgency  Musculoskeletal: negative for, muscular weakness and nocturnal cramping  Neurologic: negative for and numbness or tingling of feet  Psychiatric: positive for excessive stress  Hematologic/Lymphatic/Immunologic: negative    Past Medical History  Past Medical History:   Diagnosis Date     Allergic rhinitis due to other allergen     seasonal     Arthritis      Diabetes (H)      Localized osteoarthritis of both knees      Migraine, unspecified, without mention of intractable migraine without mention of status migrainosus      Monoclonal gammopathy      Morbid obesity (H)      Type 2 diabetes mellitus with stage 3a chronic kidney disease, without long-term current use of insulin (H) 8/9/2016     Unspecified essential hypertension     dx around age 31       Medications  Current Outpatient Medications   Medication Sig Dispense Refill     acetaminophen (TYLENOL) 325 MG tablet Take 2 tablets (650 mg) by mouth every 4 hours as needed for other (mild pain) 100 tablet 0     amLODIPine (NORVASC) 5 MG tablet Take 1 tablet (5 mg) by mouth daily (Patient taking differently: Take 5 mg by mouth every evening) 90 tablet 3     aspirin (ASA) 81 MG EC tablet Take 1 tablet (81 mg) by mouth daily 90 tablet 3     atorvastatin (LIPITOR) 10 MG tablet Take 1 tablet (10 mg) by mouth every evening 90 tablet 3     blood glucose (NO BRAND SPECIFIED) lancets standard Use to test blood sugar 2 times daily or as directed. 200 lancet 3     blood glucose (NO BRAND SPECIFIED) lancets standard Use to test blood sugar 1-2 times daily or as directed. 200 each 3     blood glucose (NO  BRAND SPECIFIED) test strip Use to test blood sugar 2-3 times daily or as directed. 200 strip 3     blood glucose (NO BRAND SPECIFIED) test strip Use to test blood sugar 2 times daily or as directed. 200 strip 3     blood glucose monitoring (NO BRAND SPECIFIED) meter device kit Use to test blood sugar 1-2 times daily or as directed. 1 kit 0     cetirizine (ZYRTEC) 10 MG tablet Take 10 mg by mouth daily as needed for allergies       cholecalciferol 2000 UNITS CAPS Take 2,000 Units by mouth daily 90 capsule 3     dapagliflozin (FARXIGA) 10 MG TABS tablet Take 1 tablet (10 mg) by mouth daily 90 tablet 0     eplerenone (INSPRA) 50 MG tablet Take 2 tablets (100 mg) by mouth 2 times daily 360 tablet 3     famotidine (PEPCID) 20 MG tablet Take 1 tablet (20 mg) by mouth 2 times daily 180 tablet 1     Fluocinolone Acetonide Scalp (DERMA-SMOOTHE/FS SCALP) 0.01 % OIL oil Apply nightly to the scalp for the 6 weeks 60 mL 1     fluticasone (FLONASE) 50 MCG/ACT nasal spray INSTILL 1 SPRAY INTO BOTH NOSTRILS DAILY 16 mL 1     hydrOXYzine (ATARAX) 25 MG tablet Take 0.5-1 tablets (12.5-25 mg) by mouth nightly as needed for other (sleep) 30 tablet 3     insulin glargine U-300 (TOUJEO) 300 UNIT/ML (1 units dial) pen Inject 7 Units Subcutaneous At Bedtime Profile Rx, dose change 3 mL 1     labetalol (NORMODYNE) 300 MG tablet Take 1 tablet (300 mg) by mouth 2 times daily 180 tablet 3     liraglutide (VICTOZA) 18 MG/3ML solution Inject 3 mg Subcutaneous daily Dose change 45 mL 0     polyethylene glycol (MIRALAX) 17 g packet Take 17 g by mouth daily 7 packet 0     Semaglutide-Weight Management (WEGOVY) 0.5 MG/0.5ML pen Inject 0.5 mg Subcutaneous once a week 2 mL 1     SUMAtriptan (IMITREX) 25 MG tablet Profile rx,TAKE 1 TO 2 TABLETS BY MOUTH AT ONSET OF HEADACHE FOR MIGRAINE. MAY REPEAT DOSE IN 2 HOURS. MAX OF 200MG OR 2 DOSES IN 24 HOURS 18 tablet 1     Semaglutide-Weight Management (WEGOVY) 0.25 MG/0.5ML pen Inject 0.25 mg Subcutaneous  "once a week (Patient not taking: Reported on 4/21/2023) 2 mL 0       Allergies  Allergies   Allergen Reactions     Sulfa Drugs Shortness Of Breath and Rash     Doxycycline      Severe gastritis, nausea, vomiting-ended up in the ER     Hydrochlorothiazide W/Triamterene Other (See Comments)     Renal insuff     Maxzide [Hydrochlorothiazide W/Triamterene] Other (See Comments)     Renal insuff     Metoprolol Other (See Comments)     Other reaction(s): Bradycardia  At high dose only  At high dose only     Seasonal Allergies      Hayfever, tree pollens         Family History  family history includes Alcohol/Drug in her brother; Anesthesia Reaction in her mother; Arthritis in her father and paternal grandmother; Cancer in her maternal grandfather and maternal grandmother; Cerebrovascular Disease in her sister; Circulatory in her brother; Diabetes in her sister; Eye Disorder in her maternal grandfather; Gastrointestinal Disease in her mother; Glaucoma in her maternal grandfather; Gynecology in her mother; Heart Disease in her father; Hypertension in her father and mother; Lipids in her father; Neurologic Disorder in her mother; Obesity in her maternal grandmother; Osteoporosis in her mother; Prostate Cancer in her brother and maternal grandfather; Respiratory in her daughter; Thyroid Disease in her mother.    Social History  Social History     Tobacco Use     Smoking status: Never     Smokeless tobacco: Never   Vaping Use     Vaping status: Never Used   Substance Use Topics     Alcohol use: No     Drug use: No       Physical Exam  /81 (BP Location: Left arm, Patient Position: Sitting, Cuff Size: Adult Large)   Pulse 74   Ht 1.575 m (5' 2\")   Wt 127.3 kg (280 lb 9.6 oz)   LMP 08/13/2005   SpO2 100%   BMI 51.32 kg/m    Body mass index is 51.32 kg/m .  GENERAL :  In no apparent distress  SKIN: Normal color, normal temperature, texture.  No hirsutism, alopecia or purple striae.     EYES: PERRL, EOMI, No scleral " icterus,  No proptosis, conjunctival redness, stare, retraction  MOUTH: Moist, pink; pharynx clear  NECK: No visible masses. No palpable adenopathy, or masses. No carotid bruits.   THYROID:  Normal, nontender, smooth / firm texture,  no nodules, no Bruit   RESP: Lungs clear to auscultation bilaterally  CARDIAC: Regular rate and rhythm, normal S1 S2, without murmurs, rubs or gallops    NEURO: awake, alert, responds appropriately to questions.  Cranial nerves intact.   Moves all extremities; Gait normal.  No tremor of the outstretched hand.   EXTREMITIES: No clubbing, cyanosis or edema.    DATA REVIEW    Blood Sugar Log  4/21 89  4/20 86 101  4/19 98 101  4/18 142  4/17 164 103  4/16 112 104 95 103  4/15 149 95  4/14 111 130 93  4/13 140 115 103 110

## 2023-04-21 NOTE — NURSING NOTE
Adelaida Packer's goals for this visit include:   Chief Complaint   Patient presents with     Diabetes     She requests these members of her care team be copied on today's visit information: No    PCP: Windy Gordillo    Referring Provider:  No referring provider defined for this encounter.    /81 (BP Location: Left arm, Patient Position: Sitting, Cuff Size: Adult Large)   Pulse 74   Wt 127.3 kg (280 lb 9.6 oz)   LMP 08/13/2005   SpO2 100%   BMI 51.32 kg/m      Do you need any medication refills at today's visit? No

## 2023-04-21 NOTE — LETTER
4/21/2023         RE: Adelaida Packer  [address] [place] [address]        Dear Colleague,    Thank you for referring your patient, Adelaida Packer, to the [facility]. Please see a copy of my visit note below.    Outcome for 04/19/23 7:52 AM: See 4/17/23 vivihart to Dr. Marshall for up to date readings.  Outcome for 04/17/23 1:23 PM: [employer] message sent for meter readings.  Tawny Almonte, [facility]  Adult Endocrinology  [facility], [place]      Assessment/Plan :   1. Type 2 DM, uncontrolled. We discussed Adelaida's recent laboratory results and her current medications. She was considering switching to Wegovy but she was unsure how the medication differed from Victoza. We had a discussion about Wegovy and how it may affect her blood sugars and her weight. We also discussed the impact of weight loss on blood sugars. We also reviewed other medication options. She would like to give the Wegovy a try. I think she can start at a higher dose and I will send in a new prescription for 0.5 mg weekly.    I am also concerned about the possibility of hypoglycemia while she is taking both Toujeo and glimepiride. I understand why her primary care provider wants her to continue on Toujeo but I am not sure that it is the best option, at this time. I would like to switch the glimepiride to Farxiga 10 mg daily. I will send in a new prescription today. We reviewed the possible adverse effects and if she has any problems, she is to contact my office. She is also to hold the Toujeo if her morning blood sugar is under 100 mg/dl.    2. Obesity. I encouraged her to follow-up with the weight management clinic. We also discussed the role of phentermine in weight loss. I reassured her that it will not affect her current diabetes medications.    We will follow-up in 1-2 mos.      I have independently reviewed and interpreted labs, imaging as indicated.      Chief complaint:  Adelaida is a 53 year old  female who comes to our office for follow-up of Type 2 DM.    I have reviewed Care Everywhere including [facility], [facility], [facility], [facility], [facility], [facility] , [facility], [place] lab reports, imaging reports and provider notes as indicated.      HISTORY OF PRESENT ILLNESS  Adelaida was recently diagnosed with Type 2 DM. She went through a period of emotional distress around October of last year. She was not eating healthy and she ended up gaining a significant amount of weight. She then developed worsening blurred vision, body aches, polydipsia and polyuria. She went in to and Urgent Care on Feb 10th due to these ongoing problems. They checked a hemoglobin A1C and it was very elevated at 13%. She does not recall ever having an A1C that was that elevated. She had a follow-up appt with her primary care provider and they restarted Victoza. Her blood sugars remained elevated and so her primary care provider added Toujeo U-300 at 15 units nightly. Eventually, glimiperide was also added twice daily.    Recently, Adelaida has continued to struggle with weight gain. Her blood sugars are improving and she has experienced a few low blood sugars. She cut the glimepiride back down to 1 tablet daily and she even stopped the Toujeo for a few days. She was using a CGMS sensor but she had a reaction to the adhesive so she is back to fingerstick testing. She is also working with the weight management team.     Her diabetes is complicated by stage 3 CKD, hyperlipidemia, hypertension, and obesity. She has noticed some worsening blurred vision. Her vision was really blurry when she was first diagnosed and now it seems to have gotten worse, as her blood sugars have dropped. She does not have any problems with neuropathy.    She is frustrated with her continuous weight gain. She is also really worried about hypoglycemia. She had a recent follow-up with her primary care provider and she was told to restart the  Bruce. Her blood sugars have improved but she is waking up with morning readings around 80 mg/dl. She has also had a few episodes where she feels shaky during the day.    Endocrine relevant labs are as follows:   Latest Reference Range & Units 04/17/23 08:30   Hemoglobin A1C 0.0 - 5.6 % 8.0 (H)   (H): Data is abnormally high     Latest Reference Range & Units 03/24/23 07:51   Albumin Urine mg/L mg/L <5        REVIEW OF SYSTEMS    Endocrine: positive for diabetes and obesity  Skin: negative  Eyes: positive for visual blurring  Ears/Nose/Throat: negative  Respiratory: No shortness of breath, dyspnea on exertion, cough, or hemoptysis  Cardiovascular: negative for, chest pain, lower extremity edema and exercise intolerance  Gastrointestinal: negative for, nausea, vomiting, constipation and diarrhea  Genitourinary: negative for, nocturia, dysuria, frequency and urgency  Musculoskeletal: negative for, muscular weakness and nocturnal cramping  Neurologic: negative for and numbness or tingling of feet  Psychiatric: positive for excessive stress  Hematologic/Lymphatic/Immunologic: negative    Past Medical History  Past Medical History:   Diagnosis Date     Allergic rhinitis due to other allergen     seasonal     Arthritis      Diabetes (H)      Localized osteoarthritis of both knees      Migraine, unspecified, without mention of intractable migraine without mention of status migrainosus      Monoclonal gammopathy      Morbid obesity (H)      Type 2 diabetes mellitus with stage 3a chronic kidney disease, without long-term current use of insulin (H) 8/9/2016     Unspecified essential hypertension     dx around age 31       Medications  Current Outpatient Medications   Medication Sig Dispense Refill     acetaminophen (TYLENOL) 325 MG tablet Take 2 tablets (650 mg) by mouth every 4 hours as needed for other (mild pain) 100 tablet 0     amLODIPine (NORVASC) 5 MG tablet Take 1 tablet (5 mg) by mouth daily (Patient taking  differently: Take 5 mg by mouth every evening) 90 tablet 3     aspirin (ASA) 81 MG EC tablet Take 1 tablet (81 mg) by mouth daily 90 tablet 3     atorvastatin (LIPITOR) 10 MG tablet Take 1 tablet (10 mg) by mouth every evening 90 tablet 3     blood glucose (NO BRAND SPECIFIED) lancets standard Use to test blood sugar 2 times daily or as directed. 200 lancet 3     blood glucose (NO BRAND SPECIFIED) lancets standard Use to test blood sugar 1-2 times daily or as directed. 200 each 3     blood glucose (NO BRAND SPECIFIED) test strip Use to test blood sugar 2-3 times daily or as directed. 200 strip 3     blood glucose (NO BRAND SPECIFIED) test strip Use to test blood sugar 2 times daily or as directed. 200 strip 3     blood glucose monitoring (NO BRAND SPECIFIED) meter device kit Use to test blood sugar 1-2 times daily or as directed. 1 kit 0     cetirizine (ZYRTEC) 10 MG tablet Take 10 mg by mouth daily as needed for allergies       cholecalciferol 2000 UNITS CAPS Take 2,000 Units by mouth daily 90 capsule 3     dapagliflozin (FARXIGA) 10 MG TABS tablet Take 1 tablet (10 mg) by mouth daily 90 tablet 0     eplerenone (INSPRA) 50 MG tablet Take 2 tablets (100 mg) by mouth 2 times daily 360 tablet 3     famotidine (PEPCID) 20 MG tablet Take 1 tablet (20 mg) by mouth 2 times daily 180 tablet 1     Fluocinolone Acetonide Scalp (DERMA-SMOOTHE/FS SCALP) 0.01 % OIL oil Apply nightly to the scalp for the 6 weeks 60 mL 1     fluticasone (FLONASE) 50 MCG/ACT nasal spray INSTILL 1 SPRAY INTO BOTH NOSTRILS DAILY 16 mL 1     hydrOXYzine (ATARAX) 25 MG tablet Take 0.5-1 tablets (12.5-25 mg) by mouth nightly as needed for other (sleep) 30 tablet 3     insulin glargine U-300 (TOUJEO) 300 UNIT/ML (1 units dial) pen Inject 7 Units Subcutaneous At Bedtime Profile Rx, dose change 3 mL 1     labetalol (NORMODYNE) 300 MG tablet Take 1 tablet (300 mg) by mouth 2 times daily 180 tablet 3     liraglutide (VICTOZA) 18 MG/3ML solution Inject 3 mg  Subcutaneous daily Dose change 45 mL 0     polyethylene glycol (MIRALAX) 17 g packet Take 17 g by mouth daily 7 packet 0     Semaglutide-Weight Management (WEGOVY) 0.5 MG/0.5ML pen Inject 0.5 mg Subcutaneous once a week 2 mL 1     SUMAtriptan (IMITREX) 25 MG tablet Profile rx,TAKE 1 TO 2 TABLETS BY MOUTH AT ONSET OF HEADACHE FOR MIGRAINE. MAY REPEAT DOSE IN 2 HOURS. MAX OF 200MG OR 2 DOSES IN 24 HOURS 18 tablet 1     Semaglutide-Weight Management (WEGOVY) 0.25 MG/0.5ML pen Inject 0.25 mg Subcutaneous once a week (Patient not taking: Reported on 4/21/2023) 2 mL 0       Allergies  Allergies   Allergen Reactions     Sulfa Drugs Shortness Of Breath and Rash     Doxycycline      Severe gastritis, nausea, vomiting-ended up in the ER     Hydrochlorothiazide W/Triamterene Other (See Comments)     Renal insuff     Maxzide [Hydrochlorothiazide W/Triamterene] Other (See Comments)     Renal insuff     Metoprolol Other (See Comments)     Other reaction(s): Bradycardia  At high dose only  At high dose only     Seasonal Allergies      Hayfever, tree pollens         Family History  family history includes Alcohol/Drug in her brother; Anesthesia Reaction in her mother; Arthritis in her father and paternal grandmother; Cancer in her maternal grandfather and maternal grandmother; Cerebrovascular Disease in her sister; Circulatory in her brother; Diabetes in her sister; Eye Disorder in her maternal grandfather; Gastrointestinal Disease in her mother; Glaucoma in her maternal grandfather; Gynecology in her mother; Heart Disease in her father; Hypertension in her father and mother; Lipids in her father; Neurologic Disorder in her mother; Obesity in her maternal grandmother; Osteoporosis in her mother; Prostate Cancer in her brother and maternal grandfather; Respiratory in her daughter; Thyroid Disease in her mother.    Social History  Social History     Tobacco Use     Smoking status: Never     Smokeless tobacco: Never   Vaping Use      "Vaping status: Never Used   Substance Use Topics     Alcohol use: No     Drug use: No       Physical Exam  /81 (BP Location: Left arm, Patient Position: Sitting, Cuff Size: Adult Large)   Pulse 74   Ht 1.575 m (5' 2\")   Wt 127.3 kg (280 lb 9.6 oz)   LMP 08/13/2005   SpO2 100%   BMI 51.32 kg/m    Body mass index is 51.32 kg/m .  GENERAL :  In no apparent distress  SKIN: Normal color, normal temperature, texture.  No hirsutism, alopecia or purple striae.     EYES: PERRL, EOMI, No scleral icterus,  No proptosis, conjunctival redness, stare, retraction  MOUTH: Moist, pink; pharynx clear  NECK: No visible masses. No palpable adenopathy, or masses. No carotid bruits.   THYROID:  Normal, nontender, smooth / firm texture,  no nodules, no Bruit   RESP: Lungs clear to auscultation bilaterally  CARDIAC: Regular rate and rhythm, normal S1 S2, without murmurs, rubs or gallops    NEURO: awake, alert, responds appropriately to questions.  Cranial nerves intact.   Moves all extremities; Gait normal.  No tremor of the outstretched hand.   EXTREMITIES: No clubbing, cyanosis or edema.    DATA REVIEW    Blood Sugar Log  4/21 89  4/20 86 101  4/19 98 101  4/18 142  4/17 164 103  4/16 112 104 95 103  4/15 149 95  4/14 111 130 93  4/13 140 115 103 110    Again, thank you for allowing me to participate in the care of your patient.        Sincerely,        Dee Tellez PA-C    "

## 2023-04-26 ENCOUNTER — THERAPY VISIT (OUTPATIENT)
Dept: PHYSICAL THERAPY | Facility: CLINIC | Age: 53
End: 2023-04-26
Payer: COMMERCIAL

## 2023-04-26 DIAGNOSIS — R60.0 LOCALIZED EDEMA: ICD-10-CM

## 2023-04-26 DIAGNOSIS — M25.562 CHRONIC PAIN OF LEFT KNEE: ICD-10-CM

## 2023-04-26 DIAGNOSIS — Z96.652 S/P TOTAL KNEE ARTHROPLASTY, LEFT: ICD-10-CM

## 2023-04-26 DIAGNOSIS — Z47.1 AFTERCARE FOLLOWING LEFT KNEE JOINT REPLACEMENT SURGERY: Primary | ICD-10-CM

## 2023-04-26 DIAGNOSIS — G89.29 CHRONIC PAIN OF LEFT KNEE: ICD-10-CM

## 2023-04-26 DIAGNOSIS — Z96.652 AFTERCARE FOLLOWING LEFT KNEE JOINT REPLACEMENT SURGERY: Primary | ICD-10-CM

## 2023-04-26 PROCEDURE — 97110 THERAPEUTIC EXERCISES: CPT | Mod: GP | Performed by: PHYSICAL THERAPIST

## 2023-04-26 ASSESSMENT — ACTIVITIES OF DAILY LIVING (ADL)
GO UP STAIRS: ACTIVITY IS MINIMALLY DIFFICULT
LIMPING: I DO NOT HAVE THE SYMPTOM
GIVING WAY, BUCKLING OR SHIFTING OF KNEE: THE SYMPTOM AFFECTS MY ACTIVITY SLIGHTLY
SIT WITH YOUR KNEE BENT: ACTIVITY IS NOT DIFFICULT
KNEE_ACTIVITY_OF_DAILY_LIVING_SCORE: 78.57
KNEEL ON THE FRONT OF YOUR KNEE: ACTIVITY IS FAIRLY DIFFICULT
SQUAT: ACTIVITY IS NOT DIFFICULT
WALK: ACTIVITY IS NOT DIFFICULT
KNEE_ACTIVITY_OF_DAILY_LIVING_SUM: 55
RAW_SCORE: 55
STIFFNESS: THE SYMPTOM AFFECTS MY ACTIVITY SLIGHTLY
AS_A_RESULT_OF_YOUR_KNEE_INJURY,_HOW_WOULD_YOU_RATE_YOUR_CURRENT_LEVEL_OF_DAILY_ACTIVITY?: NEARLY NORMAL
WEAKNESS: THE SYMPTOM AFFECTS MY ACTIVITY SLIGHTLY
RISE FROM A CHAIR: ACTIVITY IS NOT DIFFICULT
SWELLING: I HAVE THE SYMPTOM BUT IT DOES NOT AFFECT MY ACTIVITY
PAIN: THE SYMPTOM AFFECTS MY ACTIVITY SLIGHTLY
HOW_WOULD_YOU_RATE_THE_OVERALL_FUNCTION_OF_YOUR_KNEE_DURING_YOUR_USUAL_DAILY_ACTIVITIES?: NEARLY NORMAL
STAND: ACTIVITY IS MINIMALLY DIFFICULT
GO DOWN STAIRS: ACTIVITY IS MINIMALLY DIFFICULT
HOW_WOULD_YOU_RATE_THE_CURRENT_FUNCTION_OF_YOUR_KNEE_DURING_YOUR_USUAL_DAILY_ACTIVITIES_ON_A_SCALE_FROM_0_TO_100_WITH_100_BEING_YOUR_LEVEL_OF_KNEE_FUNCTION_PRIOR_TO_YOUR_INJURY_AND_0_BEING_THE_INABILITY_TO_PERFORM_ANY_OF_YOUR_USUAL_DAILY_ACTIVITIES?: 80

## 2023-05-05 ENCOUNTER — MYC MEDICAL ADVICE (OUTPATIENT)
Dept: ENDOCRINOLOGY | Facility: CLINIC | Age: 53
End: 2023-05-05
Payer: COMMERCIAL

## 2023-05-05 DIAGNOSIS — E66.01 MORBID OBESITY (H): Primary | ICD-10-CM

## 2023-05-08 ENCOUNTER — MYC MEDICAL ADVICE (OUTPATIENT)
Dept: ENDOCRINOLOGY | Facility: CLINIC | Age: 53
End: 2023-05-08
Payer: COMMERCIAL

## 2023-05-08 DIAGNOSIS — E66.813 CLASS 3 SEVERE OBESITY DUE TO EXCESS CALORIES WITH SERIOUS COMORBIDITY AND BODY MASS INDEX (BMI) OF 40.0 TO 44.9 IN ADULT (H): ICD-10-CM

## 2023-05-08 DIAGNOSIS — E11.65 UNCONTROLLED TYPE 2 DIABETES MELLITUS WITH HYPERGLYCEMIA (H): Primary | ICD-10-CM

## 2023-05-08 DIAGNOSIS — E66.01 CLASS 3 SEVERE OBESITY DUE TO EXCESS CALORIES WITH SERIOUS COMORBIDITY AND BODY MASS INDEX (BMI) OF 40.0 TO 44.9 IN ADULT (H): ICD-10-CM

## 2023-05-11 ENCOUNTER — TELEPHONE (OUTPATIENT)
Dept: FAMILY MEDICINE | Facility: CLINIC | Age: 53
End: 2023-05-11
Payer: COMMERCIAL

## 2023-05-11 NOTE — TELEPHONE ENCOUNTER
Forms/Letter Request    Type of form/letter: [employer]    Have you been seen for this request: N/A    Do we have the form/letter: Yes: Will place form on providers desk for review/signature    When is form/letter needed by: asap    How would you like the form/letter returned: Fax : [phone]

## 2023-05-15 ENCOUNTER — MEDICAL CORRESPONDENCE (OUTPATIENT)
Dept: HEALTH INFORMATION MANAGEMENT | Facility: CLINIC | Age: 53
End: 2023-05-15
Payer: COMMERCIAL

## 2023-05-15 NOTE — TELEPHONE ENCOUNTER
Forms were found in my desk only today  Forms completed, signed, left at completed bin for MA staff to fax

## 2023-05-15 NOTE — TELEPHONE ENCOUNTER
[employer] forms signed by provider will be faxed to [phone]    Adelaida Bonilla Facilitator

## 2023-05-17 RX ORDER — NALTREXONE HYDROCHLORIDE 50 MG/1
TABLET, FILM COATED ORAL
Qty: 30 TABLET | Refills: 3 | Status: SHIPPED | OUTPATIENT
Start: 2023-05-17 | End: 2023-07-07

## 2023-05-24 ENCOUNTER — ANCILLARY PROCEDURE (OUTPATIENT)
Dept: MAMMOGRAPHY | Facility: CLINIC | Age: 53
End: 2023-05-24
Attending: FAMILY MEDICINE
Payer: COMMERCIAL

## 2023-05-24 DIAGNOSIS — Z12.31 VISIT FOR SCREENING MAMMOGRAM: ICD-10-CM

## 2023-05-24 PROCEDURE — 77063 BREAST TOMOSYNTHESIS BI: CPT | Mod: GC | Performed by: STUDENT IN AN ORGANIZED HEALTH CARE EDUCATION/TRAINING PROGRAM

## 2023-05-24 PROCEDURE — 77067 SCR MAMMO BI INCL CAD: CPT | Mod: GC | Performed by: STUDENT IN AN ORGANIZED HEALTH CARE EDUCATION/TRAINING PROGRAM

## 2023-06-08 DIAGNOSIS — N18.30 CKD (CHRONIC KIDNEY DISEASE) STAGE 3, GFR 30-59 ML/MIN (H): ICD-10-CM

## 2023-06-08 RX ORDER — AMLODIPINE BESYLATE 5 MG/1
5 TABLET ORAL DAILY
Qty: 90 TABLET | Refills: 3 | Status: SHIPPED | OUTPATIENT
Start: 2023-06-08 | End: 2024-01-10

## 2023-06-08 NOTE — TELEPHONE ENCOUNTER
Refill for amLODIPine (NORVASC) 5 MG tablet Sent to patient's pharmacy. Patient's last visit with Dr Aleman was 3/38/23. Patient has a follow up scheduled with Dr Aleman for 3/26/24.  MELY Razo

## 2023-06-09 DIAGNOSIS — E66.01 CLASS 3 SEVERE OBESITY DUE TO EXCESS CALORIES WITH SERIOUS COMORBIDITY AND BODY MASS INDEX (BMI) OF 40.0 TO 44.9 IN ADULT (H): ICD-10-CM

## 2023-06-09 DIAGNOSIS — E66.813 CLASS 3 SEVERE OBESITY DUE TO EXCESS CALORIES WITH SERIOUS COMORBIDITY AND BODY MASS INDEX (BMI) OF 40.0 TO 44.9 IN ADULT (H): ICD-10-CM

## 2023-06-13 DIAGNOSIS — E66.01 CLASS 3 SEVERE OBESITY DUE TO EXCESS CALORIES WITH SERIOUS COMORBIDITY AND BODY MASS INDEX (BMI) OF 40.0 TO 44.9 IN ADULT (H): ICD-10-CM

## 2023-06-13 DIAGNOSIS — E66.813 CLASS 3 SEVERE OBESITY DUE TO EXCESS CALORIES WITH SERIOUS COMORBIDITY AND BODY MASS INDEX (BMI) OF 40.0 TO 44.9 IN ADULT (H): ICD-10-CM

## 2023-06-14 ENCOUNTER — VIRTUAL VISIT (OUTPATIENT)
Dept: ENDOCRINOLOGY | Facility: CLINIC | Age: 53
End: 2023-06-14
Payer: COMMERCIAL

## 2023-06-14 VITALS — WEIGHT: 275 LBS | BODY MASS INDEX: 50.61 KG/M2 | HEIGHT: 62 IN

## 2023-06-14 DIAGNOSIS — E66.01 CLASS 3 SEVERE OBESITY DUE TO EXCESS CALORIES WITH SERIOUS COMORBIDITY AND BODY MASS INDEX (BMI) OF 40.0 TO 44.9 IN ADULT (H): Primary | ICD-10-CM

## 2023-06-14 DIAGNOSIS — E66.813 CLASS 3 SEVERE OBESITY DUE TO EXCESS CALORIES WITH SERIOUS COMORBIDITY AND BODY MASS INDEX (BMI) OF 40.0 TO 44.9 IN ADULT (H): Primary | ICD-10-CM

## 2023-06-14 PROCEDURE — 99213 OFFICE O/P EST LOW 20 MIN: CPT | Mod: VID

## 2023-06-14 ASSESSMENT — PAIN SCALES - GENERAL: PAINLEVEL: NO PAIN (0)

## 2023-06-14 NOTE — NURSING NOTE
Is the patient currently in the state of [place]? YES    Visit mode:VIDEO    If the visit is dropped, the patient can be reconnected by: TELEPHONE VISIT: Phone number: [phone]    Will anyone else be joining the visit? NO      How would you like to obtain your AVS? MyChart    Are changes needed to the allergy or medication list? NO    Pt states no changes to meds/allergies.    Reason for visit: NATI Kelsey on 6/14/2023 at 7:41 AM

## 2023-06-14 NOTE — PROGRESS NOTES
"Virtual Visit Details    Type of service:  Video Visit     Originating Location (pt. Location): {video visit patient location:379997::\"Home\"}  {PROVIDER LOCATION On-site should be selected for visits conducted from your clinic location or adjoining [facility] hospital, academic office, or other nearby [facility] building. Off-site should be selected for all other provider locations, including home:[id number]}  Distant Location (provider location):  {virtual location provider:099759}  Platform used for Video Visit: {Virtual Visit Platforms:281942::\"Next Generation Dance\"}  "

## 2023-06-14 NOTE — PROGRESS NOTES
Virtual Visit Details    Type of service:  Video Visit     Originating Location (pt. Location): Home    Distant Location (provider location):  Off-site  Platform used for Video Visit: [facility] Medical Weight Management Note     Adelaida Packer  MRN:  [id number]  :  1970  JUSTUS:  2023    Dear Windy Gordillo MD,    I had the pleasure of seeing your patient Adelaida Packer. She is a 53 year old female who I am continuing to see for treatment of obesity related to:        6/15/2021     2:14 AM   --   I have the following health issues associated with obesity Pre-Diabetes    High Blood Pressure    Osteoarthritis (joint disease)   I have the following symptoms associated with obesity Knee Pain    Hip Pain       Assessment & Plan   Problem List Items Addressed This Visit        Digestive    Class 3 severe obesity due to excess calories with serious comorbidity and body mass index (BMI) of 40.0 to 44.9 in adult (H) - Primary      1. Activity goal - going to the gym 2xweek   2. Increase Wegovy 1.7mg once weekly. Prescribed by endo   3. Hold Naltrexone for now. Consider restarting in the future.   4. Follow up with Yecenia Rivera in 2-3 months   5. Continue 24 week program with Carrie Ness, health  and KATARINA Gauthier     INTERVAL HISTORY:  [facility] - 6/15/2021  Last seen by Dr. Mitchell- 2022  Ozempic - heartburn and nausea  Phentermine - discontinued due to surgery.     Started 24 week program.     Anti-obesity medications:     Current:   Naltrexone 50mg - took 1/2 tablet once in the afternoon and got too sleepy. Stopped due to needing to finish up the school year. No other side effects. Unsure if helpful.     Wegovy 1.0mg - Decided to start after further discussion with endo. Has completed 4 weeks on this dose, please to dose increase to 1.7mg this Saturday. Not having the same side effects as she did with ozempic. Some mild heartburn on the 0.5mg dose, but none currently. No side effects. Some help with  hunger, but not significant. .     Failed/contraindicated:   Phentermine - contraindicated due to history of CKD and pulmonary HTN. Discussed with nephrologist who advised against starting. Previously trialed with side effects of dry mouth. Unsure if helped with weight.   Topiramate - side effects of numbness and tingling. No effect on weight    Recent diet changes: Decrease in portion sizes, but is having hunger 2 hours after. Keeps nuts, protein bars, and beef sticks available for when she is. Finds that she stays full longer when she has protein. Still having sugar cravings. Has been having vinegar with water, that has been helping.      Recent exercise/activity changes: Has a life time membership. Trying to get back into the gym schedule. Was too hard during the school year.     Recent stressors:  Has had some increase stress at the end of the school year. Passing of a good friend. 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 Continues to check BS fasting 110-120. Does not have a CGM. Just taking Wegovy. No longer on farxiga or insulin. Continue to follow with endocrine     CURRENT WEIGHT:   275 lbs 0 oz    Initial Weight (lbs): 244 lbs  Last Visits Weight: 123.4 kg (272 lb)  Cumulative weight loss (lbs): -31  Weight Loss Percentage: -12.7%        6/14/2023     7:45 AM   Changes and Difficulties   I have made the following changes to my diet since my last visit: same   With regards to my diet, I am still struggling with: portion control and sugar   I have made the following changes to my activity/exercise since my last visit: decreased due to work   With regards to my activity/exercise, I am still struggling with: getting it in daily         MEDICATIONS:   Current Outpatient Medications   Medication Sig Dispense Refill     acetaminophen (TYLENOL) 325 MG tablet Take 2 tablets (650 mg) by mouth every 4 hours as needed  for other (mild pain) 100 tablet 0     amLODIPine (NORVASC) 5 MG tablet Take 1 tablet (5 mg) by mouth daily 90 tablet 3     aspirin (ASA) 81 MG EC tablet Take 1 tablet (81 mg) by mouth daily 90 tablet 3     atorvastatin (LIPITOR) 10 MG tablet Take 1 tablet (10 mg) by mouth every evening 90 tablet 3     blood glucose (NO BRAND SPECIFIED) lancets standard Use to test blood sugar 2 times daily or as directed. 200 lancet 3     blood glucose (NO BRAND SPECIFIED) lancets standard Use to test blood sugar 1-2 times daily or as directed. 200 each 3     blood glucose (NO BRAND SPECIFIED) test strip Use to test blood sugar 2-3 times daily or as directed. 200 strip 3     blood glucose (NO BRAND SPECIFIED) test strip Use to test blood sugar 2 times daily or as directed. 200 strip 3     blood glucose monitoring (NO BRAND SPECIFIED) meter device kit Use to test blood sugar 1-2 times daily or as directed. 1 kit 0     cetirizine (ZYRTEC) 10 MG tablet Take 10 mg by mouth daily as needed for allergies       cholecalciferol 2000 UNITS CAPS Take 2,000 Units by mouth daily 90 capsule 3     dapagliflozin (FARXIGA) 10 MG TABS tablet Take 1 tablet (10 mg) by mouth daily 90 tablet 0     eplerenone (INSPRA) 50 MG tablet Take 2 tablets (100 mg) by mouth 2 times daily 360 tablet 3     famotidine (PEPCID) 20 MG tablet Take 1 tablet (20 mg) by mouth 2 times daily 180 tablet 1     Fluocinolone Acetonide Scalp (DERMA-SMOOTHE/FS SCALP) 0.01 % OIL oil Apply nightly to the scalp for the 6 weeks 60 mL 1     fluticasone (FLONASE) 50 MCG/ACT nasal spray INSTILL 1 SPRAY INTO BOTH NOSTRILS DAILY 16 mL 1     hydrOXYzine (ATARAX) 25 MG tablet Take 0.5-1 tablets (12.5-25 mg) by mouth nightly as needed for other (sleep) 30 tablet 3     insulin glargine U-300 (TOUJEO) 300 UNIT/ML (1 units dial) pen Inject 7 Units Subcutaneous At Bedtime Profile Rx, dose change 3 mL 1     labetalol (NORMODYNE) 300 MG tablet Take 1 tablet (300 mg) by mouth 2 times daily 180  "tablet 3     naltrexone (DEPADE/REVIA) 50 MG tablet Take 1/2 tablet once daily 1-2 hours prior to worst cravings for 1 week, then increase to 1 tablet daily as directed if tolerating 30 tablet 3     polyethylene glycol (MIRALAX) 17 g packet Take 17 g by mouth daily 7 packet 0     Semaglutide-Weight Management (WEGOVY) 1 MG/0.5ML pen Inject 1 mg Subcutaneous once a week 2 mL 1     Semaglutide-Weight Management (WEGOVY) 1.7 MG/0.75ML pen Inject 1.7 mg Subcutaneous once a week 3 mL 0     SUMAtriptan (IMITREX) 25 MG tablet Profile rx,TAKE 1 TO 2 TABLETS BY MOUTH AT ONSET OF HEADACHE FOR MIGRAINE. MAY REPEAT DOSE IN 2 HOURS. MAX OF 200MG OR 2 DOSES IN 24 HOURS 18 tablet 1           6/14/2023     7:45 AM   Weight Loss Medication History Reviewed With Patient   Which weight loss medications are you currently taking on a regular basis? Wegovy   If you are not taking a weight loss medication that was prescribed to you, please indicate why: I did not like the side effects    Other   Are you having any side effects from the weight loss medication that we have prescribed you? Yes   If you are having side effects please describe: sleepiness-struggled to drive home safely             Objective    Ht 1.575 m (5' 2.01\")   Wt 124.7 kg (275 lb)   LMP 08/13/2005   BMI 50.29 kg/m             Vitals:  No vitals were obtained today due to virtual visit.  PHYSICAL EXAM:  GENERAL: Healthy, alert and no distress  EYES: Eyes grossly normal to inspection.  No discharge or erythema, or obvious scleral/conjunctival abnormalities.  RESP: No audible wheeze, cough, or visible cyanosis.  No visible retractions or increased work of breathing.    SKIN: Visible skin clear. No significant rash, abnormal pigmentation or lesions.  NEURO: Cranial nerves grossly intact.  Mentation and speech appropriate for age.  PSYCH: Mentation appears normal, affect normal/bright, judgement and insight intact, normal speech and appearance " well-groomed.        Sincerely,    LOLI PUTNAM PA-C      26 minutes spent by me on the date of the encounter doing chart review, history and exam, documentation and further activities per the note

## 2023-06-14 NOTE — LETTER
2023       RE: Adelaida Packer  [id number] [place] [address]     Dear Colleague,    Thank you for referring your patient, Adelaida Packer, to the [facility] [place] at [facility]. Please see a copy of my visit note below.    Virtual Visit Details    Type of service:  Video Visit     Originating Location (pt. Location): Home    Distant Location (provider location):  Off-site  Platform used for Video Visit: [facility] Medical Weight Management Note     Adelaida Packer  MRN:  [id number]  :  1970  JUSTUS:  2023    Dear Windy Gordillo MD,    I had the pleasure of seeing your patient Adelaida Packer. She is a 53 year old female who I am continuing to see for treatment of obesity related to:        6/15/2021     2:14 AM   --   I have the following health issues associated with obesity Pre-Diabetes    High Blood Pressure    Osteoarthritis (joint disease)   I have the following symptoms associated with obesity Knee Pain    Hip Pain       Assessment & Plan   Problem List Items Addressed This Visit          Digestive    Class 3 severe obesity due to excess calories with serious comorbidity and body mass index (BMI) of 40.0 to 44.9 in adult (H) - Primary      Activity goal - going to the gym 2xweek   Increase Wegovy 1.7mg once weekly. Prescribed by endo   Hold Naltrexone for now. Consider restarting in the future.   Follow up with Yecenia Rivera in 2-3 months   Continue 24 week program with Carrie Ness, health  and KATARINA Gauthier     INTERVAL HISTORY:  [facility] - 6/15/2021  Last seen by Dr. Mitchell- 2022  Ozempic - heartburn and nausea  Phentermine - discontinued due to surgery.     Started 24 week program.     Anti-obesity medications:     Current:   Naltrexone 50mg - took 1/2 tablet once in the afternoon and got too sleepy. Stopped due to needing to finish up the school year. No other side effects.  Unsure if helpful.     Wegovy 1.0mg - Decided to start after further discussion with endo. Has completed 4 weeks on this dose, please to dose increase to 1.7mg this Saturday. Not having the same side effects as she did with ozempic. Some mild heartburn on the 0.5mg dose, but none currently. No side effects. Some help with hunger, but not significant. .     Failed/contraindicated:   Phentermine - contraindicated due to history of CKD and pulmonary HTN. Discussed with nephrologist who advised against starting. Previously trialed with side effects of dry mouth. Unsure if helped with weight.   Topiramate - side effects of numbness and tingling. No effect on weight    Recent diet changes: Decrease in portion sizes, but is having hunger 2 hours after. Keeps nuts, protein bars, and beef sticks available for when she is. Finds that she stays full longer when she has protein. Still having sugar cravings. Has been having vinegar with water, that has been helping.      Recent exercise/activity changes: Has a life time membership. Trying to get back into the gym schedule. Was too hard during the school year.     Recent stressors:  Has had some increase stress at the end of the school year. Passing of a good friend. 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 Continues to check BS fasting 110-120. Does not have a CGM. Just taking Wegovy. No longer on farxiga or insulin. Continue to follow with endocrine     CURRENT WEIGHT:   275 lbs 0 oz    Initial Weight (lbs): 244 lbs  Last Visits Weight: 123.4 kg (272 lb)  Cumulative weight loss (lbs): -31  Weight Loss Percentage: -12.7%        6/14/2023     7:45 AM   Changes and Difficulties   I have made the following changes to my diet since my last visit: same   With regards to my diet, I am still struggling with: portion control and sugar   I have made the following changes to my  activity/exercise since my last visit: decreased due to work   With regards to my activity/exercise, I am still struggling with: getting it in daily         MEDICATIONS:   Current Outpatient Medications   Medication Sig Dispense Refill    acetaminophen (TYLENOL) 325 MG tablet Take 2 tablets (650 mg) by mouth every 4 hours as needed for other (mild pain) 100 tablet 0    amLODIPine (NORVASC) 5 MG tablet Take 1 tablet (5 mg) by mouth daily 90 tablet 3    aspirin (ASA) 81 MG EC tablet Take 1 tablet (81 mg) by mouth daily 90 tablet 3    atorvastatin (LIPITOR) 10 MG tablet Take 1 tablet (10 mg) by mouth every evening 90 tablet 3    blood glucose (NO BRAND SPECIFIED) lancets standard Use to test blood sugar 2 times daily or as directed. 200 lancet 3    blood glucose (NO BRAND SPECIFIED) lancets standard Use to test blood sugar 1-2 times daily or as directed. 200 each 3    blood glucose (NO BRAND SPECIFIED) test strip Use to test blood sugar 2-3 times daily or as directed. 200 strip 3    blood glucose (NO BRAND SPECIFIED) test strip Use to test blood sugar 2 times daily or as directed. 200 strip 3    blood glucose monitoring (NO BRAND SPECIFIED) meter device kit Use to test blood sugar 1-2 times daily or as directed. 1 kit 0    cetirizine (ZYRTEC) 10 MG tablet Take 10 mg by mouth daily as needed for allergies      cholecalciferol 2000 UNITS CAPS Take 2,000 Units by mouth daily 90 capsule 3    dapagliflozin (FARXIGA) 10 MG TABS tablet Take 1 tablet (10 mg) by mouth daily 90 tablet 0    eplerenone (INSPRA) 50 MG tablet Take 2 tablets (100 mg) by mouth 2 times daily 360 tablet 3    famotidine (PEPCID) 20 MG tablet Take 1 tablet (20 mg) by mouth 2 times daily 180 tablet 1    Fluocinolone Acetonide Scalp (DERMA-SMOOTHE/FS SCALP) 0.01 % OIL oil Apply nightly to the scalp for the 6 weeks 60 mL 1    fluticasone (FLONASE) 50 MCG/ACT nasal spray INSTILL 1 SPRAY INTO BOTH NOSTRILS DAILY 16 mL 1    hydrOXYzine (ATARAX) 25 MG tablet  "Take 0.5-1 tablets (12.5-25 mg) by mouth nightly as needed for other (sleep) 30 tablet 3    insulin glargine U-300 (TOUJEO) 300 UNIT/ML (1 units dial) pen Inject 7 Units Subcutaneous At Bedtime Profile Rx, dose change 3 mL 1    labetalol (NORMODYNE) 300 MG tablet Take 1 tablet (300 mg) by mouth 2 times daily 180 tablet 3    naltrexone (DEPADE/REVIA) 50 MG tablet Take 1/2 tablet once daily 1-2 hours prior to worst cravings for 1 week, then increase to 1 tablet daily as directed if tolerating 30 tablet 3    polyethylene glycol (MIRALAX) 17 g packet Take 17 g by mouth daily 7 packet 0    Semaglutide-Weight Management (WEGOVY) 1 MG/0.5ML pen Inject 1 mg Subcutaneous once a week 2 mL 1    Semaglutide-Weight Management (WEGOVY) 1.7 MG/0.75ML pen Inject 1.7 mg Subcutaneous once a week 3 mL 0    SUMAtriptan (IMITREX) 25 MG tablet Profile rx,TAKE 1 TO 2 TABLETS BY MOUTH AT ONSET OF HEADACHE FOR MIGRAINE. MAY REPEAT DOSE IN 2 HOURS. MAX OF 200MG OR 2 DOSES IN 24 HOURS 18 tablet 1           6/14/2023     7:45 AM   Weight Loss Medication History Reviewed With Patient   Which weight loss medications are you currently taking on a regular basis? Wegovy   If you are not taking a weight loss medication that was prescribed to you, please indicate why: I did not like the side effects    Other   Are you having any side effects from the weight loss medication that we have prescribed you? Yes   If you are having side effects please describe: sleepiness-struggled to drive home safely             Objective    Ht 1.575 m (5' 2.01\")   Wt 124.7 kg (275 lb)   LMP 08/13/2005   BMI 50.29 kg/m             Vitals:  No vitals were obtained today due to virtual visit.  PHYSICAL EXAM:  GENERAL: Healthy, alert and no distress  EYES: Eyes grossly normal to inspection.  No discharge or erythema, or obvious scleral/conjunctival abnormalities.  RESP: No audible wheeze, cough, or visible cyanosis.  No visible retractions or increased work of breathing.  "   SKIN: Visible skin clear. No significant rash, abnormal pigmentation or lesions.  NEURO: Cranial nerves grossly intact.  Mentation and speech appropriate for age.  PSYCH: Mentation appears normal, affect normal/bright, judgement and insight intact, normal speech and appearance well-groomed.        Sincerely,    LOLI PUTNAM PA-C      26 minutes spent by me on the date of the encounter doing chart review, history and exam, documentation and further activities per the note

## 2023-06-15 ENCOUNTER — VIRTUAL VISIT (OUTPATIENT)
Dept: ENDOCRINOLOGY | Facility: CLINIC | Age: 53
End: 2023-06-15
Payer: COMMERCIAL

## 2023-06-15 VITALS — BODY MASS INDEX: 50.61 KG/M2 | HEIGHT: 62 IN | WEIGHT: 275 LBS

## 2023-06-15 DIAGNOSIS — E11.22 TYPE 2 DIABETES MELLITUS WITH STAGE 3A CHRONIC KIDNEY DISEASE, WITHOUT LONG-TERM CURRENT USE OF INSULIN (H): ICD-10-CM

## 2023-06-15 DIAGNOSIS — N18.31 TYPE 2 DIABETES MELLITUS WITH STAGE 3A CHRONIC KIDNEY DISEASE, WITHOUT LONG-TERM CURRENT USE OF INSULIN (H): ICD-10-CM

## 2023-06-15 DIAGNOSIS — Z71.3 NUTRITIONAL COUNSELING: Primary | ICD-10-CM

## 2023-06-15 DIAGNOSIS — E66.9 OBESITY: ICD-10-CM

## 2023-06-15 PROCEDURE — 99207 PR NO CHARGE LOS: CPT | Mod: VID | Performed by: DIETITIAN, REGISTERED

## 2023-06-15 PROCEDURE — 97803 MED NUTRITION INDIV SUBSEQ: CPT | Mod: VID | Performed by: DIETITIAN, REGISTERED

## 2023-06-15 NOTE — LETTER
"6/15/2023       RE: Adelaida Packer  [address] [place] [address]     Dear Colleague,    Thank you for referring your patient, Adelaida Packer, to the [facility] [place] [facility]. Please see a copy of my visit note below.    Adelaida Packer is a 51 year old female who is being evaluated via a billable video visit.      The patient has been notified of following:      \"This video visit will be conducted via a call between you and your physician/provider. We have found that certain health care needs can be provided without the need for an in-person physical exam.  This service lets us provide the care you need with a video conversation.  If a prescription is necessary we can send it directly to your pharmacy.  If lab work is needed we can place an order for that and you can then stop by our lab to have the test done at a later time.    Video visits are billed at different rates depending on your insurance coverage.  Please reach out to your insurance provider with any questions.    If during the course of the call the physician/provider feels a video visit is not appropriate, you will not be charged for this service.\"    How would you like to obtain your AVS? MyChart  If you are dropped from the video visit, the video invite should be resent to: [phone]  Will anyone else be joining your video visit? No  {If patient encounters technical issues they should call [phone]       Video-Visit Details    Type of service:  Video Visit    Video Start Time: 8:22 am  Video End Time: 8:47 am    Originating Location (pt. Location): Home    Distant Location (provider location): Offsite    Platform used for Video Visit: MedPro    During this virtual visit the patient is located in [place], patient verifies this as the location during the entirety of this visit.      Weight Management Nutrition Consultation    Adelaida MARIN" "Ochoa is a 51 year old female presents today for weight management nutrition consultation.  Patient referred by Dr. Gordillo.     Pt previously worked with Dr. Stephen Null. Now working with Yecenia Andrea PA-C as of 6/14/23.     Patient with Co-morbidities of obesity including:  Type II DM yes  Renal Failure stage 3 CKD per chart  Sleep apnea no  Hypertension yes  Dyslipidemia yes, high LDL hx  Joint pain osteoarthritis and knee pain per chart  Back pain no  GERD yes    PMH includes: migraines, vit d deficiency, hyperparathyroidism, needs knee replacement     Recommended 30-40 lb weight loss for knee surgery    Anthropometrics:  Weight when started with me 6/15/21: 244 lbs  Weight 6/15/23: 275 lbs     Estimated body mass index is 50.29 kg/m  as calculated from the following:    Height as of 6/14/23: 1.575 m (5' 2.01\").    Weight as of 6/14/23: 124.7 kg (275 lb).     Current weight: 275 lbs, pt report    Medications for Weight Loss:  Victoza - Plans to transition to 1.7 mg Wegovy this weekend    Considering Phen or Naltrexone per MK    A1C 13 Jan 2023, now at 8.0 as of 4/17/23  Had a CGM but was not affordable     NUTRITION HISTORY  NKFA  Not huge on seafood - will eat some fish/shrimp, no lobster  Does not like mushrooms     Tried meal kits - got sick of it, expensive.  Tried Noom - 1200 kcal/day  Did WW in past, hard virtually. Dr. Mitchell recommended again.   Did weight study through [facility], lost 40 lbs    Limited vegetables and fruit   Beverages: coffee - almond milk and stevia, water (had worked up to 80-90 oz now less), water with crystal light, pop once in a blue moon (diet pepsi or coke), seltzer water once in a while, no alcohol    Snacks: varies, maybe pickles, cucumbers, chips, chocolate    June 2021:  Pt last seen 6/15/21.     Increased stress lately which has been associated with weight gain.     Working on ensuring good protein.  Feeling better and BG managed better when higher protein and lower " carbs. Has been utilizing premier protein shakes. Has hard boiled eggs made for this week.  Using 45 kcal alexis vanita bread.    Wants to get more vegetables in.    Hydration: lots of water, coffee    PA: has membership at [employer]. 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    Nutrition Intervention  Reviewed current eating habits.  Education on plate method and foods to incorporate.  AVS via [email or web address]    Patient demonstrates understanding.    Expected Engagement: good    Nutrition Goals  1. Aim to go to gym 2x/week   2. Aim to get fruits/vegetables in 3x/week   3. Recommend ~85 grams of protein/day. Aiming for 20-30 grams per meal  4. Consider meal delivery such as Factor    Plate method can be used for general guidance on balanced meals/portion sizes (see link below for picture/more information)                 Make 1/2 your plate non starchy vegetable(cauliflower, broccoli, asparagus, [place] sprouts, lettuce, carrots, for example).                3+ oz lean protein sources (salmon/skinless chicken/turkey breast/pork loin/lean cuts of beef/ or non-animal protein such as black, kidney or valencia beans, tofu/edamame/tempeh)     (Note: 3 oz = deck of cards size)                 1/2 to 1 cup carbohydrate choices such as whole grain starches or starchy vegetables or fruit. For example: quinoa, brown rice, barley, potatoes, sweet potatoes, winter squash, peas, corn, or fruit.               Choose ~0-2 added fat servings at a meal (avocados, nut butters, nuts or seeds, olive oil, vegetable oils).    The Plate Method:  https://www.cdc.gov/diabetes/images/managing/Diabetes-Manage-Eat-Well-Plate-Graphic_600px.jpg    Summary of  "Volumetrics Eating Plan  [email or web address]     Protein:  Chicken  Turkey  Fish  Seafood (shrimp, lobster, crab, etc)  Lean cuts of beef (93% ground, sirloin, tenderloin, etc)  Pork (limit fatter options like gonsalez)  Cottage cheese  Greek yogurt  Eggs   Low-fat Cheese  Lentils/beans  Nuts/nut butter (recommend a smaller portion if doing these options - 2 Tbsp of nuts or 1/4 cup nut butter)  Tofu  Seitan     Carbohydrates:   - Brown/Wild rice   - Egg life wrap (low carb and great source of protein!)    - Whole wheat pasta   - Protein pasta (barilla protein +, lentil pasta, etc)   - Whole wheat or carb balance tortilla   - Oatmeal    - Fruit   - Starchy vegetables (corn, peas, beans/lentils, potatoes)   - Whole grain crackers    - Whole wheat waffle    - [place] protein waffles/pancakes    - Couscous     Non-starchy Vegetables Examples:   - Lettuce   - Spinach   - Onions    - Bell pepper   - Carrots   - Broccoli   - Cabbage   - Brussel sprouts   - Cauliflower    - Asparagus    - Cucumber   - Artichoke   - Zucchini   - Bean sprouts   - Mushrooms   - Sugar snap peas   - Eggplant   - Turnips   - Celery   - Radishes    - Green beans    Starchy vegetable examples:  Corn  Green peas  Winter squash (butternut, spaghetti squash, acorn)  Beans/lentils (not including green beans)  Potatoes  Sweet potatoes       Healthy Recipe Resources:  Books:  \"The Volumetrics Eating Plan\" by Yudith Barajas, Ph.D.  \"Cooking that Counts\" by editors of Autopilot  \"Calorie Smart Meals\" cookbook by Better Homes and Gardens (200-500 calorie meals)    Websites:  www.Jogg  www.oldClaimIt.org  https://www.diabetesfoodhub.org/all-recipes.html  https://www.XMOS.Portable Internet/  Https://www.choosemyplate.gov/myplatekitchen  https://snaped.fns.usda.gov/recipes-menus   https://www.International Biomass Group/gallery/2703699/dietitian-budget-high-protein-dinner-recipes/  https://www.Decision Rocket.com/recipes/84/healthy-recipes/ "   Https://www.Axium Nanofiberstube.com/c/ugo   Eatthismuch.com       Cultural Cuisines:   Cuisine: https://www.Pulse 8.org/knowledge-center/recipes/  Mexican and Vegan Cuisine:   https://Generous Deals.Accord/  https://galaxyadvisors/recipe-index/  Chinese Cuisine: https://www.Music Nation.Accord/  Various Regions of African Cuisine: https://www.Nextly.Accord/recipes/93021/cuisines-regions//    Apps:  [employer] anika (or website, mealime.com)   SpendSmartEatSmart       Follow-Up:  1-2 months, PRN    Time spent with patient: 25 minutes.  SAKSHI Salomon RD, LD

## 2023-06-15 NOTE — NURSING NOTE
Is the patient currently in the state of [place]? YES    Visit mode:VIDEO    If the visit is dropped, the patient can be reconnected by: VIDEO VISIT: Send to e-mail at: [email or web address]    Will anyone else be joining the visit? NO      How would you like to obtain your AVS? MyChart    Are changes needed to the allergy or medication list? NO    Reason for visit: ANN Fernandez, VF

## 2023-06-15 NOTE — PATIENT INSTRUCTIONS
Nutrition Goals  1. Aim to go to gym 2x/week   2. Aim to get fruits/vegetables in 3x/week   3. Recommend ~85 grams of protein/day. Aiming for 20-30 grams per meal  4. Consider meal delivery such as Factor    Plate method can be used for general guidance on balanced meals/portion sizes (see link below for picture/more information)                 Make 1/2 your plate non starchy vegetable(cauliflower, broccoli, asparagus, [place] sprouts, lettuce, carrots, for example).                3+ oz lean protein sources (salmon/skinless chicken/turkey breast/pork loin/lean cuts of beef/ or non-animal protein such as black, kidney or valencia beans, tofu/edamame/tempeh)     (Note: 3 oz = deck of cards size)                 1/2 to 1 cup carbohydrate choices such as whole grain starches or starchy vegetables or fruit. For example: quinoa, brown rice, barley, potatoes, sweet potatoes, winter squash, peas, corn, or fruit.               Choose ~0-2 added fat servings at a meal (avocados, nut butters, nuts or seeds, olive oil, vegetable oils).    The Plate Method:  https://www.cdc.gov/diabetes/images/managing/Diabetes-Manage-Eat-Well-Plate-Graphic_600px.jpg    Carbohydrates  [email or web address]     Summary of Volumetrics Eating Plan  [email or web address]     Protein:  Chicken  Turkey  Fish  Seafood (shrimp, lobster, crab, etc)  Lean cuts of beef (93% ground, sirloin, tenderloin, etc)  Pork (limit fatter options like gonsalez)  Cottage cheese  Greek yogurt  Eggs   Low-fat Cheese  Lentils/beans  Nuts/nut butter (recommend a smaller portion if doing these options - 2 Tbsp of nuts or 1/4 cup nut butter)  Tofu  Seitan     Carbohydrates:   - Brown/Wild rice   - Egg life wrap (low carb and great source of protein!)    - Whole wheat pasta   - Protein pasta (barilla protein +, lentil pasta, etc)   - Whole wheat or carb balance tortilla   - Oatmeal    - Fruit   - Starchy vegetables (corn, peas, beans/lentils, potatoes)   -  "Whole grain crackers    - Whole wheat waffle    - Bern protein waffles/pancakes    - Couscous     Non-starchy Vegetables Examples:   - Lettuce   - Spinach   - Onions    - Bell pepper   - Carrots   - Broccoli   - Cabbage   - Brussel sprouts   - Cauliflower    - Asparagus    - Cucumber   - Artichoke   - Zucchini   - Bean sprouts   - Mushrooms   - Sugar snap peas   - Eggplant   - Turnips   - Celery   - Radishes    - Green beans    Starchy vegetable examples:  Corn  Green peas  Winter squash (butternut, spaghetti squash, acorn)  Beans/lentils (not including green beans)  Potatoes  Sweet potatoes       Healthy Recipe Resources:  Books:  \"The Volumetrics Eating Plan\" by Yudith Barajas, Ph.D.  \"Cooking that Counts\" by editors of Majitek  \"Calorie Smart Meals\" cookbook by Better Homes and Gardens (200-500 calorie meals)    Websites:  www.Tipp24  www.lifeaction games.org  https://www.diabetesfoodhub.org/all-recipes.html  https://www.Melon #usemelon.Appscend/  Https://www.choosemyplate.gov/myplatekitchen  https://snaped.fns.usda.gov/recipes-menus   https://www.Swift Frontiers Corp/gallery/0580762/dietitian-budget-high-protein-dinner-recipes/  https://www.Royal Petroleum.Appscend/recipes/84/healthy-recipes/   Https://www.youtube.com/c/Baifendianen   EatthiQuintel Technology.com       Cultural Cuisines:   Cuisine: https://www.Hobleenations.org/knowledge-center/recipes/  Mexican and Vegan Cuisine:   https://Next Glass.Appscend/  https://Sampa.Appscend/recipe-index/  Chinese Cuisine: https://www."Roku, Inc.".Appscend/  Various Regions of African Cuisine: https://www.Wavemark.Appscend/recipes/41746/cuisines-regions//    Apps:  Kompyte. anika (or website, mealime.com)   SpendKashartJefet       Follow-Up:  1-2 months, SAKSHI Guadalupe, RD, LD  Clinic #: [phone]    "

## 2023-06-15 NOTE — PROGRESS NOTES
"Adelaida Packer is a 51 year old female who is being evaluated via a billable video visit.      The patient has been notified of following:      \"This video visit will be conducted via a call between you and your physician/provider. We have found that certain health care needs can be provided without the need for an in-person physical exam.  This service lets us provide the care you need with a video conversation.  If a prescription is necessary we can send it directly to your pharmacy.  If lab work is needed we can place an order for that and you can then stop by our lab to have the test done at a later time.    Video visits are billed at different rates depending on your insurance coverage.  Please reach out to your insurance provider with any questions.    If during the course of the call the physician/provider feels a video visit is not appropriate, you will not be charged for this service.\"    How would you like to obtain your AVS? MyChart  If you are dropped from the video visit, the video invite should be resent to: [phone]  Will anyone else be joining your video visit? No  {If patient encounters technical issues they should call [phone]       Video-Visit Details    Type of service:  Video Visit    Video Start Time: 8:22 am  Video End Time: 8:47 am    Originating Location (pt. Location): Home    Distant Location (provider location): Offsite    Platform used for Video Visit: Hyginex    During this virtual visit the patient is located in [place], patient verifies this as the location during the entirety of this visit.      Weight Management Nutrition Consultation    Adelaida Packer is a 51 year old female presents today for weight management nutrition consultation.  Patient referred by Dr. Gordillo.     Pt previously worked with Dr. Stephen Null. Now working with Yecenia Andrea PA-C as of 6/14/23.     Patient with Co-morbidities of obesity including:  Type II DM yes  Renal Failure stage 3 CKD per " "chart  Sleep apnea no  Hypertension yes  Dyslipidemia yes, high LDL hx  Joint pain osteoarthritis and knee pain per chart  Back pain no  GERD yes    PMH includes: migraines, vit d deficiency, hyperparathyroidism, needs knee replacement     Recommended 30-40 lb weight loss for knee surgery    Anthropometrics:  Weight when started with me 6/15/21: 244 lbs  Weight 6/15/23: 275 lbs     Estimated body mass index is 50.29 kg/m  as calculated from the following:    Height as of 6/14/23: 1.575 m (5' 2.01\").    Weight as of 6/14/23: 124.7 kg (275 lb).     Current weight: 275 lbs, pt report    Medications for Weight Loss:  Victoza - Plans to transition to 1.7 mg Wegovy this weekend    Considering Phen or Naltrexone per MK    A1C 13 Jan 2023, now at 8.0 as of 4/17/23  Had a CGM but was not affordable     NUTRITION HISTORY  NKFA  Not huge on seafood - will eat some fish/shrimp, no lobster  Does not like mushrooms     Tried meal kits - got sick of it, expensive.  Tried Noom - 1200 kcal/day  Did WW in past, hard virtually. Dr. Mitchell recommended again.   Did weight study through [facility], lost 40 lbs    Limited vegetables and fruit   Beverages: coffee - almond milk and stevia, water (had worked up to 80-90 oz now less), water with crystal light, pop once in a blue moon (diet pepsi or coke), seltzer water once in a while, no alcohol    Snacks: varies, maybe pickles, cucumbers, chips, chocolate    June 2021:  Pt last seen 6/15/21.     Increased stress lately which has been associated with weight gain.     Working on ensuring good protein.  Feeling better and BG managed better when higher protein and lower carbs. Has been utilizing premier protein shakes. Has hard boiled eggs made for this week.  Using 45 kcal alexis vanita bread.    Wants to get more vegetables in.    Hydration: lots of water, coffee    PA: has membership at [employer]. 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    Nutrition Intervention  Reviewed current eating habits.  Education on plate method and foods to incorporate.  AVS via SquareOne Mailhart    Patient demonstrates understanding.    Expected Engagement: good    Nutrition Goals  1. Aim to go to gym 2x/week   2. Aim to get fruits/vegetables in 3x/week   3. Recommend ~85 grams of protein/day. Aiming for 20-30 grams per meal  4. Consider meal delivery such as Factor    Plate method can be used for general guidance on balanced meals/portion sizes (see link below for picture/more information)                 Make 1/2 your plate non starchy vegetable(cauliflower, broccoli, asparagus, [place] sprouts, lettuce, carrots, for example).                3+ oz lean protein sources (salmon/skinless chicken/turkey breast/pork loin/lean cuts of beef/ or non-animal protein such as black, kidney or valencia beans, tofu/edamame/tempeh)     (Note: 3 oz = deck of cards size)                 1/2 to 1 cup carbohydrate choices such as whole grain starches or starchy vegetables or fruit. For example: quinoa, brown rice, barley, potatoes, sweet potatoes, winter squash, peas, corn, or fruit.               Choose ~0-2 added fat servings at a meal (avocados, nut butters, nuts or seeds, olive oil, vegetable oils).    The Plate Method:  https://www.cdc.gov/diabetes/images/managing/Diabetes-Manage-Eat-Well-Plate-Graphic_600px.jpg    Summary of Volumetrics Eating Plan  http://Opti-Source.com/390813.pdf     Protein:  ? Chicken  ? Turkey  ? Fish  ? Seafood (shrimp, lobster, crab, etc)  ? Lean cuts of beef (93% ground, sirloin, tenderloin, etc)  ? Pork (limit fatter options like gonsalez)  ? Cottage cheese  ? Greek yogurt  ? Eggs   ? Low-fat Cheese  ? Lentils/beans  ? Nuts/nut  "butter (recommend a smaller portion if doing these options - 2 Tbsp of nuts or 1/4 cup nut butter)  ? Tofu  ? Seitan     Carbohydrates:   - Brown/Wild rice   - Egg life wrap (low carb and great source of protein!)    - Whole wheat pasta   - Protein pasta (barilla protein +, lentil pasta, etc)   - Whole wheat or carb balance tortilla   - Oatmeal    - Fruit   - Starchy vegetables (corn, peas, beans/lentils, potatoes)   - Whole grain crackers    - Whole wheat waffle    - [place] protein waffles/pancakes    - Couscous     Non-starchy Vegetables Examples:   - Lettuce   - Spinach   - Onions    - Bell pepper   - Carrots   - Broccoli   - Cabbage   - Brussel sprouts   - Cauliflower    - Asparagus    - Cucumber   - Artichoke   - Zucchini   - Bean sprouts   - Mushrooms   - Sugar snap peas   - Eggplant   - Turnips   - Celery   - Radishes    - Green beans    Starchy vegetable examples:  ? Corn  ? Green peas  ? Winter squash (butternut, spaghetti squash, acorn)  ? Beans/lentils (not including green beans)  ? Potatoes  ? Sweet potatoes       Healthy Recipe Resources:  Books:    \"The Volumetrics Eating Plan\" by Yudith Barajas, Ph.D.    \"Cooking that Counts\" by editors of Red Rabbit inc    \"Calorie Smart Meals\" cookbook by Better Homes and Vibrant Energys (200-500 calorie meals)    Websites:    www.Publictivity    www.Animeeple.org    https://www.diabetesfoodhub.org/all-recipes.html    https://www.Drive Powertive.Topcom Europe/    Https://www.choosemyplate.gov/myplatekitchen    https://snaped.fns.usda.gov/recipes-menus     https://www.The Shock 3D Group.Topcom Europe/camille/1571164/dietitian-budget-high-protein-dinner-recipes/    https://www."Rhiza, Inc."pes.Topcom Europe/recipes/84/healthy-recipes/     Https://www.youtube.com/c/Mommy Nearestcallie     Eatthismuch.com       Cultural Cuisines:     Cuisine: https://www.frintitnatSymwave.org/knowledge-center/recipes/    Mexican and Vegan Cuisine:   o https://Skoovy.Topcom Europe/  o https://Mineralist/recipe-index/    Chinese " Cuisine: https://www.Aldis.Seafarers CV/    Various Regions of African Cuisine: https://www.farmaciamarket.com/recipes/82718/cuisines-regions//    Apps:    [employer] anika (or website, mealime.com)     SpendSmartEatSmart       Follow-Up:  1-2 months, PRN    Time spent with patient: 25 minutes.  SAKSHI Salomon RD, LD

## 2023-06-16 NOTE — PATIENT INSTRUCTIONS
"Kelby Son,   Thank you for allowing us the privilege of caring for you. We hope we provided you with the excellent service you deserve.   Please let us know if there is anything else we can do for you so that we can be sure you are completely satisfied with your care experience.    To ensure the quality of our services you may be receiving a patient satisfaction survey from an independent patient satisfaction monitoring company.    The greatest compliment you can give is a \"Likely to Recommend\"    Your visit was with LOLI PUTNAM PA-C today.    Instructions per today's visit:     Activity goal - going to the gym 2xweek   Increase Wegovy 1.7mg once weekly. Prescribed by endo   Hold Naltrexone for now. Consider restarting in the future.   Follow up with Loli Rivera in 2-3 months   Continue 24 week program with Carrie Ness, health  and KATARINA Gauthier     ___________________________________________________________________________  Important contact and scheduling information:  Please call our contact center at [phone] to schedule your next appointments.  For any nursing questions or concerns call Lori Merino LPN at [phone] or Wendy Feldman RN at [phone]  Please call during clinic hours Monday through Friday 8:00a - 4:00p if you have questions or you can contact us via Moda2Ridet at anytime and we will reply during clinic hours.    Lab results will be communicated through My Chart or letter (if My Chart not used). Please call the clinic if you have not received communication after 1 week or if you have any questions.?  Clinic Fax: [phone]  __________________________________________________________________________    If labs were ordered today:    Please make an appointment to have them drawn at your convenience.     To schedule the Lab Appointment using blogTV:  Select \"Schedule an Appointment\"  Select \"Lab Only\"  For \"A couple of questions\", select \"Other\"  For \"Which locations work for you?, select " the location and set up the appointment    To schedule by phone call [phone] to schedule a lab only appointment at any [facility] lab.  ___________________________________________________________________________  Work with A Health !  Virtual Sessions are Available through [facility] Weight Management Clinics    To learn more, call to schedule a free, Health  Q&A appointment: [phone]     What is Health Coaching?  Do you know what you are supposed to do, but you just aren't doing it?  Then, HEALTH COACHING may help you!   Get unstuck and move forward with the support of a professionally trained NBC-HWC (National Board-Certified Health and ) who uses evidence-based approaches to help you move forward with healthy lifestyle changes in the areas of weight loss, stress management and overall well-being.    Health Coaches help you identify goals that will work best for you. Health Coaches provide support and encouragement with overcoming barriers and help you to find inspiration and motivation to lead a healthy lifestyle.    Option one:  Health Coaching 3-Pack; Three, 30-minute Health Coaching Visits, for $99  Visits are done virtually (phone or video)  This is a self pay service; we do not accept insurance for celia coaching.    Option two:   The 24 week Plan; 11 Health Coaching Visits, and a 7 months subscription to [employer]-- on-demand fitness, nutrition and mindfulness classes, for $499 (employee discounts may be available). Participants will also meet regularly with a weight management Medical Provider and a Registered/Licensed Dietician.  This is a self-pay service; we do not accept insurance for health coaching.    To Schedule a free Health  Q&A appointment to learn more,  call [phone].  ____________________________________________________________________    [facility]   Healthy Lifestyle Virtual Support  Group    Healthy Lifestyle Virtual Support Group?  This is 60 minutes of small group guided discussion, support and resources. All are welcome who want a healthy lifestyle.  WHEN: Starting in July 2023, this group meets the 1st Friday of the month from 12:30 PM - 1:30 PM virtually using Microsoft Teams.    FACILITATOR: Led by National Board Certified Health and , Carrie Ness, [facility].   TO REGISTER: Please send an email to Carrie [email or web address] to receive monthly invites to the group or if you have any questions about having a health .  Prior to the meeting, a link with directions on how to join the meeting will be sent to you.    2023 Meetings  May 19: Let's Talk  June 9: Create Your Coaching Toolkit: Learn How to  Yourself  July 7: Let's Talk  August 4: Benefits of Fiber with KATARINA Marquez  September 1: Show and Tell (share your aps, podcasts, recipes, hacks, books)  October 6 :Let's Talk  November 3: Introduction to Mindfulness   December 1: Let's Talk    If you would like bariatric surgery specific support group info please let your care team know.         Thank you,   [facility] Comprehensive Weight Management Team

## 2023-06-19 ENCOUNTER — PRE VISIT (OUTPATIENT)
Dept: ONCOLOGY | Facility: CLINIC | Age: 53
End: 2023-06-19

## 2023-06-19 ENCOUNTER — VIRTUAL VISIT (OUTPATIENT)
Dept: ENDOCRINOLOGY | Facility: CLINIC | Age: 53
End: 2023-06-19

## 2023-06-19 ENCOUNTER — ONCOLOGY VISIT (OUTPATIENT)
Dept: ONCOLOGY | Facility: CLINIC | Age: 53
End: 2023-06-19
Attending: INTERNAL MEDICINE
Payer: COMMERCIAL

## 2023-06-19 VITALS
OXYGEN SATURATION: 97 % | HEIGHT: 62 IN | WEIGHT: 278 LBS | HEART RATE: 87 BPM | DIASTOLIC BLOOD PRESSURE: 81 MMHG | BODY MASS INDEX: 51.16 KG/M2 | SYSTOLIC BLOOD PRESSURE: 115 MMHG

## 2023-06-19 DIAGNOSIS — D47.2 MGUS (MONOCLONAL GAMMOPATHY OF UNKNOWN SIGNIFICANCE): Primary | ICD-10-CM

## 2023-06-19 DIAGNOSIS — N18.2 CHRONIC KIDNEY DISEASE, STAGE 2 (MILD): ICD-10-CM

## 2023-06-19 DIAGNOSIS — E83.52 HYPERCALCEMIA: ICD-10-CM

## 2023-06-19 DIAGNOSIS — E66.9 OBESITY: Primary | ICD-10-CM

## 2023-06-19 LAB
ALBUMIN SERPL BCG-MCNC: 4.7 G/DL (ref 3.5–5.2)
ALP SERPL-CCNC: 65 U/L (ref 35–104)
ALT SERPL W P-5'-P-CCNC: 18 U/L (ref 0–50)
ANION GAP SERPL CALCULATED.3IONS-SCNC: 12 MMOL/L (ref 7–15)
AST SERPL W P-5'-P-CCNC: 21 U/L (ref 0–45)
BASOPHILS # BLD AUTO: 0 10E3/UL (ref 0–0.2)
BASOPHILS NFR BLD AUTO: 0 %
BILIRUB SERPL-MCNC: 0.3 MG/DL
BUN SERPL-MCNC: 27.9 MG/DL (ref 6–20)
CALCIUM SERPL-MCNC: 10.8 MG/DL (ref 8.6–10)
CHLORIDE SERPL-SCNC: 102 MMOL/L (ref 98–107)
CREAT SERPL-MCNC: 1.41 MG/DL (ref 0.51–0.95)
DEPRECATED HCO3 PLAS-SCNC: 19 MMOL/L (ref 22–29)
EOSINOPHIL # BLD AUTO: 0.3 10E3/UL (ref 0–0.7)
EOSINOPHIL NFR BLD AUTO: 5 %
ERYTHROCYTE [DISTWIDTH] IN BLOOD BY AUTOMATED COUNT: 13.2 % (ref 10–15)
GFR SERPL CREATININE-BSD FRML MDRD: 44 ML/MIN/1.73M2
GLUCOSE SERPL-MCNC: 134 MG/DL (ref 70–99)
HCT VFR BLD AUTO: 40.8 % (ref 35–47)
HGB BLD-MCNC: 13.7 G/DL (ref 11.7–15.7)
IMM GRANULOCYTES # BLD: 0 10E3/UL
IMM GRANULOCYTES NFR BLD: 0 %
LYMPHOCYTES # BLD AUTO: 2.6 10E3/UL (ref 0.8–5.3)
LYMPHOCYTES NFR BLD AUTO: 40 %
MCH RBC QN AUTO: 30 PG (ref 26.5–33)
MCHC RBC AUTO-ENTMCNC: 33.6 G/DL (ref 31.5–36.5)
MCV RBC AUTO: 90 FL (ref 78–100)
MONOCYTES # BLD AUTO: 0.5 10E3/UL (ref 0–1.3)
MONOCYTES NFR BLD AUTO: 7 %
NEUTROPHILS # BLD AUTO: 3 10E3/UL (ref 1.6–8.3)
NEUTROPHILS NFR BLD AUTO: 48 %
NRBC # BLD AUTO: 0 10E3/UL
NRBC BLD AUTO-RTO: 0 /100
PLATELET # BLD AUTO: 216 10E3/UL (ref 150–450)
POTASSIUM SERPL-SCNC: 4.4 MMOL/L (ref 3.4–5.3)
PROT SERPL-MCNC: 8 G/DL (ref 6.4–8.3)
RBC # BLD AUTO: 4.56 10E6/UL (ref 3.8–5.2)
SODIUM SERPL-SCNC: 133 MMOL/L (ref 136–145)
TOTAL PROTEIN SERUM FOR ELP: 7.9 G/DL (ref 6.4–8.3)
WBC # BLD AUTO: 6.4 10E3/UL (ref 4–11)

## 2023-06-19 PROCEDURE — 99207 PR NO CHARGE LOS: CPT | Mod: 93

## 2023-06-19 PROCEDURE — 84155 ASSAY OF PROTEIN SERUM: CPT | Mod: XU | Performed by: INTERNAL MEDICINE

## 2023-06-19 PROCEDURE — 84165 PROTEIN E-PHORESIS SERUM: CPT

## 2023-06-19 PROCEDURE — 83970 ASSAY OF PARATHORMONE: CPT | Performed by: INTERNAL MEDICINE

## 2023-06-19 PROCEDURE — 83521 IG LIGHT CHAINS FREE EACH: CPT | Performed by: INTERNAL MEDICINE

## 2023-06-19 PROCEDURE — 99204 OFFICE O/P NEW MOD 45 MIN: CPT | Performed by: INTERNAL MEDICINE

## 2023-06-19 PROCEDURE — 36415 COLL VENOUS BLD VENIPUNCTURE: CPT | Performed by: INTERNAL MEDICINE

## 2023-06-19 PROCEDURE — 99207 PR MEDICAL WEIGHT MANAGEMENT PROGRAM ENROLLMENT: CPT | Mod: 93

## 2023-06-19 PROCEDURE — 85025 COMPLETE CBC W/AUTO DIFF WBC: CPT | Performed by: INTERNAL MEDICINE

## 2023-06-19 PROCEDURE — 80053 COMPREHEN METABOLIC PANEL: CPT | Performed by: INTERNAL MEDICINE

## 2023-06-19 ASSESSMENT — PAIN SCALES - GENERAL: PAINLEVEL: NO PAIN (0)

## 2023-06-19 NOTE — NURSING NOTE
"Oncology Rooming Note    June 19, 2023 2:11 PM   Adelaida Packer is a 53 year old female who presents for:    Chief Complaint   Patient presents with     Oncology Clinic Visit     New MGUS     Initial Vitals: /81 (BP Location: Left arm, Patient Position: Chair, Cuff Size: Adult Large)   Pulse 87   Ht 1.575 m (5' 2\")   Wt 126.1 kg (278 lb)   LMP 08/13/2005   SpO2 97%   BMI 50.85 kg/m   Estimated body mass index is 50.85 kg/m  as calculated from the following:    Height as of this encounter: 1.575 m (5' 2\").    Weight as of this encounter: 126.1 kg (278 lb). Body surface area is 2.35 meters squared.  No Pain (0) Comment: Data Unavailable   Patient's last menstrual period was 08/13/2005.  Allergies reviewed: Yes  Medications reviewed: Yes    Medications: Medication refills not needed today.  Pharmacy name entered into [facility]:    [facility] [place] - [address] [address] [place], [place] [address]E [address], [address]  [id number] - [place], [place] - [address] AT [facility]  [id number] [place] [facility] [address]    Clinical concerns: New US     Dr. Dennis was notified.      [place]ia Ch CMA              "

## 2023-06-19 NOTE — PROGRESS NOTES
"EDUARDO POWER    Progress Notes    New  24 Week [facility] Health Coaching Note    Visit#1/11    24 Week Enrollment Information:     Initial Start Date: June 19  Graduation Date: Nov     Yecenia Rivera and Edel Jain    Employee or Non Employee: NON EMPLOYEE, teacher    Mode of Visit: (telephone or video): phone, video next time bc phone visit did not show up on [employer] schedule    Permission to receive Email pertaining to Wellbeats/24 week handouts:yes    Support Group Interest:  yes    Payment Form/ EV form received:    Welcome Email/ Wellness Assessment Received: Send today and welcome gift today; add to Addison spreadsheet    Pt gave verbal consent of $499 fee and what is covered          PATIENT INFORMATION PROVIDED via Welcome Email:  1.) 24 Week Healthy Lifestyle Plan  Fact Sheet  2.) WellBeats Info Sheet  3.) Payment Form or EV Form  4.) Wellbeing Assessment/4 Pillars of Health  5.) Support Group Info/Dates  6.) E-Store Info  7.) Scheduling Phone Number      The FOUR PILLARS OF HEALTH ASSESSMENT:  Rate your current level of satisfaction on a scale of 1-10 in each pillar, and state what number you would like to be at for each    SLEEP/REST:    MOVEMENT:    NUTRITION:    EMOTIONAL WELL-BEING/STRESS MANAGEMENT:    Wellness Vision;  Make me a priority.First time in 17 years that I get to take care of myself first.  I get to the gym, LIFETIME; things don't get in my way.     GOAL: June 19  I will go to LIFETIME 2 times per week. BONUS if I go more then 2 times  I will go to [employer] Class on Thursdays  I will put my classes on my calendar  I will have a protein smoothie for breakfast on gym days  Review 24 week handouts and complete \"Wellbeing Assessment and Wellness Vision handout      NEXT HEALTH  VISIT SCHEDULED FOR:  July 5 at 12:30 pm, VIDEO visit (you do NOT need to check-in early but join at 12:30)      FOLLOW-UP:    To schedule your next visit, please call [phone].      TIME: 60 minutes         " SIGNATURE:  Carrie Ness, Certified Health  on 5/6/2021 at 10:37 AM       Wellness Vision;  Make me a priority.First time in 17 years that I get to take care of myself first.  I get to the gym, LIFETIME; things don't get in my way.     GOAL: June 19  I will go to LIFETIME 2 times per week. BONUS if I go more then 2 times  I will go to [employer] Class on Thursdays  I will put my classes on my calendar  I will have a protein smoothie for breakfast on gym days                Notes: Just started wepaymio

## 2023-06-19 NOTE — LETTER
2023         RE: Adelaida Packer  [address] [facility] [place] [address]        Dear Colleague,    Thank you for referring your patient, Adelaida Packer, to the [facility] [place]. Please see a copy of my visit note below.    [facility] Hematology / Oncology  Initial Visit / Consultation Note 2023  Name: Adelaida Packer  :  1970  MRN:  [id number]    --------------------    Assessment / Plan:  IgG lambda MGUS.  CKD.    Over the course of our visit, Adelaida and I had a nice chance to review her monoclonal gammopathy of undetermined significance.  Its been sometime since she has checked in with our clinic regarding this premalignant state.  We had a good discussion about the natural history as well as red flags on organ complications and constitutional symptoms to monitor for.  Pending is good that we get her plugged back into the clinic and see her annually ongoing.  Most importantly, we plan to recheck her serum protein electrophoresis, free light chain assays, CBC and CMP to determine where she is at from an MGUS standpoint given that its been about 5 to 6 years since her last recheck.  Previously she did have some slow growth M spike that always bears close monitoring.  She is in agreement to proceed.    Thank you kindly for this consultation.  Geremias Dennis MD    --------------------    Interval History:  Adelaida present for evaluation of MGUS.  She is previously been followed but its been about 5 to 6 years since her last MGUS checks.  At that time, her M spike was 0.5 IgG lambda by immunofixation.  She is otherwise been enjoying good health.  She suffers from type 2 diabetes that she is working on diet and exercise for it.  She is follows with Dr Piña for her chronic kidney disease.  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  She is got some bilateral knee issues that have been replaced in the past due to  arthritis.  She underwent parathyroid surgery complicated unfortunately by serious medical complications and postop complications putting her in a coma for some period of time fortunately her health has been great recently.  She denies any unexplained fevers, chills or sweats.  No worsening adenopathy.  Good energy.  No unexplained bleeding.  No neuropathy.  Recent CBC has been reassuring.  Recent CMP shows stable chronic kidney disease.  Her lambda free light chain was minimally elevated in the past.  The kappa.    --------------------    Review of Systems:  10 point ROS negative except for that above.    Past Medical / Surgical History:  Past Medical History:   Diagnosis Date     Allergic rhinitis due to other allergen     seasonal     Arthritis      Diabetes (H)      Localized osteoarthritis of both knees      Migraine, unspecified, without mention of intractable migraine without mention of status migrainosus      Monoclonal gammopathy      Morbid obesity (H)      Type 2 diabetes mellitus with stage 3a chronic kidney disease, without long-term current use of insulin (H) 8/9/2016     Unspecified essential hypertension     dx around age 31     Past Surgical History:   Procedure Laterality Date     ARTHROPLASTY KNEE Right 8/11/2021    Procedure: ARTHROPLASTY, KNEE, TOTAL RIGHT;  Surgeon: Dylan Hernandez MD;  Location: [facility] OR     ARTHROPLASTY KNEE Left 7/27/2022    Procedure: ARTHROPLASTY, LEFT  KNEE, TOTAL;  Surgeon: Dylan Hernandez MD;  Location: [facility] OR     BUNIONECTOMY Right 7/6/2018    Procedure: BUNIONECTOMY;  Surgeon: Erik Bazan DPM;  Location: [facility];  Service:      EXCISE GANGLION WRIST Right 7/6/2018    Procedure: EXCISION OF THE GANGLION CYST, BUNIONECTOMU WITH FIRST METATARSAL  OSTEOTOMY WITH INTERNAL SCREW FIXATION, A SUBTALAR JIONT ARTHROERESIS OF THE RIGHT FOOT AND GASTROCNEMIUS RECESSION RIGHT LOWER EXTREMITY;  Surgeon: Erik Bazan DPM;  Location: [facility];   Service:       REMOVE TONSILS/ADENOIDS,12+ Y/O  1995     HEAD & NECK SURGERY  3/2013    Parathyroidectomy--had to go back in 6 days later for a possible bleed     HYSTERECTOMY       HYSTERECTOMY, VAGINAL  12/2005    hysterectomy- fibroids     KNEE SURGERY Right      ORTHOPEDIC SURGERY       PARATHYROIDECTOMY       TONSILLECTOMY       [facility] ANESTH,KNEE AREA SURGERY  01/2001     [facility] GASTROPLASTY,OBESITY,VERT BAND      removed 2009       Family History:  Family History   Problem Relation Age of Onset     Hypertension Mother      Gynecology Mother         hysterectomy     Gastrointestinal Disease Mother         bowel upstruction     Thyroid Disease Mother      Neurologic Disorder Mother      Osteoporosis Mother      Anesthesia Reaction Mother         migraines     Hypertension Father      Lipids Father      Arthritis Father      Heart Disease Father      Diabetes Sister      Cerebrovascular Disease Sister      Prostate Cancer Brother      Alcohol/Drug Brother      Circulatory Brother      Cancer Maternal Grandmother         tumor-head     Obesity Maternal Grandmother      Cancer Maternal Grandfather         bone     Eye Disorder Maternal Grandfather      Prostate Cancer Maternal Grandfather      Glaucoma Maternal Grandfather      Arthritis Paternal Grandmother      Respiratory Daughter         asthma     Macular Degeneration No family hx of      Deep Vein Thrombosis (DVT) No family hx of        Social History:  Social History     Socioeconomic History     Marital status: Single     Spouse name: None     Number of children: 2     Years of education: None     Highest education level: None   Occupational History     Employer: [employer]     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 No   Social History Narrative    Lives in [place], [place]        Social Documentation:        Balanced Diet: YES    Calcium intake: 1200 calcium per day    Caffeine: none per day    Exercise:  type of activity walking;3-4 times per week    Sunscreen: No    Seatbelts:  Yes    Self Breast Exam:  Yes    Physical/Emotional/Sexual Abuse: No     Do you feel safe in your environment? Yes        Cholesterol screen up to date: Yes-2005    Eye Exam up to date: Yes    Dental Exam up to date: Yes    Pap smear up to date: Yes    Mammogram up to date: NO, but want a Mammogram    Dexa Scan up to date: Does Not Apply    Colonoscopy up to date: Does Not Apply    [facility]    Glucose screen if over 40:  NO        Aislinn Wilson MA       Medications / Allergies:  Reviewed in EMR.    --------------------    Physical Exam:  VS: /81 (BP Location: Left arm, Patient Position: Chair, Cuff Size: Adult Large)   Pulse 87   Ht 1.575 m (5' 2\")   Wt 126.1 kg (278 lb)   LMP 08/13/2005   SpO2 97%   BMI 50.85 kg/m    GEN: Well appearing.    Labs / Imaging / Path:  We reviewed past CBC, CMP, SPEP, FLC assay.      Again, thank you for allowing me to participate in the care of your patient.        Sincerely,        Geremias Dennis MD    "

## 2023-06-19 NOTE — LETTER
6/19/2023       RE: Adelaida Packer  [address] [facility] [facility] [place] [address]     Dear Colleague,    Thank you for referring your patient, Adelaida Packer, to the [facility] [place] at [facility]. Please see a copy of my visit note below.    EDUARDO POWER    Progress Notes    New  24 Week P Health Coaching Note    Visit#1/11    24 Week Enrollment Information:     Initial Start Date: June 19  Graduation Date: Nov     Yecenia Rivera and Edel Cristobal    Employee or Non Employee: NON EMPLOYEE, teacher    Mode of Visit: (telephone or video): phone, video next time bc phone visit did not show up on [employer] schedule    Permission to receive Email pertaining to Wellbeats/24 week handouts:yes    Support Group Interest:  yes    Payment Form/ EV form received:    Welcome Email/ Wellness Assessment Received: Send today and welcome gift today; add to Addison spreadsheet    Pt gave verbal consent of $499 fee and what is covered          PATIENT INFORMATION PROVIDED via Welcome Email:  24 Week Healthy Lifestyle Plan  Fact Sheet  WellBeats Info Sheet  Payment Form or EV Form  Wellbeing Assessment/4 Pillars of Health  Support Group Info/Dates  E-Store Info  Scheduling Phone Number      The FOUR PILLARS OF HEALTH ASSESSMENT:  Rate your current level of satisfaction on a scale of 1-10 in each pillar, and state what number you would like to be at for each    SLEEP/REST:    MOVEMENT:    NUTRITION:    EMOTIONAL WELL-BEING/STRESS MANAGEMENT:    Wellness Vision;  Make me a priority.First time in 17 years that I get to take care of myself first.  I get to the gym, LIFETIME; things don't get in my way.     GOAL: June 19  I will go to LIFETIME 2 times per week. BONUS if I go more then 2 times  I will go to [employer] Class on Thursdays  I will put my classes on my calendar  I will have a protein smoothie for breakfast on gym days  Review 24 week handouts  "and complete \"Wellbeing Assessment and Wellness Vision handout      NEXT HEALTH  VISIT SCHEDULED FOR:  July 5 at 12:30 pm, VIDEO visit (you do NOT need to check-in early but join at 12:30)      FOLLOW-UP:    To schedule your next visit, please call [phone].      TIME: 60 minutes         SIGNATURE:  Carrie Ness, Certified Health  on 5/6/2021 at 10:37 AM       Wellness Vision;  Make me a priority.First time in 17 years that I get to take care of myself first.  I get to the gym, LIFETIME; things don't get in my way.     GOAL: June 19  I will go to LIFETIME 2 times per week. BONUS if I go more then 2 times  I will go to [employer] Class on Thursdays  I will put my classes on my calendar  I will have a protein smoothie for breakfast on gym days                Notes: Just started wegovy      "

## 2023-06-20 LAB
ALBUMIN SERPL ELPH-MCNC: 4.6 G/DL (ref 3.7–5.1)
ALPHA1 GLOB SERPL ELPH-MCNC: 0.3 G/DL (ref 0.2–0.4)
ALPHA2 GLOB SERPL ELPH-MCNC: 0.7 G/DL (ref 0.5–0.9)
B-GLOBULIN SERPL ELPH-MCNC: 0.9 G/DL (ref 0.6–1)
GAMMA GLOB SERPL ELPH-MCNC: 1.5 G/DL (ref 0.7–1.6)
KAPPA LC FREE SER-MCNC: 3.38 MG/DL (ref 0.33–1.94)
KAPPA LC FREE/LAMBDA FREE SER NEPH: 0.41 {RATIO} (ref 0.26–1.65)
LAMBDA LC FREE SERPL-MCNC: 8.29 MG/DL (ref 0.57–2.63)
M PROTEIN SERPL ELPH-MCNC: 0.5 G/DL
PROT PATTERN SERPL ELPH-IMP: ABNORMAL

## 2023-06-20 NOTE — PROGRESS NOTES
[facility] Hematology / Oncology  Initial Visit / Consultation Note 2023  Name: Adelaida Packer  :  1970  MRN:  [id number]    --------------------    Assessment / Plan:  IgG lambda MGUS.  CKD.    Over the course of our visit, Adelaida and I had a nice chance to review her monoclonal gammopathy of undetermined significance.  Its been sometime since she has checked in with our clinic regarding this premalignant state.  We had a good discussion about the natural history as well as red flags on organ complications and constitutional symptoms to monitor for.  Pending is good that we get her plugged back into the clinic and see her annually ongoing.  Most importantly, we plan to recheck her serum protein electrophoresis, free light chain assays, CBC and CMP to determine where she is at from an MGUS standpoint given that its been about 5 to 6 years since her last recheck.  Previously she did have some slow growth M spike that always bears close monitoring.  She is in agreement to proceed.    Thank you kindly for this consultation.  Geremias Dennis MD    --------------------    Interval History:  Adelaida present for evaluation of MGUS.  She is previously been followed but its been about 5 to 6 years since her last MGUS checks.  At that time, her M spike was 0.5 IgG lambda by immunofixation.  She is otherwise been enjoying good health.  She suffers from type 2 diabetes that she is working on diet and exercise for it.  She is follows with Dr Piña for her chronic kidney disease.  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  She is got some bilateral knee issues that have been replaced in the past due to arthritis.  She underwent parathyroid surgery complicated unfortunately by serious medical complications and postop complications putting her in a coma for some period of time fortunately her health has been great recently.  She denies any unexplained fevers, chills or  sweats.  No worsening adenopathy.  Good energy.  No unexplained bleeding.  No neuropathy.  Recent CBC has been reassuring.  Recent CMP shows stable chronic kidney disease.  Her lambda free light chain was minimally elevated in the past.  The kappa.    --------------------    Review of Systems:  10 point ROS negative except for that above.    Past Medical / Surgical History:  Past Medical History:   Diagnosis Date     Allergic rhinitis due to other allergen     seasonal     Arthritis      Diabetes (H)      Localized osteoarthritis of both knees      Migraine, unspecified, without mention of intractable migraine without mention of status migrainosus      Monoclonal gammopathy      Morbid obesity (H)      Type 2 diabetes mellitus with stage 3a chronic kidney disease, without long-term current use of insulin (H) 8/9/2016     Unspecified essential hypertension     dx around age 31     Past Surgical History:   Procedure Laterality Date     ARTHROPLASTY KNEE Right 8/11/2021    Procedure: ARTHROPLASTY, KNEE, TOTAL RIGHT;  Surgeon: Dylan Hernandez MD;  Location: [facility] OR     ARTHROPLASTY KNEE Left 7/27/2022    Procedure: ARTHROPLASTY, LEFT  KNEE, TOTAL;  Surgeon: Dylan Hernandez MD;  Location: [facility]     BUNIONECTOMY Right 7/6/2018    Procedure: BUNIONECTOMY;  Surgeon: Erik Bazan DPM;  Location: [facility];  Service:      EXCISE GANGLION WRIST Right 7/6/2018    Procedure: EXCISION OF THE GANGLION CYST, BUNIONECTOMU WITH FIRST METATARSAL  OSTEOTOMY WITH INTERNAL SCREW FIXATION, A SUBTALAR JIONT ARTHROERESIS OF THE RIGHT FOOT AND GASTROCNEMIUS RECESSION RIGHT LOWER EXTREMITY;  Surgeon: Erik Bazan DPM;  Location: [facility];  Service:      HC REMOVE TONSILS/ADENOIDS,12+ Y/O  1995     HEAD & NECK SURGERY  3/2013    Parathyroidectomy--had to go back in 6 days later for a possible bleed     HYSTERECTOMY       HYSTERECTOMY, VAGINAL  12/2005    hysterectomy- fibroids     KNEE SURGERY Right       ORTHOPEDIC SURGERY       PARATHYROIDECTOMY       TONSILLECTOMY       [facility] ANESTH,KNEE AREA SURGERY  01/2001     [facility] GASTROPLASTY,OBESITY,VERT BAND      removed 2009       Family History:  Family History   Problem Relation Age of Onset     Hypertension Mother      Gynecology Mother         hysterectomy     Gastrointestinal Disease Mother         bowel upstruction     Thyroid Disease Mother      Neurologic Disorder Mother      Osteoporosis Mother      Anesthesia Reaction Mother         migraines     Hypertension Father      Lipids Father      Arthritis Father      Heart Disease Father      Diabetes Sister      Cerebrovascular Disease Sister      Prostate Cancer Brother      Alcohol/Drug Brother      Circulatory Brother      Cancer Maternal Grandmother         tumor-head     Obesity Maternal Grandmother      Cancer Maternal Grandfather         bone     Eye Disorder Maternal Grandfather      Prostate Cancer Maternal Grandfather      Glaucoma Maternal Grandfather      Arthritis Paternal Grandmother      Respiratory Daughter         asthma     Macular Degeneration No family hx of      Deep Vein Thrombosis (DVT) No family hx of        Social History:  Social History     Socioeconomic History     Marital status: Single     Spouse name: None     Number of children: 2     Years of education: None     Highest education level: None   Occupational History     Employer: [employer]     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 No   Social History Narrative    Lives in [place], [place]        Social Documentation:        Balanced Diet: YES    Calcium intake: 1200 calcium per day    Caffeine:  "none per day    Exercise:  type of activity walking;3-4 times per week    Sunscreen: No    Seatbelts:  Yes    Self Breast Exam:  Yes    Physical/Emotional/Sexual Abuse: No     Do you feel safe in your environment? Yes        Cholesterol screen up to date: Yes-2005    Eye Exam up to date: Yes    Dental Exam up to date: Yes    Pap smear up to date: Yes    Mammogram up to date: NO, but want a Mammogram    Dexa Scan up to date: Does Not Apply    Colonoscopy up to date: Does Not Apply    [facility] date[facility]    Glucose screen if over 40:  NO        Aislnin Wilson MA       Medications / Allergies:  Reviewed in EMR.    --------------------    Physical Exam:  VS: /81 (BP Location: Left arm, Patient Position: Chair, Cuff Size: Adult Large)   Pulse 87   Ht 1.575 m (5' 2\")   Wt 126.1 kg (278 lb)   LMP 08/13/2005   SpO2 97%   BMI 50.85 kg/m    GEN: Well appearing.    Labs / Imaging / Path:  We reviewed past CBC, CMP, SPEP, FLC assay.  "

## 2023-06-21 LAB — PTH-INTACT SERPL-MCNC: 42 PG/ML (ref 15–65)

## 2023-06-22 ENCOUNTER — TELEPHONE (OUTPATIENT)
Dept: FAMILY MEDICINE | Facility: CLINIC | Age: 53
End: 2023-06-22
Payer: COMMERCIAL

## 2023-06-22 DIAGNOSIS — E11.65 UNCONTROLLED TYPE 2 DIABETES MELLITUS WITH HYPERGLYCEMIA (H): Primary | ICD-10-CM

## 2023-06-22 NOTE — TELEPHONE ENCOUNTER
Reason for Call:  Same Day Appointment, Requested Provider:      PCP: Windy Gordillo    Reason for visit: Diabetes check    Duration of symptoms: N/a    Have you been treated for this in the past? Yes    Additional comments: appointments are booking out into September- and she is due for a follow up in mid July.  Can we use a GUILLERMO or same day slot?    Can we leave a detailed message on this number? YES    Phone number patient can be reached at: Cell number on file:    Telephone Information:   Mobile [phone]       Best Time: anytime    Call taken on 6/22/2023 at 12:36 PM by Joselyn Bright

## 2023-06-29 NOTE — PROGRESS NOTES
Outcome for 07/05/23 1:08 PM: Glucose readings sent via Carmelo Almonte CMA  Adult Endocrinology  [facility] [place]  Outcome for 06/29/23 4:21 PM: Hi-Dis(M[place]) message sent  Tricia Toney CMA  Adult Endocrinology  [facility]

## 2023-07-03 ENCOUNTER — TELEPHONE (OUTPATIENT)
Dept: ENDOCRINOLOGY | Facility: CLINIC | Age: 53
End: 2023-07-03
Payer: COMMERCIAL

## 2023-07-03 DIAGNOSIS — E66.813 CLASS 3 SEVERE OBESITY DUE TO EXCESS CALORIES WITH SERIOUS COMORBIDITY AND BODY MASS INDEX (BMI) OF 40.0 TO 44.9 IN ADULT (H): ICD-10-CM

## 2023-07-03 DIAGNOSIS — E66.01 CLASS 3 SEVERE OBESITY DUE TO EXCESS CALORIES WITH SERIOUS COMORBIDITY AND BODY MASS INDEX (BMI) OF 40.0 TO 44.9 IN ADULT (H): ICD-10-CM

## 2023-07-03 NOTE — TELEPHONE ENCOUNTER
Let's have her go back down to 1 mg. I'll send in a new prescription now. Let me know if she has any questions.     Thanks,   Dee Almonte, [facility]  Adult Endocrinology  [facility], [place]

## 2023-07-03 NOTE — TELEPHONE ENCOUNTER
[facility] Call Center    Phone Message    May a detailed message be left on voicemail: yes     Reason for Call: Other: Adelaida called and informed she is having nasua vomitting and cramps diareha  since increase to 1.7 on her wegovy medication please contact to assist       Action Taken: Other: endo    Travel Screening: Not Applicable

## 2023-07-05 ENCOUNTER — VIRTUAL VISIT (OUTPATIENT)
Dept: ENDOCRINOLOGY | Facility: CLINIC | Age: 53
End: 2023-07-05

## 2023-07-05 ENCOUNTER — OFFICE VISIT (OUTPATIENT)
Dept: OPTOMETRY | Facility: CLINIC | Age: 53
End: 2023-07-05
Payer: COMMERCIAL

## 2023-07-05 DIAGNOSIS — E66.9 OBESITY: Primary | ICD-10-CM

## 2023-07-05 DIAGNOSIS — E11.65 UNCONTROLLED TYPE 2 DIABETES MELLITUS WITH HYPERGLYCEMIA (H): Primary | ICD-10-CM

## 2023-07-05 PROCEDURE — 99207 PR NO CHARGE LOS: CPT | Mod: VID

## 2023-07-05 PROCEDURE — 99213 OFFICE O/P EST LOW 20 MIN: CPT | Performed by: OPTOMETRIST

## 2023-07-05 ASSESSMENT — REFRACTION_WEARINGRX
OD_ADD: +2.25
OS_SPHERE: -4.75
OS_CYLINDER: +0.75
OD_SPHERE: -4.50
OS_AXIS: 070
OD_AXIS: 175
OS_ADD: +2.25
OD_CYLINDER: +0.25

## 2023-07-05 ASSESSMENT — KERATOMETRY
OS_AXISANGLE_DEGREES: 094
OD_K1POWER_DIOPTERS: 45.00
OS_K2POWER_DIOPTERS: 45.75
OS_AXISANGLE2_DEGREES: 004
OD_AXISANGLE_DEGREES: 081
OD_K2POWER_DIOPTERS: 45.75
OS_K1POWER_DIOPTERS: 45.00
OD_AXISANGLE2_DEGREES: 171

## 2023-07-05 ASSESSMENT — VISUAL ACUITY
OD_CC+: -1
METHOD: SNELLEN - LINEAR
CORRECTION_TYPE: GLASSES
OD_CC: 20/20
OS_CC: 20/20
OS_CC+: -2

## 2023-07-05 ASSESSMENT — REFRACTION_CURRENTRX
OD_SPHERE: -4.25
OS_SPHERE: -4.00

## 2023-07-05 NOTE — LETTER
7/5/2023         RE: Adelaida Packer  [id number] [place] [address]        Dear Colleague,    Thank you for referring your patient, Adelaida Packer, to the [facility]. Please see a copy of my visit note below.    Chief Complaint   Patient presents with     Diabetic Eye Exam Follow Up        Chief Complaint(s) and History of Present Illness(es)     Diabetic Eye Exam Follow Up                Lab Results   Component Value Date    A1C 8.0 04/17/2023    A1C 13.0 02/10/2023    A1C 5.9 03/11/2022    A1C 5.5 07/19/2021    A1C 6.1 11/30/2020    A1C 5.5 11/19/2019    A1C 5.3 01/29/2019    A1C 5.2 08/06/2018    A1C 5.2 08/15/2017     HPI: Patient presents 3 month check up. Vision is clear, she switches between glasses & contacts. She states blood sugar levels are well controlled.     Last visit she had a big refractive change to less myopia.  She is back wearing her old prescription and things seem clear.  She is scheduled for A1C on Friday.    NEVIN Black        Medical, surgical and family histories reviewed and updated 7/5/2023.       OBJECTIVE: See Ophthalmology exam    ASSESSMENT:    ICD-10-CM    1. Uncontrolled type 2 diabetes mellitus with hyperglycemia (H)  E11.65     Improved vision but may refraction may still be changing          PLAN:    Adelaida Packer aware  eye exam results will be sent to Windy Gordillo  Patient Instructions   Hold off on updating eyeglass prescription at this time.  Trial contact lenses dispensed for the interm.    Return in 1 month for refraction or sooner if needed.    Zac Sandhu, BRIGHT               Again, thank you for allowing me to participate in the care of your patient.        Sincerely,        Zac Sandhu, BRIGHT

## 2023-07-05 NOTE — PROGRESS NOTES
24 wk  Return Visit  #2  Hjelm  Video today    Will discuss nausea/wegovy with the Dr this Friday  Has not started wellness/self care yet due to being busy with work, visitors and nausea   Came up with an exercise plan (prepare for walking a lot in August for trip      GOALS: JULY 5: I am preparing for my August trip so I am at my best. I am putting myself first, one step at a time.  1.) Mindset: It is ok if I don't feel well. I will show up and do my best.  I will feel better after. Deep breaths  2.) Exercise Plan: Water Aerobics and Chair 2 times per week at [place]; Alternate walking and biking every other day; Text Linda my plan and see if we can set up some walking dates; Refit [employer] on Thursday- try it out. PLAN B, go to [place] when I do not walk outside  3.) Develop a rythhm/flow for my summer days with some structure and some free time  4.) Lean in to quiet and alone time    NEXT [facility]  VISITS: July 18 at 11 am and August 1 at 11 am, VIDEO visits with Carrie Ness  [facility], National Board Certified Health &   Lead Health   [facility]  [email or web address]  [email or web address]   To schedule: [phone]   Gender pronouns: she/her

## 2023-07-05 NOTE — PROGRESS NOTES
Chief Complaint   Patient presents with     Diabetic Eye Exam Follow Up        Chief Complaint(s) and History of Present Illness(es)     Diabetic Eye Exam Follow Up                Lab Results   Component Value Date    A1C 8.0 04/17/2023    A1C 13.0 02/10/2023    A1C 5.9 03/11/2022    A1C 5.5 07/19/2021    A1C 6.1 11/30/2020    A1C 5.5 11/19/2019    A1C 5.3 01/29/2019    A1C 5.2 08/06/2018    A1C 5.2 08/15/2017     HPI: Patient presents 3 month check up. Vision is clear, she switches between glasses & contacts. She states blood sugar levels are well controlled.     Last visit she had a big refractive change to less myopia.  She is back wearing her old prescription and things seem clear.  She is scheduled for A1C on Friday.    NEVIN Black        Medical, surgical and family histories reviewed and updated 7/5/2023.       OBJECTIVE: See Ophthalmology exam    ASSESSMENT:    ICD-10-CM    1. Uncontrolled type 2 diabetes mellitus with hyperglycemia (H)  E11.65     Improved vision but may refraction may still be changing          PLAN:    Adelaida Packer aware  eye exam results will be sent to Windy Gordillo  Patient Instructions   Hold off on updating eyeglass prescription at this time.  Trial contact lenses dispensed for the interm.    Return in 1 month for refraction or sooner if needed.    Zac Sandhu OD

## 2023-07-05 NOTE — LETTER
7/5/2023     RE: Adelaida Packer  [address] [place] [address]     Dear Colleague,    Thank you for referring your patient, Adelaida Packer, to the [facility] [place] [facility]. Please see a copy of my visit note below.    24 wk  Return Visit  #2  Hjelm  Video today    Will discuss nausea/wegovy with the Dr this Friday  Has not started wellness/self care yet due to being busy with work, visitors and nausea   Came up with an exercise plan (prepare for walking a lot in August for trip      GOALS: JULY 5: I am preparing for my August trip so I am at my best. I am putting myself first, one step at a time.  1.) Mindset: It is ok if I don't feel well. I will show up and do my best.  I will feel better after. Deep breaths  2.) Exercise Plan: Water Aerobics and Chair 2 times per week at [place]; Alternate walking and biking every other day; Text Linda my plan and see if we can set up some walking dates; Refit [employer] on Thursday- try it out. PLAN B, go to [place] when I do not walk outside  3.) Develop a rythhm/flow for my summer days with some structure and some free time  4.) Lean in to quiet and alone time    NEXT HEALTH  VISITS: July 18 at 11 am and August 1 at 11 am, VIDEO visits with Carrie Ness  [facility], National Board Certified Health &   Lead Health   [facility]  [email or web address]  [email or web address]   To schedule: [phone]   Gender pronouns: she/her

## 2023-07-06 ASSESSMENT — REFRACTION_CURRENTRX
OD_BRAND: ALCON DAILIES TOTAL 1 MULTI  BC 8.5 D 14.1
OS_ADD: MEDIUM
OD_ADD: MEDIUM
OS_BRAND: ALCON DAILIES TOTAL 1 MULTI  BC 8.5 D 14.1

## 2023-07-06 ASSESSMENT — REFRACTION_MANIFEST
OS_SPHERE: -3.75
OD_CYLINDER: +0.25
OS_CYLINDER: +0.25
OS_AXIS: 070
OD_AXIS: 175
OD_SPHERE: -3.75

## 2023-07-06 ASSESSMENT — CUP TO DISC RATIO
OD_RATIO: 0.2
OS_RATIO: 0.2

## 2023-07-06 ASSESSMENT — EXTERNAL EXAM - LEFT EYE: OS_EXAM: NORMAL

## 2023-07-06 ASSESSMENT — SLIT LAMP EXAM - LIDS
COMMENTS: NORMAL
COMMENTS: NORMAL

## 2023-07-06 ASSESSMENT — EXTERNAL EXAM - RIGHT EYE: OD_EXAM: NORMAL

## 2023-07-06 NOTE — PATIENT INSTRUCTIONS
Hold off on updating eyeglass prescription at this time.  Trial contact lenses dispensed for the interm.    Return in 1 month for refraction or sooner if needed.    Zac Sandhu, OD

## 2023-07-07 ENCOUNTER — OFFICE VISIT (OUTPATIENT)
Dept: ENDOCRINOLOGY | Facility: CLINIC | Age: 53
End: 2023-07-07
Payer: COMMERCIAL

## 2023-07-07 ENCOUNTER — LAB (OUTPATIENT)
Dept: LAB | Facility: CLINIC | Age: 53
End: 2023-07-07
Payer: COMMERCIAL

## 2023-07-07 VITALS
WEIGHT: 277.2 LBS | BODY MASS INDEX: 50.7 KG/M2 | HEART RATE: 73 BPM | DIASTOLIC BLOOD PRESSURE: 78 MMHG | SYSTOLIC BLOOD PRESSURE: 111 MMHG | RESPIRATION RATE: 16 BRPM | OXYGEN SATURATION: 100 %

## 2023-07-07 DIAGNOSIS — E66.01 CLASS 3 SEVERE OBESITY DUE TO EXCESS CALORIES WITH SERIOUS COMORBIDITY AND BODY MASS INDEX (BMI) OF 40.0 TO 44.9 IN ADULT (H): ICD-10-CM

## 2023-07-07 DIAGNOSIS — E11.22 TYPE 2 DIABETES MELLITUS WITH STAGE 3A CHRONIC KIDNEY DISEASE, WITHOUT LONG-TERM CURRENT USE OF INSULIN (H): Primary | ICD-10-CM

## 2023-07-07 DIAGNOSIS — E11.65 UNCONTROLLED TYPE 2 DIABETES MELLITUS WITH HYPERGLYCEMIA (H): ICD-10-CM

## 2023-07-07 DIAGNOSIS — E66.813 CLASS 3 SEVERE OBESITY DUE TO EXCESS CALORIES WITH SERIOUS COMORBIDITY AND BODY MASS INDEX (BMI) OF 40.0 TO 44.9 IN ADULT (H): ICD-10-CM

## 2023-07-07 DIAGNOSIS — N18.31 TYPE 2 DIABETES MELLITUS WITH STAGE 3A CHRONIC KIDNEY DISEASE, WITHOUT LONG-TERM CURRENT USE OF INSULIN (H): Primary | ICD-10-CM

## 2023-07-07 LAB
ANION GAP SERPL CALCULATED.3IONS-SCNC: 12 MMOL/L (ref 7–15)
BUN SERPL-MCNC: 17.6 MG/DL (ref 6–20)
CALCIUM SERPL-MCNC: 9.9 MG/DL (ref 8.6–10)
CHLORIDE SERPL-SCNC: 107 MMOL/L (ref 98–107)
CREAT SERPL-MCNC: 1.28 MG/DL (ref 0.51–0.95)
DEPRECATED HCO3 PLAS-SCNC: 22 MMOL/L (ref 22–29)
GFR SERPL CREATININE-BSD FRML MDRD: 50 ML/MIN/1.73M2
GLUCOSE SERPL-MCNC: 108 MG/DL (ref 70–99)
HBA1C MFR BLD: 6.6 % (ref 0–5.6)
POTASSIUM SERPL-SCNC: 4.5 MMOL/L (ref 3.4–5.3)
SODIUM SERPL-SCNC: 141 MMOL/L (ref 136–145)

## 2023-07-07 PROCEDURE — 36415 COLL VENOUS BLD VENIPUNCTURE: CPT

## 2023-07-07 PROCEDURE — 80048 BASIC METABOLIC PNL TOTAL CA: CPT

## 2023-07-07 PROCEDURE — 83036 HEMOGLOBIN GLYCOSYLATED A1C: CPT

## 2023-07-07 PROCEDURE — 99213 OFFICE O/P EST LOW 20 MIN: CPT | Performed by: PHYSICIAN ASSISTANT

## 2023-07-07 ASSESSMENT — PAIN SCALES - GENERAL: PAINLEVEL: NO PAIN (0)

## 2023-07-07 NOTE — LETTER
7/7/2023         RE: Adelaida Packer  [id number] [facility] [place] [address]        Dear Colleague,    Thank you for referring your patient, Adelaida Packer, to the [facility]. Please see a copy of my visit note below.    Outcome for 07/05/23 1:08 PM: Glucose readings sent via [employer]  [facility]  Adult Endocrinology  [facility]  Outcome for 06/29/23 4:21 PM: [employer] message sent  [facility]  Adult Endocrinology  [facility]           Assessment/Plan :   1. Type 2 DM. Adelaida remains stable on Farxiga. She has also continued to work on making healthy dietary decisions. She is now going to start focusing on reintroducing exercise, as well. She is doing much better. I encouraged her to keep up the good work. Her blood sugars have improved significantly. We will plan on a follow-up appt in 3 mos.    2. Obesity. Adelaida was tolerating the Wegovy well. She had taken 2 previous doses of 1.7 mg with no problems. We discussed what may have caused her new onset nausea and abdominal discomfort. I recommend that she hold off on administering the next dose for a few more days. Her stomach pain is improving and it is not going to cause any problems, if she waits a little bit longer before administering the next dose. I am also going to recommend that we continue with 1.7 mg for another month.     She will contact our office in about 3 wks for a refill of the Wegovy. If she is tolerating the 1.7 mg dose, we will increase to 2.4 mg.      I have independently reviewed and interpreted labs, imaging as indicated.      Chief complaint:  Adelaida is a 53 year old female who returns for follow-up of Type 2 DM and obesity.    I have reviewed Care Everywhere including [facility], [facility], [facility], [place], [facility], [facility] , [facility], [place] lab reports, imaging reports and provider notes as indicated.      HISTORY OF PRESENT ILLNESS  Adelaida  was doing great on her current medications until last week. She is currently taking Farxiga 10 m daily and Wegovy 1.7 mg weekly to help in the management of diabetes and obesity. She typically takes Wegovy Saturday evening but last week, she took the injection in the morning. She had plans and she was worried that she would forget to administer the injection. She then went out to lunch and made chicken ariel for dinner. That night she woke up with stomach cramps and ended up vomiting. She has been dealing with stomach cramps, gas and bloating ever since.    She denies any problems with severe hyperglycemia and/or hypoglycemia. She continues to monitor her blood sugars with twice daily fingerstick testing. She feels like her blood sugars have continued to improve since her diagnosis in February of this year. She also denies any worsening vision or problems with numbness/tingling in her feet. She is frustrated that she has not lost much weight but she is feeling better.    Endocrine relevant labs are as follows:     Latest Reference Range & Units 07/07/23 08:08   Hemoglobin A1C 0.0 - 5.6 % 6.6 (H)   (H): Data is abnormally high    REVIEW OF SYSTEMS    Endocrine: positive for diabetes  Skin: negative  Eyes: negative for, visual blurring, redness, tearing  Ears/Nose/Throat: negative  Respiratory: No shortness of breath, dyspnea on exertion, cough, or hemoptysis  Cardiovascular: negative for, chest pain, dyspnea on exertion, lower extremity edema and exercise intolerance  Gastrointestinal: positive for nausea, vomiting, dyspepsia and excessive gas or bloating, negative for, constipation and diarrhea  Genitourinary: negative for, nocturia, dysuria, frequency and urgency  Musculoskeletal: negative for, muscular weakness and nocturnal cramping  Neurologic: negative for and numbness or tingling of feet  Psychiatric: negative  Hematologic/Lymphatic/Immunologic: negative    Past Medical History  Past Medical History:    Diagnosis Date     Allergic rhinitis due to other allergen     seasonal     Arthritis      Diabetes (H)      Localized osteoarthritis of both knees      Migraine, unspecified, without mention of intractable migraine without mention of status migrainosus      Monoclonal gammopathy      Morbid obesity (H)      Type 2 diabetes mellitus with stage 3a chronic kidney disease, without long-term current use of insulin (H) 8/9/2016     Unspecified essential hypertension     dx around age 31       Medications  Current Outpatient Medications   Medication Sig Dispense Refill     acetaminophen (TYLENOL) 325 MG tablet Take 2 tablets (650 mg) by mouth every 4 hours as needed for other (mild pain) 100 tablet 0     amLODIPine (NORVASC) 5 MG tablet Take 1 tablet (5 mg) by mouth daily 90 tablet 3     aspirin (ASA) 81 MG EC tablet Take 1 tablet (81 mg) by mouth daily 90 tablet 3     atorvastatin (LIPITOR) 10 MG tablet Take 1 tablet (10 mg) by mouth every evening 90 tablet 3     blood glucose (NO BRAND SPECIFIED) lancets standard Use to test blood sugar 2 times daily or as directed. 200 lancet 3     blood glucose (NO BRAND SPECIFIED) lancets standard Use to test blood sugar 1-2 times daily or as directed. 200 each 3     blood glucose (NO BRAND SPECIFIED) test strip Use to test blood sugar 2-3 times daily or as directed. 200 strip 3     blood glucose (NO BRAND SPECIFIED) test strip Use to test blood sugar 2 times daily or as directed. 200 strip 3     blood glucose monitoring (NO BRAND SPECIFIED) meter device kit Use to test blood sugar 1-2 times daily or as directed. 1 kit 0     cetirizine (ZYRTEC) 10 MG tablet Take 10 mg by mouth daily as needed for allergies       cholecalciferol 2000 UNITS CAPS Take 2,000 Units by mouth daily 90 capsule 3     dapagliflozin (FARXIGA) 10 MG TABS tablet Take 1 tablet (10 mg) by mouth daily 90 tablet 0     eplerenone (INSPRA) 50 MG tablet Take 2 tablets (100 mg) by mouth 2 times daily 360 tablet 3      famotidine (PEPCID) 20 MG tablet Take 1 tablet (20 mg) by mouth 2 times daily 180 tablet 1     Fluocinolone Acetonide Scalp (DERMA-SMOOTHE/FS SCALP) 0.01 % OIL oil Apply nightly to the scalp for the 6 weeks 60 mL 1     fluticasone (FLONASE) 50 MCG/ACT nasal spray INSTILL 1 SPRAY INTO BOTH NOSTRILS DAILY 16 mL 1     hydrOXYzine (ATARAX) 25 MG tablet Take 0.5-1 tablets (12.5-25 mg) by mouth nightly as needed for other (sleep) 30 tablet 3     insulin glargine U-300 (TOUJEO) 300 UNIT/ML (1 units dial) pen Inject 7 Units Subcutaneous At Bedtime Profile Rx, dose change 3 mL 1     labetalol (NORMODYNE) 300 MG tablet Take 1 tablet (300 mg) by mouth 2 times daily 180 tablet 3     polyethylene glycol (MIRALAX) 17 g packet Take 17 g by mouth daily 7 packet 0     Semaglutide-Weight Management (WEGOVY) 1 MG/0.5ML pen Inject 1 mg Subcutaneous once a week 2 mL 1     SUMAtriptan (IMITREX) 25 MG tablet Profile rx,TAKE 1 TO 2 TABLETS BY MOUTH AT ONSET OF HEADACHE FOR MIGRAINE. MAY REPEAT DOSE IN 2 HOURS. MAX OF 200MG OR 2 DOSES IN 24 HOURS 18 tablet 1     naltrexone (DEPADE/REVIA) 50 MG tablet Take 1/2 tablet once daily 1-2 hours prior to worst cravings for 1 week, then increase to 1 tablet daily as directed if tolerating (Patient not taking: Reported on 7/7/2023) 30 tablet 3       Allergies  Allergies   Allergen Reactions     Sulfa Antibiotics Shortness Of Breath and Rash     Doxycycline      Severe gastritis, nausea, vomiting-ended up in the ER     Hydrochlorothiazide W-Triamterene Other (See Comments)     Renal insuff     Maxzide [Hydrochlorothiazide W/Triamterene] Other (See Comments)     Renal insuff     Metoprolol Other (See Comments)     Other reaction(s): Bradycardia  At high dose only  At high dose only     Seasonal Allergies      Hayfever, tree pollens         Family History  family history includes Alcohol/Drug in her brother; Anesthesia Reaction in her mother; Arthritis in her father and paternal grandmother; Cancer in  her maternal grandfather and maternal grandmother; Cerebrovascular Disease in her sister; Circulatory in her brother; Diabetes in her sister; Eye Disorder in her maternal grandfather; Gastrointestinal Disease in her mother; Glaucoma in her maternal grandfather; Gynecology in her mother; Heart Disease in her father; Hypertension in her father and mother; Lipids in her father; Neurologic Disorder in her mother; Obesity in her maternal grandmother; Osteoporosis in her mother; Prostate Cancer in her brother and maternal grandfather; Respiratory in her daughter; Thyroid Disease in her mother.    Social History  Social History     Tobacco Use     Smoking status: Never     Smokeless tobacco: Never   Vaping Use     Vaping Use: Never used   Substance Use Topics     Alcohol use: No     Drug use: No       Physical Exam  /78 (BP Location: Left arm, Patient Position: Sitting, Cuff Size: Adult Large)   Pulse 73   Resp 16   Wt 125.7 kg (277 lb 3.2 oz)   LMP 08/13/2005   SpO2 100%   BMI 50.70 kg/m    Body mass index is 50.7 kg/m .  GENERAL :  In no apparent distress  SKIN: Normal color, normal temperature, texture.  No hirsutism, alopecia or purple striae.     RESP: Lungs clear to auscultation bilaterally  CARDIAC: Regular rate and rhythm, normal S1 S2, without murmurs, rubs or gallops    NEURO: awake, alert, responds appropriately to questions.  Cranial nerves intact.   Moves all extremities; Gait normal.  No tremor of the outstretched hand.   EXTREMITIES: No clubbing, cyanosis or edema.    DATA REVIEW    06/20 129 107   06/21 122    06/22 122 141   06/23 120 167   06/24 199    06/25 115 132   06/26 102    06/27 135 148   06/28 116 93   06/29 104    06/30 123    07/01 101            Again, thank you for allowing me to participate in the care of your patient.        Sincerely,        Dee Tellez PA-C

## 2023-07-07 NOTE — NURSING NOTE
Adelaida Rendonon's goals for this visit include: Wygovy causing upset GI  She requests these members of her care team be copied on today's visit information: YES    PCP: Windy Gordillo    Referring Provider:  No referring provider defined for this encounter.    /78 (BP Location: Left arm, Patient Position: Sitting, Cuff Size: Adult Large)   Pulse 73   Resp 16   Wt 125.7 kg (277 lb 3.2 oz)   LMP 08/13/2005   SpO2 100%   BMI 50.70 kg/m      Do you need any medication refills at today's visit? NONE    [facility]-Department of Endocrinology  Diabetes Educators:   Di Duarte RN and Mikayla Werner RN  Clinic Nurse: MELY Duque  CMA's: Tricia Hector and Zac   EMT: Matteo  Scheduling/Clinic phone number : [phone]   Clinic Fax: [phone]  On-Call Endocrine at the [place] (after hours/weekends): [phone] option [phone]    Please call the number below to schedule your labs.  Lab    General [phone]   [facility] [phone]   [place] [phone]   [facility]  [phone]   [facility] [phone]   [place] [phone]   [facility]) [phone]   [facility]   [place] [phone]   [place] [phone]   [place] [phone]   [place] [phone]     Please reach out to the following centers to schedule your imaging appointment:  Imaging (DEXA, CT, MRI, XRAY)    [facility] ([facility], [facility] [place]) [phone]   [facility] ([place], [place]) [phone]   [facility] ([facility]) [phone]   [facility] ([facility]) [phone]     Appointment Reminders:  * Please bring meter with for staff to download  * If you are due ONLY for an A1C, it is scheduled with the nurse and will be done in clinic. You do not need to schedule a lab appointment. Fasting is not required for an A1C.  * Refill request should be submitted to your pharmacy. They will contact clinic for approval.    Matteo Stewart  Lith, EMT

## 2023-07-07 NOTE — PROGRESS NOTES
Assessment/Plan :   1. Type 2 DM. Adelaida remains stable on Farxiga. She has also continued to work on making healthy dietary decisions. She is now going to start focusing on reintroducing exercise, as well. She is doing much better. I encouraged her to keep up the good work. Her blood sugars have improved significantly. We will plan on a follow-up appt in 3 mos.    2. Obesity. Adelaida was tolerating the Wegovy well. She had taken 2 previous doses of 1.7 mg with no problems. We discussed what may have caused her new onset nausea and abdominal discomfort. I recommend that she hold off on administering the next dose for a few more days. Her stomach pain is improving and it is not going to cause any problems, if she waits a little bit longer before administering the next dose. I am also going to recommend that we continue with 1.7 mg for another month.     She will contact our office in about 3 wks for a refill of the Wegovy. If she is tolerating the 1.7 mg dose, we will increase to 2.4 mg.      I have independently reviewed and interpreted labs, imaging as indicated.      Chief complaint:  Adelaida is a 53 year old female who returns for follow-up of Type 2 DM and obesity.    I have reviewed Care Everywhere including [facility], [facility], [facility] , [facility] lab reports, imaging reports and provider notes as indicated.      HISTORY OF PRESENT ILLNESS  Adelaida was doing great on her current medications until last week. She is currently taking Farxiga 10 m daily and Wegovy 1.7 mg weekly to help in the management of diabetes and obesity. She typically takes Wegovy Saturday evening but last week, she took the injection in the morning. She had plans and she was worried that she would forget to administer the injection. She then went out to lunch and made chicken ariel for dinner. That night she woke up with stomach cramps and ended up vomiting. She has been dealing  with stomach cramps, gas and bloating ever since.    She denies any problems with severe hyperglycemia and/or hypoglycemia. She continues to monitor her blood sugars with twice daily fingerstick testing. She feels like her blood sugars have continued to improve since her diagnosis in February of this year. She also denies any worsening vision or problems with numbness/tingling in her feet. She is frustrated that she has not lost much weight but she is feeling better.    Endocrine relevant labs are as follows:     Latest Reference Range & Units 07/07/23 08:08   Hemoglobin A1C 0.0 - 5.6 % 6.6 (H)   (H): Data is abnormally high    REVIEW OF SYSTEMS    Endocrine: positive for diabetes  Skin: negative  Eyes: negative for, visual blurring, redness, tearing  Ears/Nose/Throat: negative  Respiratory: No shortness of breath, dyspnea on exertion, cough, or hemoptysis  Cardiovascular: negative for, chest pain, dyspnea on exertion, lower extremity edema and exercise intolerance  Gastrointestinal: positive for nausea, vomiting, dyspepsia and excessive gas or bloating, negative for, constipation and diarrhea  Genitourinary: negative for, nocturia, dysuria, frequency and urgency  Musculoskeletal: negative for, muscular weakness and nocturnal cramping  Neurologic: negative for and numbness or tingling of feet  Psychiatric: negative  Hematologic/Lymphatic/Immunologic: negative    Past Medical History  Past Medical History:   Diagnosis Date     Allergic rhinitis due to other allergen     seasonal     Arthritis      Diabetes (H)      Localized osteoarthritis of both knees      Migraine, unspecified, without mention of intractable migraine without mention of status migrainosus      Monoclonal gammopathy      Morbid obesity (H)      Type 2 diabetes mellitus with stage 3a chronic kidney disease, without long-term current use of insulin (H) 8/9/2016     Unspecified essential hypertension     dx around age 31       Medications  Current  Outpatient Medications   Medication Sig Dispense Refill     acetaminophen (TYLENOL) 325 MG tablet Take 2 tablets (650 mg) by mouth every 4 hours as needed for other (mild pain) 100 tablet 0     amLODIPine (NORVASC) 5 MG tablet Take 1 tablet (5 mg) by mouth daily 90 tablet 3     aspirin (ASA) 81 MG EC tablet Take 1 tablet (81 mg) by mouth daily 90 tablet 3     atorvastatin (LIPITOR) 10 MG tablet Take 1 tablet (10 mg) by mouth every evening 90 tablet 3     blood glucose (NO BRAND SPECIFIED) lancets standard Use to test blood sugar 2 times daily or as directed. 200 lancet 3     blood glucose (NO BRAND SPECIFIED) lancets standard Use to test blood sugar 1-2 times daily or as directed. 200 each 3     blood glucose (NO BRAND SPECIFIED) test strip Use to test blood sugar 2-3 times daily or as directed. 200 strip 3     blood glucose (NO BRAND SPECIFIED) test strip Use to test blood sugar 2 times daily or as directed. 200 strip 3     blood glucose monitoring (NO BRAND SPECIFIED) meter device kit Use to test blood sugar 1-2 times daily or as directed. 1 kit 0     cetirizine (ZYRTEC) 10 MG tablet Take 10 mg by mouth daily as needed for allergies       cholecalciferol 2000 UNITS CAPS Take 2,000 Units by mouth daily 90 capsule 3     dapagliflozin (FARXIGA) 10 MG TABS tablet Take 1 tablet (10 mg) by mouth daily 90 tablet 0     eplerenone (INSPRA) 50 MG tablet Take 2 tablets (100 mg) by mouth 2 times daily 360 tablet 3     famotidine (PEPCID) 20 MG tablet Take 1 tablet (20 mg) by mouth 2 times daily 180 tablet 1     Fluocinolone Acetonide Scalp (DERMA-SMOOTHE/FS SCALP) 0.01 % OIL oil Apply nightly to the scalp for the 6 weeks 60 mL 1     fluticasone (FLONASE) 50 MCG/ACT nasal spray INSTILL 1 SPRAY INTO BOTH NOSTRILS DAILY 16 mL 1     hydrOXYzine (ATARAX) 25 MG tablet Take 0.5-1 tablets (12.5-25 mg) by mouth nightly as needed for other (sleep) 30 tablet 3     insulin glargine U-300 (TOUJEO) 300 UNIT/ML (1 units dial) pen Inject 7  Units Subcutaneous At Bedtime Profile Rx, dose change 3 mL 1     labetalol (NORMODYNE) 300 MG tablet Take 1 tablet (300 mg) by mouth 2 times daily 180 tablet 3     polyethylene glycol (MIRALAX) 17 g packet Take 17 g by mouth daily 7 packet 0     Semaglutide-Weight Management (WEGOVY) 1 MG/0.5ML pen Inject 1 mg Subcutaneous once a week 2 mL 1     SUMAtriptan (IMITREX) 25 MG tablet Profile rx,TAKE 1 TO 2 TABLETS BY MOUTH AT ONSET OF HEADACHE FOR MIGRAINE. MAY REPEAT DOSE IN 2 HOURS. MAX OF 200MG OR 2 DOSES IN 24 HOURS 18 tablet 1     naltrexone (DEPADE/REVIA) 50 MG tablet Take 1/2 tablet once daily 1-2 hours prior to worst cravings for 1 week, then increase to 1 tablet daily as directed if tolerating (Patient not taking: Reported on 7/7/2023) 30 tablet 3       Allergies  Allergies   Allergen Reactions     Sulfa Antibiotics Shortness Of Breath and Rash     Doxycycline      Severe gastritis, nausea, vomiting-ended up in the ER     Hydrochlorothiazide W-Triamterene Other (See Comments)     Renal insuff     Maxzide [Hydrochlorothiazide W/Triamterene] Other (See Comments)     Renal insuff     Metoprolol Other (See Comments)     Other reaction(s): Bradycardia  At high dose only  At high dose only     Seasonal Allergies      Hayfever, tree pollens         Family History  family history includes Alcohol/Drug in her brother; Anesthesia Reaction in her mother; Arthritis in her father and paternal grandmother; Cancer in her maternal grandfather and maternal grandmother; Cerebrovascular Disease in her sister; Circulatory in her brother; Diabetes in her sister; Eye Disorder in her maternal grandfather; Gastrointestinal Disease in her mother; Glaucoma in her maternal grandfather; Gynecology in her mother; Heart Disease in her father; Hypertension in her father and mother; Lipids in her father; Neurologic Disorder in her mother; Obesity in her maternal grandmother; Osteoporosis in her mother; Prostate Cancer in her brother and  maternal grandfather; Respiratory in her daughter; Thyroid Disease in her mother.    Social History  Social History     Tobacco Use     Smoking status: Never     Smokeless tobacco: Never   Vaping Use     Vaping Use: Never used   Substance Use Topics     Alcohol use: No     Drug use: No       Physical Exam  /78 (BP Location: Left arm, Patient Position: Sitting, Cuff Size: Adult Large)   Pulse 73   Resp 16   Wt 125.7 kg (277 lb 3.2 oz)   LMP 08/13/2005   SpO2 100%   BMI 50.70 kg/m    Body mass index is 50.7 kg/m .  GENERAL :  In no apparent distress  SKIN: Normal color, normal temperature, texture.  No hirsutism, alopecia or purple striae.     RESP: Lungs clear to auscultation bilaterally  CARDIAC: Regular rate and rhythm, normal S1 S2, without murmurs, rubs or gallops    NEURO: awake, alert, responds appropriately to questions.  Cranial nerves intact.   Moves all extremities; Gait normal.  No tremor of the outstretched hand.   EXTREMITIES: No clubbing, cyanosis or edema.    DATA REVIEW    06/20 129 107   06/21 122    06/22 122 141   06/23 120 167   06/24 199    06/25 115 132   06/26 102    06/27 135 148   06/28 116 93   06/29 104    06/30 123    07/01 101

## 2023-07-12 ENCOUNTER — VIRTUAL VISIT (OUTPATIENT)
Dept: FAMILY MEDICINE | Facility: CLINIC | Age: 53
End: 2023-07-12
Payer: COMMERCIAL

## 2023-07-12 DIAGNOSIS — E11.65 UNCONTROLLED TYPE 2 DIABETES MELLITUS WITH HYPERGLYCEMIA (H): Primary | ICD-10-CM

## 2023-07-12 DIAGNOSIS — R11.0 NAUSEA: ICD-10-CM

## 2023-07-12 DIAGNOSIS — Z51.81 THERAPEUTIC DRUG MONITORING: ICD-10-CM

## 2023-07-12 DIAGNOSIS — E66.01 MORBID OBESITY WITH BODY MASS INDEX OF 45.0-49.9 IN ADULT (H): ICD-10-CM

## 2023-07-12 PROCEDURE — 99214 OFFICE O/P EST MOD 30 MIN: CPT | Mod: VID | Performed by: FAMILY MEDICINE

## 2023-07-12 RX ORDER — ONDANSETRON 4 MG/1
4 TABLET, ORALLY DISINTEGRATING ORAL EVERY 8 HOURS PRN
Qty: 30 TABLET | Refills: 0 | Status: SHIPPED | OUTPATIENT
Start: 2023-07-12 | End: 2024-01-10

## 2023-07-12 NOTE — PROGRESS NOTES
Adelaida is a 53 year old who is being evaluated via a billable video visit.      How would you like to obtain your AVS? MyChart  If the video visit is dropped, the invitation should be resent by: Text to cell phone: [phone]  Will anyone else be joining your video visit? No        Assessment & Plan     Uncontrolled type 2 diabetes mellitus with hyperglycemia (H)  Lab Results   Component Value Date    A1C 6.6 07/07/2023    A1C 8.0 04/17/2023    A1C 13.0 02/10/2023    A1C 5.9 03/11/2022    A1C 5.5 07/19/2021    A1C 6.1 11/30/2020    A1C 5.5 11/19/2019    A1C 5.3 01/29/2019    A1C 5.2 08/06/2018    A1C 5.2 08/15/2017     Last Comprehensive Metabolic Panel:  Lab Results   Component Value Date     07/07/2023    POTASSIUM 4.5 07/07/2023    CHLORIDE 107 07/07/2023    CO2 22 07/07/2023    ANIONGAP 12 07/07/2023     (H) 07/07/2023    BUN 17.6 07/07/2023    CR 1.28 (H) 07/07/2023    GFRESTIMATED 50 (L) 07/07/2023    EVGENY 9.9 07/07/2023       Reviewed labs including significantly improved A1c which is at goal,  normalized sodium, improved renal function test  Patient has just restarted taking wegovy for the past week, after starting it for a while due to concern  for nausea  Prescription given for Zofran to use as needed for nausea, whenever she is taking wegovy  Dosing and potential medication side effects discussed.  Follow-up recheck her diabetes in 3 months  Continue with healthy eating, regular exercises, portion control.    - ondansetron (ZOFRAN ODT) 4 MG ODT tab; Take 1 tablet (4 mg) by mouth every 8 hours as needed for nausea or vomiting    Nausea  as above    - ondansetron (ZOFRAN ODT) 4 MG ODT tab; Take 1 tablet (4 mg) by mouth every 8 hours as needed for nausea or vomiting    Therapeutic drug monitoring  as above    - ondansetron (ZOFRAN ODT) 4 MG ODT tab; Take 1 tablet (4 mg) by mouth every 8 hours as needed for nausea or vomiting    Morbid obesity with body mass index of 45.0-49.9 in adult (H)  Wt  Readings from Last 5 Encounters:   07/07/23 125.7 kg (277 lb 3.2 oz)   06/19/23 126.1 kg (278 lb)   06/15/23 124.7 kg (275 lb)   06/14/23 124.7 kg (275 lb)   04/21/23 127.3 kg (280 lb 9.6 oz)     We expect improvement with starting back on the Wegovy  - ondansetron (ZOFRAN ODT) 4 MG ODT tab; Take 1 tablet (4 mg) by mouth every 8 hours as needed for nausea or vomiting    Review of the result(s) of each unique test - A1c, BMP    Work on weight loss  Regular exercise  Chart documentation done in part with Dragon Voice recognition Software. Although reviewed after completion, some word and grammatical error may remain.    See Patient Instructions    Windy Gordillo MD  [facility]    Shannan Son is a 53 year old, presenting for the following health issues:  Diabetes  Patient is here for a video visit instead of in person visit due to the current COVID-19 pandemic.        7/12/2023     9:33 AM   Additional Questions   Roomed by Samantha     History of Present Illness       Diabetes:   She presents for follow up of diabetes.  She is checking home blood glucose two times daily. She checks blood glucose before meals and at bedtime.  Blood glucose is never over 200 and never under 70. She is aware of hypoglycemia symptoms including shakiness. She has no concerns regarding her diabetes at this time.  She is not experiencing numbness or burning in feet, excessive thirst, blurry vision, weight changes or redness, sores or blisters on feet.         She eats 2-3 servings of fruits and vegetables daily.She consumes 0 sweetened beverage(s) daily.She exercises with enough effort to increase her heart rate 10 to 19 minutes per day.  She exercises with enough effort to increase her heart rate 3 or less days per week.   She is taking medications regularly.       Diabetes Follow-up    How often are you checking your blood sugar? Two times daily  Blood sugar testing frequency justification:  Adjustment of  medication(s), Risk of hypoglycemia with medication(s) and Patient modifying lifestyle changes (diet, exercise) with blood sugars  What time of day are you checking your blood sugars (select all that apply)?  Before and after meals  Have you had any blood sugars above 200?  No  Have you had any blood sugars below 70?  No    What symptoms do you notice when your blood sugar is low?  None    What concerns do you have today about your diabetes? None     Do you have any of these symptoms? (Select all that apply)  No numbness or tingling in feet.  No redness, sores or blisters on feet.  No complaints of excessive thirst.  No reports of blurry vision.  No significant changes to weight.      BP Readings from Last 2 Encounters:   07/07/23 111/78   06/19/23 115/81     Hemoglobin A1C (%)   Date Value   07/07/2023 6.6 (H)   04/17/2023 8.0 (H)   11/30/2020 6.1 (H)   11/19/2019 5.5     LDL Cholesterol Calculated (mg/dL)   Date Value   04/17/2023 51   03/11/2022 106 (H)   03/29/2021 120 (H)   02/18/2019 95               Review of Systems   CONSTITUTIONAL: NEGATIVE for fever, chills, change in weight  RESP: NEGATIVE for significant cough or SOB  CV: NEGATIVE for chest pain, palpitations or peripheral edema  CV: History of hypertension  GI: NEGATIVE for nausea, abdominal pain, heartburn, or change in bowel habits  NEURO: NEGATIVE for weakness, dizziness or paresthesias  ENDOCRINE: NEGATIVE for temperature intolerance, skin/hair changes and history diabetes  PSYCHIATRIC: NEGATIVE for changes in mood or affect      Objective    Vitals - Patient Reported  Pain Score: No Pain (0)      Vitals:  No vitals were obtained today due to virtual visit.    Physical Exam   GENERAL: Healthy, alert and no distress  EYES: Eyes grossly normal to inspection  RESP: No audible wheeze, cough, or visible cyanosis.  No visible retractions or increased work of breathing.    NEURO: Cranial nerves grossly intact.  Mentation and speech appropriate for  age.  PSYCH: Mentation appears normal, affect normal/bright, judgement and insight intact, normal speech and appearance well-groomed.    Hemoglobin A1C   Date Value Ref Range Status   07/07/2023 6.6 (H) 0.0 - 5.6 % Final     Comment:     Normal <5.7%   Prediabetes 5.7-6.4%    Diabetes 6.5% or higher     Note: Adopted from ADA consensus guidelines.   11/30/2020 6.1 (H) 0 - 5.6 % Final     Comment:     Normal <5.7% Prediabetes 5.7-6.4%  Diabetes 6.5% or higher - adopted from ADA   consensus guidelines.       Last Comprehensive Metabolic Panel:  Lab Results   Component Value Date     07/07/2023    POTASSIUM 4.5 07/07/2023    CHLORIDE 107 07/07/2023    CO2 22 07/07/2023    ANIONGAP 12 07/07/2023     (H) 07/07/2023    BUN 17.6 07/07/2023    CR 1.28 (H) 07/07/2023    GFRESTIMATED 50 (L) 07/07/2023    EVGENY 9.9 07/07/2023                   Video-Visit Details    Type of service:  Video Visit     Originating Location (pt. Location): Home  Distant Location (provider location):  Off-site  Platform used for Video Visit: Delmi

## 2023-07-17 DIAGNOSIS — E11.22 TYPE 2 DIABETES MELLITUS WITH STAGE 3A CHRONIC KIDNEY DISEASE, WITHOUT LONG-TERM CURRENT USE OF INSULIN (H): ICD-10-CM

## 2023-07-17 DIAGNOSIS — N18.31 TYPE 2 DIABETES MELLITUS WITH STAGE 3A CHRONIC KIDNEY DISEASE, WITHOUT LONG-TERM CURRENT USE OF INSULIN (H): ICD-10-CM

## 2023-07-18 ENCOUNTER — VIRTUAL VISIT (OUTPATIENT)
Dept: ENDOCRINOLOGY | Facility: CLINIC | Age: 53
End: 2023-07-18

## 2023-07-18 DIAGNOSIS — E66.9 OBESITY: Primary | ICD-10-CM

## 2023-07-18 PROCEDURE — 99207 PR NO CHARGE LOS: CPT | Mod: VID

## 2023-07-18 NOTE — PROGRESS NOTES
"24 wk  Visit #3  Hjelm  VIDEO    Wegovy nauseas is better bc got zofran prescription. Pt noted her success with her A1C level down to 6.6 now; has not seen weight loss success yet.  Will book with KATARINA Cavazosah      GOALS: July 18  [employer]; bike for 30 minutes, M-F. Self Talk: \"It is just 30 minutes. I can find the time. It is my self care. I get to jam to my music.\"    Text my friend for accountability with exercise    1 week prior to school starting, try out a morning [employer] routine to set the habit early  School lunch ideas to plan and try out now; lunchables; jar salads; protein bar for snack; Factor Meals    Start reading or listening to \"Essentialism\" (envsion how I can use this with my new job and start practicing the philosophy now)    NEXT HEALTH COACHING VISIT: August 1 at 11 am (video visit; you do NOT need to check in early)    Carrie Ness  [facility], National Board Certified Health &   Lead Health   [facility] Weight Management  [email or web address]  [email or web address]   To schedule: [phone]   Gender pronouns: she/her      "

## 2023-07-18 NOTE — LETTER
"7/18/2023       RE: Adelaida Packer  [address] [place] [address]     Dear Colleague,    Thank you for referring your patient, Adelaida Packer, to the [facility] WEIGHT MANAGEMENT CLINIC [place] at [facility]. Please see a copy of my visit note below.    24 wk  Visit #3  Hjelm  VIDEO    Wegovy nauseas is better bc got zofran prescription. Pt noted her success with her A1C level down to 6.6 now; has not seen weight loss success yet.  Will book with KATARINA Hollingsworth      GOALS: July 18  [employer]; bike for 30 minutes, M-F. Self Talk: \"It is just 30 minutes. I can find the time. It is my self care. I get to jam to my music.\"    Text my friend for accountability with exercise    1 week prior to school starting, try out a morning [employer] routine to set the habit early  School lunch ideas to plan and try out now; lunchables; jar salads; protein bar for snack; Factor Meals    Start reading or listening to \"Essentialism\" (envsion how I can use this with my new job and start practicing the philosophy now)    NEXT HEALTH COACHING VISIT: August 1 at 11 am (video visit; you do NOT need to check in early)    Carrie RICHARD[facility], National Board Certified Health &   Lead Health   [facility] Comprehensive Weight Management  [email or web address]   To schedule: [phone]   Gender pronouns: she/her    "

## 2023-07-22 ENCOUNTER — MYC MEDICAL ADVICE (OUTPATIENT)
Dept: ENDOCRINOLOGY | Facility: CLINIC | Age: 53
End: 2023-07-22
Payer: COMMERCIAL

## 2023-07-22 DIAGNOSIS — N18.31 TYPE 2 DIABETES MELLITUS WITH STAGE 3A CHRONIC KIDNEY DISEASE, WITHOUT LONG-TERM CURRENT USE OF INSULIN (H): Primary | ICD-10-CM

## 2023-07-22 DIAGNOSIS — E11.22 TYPE 2 DIABETES MELLITUS WITH STAGE 3A CHRONIC KIDNEY DISEASE, WITHOUT LONG-TERM CURRENT USE OF INSULIN (H): Primary | ICD-10-CM

## 2023-07-24 ENCOUNTER — TELEPHONE (OUTPATIENT)
Dept: ENDOCRINOLOGY | Facility: CLINIC | Age: 53
End: 2023-07-24
Payer: COMMERCIAL

## 2023-07-24 DIAGNOSIS — E11.22 TYPE 2 DIABETES MELLITUS WITH STAGE 3A CHRONIC KIDNEY DISEASE, WITHOUT LONG-TERM CURRENT USE OF INSULIN (H): ICD-10-CM

## 2023-07-24 DIAGNOSIS — N18.31 TYPE 2 DIABETES MELLITUS WITH STAGE 3A CHRONIC KIDNEY DISEASE, WITHOUT LONG-TERM CURRENT USE OF INSULIN (H): ICD-10-CM

## 2023-07-24 RX ORDER — DAPAGLIFLOZIN 10 MG/1
10 TABLET, FILM COATED ORAL DAILY
Qty: 90 TABLET | Refills: 0 | Status: SHIPPED | OUTPATIENT
Start: 2023-07-24 | End: 2023-07-24

## 2023-07-24 RX ORDER — DAPAGLIFLOZIN 10 MG/1
10 TABLET, FILM COATED ORAL DAILY
Qty: 90 TABLET | Refills: 0 | Status: SHIPPED | OUTPATIENT
Start: 2023-07-24 | End: 2023-10-12

## 2023-07-24 NOTE — TELEPHONE ENCOUNTER
dapagliflozin (FARXIGA) 10 MG TABS tablet --pharmacy transfer      dapagliflozin (FARXIGA) 10 MG TABS tablet   90 tablet 0 7/24/2023  No  Sig - Route: Take 1 tablet (10 mg) by mouth daily - Oral  Prescribing Provider's NPI: [id number]  Dee Tellez

## 2023-07-24 NOTE — TELEPHONE ENCOUNTER
Patient would like to fill her Farxiga at [facility]. I accidentally forgot to change the pharmacy when I sent order. Can a new order be put in for Farxiga and sent to [facility]

## 2023-08-03 ENCOUNTER — TELEPHONE (OUTPATIENT)
Dept: DERMATOLOGY | Facility: CLINIC | Age: 53
End: 2023-08-03

## 2023-08-03 DIAGNOSIS — N18.31 TYPE 2 DIABETES MELLITUS WITH STAGE 3A CHRONIC KIDNEY DISEASE, WITHOUT LONG-TERM CURRENT USE OF INSULIN (H): ICD-10-CM

## 2023-08-03 DIAGNOSIS — E66.01 CLASS 3 SEVERE OBESITY DUE TO EXCESS CALORIES WITH SERIOUS COMORBIDITY AND BODY MASS INDEX (BMI) OF 40.0 TO 44.9 IN ADULT (H): ICD-10-CM

## 2023-08-03 DIAGNOSIS — E11.22 TYPE 2 DIABETES MELLITUS WITH STAGE 3A CHRONIC KIDNEY DISEASE, WITHOUT LONG-TERM CURRENT USE OF INSULIN (H): ICD-10-CM

## 2023-08-03 DIAGNOSIS — E66.813 CLASS 3 SEVERE OBESITY DUE TO EXCESS CALORIES WITH SERIOUS COMORBIDITY AND BODY MASS INDEX (BMI) OF 40.0 TO 44.9 IN ADULT (H): ICD-10-CM

## 2023-08-03 NOTE — TELEPHONE ENCOUNTER
Pt read [employer] message and stated she will call us to reschedule.    Cristina Samuel RN on 8/3/2023 at 2:19 PM

## 2023-08-03 NOTE — TELEPHONE ENCOUNTER
I left Adelaida doyle  informing her that her appt with Dr. Murillo this afternoon was cancelled due to provider illness. I provided the clinic phone number to call back to reschedule. Luis Daniel also sent.     Jesenia BOONE

## 2023-08-07 ENCOUNTER — VIRTUAL VISIT (OUTPATIENT)
Dept: ENDOCRINOLOGY | Facility: CLINIC | Age: 53
End: 2023-08-07

## 2023-08-07 DIAGNOSIS — I12.9 HYPERTENSION, RENAL: ICD-10-CM

## 2023-08-07 DIAGNOSIS — E66.9 OBESITY: Primary | ICD-10-CM

## 2023-08-07 PROCEDURE — 99207 PR NO CHARGE LOS: CPT | Mod: VID

## 2023-08-07 RX ORDER — LABETALOL 300 MG/1
300 TABLET, FILM COATED ORAL 2 TIMES DAILY
Qty: 180 TABLET | Refills: 3 | Status: SHIPPED | OUTPATIENT
Start: 2023-08-07 | End: 2024-01-10

## 2023-08-07 NOTE — PROGRESS NOTES
"[facility] 24 wk  [facility]  [facility]  [facility]     PREVIOUS GOALS to continue:  [employer]; bike for 30 minutes, M-F. Self Talk: \"It is just 30 minutes. I can find the time. It is my self care. I get to jam to my music.\"     Text my friend for accountability with exercise     1 week prior to school starting, try out a morning [employer] routine to set the habit early  School lunch ideas to plan and try out now; lunchables; jar salads; protein bar for snack; Factor Meals     Start reading or listening to \"Essentialism\" (envsion how I can use this with my new job and start practicing the philosophy now)    GOALS: Aug 7  Focus on Salad Prep   Follow-up with [employer] to focus on inspiration, leadership and growth     NEXT HEALTH  VISIT: August 25, 2:30 pm, video visit    TIME SPENT: 30 minutes    [facility], National Board Certified Health &   Lead Health   [facility] Weight Management  [email or web address]  [email or web address]   To schedule: [phone]   Gender pronouns: she/her                     Car[facility], National Board Certified Health &   Lead Health   [facility] Weight Management  [email or web address]  [email or web address]   To schedule: [phone]   Gender pronouns: she/her     "

## 2023-08-07 NOTE — LETTER
"8/7/2023       RE: Adelaida Packer  [address] [place] [address]     Dear Colleague,    Thank you for referring your patient, Adelaida Packer, to the [facility] WEIGHT MANAGEMENT CLINIC [place] at [facility]. Please see a copy of my visit note below.    Return 24 wk  Video  [facility]  Visit # 4     PREVIOUS GOALS to continue:  [employer]; bike for 30 minutes, M-F. Self Talk: \"It is just 30 minutes. I can find the time. It is my self care. I get to jam to my music.\"     Text my friend for accountability with exercise     1 week prior to school starting, try out a morning [employer] routine to set the habit early  School lunch ideas to plan and try out now; lunchables; jar salads; protein bar for snack; Factor Meals     Start reading or listening to \"Essentialism\" (envsion how I can use this with my new job and start practicing the philosophy now)    GOALS: Aug 7  Focus on Salad Prep   Follow-up with [employer] to focus on inspiration, leadership and growth     NEXT HEALTH  VISIT: August 25, 2:30 pm, video visit    TIME SPENT: 30 minutes    Carrie [facility], National Board Certified Health &   Lead Health   [facility]  [email or web address]  [email or web address]   To schedule: [phone]   Gender pronouns: she/her           Carrie HERPAUL, National Board Certified Health &   Lead Health   [facility] Weight Management  [email or web address]  [email or web address]   To schedule: [phone]   Gender pronouns: she/her     "

## 2023-08-07 NOTE — TELEPHONE ENCOUNTER
Refill for labetalol (NORMODYNE) 300 MG tablet  sent to patient's pharmacy. Patient's last visit with Dr Aleman was 3/28/23. Patient has a scheduled follow visit with Dr Aleman for 3/26/24.  MELY Razo Care Coordinator  Nephrology

## 2023-08-08 ENCOUNTER — MYC MEDICAL ADVICE (OUTPATIENT)
Dept: ENDOCRINOLOGY | Facility: CLINIC | Age: 53
End: 2023-08-08
Payer: COMMERCIAL

## 2023-08-08 DIAGNOSIS — E66.01 CLASS 3 SEVERE OBESITY DUE TO EXCESS CALORIES WITH SERIOUS COMORBIDITY AND BODY MASS INDEX (BMI) OF 40.0 TO 44.9 IN ADULT (H): Primary | ICD-10-CM

## 2023-08-08 DIAGNOSIS — E66.813 CLASS 3 SEVERE OBESITY DUE TO EXCESS CALORIES WITH SERIOUS COMORBIDITY AND BODY MASS INDEX (BMI) OF 40.0 TO 44.9 IN ADULT (H): Primary | ICD-10-CM

## 2023-08-14 ENCOUNTER — VIRTUAL VISIT (OUTPATIENT)
Dept: ENDOCRINOLOGY | Facility: CLINIC | Age: 53
End: 2023-08-14
Payer: COMMERCIAL

## 2023-08-14 VITALS — BODY MASS INDEX: 49.69 KG/M2 | WEIGHT: 270 LBS | HEIGHT: 62 IN

## 2023-08-14 DIAGNOSIS — E66.9 OBESITY: ICD-10-CM

## 2023-08-14 DIAGNOSIS — Z71.3 NUTRITIONAL COUNSELING: Primary | ICD-10-CM

## 2023-08-14 DIAGNOSIS — N18.31 TYPE 2 DIABETES MELLITUS WITH STAGE 3A CHRONIC KIDNEY DISEASE, WITHOUT LONG-TERM CURRENT USE OF INSULIN (H): ICD-10-CM

## 2023-08-14 DIAGNOSIS — E11.22 TYPE 2 DIABETES MELLITUS WITH STAGE 3A CHRONIC KIDNEY DISEASE, WITHOUT LONG-TERM CURRENT USE OF INSULIN (H): ICD-10-CM

## 2023-08-14 PROCEDURE — 97803 MED NUTRITION INDIV SUBSEQ: CPT | Mod: VID | Performed by: DIETITIAN, REGISTERED

## 2023-08-14 PROCEDURE — 99207 PR NO CHARGE LOS: CPT | Mod: VID | Performed by: DIETITIAN, REGISTERED

## 2023-08-14 ASSESSMENT — PAIN SCALES - GENERAL: PAINLEVEL: NO PAIN (0)

## 2023-08-14 NOTE — LETTER
"8/14/2023       RE: Adelaida Packer  [address] [place] [address]     Dear Colleague,    Thank you for referring your patient, Adelaida Packer, to the [facility] WEIGHT MANAGEMENT CLINIC [place] at [facility]. Please see a copy of my visit note below.    Adelaida Packer is a 51 year old female who is being evaluated via a billable video visit.      The patient has been notified of following:      \"This video visit will be conducted via a call between you and your physician/provider. We have found that certain health care needs can be provided without the need for an in-person physical exam.  This service lets us provide the care you need with a video conversation.  If a prescription is necessary we can send it directly to your pharmacy.  If lab work is needed we can place an order for that and you can then stop by our lab to have the test done at a later time.    Video visits are billed at different rates depending on your insurance coverage.  Please reach out to your insurance provider with any questions.    If during the course of the call the physician/provider feels a video visit is not appropriate, you will not be charged for this service.\"    How would you like to obtain your AVS? MyChart  If you are dropped from the video visit, the video invite should be resent to: [phone]  Will anyone else be joining your video visit? No  {If patient encounters technical issues they should call [phone]       Video-Visit Details    Type of service:  Video Visit    Video Start Time: 1:24 pm  Video End Time: 1:45 pm    Originating Location (pt. Location): Home    Distant Location (provider location): Offsite    Platform used for Video Visit: Garena    During this virtual visit the patient is located in [place], patient verifies this as the location during the entirety of this visit.      Weight Management Nutrition Consultation    Adelaida MARIN" "Ochoa is a 51 year old female presents today for weight management nutrition consultation.  Patient referred by Dr. Gordillo.     Pt previously worked with Dr. Stephen Null. Now working with Yecenia Andrea PA-C as of 6/14/23.     Patient with Co-morbidities of obesity including:  Type II DM yes  Renal Failure stage 3 CKD per chart  Sleep apnea no  Hypertension yes  Dyslipidemia yes, high LDL hx  Joint pain osteoarthritis and knee pain per chart  Back pain no  GERD yes    PMH includes: migraines, vit d deficiency, hyperparathyroidism, needs knee replacement     Recommended 30-40 lb weight loss for knee surgery    Anthropometrics:  Weight when started with me 6/15/21: 244 lbs  Weight 6/15/23: 275 lbs     Estimated body mass index is 49.38 kg/m  as calculated from the following:    Height as of this encounter: 1.575 m (5' 2\").    Weight as of this encounter: 122.5 kg (270 lb).     Current weight: 270 lbs, pt report    Medications for Weight Loss:  Wegovy - increasing dose to 2.4 tonight    A1C 13 Jan 2023, 8.0 4/17/23 and 6.6 7/7/23  Had a CGM but was not affordable     NUTRITION HISTORY  NKFA  Not huge on seafood - will eat some fish/shrimp, no lobster  Does not like mushrooms     Tried meal kits - got sick of it, expensive.  Tried Noom - 1200 kcal/day  Did WW in past, hard virtually. Dr. Mitchell recommended again.   Did weight study through [facility], lost 40 lbs    Limited vegetables and fruit   Beverages: coffee - almond milk and stevia, water (had worked up to 80-90 oz now less), water with crystal light, pop once in a blue moon (diet pepsi or coke), seltzer water once in a while, no alcohol    Snacks: varies, maybe pickles, cucumbers, chips, chocolate    June 2021:  Pt last seen 6/15/21.     Increased stress lately which has been associated with weight gain.     Working on ensuring good protein.  Feeling better and BG managed better when higher protein and lower carbs. Has been utilizing premier protein shakes. " Has hard boiled eggs made for this week.  Using 45 kcal alexis vanita bread.    Wants to get more vegetables in.    Hydration: lots of water, coffee    PA: has membership at [employer]. Going to water aerobics  - Limited by knee pain    Stressors: work stress, just left [employer] years (learned  was corrupt), friend passed    - Does work with a therapist    August 2023:  Going to [employer] this week. Starts work up again once returning.    Bought a Pur[employer]ify lunch box. Plans to get Factor meals once school starts again.    Continues to utilize premier protein shakes. Using Lynn protein bars but doesn't feel as satisfied long-term after them.    Increasing to Wegovy 2.4 mg tonight.     Hydration - not feeling thirsty and full sooner with fluids but trying to keep fluids on hand    PA: water aerobics, bike    Previous goals:  1. Aim to go to gym 2x/week - Going well. Did water aerobics 3x last week  2. Aim to get fruits/vegetables in 3x/week - Going well. Has been doing lots of hummus with carrots/cucumbers. Also enjoying cabbage, corn on the cob, strawberries and grapes.  3. Recommend ~85 grams of protein/day. Aiming for 20-30 grams per meal - Going well. Using protein shakes/bars to help supplement. Also found garlic beef sticks she really likes.  4. Consider meal delivery such as Factor - In progress, wants to do in fall. More on the go in summer time.    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    Nutrition Intervention  Reviewed and modified previous goals  Education on plate method and foods to incorporate.  AVS via CloudCovert    Patient demonstrates understanding.    Expected Engagement: good    Nutrition Goals  Pack some granola/protein bars and beef sticks for trip  Consider Factor meals when school starts up again   Continue working on hydration. Small frequent, sips. Keeping water  on hand  Continue staying consistent with activity as able    Protein bar ideas  Quest Protein Bars (190 Calories, 20 g protein)  Built Bar (170 Calories, 15-20 g protein)  One Protein Bar (210 calories, 20 g protein)  Lynn Signature Protein Bar ([employer]) (190 Calories, 21 g protein)  Pure Protein Bars (180 Calories, 21 g protein)3    Plate method can be used for general guidance on balanced meals/portion sizes (see link below for picture/more information)                 Make 1/2 your plate non starchy vegetable(cauliflower, broccoli, asparagus, Westley sprouts, lettuce, carrots, for example).                3+ oz lean protein sources (salmon/skinless chicken/turkey breast/pork loin/lean cuts of beef/ or non-animal protein such as black, kidney or valencia beans, tofu/edamame/tempeh)     (Note: 3 oz = deck of cards size)                 1/2 to 1 cup carbohydrate choices such as whole grain starches or starchy vegetables or fruit. For example: quinoa, brown rice, barley, potatoes, sweet potatoes, winter squash, peas, corn, or fruit.               Choose ~0-2 added fat servings at a meal (avocados, nut butters, nuts or seeds, olive oil, vegetable oils).    The Plate Method:  https://www.cdc.gov/diabetes/images/managing/Diabetes-Manage-Eat-Well-Plate-Graphic_600px.jpg    Summary of Volumetrics Eating Plan  http://Pomme de Terra.com/376436.pdf     Protein:  Chicken  Turkey  Fish  Seafood (shrimp, lobster, crab, etc)  Lean cuts of beef (93% ground, sirloin, tenderloin, etc)  Pork (limit fatter options like gonsalez)  Cottage cheese  Greek yogurt  Eggs   Low-fat Cheese  Lentils/beans  Nuts/nut butter (recommend a smaller portion if doing these options - 2 Tbsp of nuts or 1/4 cup nut butter)  Tofu  Seitan     Carbohydrates:   - Brown/Wild rice   - Egg life wrap (low carb and great source of protein!)    - Whole wheat pasta   - Protein pasta (barilla protein +, lentil pasta, etc)   - Whole wheat or carb balance tortilla   - Oatmeal  "   - Fruit   - Starchy vegetables (corn, peas, beans/lentils, potatoes)   - Whole grain crackers    - Whole wheat waffle    - [place]ney protein waffles/pancakes    - Couscous     Non-starchy Vegetables Examples:   - Lettuce   - Spinach   - Onions    - Bell pepper   - Carrots   - Broccoli   - Cabbage   - Brussel sprouts   - Cauliflower    - Asparagus    - Cucumber   - Artichoke   - Zucchini   - Bean sprouts   - Mushrooms   - Sugar snap peas   - Eggplant   - Turnips   - Celery   - Radishes    - Green beans    Starchy vegetable examples:  Corn  Green peas  Winter squash (butternut, spaghetti squash, acorn)  Beans/lentils (not including green beans)  Potatoes  Sweet potatoes       Healthy Recipe Resources:  Books:  \"The Volumetrics Eating Plan\" by Yudith Barajas, Ph.D.  \"Cooking that Counts\" by editors of [employer] Group  \"Calorie Smart Meals\" cookbook by Better Homes and Gardens (200-500 calorie meals)    Websites:  www.MoneyMenttor  www.Energy Points.eXludus Technologies  https://www.diabetesfoodhub.org/all-recipes.html  https://www.Kartela.Cinetraffic/  Https://www.choosemyplate.gov/myplatekitchen  https://snaped.fns.usda.gov/recipes-menus   https://www.You Software/gallery/6374911/dietitian-budget-high-protein-dinner-recipes/  https://www.FreedomPay.Cinetraffic/recipes/84/healthy-recipes/   Https://www.youtube.com/c/zachcoen   Eatthismuch.com       Cultural Cuisines:   Cuisine: https://www.firstnations.org/knowledge-center/recipes/  Mexican and Vegan Cuisine:   https://Cronote.Cinetraffic/  https://Synchris.Cinetraffic/recipe-index/  Chinese Cuisine: https://www.Marketo Japan.Cinetraffic/  Various Regions of African Cuisine: https://www.Learndot.com/recipes/55050/cuisines-regions//    Apps:  Paytrail anika (or website, mealime.com)   SpendSmartEatSmart       Follow-Up:  2-3 months    Time spent with patient: 21 minutes.  SAKSHI Salomon RD, LD  "

## 2023-08-14 NOTE — PROGRESS NOTES
"Adelaida Packer is a 51 year old female who is being evaluated via a billable video visit.      The patient has been notified of following:      \"This video visit will be conducted via a call between you and your physician/provider. We have found that certain health care needs can be provided without the need for an in-person physical exam.  This service lets us provide the care you need with a video conversation.  If a prescription is necessary we can send it directly to your pharmacy.  If lab work is needed we can place an order for that and you can then stop by our lab to have the test done at a later time.    Video visits are billed at different rates depending on your insurance coverage.  Please reach out to your insurance provider with any questions.    If during the course of the call the physician/provider feels a video visit is not appropriate, you will not be charged for this service.\"    How would you like to obtain your AVS? MyChart  If you are dropped from the video visit, the video invite should be resent to: [phone]  Will anyone else be joining your video visit? No  {If patient encounters technical issues they should call [phone]       Video-Visit Details    Type of service:  Video Visit    Video Start Time: 1:24 pm  Video End Time: 1:45 pm    Originating Location (pt. Location): Home    Distant Location (provider location): Offsite    Platform used for Video Visit: "[employer]"    During this virtual visit the patient is located in [place], patient verifies this as the location during the entirety of this visit.      Weight Management Nutrition Consultation    Adelaida Packer is a 51 year old female presents today for weight management nutrition consultation.  Patient referred by Dr. Gordillo.     Pt previously worked with Dr. Stephen Null. Now working with Yecenia nAdrea PA-C as of 6/14/23.     Patient with Co-morbidities of obesity including:  Type II DM yes  Renal Failure stage 3 CKD per " "chart  Sleep apnea no  Hypertension yes  Dyslipidemia yes, high LDL hx  Joint pain osteoarthritis and knee pain per chart  Back pain no  GERD yes    PMH includes: migraines, vit d deficiency, hyperparathyroidism, needs knee replacement     Recommended 30-40 lb weight loss for knee surgery    Anthropometrics:  Weight when started with me 6/15/21: 244 lbs  Weight 6/15/23: 275 lbs     Estimated body mass index is 49.38 kg/m  as calculated from the following:    Height as of this encounter: 1.575 m (5' 2\").    Weight as of this encounter: 122.5 kg (270 lb).     Current weight: 270 lbs, pt report    Medications for Weight Loss:  Wegovy - increasing dose to 2.4 tonight    A1C 13 Jan 2023, 8.0 4/17/23 and 6.6 7/7/23  Had a CGM but was not affordable     NUTRITION HISTORY  NKFA  Not huge on seafood - will eat some fish/shrimp, no lobster  Does not like mushrooms     Tried meal kits - got sick of it, expensive.  Tried Noom - 1200 kcal/day  Did WW in past, hard virtually. Dr. Mitchell recommended again.   Did weight study through [facility], lost 40 lbs    Limited vegetables and fruit   Beverages: coffee - almond milk and stevia, water (had worked up to 80-90 oz now less), water with crystal light, pop once in a blue moon (diet pepsi or coke), seltzer water once in a while, no alcohol    Snacks: varies, maybe pickles, cucumbers, chips, chocolate    June 2021:  Pt last seen 6/15/21.     Increased stress lately which has been associated with weight gain.     Working on ensuring good protein.  Feeling better and BG managed better when higher protein and lower carbs. Has been utilizing premier protein shakes. Has hard boiled eggs made for this week.  Using 45 kcal alexis vanita bread.    Wants to get more vegetables in.    Hydration: lots of water, coffee    PA: has membership at [employer]. Going to water aerobics  - Limited by knee pain    Stressors: work stress, just left [employer] of 17 years (learned  was corrupt), friend passed    - Does " work with a therapist    August 2023:  Going to [place] this week. Starts work up again once returning.    Bought a [employer] lunch box. Plans to get [employer] meals once school starts again.    Continues to utilize premier protein shakes. Using Lynn protein bars but doesn't feel as satisfied long-term after them.    Increasing to Wegovy 2.4 mg tonight.     Hydration - not feeling thirsty and full sooner with fluids but trying to keep fluids on hand    PA: water aerobics, bike    Previous goals:  1. Aim to go to gym 2x/week - Going well. Did water aerobics 3x last week  2. Aim to get fruits/vegetables in 3x/week - Going well. Has been doing lots of hummus with carrots/cucumbers. Also enjoying cabbage, corn on the cob, strawberries and grapes.  3. Recommend ~85 grams of protein/day. Aiming for 20-30 grams per meal - Going well. Using protein shakes/bars to help supplement. Also found garlic beef sticks she really likes.  4. Consider meal delivery such as [employer] - In progress, wants to do in fall. More on the go in summer time.    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    Nutrition Intervention  Reviewed and modified previous goals  Education on plate method and foods to incorporate.  AVS via [employer]    Patient demonstrates understanding.    Expected Engagement: good    Nutrition Goals  Pack some granola/protein bars and beef sticks for trip  Consider [employer] meals when school starts up again   Continue working on hydration. Small frequent, sips. Keeping water on hand  Continue staying consistent with activity as able    Protein bar ideas  Quest Protein Bars (190 Calories, 20 g protein)  Built Bar (170 Calories, 15-20 g protein)  One Protein Bar (210 calories, 20 g protein)  Lynn Signature Protein Bar ([employer]) (190 Calories, 21 g protein)  Pure Protein Bars (180 Calories, 21 g  protein)3    Plate method can be used for general guidance on balanced meals/portion sizes (see link below for picture/more information)                 Make 1/2 your plate non starchy vegetable(cauliflower, broccoli, asparagus, [place] sprouts, lettuce, carrots, for example).                3+ oz lean protein sources (salmon/skinless chicken/turkey breast/pork loin/lean cuts of beef/ or non-animal protein such as black, kidney or valencia beans, tofu/edamame/tempeh)     (Note: 3 oz = deck of cards size)                 1/2 to 1 cup carbohydrate choices such as whole grain starches or starchy vegetables or fruit. For example: quinoa, brown rice, barley, potatoes, sweet potatoes, winter squash, peas, corn, or fruit.               Choose ~0-2 added fat servings at a meal (avocados, nut butters, nuts or seeds, olive oil, vegetable oils).    The Plate Method:  https://www.cdc.gov/diabetes/images/managing/Diabetes-Manage-Eat-Well-Plate-Graphic_600px.jpg    Summary of Volumetrics Eating Plan  [email or web address]     Protein:  Chicken  Turkey  Fish  Seafood (shrimp, lobster, crab, etc)  Lean cuts of beef (93% ground, sirloin, tenderloin, etc)  Pork (limit fatter options like gonsalez)  Cottage cheese  Greek yogurt  Eggs   Low-fat Cheese  Lentils/beans  Nuts/nut butter (recommend a smaller portion if doing these options - 2 Tbsp of nuts or 1/4 cup nut butter)  Tofu  Seitan     Carbohydrates:   - Brown/Wild rice   - Egg life wrap (low carb and great source of protein!)    - Whole wheat pasta   - Protein pasta (barilla protein +, lentil pasta, etc)   - Whole wheat or carb balance tortilla   - Oatmeal    - Fruit   - Starchy vegetables (corn, peas, beans/lentils, potatoes)   - Whole grain crackers    - Whole wheat waffle    - [place] protein waffles/pancakes    - Couscous     Non-starchy Vegetables Examples:   - Lettuce   - Spinach   - Onions    - Bell pepper   - Carrots   - Broccoli   - Cabbage   - Brussel sprouts   -  "Cauliflower    - Asparagus    - Cucumber   - Artichoke   - Zucchini   - Bean sprouts   - Mushrooms   - Sugar snap peas   - Eggplant   - Turnips   - Celery   - Radishes    - Green beans    Starchy vegetable examples:  Corn  Green peas  Winter squash (butternut, spaghetti squash, acorn)  Beans/lentils (not including green beans)  Potatoes  Sweet potatoes       Healthy Recipe Resources:  Books:  \"The Volumetrics Eating Plan\" by Yudith Barajas, Ph.D.  \"Cooking that Counts\" by editors of [employer]  \"Calorie Smart Meals\" cookbook by Better Homes and ContinuityX Solutionss (200-500 calorie meals)    Websites:  www.Picolight  www.PersonSpot.[employer] New Forests Company  https://www.diabetesfoodhub.org/all-recipes.html  https://KartRocket.RedKLEVER/  Https://www.OneMobplate.gov/myplatekitchen  https://snaped.fns.usda.gov/recipes-menus   https://www.SR Labs/Propanc/8006133/dietitian-budget-high-protein-dinner-recipes/  https://www.Mistral Solutions/recipes/84/healthy-recipes/   Https://www.Jetbay.com/c/VasoNovaen   EatthiCorensic.com       Cultural Cuisines:   Cuisine: https://www.firstnations.org/knowledge-center/recipes/  Mexican and Vegan Cuisine:   https://Medypal.Nudipay Mobile Payment/  https://Rayneer/recipe-index/  Chinese Cuisine: https://www.ClassWallet.Nudipay Mobile Payment/  Various Regions of African Cuisine: https://www.SR Labs/recipes/15152/cuisines-regions//    Apps:  KupiVIP anika (or website, mealime.com)   SpendSmartEatSmart       Follow-Up:  2-3 months    Time spent with patient: 21 minutes.  SAKSHI Salomon RD, LD    "

## 2023-08-14 NOTE — PATIENT INSTRUCTIONS
Nutrition Goals  Pack some granola/protein bars and beef sticks for trip  Consider [employer] meals when school starts up again   Continue working on hydration. Small frequent, sips. Keeping water on hand  Continue staying consistent with activity as able    Protein bar ideas  Quest Protein Bars (190 Calories, 20 g protein)  Built Bar (170 Calories, 15-20 g protein)  One Protein Bar (210 calories, 20 g protein)  Lynn Signature Protein Bar ([employer]) (190 Calories, 21 g protein)  Pure Protein Bars (180 Calories, 21 g protein)3    Plate method can be used for general guidance on balanced meals/portion sizes (see link below for picture/more information)                 Make 1/2 your plate non starchy vegetable(cauliflower, broccoli, asparagus, [place] sprouts, lettuce, carrots, for example).                3+ oz lean protein sources (salmon/skinless chicken/turkey breast/pork loin/lean cuts of beef/ or non-animal protein such as black, kidney or valencia beans, tofu/edamame/tempeh)     (Note: 3 oz = deck of cards size)                 1/2 to 1 cup carbohydrate choices such as whole grain starches or starchy vegetables or fruit. For example: quinoa, brown rice, barley, potatoes, sweet potatoes, winter squash, peas, corn, or fruit.               Choose ~0-2 added fat servings at a meal (avocados, nut butters, nuts or seeds, olive oil, vegetable oils).    The Plate Method:  https://www.cdc.gov/diabetes/images/managing/Diabetes-Manage-Eat-Well-Plate-Graphic_600px.jpg    Summary of Volumetrics Eating Plan  [email or web address]     Protein:  Chicken  Turkey  Fish  Seafood (shrimp, lobster, crab, etc)  Lean cuts of beef (93% ground, sirloin, tenderloin, etc)  Pork (limit fatter options like gonsalez)  Cottage cheese  Greek yogurt  Eggs   Low-fat Cheese  Lentils/beans  Nuts/nut butter (recommend a smaller portion if doing these options - 2 Tbsp of nuts or 1/4 cup nut butter)  Tofu  Seitan     Carbohydrates:   - Brown/Wild  "rice   - Egg life wrap (low carb and great source of protein!)    - Whole wheat pasta   - Protein pasta (barilla protein +, lentil pasta, etc)   - Whole wheat or carb balance tortilla   - Oatmeal    - Fruit   - Starchy vegetables (corn, peas, beans/lentils, potatoes)   - Whole grain crackers    - Whole wheat waffle    - [place] protein waffles/pancakes    - Couscous     Non-starchy Vegetables Examples:   - Lettuce   - Spinach   - Onions    - Bell pepper   - Carrots   - Broccoli   - Cabbage   - Brussel sprouts   - Cauliflower    - Asparagus    - Cucumber   - Artichoke   - Zucchini   - Bean sprouts   - Mushrooms   - Sugar snap peas   - Eggplant   - Turnips   - Celery   - Radishes    - Green beans    Starchy vegetable examples:  Corn  Green peas  Winter squash (butternut, spaghetti squash, acorn)  Beans/lentils (not including green beans)  Potatoes  Sweet potatoes       Healthy Recipe Resources:  Books:  \"The Volumetrics Eating Plan\" by Yudith Barajas, Ph.D.  \"Cooking that Counts\" by editors of Helicomm  \"Calorie Smart Meals\" cookbook by Better Homes and Gardens (200-500 calorie meals)    Websites:  www.Yogurtistan  www.Big Bug Mining & Materials.Adyuka  https://www.diabetesfoodhub.org/all-recipes.html  https://www.Xpressotive.Legend Power Systems/  Https://www.choosemyplate.gov/myplatekitchen  https://snaped.fns.usda.gov/recipes-menus   https://www."SteadyServ Technologies, LLC".Legend Power Systems/gallery/2385404/dietitian-budget-high-protein-dinner-recipes/  https://www.Entrecard.Legend Power Systems/recipes/84/healthy-recipes/   Https://www.Grooptube.com/c/Romark Laboratoriescoen   Eatthismuch.com       Cultural Cuisines:   Cuisine: https://www.Asia Media.org/knowledge-center/recipes/  Mexican and Vegan Cuisine:   https://Sensentia.Legend Power Systems/  https://dorastable.com/recipe-index/  Chinese Cuisine: https://www.Robotronica.Legend Power Systems/  Various Regions of African Cuisine: https://www."SteadyServ Technologies, LLC".Legend Power Systems/recipes/45949/cuisines-regions//    Apps:  Wavii anika (or website, mealime.com) "   [facility]       Follow-Up:  2-3 months    SAKSHI Osorio, RD, LD  Clinic #: [phone]

## 2023-08-14 NOTE — NURSING NOTE
Is the patient currently in the state of [place]? YES    Visit mode:VIDEO    If the visit is dropped, the patient can be reconnected by: TELEPHONE VISIT: Phone number: [phone]    Will anyone else be joining the visit? NO      How would you like to obtain your AVS? MyChart    Are changes needed to the allergy or medication list? NO    Reason for visit: RECH[place]I [place] on 8/14/2023 at 1:15 PM

## 2023-08-25 ENCOUNTER — VIRTUAL VISIT (OUTPATIENT)
Dept: ENDOCRINOLOGY | Facility: CLINIC | Age: 53
End: 2023-08-25

## 2023-08-25 DIAGNOSIS — E66.9 OBESITY: Primary | ICD-10-CM

## 2023-08-25 PROCEDURE — 99207 PR NO CHARGE LOS: CPT | Mod: VID

## 2023-08-25 NOTE — LETTER
8/25/2023       RE: Adelaida Packer  [address] [place] [address]     Dear Colleague,    Thank you for referring your patient, Adelaida Packer, to the [facility]IGHT MANAGEMENT [facility] [place] at [facility]. Please see a copy of my visit note below.    24 wk  Video visit #5  Emre  Had a [place] trip in [place] and walked a ton, 3-4 miles per day and gave her confidence that she can do it. 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, National Board Certified Health &   Lead Health   [facility] Weight Management  [email or web address]   To schedule: [phone]   Gender pronouns: she/her

## 2023-08-28 NOTE — PROGRESS NOTES
24 wk  Video visit #5  Emre  Had a fantastic trip in [place] and walked a ton, 3-4 miles per day and gave her confidence that she can do it. 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

## 2023-09-01 ENCOUNTER — VIRTUAL VISIT (OUTPATIENT)
Dept: ENDOCRINOLOGY | Facility: CLINIC | Age: 53
End: 2023-09-01

## 2023-09-01 DIAGNOSIS — E66.9 OBESITY: Primary | ICD-10-CM

## 2023-09-01 PROCEDURE — 99207 PR NO CHARGE LOS: CPT | Mod: VID

## 2023-09-01 NOTE — LETTER
9/1/2023       RE: Adelaida Packer  [address] [place] [address]     Dear Colleague,    Thank you for referring your patient, Adelaida Packer, to the [facility] WEIGHT MANAGEMENT CLINIC [place] at [facility]. Please see a copy of my visit note below.    Support Group  TOPIC: Share your favorites recipes, wellness tips    1 hour spent with group    Carrie Ness  [facility], National Board Certified Health &   Lead Health   [facility] Comprehensive Weight Management  [email or web address]   To schedule: [phone]   Gender pronouns: she/her

## 2023-09-05 NOTE — PROGRESS NOTES
Support Group  TOPIC: Share your favorites recipes, wellness tips    1 hour spent with group    Carrie Ness  [facility], National Board Certified Health &   Lead Health   [facility] Weight Management  [email or web address]  [email or web address]   To schedule: [phone]   Gender pronouns: she/her

## 2023-09-25 ENCOUNTER — TELEPHONE (OUTPATIENT)
Dept: FAMILY MEDICINE | Facility: CLINIC | Age: 53
End: 2023-09-25
Payer: COMMERCIAL

## 2023-09-25 NOTE — TELEPHONE ENCOUNTER
Reason for Call:  Appointment Request    Patient requesting this type of appt: Chronic Diease Management/Medication/Follow-Up    Requested provider: Windy Gordillo    Reason patient unable to be scheduled: Not within requested timeframe    When does patient want to be seen/preferred time:  Needs to get in w/in the next cpl of weeks    Comments: Pt needs to do 6 month follow up appt for diabetes mgmt and check.  Is due, nothing avl til Jan 2024    Could we send this information to you in [employer] or would you prefer to receive a phone call?:   Patient would prefer a phone call   Okay to leave a detailed message?: Yes at Cell number on file:    Telephone Information:   Mobile [phone]       Call taken on 9/25/2023 at 4:58 PM by REMI HILL

## 2023-09-26 ENCOUNTER — LAB (OUTPATIENT)
Dept: LAB | Facility: CLINIC | Age: 53
End: 2023-09-26
Attending: INTERNAL MEDICINE
Payer: COMMERCIAL

## 2023-09-26 DIAGNOSIS — N18.2 CHRONIC KIDNEY DISEASE, STAGE 2 (MILD): ICD-10-CM

## 2023-09-26 LAB
ALBUMIN MFR UR ELPH: 7.4 MG/DL
ALBUMIN SERPL BCG-MCNC: 4.3 G/DL (ref 3.5–5.2)
ANION GAP SERPL CALCULATED.3IONS-SCNC: 11 MMOL/L (ref 7–15)
BUN SERPL-MCNC: 17.9 MG/DL (ref 6–20)
CALCIUM SERPL-MCNC: 10.1 MG/DL (ref 8.6–10)
CHLORIDE SERPL-SCNC: 107 MMOL/L (ref 98–107)
CREAT SERPL-MCNC: 1.34 MG/DL (ref 0.51–0.95)
CREAT UR-MCNC: 129 MG/DL
DEPRECATED HCO3 PLAS-SCNC: 20 MMOL/L (ref 22–29)
EGFRCR SERPLBLD CKD-EPI 2021: 47 ML/MIN/1.73M2
GLUCOSE SERPL-MCNC: 108 MG/DL (ref 70–99)
HGB BLD-MCNC: 13.4 G/DL (ref 11.7–15.7)
PHOSPHATE SERPL-MCNC: 3.3 MG/DL (ref 2.5–4.5)
POTASSIUM SERPL-SCNC: 4.5 MMOL/L (ref 3.4–5.3)
PROT/CREAT 24H UR: 0.06 MG/MG CR (ref 0–0.2)
PTH-INTACT SERPL-MCNC: 57 PG/ML (ref 15–65)
SODIUM SERPL-SCNC: 138 MMOL/L (ref 135–145)

## 2023-09-26 PROCEDURE — 36415 COLL VENOUS BLD VENIPUNCTURE: CPT

## 2023-09-26 PROCEDURE — 83970 ASSAY OF PARATHORMONE: CPT

## 2023-09-26 PROCEDURE — 84156 ASSAY OF PROTEIN URINE: CPT

## 2023-09-26 PROCEDURE — 80069 RENAL FUNCTION PANEL: CPT

## 2023-09-26 PROCEDURE — 85018 HEMOGLOBIN: CPT

## 2023-10-09 ENCOUNTER — TELEPHONE (OUTPATIENT)
Dept: ENDOCRINOLOGY | Facility: CLINIC | Age: 53
End: 2023-10-09
Payer: COMMERCIAL

## 2023-10-09 NOTE — TELEPHONE ENCOUNTER
PA Initiation    Medication: WEGOVY 2.4 MG/0.75ML SC SOAJ  Insurance Company: [employer] - Phone [phone] Fax [phone]  Pharmacy Filling the Rx: [place], [place] - [address]  Filling Pharmacy Phone: [phone]  Filling Pharmacy Fax: [phone]  Start Date: 10/9/2023

## 2023-10-09 NOTE — LETTER
2023    To whom it may concern,    I am writing on behalf of patient Adelaida Packer ( 1970), in regards to a recent denial of Wegovy. Wegovy has been denied due to poor outcomes from the medication. You received information regarding Adelaida's weight before and after taking the medication. The information you received was incorrect. I have included chart notes from our first visit and the most recent visit. We first started Adelaida on Wegovy on . At the visit her weight was 280 lbs. She had been taking Victoza but she had not experienced any benefit from the medication. She was started on 0.5 mg weekly after the visit in April. We have worked up to 1.7 mg weekly and she is doing great. At our most recent visit on , her weight has dropped to 165 lbs. She would like to increase to 2.4 mg weekly in order to continue with her progress. Thank you for taking the time to consider the appeal. If you have any questions, please contact my office.    Thank you,        Dee Tellez, PAC

## 2023-10-10 NOTE — PROGRESS NOTES
Outcome for 10/10/23 1:36 PM: Glucose readings sent via Seed[employer]t printed  Outcome for 10/10/23 12:39 PM: [employer] message sent for blood sugar readings.  Tawny Almonte CMA  Adult Endocrinology  [facility], [place]

## 2023-10-11 NOTE — TELEPHONE ENCOUNTER
PA renewal for Wegovy denied. When original PA was submitted 3-2023 patients weight was 265 last appt patients weight increased 275. Pt needs to have lost 5 percent of baseline weight. Would you like to Appeal?

## 2023-10-11 NOTE — TELEPHONE ENCOUNTER
PRIOR AUTHORIZATION DENIED    Medication: WEGOVY 2.4 MG/0.75ML SC SOAJ  Insurance Company: [employer] - Phone [phone] Fax [phone]  Denial Date: 10/11/2023  Denial Rational:   Appeal Information:   Patient Notified: clinic to discuss with pt what they would like to do

## 2023-10-11 NOTE — TELEPHONE ENCOUNTER
Provider will address at appointment on 10/12/2023.     Tricia Toney CMA  Adult Endocrinology   [facility]

## 2023-10-12 ENCOUNTER — OFFICE VISIT (OUTPATIENT)
Dept: ENDOCRINOLOGY | Facility: CLINIC | Age: 53
End: 2023-10-12
Payer: COMMERCIAL

## 2023-10-12 VITALS
HEART RATE: 70 BPM | SYSTOLIC BLOOD PRESSURE: 100 MMHG | DIASTOLIC BLOOD PRESSURE: 67 MMHG | BODY MASS INDEX: 48.47 KG/M2 | OXYGEN SATURATION: 100 % | WEIGHT: 265 LBS

## 2023-10-12 DIAGNOSIS — E11.22 TYPE 2 DIABETES MELLITUS WITH STAGE 3A CHRONIC KIDNEY DISEASE, WITHOUT LONG-TERM CURRENT USE OF INSULIN (H): ICD-10-CM

## 2023-10-12 DIAGNOSIS — N18.31 TYPE 2 DIABETES MELLITUS WITH STAGE 3A CHRONIC KIDNEY DISEASE, WITHOUT LONG-TERM CURRENT USE OF INSULIN (H): ICD-10-CM

## 2023-10-12 LAB — HBA1C MFR BLD: 6.5 % (ref 4.3–?)

## 2023-10-12 PROCEDURE — 83036 HEMOGLOBIN GLYCOSYLATED A1C: CPT | Performed by: PHYSICIAN ASSISTANT

## 2023-10-12 PROCEDURE — 99213 OFFICE O/P EST LOW 20 MIN: CPT | Performed by: PHYSICIAN ASSISTANT

## 2023-10-12 RX ORDER — DAPAGLIFLOZIN 10 MG/1
10 TABLET, FILM COATED ORAL DAILY
Qty: 90 TABLET | Refills: 3 | Status: SHIPPED | OUTPATIENT
Start: 2023-10-12 | End: 2024-01-10

## 2023-10-12 NOTE — LETTER
10/12/2023         RE: Adelaida Packer  [id number] [facility] [place] [address]        Dear Colleague,    Thank you for referring your patient, Adelaida Packer, to the [facility]. Please see a copy of my visit note below.    Outcome for 10/10/23 1:36 PM: Glucose readings sent via Razer printed  Outcome for 10/10/23 12:39 PM: Razer message sent for blood sugar readings.  Tawny Almonte, [facility]  Adult Endocrinology  [facility], [place]      Assessment/Plan :   Type 2 DM. Adelaida is doing a great job at managing her blood sugars. Her hemoglobin A1C has improved to 6.5%. She is not having any problems with severe hyperglycemia and/or hypoglycemia. She also states that she feels better. We reviewed her recent laboratory results and her kidney function is down a bit. She admits that she is not great about drinking water. She feels full with the Wegovy and she has to force herself drink water. I would like her to work on increasing hydration. She can try to add in no sugar Gatorade or Pedialyte. We will send in a refill of Farxiga today. We will follow-up in 3 mos.  Obesity. Her Wegovy was denied by her insurance. It looks like we submitted inaccurate weights. Her weight when we initiated Wegovy was 280 lbs. At present time, her weight is 265 lbs. We will try to resubmit the claim.       I have independently reviewed and interpreted labs, imaging as indicated.      Chief complaint:  Adelaida is a 53 year old female who returns for follow-up of Type 2 DM and obesity.    I have reviewed Care Everywhere including [facility], [facility], [facility], [facility], [place] [facility], [facility], [facility] , , [place] lab reports, imaging reports and provider notes as indicated.      HISTORY OF PRESENT ILLNESS  Adelaida is doing well. She remains stable on her current medications and she has continued to work on diet and exercise. She states that she is really feeling good. At present time,  she takes Farxiga 10 mg daily for blood sugar control. She is also taking Wegovy 2.4 mg weekly for weight loss. She has been trying to walk more often throughout the week and she continues to work on making healthy dietary decisions. She is monitoring her blood sugars with fingerstick testing.    Adelaida was diagnosed with diabetes in February of this year. Last fall she had been dealing with a lot of stress. She was not exercising and she was making poor dietary decisions. This led to significant weight gain. In February, she developed blurred vision, body aches, and polyuria. She went to an Urgent care and she was told that her A1C ws 13%. Since that time she has been very focused on improving her diet and getting her blood sugars under better control. Soon after her diagnosis she was started on Victoza, glimepiride and Toujeo. This led to trouble with hypoglycemia and she experienced some adverse GI effects with the Victoza. In April, we switched her medications to a combination of Wegovy and Farxiga and we discontinued the once daily insulin.     Endocrine relevant labs are as follows:   Latest Reference Range & Units 10/12/23 08:30   Hemoglobin A1C POCT 4.3 - <5.7 % 6.5 !   !: Data is abnormal     Latest Reference Range & Units 03/24/23 07:51   Albumin Urine mg/L mg/L <5     REVIEW OF SYSTEMS    Endocrine: positive for diabetes and obesity  Skin: negative  Eyes: negative for, visual blurring, redness, tearing  Ears/Nose/Throat: negative  Respiratory: No shortness of breath, dyspnea on exertion, cough, or hemoptysis  Cardiovascular: negative for, chest pain, dyspnea on exertion, and exercise intolerance  Gastrointestinal: positive for nausea, negative for, vomiting, abdominal pain, excessive gas or bloating, constipation, and diarrhea  Genitourinary: negative for, nocturia, dysuria, frequency, and urgency  Musculoskeletal: negative for, muscular weakness, nocturnal cramping, and foot pain  Neurologic: negative  for, local weakness, numbness or tingling of hands, and numbness or tingling of feet  Psychiatric: negative  Hematologic/Lymphatic/Immunologic: negative    Past Medical History  Past Medical History:   Diagnosis Date     Allergic rhinitis due to other allergen     seasonal     Arthritis      Diabetes (H)      Localized osteoarthritis of both knees      Migraine, unspecified, without mention of intractable migraine without mention of status migrainosus      Monoclonal gammopathy      Morbid obesity (H)      Type 2 diabetes mellitus with stage 3a chronic kidney disease, without long-term current use of insulin (H) 8/9/2016     Unspecified essential hypertension     dx around age 31       Medications  Current Outpatient Medications   Medication Sig Dispense Refill     acetaminophen (TYLENOL) 325 MG tablet Take 2 tablets (650 mg) by mouth every 4 hours as needed for other (mild pain) 100 tablet 0     amLODIPine (NORVASC) 5 MG tablet Take 1 tablet (5 mg) by mouth daily 90 tablet 3     aspirin (ASA) 81 MG EC tablet Take 1 tablet (81 mg) by mouth daily 90 tablet 3     atorvastatin (LIPITOR) 10 MG tablet Take 1 tablet (10 mg) by mouth every evening 90 tablet 3     blood glucose (NO BRAND SPECIFIED) lancets standard Use to test blood sugar 2 times daily or as directed. 200 lancet 3     blood glucose (NO BRAND SPECIFIED) lancets standard Use to test blood sugar 1-2 times daily or as directed. 200 each 3     blood glucose (NO BRAND SPECIFIED) test strip Use to test blood sugar 2-3 times daily or as directed. 200 strip 3     blood glucose (NO BRAND SPECIFIED) test strip Use to test blood sugar 2 times daily or as directed. 200 strip 3     blood glucose monitoring (NO BRAND SPECIFIED) meter device kit Use to test blood sugar 1-2 times daily or as directed. 1 kit 0     cetirizine (ZYRTEC) 10 MG tablet Take 10 mg by mouth daily as needed for allergies       cholecalciferol 2000 UNITS CAPS Take 2,000 Units by mouth daily 90 capsule  3     dapagliflozin (FARXIGA) 10 MG TABS tablet Take 1 tablet (10 mg) by mouth daily 90 tablet 3     eplerenone (INSPRA) 50 MG tablet Take 2 tablets (100 mg) by mouth 2 times daily 360 tablet 3     famotidine (PEPCID) 20 MG tablet Take 1 tablet (20 mg) by mouth 2 times daily 180 tablet 1     Fluocinolone Acetonide Scalp (DERMA-SMOOTHE/FS SCALP) 0.01 % OIL oil Apply nightly to the scalp for the 6 weeks 60 mL 1     fluticasone (FLONASE) 50 MCG/ACT nasal spray INSTILL 1 SPRAY INTO BOTH NOSTRILS DAILY 16 mL 1     hydrOXYzine (ATARAX) 25 MG tablet Take 0.5-1 tablets (12.5-25 mg) by mouth nightly as needed for other (sleep) 30 tablet 3     labetalol (NORMODYNE) 300 MG tablet Take 1 tablet (300 mg) by mouth 2 times daily 180 tablet 3     ondansetron (ZOFRAN ODT) 4 MG ODT tab Take 1 tablet (4 mg) by mouth every 8 hours as needed for nausea or vomiting 30 tablet 0     polyethylene glycol (MIRALAX) 17 g packet Take 17 g by mouth daily 7 packet 0     Semaglutide-Weight Management (WEGOVY) 2.4 MG/0.75ML pen Inject 2.4 mg Subcutaneous once a week 3 mL 3     SUMAtriptan (IMITREX) 25 MG tablet Profile rx,TAKE 1 TO 2 TABLETS BY MOUTH AT ONSET OF HEADACHE FOR MIGRAINE. MAY REPEAT DOSE IN 2 HOURS. MAX OF 200MG OR 2 DOSES IN 24 HOURS 18 tablet 1     amoxicillin (AMOXIL) 500 MG capsule Take 4 caps (2000mg) 30-60 minutes before dental procedure (Patient not taking: Reported on 10/12/2023) 4 capsule 0       Allergies  Allergies   Allergen Reactions     Sulfa Antibiotics Shortness Of Breath and Rash     Doxycycline      Severe gastritis, nausea, vomiting-ended up in the ER     Hydrochlorothiazide W-Triamterene Other (See Comments)     Renal insuff     Maxzide [Hydrochlorothiazide W/Triamterene] Other (See Comments)     Renal insuff     Metoprolol Other (See Comments)     Other reaction(s): Bradycardia  At high dose only  At high dose only     Seasonal Allergies      Hayfever, tree pollens         Family History  family history includes  Alcohol/Drug in her brother; Anesthesia Reaction in her mother; Arthritis in her father and paternal grandmother; Cancer in her maternal grandfather and maternal grandmother; Cerebrovascular Disease in her sister; Circulatory in her brother; Diabetes in her sister; Eye Disorder in her maternal grandfather; Gastrointestinal Disease in her mother; Glaucoma in her maternal grandfather; Gynecology in her mother; Heart Disease in her father; Hypertension in her father and mother; Lipids in her father; Neurologic Disorder in her mother; Obesity in her maternal grandmother; Osteoporosis in her mother; Prostate Cancer in her brother and maternal grandfather; Respiratory in her daughter; Thyroid Disease in her mother.    Social History  Social History     Tobacco Use     Smoking status: Never     Smokeless tobacco: Never   Vaping Use     Vaping Use: Never used   Substance Use Topics     Alcohol use: No     Drug use: No       Physical Exam  /67 (BP Location: Right arm, Patient Position: Sitting, Cuff Size: Adult Large)   Pulse 70   Wt 120.2 kg (265 lb)   LMP 08/13/2005   SpO2 100%   BMI 48.47 kg/m    Body mass index is 48.47 kg/m .  GENERAL :  In no apparent distress  SKIN: Normal color, normal temperature, texture.  No hirsutism, alopecia or purple striae.     EYES: PERRL, EOMI, No scleral icterus,  No proptosis, conjunctival redness, stare, retraction  RESP: Lungs clear to auscultation bilaterally  CARDIAC: Regular rate and rhythm, normal S1 S2, without murmurs, rubs or gallops   NEURO: awake, alert, responds appropriately to questions.  Cranial nerves intact.   Moves all extremities; Gait normal.  No tremor of the outstretched hand.    EXTREMITIES: No clubbing, cyanosis or edema.    DATA REVIEW  10/9 98 mg/dl  10/8 121  10/7 105  9/24 116  9/23 124  9/22 122  9/21 113 95  9/20 117  9/19 109  9/16 124        Again, thank you for allowing me to participate in the care of your patient.         Sincerely,        Dee Tellez PA-C

## 2023-10-12 NOTE — PATIENT INSTRUCTIONS
[facility]-Department of Endocrinology  Diabetes Educators:   Di Duarte, RN and Mikayla Werner RN  Clinic Nurse: MELY Duque  CMA's: Tricia Hector and Zac   EMT: Matteo  Scheduling/Clinic phone number : [phone]   Clinic Fax: [phone]  On-Call Endocrine at the [place] (after hours/weekends): [phone] option 4    Please call the number below to schedule your labs.  [facility] [phone]   [facility] [phone]   [place] [phone]   [facility]  [phone]   [facility] [phone]   [place] [phone]   [facility]) [phone]   [facility] Walk-In Only   [place] [phone]   [place] [phone]   [place] [phone]   [place] [phone]     Please reach out to the following centers to schedule your imaging appointment:  Imaging (DEXA, CT, MRI, XRAY)    [facility] ([facility] [place]) [phone]   [facility] ([place], [place]) [phone]   [facility] ([facility]) [phone]   [facility] ([facility]) [phone]     Appointment Reminders:  * Please bring meter with for staff to download  * If you are due ONLY for an A1C, it is scheduled with the nurse and will be done in clinic. You do not need to schedule a lab appointment. Fasting is not required for an A1C.  * Refill request should be submitted to your pharmacy. They will contact clinic for approval.

## 2023-10-12 NOTE — PROGRESS NOTES
Assessment/Plan :   Type 2 DM. Adelaida is doing a great job at managing her blood sugars. Her hemoglobin A1C has improved to 6.5%. She is not having any problems with severe hyperglycemia and/or hypoglycemia. She also states that she feels better. We reviewed her recent laboratory results and her kidney function is down a bit. She admits that she is not great about drinking water. She feels full with the Wegovy and she has to force herself drink water. I would like her to work on increasing hydration. She can try to add in no sugar Gatorade or Pedialyte. We will send in a refill of Farxiga today. We will follow-up in 3 mos.  Obesity. Her Wegovy was denied by her insurance. It looks like we submitted inaccurate weights. Her weight when we initiated Wegovy was 280 lbs. At present time, her weight is 265 lbs. We will try to resubmit the claim.       I have independently reviewed and interpreted labs, imaging as indicated.      Chief complaint:  Adelaida is a 53 year old female who returns for follow-up of Type 2 DM and obesity.    I have reviewed Care Everywhere including [facility], [facility], [facility], [facility], [facility] , [facility], [place] lab reports, imaging reports and provider notes as indicated.      HISTORY OF PRESENT ILLNESS  Adelaida is doing well. She remains stable on her current medications and she has continued to work on diet and exercise. She states that she is really feeling good. At present time, she takes Farxiga 10 mg daily for blood sugar control. She is also taking Wegovy 2.4 mg weekly for weight loss. She has been trying to walk more often throughout the week and she continues to work on making healthy dietary decisions. She is monitoring her blood sugars with fingerstick testing.    Adelaida was diagnosed with diabetes in February of this year. Last fall she had been dealing with a lot of stress. She was not exercising and she was making poor dietary decisions. This led  to significant weight gain. In February, she developed blurred vision, body aches, and polyuria. She went to an Urgent care and she was told that her A1C ws 13%. Since that time she has been very focused on improving her diet and getting her blood sugars under better control. Soon after her diagnosis she was started on Victoza, glimepiride and Toujeo. This led to trouble with hypoglycemia and she experienced some adverse GI effects with the Victoza. In April, we switched her medications to a combination of Wegovy and Farxiga and we discontinued the once daily insulin.     Endocrine relevant labs are as follows:   Latest Reference Range & Units 10/12/23 08:30   Hemoglobin A1C POCT 4.3 - <5.7 % 6.5 !   !: Data is abnormal     Latest Reference Range & Units 03/24/23 07:51   Albumin Urine mg/L mg/L <5     REVIEW OF SYSTEMS    Endocrine: positive for diabetes and obesity  Skin: negative  Eyes: negative for, visual blurring, redness, tearing  Ears/Nose/Throat: negative  Respiratory: No shortness of breath, dyspnea on exertion, cough, or hemoptysis  Cardiovascular: negative for, chest pain, dyspnea on exertion, and exercise intolerance  Gastrointestinal: positive for nausea, negative for, vomiting, abdominal pain, excessive gas or bloating, constipation, and diarrhea  Genitourinary: negative for, nocturia, dysuria, frequency, and urgency  Musculoskeletal: negative for, muscular weakness, nocturnal cramping, and foot pain  Neurologic: negative for, local weakness, numbness or tingling of hands, and numbness or tingling of feet  Psychiatric: negative  Hematologic/Lymphatic/Immunologic: negative    Past Medical History  Past Medical History:   Diagnosis Date    Allergic rhinitis due to other allergen     seasonal    Arthritis     Diabetes (H)     Localized osteoarthritis of both knees     Migraine, unspecified, without mention of intractable migraine without mention of status migrainosus     Monoclonal gammopathy     Morbid  obesity (H)     Type 2 diabetes mellitus with stage 3a chronic kidney disease, without long-term current use of insulin (H) 8/9/2016    Unspecified essential hypertension     dx around age 31       Medications  Current Outpatient Medications   Medication Sig Dispense Refill    acetaminophen (TYLENOL) 325 MG tablet Take 2 tablets (650 mg) by mouth every 4 hours as needed for other (mild pain) 100 tablet 0    amLODIPine (NORVASC) 5 MG tablet Take 1 tablet (5 mg) by mouth daily 90 tablet 3    aspirin (ASA) 81 MG EC tablet Take 1 tablet (81 mg) by mouth daily 90 tablet 3    atorvastatin (LIPITOR) 10 MG tablet Take 1 tablet (10 mg) by mouth every evening 90 tablet 3    blood glucose (NO BRAND SPECIFIED) lancets standard Use to test blood sugar 2 times daily or as directed. 200 lancet 3    blood glucose (NO BRAND SPECIFIED) lancets standard Use to test blood sugar 1-2 times daily or as directed. 200 each 3    blood glucose (NO BRAND SPECIFIED) test strip Use to test blood sugar 2-3 times daily or as directed. 200 strip 3    blood glucose (NO BRAND SPECIFIED) test strip Use to test blood sugar 2 times daily or as directed. 200 strip 3    blood glucose monitoring (NO BRAND SPECIFIED) meter device kit Use to test blood sugar 1-2 times daily or as directed. 1 kit 0    cetirizine (ZYRTEC) 10 MG tablet Take 10 mg by mouth daily as needed for allergies      cholecalciferol 2000 UNITS CAPS Take 2,000 Units by mouth daily 90 capsule 3    dapagliflozin (FARXIGA) 10 MG TABS tablet Take 1 tablet (10 mg) by mouth daily 90 tablet 3    eplerenone (INSPRA) 50 MG tablet Take 2 tablets (100 mg) by mouth 2 times daily 360 tablet 3    famotidine (PEPCID) 20 MG tablet Take 1 tablet (20 mg) by mouth 2 times daily 180 tablet 1    Fluocinolone Acetonide Scalp (DERMA-SMOOTHE/FS SCALP) 0.01 % OIL oil Apply nightly to the scalp for the 6 weeks 60 mL 1    fluticasone (FLONASE) 50 MCG/ACT nasal spray INSTILL 1 SPRAY INTO BOTH NOSTRILS DAILY 16 mL 1     hydrOXYzine (ATARAX) 25 MG tablet Take 0.5-1 tablets (12.5-25 mg) by mouth nightly as needed for other (sleep) 30 tablet 3    labetalol (NORMODYNE) 300 MG tablet Take 1 tablet (300 mg) by mouth 2 times daily 180 tablet 3    ondansetron (ZOFRAN ODT) 4 MG ODT tab Take 1 tablet (4 mg) by mouth every 8 hours as needed for nausea or vomiting 30 tablet 0    polyethylene glycol (MIRALAX) 17 g packet Take 17 g by mouth daily 7 packet 0    Semaglutide-Weight Management (WEGOVY) 2.4 MG/0.75ML pen Inject 2.4 mg Subcutaneous once a week 3 mL 3    SUMAtriptan (IMITREX) 25 MG tablet Profile rx,TAKE 1 TO 2 TABLETS BY MOUTH AT ONSET OF HEADACHE FOR MIGRAINE. MAY REPEAT DOSE IN 2 HOURS. MAX OF 200MG OR 2 DOSES IN 24 HOURS 18 tablet 1    amoxicillin (AMOXIL) 500 MG capsule Take 4 caps (2000mg) 30-60 minutes before dental procedure (Patient not taking: Reported on 10/12/2023) 4 capsule 0       Allergies  Allergies   Allergen Reactions    Sulfa Antibiotics Shortness Of Breath and Rash    Doxycycline      Severe gastritis, nausea, vomiting-ended up in the ER    Hydrochlorothiazide W-Triamterene Other (See Comments)     Renal insuff    Maxzide [Hydrochlorothiazide W/Triamterene] Other (See Comments)     Renal insuff    Metoprolol Other (See Comments)     Other reaction(s): Bradycardia  At high dose only  At high dose only    Seasonal Allergies      Hayfever, tree pollens         Family History  family history includes Alcohol/Drug in her brother; Anesthesia Reaction in her mother; Arthritis in her father and paternal grandmother; Cancer in her maternal grandfather and maternal grandmother; Cerebrovascular Disease in her sister; Circulatory in her brother; Diabetes in her sister; Eye Disorder in her maternal grandfather; Gastrointestinal Disease in her mother; Glaucoma in her maternal grandfather; Gynecology in her mother; Heart Disease in her father; Hypertension in her father and mother; Lipids in her father; Neurologic Disorder in  her mother; Obesity in her maternal grandmother; Osteoporosis in her mother; Prostate Cancer in her brother and maternal grandfather; Respiratory in her daughter; Thyroid Disease in her mother.    Social History  Social History     Tobacco Use    Smoking status: Never    Smokeless tobacco: Never   Vaping Use    Vaping Use: Never used   Substance Use Topics    Alcohol use: No    Drug use: No       Physical Exam  /67 (BP Location: Right arm, Patient Position: Sitting, Cuff Size: Adult Large)   Pulse 70   Wt 120.2 kg (265 lb)   LMP 08/13/2005   SpO2 100%   BMI 48.47 kg/m    Body mass index is 48.47 kg/m .  GENERAL :  In no apparent distress  SKIN: Normal color, normal temperature, texture.  No hirsutism, alopecia or purple striae.     EYES: PERRL, EOMI, No scleral icterus,  No proptosis, conjunctival redness, stare, retraction  RESP: Lungs clear to auscultation bilaterally  CARDIAC: Regular rate and rhythm, normal S1 S2, without murmurs, rubs or gallops   NEURO: awake, alert, responds appropriately to questions.  Cranial nerves intact.   Moves all extremities; Gait normal.  No tremor of the outstretched hand.    EXTREMITIES: No clubbing, cyanosis or edema.    DATA REVIEW  10/9 98 mg/dl  10/8 121  10/7 105  9/24 116  9/23 124  9/22 122  9/21 113 95  9/20 117  9/19 109  9/16 124

## 2023-10-12 NOTE — NURSING NOTE
Adelaida Packer's goals for this visit include:   Chief Complaint   Patient presents with    Follow Up    Diabetes     She requests these members of her care team be copied on today's visit information: Yes     PCP: Windy Gordillo    Referring Provider:  No referring provider defined for this encounter.    /67 (BP Location: Right arm, Patient Position: Sitting, Cuff Size: Adult Large)   Pulse 70   Wt 120.2 kg (265 lb)   LMP 08/13/2005   SpO2 100%   BMI 48.47 kg/m      Do you need any medication refills at today's visit? Unsure    Tricia Toney CMA  Adult Endocrinology   [facility]

## 2023-10-15 RX ORDER — DAPAGLIFLOZIN 10 MG/1
TABLET, FILM COATED ORAL
Qty: 90 TABLET | Refills: 0 | OUTPATIENT
Start: 2023-10-15

## 2023-10-16 NOTE — TELEPHONE ENCOUNTER
Follow up. Will need to Appeal Wegovy. Let liaison know when Appeal letter is ready if you would like to Appeal

## 2023-10-19 NOTE — TELEPHONE ENCOUNTER
3rd follow up. Will need to Appeal the Wegovy denial. If clinic / provider would like to Appeal route appeal letter to Liaison pool. Closing encounter

## 2023-10-20 NOTE — TELEPHONE ENCOUNTER
From Provider:     Appeal letter written and clinical office notes from 4/21/23 & 10/12/23 printed and sent with Appeal Letter to Fax: [phone]  Confirmed via Right Fax that transmission was successful    [facility]  Prescription Claim Appeals  [address] [address]  [place] [address]      Placed in Misc File     Tricia Toney CMA  Adult Endocrinology   [facility]

## 2023-10-23 DIAGNOSIS — F51.02 ADJUSTMENT INSOMNIA: ICD-10-CM

## 2023-10-24 ENCOUNTER — OFFICE VISIT (OUTPATIENT)
Dept: OPTOMETRY | Facility: CLINIC | Age: 53
End: 2023-10-24
Payer: COMMERCIAL

## 2023-10-24 DIAGNOSIS — H52.13 MYOPIA OF BOTH EYES: ICD-10-CM

## 2023-10-24 DIAGNOSIS — E11.9 TYPE 2 DIABETES MELLITUS WITHOUT COMPLICATION, WITHOUT LONG-TERM CURRENT USE OF INSULIN (H): Primary | ICD-10-CM

## 2023-10-24 DIAGNOSIS — H52.4 PRESBYOPIA: ICD-10-CM

## 2023-10-24 PROCEDURE — 99213 OFFICE O/P EST LOW 20 MIN: CPT | Performed by: OPTOMETRIST

## 2023-10-24 RX ORDER — HYDROXYZINE HYDROCHLORIDE 25 MG/1
TABLET, FILM COATED ORAL
Qty: 90 TABLET | Refills: 0 | Status: SHIPPED | OUTPATIENT
Start: 2023-10-24 | End: 2024-01-10

## 2023-10-24 ASSESSMENT — REFRACTION_MANIFEST
OS_AXIS: 070
OS_CYLINDER: +1.00
OD_AXIS: 100
METHOD_AUTOREFRACTION: 1
OD_SPHERE: -4.50
OS_ADD: +2.25
OD_CYLINDER: +0.25
OS_SPHERE: -5.00
OD_ADD: +2.25

## 2023-10-24 ASSESSMENT — VISUAL ACUITY
OS_CC+: -1
OS_CC: 20/25
OD_CC: 20/25
METHOD: SNELLEN - LINEAR
CORRECTION_TYPE: GLASSES

## 2023-10-24 ASSESSMENT — EXTERNAL EXAM - LEFT EYE: OS_EXAM: NORMAL

## 2023-10-24 ASSESSMENT — REFRACTION_WEARINGRX
OD_ADD: +2.25
OD_AXIS: 175
OD_SPHERE: -4.50
OS_CYLINDER: +0.75
OS_ADD: +2.25
OS_AXIS: 070
OS_SPHERE: -4.75
OD_CYLINDER: +0.25

## 2023-10-24 ASSESSMENT — CUP TO DISC RATIO
OD_RATIO: 0.2
OS_RATIO: 0.2

## 2023-10-24 ASSESSMENT — SLIT LAMP EXAM - LIDS
COMMENTS: NORMAL
COMMENTS: NORMAL

## 2023-10-24 ASSESSMENT — EXTERNAL EXAM - RIGHT EYE: OD_EXAM: NORMAL

## 2023-10-24 NOTE — PROGRESS NOTES
Chief Complaint   Patient presents with    Diabetic Eye Exam Follow Up     Chief Complaint   Patient presents with    Diabetic Eye Exam Follow Up     {Accompanied by:[id number]}  Chief Complaint(s) and History of Present Illness(es)       Diabetic Eye Exam Follow Up              Diabetes Type: Type 2                   Lab Results   Component Value Date    A1C 6.6 07/07/2023    A1C 8.0 04/17/2023    A1C 13.0 02/10/2023    A1C 5.9 03/11/2022    A1C 5.5 07/19/2021    A1C 6.1 11/30/2020    A1C 5.5 11/19/2019    A1C 5.3 01/29/2019    A1C 5.2 08/06/2018    A1C 5.2 08/15/2017            Last Eye Exam: ***  Dilated Previously: {YES / NO:072900}    What are you currently using to see?  {options:174970}    Distance Vision Acuity: {VISION:289653}    Near Vision Acuity: {VISION:983274}    Eye Comfort: {EYE COMFORT:164445}  Do you use eye drops? : {YES (EXPLAIN)/NO:641334}  Occupation or Hobbies: ***    Ibeth [employer], A[employer]     Medical, surgical and family histories reviewed and updated 10/24/2023.       OBJECTIVE: See Ophthalmology exam    ASSESSMENT:  No diagnosis found.    PLAN:    Adelaida Packer aware  eye exam results will be sent to Windy Gordillo  There are no Patient Instructions on file for this visit.           Medical, surgical and family histories reviewed and updated 10/24/2023.       OBJECTIVE: See Ophthalmology exam    ASSESSMENT:  No diagnosis found.   PLAN:     There are no Patient Instructions on file for this visit.

## 2023-10-24 NOTE — LETTER
10/24/2023         RE: Adelaida Packer  [address] [place] [address]        Dear Colleague,    Thank you for referring your patient, Adelaida Packer, to the [facility]. Please see a copy of my visit note below.    Chief Complaint   Patient presents with     Diabetic Eye Exam Follow Up     A1c on 10- was 6.5  Eyes feel fine.    Had blurred vision over the last 9 months due to increased A1C.  She was wearing contact lenses to get by as vision changed.  Went back to old glasses about a month ago and vision has been fine.    Medical, surgical and family histories reviewed and updated 10/24/2023.       OBJECTIVE: See Ophthalmology exam    ASSESSMENT:    ICD-10-CM    1. Type 2 diabetes mellitus without complication, without long-term current use of insulin (H)  E11.9       2. Myopia of both eyes  H52.13       3. Presbyopia  H52.4          PLAN:     Patient Instructions   Eyeglass prescription given.    Return 3/2024 for dilated diabetic eye exam or sooner if needed.    Zac Sandhu, BRIGHT         Again, thank you for allowing me to participate in the care of your patient.        Sincerely,        Zac Sandhu, OD

## 2023-10-24 NOTE — PATIENT INSTRUCTIONS
Eyeglass prescription given.    Return 3/2024 for dilated diabetic eye exam or sooner if needed.    Zac Sandhu, BRIGHT

## 2023-10-24 NOTE — PROGRESS NOTES
Chief Complaint   Patient presents with    Diabetic Eye Exam Follow Up     A1c on 10- was 6.5  Eyes feel fine.    Had blurred vision over the last 9 months due to increased A1C.  She was wearing contact lenses to get by as vision changed.  Went back to old glasses about a month ago and vision has been fine.    Medical, surgical and family histories reviewed and updated 10/24/2023.       OBJECTIVE: See Ophthalmology exam    ASSESSMENT:    ICD-10-CM    1. Type 2 diabetes mellitus without complication, without long-term current use of insulin (H)  E11.9       2. Myopia of both eyes  H52.13       3. Presbyopia  H52.4          PLAN:     Patient Instructions   Eyeglass prescription given.    Return 3/2024 for dilated diabetic eye exam or sooner if needed.    Zac Sandhu, OD

## 2023-10-24 NOTE — TELEPHONE ENCOUNTER
Appeal approval received and emailed to Mark Swift.    Tawny Almonte, [facility]  Adult Endocrinology  [facility] [place]

## 2023-10-25 ENCOUNTER — VIRTUAL VISIT (OUTPATIENT)
Dept: ENDOCRINOLOGY | Facility: CLINIC | Age: 53
End: 2023-10-25
Payer: COMMERCIAL

## 2023-10-25 ENCOUNTER — TELEPHONE (OUTPATIENT)
Dept: ENDOCRINOLOGY | Facility: CLINIC | Age: 53
End: 2023-10-25

## 2023-10-25 VITALS — BODY MASS INDEX: 48.03 KG/M2 | WEIGHT: 261 LBS | HEIGHT: 62 IN

## 2023-10-25 DIAGNOSIS — E66.9 OBESITY: Primary | ICD-10-CM

## 2023-10-25 DIAGNOSIS — E66.01 CLASS 3 SEVERE OBESITY DUE TO EXCESS CALORIES WITH SERIOUS COMORBIDITY AND BODY MASS INDEX (BMI) OF 40.0 TO 44.9 IN ADULT (H): Primary | ICD-10-CM

## 2023-10-25 DIAGNOSIS — E11.9 TYPE 2 DIABETES MELLITUS WITHOUT COMPLICATION, WITHOUT LONG-TERM CURRENT USE OF INSULIN (H): ICD-10-CM

## 2023-10-25 DIAGNOSIS — E66.813 CLASS 3 SEVERE OBESITY DUE TO EXCESS CALORIES WITH SERIOUS COMORBIDITY AND BODY MASS INDEX (BMI) OF 40.0 TO 44.9 IN ADULT (H): Primary | ICD-10-CM

## 2023-10-25 PROCEDURE — 99207 PR NO CHARGE LOS: CPT | Mod: VID

## 2023-10-25 PROCEDURE — 99213 OFFICE O/P EST LOW 20 MIN: CPT | Mod: VID

## 2023-10-25 ASSESSMENT — PAIN SCALES - GENERAL: PAINLEVEL: NO PAIN (0)

## 2023-10-25 NOTE — NURSING NOTE
Is the patient currently in the state of [place]? YES    Visit mode:VIDEO    If the visit is dropped, the patient can be reconnected by: VIDEO VISIT: Text to cell phone:   Telephone Information:   Mobile [phone]       Will anyone else be joining the visit? NO  (If patient encounters technical issues they should call [phone])    How would you like to obtain your AVS? MyChart    Are changes needed to the allergy or medication list? No, Pt stated no changes to allergies, and Pt stated no med changes    Reason for visit: RECHECK (DENI)    Wendy [place]

## 2023-10-25 NOTE — LETTER
10/25/2023       RE: Adelaida Packer  [address] [place] [address]     Dear Colleague,    Thank you for referring your patient, Adelaida Packer, to the [facility] [place] at [facility]. Please see a copy of my visit note below.    Health  visit #6  24 wk  Video  Yecenia Andrea      Down 15 lbs  A1C is at 6.5  Pushing water   Liking new job  Sleep is still an issue but got a sleep med from dr Osorio 2.4  Wants to try pickleball class or walking group  Discussed not investing too much in trying to fix the education system; it is ok to just be present and focus on self care now    TIME SPENT: 60 minutes      NEXT HC: NOV 7, 10 am video    Carrie RICHARD-HW, National Board Certified Health &   Lead Health   [facility]  [email or web address]   To schedule: [phone]   Gender pronouns: she/her

## 2023-10-25 NOTE — PROGRESS NOTES
Virtual Visit Details    Type of service:  Video Visit     Originating Location (pt. Location): Home    Distant Location (provider location):  Off-site  Platform used for Video Visit: [facility] Medical Weight Management Note     Adelaida Packer  MRN:  [id number]  :  1970  JUSTUS:  10/25/2023    Dear Windy Gordillo MD,    I had the pleasure of seeing your patient Adelaida Packer. She is a 53 year old female who I am continuing to see for treatment of obesity related to:        6/15/2021     2:14 AM   --   I have the following health issues associated with obesity Pre-Diabetes    High Blood Pressure    Osteoarthritis (joint disease)   I have the following symptoms associated with obesity Knee Pain    Hip Pain       Assessment & Plan   Problem List Items Addressed This Visit       Class 3 severe obesity due to excess calories with serious comorbidity and body mass index (BMI) of 40.0 to 44.9 in adult (H) - Primary    Diabetes mellitus, type 2 (H)      Continue Wegovy 2.4mg once weekly. Prescribed by enodcrinology   Fluid goal of 64oz daily   Carrie Ness next available   Edel Chandra next available   eYcenia Rivera in 3 months     INTERVAL HISTORY:  [place] - 6/15/2021  Last seen by Dr. Mitchell- 2022  Ozempic - heartburn and nausea  Phentermine - discontinued due to surgery.   Last seen 23 - started 24 week program   Stopped Naltrexone - did not feel like it was helpful with weight loss   Phentermine - c/I due to CKD and pulmonary HTN.         Carrie Ness - has not been able to see her due to work schedule, but found her to be helpful    Anti-obesity medication history    Current:   Wegovy 2.4mg - has been on this dose for around 2 months. No side effects. Nausea at first, but has resolved. Now taking the injection at which, was helpful with nausea. Is helpful with feeling jaimes longer.     Past/Failed/contraindicated:   Phentermine - contraindicated due to history of CKD and pulmonary HTN. Discussed with  nephrologist who advised against starting. Previously trialed with side effects of dry mouth. Unsure if helped with weight.   Topiramate - side effects of numbness and tingling. No effect on weight    Recent diet changes: Started intermittent fasting - eating from 12 to 8pm. Typically eating around 2 meals a day. Decrease in portion sizes. Hunger has been manageable. Drinking 40-60oz     Recent exercise/activity changes: Working on getting 8.5K steps. Wants to get back into biking.     Recent stressors: Started new job as a reading and . Has had a lot less stress since starting.     Recent sleep changes: Tough the past couple of days - has been getting up at 2am.     Vitamins/Labs: A1C 6.5 10/12/23     CURRENT WEIGHT:   261 lbs 0 oz    Initial Weight (lbs): 244 lbs  Last Visits Weight: 120.2 kg (265 lb)  Cumulative weight loss (lbs): -17  Weight Loss Percentage: -6.97%    Wt Readings from Last 5 Encounters:   10/25/23 118.4 kg (261 lb)   10/12/23 120.2 kg (265 lb)   08/14/23 122.5 kg (270 lb)   07/07/23 125.7 kg (277 lb 3.2 oz)   06/19/23 126.1 kg (278 lb)             10/24/2023     4:52 PM   Changes and Difficulties   I have made the following changes to my diet since my last visit: Intermittent Fasting   With regards to my diet, I am still struggling with: eating sweets/sometimes over eating   I have made the following changes to my activity/exercise since my last visit: walking 8500 steps daily   With regards to my activity/exercise, I am still struggling with: Going to the gym consistently         MEDICATIONS:   Current Outpatient Medications   Medication Sig Dispense Refill    acetaminophen (TYLENOL) 325 MG tablet Take 2 tablets (650 mg) by mouth every 4 hours as needed for other (mild pain) 100 tablet 0    amLODIPine (NORVASC) 5 MG tablet Take 1 tablet (5 mg) by mouth daily 90 tablet 3    amoxicillin (AMOXIL) 500 MG capsule Take 4 caps (2000mg) 30-60 minutes before dental procedure (Patient not  taking: Reported on 10/12/2023) 4 capsule 0    aspirin (ASA) 81 MG EC tablet Take 1 tablet (81 mg) by mouth daily 90 tablet 3    atorvastatin (LIPITOR) 10 MG tablet Take 1 tablet (10 mg) by mouth every evening 90 tablet 3    blood glucose (NO BRAND SPECIFIED) lancets standard Use to test blood sugar 2 times daily or as directed. 200 lancet 3    blood glucose (NO BRAND SPECIFIED) lancets standard Use to test blood sugar 1-2 times daily or as directed. 200 each 3    blood glucose (NO BRAND SPECIFIED) test strip Use to test blood sugar 2-3 times daily or as directed. 200 strip 3    blood glucose (NO BRAND SPECIFIED) test strip Use to test blood sugar 2 times daily or as directed. 200 strip 3    blood glucose monitoring (NO BRAND SPECIFIED) meter device kit Use to test blood sugar 1-2 times daily or as directed. 1 kit 0    cetirizine (ZYRTEC) 10 MG tablet Take 10 mg by mouth daily as needed for allergies      cholecalciferol 2000 UNITS CAPS Take 2,000 Units by mouth daily 90 capsule 3    dapagliflozin (FARXIGA) 10 MG TABS tablet Take 1 tablet (10 mg) by mouth daily 90 tablet 3    eplerenone (INSPRA) 50 MG tablet Take 2 tablets (100 mg) by mouth 2 times daily 360 tablet 3    famotidine (PEPCID) 20 MG tablet Take 1 tablet (20 mg) by mouth 2 times daily 180 tablet 1    Fluocinolone Acetonide Scalp (DERMA-SMOOTHE/FS SCALP) 0.01 % OIL oil Apply nightly to the scalp for the 6 weeks 60 mL 1    fluticasone (FLONASE) 50 MCG/ACT nasal spray INSTILL 1 SPRAY INTO BOTH NOSTRILS DAILY 16 mL 1    hydrOXYzine (ATARAX) 25 MG tablet TAKE 1/2-1 TABLET NIGHTLY AS NEEDED FOR SLEEP 90 tablet 0    labetalol (NORMODYNE) 300 MG tablet Take 1 tablet (300 mg) by mouth 2 times daily 180 tablet 3    ondansetron (ZOFRAN ODT) 4 MG ODT tab Take 1 tablet (4 mg) by mouth every 8 hours as needed for nausea or vomiting 30 tablet 0    polyethylene glycol (MIRALAX) 17 g packet Take 17 g by mouth daily 7 packet 0    Semaglutide-Weight Management (WEGOVY) 2.4  "MG/0.75ML pen Inject 2.4 mg Subcutaneous once a week 3 mL 3    SUMAtriptan (IMITREX) 25 MG tablet Profile rx,TAKE 1 TO 2 TABLETS BY MOUTH AT ONSET OF HEADACHE FOR MIGRAINE. MAY REPEAT DOSE IN 2 HOURS. MAX OF 200MG OR 2 DOSES IN 24 HOURS 18 tablet 1           10/24/2023     4:52 PM   Weight Loss Medication History Reviewed With Patient   Which weight loss medications are you currently taking on a regular basis? Wegovy   Are you having any side effects from the weight loss medication that we have prescribed you? No         Objective    Ht 1.575 m (5' 2\")   Wt 118.4 kg (261 lb)   LMP 08/13/2005   BMI 47.74 kg/m      Vitals - Patient Reported  Pain Score: No Pain (0)      PHYSICAL EXAM:    GENERAL: Healthy, alert and no distress  EYES: Eyes grossly normal to inspection.  No discharge or erythema, or obvious scleral/conjunctival abnormalities.  RESP: No audible wheeze, cough, or visible cyanosis.  No visible retractions or increased work of breathing.    SKIN: Visible skin clear. No significant rash, abnormal pigmentation or lesions.  NEURO: Cranial nerves grossly intact.  Mentation and speech appropriate for age.  PSYCH: Mentation appears normal, affect normal/bright, judgement and insight intact, normal speech and appearance well-groomed.        Sincerely,    LOLI PUTNAM PA-C      22 minutes spent by me on the date of the encounter doing chart review, history and exam, documentation and further activities per the note  "

## 2023-10-25 NOTE — LETTER
10/25/2023       RE: Adelaida Packer  [address] [place] [address]     Dear Colleague,    Thank you for referring your patient, Adelaida Packer, to the [facility] [place] at [facility]. Please see a copy of my visit note below.    Virtual Visit Details    Type of service:  Video Visit     Originating Location (pt. Location): Home    Distant Location (provider location):  Off-site  Platform used for Video Visit: [facility] Medical Weight Management Note     Adelaida Packer  MRN:  [id number]  :  1970  JUSTUS:  10/25/2023    Dear Windy Gordillo MD,    I had the pleasure of seeing your patient Adelaida Packer. She is a 53 year old female who I am continuing to see for treatment of obesity related to:        6/15/2021     2:14 AM   --   I have the following health issues associated with obesity Pre-Diabetes    High Blood Pressure    Osteoarthritis (joint disease)   I have the following symptoms associated with obesity Knee Pain    Hip Pain       Assessment & Plan  Problem List Items Addressed This Visit       Class 3 severe obesity due to excess calories with serious comorbidity and body mass index (BMI) of 40.0 to 44.9 in adult (H) - Primary    Diabetes mellitus, type 2 (H)      Continue Wegovy 2.4mg once weekly. Prescribed by enodcrinology   Fluid goal of 64oz daily   Carrie Ness next available   Edel Chandra next available   Yecenia Rivera in 3 months     INTERVAL HISTORY:  [facility] - 6/15/2021  Last seen by Dr. Mitchell- 2022  Ozempic - heartburn and nausea  Phentermine - discontinued due to surgery.   Last seen 23 - started 24 week program   Stopped Naltrexone - did not feel like it was helpful with weight loss   Phentermine - c/I due to CKD and pulmonary HTN.         Carrie Ness - has not been able to see her due to work schedule, but found her to be helpful    Anti-obesity medication history    Current:    Wegovy 2.4mg - has been on this dose for around 2 months. No side effects. Nausea at first, but has resolved. Now taking the injection at which, was helpful with nausea. Is helpful with feeling jaimes longer.     Past/Failed/contraindicated:   Phentermine - contraindicated due to history of CKD and pulmonary HTN. Discussed with nephrologist who advised against starting. Previously trialed with side effects of dry mouth. Unsure if helped with weight.   Topiramate - side effects of numbness and tingling. No effect on weight    Recent diet changes: Started intermittent fasting - eating from 12 to 8pm. Typically eating around 2 meals a day. Decrease in portion sizes. Hunger has been manageable. Drinking 40-60oz     Recent exercise/activity changes: Working on getting 8.5K steps. Wants to get back into biking.     Recent stressors: Started new job as a reading and . Has had a lot less stress since starting.     Recent sleep changes: Tough the past couple of days - has been getting up at 2am.     Vitamins/Labs: A1C 6.5 10/12/23     CURRENT WEIGHT:   261 lbs 0 oz    Initial Weight (lbs): 244 lbs  Last Visits Weight: 120.2 kg (265 lb)  Cumulative weight loss (lbs): -17  Weight Loss Percentage: -6.97%    Wt Readings from Last 5 Encounters:   10/25/23 118.4 kg (261 lb)   10/12/23 120.2 kg (265 lb)   08/14/23 122.5 kg (270 lb)   07/07/23 125.7 kg (277 lb 3.2 oz)   06/19/23 126.1 kg (278 lb)             10/24/2023     4:52 PM   Changes and Difficulties   I have made the following changes to my diet since my last visit: Intermittent Fasting   With regards to my diet, I am still struggling with: eating sweets/sometimes over eating   I have made the following changes to my activity/exercise since my last visit: walking 8500 steps daily   With regards to my activity/exercise, I am still struggling with: Going to the gym consistently         MEDICATIONS:   Current Outpatient Medications   Medication Sig Dispense  Refill    acetaminophen (TYLENOL) 325 MG tablet Take 2 tablets (650 mg) by mouth every 4 hours as needed for other (mild pain) 100 tablet 0    amLODIPine (NORVASC) 5 MG tablet Take 1 tablet (5 mg) by mouth daily 90 tablet 3    amoxicillin (AMOXIL) 500 MG capsule Take 4 caps (2000mg) 30-60 minutes before dental procedure (Patient not taking: Reported on 10/12/2023) 4 capsule 0    aspirin (ASA) 81 MG EC tablet Take 1 tablet (81 mg) by mouth daily 90 tablet 3    atorvastatin (LIPITOR) 10 MG tablet Take 1 tablet (10 mg) by mouth every evening 90 tablet 3    blood glucose (NO BRAND SPECIFIED) lancets standard Use to test blood sugar 2 times daily or as directed. 200 lancet 3    blood glucose (NO BRAND SPECIFIED) lancets standard Use to test blood sugar 1-2 times daily or as directed. 200 each 3    blood glucose (NO BRAND SPECIFIED) test strip Use to test blood sugar 2-3 times daily or as directed. 200 strip 3    blood glucose (NO BRAND SPECIFIED) test strip Use to test blood sugar 2 times daily or as directed. 200 strip 3    blood glucose monitoring (NO BRAND SPECIFIED) meter device kit Use to test blood sugar 1-2 times daily or as directed. 1 kit 0    cetirizine (ZYRTEC) 10 MG tablet Take 10 mg by mouth daily as needed for allergies      cholecalciferol 2000 UNITS CAPS Take 2,000 Units by mouth daily 90 capsule 3    dapagliflozin (FARXIGA) 10 MG TABS tablet Take 1 tablet (10 mg) by mouth daily 90 tablet 3    eplerenone (INSPRA) 50 MG tablet Take 2 tablets (100 mg) by mouth 2 times daily 360 tablet 3    famotidine (PEPCID) 20 MG tablet Take 1 tablet (20 mg) by mouth 2 times daily 180 tablet 1    Fluocinolone Acetonide Scalp (DERMA-SMOOTHE/FS SCALP) 0.01 % OIL oil Apply nightly to the scalp for the 6 weeks 60 mL 1    fluticasone (FLONASE) 50 MCG/ACT nasal spray INSTILL 1 SPRAY INTO BOTH NOSTRILS DAILY 16 mL 1    hydrOXYzine (ATARAX) 25 MG tablet TAKE 1/2-1 TABLET NIGHTLY AS NEEDED FOR SLEEP 90 tablet 0    labetalol  "(NORMODYNE) 300 MG tablet Take 1 tablet (300 mg) by mouth 2 times daily 180 tablet 3    ondansetron (ZOFRAN ODT) 4 MG ODT tab Take 1 tablet (4 mg) by mouth every 8 hours as needed for nausea or vomiting 30 tablet 0    polyethylene glycol (MIRALAX) 17 g packet Take 17 g by mouth daily 7 packet 0    Semaglutide-Weight Management (WEGOVY) 2.4 MG/0.75ML pen Inject 2.4 mg Subcutaneous once a week 3 mL 3    SUMAtriptan (IMITREX) 25 MG tablet Profile rx,TAKE 1 TO 2 TABLETS BY MOUTH AT ONSET OF HEADACHE FOR MIGRAINE. MAY REPEAT DOSE IN 2 HOURS. MAX OF 200MG OR 2 DOSES IN 24 HOURS 18 tablet 1           10/24/2023     4:52 PM   Weight Loss Medication History Reviewed With Patient   Which weight loss medications are you currently taking on a regular basis? Wegovy   Are you having any side effects from the weight loss medication that we have prescribed you? No         Objective   Ht 1.575 m (5' 2\")   Wt 118.4 kg (261 lb)   LMP 08/13/2005   BMI 47.74 kg/m      Vitals - Patient Reported  Pain Score: No Pain (0)      PHYSICAL EXAM:    GENERAL: Healthy, alert and no distress  EYES: Eyes grossly normal to inspection.  No discharge or erythema, or obvious scleral/conjunctival abnormalities.  RESP: No audible wheeze, cough, or visible cyanosis.  No visible retractions or increased work of breathing.    SKIN: Visible skin clear. No significant rash, abnormal pigmentation or lesions.  NEURO: Cranial nerves grossly intact.  Mentation and speech appropriate for age.  PSYCH: Mentation appears normal, affect normal/bright, judgement and insight intact, normal speech and appearance well-groomed.      Sincerely,    LOLI PUTNAM PA-C      22 minutes spent by me on the date of the encounter doing chart review, history and exam, documentation and further activities per the note  "

## 2023-10-25 NOTE — PATIENT INSTRUCTIONS
"Kelby Son,  Thank you for allowing us the privilege of caring for you. We hope we provided you with the excellent service you deserve.   Please let us know if there is anything else we can do for you so that we can be sure you are completely satisfied with your care experience.    To ensure the quality of our services you may be receiving a patient satisfaction survey from an independent patient satisfaction monitoring company.    The greatest compliment you can give is a \"Likely to Recommend\"    Your visit was with LOLI PUTNAM PA-C today.    Instructions per today's visit:     Continue Wegovy 2.4mg once weekly. Prescribed by enodcrinology   Fluid goal of 64oz daily   Carrie Grover next available   Edel Corazon next available   Loli Rivera in 3 months     ___________________________________________________________________________  Important contact and scheduling information:  Please call our contact center at [phone] to schedule your next appointments.  For any nursing questions or concerns call Lori Merino LPN at [phone] or Wendy Feldman RN at [phone]  Please call during clinic hours Monday through Friday 8:00a - 4:00p if you have questions or you can contact us via Utrip at anytime and we will reply during clinic hours.    Lab results will be communicated through My Chart or letter (if My Chart not used). Please call the clinic if you have not received communication after 1 week or if you have any questions.?  Clinic Fax: [phone]  __________________________________________________________________________    If labs were ordered today:    Please make an appointment to have them drawn at your convenience.     To schedule the Lab Appointment using Utrip:  Select \"Schedule an Appointment\"  Select \"Lab Only\"  For \"A couple of questions\", select \"Other\"  For \"Which locations work for you?, select the location and set up the appointment    To schedule by phone call [phone] to schedule a lab only " appointment at any [facility] lab.  ___________________________________________________________________________  Work with A Health !  Virtual Sessions are Available through [facility] Weight Management Clinics    To learn more, call to schedule a free, Health  Q&A appointment: [phone]     What is Health Coaching?  Do you know what you are supposed to do, but you just aren't doing it?  Then, HEALTH COACHING may help you!   Get unstuck and move forward with the support of a professionally trained NBC-HWC (National Board-Certified Health and ) who uses evidence-based approaches to help you move forward with healthy lifestyle changes in the areas of weight loss, stress management and overall well-being.    Health Coaches help you identify goals that will work best for you. Health Coaches provide support and encouragement with overcoming barriers and help you to find inspiration and motivation to lead a healthy lifestyle.    Option one:  Health Coaching 3-Pack; Three, 30-minute Health Coaching Visits, for $99  Visits are done virtually (phone or video)  This is a self pay service; we do not accept insurance for celia coaching.    Option two:   The 24 week Plan; 11 Health Coaching Visits, and a 7 months subscription to Your [employer]ribute-- on-demand fitness, nutrition and mindfulness classes, for $499 (employee discounts may be available). Participants will also meet regularly with a weight management Medical Provider and a Registered/Licensed Dietician.  This is a self-pay service; we do not accept insurance for health coaching.    To Schedule a free Health  Q&A appointment to learn more,  call [phone].  ____________________________________________________________________  [facility]  Healthy Lifestyle Group    Healthy Lifestyle Group  This is a 60 minute virtual coaching group for those who want to lead a healthier lifestyle. Come  "together to set goals and overcome barriers in a supportive group environment. We will address the four pillars of health--nutrition, exercise, sleep and emotional well-being.  This group is highly recommended for those who are participating in the 24 week Healthy Lifestyle Plan and our Health Coaching sessions.    WHEN: This group meets the first Friday of the month, 12:30 PM - 1:30 PM online, via a zoom meeting.      FACILITATOR: Led by National Board Certified Health and , Carrie Ness [facility].    TO REGISTER: Please call the Call Center at [phone] to register. You will get an appointment to attend in [employer]. Fifteen minutes prior to the meeting, complete the e-check in and you will get the link to join the meeting.  There is no charge to attend this group and space is limited.      2023 and 2024 Meeting Topics and Dates:    November 3: Introduction to Mindfulness (Learn simple and effective mindfulness practices and how it can benefit you)    December 8: Let's Talk (guided discussion on our wins and challenges)    January 5: New Years Vision: Manifest your Best 2024! (Guided imagery,  journaling and discussion)    February 2: Let's Talk    March 1: 10 Percent Happier by Cedric Wilkinson (Book Bites; a guided discussion on the nuggets of wisdom from favorite wellness books; no need to read the book but highly encouraged)    April 5: Let's Talk    May 3: \"Essentialism; The Disciplined Pursuit of Less by Clif Orozco (book bites discussion)    June 7: Let's Talk    July 5: NO MEETING, off for the 4th of July Holiday    August 2: The Blue Zones, Secrets for Living a Longer Life by Cedric Ornelas (book bites discussion)      If you would like bariatric surgery specific support group info please let your care team know.         Thank you,   [facility] Comprehensive Weight Management Team                          "

## 2023-10-25 NOTE — TELEPHONE ENCOUNTER
Prior Authorization Approval    Medication: WEGOVY 2.4 MG/0.75ML SC SOAJ  Authorization Effective Date:    Authorization Expiration Date:    Approved Dose/Quantity: 4 pens per 28 days  Reference #: CMM KEY [id number]   Insurance Company: [employer] - Phone [phone] Fax [phone]  Expected CoPay: $    CoPay Card Available:      Financial Assistance Needed:   Which Pharmacy is filling the prescription: [facility] - [place], [place] - [address], [id number]  Pharmacy Notified:   Patient Notified:

## 2023-10-25 NOTE — PROGRESS NOTES
Health  visit #6  24 wk  Video  Yecenia Andrea      Down 15 lbs  A1C is at 6.5  Pushing water   Liking new job  Sleep is still an issue but got a sleep med from dr Osorio 2.4  Wants to try pickleball class or walking group  Discussed not investing too much in trying to fix the education system; it is ok to just be present and focus on self care now    TIME SPENT: 60 minutes      NEXT HC: NOV 7, 10 am video    Carrie Ness  [facility], National Board Certified Health &   Lead Health   [facility] Comprehensive Weight Management  [email or web address]  [email or web address]   To schedule: [phone]   Gender pronouns: she/her

## 2023-11-01 RX ORDER — SEMAGLUTIDE 1.7 MG/.75ML
INJECTION, SOLUTION SUBCUTANEOUS
Qty: 3 ML | Refills: 0 | OUTPATIENT
Start: 2023-11-01

## 2023-11-07 ENCOUNTER — VIRTUAL VISIT (OUTPATIENT)
Dept: ENDOCRINOLOGY | Facility: CLINIC | Age: 53
End: 2023-11-07

## 2023-11-07 DIAGNOSIS — E66.9 OBESITY: Primary | ICD-10-CM

## 2023-11-07 PROCEDURE — 99207 PR NO CHARGE LOS: CPT | Mod: VID

## 2023-11-07 NOTE — PROGRESS NOTES
HC visit #7  24 wk  Video   Yecenia Rivera     [facility]  VISIT: NOV 28, 8 am    1.) Journal before bed to clear my mind for optimal sleep  2.) Stationary bike at 6:30 am weekday mornings  3.) Continue IF  4.) Work Mantra and daily reflection; write out and post my work mantra and reflect each night on; What am I proud of today?      50 minutes time spent    Carrie Ness  [facility], National Board Certified Health &   Lead Health   [facility] Comprehensive Weight Management  [email or web address]  [email or web address]   To schedule: [phone]   Gender pronouns: she/her

## 2023-11-24 DIAGNOSIS — E66.813 CLASS 3 SEVERE OBESITY DUE TO EXCESS CALORIES WITH SERIOUS COMORBIDITY AND BODY MASS INDEX (BMI) OF 40.0 TO 44.9 IN ADULT (H): ICD-10-CM

## 2023-11-24 DIAGNOSIS — E66.01 CLASS 3 SEVERE OBESITY DUE TO EXCESS CALORIES WITH SERIOUS COMORBIDITY AND BODY MASS INDEX (BMI) OF 40.0 TO 44.9 IN ADULT (H): ICD-10-CM

## 2023-11-28 ENCOUNTER — VIRTUAL VISIT (OUTPATIENT)
Dept: ENDOCRINOLOGY | Facility: CLINIC | Age: 53
End: 2023-11-28

## 2023-11-28 DIAGNOSIS — E66.9 OBESITY: Primary | ICD-10-CM

## 2023-11-28 PROCEDURE — 99207 PR NO CHARGE LOS: CPT | Mod: VID

## 2023-11-28 NOTE — PROGRESS NOTES
Health  #8  VIDEO  Yecenia Rivera    CW: 256 lb    PREVIOUS GOAL  1.) Journal before bed to clear my mind for optimal sleep  2.) Stationary bike at 6:30 am weekday mornings  3.) Continue IF  4.) Work Mantra and daily reflection; write out and post my work mantra and reflect each night on; What am I proud of today?    GOALS: NOV 28, 2023  1.) CONTINUE IF  2.) DRINK enough water, 64 oz of water  3.) Prep veggies and fruit ahead of time; merary jar salads  4.) Compare myself to myself yesterday:  How can I be better today? What am I proud of today?  5.) Youtube work-outs that are fun or find something outside the house to do for exercise that is fun and social, 2 times per week (I have my time to do this now with my current job)    NEXT HEALTH  VISIT: DEC 15, 8 am VIDEO    40 minutes time spent      Carrie Ness  [facility], National Board Certified Health &   Lead Health   [facility] Comprehensive Weight Management  [email or web address]  [email or web address]   To schedule: [phone]   Gender pronouns: she/her

## 2023-11-28 NOTE — TELEPHONE ENCOUNTER
Semaglutide-Weight Management (WEGOVY) 2.4 MG/0.75ML pen 3 mL 3 8/9/2023     Last Office Visit: 11/7/23  Future Office visit:  3/1/24    Creatinine   Date Value Ref Range Status   09/26/2023 1.34 (H) 0.51 - 0.95 mg/dL Final   11/30/2020 1.17 (H) 0.52 - 1.04 mg/dL Final             Routing refill request to provider for review/approval because:  Not on endocrine protocol and abnormal creatinine    Velia Dawson RN

## 2023-11-28 NOTE — LETTER
11/28/2023       RE: Adelaida Packer  [address] [facility] [facility] [place] [address]     Dear Colleague,    Thank you for referring your patient, Adelaida Packer, to the [facility] WEIGHT MANAGEMENT CLINIC [place] at [facility]. Please see a copy of my visit note below.    Health  #8  VIDEO  Yecenia Rivera    CW: 256 lb    PREVIOUS GOAL  1.) Journal before bed to clear my mind for optimal sleep  2.) Stationary bike at 6:30 am weekday mornings  3.) Continue IF  4.) Work Mantra and daily reflection; write out and post my work mantra and reflect each night on; What am I proud of today?    GOALS: NOV 28, 2023  1.) CONTINUE IF  2.) DRINK enough water, 64 oz of water  3.) Prep veggies and fruit ahead of time; merary jar salads  4.) Compare myself to myself yesterday:  How can I be better today? What am I proud of today?  5.) MVP [employer] work-outs that are fun or find something outside the house to do for exercise that is fun and social, 2 times per week (I have my time to do this now with my current job)    NEXT HEALTH  VISIT: DEC 15, 8 am VIDEO    40 minutes time spent      Carrie [facility], National Board Certified Health &   Lead Health   [facility] Comprehensive Weight Management  [email or web address]   To schedule: [phone]   Gender pronouns: she/her

## 2023-11-29 RX ORDER — SEMAGLUTIDE 2.4 MG/.75ML
2.4 INJECTION, SOLUTION SUBCUTANEOUS WEEKLY
Qty: 3 ML | Refills: 1 | Status: SHIPPED | OUTPATIENT
Start: 2023-11-29 | End: 2024-01-10 | Stop reason: ALTCHOICE

## 2023-11-30 DIAGNOSIS — E26.09 PRIMARY HYPERALDOSTERONISM (H): ICD-10-CM

## 2023-11-30 RX ORDER — EPLERENONE 50 MG/1
100 TABLET, FILM COATED ORAL 2 TIMES DAILY
Qty: 360 TABLET | Refills: 3 | Status: SHIPPED | OUTPATIENT
Start: 2023-11-30

## 2023-12-01 ENCOUNTER — VIRTUAL VISIT (OUTPATIENT)
Dept: ENDOCRINOLOGY | Facility: CLINIC | Age: 53
End: 2023-12-01
Payer: COMMERCIAL

## 2023-12-01 VITALS — BODY MASS INDEX: 47.29 KG/M2 | WEIGHT: 257 LBS | HEIGHT: 62 IN

## 2023-12-01 DIAGNOSIS — E11.22 TYPE 2 DIABETES MELLITUS WITH STAGE 3A CHRONIC KIDNEY DISEASE, WITHOUT LONG-TERM CURRENT USE OF INSULIN (H): ICD-10-CM

## 2023-12-01 DIAGNOSIS — Z71.3 NUTRITIONAL COUNSELING: Primary | ICD-10-CM

## 2023-12-01 DIAGNOSIS — E66.9 OBESITY: ICD-10-CM

## 2023-12-01 DIAGNOSIS — N18.31 TYPE 2 DIABETES MELLITUS WITH STAGE 3A CHRONIC KIDNEY DISEASE, WITHOUT LONG-TERM CURRENT USE OF INSULIN (H): ICD-10-CM

## 2023-12-01 PROCEDURE — 99207 PR NO CHARGE LOS: CPT | Mod: VID | Performed by: DIETITIAN, REGISTERED

## 2023-12-01 PROCEDURE — 97803 MED NUTRITION INDIV SUBSEQ: CPT | Mod: VID | Performed by: DIETITIAN, REGISTERED

## 2023-12-01 ASSESSMENT — PAIN SCALES - GENERAL: PAINLEVEL: NO PAIN (0)

## 2023-12-01 NOTE — LETTER
"12/1/2023       RE: Adelaida Packer  [address] [place] [address]     Dear Colleague,    Thank you for referring your patient, Adelaida Packer, to the [facility] WEIGHT MANAGEMENT CLINIC [place] [facility]. Please see a copy of my visit note below.    Video-Visit Details    Type of service:  Video Visit    Video Start Time: 8:06 am   Video End Time: 8:26 am    Originating Location (pt. Location): Home    Distant Location (provider location):  Offsite (providers home)     Platform used for Video Visit: [id number]         Weight Management Nutrition Consultation    Adelaida Packer is a 53 year old female presents today for weight management nutrition consultation.  Patient referred by Dr. Gordillo.     Pt previously worked with Dr. Stephen Null. Now working with Yecenia Andrea PA-C as of 6/14/23.     Patient with Co-morbidities of obesity including:  Type II DM yes  Renal Failure stage 3 CKD per chart  Sleep apnea no  Hypertension yes  Dyslipidemia yes, high LDL hx  Joint pain osteoarthritis and knee pain per chart  Back pain no  GERD yes    PMH includes: migraines, vit d deficiency, hyperparathyroidism, needs knee replacement     Recommended 30-40 lb weight loss for knee surgery    Anthropometrics:  Weight when started with me 6/15/21: 244 lbs  Weight 6/15/23: 275 lbs     Estimated body mass index is 47.01 kg/m  as calculated from the following:    Height as of this encounter: 1.575 m (5' 2\").    Weight as of this encounter: 116.6 kg (257 lb).     Current weight: 257 lbs, pt report   - Down 13 lbs since last RD visit    Medications for Weight Loss:  Wegovy     A1C 13 Jan 2023, 8.0 4/17/23, 6.6 7/7/23  A1C POCT 6.5 10/12/23  Had a CGM but was not affordable     NUTRITION HISTORY  NKFA  Not huge on seafood - will eat some fish/shrimp, no lobster  Does not like mushrooms     Tried meal kits - got sick of it, expensive.  Tried Noom - " 1200 kcal/day  Did WW in past, hard virtually. Dr. Mitchell recommended again.   Did weight study through [facility], lost 40 lbs    Limited vegetables and fruit   Beverages: coffee - almond milk and stevia, water (had worked up to 80-90 oz now less), water with crystal light, pop once in a blue moon (diet pepsi or coke), seltzer water once in a while, no alcohol    Snacks: varies, maybe pickles, cucumbers, chips, chocolate    June 2021:  Pt last seen 6/15/21.     Increased stress lately which has been associated with weight gain.     Working on ensuring good protein.  Feeling better and BG managed better when higher protein and lower carbs. Has been utilizing premier protein shakes. Has hard boiled eggs made for this week.  Using 45 kcal alexis vanita bread.    Wants to get more vegetables in.    Hydration: lots of water, coffee    PA: has membership at [employer]. Going to water aerobics  - Limited by knee pain    Stressors: work stress, just left [employer] of 17 years (learned  was corrupt), friend passed    - Does work with a therapist    August 2023:  Going to [employer] this week. Starts work up again once returning.    Bought a Mer[employer] lunch box. Plans to get [employer] meals once school starts again.    Continues to utilize premier protein shakes. Using Lynn protein bars but doesn't feel as satisfied long-term after them.    Increasing to Wegovy 2.4 mg tonight.     Hydration - not feeling thirsty and full sooner with fluids but trying to keep fluids on hand    PA: water aerobics, bike    Dec 2023:    Recently did IF, 16/8. No longer doing  Feeling hungrier lately.    Recent recall  - Hot cocoa, Greek yogurt with granola  - 1/2 sour cream chicken enchilada and a couple pieces toffee and a donut  - lo mein noodles with chicken and some veggies, 3 cream cheese puffs  - Hot cocoa   - 1-2 scrambled eggs  - Water: ~64 oz    Other meals this week: merary jar salad    Barriers: hard cooking for one    Previous goals:  Pack some  granola/protein bars and beef sticks for trip  Consider [employer] meals when school starts up again - Met, got keto meals and did not like them (too fatty). Tried some frozen meals and thought they were okay.  Continue working on hydration. Small frequent, sips. Keeping water on hand - In progress, got 64 oz yesterday.   Continue staying consistent with activity as able - Up/down, harder during school year. Wants to find more enjoyable movement.      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    Nutrition Intervention  Reviewed and modified previous goals  Education on plate method and foods to incorporate.  AVS via Plazest    Patient demonstrates understanding.    Expected Engagement: good    Nutrition Goals  Could consider protein shake instead of hot cocoa (example: AcuityAds core power 30 g protein)  Consider tuna, nuts/seeds and/or quinoa in merary jar salads  Continue working on hydration  Aim for 8.5k steps/day  Aim for 3-4 fitness classes/week (Thick chick fitness videos)    Carbohydrates that will help you stay jaimes longer: wild rice, brown rice, quinoa, lentils, beans    Protein bar ideas  Quest Protein Bars (190 Calories, 20 g protein)  Built Bar (170 Calories, 15-20 g protein)  One Protein Bar (210 calories, 20 g protein)  [place] Signature Protein Bar ([employer]) (190 Calories, 21 g protein)  Pure Protein Bars (180 Calories, 21 g protein)3    Plate method can be used for general guidance on balanced meals/portion sizes (see link below for picture/more information)                 Make 1/2 your plate non starchy vegetable(cauliflower, broccoli, asparagus, [place] sprouts, lettuce, carrots, for example).                3+ oz lean protein sources (salmon/skinless chicken/turkey breast/pork loin/lean cuts of beef/ or non-animal protein such as black, kidney or valencia beans,  "tofu/edamame/tempeh)     (Note: 3 oz = deck of cards size)                 1/2 to 1 cup carbohydrate choices such as whole grain starches or starchy vegetables or fruit. For example: quinoa, brown rice, barley, potatoes, sweet potatoes, winter squash, peas, corn, or fruit.               Choose ~0-2 added fat servings at a meal (avocados, nut butters, nuts or seeds, olive oil, vegetable oils).    The Plate Method:  https://www.cdc.gov/diabetes/images/managing/Diabetes-Manage-Eat-Well-Plate-Graphic_600px.jpg    Summary of Volumetrics Eating Plan  http://fvfiles.com/558889.pdf     Protein:  Chicken  Turkey  Fish  Seafood (shrimp, lobster, crab, etc)  Lean cuts of beef (93% ground, sirloin, tenderloin, etc)  Pork (limit fatter options like gonsalez)  Cottage cheese  Greek yogurt  Eggs   Low-fat Cheese  Lentils/beans  Nuts/nut butter (recommend a smaller portion if doing these options - 2 Tbsp of nuts or 1/4 cup nut butter)  Tofu  Seitan     Carbohydrates:   - Brown/Wild rice   - Egg life wrap (low carb and great source of protein!)    - Whole wheat pasta   - Protein pasta (barilla protein +, lentil pasta, etc)   - Whole wheat or carb balance tortilla   - Oatmeal    - Fruit   - Starchy vegetables (corn, peas, beans/lentils, potatoes)   - Whole grain crackers    - Whole wheat waffle    - [employer] protein waffles/pancakes    - Couscous     Non-starchy Vegetables Examples:   - Lettuce   - Spinach   - Onions    - Bell pepper   - Carrots   - Broccoli   - Cabbage   - Brussel sprouts   - Cauliflower    - Asparagus    - Cucumber   - Artichoke   - Zucchini   - Bean sprouts   - Mushrooms   - Sugar snap peas   - Eggplant   - Turnips   - Celery   - Radishes    - Green beans    Starchy vegetable examples:  Corn  Green peas  Winter squash (butternut, spaghetti squash, acorn)  Beans/lentils (not including green beans)  Potatoes  Sweet potatoes       Healthy Recipe Resources:  Books:  \"The Volumetrics Eating Plan\" by Yudith Barajas, " "Ph.D.  \"Cooking that Counts\" by editors of [employer]  \"Calorie Smart Meals\" cookbook by Better Homes and Gardens (200-500 calorie meals)    Websites:  www.Aicent.Jetbay  www.Joome.org  https://www.diabetesfoodhub.org/all-recipes.html  https://www.[employer]ines.Jetbay/  Https://www.FarmDropplate.gov/myplatekitchen  https://snaped.barter.lis.usda.gov/recipes-menus   https://www.GOQii/Zebra Mobile/7326775/dietitian-budget-high-protein-dinner-recipes/  https://www.Rincon Pharmaceuticals/recipes/84/healthy-recipes/   Https://www.anchor.travel.com/c/Global Nano Productscoen   EatPayPal.com       Cultural Cuisines:   Cuisine: https://www.Solartrec.org/knowledge-center/recipes/  Mexican and Vegan Cuisine:   https://Stolen Couch Games/  https://Teleradiology Holdings Inc./recipe-index/  Chinese Cuisine: https://www.Brian Industries/  Various Regions of African Cuisine: https://www.GOQii/recipes/92340/cuisines-regions//    Apps:  Rezolve anika (or website, mealime.com)   SpendSmartEatSmart       Follow-Up:  Monday, January 15th at 9:00 am    Time spent with patient: 20 minutes.  SAKSHI Salomon RD, LD  "

## 2023-12-01 NOTE — NURSING NOTE
Is the patient currently in the state of [place]? YES    Visit mode:VIDEO    If the visit is dropped, the patient can be reconnected by: VIDEO VISIT: Send to e-mail at: [email or web address]    Will anyone else be joining the visit? NO  (If patient encounters technical issues they should call [phone]    How would you like to obtain your AVS? MyChart    Are changes needed to the allergy or medication list? N/A    Reason for visit: ANN [place]

## 2023-12-01 NOTE — PATIENT INSTRUCTIONS
Nutrition Goals  Could consider protein shake instead of hot cocoa (example: "Mobile Location, IP" core power 30 g protein)  Consider tuna, nuts/seeds and/or quinoa in merary jar salads  Continue working on hydration  Aim for 8.5k steps/day  Aim for 3-4 fitness classes/week (Thick chick fitness videos)    Carbohydrates that will help you stay jaimes longer: wild rice, brown rice, quinoa, lentils, beans    Protein bar ideas  Quest Protein Bars (190 Calories, 20 g protein)  Built Bar (170 Calories, 15-20 g protein)  One Protein Bar (210 calories, 20 g protein)  [place] Signature Protein Bar ([employer]) (190 Calories, 21 g protein)  Pure Protein Bars (180 Calories, 21 g protein)3    Plate method can be used for general guidance on balanced meals/portion sizes (see link below for picture/more information)                 Make 1/2 your plate non starchy vegetable(cauliflower, broccoli, asparagus, [place] sprouts, lettuce, carrots, for example).                3+ oz lean protein sources (salmon/skinless chicken/turkey breast/pork loin/lean cuts of beef/ or non-animal protein such as black, kidney or valencia beans, tofu/edamame/tempeh)     (Note: 3 oz = deck of cards size)                 1/2 to 1 cup carbohydrate choices such as whole grain starches or starchy vegetables or fruit. For example: quinoa, brown rice, barley, potatoes, sweet potatoes, winter squash, peas, corn, or fruit.               Choose ~0-2 added fat servings at a meal (avocados, nut butters, nuts or seeds, olive oil, vegetable oils).    The Plate Method:  https://www.cdc.gov/diabetes/images/managing/Diabetes-Manage-Eat-Well-Plate-Graphic_600px.jpg    Summary of Volumetrics Eating Plan  [email or web address]     Protein:  Chicken  Turkey  Fish  Seafood (shrimp, lobster, crab, etc)  Lean cuts of beef (93% ground, sirloin, tenderloin, etc)  Pork (limit fatter options like gonsalez)  Cottage cheese  Greek yogurt  Eggs   Low-fat Cheese  Lentils/beans  Nuts/nut butter  "(recommend a smaller portion if doing these options - 2 Tbsp of nuts or 1/4 cup nut butter)  Tofu  Seitan     Carbohydrates:   - Brown/Wild rice   - Egg life wrap (low carb and great source of protein!)    - Whole wheat pasta   - Protein pasta (barilla protein +, lentil pasta, etc)   - Whole wheat or carb balance tortilla   - Oatmeal    - Fruit   - Starchy vegetables (corn, peas, beans/lentils, potatoes)   - Whole grain crackers    - Whole wheat waffle    - [place] protein waffles/pancakes    - Couscous     Non-starchy Vegetables Examples:   - Lettuce   - Spinach   - Onions    - Bell pepper   - Carrots   - Broccoli   - Cabbage   - Brussel sprouts   - Cauliflower    - Asparagus    - Cucumber   - Artichoke   - Zucchini   - Bean sprouts   - Mushrooms   - Sugar snap peas   - Eggplant   - Turnips   - Celery   - Radishes    - Green beans    Starchy vegetable examples:  Corn  Green peas  Winter squash (butternut, spaghetti squash, acorn)  Beans/lentils (not including green beans)  Potatoes  Sweet potatoes       Healthy Recipe Resources:  Books:  \"The Volumetrics Eating Plan\" by Yudith Baarjas, Ph.D.  \"Cooking that Counts\" by editors of Funnely  \"Calorie Smart Meals\" cookbook by Better Homes and RehabDevs (200-500 calorie meals)    Websites:  www.LIVELENZ  www.YYzhaoche.org  https://www.diabetesfoodhub.org/all-recipes.html  https://www.Scriptedtive.Soloingles.com Internacional/  Https://www.choosemyplate.gov/myplatekitchen  https://snaped.fns.usda.gov/recipes-menus   https://www.StorkUp.com.Soloingles.com Internacional/camille/8156805/dietitian-budget-high-protein-dinner-recipes/  https://www.Oil sands expresspes.Soloingles.com Internacional/recipes/84/healthy-recipes/   Https://www.youtube.com/c/SynerZ Medicalen   Eatthismuch.com       Cultural Cuisines:   Cuisine: https://www.RF BiocidicsnatWeixinhai.org/knowledge-center/recipes/  Mexican and Vegan Cuisine:   https://"BabyJunk, Inc".Soloingles.com Internacional/  https://CASTT.Soloingles.com Internacional/recipe-index/  Chinese Cuisine: https://www.INFUSD.Soloingles.com Internacional/  Various Regions of "  Cuisine: https://www.Human Performance Integrated Systems.com/recipes/79186/cuisines-regions//    Apps:  SWEEPiO anika (or website, mealime.com)   MarcelletSmart       Follow-Up:  Monday, January 15th at 9:00 am    SAKSHI Osorio (Duncan), RD, LD  Clinic #: [phone]

## 2023-12-01 NOTE — PROGRESS NOTES
"Video-Visit Details    Type of service:  Video Visit    Video Start Time: 8:06 am   Video End Time: 8:26 am    Originating Location (pt. Location): Home    Distant Location (provider location):  Offsite (providers home)     Platform used for Video Visit: Isomark         Weight Management Nutrition Consultation    Adelaida Packer is a 53 year old female presents today for weight management nutrition consultation.  Patient referred by Dr. Gordillo.     Pt previously worked with Dr. Stephen Null. Now working with Yecenia Andrea PA-C as of 6/14/23.     Patient with Co-morbidities of obesity including:  Type II DM yes  Renal Failure stage 3 CKD per chart  Sleep apnea no  Hypertension yes  Dyslipidemia yes, high LDL hx  Joint pain osteoarthritis and knee pain per chart  Back pain no  GERD yes    PMH includes: migraines, vit d deficiency, hyperparathyroidism, needs knee replacement     Recommended 30-40 lb weight loss for knee surgery    Anthropometrics:  Weight when started with me 6/15/21: 244 lbs  Weight 6/15/23: 275 lbs     Estimated body mass index is 47.01 kg/m  as calculated from the following:    Height as of this encounter: 1.575 m (5' 2\").    Weight as of this encounter: 116.6 kg (257 lb).     Current weight: 257 lbs, pt report   - Down 13 lbs since last RD visit    Medications for Weight Loss:  Wegovy     A1C 13 Jan 2023, 8.0 4/17/23, 6.6 7/7/23  A1C POCT 6.5 10/12/23  Had a CGM but was not affordable     NUTRITION HISTORY  NKFA  Not huge on seafood - will eat some fish/shrimp, no lobster  Does not like mushrooms     Tried meal kits - got sick of it, expensive.  Tried Noom - 1200 kcal/day  Did WW in past, hard virtually. Dr. Mitchell recommended again.   Did weight study through [facility], lost 40 lbs    Limited vegetables and fruit   Beverages: coffee - almond milk and stevia, water (had worked up to 80-90 oz now less), water with crystal light, pop once in a blue moon (diet pepsi or coke), seltzer water once in a " while, no alcohol    Snacks: varies, maybe pickles, cucumbers, chips, chocolate    June 2021:  Pt last seen 6/15/21.     Increased stress lately which has been associated with weight gain.     Working on ensuring good protein.  Feeling better and BG managed better when higher protein and lower carbs. Has been utilizing premier protein shakes. Has hard boiled eggs made for this week.  Using 45 kcal alexis vanita bread.    Wants to get more vegetables in.    Hydration: lots of water, coffee    PA: has membership at [employer]. Going to water aerobics  - Limited by knee pain    Stressors: work stress, just left [employer] of 17 years (learned  was corrupt), friend passed    - Does work with a therapist    August 2023:  Going to [employer] this week. Starts work up again once returning.    Bought a [employer] lunch box. Plans to get [employer] meals once school starts again.    Continues to utilize premier protein shakes. Using Lynn protein bars but doesn't feel as satisfied long-term after them.    Increasing to Wegovy 2.4 mg tonight.     Hydration - not feeling thirsty and full sooner with fluids but trying to keep fluids on hand    PA: water aerobics, bike    Dec 2023:    Recently did IF, 16/8. No longer doing  Feeling hungrier lately.    Recent recall  - Hot cocoa, Greek yogurt with granola  - 1/2 sour cream chicken enchilada and a couple pieces toffee and a donut  - lo mein noodles with chicken and some veggies, 3 cream cheese puffs  - Hot cocoa   - 1-2 scrambled eggs  - Water: ~64 oz    Other meals this week: merary jar salad    Barriers: hard cooking for one    Previous goals:  Pack some granola/protein bars and beef sticks for trip  Consider [employer] meals when school starts up again - Met, got keto meals and did not like them (too fatty). Tried some frozen meals and thought they were okay.  Continue working on hydration. Small frequent, sips. Keeping water on hand - In progress, got 64 oz yesterday.   Continue staying consistent  with activity as able - Up/down, harder during school year. Wants to find more enjoyable movement.      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    Nutrition Intervention  Reviewed and modified previous goals  Education on plate method and foods to incorporate.  AVS via Citic Shenzhenhart    Patient demonstrates understanding.    Expected Engagement: good    Nutrition Goals  Could consider protein shake instead of hot cocoa (example: [employer] core power 30 g protein)  Consider tuna, nuts/seeds and/or quinoa in merary jar salads  Continue working on hydration  Aim for 8.5k steps/day  Aim for 3-4 fitness classes/week (Thick chick fitness videos)    Carbohydrates that will help you stay jaimes longer: wild rice, brown rice, quinoa, lentils, beans    Protein bar ideas  Quest Protein Bars (190 Calories, 20 g protein)  Built Bar (170 Calories, 15-20 g protein)  One Protein Bar (210 calories, 20 g protein)  [place] Signature Protein Bar ([employer]) (190 Calories, 21 g protein)  Pure Protein Bars (180 Calories, 21 g protein)3    Plate method can be used for general guidance on balanced meals/portion sizes (see link below for picture/more information)                 Make 1/2 your plate non starchy vegetable(cauliflower, broccoli, asparagus, [place] sprouts, lettuce, carrots, for example).                3+ oz lean protein sources (salmon/skinless chicken/turkey breast/pork loin/lean cuts of beef/ or non-animal protein such as black, kidney or valencia beans, tofu/edamame/tempeh)     (Note: 3 oz = deck of cards size)                 1/2 to 1 cup carbohydrate choices such as whole grain starches or starchy vegetables or fruit. For example: quinoa, brown rice, barley, potatoes, sweet potatoes, winter squash, peas, corn, or fruit.               Choose ~0-2 added fat servings at a meal (avocados, nut butters, nuts  "or seeds, olive oil, vegetable oils).    The Plate Method:  https://www.cdc.gov/diabetes/images/managing/Diabetes-Manage-Eat-Well-Plate-Graphic_600px.jpg    Summary of Volumetrics Eating Plan  [email or web address]     Protein:  Chicken  Turkey  Fish  Seafood (shrimp, lobster, crab, etc)  Lean cuts of beef (93% ground, sirloin, tenderloin, etc)  Pork (limit fatter options like gonsalez)  Cottage cheese  Greek yogurt  Eggs   Low-fat Cheese  Lentils/beans  Nuts/nut butter (recommend a smaller portion if doing these options - 2 Tbsp of nuts or 1/4 cup nut butter)  Tofu  Seitan     Carbohydrates:   - Brown/Wild rice   - Egg life wrap (low carb and great source of protein!)    - Whole wheat pasta   - Protein pasta (barilla protein +, lentil pasta, etc)   - Whole wheat or carb balance tortilla   - Oatmeal    - Fruit   - Starchy vegetables (corn, peas, beans/lentils, potatoes)   - Whole grain crackers    - Whole wheat waffle    - [place] protein waffles/pancakes    - Couscous     Non-starchy Vegetables Examples:   - Lettuce   - Spinach   - Onions    - Bell pepper   - Carrots   - Broccoli   - Cabbage   - Brussel sprouts   - Cauliflower    - Asparagus    - Cucumber   - Artichoke   - Zucchini   - Bean sprouts   - Mushrooms   - Sugar snap peas   - Eggplant   - Turnips   - Celery   - Radishes    - Green beans    Starchy vegetable examples:  Corn  Green peas  Winter squash (butternut, spaghetti squash, acorn)  Beans/lentils (not including green beans)  Potatoes  Sweet potatoes       Healthy Recipe Resources:  Books:  \"The Volumetrics Eating Plan\" by Yudith Barajas, Ph.D.  \"Cooking that Counts\" by editors of CookingLight  \"Calorie Smart Meals\" cookbook by Better Homes and Gardens (200-500 calorie " meals)    Websites:  www.Neptune Software AS  www.Ossia.org  https://www.diabetesfoodhub.org/all-recipes.html  https://www.T-System/  Https://www.Familonetmyplate.gov/myplatekitchen  https://snaped.FedCybers.usda.gov/recipes-menus   https://www.[address]/Vurb/9802503/dietitian-budget-high-protein-dinner-recipes/  https://www.Policard/recipes/84/healthy-recipes/   Https://www.FreeLunched.com/c/Soleil Insulationen   EatthiISGN Corporation.com       Cultural Cuisines:   Cuisine: https://www.Thomas Golf.org/knowledge-center/recipes/  Mexican and Vegan Cuisine:   https://AudioCatch/  https://ZÃ¼m XR/recipe-index/  Chinese Cuisine: https://www.Intellectual Investments/  Various Regions of African Cuisine: https://www.[address]/recipes/14402/cuisines-regions//    Apps:  LoveLula anika (or website, mealime.com)   SpendSmartEatSmart       Follow-Up:  Monday, January 15th at 9:00 am    Time spent with patient: 20 minutes.  SAKSHI Salomon RD, LD

## 2023-12-11 ENCOUNTER — NURSE TRIAGE (OUTPATIENT)
Dept: NURSING | Facility: CLINIC | Age: 53
End: 2023-12-11

## 2023-12-11 ENCOUNTER — ANCILLARY PROCEDURE (OUTPATIENT)
Dept: GENERAL RADIOLOGY | Facility: CLINIC | Age: 53
End: 2023-12-11
Attending: FAMILY MEDICINE
Payer: COMMERCIAL

## 2023-12-11 ENCOUNTER — TELEPHONE (OUTPATIENT)
Dept: FAMILY MEDICINE | Facility: CLINIC | Age: 53
End: 2023-12-11

## 2023-12-11 ENCOUNTER — OFFICE VISIT (OUTPATIENT)
Dept: FAMILY MEDICINE | Facility: CLINIC | Age: 53
End: 2023-12-11
Payer: COMMERCIAL

## 2023-12-11 VITALS
SYSTOLIC BLOOD PRESSURE: 121 MMHG | BODY MASS INDEX: 47.59 KG/M2 | RESPIRATION RATE: 16 BRPM | DIASTOLIC BLOOD PRESSURE: 77 MMHG | HEIGHT: 62 IN | HEART RATE: 74 BPM | WEIGHT: 258.6 LBS | OXYGEN SATURATION: 98 %

## 2023-12-11 DIAGNOSIS — E11.65 UNCONTROLLED TYPE 2 DIABETES MELLITUS WITH HYPERGLYCEMIA (H): ICD-10-CM

## 2023-12-11 DIAGNOSIS — E87.1 HYPONATREMIA: ICD-10-CM

## 2023-12-11 DIAGNOSIS — Z23 NEED FOR COVID-19 VACCINE: ICD-10-CM

## 2023-12-11 DIAGNOSIS — Z23 NEED FOR SHINGLES VACCINE: ICD-10-CM

## 2023-12-11 DIAGNOSIS — R81 GLUCOSURIA: ICD-10-CM

## 2023-12-11 DIAGNOSIS — R10.9 LEFT FLANK PAIN: Primary | ICD-10-CM

## 2023-12-11 DIAGNOSIS — R10.9 LEFT FLANK PAIN: ICD-10-CM

## 2023-12-11 LAB
ALBUMIN UR-MCNC: NEGATIVE MG/DL
APPEARANCE UR: CLEAR
BILIRUB UR QL STRIP: NEGATIVE
COLOR UR AUTO: YELLOW
ERYTHROCYTE [DISTWIDTH] IN BLOOD BY AUTOMATED COUNT: 13 % (ref 10–15)
GLUCOSE UR STRIP-MCNC: >=1000 MG/DL
HCT VFR BLD AUTO: 43.3 % (ref 35–47)
HGB BLD-MCNC: 13.6 G/DL (ref 11.7–15.7)
HGB UR QL STRIP: NEGATIVE
KETONES UR STRIP-MCNC: NEGATIVE MG/DL
LEUKOCYTE ESTERASE UR QL STRIP: NEGATIVE
MCH RBC QN AUTO: 29.2 PG (ref 26.5–33)
MCHC RBC AUTO-ENTMCNC: 31.4 G/DL (ref 31.5–36.5)
MCV RBC AUTO: 93 FL (ref 78–100)
NITRATE UR QL: NEGATIVE
PH UR STRIP: 5.5 [PH] (ref 5–7)
PLATELET # BLD AUTO: 201 10E3/UL (ref 150–450)
RBC # BLD AUTO: 4.66 10E6/UL (ref 3.8–5.2)
SP GR UR STRIP: <=1.005 (ref 1–1.03)
UROBILINOGEN UR STRIP-ACNC: 0.2 E.U./DL
WBC # BLD AUTO: 5.6 10E3/UL (ref 4–11)

## 2023-12-11 PROCEDURE — 91320 SARSCV2 VAC 30MCG TRS-SUC IM: CPT | Performed by: FAMILY MEDICINE

## 2023-12-11 PROCEDURE — 83036 HEMOGLOBIN GLYCOSYLATED A1C: CPT | Performed by: FAMILY MEDICINE

## 2023-12-11 PROCEDURE — 36415 COLL VENOUS BLD VENIPUNCTURE: CPT | Performed by: FAMILY MEDICINE

## 2023-12-11 PROCEDURE — 80048 BASIC METABOLIC PNL TOTAL CA: CPT | Performed by: FAMILY MEDICINE

## 2023-12-11 PROCEDURE — 74019 RADEX ABDOMEN 2 VIEWS: CPT | Mod: TC | Performed by: RADIOLOGY

## 2023-12-11 PROCEDURE — 90750 HZV VACC RECOMBINANT IM: CPT | Performed by: FAMILY MEDICINE

## 2023-12-11 PROCEDURE — 99213 OFFICE O/P EST LOW 20 MIN: CPT | Mod: 25 | Performed by: FAMILY MEDICINE

## 2023-12-11 PROCEDURE — 85027 COMPLETE CBC AUTOMATED: CPT | Performed by: FAMILY MEDICINE

## 2023-12-11 PROCEDURE — 90471 IMMUNIZATION ADMIN: CPT | Performed by: FAMILY MEDICINE

## 2023-12-11 PROCEDURE — 90480 ADMN SARSCOV2 VAC 1/ONLY CMP: CPT | Performed by: FAMILY MEDICINE

## 2023-12-11 PROCEDURE — 81003 URINALYSIS AUTO W/O SCOPE: CPT | Performed by: FAMILY MEDICINE

## 2023-12-11 ASSESSMENT — PAIN SCALES - GENERAL: PAINLEVEL: MODERATE PAIN (4)

## 2023-12-11 NOTE — PROGRESS NOTES
"flank  Assessment & Plan     Left flank pain  Possible etiologies discussed - check ua, labs and xray.  Treatment as indicated.  Discussed daily miralax  - UA Macroscopic with reflex to Microscopic and Culture - Lab Collect; Future  - CBC with platelets; Future  - XR Abdomen 2 Views; Future    Need for COVID-19 vaccine    - COVID-19 12+ (2023-24) (PFIZER)    Need for shingles vaccine    - ZOSTER RECOMBINANT ADJUVANTED (SHINGRIX)       BMI:   Estimated body mass index is 47.3 kg/m  as calculated from the following:    Height as of this encounter: 1.575 m (5' 2\").    Weight as of this encounter: 117.3 kg (258 lb 9.6 oz).   Weight management plan: Specific weight management program called Zepbound/Deysi with PCP discussed    The uses and side effects, including black box warnings as appropriate, were discussed in detail.  All patient questions were answered.  The patient was instructed to call immediately if any side effects developed.     Lesa Ziegler MD  [facility]    Shannan Son is a 53 year old, presenting for the following health issues:  Flank Pain        12/11/2023     1:12 PM   Additional Questions   Roomed by meño       History of Present Illness       Back Pain:  She presents for follow up of back pain. Patient's back pain is a new problem.    Original cause of back pain: not sure  First noticed back pain: in the last week  Patient feels back pain: comes and goesLocation of back pain:  Left lower back and left side of waist  Description of back pain: dull ache  Back pain spreads: nowhere    Since patient first noticed back pain, pain is: gradually worsening  Does back pain interfere with her job:  No  On a scale of 1-10 (10 being the worst), patient describes pain as:  5  What makes back pain worse: nothing   Acupuncture: not tried  Acetaminophen: not helpful  Activity or exercise: not tried  Chiropractor:  Not tried  Cold: not tried  Heat: not " "tried  Massage: not tried  Muscle relaxants: not tried  NSAIDS: not tried  Opioids: not tried  Physical Therapy: not tried  Rest: not helpful  Steroid Injection: not tried  Stretching: not tried  Surgery: not tried  TENS unit: not tried  Topical pain relievers: not tried  Other healthcare providers patient is seeing for back pain: Other    Reason for visit:  Painful lower back possibly kidney  Symptom onset:  3-7 days ago  Symptoms include:  Dull aching lower left back and side  Symptom intensity:  Moderate  Symptom progression:  Worsening  Had these symptoms before:  No  What makes it worse:  No  What makes it better:  Tylenol takes the edge off    She eats 2-3 servings of fruits and vegetables daily.She consumes 1 sweetened beverage(s) daily.She exercises with enough effort to increase her heart rate 9 or less minutes per day.  She exercises with enough effort to increase her heart rate 3 or less days per week.   She is taking medications regularly.     Left flank pain for 10 days and worse for 3 days. No blood in urine. On Wegovy 2.4 mg for the last 2 months. Last BM was earlier today and firm        Review of Systems   Constitutional, HEENT, cardiovascular, pulmonary, gi and gu systems are negative, except as otherwise noted.      Objective    /77 (BP Location: Right arm, Patient Position: Sitting, Cuff Size: Adult Large)   Pulse 74   Resp 16   Ht 1.575 m (5' 2\")   Wt 117.3 kg (258 lb 9.6 oz)   LMP 08/13/2005   SpO2 98%   BMI 47.30 kg/m    Body mass index is 47.3 kg/m .  Physical Exam   GENERAL: healthy, alert, no distress, and obese  NECK: no adenopathy, no asymmetry, masses, or scars and thyroid normal to palpation  RESP: lungs clear to auscultation - no rales, rhonchi or wheezes  CV: regular rate and rhythm, normal S1 S2, no S3 or S4, no murmur, click or rub, no peripheral edema and peripheral pulses strong  ABDOMEN: tenderness left flank tenderness but no rebound or guarding, and bowel sounds " normal  MS: no gross musculoskeletal defects noted, no edema  PSYCH: mentation appears normal, affect normal/bright

## 2023-12-11 NOTE — TELEPHONE ENCOUNTER
Patient is requesting appt today for back pain/kidney area. We do not have any in-person visits available at [facility]. Is it ok to schedule a virtual? Dr Pina has openings today.

## 2023-12-11 NOTE — NURSING NOTE
Prior to immunization administration, verified patients identity using patient s name and date of birth. Please see Immunization Activity for additional information.     Screening Questionnaire for Adult Immunization    Are you sick today?   No   Do you have allergies to medications, food, a vaccine component or latex?   yes   Have you ever had a serious reaction after receiving a vaccination?   No   Do you have a long-term health problem with heart, lung, kidney, or metabolic disease (e.g., diabetes), asthma, a blood disorder, no spleen, complement component deficiency, a cochlear implant, or a spinal fluid leak?  Are you on long-term aspirin therapy?   No   Do you have cancer, leukemia, HIV/AIDS, or any other immune system problem?   No   Do you have a parent, brother, or sister with an immune system problem?   No   In the past 3 months, have you taken medications that affect  your immune system, such as prednisone, other steroids, or anticancer drugs; drugs for the treatment of rheumatoid arthritis, Crohn s disease, or psoriasis; or have you had radiation treatments?   No   Have you had a seizure, or a brain or other nervous system problem?   No   During the past year, have you received a transfusion of blood or blood    products, or been given immune (gamma) globulin or antiviral drug?   No   For women: Are you pregnant or is there a chance you could become       pregnant during the next month?   No   Have you received any vaccinations in the past 4 weeks?   No     Immunization questionnaire answers were all negative but allergies to medications      Patient instructed to remain in clinic for 15 minutes afterwards, and to report any adverse reactions.     Screening performed by Lakeshia Herron MA on 12/11/2023 at 2:07 PM.

## 2023-12-11 NOTE — TELEPHONE ENCOUNTER
See nurse triage encounter 12/11/2023. Patient was triaged and has been scheduled for office appointment  today 12/11 for further evaluation. No further action needed.    MELY Galeas  [facility] Primary [facility] Triage

## 2023-12-11 NOTE — TELEPHONE ENCOUNTER
Reason for Call:  Appointment Request    Patient requesting this type of appt:  office visit    Requested provider: Windy Gordillo    Reason patient unable to be scheduled: Not within requested timeframe    When does patient want to be seen/preferred time: Same day    Comments: patient states she is having pain by the back area where her kidney is located. She would like to be seen today.     Could we send this information to you in [place] or would you prefer to receive a phone call?:   Patient would prefer a phone call   Okay to leave a detailed message?: Yes at Cell number on file:    Telephone Information:   Mobile [phone]       Call taken on 12/11/2023 at 7:21 AM by VIKI CHINCHILLA

## 2023-12-11 NOTE — TELEPHONE ENCOUNTER
"Nurse Triage SBAR    Is this a 2nd Level Triage? NO    Situation:   Spoke with 53 yr old Adelaida who c/o:    Lower L back pain radiating to her front x 10 days.  Initially off and on, but 3 days ago pain became more constant.  Rates pain a \"5-6\" on a 0-10 scale.  Aching pain   Has tried Tylenol, that took the edge of the pain.  Denies injury.    Consent: not needed    Background:   Hx of chronic kidney disease stage 3  Hx of diabetes and obesity.    Assessment:   Evaluation needed.    Protocol Recommended Disposition:   See in office today.    Recommendation:   Advised OV today per protocol.  Transferred to PSR to schedule OV or UC option provided.  Message had already been sent to PCP by scheduling for possible same day appointment.  Advised may try heat to area for comfort.  Advised to call back if symptoms are worsening.     Madie Stewart RN  [facility]      Madie Stewart RN 7:31 AM 12/11/2023  Reason for Disposition   MODERATE pain (e.g., interferes with normal activities or awakens from sleep)    Additional Information   Negative: Passed out (i.e., lost consciousness, collapsed and was not responding)   Negative: Shock suspected (e.g., cold/pale/clammy skin, too weak to stand, low BP, rapid pulse)   Negative: Sounds like a life-threatening emergency to the triager   Negative: Followed a major injury to the back (e.g., MVA, fall > 10 feet or 3 meters, penetrating injury, etc.)   Negative: Upper, mid or lower back pain that occurs mainly in the midline   Negative: SEVERE pain (e.g., excruciating, scale 8-10) and present > 1 hour   Negative: Sudden onset of severe flank pain and age > 60 years   Negative: Abdominal pain and age > 60 years   Negative: Unable to urinate (or only a few drops) > 4 hours and bladder feels very full (e.g., palpable bladder or strong urge to urinate)   Negative: Pain radiates into groin, scrotum   Negative: Blood in urine (red, pink, or tea-colored)   Negative: Vomiting   " Negative: Weakness of a leg or foot (e.g., unable to bear weight, dragging foot)   Negative: Patient sounds very sick or weak to the triager   Negative: Fever > 100.4 F (38.0 C)   Negative: Pain or burning with passing urine (urination)    Protocols used: Flank Pain-A-[place]

## 2023-12-12 LAB
ANION GAP SERPL CALCULATED.3IONS-SCNC: 11 MMOL/L (ref 7–15)
BUN SERPL-MCNC: 18.7 MG/DL (ref 6–20)
CALCIUM SERPL-MCNC: 10.4 MG/DL (ref 8.6–10)
CHLORIDE SERPL-SCNC: 101 MMOL/L (ref 98–107)
CREAT SERPL-MCNC: 1.41 MG/DL (ref 0.51–0.95)
DEPRECATED HCO3 PLAS-SCNC: 24 MMOL/L (ref 22–29)
EGFRCR SERPLBLD CKD-EPI 2021: 44 ML/MIN/1.73M2
GLUCOSE SERPL-MCNC: 99 MG/DL (ref 70–99)
HBA1C MFR BLD: 5.9 % (ref 0–5.6)
POTASSIUM SERPL-SCNC: 4.7 MMOL/L (ref 3.4–5.3)
SODIUM SERPL-SCNC: 136 MMOL/L (ref 135–145)

## 2023-12-12 NOTE — RESULT ENCOUNTER NOTE
Ms. Packer,    No signs of kidney stones. You did have a moderate amount of still in your colon.  Try the miralax everyday to see if that helps your pain.    Please contact the clinic if you have additional questions.  Thank you.    Sincerely,    Lesa Ziegler MD

## 2023-12-12 NOTE — RESULT ENCOUNTER NOTE
Ms. Packer,    Your urine test showed a lot of sugar.  This can cause discomfort and pain.  I will add a diabetes test to compare to the one from 2 months ago.  Your other labs were okay.    Please contact the clinic if you have additional questions.  Thank you.    Sincerely,    Lesa Ziegler MD

## 2023-12-15 ENCOUNTER — VIRTUAL VISIT (OUTPATIENT)
Dept: ENDOCRINOLOGY | Facility: CLINIC | Age: 53
End: 2023-12-15

## 2023-12-15 DIAGNOSIS — E66.9 OBESITY: Primary | ICD-10-CM

## 2023-12-15 PROCEDURE — 99207 PR NO CHARGE LOS: CPT | Mod: VID

## 2023-12-15 NOTE — PROGRESS NOTES
Health  Visit 9  Video  Yecenia Rivera and Edel Jain    Last Wght: 256 lb     PREVIOUS GOALS: NOV 28, 2023  1.) CONTINUE IF  2.) DRINK enough water, 64 oz of water  3.) Prep veggies and fruit ahead of time; merary jar salads  4.) Compare myself to myself yesterday:  How can I be better today? What am I proud of today?  5.) Youtube work-outs that are fun or find something outside the house to do for exercise that is fun and social, 2 times per week (I have my time to do this now with my current job)    PILLARS: wegovy is slowing digestion;  So may ask about changing medication.      GOALS: Dec 15, 2023  Reach out to Dee Hollingsworth about changing medication  Reminders; [place], stepping out of  my comfort, expressive, bold  I got out there and made connections  Mindful of foods that are greasy when eating out  My body cannot tolerate many of the rich foods anymore now with the medication  Make the best choice I can at restaurants.  Try more local vs chain restaurants  64 oz of water per day  Walking or biking to keep things moving  Ask Edel about tracking Fiber    NEXT HEALTH  VISIT; Jeramie 10, 3:30pm    TIME SPENT: 40 minutes      Carrie HENRY, National Board Certified Health &   Lead Health   [facility]  [email or web address]  [email or web address]   To schedule: [phone]   Gender pronouns: she/her         NEXT HEALTH  VISIT:    TIME SPENT:      Carrie HENRY, National Board Certified Health &   Lead Health   [facility]  [email or web address]  [email or web address]   To schedule: [phone]   Gender pronouns: she/her

## 2023-12-15 NOTE — LETTER
12/15/2023       RE: Adelaida Packer  [address] [address]     Dear Colleague,    Thank you for referring your patient, Adelaida Packer, to the [facility] [place] at [facility]. Please see a copy of my visit note below.    Health  Visit 9  Video  Yecenia Rivera and Edel Cristobal    Last Wght: 256 lb     PREVIOUS GOALS: NOV 28, 2023  1.) CONTINUE IF  2.) DRINK enough water, 64 oz of water  3.) Prep veggies and fruit ahead of time; merary jar salads  4.) Compare myself to myself yesterday:  How can I be better today? What am I proud of today?  5.) Youtube work-outs that are fun or find something outside the house to do for exercise that is fun and social, 2 times per week (I have my time to do this now with my current job)    PILLARS: wegovy is slowing digestion;  So may ask about changing medication.      GOALS: Dec 15, 2023  Reach out to Dee Hollingsworth about changing medication  Reminders; [place], stepping out of  my comfort, expressive, bold  I got out there and made connections  Mindful of foods that are greasy when eating out  My body cannot tolerate many of the rich foods anymore now with the medication  Make the best choice I can at restaurants.  Try more local vs chain restaurants  64 oz of water per day  Walking or biking to keep things moving  Ask Edel about tracking Fiber    NEXT HEALTH  VISIT; Jeramie 10, 3:30pm    TIME SPENT: 40 minutes      Carrie HERPAUL, National Board Certified Health &   Lead Health   [facility]  [email or web address]  [email or web address]   To schedule: [phone]   Gender pronouns: she/her         NEXT HEALTH  VISIT:    TIME SPENT:      Carrie HENRY, National Board Certified Health &   Lead Health   [facility]  [email or web address]   [email or web address]   To schedule: [phone]   Gender pronouns: she/her

## 2024-01-07 ENCOUNTER — HEALTH MAINTENANCE LETTER (OUTPATIENT)
Age: 54
End: 2024-01-07

## 2024-01-10 ENCOUNTER — OFFICE VISIT (OUTPATIENT)
Dept: FAMILY MEDICINE | Facility: CLINIC | Age: 54
End: 2024-01-10
Payer: COMMERCIAL

## 2024-01-10 ENCOUNTER — TELEPHONE (OUTPATIENT)
Dept: FAMILY MEDICINE | Facility: CLINIC | Age: 54
End: 2024-01-10

## 2024-01-10 ENCOUNTER — VIRTUAL VISIT (OUTPATIENT)
Dept: ENDOCRINOLOGY | Facility: CLINIC | Age: 54
End: 2024-01-10
Payer: COMMERCIAL

## 2024-01-10 VITALS
BODY MASS INDEX: 47.32 KG/M2 | HEIGHT: 62 IN | TEMPERATURE: 97.4 F | WEIGHT: 257.13 LBS | OXYGEN SATURATION: 98 % | SYSTOLIC BLOOD PRESSURE: 109 MMHG | DIASTOLIC BLOOD PRESSURE: 76 MMHG | RESPIRATION RATE: 16 BRPM | HEART RATE: 70 BPM

## 2024-01-10 DIAGNOSIS — N18.32 TYPE 2 DIABETES MELLITUS WITH STAGE 3B CHRONIC KIDNEY DISEASE, WITHOUT LONG-TERM CURRENT USE OF INSULIN (H): Primary | ICD-10-CM

## 2024-01-10 DIAGNOSIS — I12.9 HYPERTENSION, RENAL: ICD-10-CM

## 2024-01-10 DIAGNOSIS — E66.9 OBESITY: Primary | ICD-10-CM

## 2024-01-10 DIAGNOSIS — R10.13 DYSPEPSIA: ICD-10-CM

## 2024-01-10 DIAGNOSIS — F51.02 ADJUSTMENT INSOMNIA: ICD-10-CM

## 2024-01-10 DIAGNOSIS — E83.52 HYPERCALCEMIA: ICD-10-CM

## 2024-01-10 DIAGNOSIS — E78.5 HYPERLIPIDEMIA LDL GOAL <100: ICD-10-CM

## 2024-01-10 DIAGNOSIS — R11.0 NAUSEA: ICD-10-CM

## 2024-01-10 DIAGNOSIS — E66.813 CLASS 3 DRUG-INDUCED OBESITY WITH SERIOUS COMORBIDITY AND BODY MASS INDEX (BMI) OF 45.0 TO 49.9 IN ADULT (H): ICD-10-CM

## 2024-01-10 DIAGNOSIS — G43.709 CHRONIC MIGRAINE WITHOUT AURA WITHOUT STATUS MIGRAINOSUS, NOT INTRACTABLE: ICD-10-CM

## 2024-01-10 DIAGNOSIS — Z51.81 THERAPEUTIC DRUG MONITORING: ICD-10-CM

## 2024-01-10 DIAGNOSIS — E11.22 TYPE 2 DIABETES MELLITUS WITH STAGE 3B CHRONIC KIDNEY DISEASE, WITHOUT LONG-TERM CURRENT USE OF INSULIN (H): Primary | ICD-10-CM

## 2024-01-10 DIAGNOSIS — N18.32 STAGE 3B CHRONIC KIDNEY DISEASE (H): ICD-10-CM

## 2024-01-10 DIAGNOSIS — E66.1 CLASS 3 DRUG-INDUCED OBESITY WITH SERIOUS COMORBIDITY AND BODY MASS INDEX (BMI) OF 45.0 TO 49.9 IN ADULT (H): ICD-10-CM

## 2024-01-10 LAB
CA-I BLD-MCNC: 5.3 MG/DL (ref 4.4–5.2)
PHOSPHATE SERPL-MCNC: 3.4 MG/DL (ref 2.5–4.5)
PTH-INTACT SERPL-MCNC: 45 PG/ML (ref 15–65)
VIT D+METAB SERPL-MCNC: 30 NG/ML (ref 20–50)

## 2024-01-10 PROCEDURE — 83970 ASSAY OF PARATHORMONE: CPT | Performed by: FAMILY MEDICINE

## 2024-01-10 PROCEDURE — 90471 IMMUNIZATION ADMIN: CPT | Performed by: FAMILY MEDICINE

## 2024-01-10 PROCEDURE — 90746 HEPB VACCINE 3 DOSE ADULT IM: CPT | Performed by: FAMILY MEDICINE

## 2024-01-10 PROCEDURE — 99207 PR NO CHARGE LOS: CPT | Mod: 95

## 2024-01-10 PROCEDURE — 36415 COLL VENOUS BLD VENIPUNCTURE: CPT | Performed by: FAMILY MEDICINE

## 2024-01-10 PROCEDURE — 82306 VITAMIN D 25 HYDROXY: CPT | Performed by: FAMILY MEDICINE

## 2024-01-10 PROCEDURE — 84100 ASSAY OF PHOSPHORUS: CPT | Performed by: FAMILY MEDICINE

## 2024-01-10 PROCEDURE — 99214 OFFICE O/P EST MOD 30 MIN: CPT | Mod: 25 | Performed by: FAMILY MEDICINE

## 2024-01-10 PROCEDURE — 82330 ASSAY OF CALCIUM: CPT | Performed by: FAMILY MEDICINE

## 2024-01-10 RX ORDER — ONDANSETRON 4 MG/1
4 TABLET, ORALLY DISINTEGRATING ORAL EVERY 8 HOURS PRN
Qty: 30 TABLET | Refills: 0 | Status: SHIPPED | OUTPATIENT
Start: 2024-01-10 | End: 2024-01-10

## 2024-01-10 RX ORDER — TIRZEPATIDE 5 MG/.5ML
5 INJECTION, SOLUTION SUBCUTANEOUS WEEKLY
Qty: 1 ML | Refills: 0 | Status: SHIPPED | OUTPATIENT
Start: 2024-01-10 | End: 2024-02-05

## 2024-01-10 RX ORDER — DAPAGLIFLOZIN 10 MG/1
10 TABLET, FILM COATED ORAL DAILY
Qty: 90 TABLET | Refills: 3 | Status: SHIPPED | OUTPATIENT
Start: 2024-01-10 | End: 2024-08-09

## 2024-01-10 RX ORDER — FAMOTIDINE 20 MG/1
20 TABLET, FILM COATED ORAL 2 TIMES DAILY
Qty: 180 TABLET | Refills: 1 | Status: SHIPPED | OUTPATIENT
Start: 2024-01-10 | End: 2024-05-06

## 2024-01-10 RX ORDER — TIRZEPATIDE 2.5 MG/.5ML
2.5 INJECTION, SOLUTION SUBCUTANEOUS WEEKLY
Qty: 1 ML | Refills: 0 | Status: SHIPPED | OUTPATIENT
Start: 2024-01-10 | End: 2024-02-28 | Stop reason: DRUGHIGH

## 2024-01-10 RX ORDER — ATORVASTATIN CALCIUM 10 MG/1
10 TABLET, FILM COATED ORAL EVERY EVENING
Qty: 90 TABLET | Refills: 3 | Status: SHIPPED | OUTPATIENT
Start: 2024-01-10 | End: 2024-08-09

## 2024-01-10 RX ORDER — HYDROXYZINE HYDROCHLORIDE 25 MG/1
50 TABLET, FILM COATED ORAL
Qty: 90 TABLET | Refills: 1 | Status: SHIPPED | OUTPATIENT
Start: 2024-01-10 | End: 2024-04-01

## 2024-01-10 RX ORDER — TIRZEPATIDE 7.5 MG/.5ML
7.5 INJECTION, SOLUTION SUBCUTANEOUS WEEKLY
Qty: 2 ML | Refills: 0 | Status: SHIPPED | OUTPATIENT
Start: 2024-01-10 | End: 2024-02-19

## 2024-01-10 RX ORDER — ONDANSETRON 4 MG/1
4 TABLET, ORALLY DISINTEGRATING ORAL EVERY 8 HOURS PRN
Qty: 30 TABLET | Refills: 0 | Status: SHIPPED | OUTPATIENT
Start: 2024-01-10 | End: 2024-05-22

## 2024-01-10 RX ORDER — AMLODIPINE BESYLATE 5 MG/1
5 TABLET ORAL DAILY
Qty: 90 TABLET | Refills: 3 | Status: SHIPPED | OUTPATIENT
Start: 2024-01-10 | End: 2024-05-06

## 2024-01-10 RX ORDER — SUMATRIPTAN 25 MG/1
TABLET, FILM COATED ORAL
Qty: 18 TABLET | Refills: 1 | Status: SHIPPED | OUTPATIENT
Start: 2024-01-10 | End: 2024-05-06

## 2024-01-10 RX ORDER — LABETALOL 300 MG/1
300 TABLET, FILM COATED ORAL 2 TIMES DAILY
Qty: 180 TABLET | Refills: 3 | Status: SHIPPED | OUTPATIENT
Start: 2024-01-10 | End: 2024-03-11

## 2024-01-10 ASSESSMENT — PAIN SCALES - GENERAL: PAINLEVEL: NO PAIN (0)

## 2024-01-10 NOTE — PROGRESS NOTES
Assessment & Plan     Type 2 diabetes mellitus with stage 3b chronic kidney disease, without long-term current use of insulin (H)  Lab Results   Component Value Date    A1C 5.9 12/11/2023    A1C 6.6 07/07/2023    A1C 8.0 04/17/2023    A1C 13.0 02/10/2023    A1C 5.9 03/11/2022    A1C 6.1 11/30/2020    A1C 5.5 11/19/2019    A1C 5.3 01/29/2019    A1C 5.2 08/06/2018    A1C 5.2 08/15/2017     A1c significantly improved and is at goal, continue with current dose of Farxiga  Per patient's request, will switch from Wegovy to Mounjaro  Start with a 2.5 mg once a week for 2 weeks followed by titrating up to 5 mg and 7.5 mg every 2 weeks  Follow-up in 6 weeks for recheck on weight and A1c continue with self-monitoring of blood glucose, healthy eating, regular exercises, portion control    - tirzepatide (MOUNJARO) 2.5 MG/0.5ML pen; Inject 2.5 mg Subcutaneous once a week  - tirzepatide (MOUNJARO) 5 MG/0.5ML pen; Inject 5 mg Subcutaneous once a week  - tirzepatide (MOUNJARO) 7.5 MG/0.5ML pen; Inject 7.5 mg Subcutaneous once a week  - dapagliflozin (FARXIGA) 10 MG TABS tablet; Take 1 tablet (10 mg) by mouth daily    Class 3 drug-induced obesity with serious comorbidity and body mass index (BMI) of 45.0 to 49.9 in adult (H)  Wt Readings from Last 5 Encounters:   01/10/24 116.6 kg (257 lb 2 oz)   12/11/23 117.3 kg (258 lb 9.6 oz)   12/01/23 116.6 kg (257 lb)   10/25/23 118.4 kg (261 lb)   10/12/23 120.2 kg (265 lb)     as above    - tirzepatide (MOUNJARO) 2.5 MG/0.5ML pen; Inject 2.5 mg Subcutaneous once a week  - tirzepatide (MOUNJARO) 5 MG/0.5ML pen; Inject 5 mg Subcutaneous once a week  - tirzepatide (MOUNJARO) 7.5 MG/0.5ML pen; Inject 7.5 mg Subcutaneous once a week  - ondansetron (ZOFRAN ODT) 4 MG ODT tab; Take 1 tablet (4 mg) by mouth every 8 hours as needed for nausea or vomiting    Nausea    - ondansetron (ZOFRAN ODT) 4 MG ODT tab; Take 1 tablet (4 mg) by mouth every 8 hours as needed for nausea or vomiting    Therapeutic  drug monitoring    - ondansetron (ZOFRAN ODT) 4 MG ODT tab; Take 1 tablet (4 mg) by mouth every 8 hours as needed for nausea or vomiting    Stage 3b chronic kidney disease (H)  Continue to control underlying hypertension, continue to follow-up with Dr. Aleman in nephrology  - amLODIPine (NORVASC) 5 MG tablet; Take 1 tablet (5 mg) by mouth daily    Hyperlipidemia LDL goal <100  LDL Cholesterol Calculated   Date Value Ref Range Status   04/17/2023 51 <=100 mg/dL Final   03/29/2021 120 (H) <100 mg/dL Final     Comment:     Above desirable:  100-129 mg/dl  Borderline High:  130-159 mg/dL  High:             160-189 mg/dL  Very high:       >189 mg/dl       Will recheck lipids at the next visit, continue current dose of Lipitor  - atorvastatin (LIPITOR) 10 MG tablet; Take 1 tablet (10 mg) by mouth every evening    Dyspepsia  Stable, continue reflux precautions, Pepcid 20 mg twice a day  - famotidine (PEPCID) 20 MG tablet; Take 1 tablet (20 mg) by mouth 2 times daily    Hypertension, renal  BP Readings from Last 3 Encounters:   01/10/24 109/76   12/11/23 121/77   10/12/23 100/67     Blood pressure is at goal, continue with current dose of amlodipine and labetalol  - labetalol (NORMODYNE) 300 MG tablet; Take 1 tablet (300 mg) by mouth 2 times daily    Chronic migraine without aura without status migrainosus, not intractable  Stable, continue with current dose of Imitrex, recheck in 6 months or sooner if needed  - SUMAtriptan (IMITREX) 25 MG tablet; Profile rx,TAKE 1 TO 2 TABLETS BY MOUTH AT ONSET OF HEADACHE FOR MIGRAINE. MAY REPEAT DOSE IN 2 HOURS. MAX OF 200MG OR 2 DOSES IN 24 HOURS    Adjustment insomnia  Needing improvement, continue with sleep hygiene, will increase the dose of hydroxyzine from 1 tablet to 2 tablets daily at night  If no improvement noted in 4 to 6 weeks, will consider replacing with trazodone  Patient verbalised understanding and is agreeable to the plan.    - hydrOXYzine HCl (ATARAX) 25 MG tablet;  Take 2 tablets (50 mg) by mouth nightly as needed for itching    Hypercalcemia    Last Comprehensive Metabolic Panel:  Lab Results   Component Value Date     12/11/2023    POTASSIUM 4.7 12/11/2023    CHLORIDE 101 12/11/2023    CO2 24 12/11/2023    ANIONGAP 11 12/11/2023    GLC 99 12/11/2023    BUN 18.7 12/11/2023    CR 1.41 (H) 12/11/2023    GFRESTIMATED 44 (L) 12/11/2023    EVGENY 10.4 (H) 12/11/2023       Reviewed recent BMP from 2/11/2023 showing elevated calcium of 10.4.  Patient has history of s/p parathyroidectomy for hyperparathyroidism she is currently not on any calcium supplements or vitamin D  We will check the ionized calcium along with the other labs to evaluate for hypothyroidism either primary or secondary from underlying renal  Reviewed normal PTH and slightly elevated calcium of 10.1 at the nephrologist visit on 9/26/2023  Will f/u on results and call with recommendations.    - Ionized Calcium; Future  - Vitamin D Deficiency; Future  - Parathyroid Hormone Intact; Future  - Phosphorus; Future  - Ionized Calcium  - Vitamin D Deficiency  - Parathyroid Hormone Intact  - Phosphorus    Review of the result(s) of each unique test - BMP, PTH, lipids, A1c         Regular exercise  Chart documentation done in part with Dragon Voice recognition Software. Although reviewed after completion, some word and grammatical error may remain.    See Patient Instructions    Windy Gordillo MD  [facility]    Shannan Son is a 53 year old, presenting for the following health issues:  Diabetes        1/10/2024     8:02 AM   Additional Questions   Roomed by Diane Lynne Schoenherr RN       History of Present Illness       Diabetes:   She presents for follow up of diabetes.  She is checking home blood glucose one time daily.   She checks blood glucose before meals and at bedtime.  Blood glucose is never over 200 and never under 70. She is aware of hypoglycemia symptoms including weakness.    She is concerned about other.   She is having weight loss.            Reason for visit:  Diabetes and insomnia    She eats 2-3 servings of fruits and vegetables daily.She consumes 0 sweetened beverage(s) daily.She exercises with enough effort to increase her heart rate 9 or less minutes per day.  She exercises with enough effort to increase her heart rate 3 or less days per week.   She is taking medications regularly.         Diabetes Follow-up    How often are you checking your blood sugar?  Once a day  What concerns do you have today about your diabetes? None   Do you have any of these symptoms? (Select all that apply)  No numbness or tingling in feet.  No redness, sores or blisters on feet.  No complaints of excessive thirst.  No reports of blurry vision.  No significant changes to weight.          Hyperlipidemia Follow-Up    Are you regularly taking any medication or supplement to lower your cholesterol?   Yes- Lipitor  Are you having muscle aches or other side effects that you think could be caused by your cholesterol lowering medication?  No    Hypertension Follow-up    Do you check your blood pressure regularly outside of the clinic? Yes   Are you following a low salt diet? Yes  Are your blood pressures ever more than 140 on the top number (systolic) OR more   than 90 on the bottom number (diastolic), for example 140/90? No    BP Readings from Last 2 Encounters:   01/10/24 109/76   12/11/23 121/77     Hemoglobin A1C (%)   Date Value   12/11/2023 5.9 (H)   07/07/2023 6.6 (H)   11/30/2020 6.1 (H)   11/19/2019 5.5     LDL Cholesterol Calculated (mg/dL)   Date Value   04/17/2023 51   03/11/2022 106 (H)   03/29/2021 120 (H)   02/18/2019 95         Migraine   Since your last clinic visit, how have your headaches changed?  No change  How often are you getting headaches or migraines?  Once in 1 to 2 months  Are you able to do normal daily activities when you have a migraine? Yes  Are you taking rescue/relief  "medications? (Select all that apply) sumatriptan (Imitrex)  How helpful is your rescue/relief medication?  I get total relief  Are you taking any medications to prevent migraines? (Select all that apply)  No  In the past 4 weeks, how often have you gone to urgent care or the emergency room because of your headaches?  0  Medication Followup of hydroxyzine for sleep  Taking Medication as prescribed: yes  Side Effects:  None  Medication Helping Symptoms: Patient thinks it helps to get to sleep but she wakes up in the middle of the night and unable to go back to sleep       Review of Systems   CONSTITUTIONAL: As above  EYES: NEGATIVE for vision changes or irritation  RESP: NEGATIVE for significant cough or SOB  CV: NEGATIVE for chest pain, palpitations or peripheral edema  CV: History of hypertension  GI: History of GERD/dyspepsia  MUSCULOSKELETAL: NEGATIVE for significant arthralgias or myalgia  NEURO: NEGATIVE for weakness, dizziness or paresthesias  ENDOCRINE: History of diabetes  HEME/ALLERGY/IMMUNE: NEGATIVE for bleeding problems  PSYCHIATRIC: NEGATIVE for changes in mood or affect      Objective    /76 (BP Location: Right arm, Patient Position: Sitting, Cuff Size: Adult Large)   Pulse 70   Temp 97.4  F (36.3  C) (Temporal)   Resp 16   Ht 1.575 m (5' 2\")   Wt 116.6 kg (257 lb 2 oz)   LMP 08/13/2005   SpO2 98%   BMI 47.03 kg/m    Body mass index is 47.03 kg/m .  Physical Exam   GENERAL: healthy, alert and no distress  RESP: lungs clear to auscultation - no rales, rhonchi or wheezes  CV: regular rate and rhythm, normal S1 S2, no S3 or S4, no murmur, click or rub, no peripheral edema and peripheral pulses strong  MS: no gross musculoskeletal defects noted, no edema  NEURO: Normal strength and tone, mentation intact and speech normal  PSYCH: mentation appears normal, affect normal/bright    Hemoglobin A1C   Date Value Ref Range Status   12/11/2023 5.9 (H) 0.0 - 5.6 % Final     Comment:     Normal <5.7% "   Prediabetes 5.7-6.4%    Diabetes 6.5% or higher     Note: Adopted from ADA consensus guidelines.   11/30/2020 6.1 (H) 0 - 5.6 % Final     Comment:     Normal <5.7% Prediabetes 5.7-6.4%  Diabetes 6.5% or higher - adopted from ADA   consensus guidelines.       Last Comprehensive Metabolic Panel:  Lab Results   Component Value Date     12/11/2023    POTASSIUM 4.7 12/11/2023    CHLORIDE 101 12/11/2023    CO2 24 12/11/2023    ANIONGAP 11 12/11/2023    GLC 99 12/11/2023    BUN 18.7 12/11/2023    CR 1.41 (H) 12/11/2023    GFRESTIMATED 44 (L) 12/11/2023    EVGENY 10.4 (H) 12/11/2023

## 2024-01-10 NOTE — RESULT ENCOUNTER NOTE
Kelby Son,  Your calcium is slightly elevated but your other numbers including the parathyroid hormone are in normal range.  It is continue to monitor  I would advise not to take the calcium supplements at this time.  We will recheck at your visit in 3 months   Let me know if you have any questions. Take care.  Windy Gordillo MD

## 2024-01-10 NOTE — NURSING NOTE
Prior to immunization administration, verified patients identity using patient s name and date of birth. Please see Immunization Activity for additional information. Yes    Screening Questionnaire for Adult Immunization    Are you sick today?   No   Do you have allergies to medications, food, a vaccine component or latex?   Yes   Have you ever had a serious reaction after receiving a vaccination?   No   Do you have a long-term health problem with heart, lung, kidney, or metabolic disease (e.g., diabetes), asthma, a blood disorder, no spleen, complement component deficiency, a cochlear implant, or a spinal fluid leak?  Are you on long-term aspirin therapy?   No   Do you have cancer, leukemia, HIV/AIDS, or any other immune system problem?   No   Do you have a parent, brother, or sister with an immune system problem?   No   In the past 3 months, have you taken medications that affect  your immune system, such as prednisone, other steroids, or anticancer drugs; drugs for the treatment of rheumatoid arthritis, Crohn s disease, or psoriasis; or have you had radiation treatments?   No   Have you had a seizure, or a brain or other nervous system problem?   No   During the past year, have you received a transfusion of blood or blood    products, or been given immune (gamma) globulin or antiviral drug?   No   For women: Are you pregnant or is there a chance you could become       pregnant during the next month?   No   Have you received any vaccinations in the past 4 weeks?   No     Immunization questionnaire was positive for at least one answer.  Notified Dr. Gordlilo.      Patient instructed to remain in clinic for 15 minutes afterwards, and to report any adverse reactions.     Screening performed by Diane L. Schoenherr, RN on 1/10/2024 at 9:28 AM.

## 2024-01-10 NOTE — PROGRESS NOTES
Health  Visit #10  Yecenia Rivera  VIDEO    PT said she accidentally overbooked and has a work meeting. So asked me to rebook her for this Friday during the visit.      Previous Goals;  Reach out to Dee Hollingsworth about changing medication  Reminders; [place], stepping out of  my comfort, expressive, bold  I got out there and made connections  Mindful of foods that are greasy when eating out  My body cannot tolerate many of the rich foods anymore now with the medication  Make the best choice I can at restaurants.  Try more local vs chain restaurants  64 oz of water per day  Walking or biking to keep things moving  Ask Edel about tracking Fiber          TIME SPENT: 5 minutes (pt asked to reschedule)      Carrie Ness  [facility], National Board Certified Health &   Lead Health   [facility]  [email or web address]  [email or web address]   To schedule: [phone]   Gender pronouns: she/her

## 2024-01-11 NOTE — PROGRESS NOTES
"Video-Visit Details    Type of service:  Video Visit    Video Start Time: 9:01 am  Video End Time:  9:25 am    Originating Location (pt. Location): Home    Distant Location (provider location):  [facility].    Platform used for Video Visit: TribeHired       Weight Management Nutrition Consultation    Adelaida Packer is a 53 year old female presents today for weight management nutrition consultation.  Patient referred by Dr. Gordillo.     Pt previously worked with Dr. Stephen Null. Now working with Yecenia Andrea PA-C as of 6/14/23.     Patient with Co-morbidities of obesity including:  Type II DM yes  Renal Failure stage 3 CKD per chart  Sleep apnea no  Hypertension yes  Dyslipidemia yes, high LDL hx  Joint pain osteoarthritis and knee pain per chart  Back pain no  GERD yes    PMH includes: migraines, vit d deficiency, hyperparathyroidism, needs knee replacement     Recommended 30-40 lb weight loss for knee surgery    Anthropometrics:  Weight when started with me 6/15/21: 244 lbs  Weight 6/15/23: 275 lbs     Estimated body mass index is 46.46 kg/m  as calculated from the following:    Height as of this encounter: 1.575 m (5' 2\").    Weight as of this encounter: 115.2 kg (254 lb).     Current weight: 254 lbs, pt report    Medications for Weight Loss:  Wegovy previously, transitioned to Mounjaro     A1C 13 Jan 2023, 8.0 4/17/23, 6.6 7/7/23  A1C POCT 6.5 10/12/23  Had a CGM but was not affordable     Supplements:  Forgot to ask today    Labs 1/10/24  Calcium slightly elevated  PTH WNL  Vitamin D on low end at 30    NUTRITION HISTORY  NKFA  Not huge on seafood - will eat some fish/shrimp, no lobster  Does not like mushrooms     Tried meal kits - got sick of it, expensive.  Tried Noom - 1200 kcal/day  Did WW in past, hard virtually. Dr. Mitchell recommended again.   Did weight study through [facility], lost 40 lbs    Limited vegetables and fruit   Beverages: coffee - almond milk and stevia, water (had " worked up to 80-90 oz now less), water with crystal light, pop once in a blue moon (diet pepsi or coke), seltzer water once in a while, no alcohol    Snacks: varies, maybe pickles, cucumbers, chips, chocolate    Please see previous RD notes for more information     Dec 2023:    Recently did IF, 16/8. No longer doing  Feeling hungrier lately.    Recent recall  - Hot cocoa, Greek yogurt with granola  - 1/2 sour cream chicken enchilada and a couple pieces toffee and a donut  - lo mein noodles with chicken and some veggies, 3 cream cheese puffs  - Hot cocoa   - 1-2 scrambled eggs  - Water: ~64 oz    Other meals this week: merary jar salad    Barriers: hard cooking for one    Jan 2024:  Just switched to Mounjaro last night.    Getting 64-80 oz of water/day. Using crystal light occ. Seeing Nephrology 3/26/24 - will check in on any dietary/fluid restrictions. Did discuss protein recommendations today to help with kidney health.    Continues with merary jar salads but got sick recently (diarrhea/vomiting) so did less salads and more noodles/eggs.     Trying to get more fruits/vegetables in. Recent meal example: chicken and sauteed peppers.     BM: noticed constipation last month, now taking miralax daily.     PA: 10-11k steps/day    Working PT 3 nights a week - does not get home till 9:30 or 10 pm.    Previous goals:  Could consider protein shake instead of hot cocoa (example: Fairlife core power 30 g protein) - Decreased hot cocoa intake to 2-3x/week. Bought a zero-sugar coffee drink to have some days instead.  Consider tuna, nuts/seeds and/or quinoa in merary jar salads - Hasn't bought quinoa yet but did do tuna. Got sick (diarrhea/vomiting) a few weeks ago so was not doing as many salads.   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    Nutrition Intervention  Reviewed and modified previous goals  Education on plate method and foods to incorporate.  AVS via Setupt    Patient demonstrates understanding.    Expected Engagement: good    Nutrition Goals  Aim to incorporate more soluble fiber to help with constipation. Soluble fiber attracts water to form a gel-like substance (helps pull stool through the system)  Soluble fiber examples: [place] sprouts, oats, beans, peas, apples, oranges, nuts, seeds  2. Recommend 20-30 g of fiber/day  3. Protein recommendations ~85 g/day  4. Aim to  some more EMKinetics Power from finalsite  5. Aim for 3 workouts/week  6. Could consider [place] oatmeal    Meals to make: quiche, roast with potatoes/carrots, oatmeal with nuts/seeds and fruit     High Fiber Foods:  Bran cereal, 1/3 cup, 8.6 gm fiber   Cooked kidney beans   cup 7.9 gm  Cooked lentils   cup 7.8 gm  Cooked black beans   cup 7.6 gm  Canned chickpeas   cup 5.3 gm  Baked beans   cup 5.2 gm  Pear 1 5.1 gm  Soybeans   cup 5.1 gm  Quinoa   cup 5 gm  Jaun seeds, 1 tbsp 5 gm  Baked sweet potato, with skin 1 medium 4.8 gm  Avocado, half small 4.5 gm  Baked potato, with skin 1 medium 4.4 gm  Cooked frozen green peas   cup 4.4 gm  Bulgur   cup 4.1 gm  Cooked frozen mixed vegetables   cup 4 gm  Raspberries   cup 4 gm  Blackberries   cup 3.8 gm  Almonds 1 oz 3.5 gm  Cooked frozen spinach   cup 3.5 gm  Vegetable or soy ruth 1 each 3.4 gm  Apple 1 medium 3.3 gm  Dried dates 5 pieces 3.3 gm     Carbohydrates that will help you stay jaimes longer: wild rice, brown rice, quinoa, lentils, beans    Protein bar ideas  Quest Protein Bars (190 Calories, 20 g protein)  Built Bar (170 Calories, 15-20 g protein)  One Protein Bar (210 calories, 20 g protein)  [place] Signature Protein Bar ([facility]) (190 Calories, 21 g protein)  Pure  Protein Bars (180 Calories, 21 g protein)    Plate method can be used for general guidance on balanced meals/portion sizes (see link below for picture/more information)                 Make 1/2 your plate non starchy vegetable(cauliflower, broccoli, asparagus, [place] sprouts, lettuce, carrots, for example).                3+ oz lean protein sources (salmon/skinless chicken/turkey breast/pork loin/lean cuts of beef/ or non-animal protein such as black, kidney or valencia beans, tofu/edamame/tempeh)     (Note: 3 oz = deck of cards size)                 1/2 to 1 cup carbohydrate choices such as whole grain starches or starchy vegetables or fruit. For example: quinoa, brown rice, barley, potatoes, sweet potatoes, winter squash, peas, corn, or fruit.               Choose ~0-2 added fat servings at a meal (avocados, nut butters, nuts or seeds, olive oil, vegetable oils).    The Plate Method:  https://www.cdc.gov/diabetes/images/managing/Diabetes-Manage-Eat-Well-Plate-Graphic_600px.jpg    Summary of Volumetrics Eating Plan  [email or web address]     Protein:  Chicken  Turkey  Fish  Seafood (shrimp, lobster, crab, etc)  Lean cuts of beef (93% ground, sirloin, tenderloin, etc)  Pork (limit fatter options like gonsalez)  Cottage cheese  Greek yogurt  Eggs   Low-fat Cheese  Lentils/beans  Nuts/nut butter (recommend a smaller portion if doing these options - 2 Tbsp of nuts or 1/4 cup nut butter)  Tofu  Seitan     Carbohydrates:   - Brown/Wild rice   - Egg life wrap (low carb and great source of protein!)    - Whole wheat pasta   - Protein pasta (barilla protein +, lentil pasta, etc)   - Whole wheat or carb balance tortilla   - Oatmeal    - Fruit   - Starchy vegetables (corn, peas, beans/lentils, potatoes)   - Whole grain crackers    - Whole wheat waffle    - [place] protein waffles/pancakes    - Couscous     Non-starchy Vegetables Examples:   - Lettuce   - Spinach   - Onions    - Bell pepper   - Carrots   - Broccoli   -  "Cabbage   - Brussel sprouts   - Cauliflower    - Asparagus    - Cucumber   - Artichoke   - Zucchini   - Bean sprouts   - Mushrooms   - Sugar snap peas   - Eggplant   - Turnips   - Celery   - Radishes    - Green beans    Starchy vegetable examples:  Corn  Green peas  Winter squash (butternut, spaghetti squash, acorn)  Beans/lentils (not including green beans)  Potatoes  Sweet potatoes       Healthy Recipe Resources:  Books:  \"The Volumetrics Eating Plan\" by Yudith Barajas, Ph.D.  \"Cooking that Counts\" by editors of [employer]  \"Calorie Smart Meals\" cookbook by Better Homes and Gardens (200-500 calorie meals)    Websites:  www.Mbaobao  www.Cerecor.Advasense  https://www.diabetesfoodhub.org/all-recipes.html  https://www.PerfectServe.Direct Vet Marketing/  Https://www.Somoplate.gov/myplatekitchen  https://snaped.BioScrips.usda.gov/recipes-menus   https://www.Jumia/esolidar/9053966/dietitian-budget-high-protein-dinner-recipes/  https://www.Lendinero/recipes/84/healthy-recipes/   Https://www.[place].com/c/Imanis Life Scienceschcoen   Eatthismuch.com     Cultural Cuisines:   Cuisine: https://www.firstnations.org/knowledge-center/recipes/  Mexican and Vegan Cuisine:   https://Funny Or Die.Direct Vet Marketing/  https://VALLEY FORGE COMPOSITE TECHNOLOGIES/recipe-index/  Chinese Cuisine: https://www.PlayerTakesAll.Direct Vet Marketing/  Various Regions of African Cuisine: https://www.Jumia/recipes/14382/cuisines-regions//    Apps:  Hotlease.Com anika (or website, mealime.com)   SpendSmartEatSmart       Follow-Up:  Monday, February 12th at 8:00 am    Time spent with patient: 24 minutes.  SAKSHI Salomon RD, LD    "

## 2024-01-12 ENCOUNTER — VIRTUAL VISIT (OUTPATIENT)
Dept: ENDOCRINOLOGY | Facility: CLINIC | Age: 54
End: 2024-01-12

## 2024-01-12 DIAGNOSIS — E66.9 OBESITY: Primary | ICD-10-CM

## 2024-01-12 PROCEDURE — 99207 PR NO CHARGE LOS: CPT | Mod: 95

## 2024-01-12 NOTE — PROGRESS NOTES
Health  24 wk  Visit 10  Yecenia Rivera    Previous Goals  Reach out to Dee Hollingsworth about changing medication  Reminders; [place], stepping out of  my comfort, expressive, bold  I got out there and made connections  Mindful of foods that are greasy when eating out  My body cannot tolerate many of the rich foods anymore now with the medication  Make the best choice I can at restaurants.  Try more local vs chain restaurants  64 oz of water per day  Walking or biking to keep things moving  Ask Edel about tracking Fiber    PILLARS:  Pt is switching form wegovy to monjaro and will start Sunday  Pt is folloiwng the 5 goals from tr birch and is going well; just has to make sure to do the morning power walks     GOALS/Notes: Jan 12  1.) I follow the 5 Tr [place] Goals  2.) I feel accomplished and present after doing my morning routine   3.) I am ready to do my morning one mile power walks  4.) Affirmation for the year: I am enough. I live for today. I am present  5.) I read 5 pages before bed    NEXT HEALTH  VISIT: FEB 5, 8:30 am video visit     Health Coaching  3 Pack: if you want to continue after your last 24 wk HC visit on Feb 5    Do you know what you are supposed to do, but you just aren't doing it?  Work with our HEALTH !  Three 30 minute Health Coaching Visits, for $99.  Visits are done virtually or over the phone.  This is a self pay service; we do not accept insurance for celia coaching.    Health Coaches help you identify goals that will work best for you. Health Coaches provide support and encouragement with  overcoming barriers and help you to find inspiration and motivation to lead a healthy lifestyle.    To Schedule, call [phone].     Carrie Ness  [facility], National Board Certified Health &   Lead Health   [facility] Weight [facility]  [email or web address]  [email or web address]   To schedule: [phone]   Gender pronouns: she/her           TIME SPENT:  30 minutes          TIME SPENT:      Carrie RICHARD-[facility], National Board Certified Health &   Lead Health   [facility] Weight Management  [email or web address]   To schedule: [phone]   Gender pronouns: she/her

## 2024-01-15 ENCOUNTER — VIRTUAL VISIT (OUTPATIENT)
Dept: ENDOCRINOLOGY | Facility: CLINIC | Age: 54
End: 2024-01-15
Payer: COMMERCIAL

## 2024-01-15 VITALS — WEIGHT: 254 LBS | HEIGHT: 62 IN | BODY MASS INDEX: 46.74 KG/M2

## 2024-01-15 DIAGNOSIS — N18.31 TYPE 2 DIABETES MELLITUS WITH STAGE 3A CHRONIC KIDNEY DISEASE, WITHOUT LONG-TERM CURRENT USE OF INSULIN (H): ICD-10-CM

## 2024-01-15 DIAGNOSIS — E11.22 TYPE 2 DIABETES MELLITUS WITH STAGE 3A CHRONIC KIDNEY DISEASE, WITHOUT LONG-TERM CURRENT USE OF INSULIN (H): ICD-10-CM

## 2024-01-15 DIAGNOSIS — Z71.3 NUTRITIONAL COUNSELING: Primary | ICD-10-CM

## 2024-01-15 DIAGNOSIS — E66.9 OBESITY: ICD-10-CM

## 2024-01-15 PROCEDURE — 97803 MED NUTRITION INDIV SUBSEQ: CPT | Mod: 95 | Performed by: DIETITIAN, REGISTERED

## 2024-01-15 PROCEDURE — 99207 PR NO CHARGE LOS: CPT | Mod: 95 | Performed by: DIETITIAN, REGISTERED

## 2024-01-15 ASSESSMENT — PAIN SCALES - GENERAL: PAINLEVEL: NO PAIN (0)

## 2024-01-15 NOTE — NURSING NOTE
Is the patient currently in the state of [place]? YES    Visit mode:VIDEO    If the visit is dropped, the patient can be reconnected by: VIDEO VISIT: Send to e-mail at: [email or web address]    Will anyone else be joining the visit? NO  (If patient encounters technical issues they should call [phone]    How would you like to obtain your AVS? MyChart    Are changes needed to the allergy or medication list? N/A    Reason for visit: [place]

## 2024-01-15 NOTE — LETTER
"1/15/2024       RE: Adelaida Packer  [address] [place] [address]     Dear Colleague,    Thank you for referring your patient, Adelaida Packer, to the [facility] [place] [facility]. Please see a copy of my visit note below.    Video-Visit Details    Type of service:  Video Visit    Video Start Time: 9:01 am  Video End Time:  9:25 am    Originating Location (pt. Location): Home    Distant Location (provider location):  Offsite (providers home)     Platform used for Video Visit: University Media       Weight Management Nutrition Consultation    Adelaida Packer is a 53 year old female presents today for weight management nutrition consultation.  Patient referred by Dr. Gordillo.     Pt previously worked with Dr. Stephen Null. Now working with Yecenia Andrea PA-C as of 6/14/23.     Patient with Co-morbidities of obesity including:  Type II DM yes  Renal Failure stage 3 CKD per chart  Sleep apnea no  Hypertension yes  Dyslipidemia yes, high LDL hx  Joint pain osteoarthritis and knee pain per chart  Back pain no  GERD yes    PMH includes: migraines, vit d deficiency, hyperparathyroidism, needs knee replacement     Recommended 30-40 lb weight loss for knee surgery    Anthropometrics:  Weight when started with me 6/15/21: 244 lbs  Weight 6/15/23: 275 lbs     Estimated body mass index is 46.46 kg/m  as calculated from the following:    Height as of this encounter: 1.575 m (5' 2\").    Weight as of this encounter: 115.2 kg (254 lb).     Current weight: 254 lbs, pt report    Medications for Weight Loss:  Wegovy previously, transitioned to Mounjaro     A1C 13 Jan 2023, 8.0 4/17/23, 6.6 7/7/23  A1C POCT 6.5 10/12/23  Had a CGM but was not affordable     Supplements:  Forgot to ask today    Labs 1/10/24  Calcium slightly elevated  PTH WNL  Vitamin D on low end at 30    NUTRITION HISTORY  NKFA  Not huge on seafood - will eat " some fish/shrimp, no lobster  Does not like mushrooms     Tried meal kits - got sick of it, expensive.  Tried Noom - 1200 kcal/day  Did WW in past, hard virtually. Dr. Mitchell recommended again.   Did weight study through , lost 40 lbs    Limited vegetables and fruit   Beverages: coffee - almond milk and stevia, water (had worked up to 80-90 oz now less), water with crystal light, pop once in a blue moon (diet pepsi or coke), seltzer water once in a while, no alcohol    Snacks: varies, maybe pickles, cucumbers, chips, chocolate    Please see previous RD notes for more information     Dec 2023:    Recently did IF, 16/8. No longer doing  Feeling hungrier lately.    Recent recall  - Hot cocoa, Greek yogurt with granola  - 1/2 sour cream chicken enchilada and a couple pieces toffee and a donut  - lo mein noodles with chicken and some veggies, 3 cream cheese puffs  - Hot cocoa   - 1-2 scrambled eggs  - Water: ~64 oz    Other meals this week: merary jar salad    Barriers: hard cooking for one    Jan 2024:  Just switched to Mounjaro last night.    Getting 64-80 oz of water/day. Using crystal light occ. Seeing Nephrology 3/26/24 - will check in on any dietary/fluid restrictions. Did discuss protein recommendations today to help with kidney health.    Continues with merary jar salads but got sick recently (diarrhea/vomiting) so did less salads and more noodles/eggs.     Trying to get more fruits/vegetables in. Recent meal example: chicken and sauteed peppers.     BM: noticed constipation last month, now taking miralax daily.     PA: 10-11k steps/day    Working PT 3 nights a week - does not get home till 9:30 or 10 pm.    Previous goals:  Could consider protein shake instead of hot cocoa (example: Fairlife core power 30 g protein) - Decreased hot cocoa intake to 2-3x/week. Bought a zero-sugar coffee drink to have some days instead.  Consider tuna, nuts/seeds and/or quinoa in merary jar salads - Hasn't bought quinoa yet but did  do tuna. Got sick (diarrhea/vomiting) a few weeks ago so was not doing as many salads.   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    Nutrition Intervention  Reviewed and modified previous goals  Education on plate method and foods to incorporate.  AVS via Events Coret    Patient demonstrates understanding.    Expected Engagement: good    Nutrition Goals  Aim to incorporate more soluble fiber to help with constipation. Soluble fiber attracts water to form a gel-like substance (helps pull stool through the system)  Soluble fiber examples: [place] sprouts, oats, beans, peas, apples, oranges, nuts, seeds  2. Recommend 20-30 g of fiber/day  3. Protein recommendations ~85 g/day  4. Aim to  some more PixelPlay Core Power from [employer]  5. Aim for 3 workouts/week  6. Could consider [place] oatmeal    Meals to make: quiche, roast with potatoes/carrots, oatmeal with nuts/seeds and fruit     High Fiber Foods:  Bran cereal, 1/3 cup, 8.6 gm fiber   Cooked kidney beans   cup 7.9 gm  Cooked lentils   cup 7.8 gm  Cooked black beans   cup 7.6 gm  Canned chickpeas   cup 5.3 gm  Baked beans   cup 5.2 gm  Pear 1 5.1 gm  Soybeans   cup 5.1 gm  Quinoa   cup 5 gm  Jaun seeds, 1 tbsp 5 gm  Baked sweet potato, with skin 1 medium 4.8 gm  Avocado, half small 4.5 gm  Baked potato, with skin 1 medium 4.4 gm  Cooked frozen green peas   cup 4.4 gm  Bulgur   cup 4.1 gm  Cooked frozen mixed vegetables   cup 4 gm  Raspberries   cup 4 gm  Blackberries   cup 3.8 gm  Almonds 1 oz 3.5 gm  Cooked frozen spinach   cup 3.5 gm  Vegetable or soy ruth 1 each 3.4 gm  Apple 1 medium 3.3 gm  Dried dates 5 pieces 3.3 gm     Carbohydrates  that will help you stay jaimes longer: wild rice, brown rice, quinoa, lentils, beans    Protein bar ideas  Quest Protein Bars (190 Calories, 20 g protein)  Built Bar (170 Calories, 15-20 g protein)  One Protein Bar (210 calories, 20 g protein)  [place] Signature Protein Bar ([employer]) (190 Calories, 21 g protein)  Pure Protein Bars (180 Calories, 21 g protein)    Plate method can be used for general guidance on balanced meals/portion sizes (see link below for picture/more information)                 Make 1/2 your plate non starchy vegetable(cauliflower, broccoli, asparagus, [place] sprouts, lettuce, carrots, for example).                3+ oz lean protein sources (salmon/skinless chicken/turkey breast/pork loin/lean cuts of beef/ or non-animal protein such as black, kidney or valencia beans, tofu/edamame/tempeh)     (Note: 3 oz = deck of cards size)                 1/2 to 1 cup carbohydrate choices such as whole grain starches or starchy vegetables or fruit. For example: quinoa, brown rice, barley, potatoes, sweet potatoes, winter squash, peas, corn, or fruit.               Choose ~0-2 added fat servings at a meal (avocados, nut butters, nuts or seeds, olive oil, vegetable oils).    The Plate Method:  https://www.cdc.gov/diabetes/images/managing/Diabetes-Manage-Eat-Well-Plate-Graphic_600px.jpg    Summary of Volumetrics Eating Plan  [email or web address]     Protein:  Chicken  Turkey  Fish  Seafood (shrimp, lobster, crab, etc)  Lean cuts of beef (93% ground, sirloin, tenderloin, etc)  Pork (limit fatter options like gonsalez)  Cottage cheese  Greek yogurt  Eggs   Low-fat Cheese  Lentils/beans  Nuts/nut butter (recommend a smaller portion if doing these options - 2 Tbsp of nuts or 1/4 cup nut butter)  Tofu  Seitan     Carbohydrates:   - Brown/Wild rice   - Egg life wrap (low carb and great source of protein!)    - Whole wheat pasta   - Protein pasta (barilla protein +, lentil pasta, etc)   - Whole wheat or carb  "balance tortilla   - Oatmeal    - Fruit   - Starchy vegetables (corn, peas, beans/lentils, potatoes)   - Whole grain crackers    - Whole wheat waffle    - [place] protein waffles/pancakes    - Couscous     Non-starchy Vegetables Examples:   - Lettuce   - Spinach   - Onions    - Bell pepper   - Carrots   - Broccoli   - Cabbage   - Brussel sprouts   - Cauliflower    - Asparagus    - Cucumber   - Artichoke   - Zucchini   - Bean sprouts   - Mushrooms   - Sugar snap peas   - Eggplant   - Turnips   - Celery   - Radishes    - Green beans    Starchy vegetable examples:  Corn  Green peas  Winter squash (butternut, spaghetti squash, acorn)  Beans/lentils (not including green beans)  Potatoes  Sweet potatoes       Healthy Recipe Resources:  Books:  \"The Volumetrics Eating Plan\" by Yudith Barajas, Ph.D.  \"Cooking that Counts\" by editors of [place] [facility]  \"Calorie Smart Meals\" cookbook by Better Homes and Gardens (200-500 calorie meals)    Websites:  www.Integrated Solar Analytics Solutions  www.Pricebets  https://www.diabetesfoodhub.org/all-recipes.html  https://www.CrepeGuys.Silenseed/  Https://www.choosemyplate.gov/myplatekitchen  https://snaped.fns.usda.gov/recipes-menus   https://www.SoNetJob/gallsonu/4809534/dietitian-budget-high-protein-dinner-recipes/  https://www.Traitify.Silenseed/recipes/84/healthy-recipes/   Https://www.youtube.com/c/SecureOne Data Solutionseddcoen   Eatthismuch.com     Cultural Cuisines:   Cuisine: https://www.Etactsnations.org/knowledge-center/recipes/  Mexican and Vegan Cuisine:   https://ShopSpot.Silenseed/  https://eTobb.Silenseed/recipe-index/  Chinese Cuisine: https://www.Avuxi.Silenseed/  Various Regions of African Cuisine: https://www.edupristine.com/recipes/15862/cuisines-regions//    Apps:  Spice Online Retail anika (or website, Ecochlor.com)   Ness       Follow-Up:  Monday, February 12th at 8:00 am    Time spent with patient: 24 minutes.  SAKSHI Salomon RD, LD    "

## 2024-01-15 NOTE — PATIENT INSTRUCTIONS
Nutrition Goals  Aim to incorporate more soluble fiber to help with constipation. Soluble fiber attracts water to form a gel-like substance (helps pull stool through the system)  Soluble fiber examples: Clarita sprouts, oats, beans, peas, apples, oranges, nuts, seeds  2. Recommend 20-30 g of fiber/day  3. Protein recommendations ~85 g/day  4. Aim to  some more KE2 [employer] from [employer]  5. Aim for 3 workouts/week  6. Could consider [place] oatmeal    Meals to make: quiche, roast with potatoes/carrots, oatmeal with nuts/seeds and fruit     High Fiber Foods:  Bran cereal, 1/3 cup, 8.6 gm fiber   Cooked kidney beans   cup 7.9 gm  Cooked lentils   cup 7.8 gm  Cooked black beans   cup 7.6 gm  Canned chickpeas   cup 5.3 gm  Baked beans   cup 5.2 gm  Pear 1 5.1 gm  Soybeans   cup 5.1 gm  Quinoa   cup 5 gm  Jaun seeds, 1 tbsp 5 gm  Baked sweet potato, with skin 1 medium 4.8 gm  Avocado, half small 4.5 gm  Baked potato, with skin 1 medium 4.4 gm  Cooked frozen green peas   cup 4.4 gm  Bulgur   cup 4.1 gm  Cooked frozen mixed vegetables   cup 4 gm  Raspberries   cup 4 gm  Blackberries   cup 3.8 gm  Almonds 1 oz 3.5 gm  Cooked frozen spinach   cup 3.5 gm  Vegetable or soy ruth 1 each 3.4 gm  Apple 1 medium 3.3 gm  Dried dates 5 pieces 3.3 gm     Carbohydrates that will help you stay jaimes longer: wild rice, brown rice, quinoa, lentils, beans    Protein bar ideas  Quest Protein Bars (190 Calories, 20 g protein)  Built Bar (170 Calories, 15-20 g protein)  One Protein Bar (210 calories, 20 g protein)  Sweet Briar Signature Protein Bar ([employer]) (190 Calories, 21 g protein)  Pure Protein Bars (180 Calories, 21 g protein)    Plate method can be used for general guidance on balanced meals/portion sizes (see link below for picture/more information)                 Make 1/2 your plate non starchy vegetable(cauliflower, broccoli, asparagus, Clarita sprouts, lettuce, carrots, for example).                3+ oz lean protein  sources (salmon/skinless chicken/turkey breast/pork loin/lean cuts of beef/ or non-animal protein such as black, kidney or valencia beans, tofu/edamame/tempeh)     (Note: 3 oz = deck of cards size)                 1/2 to 1 cup carbohydrate choices such as whole grain starches or starchy vegetables or fruit. For example: quinoa, brown rice, barley, potatoes, sweet potatoes, winter squash, peas, corn, or fruit.               Choose ~0-2 added fat servings at a meal (avocados, nut butters, nuts or seeds, olive oil, vegetable oils).    The Plate Method:  https://www.cdc.gov/diabetes/images/managing/Diabetes-Manage-Eat-Well-Plate-Graphic_600px.jpg    Summary of Volumetrics Eating Plan  http://fvfiles.[email or web address]/[email or web address]     Protein:  Chicken  Turkey  Fish  Seafood (shrimp, lobster, crab, etc)  Lean cuts of beef (93% ground, sirloin, tenderloin, etc)  Pork (limit fatter options like gonsalez)  Cottage cheese  Greek yogurt  Eggs   Low-fat Cheese  Lentils/beans  Nuts/nut butter (recommend a smaller portion if doing these options - 2 Tbsp of nuts or 1/4 cup nut butter)  Tofu  Seitan     Carbohydrates:   - Brown/Wild rice   - Egg life wrap (low carb and great source of protein!)    - Whole wheat pasta   - Protein pasta (barilla protein +, lentil pasta, etc)   - Whole wheat or carb balance tortilla   - Oatmeal    - Fruit   - Starchy vegetables (corn, peas, beans/lentils, potatoes)   - Whole grain crackers    - Whole wheat waffle    - [place] protein waffles/pancakes    - Couscous     Non-starchy Vegetables Examples:   - Lettuce   - Spinach   - Onions    - Bell pepper   - Carrots   - Broccoli   - Cabbage   - Brussel sprouts   - Cauliflower    - Asparagus    - Cucumber   - Artichoke   - Zucchini   - Bean sprouts   - Mushrooms   - Sugar snap peas   - Eggplant   - Turnips   - Celery   - Radishes    - Green beans    Starchy vegetable examples:  Corn  Green peas  Winter squash (butternut, spaghetti squash, acorn)  Beans/lentils (not including  "green beans)  Potatoes  Sweet potatoes       Healthy Recipe Resources:  Books:  \"The Volumetrics Eating Plan\" by Yudith Barajas, Ph.D.  \"Cooking that Counts\" by editors of Poxel  \"Calorie Smart Meals\" cookbook by [employer] (200-500 calorie meals)    Websites:  www.FRH Consumer Services  www.[facility].org  https://www.diabetesfoodhub.org/all-recipes.html  https://www.Oesia/  Https://www.Ground Zero Group Corporationmyplate.gov/myplatekitchen  https://snaped.Nimias.usda.gov/recipes-menus   https://www.Smart Energy/Silver Push/5518303/dietitian-budget-high-protein-dinner-recipes/  https://www.Frankly/recipes/84/healthy-recipes/   Https://www.uAfrica.com/c/[employer]   EatthiJ.G. ink.com     Cultural Cuisines:   Cuisine: https://www.Nimble.org/knowledge-center/recipes/  Mexican and Vegan Cuisine:   https://Symptify/  https://Flex Biomedical/recipe-index/  Chinese Cuisine: https://www.Endpoint Clinical.Doppelgames/  Various Regions of African Cuisine: https://www.Boundless.Doppelgames/recipes/65773/cuisines-regions//    Apps:  Pumpic anika (or website, mealime.com)   SandiEatSmart       Follow-Up:  Monday, February 12th at 8:00 am    Edel Jain (Duncan), SAKSHI, RD, LD  Clinic #: [phone]    "

## 2024-02-05 ENCOUNTER — VIRTUAL VISIT (OUTPATIENT)
Dept: ENDOCRINOLOGY | Facility: CLINIC | Age: 54
End: 2024-02-05

## 2024-02-05 DIAGNOSIS — E66.9 OBESITY: Primary | ICD-10-CM

## 2024-02-05 PROCEDURE — 99207 PR NO CHARGE LOS: CPT | Mod: 95

## 2024-02-05 NOTE — PROGRESS NOTES
"24 wk plan  VISIT 11  LOLI LUGO  VIDEO    Pt is switching form wegovy to sophai and will start Sunday  Pt is folloiwng the 5 goals from tr birch and is going well; just has to make sure to do the morning power walks       Previous GOALS/Notes: Jan 12  1.) I follow the 5 Tr Birch Goals  2.) I feel accomplished and present after doing my morning routine   3.) I am ready to do my morning one mile power walks  4.) Affirmation for the year: I am enough. I live for today. I am present  5.) I read 5 pages before bed      GOALS; FEB 5  Continue with my movement goal; biking before work and 11,000 steps daily  Track [facility] food [facility] pal for accountability  Plan to have a healthy snack with veggies or fruit  Continue to get meal prep ideas/inspiration online and prioritize meal prep. I can do it!  \" I have good food at home\" (so chose not to eat out)    NEXT HEALTH  VISITS: START A 3 PACK for $99  March 12, 8am  April 16, 8 am  May 14, 8 am      Carrie Ness  [facility], National Board Certified Health &   Lead Health   [facility]  [email or web address]  [email or web address]   To schedule: [phone]   Gender pronouns: she/her     "

## 2024-02-28 ENCOUNTER — VIRTUAL VISIT (OUTPATIENT)
Dept: ENDOCRINOLOGY | Facility: CLINIC | Age: 54
End: 2024-02-28
Payer: COMMERCIAL

## 2024-02-28 DIAGNOSIS — E11.22 TYPE 2 DIABETES MELLITUS WITH STAGE 3A CHRONIC KIDNEY DISEASE, WITHOUT LONG-TERM CURRENT USE OF INSULIN (H): Primary | ICD-10-CM

## 2024-02-28 DIAGNOSIS — E83.52 HYPERCALCEMIA: ICD-10-CM

## 2024-02-28 DIAGNOSIS — N18.31 TYPE 2 DIABETES MELLITUS WITH STAGE 3A CHRONIC KIDNEY DISEASE, WITHOUT LONG-TERM CURRENT USE OF INSULIN (H): Primary | ICD-10-CM

## 2024-02-28 DIAGNOSIS — E55.9 VITAMIN D DEFICIENCY: ICD-10-CM

## 2024-02-28 PROCEDURE — 99214 OFFICE O/P EST MOD 30 MIN: CPT | Mod: 95 | Performed by: PHYSICIAN ASSISTANT

## 2024-02-28 NOTE — PROGRESS NOTES
Assessment/Plan :   Type 2 DM. Adelaida is doing great. She remains stable on Farxiga and she feels like the Mounjaro is working better than Wegovy. We reviewed the possible adverse effects and we discussed her recent blood sugar log. Her blood sugars are very stable. She is due for repeat laboratory testing in a few weeks. I will place an order today. We will follow-up in 3-6 mos.  Hypercalcemia. We discussed Adelaida's recent laboratory results and her calcium was elevated. She stopped all supplements a few weeks ago. We will repeat calcium testing and I will contact her with the results.     Due to the COVID 19 pandemic this visit was a telephone/video visit in order to help prevent spread of infection in this patient and the general population. The patient gave verbal consent for the visit today. I have independently reviewed and interpreted labs, imaging as indicated.       Distant Location (provider location):  Off-site  Mode of Communication:  Video Conference via Movirtu  Chart review/prep time 5    Joined the call at 2/28/2024, 3:02:28 pm.  Left the call at 2/28/2024, 3:08:48 pm.  You were on the call for 6 minutes 20 seconds .  15 minutes spent on the date of the encounter doing chart review, history and exam, documentation and further activities as noted above.      Chief complaint:  Adelaida is a 53 year old female who returns for follow-up of Type 2 DM.    I have reviewed Care Everywhere including [facility], [facility], [facility], [facility], [facility], [facility] , , [place] lab reports, imaging reports and provider notes as indicated.      HISTORY OF PRESENT ILLNESS  Adelaida continues to work on both blood sugar control and weight loss. She is currently taking Farxiga 10 mg daily along with Mounjaro 7.5 mg weekly. She was previously taking Farxiga along with Wegovy but she developed abdominal pain due to ongoing constipation. Since switching to Mounjaro, she has not had as much  bloating. She has also been working to drink plenty of water.    Adelaida monitors her blood sugars with fingerstick testing daily. Her blood sugars are stable and she has not had any problems with severe hyperglycemia and/or hypoglycemia. She also denies any problems with blurred vision or worsening numbness/tingling in her feet.     Adelaida was diagnosed with diabetes in February of 2023. She has struggled with weight gain for many years. She had been diligent about diet and exercise but, due to stress, she started making poor dietary decisions in 2022. This led to weight gain and an increase in her blood sugars. Her diabetes is complicated by obesity, hyperlipidemia, HTN, CKD stage 3 and a new finding of hypercalcemia.    Endocrine relevant labs are as follows:   Latest Reference Range & Units 12/11/23 14:22   Hemoglobin A1C 0.0 - 5.6 % 5.9 (H)   (H): Data is abnormally high     Latest Reference Range & Units 01/10/24 09:09   Calcium Ionized Whole Blood 4.4 - 5.2 mg/dL 5.3 (H)   (H): Data is abnormally high   Latest Reference Range & Units 01/10/24 09:09   Vitamin D, Total (25-Hydroxy) 20 - 50 ng/mL 30      Latest Reference Range & Units 01/10/24 09:09   Parathyroid Hormone Intact 15 - 65 pg/mL 45     REVIEW OF SYSTEMS    Endocrine: positive for diabetes, hypercalcemia and obesity  Skin: negative  Eyes: negative for, visual blurring, redness, tearing  Ears/Nose/Throat: negative  Respiratory: No shortness of breath, dyspnea on exertion, cough, or hemoptysis  Cardiovascular: negative for, chest pain, dyspnea on exertion, lower extremity edema, and exercise intolerance  Gastrointestinal: positive for nausea, negative for, vomiting, constipation, and diarrhea  Genitourinary: negative for, nocturia, dysuria, frequency, and urgency  Musculoskeletal: negative for, muscular weakness, nocturnal cramping, and foot pain  Neurologic: negative for, numbness or tingling of hands, and numbness or tingling of feet  Psychiatric:  positive depression, her Dad passed away last week  Hematologic/Lymphatic/Immunologic: negative    Past Medical History  Past Medical History:   Diagnosis Date    Allergic rhinitis due to other allergen     seasonal    Arthritis     Diabetes (H)     Localized osteoarthritis of both knees     Migraine, unspecified, without mention of intractable migraine without mention of status migrainosus     Monoclonal gammopathy     Morbid obesity (H)     Therapeutic drug monitoring 09/02/2022    Type 2 diabetes mellitus with stage 3a chronic kidney disease, without long-term current use of insulin (H) 08/09/2016    Unspecified essential hypertension     dx around age 31       Medications  Current Outpatient Medications   Medication Sig Dispense Refill    acetaminophen (TYLENOL) 325 MG tablet Take 2 tablets (650 mg) by mouth every 4 hours as needed for other (mild pain) 100 tablet 0    amLODIPine (NORVASC) 5 MG tablet Take 1 tablet (5 mg) by mouth daily 90 tablet 3    amoxicillin (AMOXIL) 500 MG capsule Take 4 caps (2000mg) 30-60 minutes before dental procedure 4 capsule 0    aspirin (ASA) 81 MG EC tablet Take 1 tablet (81 mg) by mouth daily 90 tablet 3    atorvastatin (LIPITOR) 10 MG tablet Take 1 tablet (10 mg) by mouth every evening 90 tablet 3    blood glucose (NO BRAND SPECIFIED) lancets standard Use to test blood sugar 2 times daily or as directed. 200 lancet 3    blood glucose (NO BRAND SPECIFIED) lancets standard Use to test blood sugar 1-2 times daily or as directed. 200 each 3    blood glucose (NO BRAND SPECIFIED) test strip Use to test blood sugar 2-3 times daily or as directed. 200 strip 3    blood glucose (NO BRAND SPECIFIED) test strip Use to test blood sugar 2 times daily or as directed. 200 strip 3    blood glucose monitoring (NO BRAND SPECIFIED) meter device kit Use to test blood sugar 1-2 times daily or as directed. 1 kit 0    cetirizine (ZYRTEC) 10 MG tablet Take 10 mg by mouth daily as needed for allergies       cholecalciferol 2000 UNITS CAPS Take 2,000 Units by mouth daily 90 capsule 3    dapagliflozin (FARXIGA) 10 MG TABS tablet Take 1 tablet (10 mg) by mouth daily 90 tablet 3    eplerenone (INSPRA) 50 MG tablet Take 2 tablets (100 mg) by mouth 2 times daily 360 tablet 3    famotidine (PEPCID) 20 MG tablet Take 1 tablet (20 mg) by mouth 2 times daily 180 tablet 1    Fluocinolone Acetonide Scalp (DERMA-SMOOTHE/FS SCALP) 0.01 % OIL oil Apply nightly to the scalp for the 6 weeks 60 mL 1    fluticasone (FLONASE) 50 MCG/ACT nasal spray INSTILL 1 SPRAY INTO BOTH NOSTRILS DAILY 16 mL 1    hydrOXYzine HCl (ATARAX) 25 MG tablet Take 2 tablets (50 mg) by mouth nightly as needed for itching 90 tablet 1    labetalol (NORMODYNE) 300 MG tablet Take 1 tablet (300 mg) by mouth 2 times daily 180 tablet 3    ondansetron (ZOFRAN ODT) 4 MG ODT tab Take 1 tablet (4 mg) by mouth every 8 hours as needed for nausea or vomiting 30 tablet 0    polyethylene glycol (MIRALAX) 17 g packet Take 17 g by mouth daily 7 packet 0    SUMAtriptan (IMITREX) 25 MG tablet Profile rx,TAKE 1 TO 2 TABLETS BY MOUTH AT ONSET OF HEADACHE FOR MIGRAINE. MAY REPEAT DOSE IN 2 HOURS. MAX OF 200MG OR 2 DOSES IN 24 HOURS 18 tablet 1    tirzepatide (MOUNJARO) 2.5 MG/0.5ML pen Inject 2.5 mg Subcutaneous once a week 1 mL 0    tirzepatide (MOUNJARO) 5 MG/0.5ML pen Inject 5 mg Subcutaneous once a week 1 mL 1    tirzepatide (MOUNJARO) 7.5 MG/0.5ML pen Inject 7.5 mg Subcutaneous once a week 6 mL 0       Allergies  Allergies   Allergen Reactions    Sulfa Antibiotics Shortness Of Breath and Rash    Doxycycline      Severe gastritis, nausea, vomiting-ended up in the ER    Hydrochlorothiazide W-Triamterene Other (See Comments)     Renal insuff    Maxzide [Hydrochlorothiazide W/Triamterene] Other (See Comments)     Renal insuff    Metoprolol Other (See Comments)     Other reaction(s): Bradycardia  At high dose only  At high dose only    Seasonal Allergies      Hayfever, tree pollens        Family History  family history includes Alcohol/Drug in her brother; Anesthesia Reaction in her mother; Arthritis in her father and paternal grandmother; Cancer in her maternal grandfather and maternal grandmother; Cerebrovascular Disease in her sister; Circulatory in her brother; Diabetes in her sister; Eye Disorder in her maternal grandfather; Gastrointestinal Disease in her mother; Glaucoma in her maternal grandfather; Gynecology in her mother; Heart Disease in her father; Hypertension in her father and mother; Lipids in her father; Neurologic Disorder in her mother; Obesity in her maternal grandmother; Osteoporosis in her mother; Prostate Cancer in her brother and maternal grandfather; Respiratory in her daughter; Thyroid Disease in her mother.    Social History  Social History     Tobacco Use    Smoking status: Never     Passive exposure: Never    Smokeless tobacco: Never   Vaping Use    Vaping Use: Never used   Substance Use Topics    Alcohol use: No    Drug use: No       Physical Exam  There is no height or weight on file to calculate BMI.  GENERAL: no distress  SKIN: Visible skin clear. No significant rash, abnormal pigmentation or lesions.  EYES: Eyes grossly normal to inspection.  No discharge or erythema, or obvious scleral/conjunctival abnormalities.  NECK: visible goiter is not present; no visible neck masses  RESP: No audible wheeze, cough, or visible cyanosis.  No visible retractions or increased work of breathing.    NEURO: Awake, alert, responds appropriately to questions.  Mentation and speech fluent.  PSYCH:affect normal and appearance well-groomed.      DATA REVIEW  Ave  mg/dl

## 2024-02-28 NOTE — NURSING NOTE
Is the patient currently in the state of [place]? YES    Visit mode:VIDEO    If the visit is dropped, the patient can be reconnected by: VIDEO VISIT: Text to cell phone:   Telephone Information:   Mobile [phone]       Will anyone else be joining the visit? NO  (If patient encounters technical issues they should call [phone]    How would you like to obtain your AVS? MyChart    Are changes needed to the allergy or medication list? Patient noted no changes to allergies or medications, so VF did not review these again with her today. Patient declined PHQ-2 today. She noted her father recently passed away.    Reason for visit: RECHECK    Rosie BIRMINGHAM

## 2024-02-28 NOTE — LETTER
2/28/2024         RE: Adelaida Packer  [address] [place] [address]        Dear Colleague,    Thank you for referring your patient, Adelaida Packer, to the [facility]. Please see a copy of my visit note below.    Outcome for 02/28/24 12:40 PM: Glucose readings sent via Carmelo Elias LPN                   Assessment/Plan :   Type 2 DM. Adelaida is doing great. She remains stable on Farxiga and she feels like the Mounjaro is working better than Wegovy. We reviewed the possible adverse effects and we discussed her recent blood sugar log. Her blood sugars are very stable. She is due for repeat laboratory testing in a few weeks. I will place an order today. We will follow-up in 3-6 mos.  Hypercalcemia. We discussed Adelaida's recent laboratory results and her calcium was elevated. She stopped all supplements a few weeks ago. We will repeat calcium testing and I will contact her with the results.     Due to the COVID 19 pandemic this visit was a telephone/video visit in order to help prevent spread of infection in this patient and the general population. The patient gave verbal consent for the visit today. I have independently reviewed and interpreted labs, imaging as indicated.       Distant Location (provider location):  Off-site  Mode of Communication:  Video Conference via TxtFeedback  Chart review/prep time 5    Joined the call at 2/28/2024, 3:02:28 pm.  Left the call at 2/28/2024, 3:08:48 pm.  You were on the call for 6 minutes 20 seconds .  15 minutes spent on the date of the encounter doing chart review, history and exam, documentation and further activities as noted above.      Chief complaint:  Adelaida is a 53 year old female who returns for follow-up of Type 2 DM.    I have reviewed Care Everywhere including [facility], [facility], [facility], [facility], [place], [facility], [facility] , [facility], [place] lab reports, imaging reports and provider notes  as indicated.      HISTORY OF PRESENT ILLNESS  Adelaida continues to work on both blood sugar control and weight loss. She is currently taking Farxiga 10 mg daily along with Mounjaro 7.5 mg weekly. She was previously taking Farxiga along with Wegovy but she developed abdominal pain due to ongoing constipation. Since switching to Mounjaro, she has not had as much bloating. She has also been working to drink plenty of water.    Adelaida monitors her blood sugars with fingerstick testing daily. Her blood sugars are stable and she has not had any problems with severe hyperglycemia and/or hypoglycemia. She also denies any problems with blurred vision or worsening numbness/tingling in her feet.     Adelaida was diagnosed with diabetes in February of 2023. She has struggled with weight gain for many years. She had been diligent about diet and exercise but, due to stress, she started making poor dietary decisions in 2022. This led to weight gain and an increase in her blood sugars. Her diabetes is complicated by obesity, hyperlipidemia, HTN, CKD stage 3 and a new finding of hypercalcemia.    Endocrine relevant labs are as follows:   Latest Reference Range & Units 12/11/23 14:22   Hemoglobin A1C 0.0 - 5.6 % 5.9 (H)   (H): Data is abnormally high     Latest Reference Range & Units 01/10/24 09:09   Calcium Ionized Whole Blood 4.4 - 5.2 mg/dL 5.3 (H)   (H): Data is abnormally high   Latest Reference Range & Units 01/10/24 09:09   Vitamin D, Total (25-Hydroxy) 20 - 50 ng/mL 30      Latest Reference Range & Units 01/10/24 09:09   Parathyroid Hormone Intact 15 - 65 pg/mL 45     REVIEW OF SYSTEMS    Endocrine: positive for diabetes, hypercalcemia and obesity  Skin: negative  Eyes: negative for, visual blurring, redness, tearing  Ears/Nose/Throat: negative  Respiratory: No shortness of breath, dyspnea on exertion, cough, or hemoptysis  Cardiovascular: negative for, chest pain, dyspnea on exertion, lower extremity edema, and exercise  intolerance  Gastrointestinal: positive for nausea, negative for, vomiting, constipation, and diarrhea  Genitourinary: negative for, nocturia, dysuria, frequency, and urgency  Musculoskeletal: negative for, muscular weakness, nocturnal cramping, and foot pain  Neurologic: negative for, numbness or tingling of hands, and numbness or tingling of feet  Psychiatric: positive depression, her Dad passed away last week  Hematologic/Lymphatic/Immunologic: negative    Past Medical History  Past Medical History:   Diagnosis Date     Allergic rhinitis due to other allergen     seasonal     Arthritis      Diabetes (H)      Localized osteoarthritis of both knees      Migraine, unspecified, without mention of intractable migraine without mention of status migrainosus      Monoclonal gammopathy      Morbid obesity (H)      Therapeutic drug monitoring 09/02/2022     Type 2 diabetes mellitus with stage 3a chronic kidney disease, without long-term current use of insulin (H) 08/09/2016     Unspecified essential hypertension     dx around age 31       Medications  Current Outpatient Medications   Medication Sig Dispense Refill     acetaminophen (TYLENOL) 325 MG tablet Take 2 tablets (650 mg) by mouth every 4 hours as needed for other (mild pain) 100 tablet 0     amLODIPine (NORVASC) 5 MG tablet Take 1 tablet (5 mg) by mouth daily 90 tablet 3     amoxicillin (AMOXIL) 500 MG capsule Take 4 caps (2000mg) 30-60 minutes before dental procedure 4 capsule 0     aspirin (ASA) 81 MG EC tablet Take 1 tablet (81 mg) by mouth daily 90 tablet 3     atorvastatin (LIPITOR) 10 MG tablet Take 1 tablet (10 mg) by mouth every evening 90 tablet 3     blood glucose (NO BRAND SPECIFIED) lancets standard Use to test blood sugar 2 times daily or as directed. 200 lancet 3     blood glucose (NO BRAND SPECIFIED) lancets standard Use to test blood sugar 1-2 times daily or as directed. 200 each 3     blood glucose (NO BRAND SPECIFIED) test strip Use to test blood  sugar 2-3 times daily or as directed. 200 strip 3     blood glucose (NO BRAND SPECIFIED) test strip Use to test blood sugar 2 times daily or as directed. 200 strip 3     blood glucose monitoring (NO BRAND SPECIFIED) meter device kit Use to test blood sugar 1-2 times daily or as directed. 1 kit 0     cetirizine (ZYRTEC) 10 MG tablet Take 10 mg by mouth daily as needed for allergies       cholecalciferol 2000 UNITS CAPS Take 2,000 Units by mouth daily 90 capsule 3     dapagliflozin (FARXIGA) 10 MG TABS tablet Take 1 tablet (10 mg) by mouth daily 90 tablet 3     eplerenone (INSPRA) 50 MG tablet Take 2 tablets (100 mg) by mouth 2 times daily 360 tablet 3     famotidine (PEPCID) 20 MG tablet Take 1 tablet (20 mg) by mouth 2 times daily 180 tablet 1     Fluocinolone Acetonide Scalp (DERMA-SMOOTHE/FS SCALP) 0.01 % OIL oil Apply nightly to the scalp for the 6 weeks 60 mL 1     fluticasone (FLONASE) 50 MCG/ACT nasal spray INSTILL 1 SPRAY INTO BOTH NOSTRILS DAILY 16 mL 1     hydrOXYzine HCl (ATARAX) 25 MG tablet Take 2 tablets (50 mg) by mouth nightly as needed for itching 90 tablet 1     labetalol (NORMODYNE) 300 MG tablet Take 1 tablet (300 mg) by mouth 2 times daily 180 tablet 3     ondansetron (ZOFRAN ODT) 4 MG ODT tab Take 1 tablet (4 mg) by mouth every 8 hours as needed for nausea or vomiting 30 tablet 0     polyethylene glycol (MIRALAX) 17 g packet Take 17 g by mouth daily 7 packet 0     SUMAtriptan (IMITREX) 25 MG tablet Profile rx,TAKE 1 TO 2 TABLETS BY MOUTH AT ONSET OF HEADACHE FOR MIGRAINE. MAY REPEAT DOSE IN 2 HOURS. MAX OF 200MG OR 2 DOSES IN 24 HOURS 18 tablet 1     tirzepatide (MOUNJARO) 2.5 MG/0.5ML pen Inject 2.5 mg Subcutaneous once a week 1 mL 0     tirzepatide (MOUNJARO) 5 MG/0.5ML pen Inject 5 mg Subcutaneous once a week 1 mL 1     tirzepatide (MOUNJARO) 7.5 MG/0.5ML pen Inject 7.5 mg Subcutaneous once a week 6 mL 0       Allergies  Allergies   Allergen Reactions     Sulfa Antibiotics Shortness Of Breath  and Rash     Doxycycline      Severe gastritis, nausea, vomiting-ended up in the ER     Hydrochlorothiazide W-Triamterene Other (See Comments)     Renal insuff     Maxzide [Hydrochlorothiazide W/Triamterene] Other (See Comments)     Renal insuff     Metoprolol Other (See Comments)     Other reaction(s): Bradycardia  At high dose only  At high dose only     Seasonal Allergies      Hayfever, tree pollens       Family History  family history includes Alcohol/Drug in her brother; Anesthesia Reaction in her mother; Arthritis in her father and paternal grandmother; Cancer in her maternal grandfather and maternal grandmother; Cerebrovascular Disease in her sister; Circulatory in her brother; Diabetes in her sister; Eye Disorder in her maternal grandfather; Gastrointestinal Disease in her mother; Glaucoma in her maternal grandfather; Gynecology in her mother; Heart Disease in her father; Hypertension in her father and mother; Lipids in her father; Neurologic Disorder in her mother; Obesity in her maternal grandmother; Osteoporosis in her mother; Prostate Cancer in her brother and maternal grandfather; Respiratory in her daughter; Thyroid Disease in her mother.    Social History  Social History     Tobacco Use     Smoking status: Never     Passive exposure: Never     Smokeless tobacco: Never   Vaping Use     Vaping Use: Never used   Substance Use Topics     Alcohol use: No     Drug use: No       Physical Exam  There is no height or weight on file to calculate BMI.  GENERAL: no distress  SKIN: Visible skin clear. No significant rash, abnormal pigmentation or lesions.  EYES: Eyes grossly normal to inspection.  No discharge or erythema, or obvious scleral/conjunctival abnormalities.  NECK: visible goiter is not present; no visible neck masses  RESP: No audible wheeze, cough, or visible cyanosis.  No visible retractions or increased work of breathing.    NEURO: Awake, alert, responds appropriately to questions.  Mentation and  speech fluent.  PSYCH:affect normal and appearance well-groomed.      DATA REVIEW  Ave  mg/dl        Again, thank you for allowing me to participate in the care of your patient.        Sincerely,        Dee Tellez PA-C

## 2024-03-11 ENCOUNTER — MYC MEDICAL ADVICE (OUTPATIENT)
Dept: NEPHROLOGY | Facility: CLINIC | Age: 54
End: 2024-03-11
Payer: COMMERCIAL

## 2024-03-11 DIAGNOSIS — I12.9 HYPERTENSION, RENAL: ICD-10-CM

## 2024-03-11 RX ORDER — LABETALOL 300 MG/1
300 TABLET, FILM COATED ORAL 2 TIMES DAILY
Qty: 10 TABLET | Refills: 0 | Status: SHIPPED | OUTPATIENT
Start: 2024-03-11

## 2024-03-15 ENCOUNTER — VIRTUAL VISIT (OUTPATIENT)
Dept: ENDOCRINOLOGY | Facility: CLINIC | Age: 54
End: 2024-03-15
Payer: COMMERCIAL

## 2024-03-15 VITALS — HEIGHT: 62 IN | BODY MASS INDEX: 46.38 KG/M2 | WEIGHT: 252 LBS

## 2024-03-15 DIAGNOSIS — E66.813 CLASS 3 SEVERE OBESITY DUE TO EXCESS CALORIES WITH SERIOUS COMORBIDITY AND BODY MASS INDEX (BMI) OF 40.0 TO 44.9 IN ADULT (H): ICD-10-CM

## 2024-03-15 DIAGNOSIS — N18.31 TYPE 2 DIABETES MELLITUS WITH STAGE 3A CHRONIC KIDNEY DISEASE, WITHOUT LONG-TERM CURRENT USE OF INSULIN (H): ICD-10-CM

## 2024-03-15 DIAGNOSIS — E11.22 TYPE 2 DIABETES MELLITUS WITH STAGE 3A CHRONIC KIDNEY DISEASE, WITHOUT LONG-TERM CURRENT USE OF INSULIN (H): ICD-10-CM

## 2024-03-15 DIAGNOSIS — E66.01 CLASS 3 SEVERE OBESITY DUE TO EXCESS CALORIES WITH SERIOUS COMORBIDITY AND BODY MASS INDEX (BMI) OF 40.0 TO 44.9 IN ADULT (H): ICD-10-CM

## 2024-03-15 PROCEDURE — 99214 OFFICE O/P EST MOD 30 MIN: CPT | Mod: 95

## 2024-03-15 RX ORDER — TIRZEPATIDE 10 MG/.5ML
10 INJECTION, SOLUTION SUBCUTANEOUS
Qty: 2 ML | Refills: 1 | OUTPATIENT
Start: 2024-03-15 | End: 2024-03-15

## 2024-03-15 RX ORDER — TIRZEPATIDE 10 MG/.5ML
10 INJECTION, SOLUTION SUBCUTANEOUS
Qty: 2 ML | Refills: 1 | Status: SHIPPED | OUTPATIENT
Start: 2024-03-15 | End: 2024-05-06 | Stop reason: DRUGHIGH

## 2024-03-15 NOTE — PROGRESS NOTES
"Virtual Visit Details    Type of service:  Video Visit     Originating Location (pt. Location): {video visit patient location:747137::\"Home\"}  {PROVIDER LOCATION On-site should be selected for visits conducted from your clinic location or adjoining [facility] hospital, academic office, or other nearby [facility] building. Off-site should be selected for all other provider locations, including home:[address]}  Distant Location (provider location):  {virtual location provider:264290}  Platform used for Video Visit: {Virtual Visit Platforms:633049::\"IFTTT\"}    "

## 2024-03-15 NOTE — LETTER
3/15/2024       RE: Adelaida Packer  [address] [place] [address]     Dear Colleague,    Thank you for referring your patient, Adelaida Packer, to the [facility] [place] at [facility]. Please see a copy of my visit note below.    Virtual Visit Details    Type of service:  Video Visit     Originating Location (pt. Location): Home    Distant Location (provider location):  Off-site  Platform used for Video Visit: [facility] Medical Weight Management Note     Adelaida Packer  MRN:  [id number]  :  1970  JUSTUS:  3/15/2024    Dear Windy Gordillo MD,    I had the pleasure of seeing your patient Adelaida Packer. She is a 53 year old female who I am continuing to see for treatment of obesity related to:        6/15/2021     2:14 AM   --   I have the following health issues associated with obesity Pre-Diabetes    High Blood Pressure    Osteoarthritis (joint disease)   I have the following symptoms associated with obesity Knee Pain    Hip Pain       Assessment & Plan  Problem List Items Addressed This Visit       Class 3 severe obesity due to excess calories with serious comorbidity and body mass index (BMI) of 40.0 to 44.9 in adult (H)    Relevant Medications    tirzepatide (MOUNJARO) 10 MG/0.5ML pen    Diabetes mellitus, type 2 (H) - Primary    Relevant Medications    tirzepatide (MOUNJARO) 10 MG/0.5ML pen      Increase Mounjaro 10mg once weekly. Reach out if would like to dose increase prior to next visit   Follow up with Yecenia Rivera in 3 months   Edel Jain RD next available     INTERVAL HISTORY:  New MWM - 6/15/2021  Last seen by Dr. Mitchell- 2022  Ozempic - heartburn and nausea  Phentermine - discontinued due to surgery.   Started 24 week program   Stopped Naltrexone - did not feel like it was helpful with weight loss   Phentermine - c/I due to CKD and pulmonary HTN.   Started Wegovy - prescribed by  endocrine       Continued to see Carrie Everett - signed up for another 3 pack. Has found it to be very helpful.     Anti-obesity medication history    Current:   Mounjaro 7.5mg - has been on this dose for 2 months. Feels like Wegovy was a little more helpfu. No side effects. Has been helpful with weight maintenance. Would like to dose increase today.     Transitioned from Wegovy due to no longer covering weight loss medications and constipation side effects.     Recent diet changes: With starting Mounjaro, has not been able to eat greasy meals. Eating 3 meals a day, with 1 snack. Has been emotional/stress eating in the past month with passing of father. Has been trying protein shakes - tried fairlife and premier. Drinking 64oz water daily. Has continued to see Edel. Was tracking food in [place] - just to be helpful with mindful eating.     Recent exercise/activity changes: Was walking 1 mile a day. Rides bike in the morning. But has been harder the past couple of weeks.     Recent stressors: Father recently passed away. Grief just hit her this week. New job has continued to be a good fit.     Recent sleep changes: has been really tired the past month. No concerns     Vitamins/Labs: A1C 5.9 on 12/11/23    CURRENT WEIGHT:   252 lbs 0 oz    Initial Weight (lbs): 275 lbs  Last Visits Weight: 115.2 kg (254 lb)  Cumulative weight loss (lbs): 23  Weight Loss Percentage: 8.36%    Wt Readings from Last 5 Encounters:   03/15/24 114.3 kg (252 lb)   01/15/24 115.2 kg (254 lb)   01/10/24 116.6 kg (257 lb 2 oz)   12/11/23 117.3 kg (258 lb 9.6 oz)   12/01/23 116.6 kg (257 lb)             3/15/2024     7:38 AM   Changes and Difficulties   I have made the following changes to my diet since my last visit: no   With regards to my diet, I am still struggling with: getting water intake, stress eating   I have made the following changes to my activity/exercise since my last visit: no   With regards to my activity/exercise, I am still  struggling with: consistency         MEDICATIONS:   Current Outpatient Medications   Medication Sig Dispense Refill    tirzepatide (MOUNJARO) 10 MG/0.5ML pen Inject 10 mg Subcutaneous every 7 days 2 mL 1    acetaminophen (TYLENOL) 325 MG tablet Take 2 tablets (650 mg) by mouth every 4 hours as needed for other (mild pain) 100 tablet 0    amLODIPine (NORVASC) 5 MG tablet Take 1 tablet (5 mg) by mouth daily 90 tablet 3    amoxicillin (AMOXIL) 500 MG capsule Take 4 caps (2000mg) 30-60 minutes before dental procedure 4 capsule 0    aspirin (ASA) 81 MG EC tablet Take 1 tablet (81 mg) by mouth daily 90 tablet 3    atorvastatin (LIPITOR) 10 MG tablet Take 1 tablet (10 mg) by mouth every evening 90 tablet 3    blood glucose (NO BRAND SPECIFIED) lancets standard Use to test blood sugar 2 times daily or as directed. 200 lancet 3    blood glucose (NO BRAND SPECIFIED) lancets standard Use to test blood sugar 1-2 times daily or as directed. 200 each 3    blood glucose (NO BRAND SPECIFIED) test strip Use to test blood sugar 2-3 times daily or as directed. 200 strip 3    blood glucose (NO BRAND SPECIFIED) test strip Use to test blood sugar 2 times daily or as directed. 200 strip 3    blood glucose monitoring (NO BRAND SPECIFIED) meter device kit Use to test blood sugar 1-2 times daily or as directed. 1 kit 0    cetirizine (ZYRTEC) 10 MG tablet Take 10 mg by mouth daily as needed for allergies      cholecalciferol 2000 UNITS CAPS Take 2,000 Units by mouth daily 90 capsule 3    dapagliflozin (FARXIGA) 10 MG TABS tablet Take 1 tablet (10 mg) by mouth daily 90 tablet 3    eplerenone (INSPRA) 50 MG tablet Take 2 tablets (100 mg) by mouth 2 times daily 360 tablet 3    famotidine (PEPCID) 20 MG tablet Take 1 tablet (20 mg) by mouth 2 times daily 180 tablet 1    Fluocinolone Acetonide Scalp (DERMA-SMOOTHE/FS SCALP) 0.01 % OIL oil Apply nightly to the scalp for the 6 weeks 60 mL 1    fluticasone (FLONASE) 50 MCG/ACT nasal spray INSTILL 1  "SPRAY INTO BOTH NOSTRILS DAILY 16 mL 1    hydrOXYzine HCl (ATARAX) 25 MG tablet Take 2 tablets (50 mg) by mouth nightly as needed for itching 90 tablet 1    labetalol (NORMODYNE) 300 MG tablet Take 1 tablet (300 mg) by mouth 2 times daily 10 tablet 0    ondansetron (ZOFRAN ODT) 4 MG ODT tab Take 1 tablet (4 mg) by mouth every 8 hours as needed for nausea or vomiting 30 tablet 0    polyethylene glycol (MIRALAX) 17 g packet Take 17 g by mouth daily 7 packet 0    SUMAtriptan (IMITREX) 25 MG tablet Profile rx,TAKE 1 TO 2 TABLETS BY MOUTH AT ONSET OF HEADACHE FOR MIGRAINE. MAY REPEAT DOSE IN 2 HOURS. MAX OF 200MG OR 2 DOSES IN 24 HOURS 18 tablet 1    tirzepatide (MOUNJARO) 7.5 MG/0.5ML pen Inject 7.5 mg Subcutaneous once a week 6 mL 0           3/15/2024     7:38 AM   Weight Loss Medication History Reviewed With Patient   Are you having any side effects from the weight loss medication that we have prescribed you? No         Objective   Ht 1.575 m (5' 2.01\")   Wt 114.3 kg (252 lb)   LMP 08/13/2005   BMI 46.08 kg/m             PHYSICAL EXAM:    GENERAL: alert and no distress  EYES: Eyes grossly normal to inspection.  No discharge or erythema, or obvious scleral/conjunctival abnormalities.  RESP: No audible wheeze, cough, or visible cyanosis.    SKIN: Visible skin clear. No significant rash, abnormal pigmentation or lesions.  NEURO: Cranial nerves grossly intact.  Mentation and speech appropriate for age.  PSYCH: Appropriate affect, tone, and pace of words        Sincerely,    Yecenia Andrea PA-C      32 minutes spent by me on the date of the encounter doing chart review, history and exam, documentation and further activities per the note    "

## 2024-03-15 NOTE — PATIENT INSTRUCTIONS
"Kelby Son,  Thank you for allowing us the privilege of caring for you. We hope we provided you with the excellent service you deserve.   Please let us know if there is anything else we can do for you so that we can be sure you are completely satisfied with your care experience.    To ensure the quality of our services you may be receiving a patient satisfaction survey from an independent patient satisfaction monitoring company.    The greatest compliment you can give is a \"Likely to Recommend\"    Your visit was with Yecenia Andrea PA-C today.    Instructions per today's visit:     Increase Mounjaro 10mg once weekly. Reach out if would like to dose increase prior to next visit   Follow up with Ann in 3 months   Edel Jain RD next available     ___________________________________________________________________________  Important contact and scheduling information:  Please call our contact center at [phone] to schedule your next appointments.  For any nursing questions or concerns call Lori Merino LPN at [phone] or Wendy Feldman RN at [phone]  Please call during clinic hours Monday through Friday 8:00a - 4:00p if you have questions or you can contact us via [employer] at anytime and we will reply during clinic hours.    Lab results will be communicated through My Chart or letter (if My Chart not used). Please call the clinic if you have not received communication after 1 week or if you have any questions.?  Clinic Fax: [phone]  __________________________________________________________________________    If labs were ordered today:    Please make an appointment to have them drawn at your convenience.     To schedule the Lab Appointment using [employer]:  Select \"Schedule an Appointment\"  Select \"Lab Only\"  For \"A couple of questions\", select \"Other\"  For \"Which locations work for you?, select the location and set up the appointment    To schedule by phone call [phone] to schedule a lab " only appointment at any [facility] lab.  ___________________________________________________________________________  Work with A Health !  Virtual Sessions are Available through [facility] Management Clinics    To learn more, call to schedule a free, Health  Q&A appointment: [phone]     What is Health Coaching?  Do you know what you are supposed to do, but you just aren't doing it?  Then, HEALTH COACHING may help you!   Get unstuck and move forward with the support of a professionally trained NBC-HWC (National Board-Certified Health and ) who uses evidence-based approaches to help you move forward with healthy lifestyle changes in the areas of weight loss, stress management and overall well-being.    Health Coaches help you identify goals that will work best for you. Health Coaches provide support and encouragement with overcoming barriers and help you to find inspiration and motivation to lead a healthy lifestyle.    Option one:  Health Coaching 3-Pack; Three, 30-minute Health Coaching Visits, for $99  Visits are done virtually (phone or video)  This is a self pay service; we do not accept insurance for celia coaching.    Option two:   The 24 week Plan; 11 Health Coaching Visits, and a 7 months subscription to [employer]-- on-demand fitness, nutrition and mindfulness classes, for $499 (employee discounts may be available). Participants will also meet regularly with a weight management Medical Provider and a Registered/Licensed Dietician.  This is a self-pay service; we do not accept insurance for health coaching.    To Schedule a free Health  Q&A appointment to learn more,  call [phone].  ____________________________________________________________________  [facility]  Healthy Lifestyle Group    Healthy Lifestyle Group  This is a 60 minute virtual coaching group for those who want to lead a healthier lifestyle.  "Come together to set goals and overcome barriers in a supportive group environment. We will address the four pillars of health--nutrition, exercise, sleep and emotional well-being.  This group is highly recommended for those who are participating in the 24 week Healthy Lifestyle Plan and our Health Coaching sessions.    WHEN: This group meets the first Friday of the month, 12:30 PM - 1:30 PM online, via a zoom meeting.      FACILITATOR: Led by National Board Certified Health and , Carrie Ness [facility].    TO REGISTER: Please call the Call Center at [phone] to register. You will get an appointment to attend in [employer]. Fifteen minutes prior to the meeting, complete the e-check in and you will get the link to join the meeting.  There is no charge to attend this group and space is limited.      2023 and 2024 Meeting Topics and Dates:    November 3: Introduction to Mindfulness (Learn simple and effective mindfulness practices and how it can benefit you)    December 8: Let's Talk (guided discussion on our wins and challenges)    January 5: New Years Vision: Manifest your Best 2024! (Guided imagery,  journaling and discussion)    February 2: Let's Talk    March 1: 10 Percent Happier by Cedric Wilkinson (Book Bites; a guided discussion on the nuggets of wisdom from favorite wellness books; no need to read the book but highly encouraged)    April 5: Let's Talk    May 3: \"Essentialism; The Disciplined Pursuit of Less by Clif Orozco (book bites discussion)    June 7: Let's Talk    July 5: NO MEETING, off for the 4th of July Holiday    August 2: The Blue Zones, Secrets for Living a Longer Life by Cedric Ornelas (book bites discussion)      If you would like bariatric surgery specific support group info please let your care team know.         Thank you,   [facility] Comprehensive Weight Management Team                          "

## 2024-03-15 NOTE — PROGRESS NOTES
Virtual Visit Details    Type of service:  Video Visit     Originating Location (pt. Location): Home    Distant Location (provider location):  Off-site  Platform used for Video Visit: [facility] Medical Weight Management Note     Adelaida Packer  MRN:  [id number]  :  1970  JUSTUS:  3/15/2024    Dear Windy Gordillo MD,    I had the pleasure of seeing your patient Adelaida Packer. She is a 53 year old female who I am continuing to see for treatment of obesity related to:        6/15/2021     2:14 AM   --   I have the following health issues associated with obesity Pre-Diabetes    High Blood Pressure    Osteoarthritis (joint disease)   I have the following symptoms associated with obesity Knee Pain    Hip Pain       Assessment & Plan   Problem List Items Addressed This Visit       Class 3 severe obesity due to excess calories with serious comorbidity and body mass index (BMI) of 40.0 to 44.9 in adult (H)    Relevant Medications    tirzepatide (MOUNJARO) 10 MG/0.5ML pen    Diabetes mellitus, type 2 (H) - Primary    Relevant Medications    tirzepatide (MOUNJARO) 10 MG/0.5ML pen      Increase Mounjaro 10mg once weekly. Reach out if would like to dose increase prior to next visit   Follow up with Yecenia Rivera in 3 months   Edel Jain RD next available     INTERVAL HISTORY:  [facility] - 6/15/2021  Last seen by Dr. Mitchell- 2022  Ozempic - heartburn and nausea  Phentermine - discontinued due to surgery.   Started 24 week program   Stopped Naltrexone - did not feel like it was helpful with weight loss   Phentermine - c/I due to CKD and pulmonary HTN.   Started Wegovy - prescribed by endocrine       Continued to see Carrie Everett - signed up for another 3 pack. Has found it to be very helpful.     Anti-obesity medication history    Current:   Mounjaro 7.5mg - has been on this dose for 2 months. Feels like Wegovy was a little more helpfu. No side effects. Has been helpful with weight maintenance. Would like to dose  increase today.     Transitioned from Wegovy due to no longer covering weight loss medications and constipation side effects.     Recent diet changes: With starting Mounjaro, has not been able to eat greasy meals. Eating 3 meals a day, with 1 snack. Has been emotional/stress eating in the past month with passing of father. Has been trying protein shakes - tried fairlife and premier. Drinking 64oz water daily. Has continued to see Edel. [facility] - just to be helpful with mindful eating.     Recent exercise/activity changes: Was walking 1 mile a day. Rides bike in the morning. But has been harder the past couple of weeks.     Recent stressors: Father recently passed away. Grief just hit her this week. New job has continued to be a good fit.     Recent sleep changes: has been really tired the past month. No concerns     Vitamins/Labs: A1C 5.9 on 12/11/23    CURRENT WEIGHT:   252 lbs 0 oz    Initial Weight (lbs): 275 lbs  Last Visits Weight: 115.2 kg (254 lb)  Cumulative weight loss (lbs): 23  Weight Loss Percentage: 8.36%    Wt Readings from Last 5 Encounters:   03/15/24 114.3 kg (252 lb)   01/15/24 115.2 kg (254 lb)   01/10/24 116.6 kg (257 lb 2 oz)   12/11/23 117.3 kg (258 lb 9.6 oz)   12/01/23 116.6 kg (257 lb)             3/15/2024     7:38 AM   Changes and Difficulties   I have made the following changes to my diet since my last visit: no   With regards to my diet, I am still struggling with: getting water intake, stress eating   I have made the following changes to my activity/exercise since my last visit: no   With regards to my activity/exercise, I am still struggling with: consistency         MEDICATIONS:   Current Outpatient Medications   Medication Sig Dispense Refill    tirzepatide (MOUNJARO) 10 MG/0.5ML pen Inject 10 mg Subcutaneous every 7 days 2 mL 1    acetaminophen (TYLENOL) 325 MG tablet Take 2 tablets (650 mg) by mouth every 4 hours as needed for other (mild pain) 100 tablet  0    amLODIPine (NORVASC) 5 MG tablet Take 1 tablet (5 mg) by mouth daily 90 tablet 3    amoxicillin (AMOXIL) 500 MG capsule Take 4 caps (2000mg) 30-60 minutes before dental procedure 4 capsule 0    aspirin (ASA) 81 MG EC tablet Take 1 tablet (81 mg) by mouth daily 90 tablet 3    atorvastatin (LIPITOR) 10 MG tablet Take 1 tablet (10 mg) by mouth every evening 90 tablet 3    blood glucose (NO BRAND SPECIFIED) lancets standard Use to test blood sugar 2 times daily or as directed. 200 lancet 3    blood glucose (NO BRAND SPECIFIED) lancets standard Use to test blood sugar 1-2 times daily or as directed. 200 each 3    blood glucose (NO BRAND SPECIFIED) test strip Use to test blood sugar 2-3 times daily or as directed. 200 strip 3    blood glucose (NO BRAND SPECIFIED) test strip Use to test blood sugar 2 times daily or as directed. 200 strip 3    blood glucose monitoring (NO BRAND SPECIFIED) meter device kit Use to test blood sugar 1-2 times daily or as directed. 1 kit 0    cetirizine (ZYRTEC) 10 MG tablet Take 10 mg by mouth daily as needed for allergies      cholecalciferol 2000 UNITS CAPS Take 2,000 Units by mouth daily 90 capsule 3    dapagliflozin (FARXIGA) 10 MG TABS tablet Take 1 tablet (10 mg) by mouth daily 90 tablet 3    eplerenone (INSPRA) 50 MG tablet Take 2 tablets (100 mg) by mouth 2 times daily 360 tablet 3    famotidine (PEPCID) 20 MG tablet Take 1 tablet (20 mg) by mouth 2 times daily 180 tablet 1    Fluocinolone Acetonide Scalp (DERMA-SMOOTHE/FS SCALP) 0.01 % OIL oil Apply nightly to the scalp for the 6 weeks 60 mL 1    fluticasone (FLONASE) 50 MCG/ACT nasal spray INSTILL 1 SPRAY INTO BOTH NOSTRILS DAILY 16 mL 1    hydrOXYzine HCl (ATARAX) 25 MG tablet Take 2 tablets (50 mg) by mouth nightly as needed for itching 90 tablet 1    labetalol (NORMODYNE) 300 MG tablet Take 1 tablet (300 mg) by mouth 2 times daily 10 tablet 0    ondansetron (ZOFRAN ODT) 4 MG ODT tab Take 1 tablet (4 mg) by mouth every 8 hours  "as needed for nausea or vomiting 30 tablet 0    polyethylene glycol (MIRALAX) 17 g packet Take 17 g by mouth daily 7 packet 0    SUMAtriptan (IMITREX) 25 MG tablet Profile rx,TAKE 1 TO 2 TABLETS BY MOUTH AT ONSET OF HEADACHE FOR MIGRAINE. MAY REPEAT DOSE IN 2 HOURS. MAX OF 200MG OR 2 DOSES IN 24 HOURS 18 tablet 1    tirzepatide (MOUNJARO) 7.5 MG/0.5ML pen Inject 7.5 mg Subcutaneous once a week 6 mL 0           3/15/2024     7:38 AM   Weight Loss Medication History Reviewed With Patient   Are you having any side effects from the weight loss medication that we have prescribed you? No         Objective    Ht 1.575 m (5' 2.01\")   Wt 114.3 kg (252 lb)   LMP 08/13/2005   BMI 46.08 kg/m             PHYSICAL EXAM:    GENERAL: alert and no distress  EYES: Eyes grossly normal to inspection.  No discharge or erythema, or obvious scleral/conjunctival abnormalities.  RESP: No audible wheeze, cough, or visible cyanosis.    SKIN: Visible skin clear. No significant rash, abnormal pigmentation or lesions.  NEURO: Cranial nerves grossly intact.  Mentation and speech appropriate for age.  PSYCH: Appropriate affect, tone, and pace of words        Sincerely,    Yecenia Andrea PA-C      32 minutes spent by me on the date of the encounter doing chart review, history and exam, documentation and further activities per the note  "

## 2024-03-19 ENCOUNTER — LAB (OUTPATIENT)
Dept: LAB | Facility: CLINIC | Age: 54
End: 2024-03-19
Payer: COMMERCIAL

## 2024-03-19 DIAGNOSIS — E55.9 VITAMIN D DEFICIENCY: ICD-10-CM

## 2024-03-19 DIAGNOSIS — N18.31 TYPE 2 DIABETES MELLITUS WITH STAGE 3A CHRONIC KIDNEY DISEASE, WITHOUT LONG-TERM CURRENT USE OF INSULIN (H): ICD-10-CM

## 2024-03-19 DIAGNOSIS — E83.52 HYPERCALCEMIA: ICD-10-CM

## 2024-03-19 DIAGNOSIS — N18.2 CHRONIC KIDNEY DISEASE, STAGE 2 (MILD): ICD-10-CM

## 2024-03-19 DIAGNOSIS — E11.22 TYPE 2 DIABETES MELLITUS WITH STAGE 3A CHRONIC KIDNEY DISEASE, WITHOUT LONG-TERM CURRENT USE OF INSULIN (H): ICD-10-CM

## 2024-03-19 LAB
ALBUMIN MFR UR ELPH: 6.3 MG/DL
ALBUMIN SERPL BCG-MCNC: 4.4 G/DL (ref 3.5–5.2)
ANION GAP SERPL CALCULATED.3IONS-SCNC: 12 MMOL/L (ref 7–15)
BUN SERPL-MCNC: 25.2 MG/DL (ref 6–20)
CA-I BLD-MCNC: 5.2 MG/DL (ref 4.4–5.2)
CALCIUM SERPL-MCNC: 10.6 MG/DL (ref 8.6–10)
CHLORIDE SERPL-SCNC: 104 MMOL/L (ref 98–107)
CREAT SERPL-MCNC: 1.49 MG/DL (ref 0.51–0.95)
CREAT UR-MCNC: 132 MG/DL
DEPRECATED HCO3 PLAS-SCNC: 23 MMOL/L (ref 22–29)
EGFRCR SERPLBLD CKD-EPI 2021: 41 ML/MIN/1.73M2
GLUCOSE SERPL-MCNC: 98 MG/DL (ref 70–99)
HBA1C MFR BLD: 6.1 % (ref 0–5.6)
HGB BLD-MCNC: 13.7 G/DL (ref 11.7–15.7)
PHOSPHATE SERPL-MCNC: 4.7 MG/DL (ref 2.5–4.5)
POTASSIUM SERPL-SCNC: 4.5 MMOL/L (ref 3.4–5.3)
PROT/CREAT 24H UR: 0.05 MG/MG CR (ref 0–0.2)
PTH-INTACT SERPL-MCNC: 39 PG/ML (ref 15–65)
SODIUM SERPL-SCNC: 139 MMOL/L (ref 135–145)
VIT D+METAB SERPL-MCNC: 34 NG/ML (ref 20–50)

## 2024-03-19 PROCEDURE — 83970 ASSAY OF PARATHORMONE: CPT

## 2024-03-19 PROCEDURE — 36415 COLL VENOUS BLD VENIPUNCTURE: CPT

## 2024-03-19 PROCEDURE — 84156 ASSAY OF PROTEIN URINE: CPT

## 2024-03-19 PROCEDURE — 85018 HEMOGLOBIN: CPT

## 2024-03-19 PROCEDURE — 83036 HEMOGLOBIN GLYCOSYLATED A1C: CPT

## 2024-03-19 PROCEDURE — 80069 RENAL FUNCTION PANEL: CPT

## 2024-03-19 PROCEDURE — 82306 VITAMIN D 25 HYDROXY: CPT

## 2024-03-19 PROCEDURE — 82330 ASSAY OF CALCIUM: CPT

## 2024-03-26 ENCOUNTER — VIRTUAL VISIT (OUTPATIENT)
Dept: NEPHROLOGY | Facility: CLINIC | Age: 54
End: 2024-03-26
Payer: COMMERCIAL

## 2024-03-26 DIAGNOSIS — E11.69 TYPE 2 DIABETES MELLITUS WITH OTHER SPECIFIED COMPLICATION, UNSPECIFIED WHETHER LONG TERM INSULIN USE (H): ICD-10-CM

## 2024-03-26 DIAGNOSIS — I12.9 HYPERTENSION, RENAL: Primary | ICD-10-CM

## 2024-03-26 DIAGNOSIS — N18.31 STAGE 3A CHRONIC KIDNEY DISEASE (H): ICD-10-CM

## 2024-03-26 PROCEDURE — 99214 OFFICE O/P EST MOD 30 MIN: CPT | Mod: 95 | Performed by: INTERNAL MEDICINE

## 2024-03-26 NOTE — PATIENT INSTRUCTIONS
Monitor BP at home - please update if outside the range of 110-130 systolic. Send updates via [employer] in a few weeks  Ok to decrease cholecalciferol to 1000 units daily.   Additional lab work in 2 weeks - [facility].   Follow-up in 4 months as a tentative plan.   Lower eplerenone to 100 mg AM and 50 mg PM

## 2024-03-26 NOTE — PROGRESS NOTES
Virtual Visit Details    Type of service:  Video Visit     Originating Location (pt. Location): Home    Distant Location (provider location):  Off-site  Platform used for Video Visit: [employer]    03/26/24   CC: HTN and CKD    HPI: Adelaida Packer is a 53 year old female who presents for follow-up of HTN/CKD.  Has some mild CKD which is felt to be related to hypertension as well as likely some effects from hypercalcemia in the past. Her hx includes hyperparathyroidism, primary for which she underwent parathyroidectomy on 3/21/13. Following that surgery, she did have a complication related to a hematoma but we have seen many benefits of her parathyroidectomy - blood pressure improved as well as kidney function.     03/28/23: video visit. Now on Continuous glucose monitor. Much improved the past few weeks. No recent home BP readings. Now going back to the gym to be more active - water aerobics, biking.    03/26/24: video visit.  On mounjaro and farxiga. BP has been good. Has lost 26 lbs. No calcium supplements. Feeling good. Seeing PCP in April. At times, tingling in her legs. No tums/rolaids. On pepcid. No swelling. Feeling well overall. No NSAIDs. 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        Current Outpatient Medications   Medication Sig Dispense Refill    acetaminophen (TYLENOL) 325 MG tablet Take 2 tablets (650 mg) by mouth every 4 hours as needed for other (mild pain) 100 tablet 0    amLODIPine (NORVASC) 5 MG tablet Take 1 tablet (5 mg) by mouth daily 90 tablet 3    amoxicillin (AMOXIL) 500 MG capsule Take 4 caps (2000mg) 30-60 minutes before dental procedure 4 capsule 0    aspirin (ASA) 81 MG EC tablet Take 1 tablet (81 mg) by mouth daily 90 tablet 3    atorvastatin (LIPITOR) 10 MG tablet Take 1 tablet (10 mg) by mouth every evening 90 tablet 3    blood glucose (NO BRAND SPECIFIED) lancets standard Use to test blood sugar 2 times daily or as directed. 200 lancet  3    blood glucose (NO BRAND SPECIFIED) lancets standard Use to test blood sugar 1-2 times daily or as directed. 200 each 3    blood glucose (NO BRAND SPECIFIED) test strip Use to test blood sugar 2-3 times daily or as directed. 200 strip 3    blood glucose (NO BRAND SPECIFIED) test strip Use to test blood sugar 2 times daily or as directed. 200 strip 3    blood glucose monitoring (NO BRAND SPECIFIED) meter device kit Use to test blood sugar 1-2 times daily or as directed. 1 kit 0    cetirizine (ZYRTEC) 10 MG tablet Take 10 mg by mouth daily as needed for allergies      cholecalciferol 2000 UNITS CAPS Take 2,000 Units by mouth daily 90 capsule 3    dapagliflozin (FARXIGA) 10 MG TABS tablet Take 1 tablet (10 mg) by mouth daily 90 tablet 3    eplerenone (INSPRA) 50 MG tablet Take 2 tablets (100 mg) by mouth 2 times daily 360 tablet 3    famotidine (PEPCID) 20 MG tablet Take 1 tablet (20 mg) by mouth 2 times daily 180 tablet 1    Fluocinolone Acetonide Scalp (DERMA-SMOOTHE/FS SCALP) 0.01 % OIL oil Apply nightly to the scalp for the 6 weeks 60 mL 1    fluticasone (FLONASE) 50 MCG/ACT nasal spray INSTILL 1 SPRAY INTO BOTH NOSTRILS DAILY 16 mL 1    hydrOXYzine HCl (ATARAX) 25 MG tablet TAKE 2 TABLETS (50 MG) BY MOUTH NIGHTLY AS NEEDED FOR ITCHING 180 tablet 0    labetalol (NORMODYNE) 300 MG tablet Take 1 tablet (300 mg) by mouth 2 times daily 10 tablet 0    ondansetron (ZOFRAN ODT) 4 MG ODT tab Take 1 tablet (4 mg) by mouth every 8 hours as needed for nausea or vomiting 30 tablet 0    polyethylene glycol (MIRALAX) 17 g packet Take 17 g by mouth daily 7 packet 0    SUMAtriptan (IMITREX) 25 MG tablet Profile rx,TAKE 1 TO 2 TABLETS BY MOUTH AT ONSET OF HEADACHE FOR MIGRAINE. MAY REPEAT DOSE IN 2 HOURS. MAX OF 200MG OR 2 DOSES IN 24 HOURS 18 tablet 1    tirzepatide (MOUNJARO) 10 MG/0.5ML pen Inject 10 mg Subcutaneous every 7 days 2 mL 1    tirzepatide (MOUNJARO) 7.5 MG/0.5ML pen Inject 7.5 mg Subcutaneous once a week 6 mL 0      Exam:  Gen - alert and oriented, appears comfortable.    Result  No visits with results within 1 Day(s) from this visit.   Latest known visit with results is:   Lab on 03/19/2024   Component Date Value Ref Range Status    Sodium 03/19/2024 139  135 - 145 mmol/L Final    Reference intervals for this test were updated on 09/26/2023 to more accurately reflect our healthy population. There may be differences in the flagging of prior results with similar values performed with this method. Interpretation of those prior results can be made in the context of the updated reference intervals.     Potassium 03/19/2024 4.5  3.4 - 5.3 mmol/L Final    Chloride 03/19/2024 104  98 - 107 mmol/L Final    Carbon Dioxide (CO2) 03/19/2024 23  22 - 29 mmol/L Final    Anion Gap 03/19/2024 12  7 - 15 mmol/L Final    Glucose 03/19/2024 98  70 - 99 mg/dL Final    Urea Nitrogen 03/19/2024 25.2 (H)  6.0 - 20.0 mg/dL Final    Creatinine 03/19/2024 1.49 (H)  0.51 - 0.95 mg/dL Final    GFR Estimate 03/19/2024 41 (L)  >60 mL/min/1.73m2 Final    Calcium 03/19/2024 10.6 (H)  8.6 - 10.0 mg/dL Final    Albumin 03/19/2024 4.4  3.5 - 5.2 g/dL Final    Phosphorus 03/19/2024 4.7 (H)  2.5 - 4.5 mg/dL Final    Total Protein Urine mg/dL 03/19/2024 6.3    mg/dL Final    The reference ranges have not been established in urine protein. The results should be integrated into the clinical context for interpretation.    Total Protein Urine mg/mg Creat 03/19/2024 0.05  0.00 - 0.20 mg/mg Cr Final    Creatinine Urine mg/dL 03/19/2024 132.0  mg/dL Final    The reference ranges have not been established in urine creatinine. The results should be integrated into the clinical context for interpretation.    Parathyroid Hormone Intact 03/19/2024 39  15 - 65 pg/mL Final    Hemoglobin 03/19/2024 13.7  11.7 - 15.7 g/dL Final    Hemoglobin A1C 03/19/2024 6.1 (H)  0.0 - 5.6 % Final    Normal <5.7%   Prediabetes 5.7-6.4%    Diabetes 6.5% or higher     Note: Adopted from ADA  consensus guidelines.    Calcium Ionized Whole Blood 03/19/2024 5.2  4.4 - 5.2 mg/dL Final    Vitamin D, Total (25-Hydroxy) 03/19/2024 34  20 - 50 ng/mL Final    optimum levels             Assessment/Plan:  1. Hypertension: aldosterone level has been high on evaluation by Dr. Quintanilla with a low renin. In the past I was suspicious for hyperaldosteronism as well but CT scan was without adrenal adenoma appreciated. On potassium sparing diuretic for medical mgmt of presumed bilateral adrenal hyperplasia. BP at goal most recently - will trial lowering eplerenone given improved BP most recently.     2. CKD: likely some chronic kidney changes related to longstanding hypertension and hypercalcemia. At risk for diabetic nephropathy but no proteinuria which is reassuring. Educated on benefits of weight loss.     3. Hypercalcemia: s/p parathyroidectomy on 3/21/13. Calcium now normal. Vitamin D level is at goal - ok to decrease cholecalciferol to 1000 units daily.     4. Monoclonal heavy Chain: following with Dr. Dennis.     5. DM: A1C improved to 6.1%.     Patient Instructions   Monitor BP at home - please update if outside the range of 110-130 systolic. Send updates via [employer] in a few weeks  Ok to decrease cholecalciferol to 1000 units daily.   Additional lab work in 2 weeks - [facility].   Follow-up in 4 months as a tentative plan.   Lower eplerenone to 100 mg AM and 50 mg PM       833-902 AM video visit via [employer] - offsite.   Dia Aleman,

## 2024-03-26 NOTE — NURSING NOTE
Is the patient currently in the state of [place]? YES    Visit mode:VIDEO    If the visit is dropped, the patient can be reconnected by: VIDEO VISIT: Send to e-mail at: [email or web address]    Will anyone else be joining the visit? NO  (If patient encounters technical issues they should call [phone]    How would you like to obtain your AVS? MyChart    Are changes needed to the allergy or medication list? No    Reason for visit: ANN [place]

## 2024-03-30 DIAGNOSIS — F51.02 ADJUSTMENT INSOMNIA: ICD-10-CM

## 2024-04-01 RX ORDER — HYDROXYZINE HYDROCHLORIDE 25 MG/1
50 TABLET, FILM COATED ORAL
Qty: 180 TABLET | Refills: 0 | Status: SHIPPED | OUTPATIENT
Start: 2024-04-01 | End: 2024-05-06 | Stop reason: ALTCHOICE

## 2024-04-16 ENCOUNTER — MYC MEDICAL ADVICE (OUTPATIENT)
Dept: ENDOCRINOLOGY | Facility: CLINIC | Age: 54
End: 2024-04-16

## 2024-04-16 ENCOUNTER — VIRTUAL VISIT (OUTPATIENT)
Dept: ENDOCRINOLOGY | Facility: CLINIC | Age: 54
End: 2024-04-16

## 2024-04-16 ENCOUNTER — MYC MEDICAL ADVICE (OUTPATIENT)
Dept: NEPHROLOGY | Facility: CLINIC | Age: 54
End: 2024-04-16

## 2024-04-16 DIAGNOSIS — E11.22 TYPE 2 DIABETES MELLITUS WITH STAGE 3A CHRONIC KIDNEY DISEASE, WITHOUT LONG-TERM CURRENT USE OF INSULIN (H): Primary | ICD-10-CM

## 2024-04-16 DIAGNOSIS — I12.9 HYPERTENSION, RENAL: Primary | ICD-10-CM

## 2024-04-16 DIAGNOSIS — N18.31 TYPE 2 DIABETES MELLITUS WITH STAGE 3A CHRONIC KIDNEY DISEASE, WITHOUT LONG-TERM CURRENT USE OF INSULIN (H): Primary | ICD-10-CM

## 2024-04-16 DIAGNOSIS — E66.3 OVERWEIGHT: Primary | ICD-10-CM

## 2024-04-16 PROCEDURE — 99207 PR NO CHARGE LOS: CPT | Mod: 95

## 2024-04-16 PROCEDURE — 99207 PR MEDICAL WEIGHT MANAGEMENT MAINTENANCE: CPT | Mod: 95

## 2024-04-16 NOTE — LETTER
"4/16/2024       RE: Adelaida Packer  [address] [place] [address]     Dear Colleague,    Thank you for referring your patient, Adelaida Packer, to the [facility] WEIGHT MANAGEMENT CLINIC [place] at [facility]. Please see a copy of my visit note below.    3 pack visit #1  Drop the $99 charge today   Yecenia Rivera; CSC    Previous Goals;   Continue with my movement goal; biking before work and 11,000 steps daily  Track my food on myfitness pal for accountability  Plan to have a healthy snack with veggies or fruit  Continue to get meal prep ideas/inspiration online and prioritize meal prep. I can do it!  \" I have good food at home\" (so chose not to eat out)    Nutrition:  I am not just saying it. I am doing this. I packed healthy salads for lunch.I took action and got myself on track.  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 Set a timer for how long I spend on it. I will give it 30 minutes today. This is an honor to do for my dad.       NEXT HEALTH  VISITS: START A 3 PACK for $99  May 14, 8 am    Crarie RICHARD-[facility], National Board Certified Health &   Lead Health   [facility] Comprehensive Weight Management  [email or web address]  [email or web address]   To schedule: [phone]   Gender pronouns: she/her     "

## 2024-04-16 NOTE — PROGRESS NOTES
"3 pack visit #1  Drop the $99 charge today   Yecenia Rivera; CSC    Previous Goals;   Continue with my movement goal; biking before work and 11,000 steps daily  Track my food on [facility] pal for accountability  Plan to have a healthy snack with veggies or fruit  Continue to get meal prep ideas/inspiration online and prioritize meal prep. I can do it!  \" I have good food at home\" (so chose not to eat out)    Nutrition:  I am not just saying it. I am doing this. I packed healthy salads for lunch.I took action and got myself on track.  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 Set a timer for how long I spend on it. I will give it 30 minutes today. This is an honor to do for my dad.       NEXT HEALTH  VISITS: START A 3 PACK for $99  May 14, 8 am    Carrie RICHARD-[facility], National Board Certified Health &   Lead Health   [facility]  [email or web address]  [email or web address]   To schedule: [phone]   Gender pronouns: she/her     "

## 2024-04-18 DIAGNOSIS — E11.65 UNCONTROLLED TYPE 2 DIABETES MELLITUS WITH HYPERGLYCEMIA (H): ICD-10-CM

## 2024-04-24 ENCOUNTER — PATIENT OUTREACH (OUTPATIENT)
Dept: CARE COORDINATION | Facility: CLINIC | Age: 54
End: 2024-04-24
Payer: COMMERCIAL

## 2024-05-03 ENCOUNTER — LAB (OUTPATIENT)
Dept: LAB | Facility: CLINIC | Age: 54
End: 2024-05-03
Payer: COMMERCIAL

## 2024-05-03 DIAGNOSIS — I12.9 HYPERTENSION, RENAL: ICD-10-CM

## 2024-05-03 LAB
ANION GAP SERPL CALCULATED.3IONS-SCNC: 11 MMOL/L (ref 7–15)
BUN SERPL-MCNC: 28.6 MG/DL (ref 6–20)
CALCIUM SERPL-MCNC: 10.1 MG/DL (ref 8.6–10)
CHLORIDE SERPL-SCNC: 105 MMOL/L (ref 98–107)
CREAT SERPL-MCNC: 1.27 MG/DL (ref 0.51–0.95)
DEPRECATED HCO3 PLAS-SCNC: 23 MMOL/L (ref 22–29)
EGFRCR SERPLBLD CKD-EPI 2021: 50 ML/MIN/1.73M2
GLUCOSE SERPL-MCNC: 100 MG/DL (ref 70–99)
POTASSIUM SERPL-SCNC: 4.4 MMOL/L (ref 3.4–5.3)
SODIUM SERPL-SCNC: 139 MMOL/L (ref 135–145)

## 2024-05-03 PROCEDURE — 36415 COLL VENOUS BLD VENIPUNCTURE: CPT

## 2024-05-03 PROCEDURE — 80048 BASIC METABOLIC PNL TOTAL CA: CPT

## 2024-05-06 ENCOUNTER — VIRTUAL VISIT (OUTPATIENT)
Dept: FAMILY MEDICINE | Facility: CLINIC | Age: 54
End: 2024-05-06
Payer: COMMERCIAL

## 2024-05-06 DIAGNOSIS — G43.709 CHRONIC MIGRAINE WITHOUT AURA WITHOUT STATUS MIGRAINOSUS, NOT INTRACTABLE: ICD-10-CM

## 2024-05-06 DIAGNOSIS — N18.31 TYPE 2 DIABETES MELLITUS WITH STAGE 3A CHRONIC KIDNEY DISEASE, WITHOUT LONG-TERM CURRENT USE OF INSULIN (H): Primary | ICD-10-CM

## 2024-05-06 DIAGNOSIS — R10.13 DYSPEPSIA: ICD-10-CM

## 2024-05-06 DIAGNOSIS — N18.32 STAGE 3B CHRONIC KIDNEY DISEASE (H): ICD-10-CM

## 2024-05-06 DIAGNOSIS — E66.813 CLASS 3 SEVERE OBESITY DUE TO EXCESS CALORIES WITH SERIOUS COMORBIDITY AND BODY MASS INDEX (BMI) OF 40.0 TO 44.9 IN ADULT (H): ICD-10-CM

## 2024-05-06 DIAGNOSIS — E66.01 CLASS 3 SEVERE OBESITY DUE TO EXCESS CALORIES WITH SERIOUS COMORBIDITY AND BODY MASS INDEX (BMI) OF 40.0 TO 44.9 IN ADULT (H): ICD-10-CM

## 2024-05-06 DIAGNOSIS — F51.02 ADJUSTMENT INSOMNIA: ICD-10-CM

## 2024-05-06 DIAGNOSIS — E11.22 TYPE 2 DIABETES MELLITUS WITH STAGE 3A CHRONIC KIDNEY DISEASE, WITHOUT LONG-TERM CURRENT USE OF INSULIN (H): Primary | ICD-10-CM

## 2024-05-06 DIAGNOSIS — Z12.31 VISIT FOR SCREENING MAMMOGRAM: ICD-10-CM

## 2024-05-06 PROCEDURE — 99214 OFFICE O/P EST MOD 30 MIN: CPT | Mod: 95 | Performed by: FAMILY MEDICINE

## 2024-05-06 RX ORDER — SUMATRIPTAN 25 MG/1
TABLET, FILM COATED ORAL
Qty: 18 TABLET | Refills: 1 | Status: SHIPPED | OUTPATIENT
Start: 2024-05-06 | End: 2024-08-09

## 2024-05-06 RX ORDER — TRAZODONE HYDROCHLORIDE 50 MG/1
50-100 TABLET, FILM COATED ORAL
Qty: 180 TABLET | Refills: 1 | Status: SHIPPED | OUTPATIENT
Start: 2024-05-06 | End: 2024-08-09

## 2024-05-06 RX ORDER — FAMOTIDINE 20 MG/1
20 TABLET, FILM COATED ORAL 2 TIMES DAILY
Qty: 180 TABLET | Refills: 1 | Status: SHIPPED | OUTPATIENT
Start: 2024-05-06 | End: 2024-08-09

## 2024-05-06 RX ORDER — AMLODIPINE BESYLATE 5 MG/1
5 TABLET ORAL DAILY
Qty: 90 TABLET | Refills: 3 | Status: SHIPPED | OUTPATIENT
Start: 2024-05-06 | End: 2024-07-30

## 2024-05-06 NOTE — PROGRESS NOTES
"Adelaida is a 54 year old who is being evaluated via a billable video visit.    How would you like to obtain your AVS? Traxer  If the video visit is dropped, the invitation should be resent by: Send to e-mail at: [email or web address]  Will anyone else be joining your video visit? No            Instructions Relayed to Patient by Virtual Roomer:     Patient is active on [employer]:   Relayed following to patient: \"It looks like you are active on [employer], are you able to join the visit this way? If not, do you need us to send you a link now or would you like your provider to send a link via text or email when they are ready to initiate the visit?\"      Patient Confirmed they will join visit via: Email   Reminded patient to ensure they were logged on to virtual visit by arrival time listed.   Asked if patient has flexibility to initiate visit sooner than arrival time: patient stated yes, documented in appointment notes availability to initiate visit earlier than arrival time     If pediatric virtual visit, ensured pediatric patient along with parent/guardian will be present for video visit.     Patient offered the website [email or web address] and/or phone number to [employer] Help line: [phone]   Assessment & Plan     Type 2 diabetes mellitus with stage 3a chronic kidney disease, without long-term current use of insulin (H)  Lab Results   Component Value Date    A1C 6.1 03/19/2024    A1C 5.9 12/11/2023    A1C 6.6 07/07/2023    A1C 8.0 04/17/2023    A1C 13.0 02/10/2023    A1C 6.1 11/30/2020    A1C 5.5 11/19/2019    A1C 5.3 01/29/2019    A1C 5.2 08/06/2018    A1C 5.2 08/15/2017     Patient is at goal, continue with current dose of Farxiga and Mounjaro will monitor blood glucose, healthy eating and regular exercises normal exam, follow for recheck in 3 months with annual physical or sooner if needed,  - tirzepatide (MOUNJARO) 15 MG/0.5ML pen; Inject 15 mg Subcutaneous once a week    Class 3 severe obesity " due to excess calories with serious comorbidity and body mass index (BMI) of 40.0 to 44.9 in adult (H)  Wt Readings from Last 5 Encounters:   03/15/24 114.3 kg (252 lb)   01/15/24 115.2 kg (254 lb)   01/10/24 116.6 kg (257 lb 2 oz)   12/11/23 117.3 kg (258 lb 9.6 oz)   12/01/23 116.6 kg (257 lb)     Patient is currently only on Mounjaro 12.5 mg and has been weighing the same for the last 3 to 4 months.  Will increase the dose to 15 mg once a week follow-up in 3 months at the time of physical, continue with portion control, healthy eating, regular exercises  - tirzepatide (MOUNJARO) 15 MG/0.5ML pen; Inject 15 mg Subcutaneous once a week    Chronic migraine without aura without status migrainosus, not intractable  Stable, continue with avoiding triggers for migraine, Imitrex as needed  - SUMAtriptan (IMITREX) 25 MG tablet; Profile rx,TAKE 1 TO 2 TABLETS BY MOUTH AT ONSET OF HEADACHE FOR MIGRAINE. MAY REPEAT DOSE IN 2 HOURS. MAX OF 200MG OR 2 DOSES IN 24 HOURS    Dyspepsia  Stable, continue with Pepcid as needed  - famotidine (PEPCID) 20 MG tablet; Take 1 tablet (20 mg) by mouth 2 times daily    Visit for screening mammogram    - MA SCREENING DIGITAL BILAT - Future  (s+30); Future    Stage 3b chronic kidney disease (H)  Continue to follow-up with Dr. Aleman nephrology as scheduled  - amLODIPine (NORVASC) 5 MG tablet; Take 1 tablet (5 mg) by mouth daily    Adjustment insomnia  Hydroxyzine 50 mg not helping  .  Hydroxyzine replace with trazodone 50 to 100 mg as needed at bedtime for sleep, continue with sleep hygiene  - traZODone (DESYREL) 50 MG tablet; Take 1-2 tablets ( mg) by mouth nightly as needed for sleep    Review of the result(s) of each unique test - BMP, A1c          Chart documentation done in part with Dragon Voice recognition Software. Although reviewed after completion, some word and grammatical error may remain.    See Patient Instructions    Shannan Son is a 54 year old, presenting for the  following health issues:  Results and Diabetes      5/6/2024     8:20 AM   Additional Questions   Roomed by Mar         5/6/2024     8:20 AM   Patient Reported Additional Medications   Patient reports taking the following new medications none     History of Present Illness       Diabetes:   She presents for follow up of diabetes.  She is checking home blood glucose one time daily.   She checks blood glucose before meals.  Blood glucose is never over 200 and never under 70.  When her blood glucose is low, the patient is asymptomatic for confusion, blurred vision, lethargy and reports not feeling dizzy, shaky, or weak.   She has no concerns regarding her diabetes at this time.   She is not experiencing numbness or burning in feet, excessive thirst, blurry vision, weight changes or redness, sores or blisters on feet.           Reason for visit:  Lab results for A1C    She eats 2-3 servings of fruits and vegetables daily.She consumes 0 sweetened beverage(s) daily.She exercises with enough effort to increase her heart rate 10 to 19 minutes per day.  She exercises with enough effort to increase her heart rate 3 or less days per week.   She is taking medications regularly.       Diabetes Follow-up    How often are you checking your blood sugar? One time daily  What time of day are you checking your blood sugars (select all that apply)?  Before meals  Have you had any blood sugars above 200?  No  Have you had any blood sugars below 70?  No  What symptoms do you notice when your blood sugar is low?  Dizzy, Lethargy, and Confusion  What concerns do you have today about your diabetes? None   Do you have any of these symptoms? (Select all that apply)  No numbness or tingling in feet.  No redness, sores or blisters on feet.  No complaints of excessive thirst.  No reports of blurry vision.  No significant changes to weight.      BP Readings from Last 2 Encounters:   01/10/24 109/76   12/11/23 121/77     Hemoglobin A1C (%)    Date Value   03/19/2024 6.1 (H)   12/11/2023 5.9 (H)   11/30/2020 6.1 (H)   11/19/2019 5.5     LDL Cholesterol Calculated (mg/dL)   Date Value   04/17/2023 51   03/11/2022 106 (H)   03/29/2021 120 (H)   02/18/2019 95         Medication Followup of , Pepcid for dyspepsia and hydroxyzine for sleep  Taking Medication as prescribed: yes  Side Effects:  None  Medication Helping Symptoms: This is helping but not the hydroxyzine patient still wakes up once or twice at night and is not able to go back to sleep when she wakes up        Review of Systems  CONSTITUTIONAL: NEGATIVE for fever, chills, change in weight  RESP: NEGATIVE for significant cough or SOB  CV: NEGATIVE for chest pain, palpitations or peripheral edema  CV: History of hypertension  GI: History of dyspepsia  MUSCULOSKELETAL: NEGATIVE for significant arthralgias or myalgia  NEURO: NEGATIVE for weakness, dizziness or paresthesias  ENDOCRINE: History of diabetes  HEME/ALLERGY/IMMUNE: NEGATIVE for bleeding problems  PSYCHIATRIC: NEGATIVE for changes in mood or affect      Objective           Vitals:  No vitals were obtained today due to virtual visit.    Physical Exam   GENERAL: alert and no distress  EYES: Eyes grossly normal to inspection  RESP: No audible wheeze, cough, or visible cyanosis.    SKIN: Visible skin clear. No significant rash, abnormal pigmentation or lesions.  NEURO: Cranial nerves grossly intact.  Mentation and speech appropriate for age.  PSYCH: Appropriate affect, tone, and pace of words          Video-Visit Details    Type of service:  Video Visit   Originating Location (pt. Location): Home    Distant Location (provider location):  Off-site  Platform used for Video Visit: Delmi  Signed Electronically by: Windy Gordillo MD

## 2024-05-14 ENCOUNTER — VIRTUAL VISIT (OUTPATIENT)
Dept: ENDOCRINOLOGY | Facility: CLINIC | Age: 54
End: 2024-05-14

## 2024-05-14 DIAGNOSIS — E66.3 OVERWEIGHT: Primary | ICD-10-CM

## 2024-05-14 PROCEDURE — 99207 PR NO CHARGE LOS: CPT | Mod: 95

## 2024-05-14 NOTE — PROGRESS NOTES
"3 pack visit 2  CSC    GOALS:  Map out my work/union schedule. \"This is fun.\"  \"Work is work\"  Stay connected/grounded with myself. Tune into my breath and body, slow down, pause   Buy easy meals for end of school year; Breakfast Buritos and Frozen Meals for lunch    NEXT HEALTH  VISIT: JUNE 20 at 9:30 am video    Carrie Ness  [facility], National Board Certified Health &   Lead Health   [facility]  [email or web address]  [email or web address]   To schedule: [phone]   Gender pronouns: she/her     "

## 2024-05-14 NOTE — LETTER
"5/14/2024       RE: Adelaida Packer  [address] [place] [address]     Dear Colleague,    Thank you for referring your patient, Adelaida Packer, to the [facility] [place] [facility]. Please see a copy of my visit note below.    3 pack visit 2  CSC    GOALS:  Map out my work/union schedule. \"This is fun.\"  \"Work is work\"  Stay connected/grounded with myself. Tune into my breath and body, slow down, pause   Buy easy meals for end of school year; Breakfast Buritos and Frozen Meals for lunch    NEXT HEALTH  VISIT: JUNE 20 at 9:30 am video    Carrie RICHARD-[facility], National Board Certified Health &   Lead Health   [facility]  [email or web address]   To schedule: [phone]   Gender pronouns: she/her     "

## 2024-05-22 ENCOUNTER — PATIENT OUTREACH (OUTPATIENT)
Dept: CARE COORDINATION | Facility: CLINIC | Age: 54
End: 2024-05-22
Payer: COMMERCIAL

## 2024-05-22 DIAGNOSIS — R11.0 NAUSEA: ICD-10-CM

## 2024-05-22 DIAGNOSIS — Z51.81 THERAPEUTIC DRUG MONITORING: ICD-10-CM

## 2024-05-22 DIAGNOSIS — E66.813 CLASS 3 DRUG-INDUCED OBESITY WITH SERIOUS COMORBIDITY AND BODY MASS INDEX (BMI) OF 45.0 TO 49.9 IN ADULT (H): ICD-10-CM

## 2024-05-22 DIAGNOSIS — E66.1 CLASS 3 DRUG-INDUCED OBESITY WITH SERIOUS COMORBIDITY AND BODY MASS INDEX (BMI) OF 45.0 TO 49.9 IN ADULT (H): ICD-10-CM

## 2024-05-22 RX ORDER — ONDANSETRON 4 MG/1
TABLET, ORALLY DISINTEGRATING ORAL
Qty: 30 TABLET | Refills: 0 | Status: SHIPPED | OUTPATIENT
Start: 2024-05-22 | End: 2024-08-09

## 2024-06-10 ENCOUNTER — DOCUMENTATION ONLY (OUTPATIENT)
Dept: LAB | Facility: CLINIC | Age: 54
End: 2024-06-10
Payer: COMMERCIAL

## 2024-06-10 DIAGNOSIS — D47.2 MGUS (MONOCLONAL GAMMOPATHY OF UNKNOWN SIGNIFICANCE): Primary | ICD-10-CM

## 2024-06-10 NOTE — PROGRESS NOTES
Orders placed per provider order:    Check Out Appointment Request  Expected 6/20/2024  BJT MG 12 months w/ labs 5-7 days prior (CBC, CMP, SPEP, FLC assay)     Madie Mosquera RN

## 2024-06-10 NOTE — PROGRESS NOTES
Adelaida Packer has an upcoming lab appointment:    Future Appointments   Date Time Provider Department Center   6/12/2024  8:00 AM  CANCER PIV LAB [facility]   6/19/2024  3:00 PM Geremias Dennis MD [facility]   6/20/2024  9:30 AM Carrie Ness [facility]   7/30/2024  8:30 AM Dia Aleman DO [facility]   8/9/2024  1:00 PM Windy Gordillo MD [facility]     Patient is scheduled for the following lab(s): Please place orders for patient's 6/12 lab visit.    There is no order available. Please review and place either future orders or HMPO (Review of Health Maintenance Protocol Orders), as appropriate.    There are no preventive care reminders to display for this patient.  Lizeth Yao

## 2024-06-12 ENCOUNTER — LAB (OUTPATIENT)
Dept: LAB | Facility: CLINIC | Age: 54
End: 2024-06-12
Payer: COMMERCIAL

## 2024-06-12 DIAGNOSIS — D47.2 MGUS (MONOCLONAL GAMMOPATHY OF UNKNOWN SIGNIFICANCE): ICD-10-CM

## 2024-06-12 LAB
BASOPHILS # BLD AUTO: 0 10E3/UL (ref 0–0.2)
BASOPHILS NFR BLD AUTO: 0 %
EOSINOPHIL # BLD AUTO: 0.4 10E3/UL (ref 0–0.7)
EOSINOPHIL NFR BLD AUTO: 7 %
ERYTHROCYTE [DISTWIDTH] IN BLOOD BY AUTOMATED COUNT: 13.5 % (ref 10–15)
HCT VFR BLD AUTO: 39.9 % (ref 35–47)
HGB BLD-MCNC: 13.3 G/DL (ref 11.7–15.7)
IMM GRANULOCYTES # BLD: 0 10E3/UL
IMM GRANULOCYTES NFR BLD: 0 %
LYMPHOCYTES # BLD AUTO: 2.3 10E3/UL (ref 0.8–5.3)
LYMPHOCYTES NFR BLD AUTO: 44 %
MCH RBC QN AUTO: 29.5 PG (ref 26.5–33)
MCHC RBC AUTO-ENTMCNC: 33.3 G/DL (ref 31.5–36.5)
MCV RBC AUTO: 89 FL (ref 78–100)
MONOCYTES # BLD AUTO: 0.3 10E3/UL (ref 0–1.3)
MONOCYTES NFR BLD AUTO: 6 %
NEUTROPHILS # BLD AUTO: 2.3 10E3/UL (ref 1.6–8.3)
NEUTROPHILS NFR BLD AUTO: 43 %
PLATELET # BLD AUTO: 213 10E3/UL (ref 150–450)
RBC # BLD AUTO: 4.51 10E6/UL (ref 3.8–5.2)
WBC # BLD AUTO: 5.3 10E3/UL (ref 4–11)

## 2024-06-12 PROCEDURE — 36415 COLL VENOUS BLD VENIPUNCTURE: CPT

## 2024-06-12 PROCEDURE — 84155 ASSAY OF PROTEIN SERUM: CPT

## 2024-06-12 PROCEDURE — 80053 COMPREHEN METABOLIC PANEL: CPT

## 2024-06-12 PROCEDURE — 85025 COMPLETE CBC W/AUTO DIFF WBC: CPT

## 2024-06-12 PROCEDURE — 83521 IG LIGHT CHAINS FREE EACH: CPT

## 2024-06-12 PROCEDURE — 84165 PROTEIN E-PHORESIS SERUM: CPT | Performed by: PATHOLOGY

## 2024-06-13 LAB
ALBUMIN SERPL BCG-MCNC: 4.6 G/DL (ref 3.5–5.2)
ALBUMIN SERPL ELPH-MCNC: 4.3 G/DL (ref 3.7–5.1)
ALP SERPL-CCNC: 59 U/L (ref 40–150)
ALPHA1 GLOB SERPL ELPH-MCNC: 0.3 G/DL (ref 0.2–0.4)
ALPHA2 GLOB SERPL ELPH-MCNC: 0.6 G/DL (ref 0.5–0.9)
ALT SERPL W P-5'-P-CCNC: 27 U/L (ref 0–50)
ANION GAP SERPL CALCULATED.3IONS-SCNC: 11 MMOL/L (ref 7–15)
AST SERPL W P-5'-P-CCNC: 21 U/L (ref 0–45)
B-GLOBULIN SERPL ELPH-MCNC: 0.8 G/DL (ref 0.6–1)
BILIRUB SERPL-MCNC: 0.3 MG/DL
BUN SERPL-MCNC: 28 MG/DL (ref 6–20)
CALCIUM SERPL-MCNC: 10.6 MG/DL (ref 8.6–10)
CHLORIDE SERPL-SCNC: 103 MMOL/L (ref 98–107)
CREAT SERPL-MCNC: 1.33 MG/DL (ref 0.51–0.95)
DEPRECATED HCO3 PLAS-SCNC: 24 MMOL/L (ref 22–29)
EGFRCR SERPLBLD CKD-EPI 2021: 47 ML/MIN/1.73M2
GAMMA GLOB SERPL ELPH-MCNC: 1.4 G/DL (ref 0.7–1.6)
GLUCOSE SERPL-MCNC: 90 MG/DL (ref 70–99)
KAPPA LC FREE SER-MCNC: 2.46 MG/DL (ref 0.33–1.94)
KAPPA LC FREE/LAMBDA FREE SER NEPH: 0.26 {RATIO} (ref 0.26–1.65)
LAMBDA LC FREE SERPL-MCNC: 9.36 MG/DL (ref 0.57–2.63)
M PROTEIN SERPL ELPH-MCNC: 0.5 G/DL
POTASSIUM SERPL-SCNC: 4.6 MMOL/L (ref 3.4–5.3)
PROT PATTERN SERPL ELPH-IMP: ABNORMAL
PROT SERPL-MCNC: 7.5 G/DL (ref 6.4–8.3)
SODIUM SERPL-SCNC: 138 MMOL/L (ref 135–145)
TOTAL PROTEIN SERUM FOR ELP: 7.4 G/DL (ref 6.4–8.3)

## 2024-06-19 ENCOUNTER — ONCOLOGY VISIT (OUTPATIENT)
Dept: ONCOLOGY | Facility: CLINIC | Age: 54
End: 2024-06-19
Attending: INTERNAL MEDICINE
Payer: COMMERCIAL

## 2024-06-19 VITALS
BODY MASS INDEX: 46.38 KG/M2 | HEART RATE: 85 BPM | OXYGEN SATURATION: 95 % | WEIGHT: 252 LBS | RESPIRATION RATE: 16 BRPM | SYSTOLIC BLOOD PRESSURE: 109 MMHG | DIASTOLIC BLOOD PRESSURE: 75 MMHG | HEIGHT: 62 IN

## 2024-06-19 DIAGNOSIS — D47.2 MGUS (MONOCLONAL GAMMOPATHY OF UNKNOWN SIGNIFICANCE): Primary | ICD-10-CM

## 2024-06-19 DIAGNOSIS — N18.2 CHRONIC KIDNEY DISEASE, STAGE 2 (MILD): ICD-10-CM

## 2024-06-19 DIAGNOSIS — E83.52 HYPERCALCEMIA: ICD-10-CM

## 2024-06-19 PROCEDURE — 99214 OFFICE O/P EST MOD 30 MIN: CPT | Performed by: INTERNAL MEDICINE

## 2024-06-19 ASSESSMENT — PAIN SCALES - GENERAL: PAINLEVEL: NO PAIN (0)

## 2024-06-19 NOTE — LETTER
2024      Adelaida Packer  [address] [place] [address]      Dear Colleague,    Thank you for referring your patient, Adelaida Packer, to the [facility] [place]. Please see a copy of my visit note below.    [facility] Hematology / Oncology  Progress Note  Name: Adelaida Packer  :  1970  MRN:  [id number]    --------------------    Assessment / Plan:  IgG lambda MGUS.  CKD.  Status post parathyroidectomy.    Continue observation.  Stable myeloma markers.  Blood counts stable.  CKD stable.  Mild hypercalcemia being monitored by nephrology.  RTC 12 months w/ labs 7 days prior to visit (CBC, CMP, SPEP, FLC).    Thank you kindly for this consultation.  Geremias Dennis MD    --------------------    Interval History:  Adelaida returns for follow-up of MGUS.  All in all, she is feeling really well.  Looking forward to trips to [facility] and [place].  Working on antihypertensives w/ nephrology.  Health is good.  Energy is good.  No immune system concerns.  No unexplained sweats or fevers.  No neuropathy.  No easy bruising.    --------------------    Review of Systems:  10 point ROS negative except for that above.    Family History:  Family History   Problem Relation Age of Onset     Hypertension Mother      Gynecology Mother         hysterectomy     Gastrointestinal Disease Mother         bowel upstruction     Thyroid Disease Mother      Neurologic Disorder Mother      Osteoporosis Mother      Anesthesia Reaction Mother         migraines     Hypertension Father      Lipids Father      Arthritis Father      Heart Disease Father      Diabetes Sister      Cerebrovascular Disease Sister      Prostate Cancer Brother      Alcohol/Drug Brother      Circulatory Brother      Cancer Maternal Grandmother         tumor-head     Obesity Maternal Grandmother      Cancer Maternal Grandfather         bone     Eye Disorder Maternal Grandfather      Prostate Cancer  Single Ventricle / High Risk Clinic Inpatient Note        Underlying cardiac diagnosis  Wong Black is a 3 month old female with Pulmonary atresia, large VSD, PFO  Now s/p complete repair VSD closure, PDA ligation, Transannular PA patch with monocsup valve placement.     Reason for admission:  10/24/23: 2023: PDA ligation, bilateral pulmonary patch plasty with autologus pericaridum, VSD closure, PFO closure, transannular PA patch and monocusp valve placement     HPI:  Wong Black is a 3 month old female with pulmonary atresia (PA), large ventricular septal defect (VSD), patent foramen ovale (PFO), patent ductus arteriosus (PDA) s/p PDA stent deployment in cath lab with subsequent shunt thrombosis requiring ECPR in Aug 2023 due to acute hypoxia. Hx of seizures on Keppra, is followed by Dr. Stark. Has left vocal cord paresis and is taking PO feeds at home. She is status post PDA ligation, bilateral pulmonary patch plasty with autologus pericaridum, VSD closure, PFO closure, transannular PA patch and monocusp valve placement on 2023. Her course has been prolonged and complicated by arrhytmia and need for amiodarone infusion.     Past medical history:  1. 8/21/23: PDA stent placement with decompensation onto [facility] ECMO   2. 8/22/23: Bedside angiography demonstrating patent PDA stent   3. 8/22/23: Right-sided clinical seizures noted  4. 8/23/23: Decannulation from [facility] ECMO, right internal jugular central line placed, repair of carotid artery.  5. 8/27/23: [facility]ubated to [facility]  6. 8/28/23: Transferred to general pediatric floor   7. 2023: PDA ligation, bilateral pulmonary patch plasty with autologus pericaridum, VSD closure, PFO closure, transannular PA patch and monocusp valve placement   8. 2023: Developed JET post op, amio load, and started on amio gtt  9. 10/25/23: Extubated  10. 10/28/23: Chest tubes removed  11. 11/1/23:  Atrial bigeminy and blocked PACs; amiodarone infusion increased            Current Medications:  Current Facility-Administered Medications   Medication Dose Route Frequency Provider Last Rate Last Admin   • [START ON 2023] levETIRAcetam ORAL (KepPRA) 100 MG/ML oral solution 50 mg  50 mg Oral Daily Jacey Khan MD       • [START ON 2023] furosemide ORAL (LASIX) oral solution 5 mg  5 mg Oral Daily Jacey Khan MD       • potassium CHLORIDE scott/ped oral liquid 4.4 mEq  1 mEq/kg (Dosing Weight) Oral Q4H PRN Jacey Khan MD   4.4 mEq at 23 1325   • cholecalciferol (VITAMIN D) 10 mcg (400 units)/mL oral liquid 10 mcg  10 mcg Oral Daily Jacey Khan MD   10 mcg at 23 0939   • pediatric multivitamin (POLY-VI-SOL) oral solution 1 mL  1 mL Oral Q24H Jacey Khan MD   1 mL at 23 1133   • [Held by provider] aspirin chewable 40.5 mg  40.5 mg Oral Daily Shasta Hidalgo, CNP       • HYDROcodone-acetaminophen 7.5-325 MG/15ML solution 0.45 mg  0.1 mg/kg (Dosing Weight) Oral Q6H PRN Allison Penny DO   0.45 mg at 10/30/23 2148   • acetaminophen (TYLENOL) 160 MG/5ML suspension 67.2 mg  15 mg/kg (Dosing Weight) Oral Q4H PRN Jacey Khan MD   67.2 mg at 23 0943   • AMIODarone (CORDARONE) 90 mg in dextrose 5 % 50 mL infusion  10 mcg/kg/min (Dosing Weight) Intravenous Continuous Diana José MD 1.5 mL/hr at 23 0759 10 mcg/kg/min at 23 0759   • calcium gluconate in D5W 220 mg /peds IVPB syringe  50 mg/kg (Dosing Weight) Intravenous Q1H PRN Jacey Khan MD 4.4 mL/hr at 10/28/23 0759 Rate Verify at 10/28/23 0759   • heparin 2 unit/mL in NaCL 0.9% solution  1 mL/hr Intravenous Continuous Amena Nunes MD 1 mL/hr at 23 0759 1 mL/hr at 23   • heparin 2 unit/mL in NaCL 0.9% solution  1 mL/hr Intra-arterial Continuous Amena Nunes MD 1 mL/hr at 23 0759 1 mL/hr at 23   • heparin (porcine) 10 UNIT/ML lock flush 20 Units  2 mL Intracatheter 2 times per day [place]  "Maternal Grandfather      Glaucoma Maternal Grandfather      Arthritis Paternal Grandmother      Respiratory Daughter         asthma     Macular Degeneration No family hx of      Deep Vein Thrombosis (DVT) No family hx of      Social History:  Social History     Socioeconomic History     Marital status: Single     Spouse name: None     Number of children: 2     Years of education: None     Highest education level: None   Occupational History     Employer: [employer]     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 No   Social History Narrative    Lives in [place], [place]        Social Documentation:        Balanced Diet: YES    Calcium intake: 1200 calcium per day    Caffeine: none per day    Exercise:  type of activity walking;3-4 times per week    Sunscreen: No    Seatbelts:  Yes    Self Breast Exam:  Yes    Physical/Emotional/Sexual Abuse: No     Do you feel safe in your environment? Yes        Cholesterol screen up to date: Yes-2005    Eye Exam up to date: Yes    Dental Exam up to date: Yes    Pap smear up to date: Yes    Mammogram up to date: NO, but want a Mammogram    Dexa Scan up to date: Does Not Apply    Colonoscopy up to date: Does Not Apply    [facility] date[facility]    Glucose screen if over 40:  NO        Aislinn Wilson MA     Medications / Allergies:  Reviewed in EMR.    --------------------    Physical Exam:  VS: /75 (BP Location: Right arm)   Pulse 85   Resp 16   Ht 1.575 m (5' 2\")   Wt 114.3 kg (252 lb)   LMP 08/13/2005   SpO2 95%   BMI 46.09 kg/m    GEN: Well appearing.    Labs / Imaging / Path:  We  reviewed past " CBC, CMP, SPEP, FLC assay.      Again, thank you for allowing me to participate in the care of your patient.        Sincerely,        Geremias Dennis MD   WILMA Lama       • heparin (porcine) 10 UNIT/ML lock flush 20 Units  2 mL Intracatheter PRN Daina Pichardo APNP       • magnesium sulfate 220 mg in dextrose 5% /peds IV syringe  50 mg/kg (Dosing Weight) Intravenous Q4H PRN Daina Pichardo APNP 5.5 mL/hr at 10/31/23 1811 220 mg at 10/31/23 1811   • heparin (porcine) 2,500 units/25 mL in dextrose 5 % infusion syringe  10 Units/kg/hr (Dosing Weight) Intravenous Continuous Daina Pichardo APNP 0.44 mL/hr at 23 0759 10 Units/kg/hr at 23 0759       Physical examination:  Vitals:    23 1100 23 1145 23 1200 23 1300   BP:       BP Location:       Patient Position:       Pulse: 130 130 131 130   Resp: 37 42 (!) 30 (!) 87   Temp:   97.3 °F (36.3 °C)    TempSrc:   Temporal    SpO2: 97% 98% 97% 90%   Weight:       Height:       HC:         GEN: NAD, pale pink, looking around room and interactive.   HEENT: Atraumatic, normocephalic, anterior fontanelle is soft, open, and flat. Normal appearing facies. Moist mucous membranes. Normal appearing eyes with no discharge or scleral erythema. Normal appearing outer ears and nose.   Chest: No bulging precordium, no thoracic asymmetry. Median sternotomy appreciated, chest tubes in place.   Respiratory: No respiratory distress, no retractions, good air movement throughout, no wheezes, no crackles.  Cardiovascular: Pacing  at time of asessment,no murmur appreciated, no thrill, no heave. Upper and lower extremity pulses were all 2+. Capillary refill was < 2 seconds.   Abdomen: Soft, nontender, nondistended, liver edge palpable 1 cm below the costal margin.  Musculoskeletal:Moving all extremities well.   Neurologic: looking around room, cooing.   Skin: No rash. Warm to touch.     Pertinent imaging studies reviewed:  CXR: 10/31/23:      Echo 10/31/23:  SUMMARY:  1.  Mildly diminished right ventricular systolic function  2.  Normal left ventricular systolic function, LVEF 78%  3.  Mild  pulmonary valve insufficiency, no stenosis  4.  Moderate bilateral pulmonary artery stenosis, peak 42-48 mmHg, mean 24-27 mmHg, mild interval increased gradients.  Pulmonary arteries measure less than 2.5 mm bilaterally.  5.  No residual VSD or PFO.  6.  No clots or effusions seen     EKG: 10/30/23:          ASSESSMENT:  Wong is a 3 month old female with history of PA, large VSD, PFO, PDA s/p PDA stent with shunt thrombosis requiring ECPR in Aug. Hx of seizures on Keppra. Has left vocal cord paresis. She is status post PDA ligation, bilateral pulmonary patch plasty with autologus pericaridum, VSD closure, PFO closure, transannular PA patch and monocusp valve placement on 10/23/23. Operative course complicated by JET no PACs and biventricular cardiac dysfunction (now improved function) requiring amiodarone and failing weans of the continuous infusion. Has continued to wean from other cardiac support well without concern.     RECOMMENDATIONS:  1. Continue current PCICU management.   2. No family at bedside X 2 when went to evaluate patient.   3. Will continue to follow and see as outpatient in the High Risk clinic. Will provide family with cardiology referrals if they want to see a cardiologist closer to home once recovered and we have seen post op as she has had complete cardiac repair.     Recommendations discussed with primary team and parents. Thank you for allowing us participate in the care of this patient.    30 minutes were spent face to face in patient care with > 50% time spent with care coordination.    Melany Arango, CNP  Single Ventricle / High Risk Clinic  [facility]

## 2024-06-19 NOTE — PROGRESS NOTES
[facility] Hematology / Oncology  Progress Note  Name: Adelaida Packer  :  1970  MRN:  [id number]    --------------------    Assessment / Plan:  IgG lambda MGUS.  CKD.  Status post parathyroidectomy.    Continue observation.  Stable myeloma markers.  Blood counts stable.  CKD stable.  Mild hypercalcemia being monitored by nephrology.  RTC 12 months w/ labs 7 days prior to visit (CBC, CMP, SPEP, FLC).    Thank you kindly for this consultation.  Geremias Dennis MD    --------------------    Interval History:  Adelaida returns for follow-up of MGUS.  All in all, she is feeling really well.  Looking forward to trips to [facility] and [place].  Working on antihypertensives w/ nephrology.  Health is good.  Energy is good.  No immune system concerns.  No unexplained sweats or fevers.  No neuropathy.  No easy bruising.    --------------------    Review of Systems:  10 point ROS negative except for that above.    Family History:  Family History   Problem Relation Age of Onset    Hypertension Mother     Gynecology Mother         hysterectomy    Gastrointestinal Disease Mother         bowel upstruction    Thyroid Disease Mother     Neurologic Disorder Mother     Osteoporosis Mother     Anesthesia Reaction Mother         migraines    Hypertension Father     Lipids Father     Arthritis Father     Heart Disease Father     Diabetes Sister     Cerebrovascular Disease Sister     Prostate Cancer Brother     Alcohol/Drug Brother     Circulatory Brother     Cancer Maternal Grandmother         tumor-head    Obesity Maternal Grandmother     Cancer Maternal Grandfather         bone    Eye Disorder Maternal Grandfather     Prostate Cancer Maternal Grandfather     Glaucoma Maternal Grandfather     Arthritis Paternal Grandmother     Respiratory Daughter         asthma    Macular Degeneration No family hx of     Deep Vein Thrombosis (DVT) No family hx of      Social History:  Social History     Socioeconomic History     "Marital status: Single     Spouse name: None    Number of children: 2    Years of education: None    Highest education level: None   Occupational History     Employer: [email or web address]     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 No   Social History Narrative    Lives in [place], [place]        Social Documentation:        Balanced Diet: YES    Calcium intake: 1200 calcium per day    Caffeine: none per day    Exercise:  type of activity walking;3-4 times per week    Sunscreen: No    Seatbelts:  Yes    Self Breast Exam:  Yes    Physical/Emotional/Sexual Abuse: No     Do you feel safe in your environment? Yes        Cholesterol screen up to date: Yes-2005    Eye Exam up to date: Yes    Dental Exam up to date: Yes    Pap smear up to date: Yes    Mammogram up to date: NO, but want a Mammogram    Dexa Scan up to date: Does Not Apply    Colonoscopy up to date: Does Not Apply    [facility] up to date: [facility]    Glucose screen if over 40:  NO        Aislinn Wilson MA     Medications / Allergies:  Reviewed in EMR.    --------------------    Physical Exam:  VS: /75 (BP Location: Right arm)   Pulse 85   Resp 16   Ht 1.575 m (5' 2\")   Wt 114.3 kg (252 lb)   LMP 08/13/2005   SpO2 95%   BMI 46.09 kg/m    GEN: Well appearing.    Labs / Imaging / Path:  We  reviewed past CBC, CMP, SPEP, FLC assay.  "

## 2024-06-19 NOTE — NURSING NOTE
"Oncology Rooming Note    June 19, 2024 3:12 PM   Adelaida Packer is a 54 year old female who presents for:    Chief Complaint   Patient presents with    Oncology Clinic Visit     Initial Vitals: /75 (BP Location: Right arm)   Pulse 85   Resp 16   Ht 1.575 m (5' 2\")   Wt 114.3 kg (252 lb)   LMP 08/13/2005   SpO2 95%   BMI 46.09 kg/m   Estimated body mass index is 46.09 kg/m  as calculated from the following:    Height as of this encounter: 1.575 m (5' 2\").    Weight as of this encounter: 114.3 kg (252 lb). Body surface area is 2.24 meters squared.  No Pain (0) Comment: Data Unavailable   Patient's last menstrual period was 08/13/2005.  Allergies reviewed: Yes  Medications reviewed: Yes    Medications: Medication refills not needed today.  Pharmacy name entered into [facility]:    [facility] - [place], [place] - [address]  [id number] - [place], [place] - [address] [facility] - [place], [place] - [address] [place] [address] AVE     Frailty Screening:   Is the patient here for a new oncology consult visit in cancer care? 2. No      Clinical concerns: No Concerns        Shyla Carter MA            "

## 2024-06-24 ENCOUNTER — VIRTUAL VISIT (OUTPATIENT)
Dept: ENDOCRINOLOGY | Facility: CLINIC | Age: 54
End: 2024-06-24

## 2024-06-24 DIAGNOSIS — E66.3 OVERWEIGHT: Primary | ICD-10-CM

## 2024-06-24 PROCEDURE — 99207 PR NO CHARGE LOS: CPT | Mod: 95

## 2024-06-24 NOTE — PROGRESS NOTES
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      GOALS: JUNE 24:  1.) I take walks daily (consider getting a new fitbit; getting my bike ready)  2.) 64 oz of water daily  3.) Incorporate fruit and veggies and prioritize protein: I make healthy choices when eating out  4.) Mindset for selling dad's house; This can go smoothly  5.) Metta Lovingkindness prayer for loved ones; I do not have to hold their burden/worries  6.) Word of the Year: \"LIGHTNESS\" ( I am light in mind body and spirit) What does this look and feel like?    Carrie Ness  [facility], National Board Certified Health &   Lead Health   [facility]  [email or web address]   To schedule: [phone]   Gender pronouns: she/her     "

## 2024-06-24 NOTE — LETTER
"6/24/2024       RE: Adelaida Packer  [address] [place] [address]     Dear Colleague,    Thank you for referring your patient, Adelaida Packer, to the [facility] [place] [facility]. Please see a copy of my visit note below.    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      GOALS: JUNE 24:  1.) I take walks daily (consider getting a new fitbit; getting my bike ready)  2.) 64 oz of water daily  3.) Incorporate fruit and veggies and prioritize protein: I make healthy choices when eating out  4.) Mindset for selling dad's house; This can go smoothly  5.) Metta Lovingkindness prayer for loved ones; I do not have to hold their burden/worries  6.) Word of the Year: \"LIGHTNESS\" ( I am light in mind body and spirit) What does this look and feel like?    Carrie Ness  [facility], National Board Certified Health &   Lead Health   [facility]  [email or web address]   To schedule: [phone]   Gender pronouns: she/her     "

## 2024-07-11 ENCOUNTER — MYC MEDICAL ADVICE (OUTPATIENT)
Dept: FAMILY MEDICINE | Facility: CLINIC | Age: 54
End: 2024-07-11
Payer: COMMERCIAL

## 2024-07-11 NOTE — TELEPHONE ENCOUNTER
Appt scheduled at [facility] location with Dr. Stratton due to pt wanting a sooner virtual then 15th with Rand

## 2024-07-12 ENCOUNTER — VIRTUAL VISIT (OUTPATIENT)
Dept: FAMILY MEDICINE | Facility: CLINIC | Age: 54
End: 2024-07-12
Payer: COMMERCIAL

## 2024-07-12 DIAGNOSIS — J01.90 ACUTE NON-RECURRENT SINUSITIS, UNSPECIFIED LOCATION: ICD-10-CM

## 2024-07-12 DIAGNOSIS — U07.1 INFECTION DUE TO 2019 NOVEL CORONAVIRUS: Primary | ICD-10-CM

## 2024-07-12 PROCEDURE — 99213 OFFICE O/P EST LOW 20 MIN: CPT | Mod: 95 | Performed by: PHYSICIAN ASSISTANT

## 2024-07-12 RX ORDER — CEFDINIR 300 MG/1
300 CAPSULE ORAL 2 TIMES DAILY
Qty: 14 CAPSULE | Refills: 0 | Status: SHIPPED | OUTPATIENT
Start: 2024-07-12 | End: 2024-07-19

## 2024-07-12 ASSESSMENT — ENCOUNTER SYMPTOMS
SHORTNESS OF BREATH: 0
WHEEZING: 0
CHILLS: 0
WEAKNESS: 0
FEVER: 1
HEADACHES: 1
VOMITING: 0
COUGH: 1
MYALGIAS: 1
DIAPHORESIS: 0
DIARRHEA: 0
SINUS PRESSURE: 1
SORE THROAT: 1
ABDOMINAL PAIN: 1
FATIGUE: 1
APPETITE CHANGE: 1

## 2024-07-12 NOTE — PROGRESS NOTES
Adelaida is a 54 year old who is being evaluated via a billable video visit.    How would you like to obtain your AVS? MyChart  If the video visit is dropped, the invitation should be resent by: Text to cell phone: [phone]  Will anyone else be joining your video visit? No      Assessment & Plan     Acute non-recurrent sinusitis, unspecified location  Infection due to 2019 novel coronavirus  Patient is a 54-year-old female who presents to virtual visit due to symptoms of COVID-19 ongoing for 6 days with positive test yesterday.  No signs of respiratory distress-patient able to speak in full sentences.  Unfortunately, patient is outside of the window for treatment of COVID-19 with Paxlovid.  Fortunately, patient symptoms are improving over the last few days and should continue to improve.  Discussed treatment and recommended continuing Eulalia-Eden cold/flu, Tylenol, and sinus rinse.  Patient notes significant sinusitis symptoms.  Shared decision making completed and patient will have antibiotic on hand in case symptoms do not improve by day 10.  Patient has a trip upcoming next week.  As long as patient is feeling better, she plans to travel.  Recommended Afrin if needed for any lingering nasal congestion or ear congestion. Follow-up precautions provided.  - cefdinir (OMNICEF) 300 MG capsule; Take 1 capsule (300 mg) by mouth 2 times daily for 7 days      See Patient Instructions    Subjective   Adelaida is a 54 year old, presenting for the following health issues:  Covid Concern        7/12/2024     8:56 AM   Additional Questions   Roomed by Megan HUNTER         7/12/2024     8:56 AM   Patient Reported Additional Medications   Patient reports taking the following new medications Tylenol, sinus rinse     History of Present Illness       Reason for visit:  Covid Concern (at a convention in [place] 6/30/24-7/7/24)  Symptom onset:  1-2 weeks ago  Symptoms include:  Body Aches, cough/nose drainage, sore throat, fever (last  "week), headache,  Symptom intensity:  Moderate  Symptom progression:  Improving  Had these symptoms before:  No  What makes it worse:  No  What makes it better:  Rest    She eats 2-3 servings of fruits and vegetables daily.She consumes 0 sweetened beverage(s) daily.She exercises with enough effort to increase her heart rate 20 to 29 minutes per day.  She exercises with enough effort to increase her heart rate 4 days per week.   She is taking medications regularly.     Patient was at convention last week. Last Saturday she started feeling unwell. She came home the next day. Patient developed congestion, cough, nasal drainage, fatigue. Patient COVID19 tested every day and finally tested positive yesterday.   Patient is using Sinus rinse, Tylenol, Alkaseltzer cold and flu       Review of Systems   Constitutional:  Positive for appetite change, fatigue and fever. Negative for chills and diaphoresis.   HENT:  Positive for congestion, sinus pressure (improving) and sore throat. Negative for ear pain.    Respiratory:  Positive for cough. Negative for shortness of breath and wheezing.    Cardiovascular:  Negative for chest pain.   Gastrointestinal:  Positive for abdominal pain (cramping). Negative for diarrhea and vomiting.   Musculoskeletal:  Positive for myalgias.   Neurological:  Positive for headaches. Negative for weakness.           Objective    Vitals - Patient Reported  Weight (Patient Reported): 112.5 kg (248 lb)  Height (Patient Reported): 157.5 cm (5' 2\")  BMI (Based on Pt Reported Ht/Wt): 45.36  Temperature (Patient Reported): 97  F (36.1  C)  Pain Score: Moderate Pain (5)  Pain Loc: Other - see comment (whole body)        Physical Exam   GENERAL: alert and no distress  EYES: Eyes grossly normal to inspection.  No discharge or erythema, or obvious scleral/conjunctival abnormalities.  RESP: No audible wheeze, cough, or visible cyanosis.    SKIN: Visible skin clear. No significant rash, abnormal pigmentation or " lesions.  NEURO: Cranial nerves grossly intact.  Mentation and speech appropriate for age.  PSYCH: Appropriate affect, tone, and pace of words          Video-Visit Details    Type of service:  Video Visit   Originating Location (pt. Location): Home    Distant Location (provider location):  On-site  Platform used for Video Visit: Delmi  Signed Electronically by: Aishwarya Stratton PA-C

## 2024-07-12 NOTE — PATIENT INSTRUCTIONS
You symptoms are most consistent with COVID19.  Unfortunately, you are past the treatment window for COVID-19 treatment, but I do think your symptoms will continue to improve from here.  If your symptoms have not improved by day 10, then you can go ahead and start the prescribed antibiotic to treat for a sinus infection.  In the meantime, you can continue to use Tylenol, Eulalia-Spencer cold and flu, and sinus rinse.  Please follow the CDC guidelines for returning to regular activities.  With upcoming travel, I would recommend purchasing over-the-counter Afrin nasal spray which you can use 30 minutes before flying and during the flight if needed for any nasal or ear congestion.  Reach out with questions or concerns.

## 2024-07-22 ENCOUNTER — LAB (OUTPATIENT)
Dept: LAB | Facility: CLINIC | Age: 54
End: 2024-07-22
Payer: COMMERCIAL

## 2024-07-22 DIAGNOSIS — E11.65 UNCONTROLLED TYPE 2 DIABETES MELLITUS WITH HYPERGLYCEMIA (H): Primary | ICD-10-CM

## 2024-07-22 DIAGNOSIS — D47.2 MGUS (MONOCLONAL GAMMOPATHY OF UNKNOWN SIGNIFICANCE): ICD-10-CM

## 2024-07-22 DIAGNOSIS — E83.52 HYPERCALCEMIA: ICD-10-CM

## 2024-07-22 DIAGNOSIS — N18.2 CHRONIC KIDNEY DISEASE, STAGE 2 (MILD): ICD-10-CM

## 2024-07-22 LAB
ALBUMIN SERPL BCG-MCNC: 4.2 G/DL (ref 3.5–5.2)
ALP SERPL-CCNC: 56 U/L (ref 40–150)
ALT SERPL W P-5'-P-CCNC: 25 U/L (ref 0–50)
ANION GAP SERPL CALCULATED.3IONS-SCNC: 11 MMOL/L (ref 7–15)
AST SERPL W P-5'-P-CCNC: 25 U/L (ref 0–45)
BASOPHILS # BLD AUTO: 0 10E3/UL (ref 0–0.2)
BASOPHILS NFR BLD AUTO: 0 %
BILIRUB SERPL-MCNC: 0.2 MG/DL
BUN SERPL-MCNC: 16.3 MG/DL (ref 6–20)
CALCIUM SERPL-MCNC: 9.9 MG/DL (ref 8.8–10.4)
CHLORIDE SERPL-SCNC: 105 MMOL/L (ref 98–107)
CREAT SERPL-MCNC: 1.23 MG/DL (ref 0.51–0.95)
EGFRCR SERPLBLD CKD-EPI 2021: 52 ML/MIN/1.73M2
EOSINOPHIL # BLD AUTO: 0.3 10E3/UL (ref 0–0.7)
EOSINOPHIL NFR BLD AUTO: 6 %
ERYTHROCYTE [DISTWIDTH] IN BLOOD BY AUTOMATED COUNT: 13.2 % (ref 10–15)
GLUCOSE SERPL-MCNC: 105 MG/DL (ref 70–99)
HCO3 SERPL-SCNC: 23 MMOL/L (ref 22–29)
HCT VFR BLD AUTO: 38.1 % (ref 35–47)
HGB BLD-MCNC: 12.9 G/DL (ref 11.7–15.7)
IMM GRANULOCYTES # BLD: 0 10E3/UL
IMM GRANULOCYTES NFR BLD: 0 %
LYMPHOCYTES # BLD AUTO: 2.4 10E3/UL (ref 0.8–5.3)
LYMPHOCYTES NFR BLD AUTO: 48 %
MCH RBC QN AUTO: 29.9 PG (ref 26.5–33)
MCHC RBC AUTO-ENTMCNC: 33.9 G/DL (ref 31.5–36.5)
MCV RBC AUTO: 88 FL (ref 78–100)
MONOCYTES # BLD AUTO: 0.4 10E3/UL (ref 0–1.3)
MONOCYTES NFR BLD AUTO: 8 %
NEUTROPHILS # BLD AUTO: 2 10E3/UL (ref 1.6–8.3)
NEUTROPHILS NFR BLD AUTO: 38 %
PLATELET # BLD AUTO: 198 10E3/UL (ref 150–450)
POTASSIUM SERPL-SCNC: 4.4 MMOL/L (ref 3.4–5.3)
PROT SERPL-MCNC: 7.5 G/DL (ref 6.4–8.3)
RBC # BLD AUTO: 4.32 10E6/UL (ref 3.8–5.2)
SODIUM SERPL-SCNC: 139 MMOL/L (ref 135–145)
TOTAL PROTEIN SERUM FOR ELP: 7.2 G/DL (ref 6.4–8.3)
WBC # BLD AUTO: 5.1 10E3/UL (ref 4–11)

## 2024-07-22 PROCEDURE — 36415 COLL VENOUS BLD VENIPUNCTURE: CPT

## 2024-07-23 LAB
ALBUMIN SERPL ELPH-MCNC: 4.2 G/DL (ref 3.7–5.1)
ALPHA1 GLOB SERPL ELPH-MCNC: 0.3 G/DL (ref 0.2–0.4)
ALPHA2 GLOB SERPL ELPH-MCNC: 0.6 G/DL (ref 0.5–0.9)
B-GLOBULIN SERPL ELPH-MCNC: 0.8 G/DL (ref 0.6–1)
GAMMA GLOB SERPL ELPH-MCNC: 1.4 G/DL (ref 0.7–1.6)
KAPPA LC FREE SER-MCNC: 2.58 MG/DL (ref 0.33–1.94)
KAPPA LC FREE/LAMBDA FREE SER NEPH: 0.24 {RATIO} (ref 0.26–1.65)
LAMBDA LC FREE SERPL-MCNC: 10.66 MG/DL (ref 0.57–2.63)
LOCATION OF TASK: ABNORMAL
M PROTEIN SERPL ELPH-MCNC: 0.5 G/DL
PROT PATTERN SERPL ELPH-IMP: ABNORMAL

## 2024-07-24 DIAGNOSIS — D47.2 MGUS (MONOCLONAL GAMMOPATHY OF UNKNOWN SIGNIFICANCE): Primary | ICD-10-CM

## 2024-07-24 DIAGNOSIS — E83.52 HYPERCALCEMIA: ICD-10-CM

## 2024-07-24 DIAGNOSIS — N18.2 CHRONIC KIDNEY DISEASE, STAGE 2 (MILD): ICD-10-CM

## 2024-07-24 NOTE — PROGRESS NOTES
Orders placed to replace tests drawn too soon on 7/22/2024 - Due June 2025.    Madie Mosquera RN

## 2024-07-25 DIAGNOSIS — N18.30 CKD (CHRONIC KIDNEY DISEASE) STAGE 3, GFR 30-59 ML/MIN (H): Primary | ICD-10-CM

## 2024-07-26 ENCOUNTER — MYC MEDICAL ADVICE (OUTPATIENT)
Dept: NEPHROLOGY | Facility: CLINIC | Age: 54
End: 2024-07-26

## 2024-07-26 ENCOUNTER — LAB (OUTPATIENT)
Dept: LAB | Facility: CLINIC | Age: 54
End: 2024-07-26
Payer: COMMERCIAL

## 2024-07-26 DIAGNOSIS — N18.30 CKD (CHRONIC KIDNEY DISEASE) STAGE 3, GFR 30-59 ML/MIN (H): ICD-10-CM

## 2024-07-26 DIAGNOSIS — E11.65 UNCONTROLLED TYPE 2 DIABETES MELLITUS WITH HYPERGLYCEMIA (H): Primary | ICD-10-CM

## 2024-07-26 LAB
ALBUMIN MFR UR ELPH: <6 MG/DL
ALBUMIN SERPL BCG-MCNC: 4.4 G/DL (ref 3.5–5.2)
ALBUMIN UR-MCNC: NEGATIVE MG/DL
ANION GAP SERPL CALCULATED.3IONS-SCNC: 9 MMOL/L (ref 7–15)
APPEARANCE UR: CLEAR
BACTERIA #/AREA URNS HPF: ABNORMAL /HPF
BILIRUB UR QL STRIP: NEGATIVE
BUN SERPL-MCNC: 17.2 MG/DL (ref 6–20)
CALCIUM SERPL-MCNC: 10.2 MG/DL (ref 8.8–10.4)
CHLORIDE SERPL-SCNC: 106 MMOL/L (ref 98–107)
CHOLEST SERPL-MCNC: 138 MG/DL
COLOR UR AUTO: YELLOW
CREAT SERPL-MCNC: 1.34 MG/DL (ref 0.51–0.95)
CREAT UR-MCNC: 94.9 MG/DL
EGFRCR SERPLBLD CKD-EPI 2021: 47 ML/MIN/1.73M2
FASTING STATUS PATIENT QL REPORTED: NO
GLUCOSE SERPL-MCNC: 111 MG/DL (ref 70–99)
GLUCOSE UR STRIP-MCNC: 500 MG/DL
HCO3 SERPL-SCNC: 26 MMOL/L (ref 22–29)
HDLC SERPL-MCNC: 65 MG/DL
HGB BLD-MCNC: 13.4 G/DL (ref 11.7–15.7)
HGB UR QL STRIP: NEGATIVE
KETONES UR STRIP-MCNC: NEGATIVE MG/DL
LDLC SERPL CALC-MCNC: 47 MG/DL
LEUKOCYTE ESTERASE UR QL STRIP: NEGATIVE
NITRATE UR QL: NEGATIVE
NONHDLC SERPL-MCNC: 73 MG/DL
PH UR STRIP: 6 [PH] (ref 5–7)
PHOSPHATE SERPL-MCNC: 3.5 MG/DL (ref 2.5–4.5)
POTASSIUM SERPL-SCNC: 4.8 MMOL/L (ref 3.4–5.3)
PROT/CREAT 24H UR: NORMAL MG/G{CREAT}
PTH-INTACT SERPL-MCNC: 47 PG/ML (ref 15–65)
RBC #/AREA URNS AUTO: ABNORMAL /HPF
SODIUM SERPL-SCNC: 141 MMOL/L (ref 135–145)
SP GR UR STRIP: 1.02 (ref 1–1.03)
SQUAMOUS #/AREA URNS AUTO: ABNORMAL /LPF
TRIGL SERPL-MCNC: 130 MG/DL
UROBILINOGEN UR STRIP-ACNC: 0.2 E.U./DL
WBC #/AREA URNS AUTO: ABNORMAL /HPF

## 2024-07-26 PROCEDURE — 83970 ASSAY OF PARATHORMONE: CPT

## 2024-07-26 PROCEDURE — 84156 ASSAY OF PROTEIN URINE: CPT

## 2024-07-26 PROCEDURE — 80069 RENAL FUNCTION PANEL: CPT

## 2024-07-26 PROCEDURE — 81001 URINALYSIS AUTO W/SCOPE: CPT

## 2024-07-26 PROCEDURE — 80061 LIPID PANEL: CPT

## 2024-07-26 PROCEDURE — 36415 COLL VENOUS BLD VENIPUNCTURE: CPT

## 2024-07-26 PROCEDURE — 85018 HEMOGLOBIN: CPT

## 2024-07-30 ENCOUNTER — VIRTUAL VISIT (OUTPATIENT)
Dept: NEPHROLOGY | Facility: CLINIC | Age: 54
End: 2024-07-30
Payer: COMMERCIAL

## 2024-07-30 VITALS — WEIGHT: 252 LBS | HEIGHT: 62 IN | BODY MASS INDEX: 46.38 KG/M2

## 2024-07-30 DIAGNOSIS — E26.09 PRIMARY HYPERALDOSTERONISM (H): ICD-10-CM

## 2024-07-30 DIAGNOSIS — E11.69 TYPE 2 DIABETES MELLITUS WITH OTHER SPECIFIED COMPLICATION, UNSPECIFIED WHETHER LONG TERM INSULIN USE (H): ICD-10-CM

## 2024-07-30 DIAGNOSIS — N18.31 STAGE 3A CHRONIC KIDNEY DISEASE (H): Primary | ICD-10-CM

## 2024-07-30 DIAGNOSIS — I12.9 HYPERTENSION, RENAL: ICD-10-CM

## 2024-07-30 PROCEDURE — 99214 OFFICE O/P EST MOD 30 MIN: CPT | Mod: 95 | Performed by: INTERNAL MEDICINE

## 2024-07-30 ASSESSMENT — PAIN SCALES - GENERAL: PAINLEVEL: NO PAIN (0)

## 2024-07-30 NOTE — PROGRESS NOTES
Virtual Visit Details    Type of service:  Video Visit     Originating Location (pt. Location): Home    Distant Location (provider location):  Off-site  Platform used for Video Visit: Delmi    7/30/24  CC: HTN and CKD    HPI: Adelaida Packer is a 54 year old female who presents for follow-up of HTN/CKD.  Has some mild CKD which is felt to be related to hypertension as well as likely some effects from hypercalcemia in the past. Her hx includes hyperparathyroidism, primary for which she underwent parathyroidectomy on 3/21/13. Following that surgery, she did have a complication related to a hematoma but we have seen many benefits of her parathyroidectomy - blood pressure improved as well as kidney function.     03/28/23: video visit. Now on Continuous glucose monitor. Much improved the past few weeks. No recent home BP readings. Now going back to the gym to be more active - water aerobics, biking.    03/26/24: video visit.  On mounjaro and farxiga. BP has been good. Has lost 26 lbs. No calcium supplements. Feeling good. Seeing PCP in April. At times, tingling in her legs. No tums/rolaids. On pepcid. No swelling. Feeling well overall. No NSAIDs. 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     07/30/24: video visit. Has been traveling this summer - covid infection - has upcoming [place]. This /87. 2 weeks ago ran out of amlodipine - stopped taking it. Weight loss of 11 lbs since Jan. Has been consistently less than 130/80. Changing jobs has also decreased her stress level. Not a lot of BP readings lately. 121/75 In May. AT times she can feel dehydrated. She is working on getting good water intake.        Current Outpatient Medications   Medication Sig Dispense Refill    amoxicillin (AMOXIL) 500 MG capsule Take 4 caps (2000mg) 30-60 minutes before dental procedure 4 capsule 0    acetaminophen (TYLENOL) 325 MG tablet Take 2 tablets (650 mg) by mouth every 4 hours  as needed for other (mild pain) 100 tablet 0    aspirin (ASA) 81 MG EC tablet Take 1 tablet (81 mg) by mouth daily 90 tablet 3    atorvastatin (LIPITOR) 10 MG tablet Take 1 tablet (10 mg) by mouth every evening 90 tablet 3    blood glucose (NO BRAND SPECIFIED) lancets standard Use to test blood sugar 2 times daily or as directed. 200 lancet 3    blood glucose (NO BRAND SPECIFIED) lancets standard Use to test blood sugar 1-2 times daily or as directed. 200 each 3    blood glucose (NO BRAND SPECIFIED) test strip Use to test blood sugar 2 times daily or as directed. 200 strip 3    blood glucose (NO BRAND SPECIFIED) test strip Use to test blood sugar 2-3 times daily or as directed. 200 strip 3    blood glucose monitoring (NO BRAND SPECIFIED) meter device kit Use to test blood sugar 1-2 times daily or as directed. 1 kit 0    cetirizine (ZYRTEC) 10 MG tablet Take 10 mg by mouth daily as needed for allergies      cholecalciferol 2000 UNITS CAPS Take 2,000 Units by mouth daily (Patient not taking: Reported on 7/12/2024) 90 capsule 3    dapagliflozin (FARXIGA) 10 MG TABS tablet Take 1 tablet (10 mg) by mouth daily 90 tablet 3    eplerenone (INSPRA) 50 MG tablet Take 2 tablets (100 mg) by mouth 2 times daily 360 tablet 3    famotidine (PEPCID) 20 MG tablet Take 1 tablet (20 mg) by mouth 2 times daily 180 tablet 1    Fluocinolone Acetonide Scalp (DERMA-SMOOTHE/FS SCALP) 0.01 % OIL oil Apply nightly to the scalp for the 6 weeks (Patient not taking: Reported on 7/12/2024) 60 mL 1    fluticasone (FLONASE) 50 MCG/ACT nasal spray INSTILL 1 SPRAY INTO BOTH NOSTRILS DAILY (Patient not taking: Reported on 7/12/2024) 16 mL 1    labetalol (NORMODYNE) 300 MG tablet Take 1 tablet (300 mg) by mouth 2 times daily 10 tablet 0    ondansetron (ZOFRAN ODT) 4 MG ODT tab DISSOLVE ONE TABLET BY MOUTH EVERY 8 HOURS AS NEEDED FOR NAUSEA AND VOMITING 30 tablet 0    polyethylene glycol (MIRALAX) 17 g packet Take 17 g by mouth daily 7 packet 0     SUMAtriptan (IMITREX) 25 MG tablet Profile rx,TAKE 1 TO 2 TABLETS BY MOUTH AT ONSET OF HEADACHE FOR MIGRAINE. MAY REPEAT DOSE IN 2 HOURS. MAX OF 200MG OR 2 DOSES IN 24 HOURS 18 tablet 1    tirzepatide (MOUNJARO) 15 MG/0.5ML pen Inject 15 mg Subcutaneous once a week 6 mL 1    traZODone (DESYREL) 50 MG tablet Take 1-2 tablets ( mg) by mouth nightly as needed for sleep (Patient not taking: Reported on 7/12/2024) 180 tablet 1     Exam:  Gen - alert and oriented, appears comfortable.    Result  No visits with results within 1 Day(s) from this visit.   Latest known visit with results is:   Lab on 07/26/2024   Component Date Value Ref Range Status    Cholesterol 07/26/2024 138  <200 mg/dL Final    Triglycerides 07/26/2024 130  <150 mg/dL Final    Direct Measure HDL 07/26/2024 65  >=50 mg/dL Final    LDL Cholesterol Calculated 07/26/2024 47  <=100 mg/dL Final    Non HDL Cholesterol 07/26/2024 73  <130 mg/dL Final    Patient Fasting > 8hrs? 07/26/2024 No   Final    Hemoglobin 07/26/2024 13.4  11.7 - 15.7 g/dL Final    Color Urine 07/26/2024 Yellow  Colorless, Straw, Light Yellow, Yellow Final    Appearance Urine 07/26/2024 Clear  Clear Final    Glucose Urine 07/26/2024 500 (A)  Negative mg/dL Final    Bilirubin Urine 07/26/2024 Negative  Negative Final    Ketones Urine 07/26/2024 Negative  Negative mg/dL Final    Specific Gravity Urine 07/26/2024 1.025  1.003 - 1.035 Final    Blood Urine 07/26/2024 Negative  Negative Final    pH Urine 07/26/2024 6.0  5.0 - 7.0 Final    Protein Albumin Urine 07/26/2024 Negative  Negative mg/dL Final    Urobilinogen Urine 07/26/2024 0.2  0.2, 1.0 E.U./dL Final    Nitrite Urine 07/26/2024 Negative  Negative Final    Leukocyte Esterase Urine 07/26/2024 Negative  Negative Final    Sodium 07/26/2024 141  135 - 145 mmol/L Final    Potassium 07/26/2024 4.8  3.4 - 5.3 mmol/L Final    Chloride 07/26/2024 106  98 - 107 mmol/L Final    Carbon Dioxide (CO2) 07/26/2024 26  22 - 29 mmol/L Final     Anion Gap 07/26/2024 9  7 - 15 mmol/L Final    Glucose 07/26/2024 111 (H)  70 - 99 mg/dL Final    Urea Nitrogen 07/26/2024 17.2  6.0 - 20.0 mg/dL Final    Creatinine 07/26/2024 1.34 (H)  0.51 - 0.95 mg/dL Final    GFR Estimate 07/26/2024 47 (L)  >60 mL/min/1.73m2 Final    eGFR calculated using 2021 CKD-EPI equation.    Calcium 07/26/2024 10.2  8.8 - 10.4 mg/dL Final    Reference intervals for this test were updated on 7/16/2024 to reflect our healthy population more accurately. There may be differences in the flagging of prior results with similar values performed with this method. Those prior results can be interpreted in the context of the updated reference intervals.    Albumin 07/26/2024 4.4  3.5 - 5.2 g/dL Final    Phosphorus 07/26/2024 3.5  2.5 - 4.5 mg/dL Final    Total Protein Urine mg/dL 07/26/2024 <6.0    mg/dL Final    The reference ranges have not been established in urine protein. The results should be integrated into the clinical context for interpretation.    Total Protein Urine mg/mg Creat 07/26/2024    Final    Unable to calculate, urine creatinine or protein is outside the detectable limits.    Creatinine Urine mg/dL 07/26/2024 94.9  mg/dL Final    The reference ranges have not been established in urine creatinine. The results should be integrated into the clinical context for interpretation.    Parathyroid Hormone Intact 07/26/2024 47  15 - 65 pg/mL Final    Bacteria Urine 07/26/2024 None Seen  None Seen /HPF Final    RBC Urine 07/26/2024 None Seen  0-2 /HPF /HPF Final    WBC Urine 07/26/2024 None Seen  0-5 /HPF /HPF Final    Squamous Epithelials Urine 07/26/2024 Few (A)  None Seen /LPF Final                Assessment/Plan:  1. Hypertension: aldosterone level has been high on evaluation by Dr. Quintanilla with a low renin. In the past I was suspicious for hyperaldosteronism as well but CT scan was without adrenal adenoma appreciated. On potassium sparing diuretic for medical mgmt of presumed  bilateral adrenal hyperplasia. With decreased stress, her BP does seem improved most recently - she stopped amlodipine on her own - will trial lower dose of eplerenone.     2. CKD: likely some chronic kidney changes related to longstanding hypertension and hypercalcemia. At risk for diabetic nephropathy but no proteinuria which is reassuring. Educated on benefits of weight loss.     3. Hypercalcemia: s/p parathyroidectomy on 3/21/13. Calcium now normal but upper limit of normal.  Vitamin D level is at goal In march    4. Monoclonal heavy Chain: following with Dr. Dennis.     5. DM: A1C improved to 6.1% in March.      Patient Instructions   Trial lowering the eplerenone to 50 mg twice a day  Monitor pressures for the next week - mychart these  Labs in September  Follow-up in 6 months       [id number] AM video visit via [employer] - offsite.   Dia Aleman, DO

## 2024-07-30 NOTE — NURSING NOTE
Current patient location: [address] [place] [address] [place] [address]    Is the patient currently in the state of [place]? YES    Visit mode:VIDEO    If the visit is dropped, the patient can be reconnected by: VIDEO VISIT: Send to e-mail at: [email or web address]    Will anyone else be joining the visit? NO  (If patient encounters technical issues they should call [phone])    How would you like to obtain your AVS? MyChart    Are changes needed to the allergy or medication list? No    Are refills needed on medications prescribed by this physician? NO    Reason for visit: [place]

## 2024-07-30 NOTE — PATIENT INSTRUCTIONS
Trial lowering the eplerenone to 50 mg twice a day  Monitor pressures for the next week - mychart these  Labs in September  Follow-up in 6 months

## 2024-08-01 ENCOUNTER — ANCILLARY PROCEDURE (OUTPATIENT)
Dept: MAMMOGRAPHY | Facility: CLINIC | Age: 54
End: 2024-08-01
Attending: FAMILY MEDICINE
Payer: COMMERCIAL

## 2024-08-01 DIAGNOSIS — Z12.31 VISIT FOR SCREENING MAMMOGRAM: ICD-10-CM

## 2024-08-01 PROCEDURE — 77067 SCR MAMMO BI INCL CAD: CPT | Mod: TC | Performed by: RADIOLOGY

## 2024-08-01 PROCEDURE — 77063 BREAST TOMOSYNTHESIS BI: CPT | Mod: TC | Performed by: RADIOLOGY

## 2024-08-06 SDOH — HEALTH STABILITY: PHYSICAL HEALTH: ON AVERAGE, HOW MANY DAYS PER WEEK DO YOU ENGAGE IN MODERATE TO STRENUOUS EXERCISE (LIKE A BRISK WALK)?: 3 DAYS

## 2024-08-06 SDOH — HEALTH STABILITY: PHYSICAL HEALTH: ON AVERAGE, HOW MANY MINUTES DO YOU ENGAGE IN EXERCISE AT THIS LEVEL?: 20 MIN

## 2024-08-06 ASSESSMENT — SOCIAL DETERMINANTS OF HEALTH (SDOH): HOW OFTEN DO YOU GET TOGETHER WITH FRIENDS OR RELATIVES?: TWICE A WEEK

## 2024-08-09 ENCOUNTER — OFFICE VISIT (OUTPATIENT)
Dept: FAMILY MEDICINE | Facility: CLINIC | Age: 54
End: 2024-08-09
Payer: COMMERCIAL

## 2024-08-09 VITALS
HEART RATE: 63 BPM | RESPIRATION RATE: 14 BRPM | SYSTOLIC BLOOD PRESSURE: 121 MMHG | TEMPERATURE: 97.2 F | WEIGHT: 245 LBS | BODY MASS INDEX: 46.26 KG/M2 | DIASTOLIC BLOOD PRESSURE: 79 MMHG | HEIGHT: 61 IN | OXYGEN SATURATION: 100 %

## 2024-08-09 DIAGNOSIS — E66.01 CLASS 3 SEVERE OBESITY DUE TO EXCESS CALORIES WITH SERIOUS COMORBIDITY AND BODY MASS INDEX (BMI) OF 40.0 TO 44.9 IN ADULT (H): ICD-10-CM

## 2024-08-09 DIAGNOSIS — R11.0 NAUSEA: ICD-10-CM

## 2024-08-09 DIAGNOSIS — G43.709 CHRONIC MIGRAINE WITHOUT AURA WITHOUT STATUS MIGRAINOSUS, NOT INTRACTABLE: ICD-10-CM

## 2024-08-09 DIAGNOSIS — Z51.81 THERAPEUTIC DRUG MONITORING: ICD-10-CM

## 2024-08-09 DIAGNOSIS — T75.3XXA MOTION SICKNESS, INITIAL ENCOUNTER: ICD-10-CM

## 2024-08-09 DIAGNOSIS — N18.31 TYPE 2 DIABETES MELLITUS WITH STAGE 3A CHRONIC KIDNEY DISEASE, WITHOUT LONG-TERM CURRENT USE OF INSULIN (H): ICD-10-CM

## 2024-08-09 DIAGNOSIS — Z00.00 ROUTINE GENERAL MEDICAL EXAMINATION AT A HEALTH CARE FACILITY: Primary | ICD-10-CM

## 2024-08-09 DIAGNOSIS — E11.22 TYPE 2 DIABETES MELLITUS WITH STAGE 3A CHRONIC KIDNEY DISEASE, WITHOUT LONG-TERM CURRENT USE OF INSULIN (H): ICD-10-CM

## 2024-08-09 DIAGNOSIS — E66.813 CLASS 3 SEVERE OBESITY DUE TO EXCESS CALORIES WITH SERIOUS COMORBIDITY AND BODY MASS INDEX (BMI) OF 40.0 TO 44.9 IN ADULT (H): ICD-10-CM

## 2024-08-09 DIAGNOSIS — R10.13 DYSPEPSIA: ICD-10-CM

## 2024-08-09 DIAGNOSIS — F51.02 ADJUSTMENT INSOMNIA: ICD-10-CM

## 2024-08-09 DIAGNOSIS — E78.5 HYPERLIPIDEMIA LDL GOAL <100: ICD-10-CM

## 2024-08-09 LAB — HBA1C MFR BLD: 5.6 % (ref 0–5.6)

## 2024-08-09 PROCEDURE — 99396 PREV VISIT EST AGE 40-64: CPT | Performed by: FAMILY MEDICINE

## 2024-08-09 PROCEDURE — 36415 COLL VENOUS BLD VENIPUNCTURE: CPT | Performed by: FAMILY MEDICINE

## 2024-08-09 PROCEDURE — 83036 HEMOGLOBIN GLYCOSYLATED A1C: CPT | Performed by: FAMILY MEDICINE

## 2024-08-09 PROCEDURE — 99214 OFFICE O/P EST MOD 30 MIN: CPT | Mod: 25 | Performed by: FAMILY MEDICINE

## 2024-08-09 RX ORDER — ONDANSETRON 4 MG/1
4 TABLET, ORALLY DISINTEGRATING ORAL EVERY 8 HOURS PRN
Qty: 30 TABLET | Refills: 1 | Status: SHIPPED | OUTPATIENT
Start: 2024-08-09

## 2024-08-09 RX ORDER — SUMATRIPTAN 25 MG/1
TABLET, FILM COATED ORAL
Qty: 18 TABLET | Refills: 1 | Status: SHIPPED | OUTPATIENT
Start: 2024-08-09

## 2024-08-09 RX ORDER — DAPAGLIFLOZIN 10 MG/1
10 TABLET, FILM COATED ORAL DAILY
Qty: 90 TABLET | Refills: 3 | Status: SHIPPED | OUTPATIENT
Start: 2024-08-09

## 2024-08-09 RX ORDER — FAMOTIDINE 20 MG/1
20 TABLET, FILM COATED ORAL 2 TIMES DAILY
Qty: 180 TABLET | Refills: 1 | Status: SHIPPED | OUTPATIENT
Start: 2024-08-09

## 2024-08-09 RX ORDER — ATORVASTATIN CALCIUM 10 MG/1
10 TABLET, FILM COATED ORAL EVERY EVENING
Qty: 90 TABLET | Refills: 3 | Status: SHIPPED | OUTPATIENT
Start: 2024-08-09

## 2024-08-09 RX ORDER — SCOLOPAMINE TRANSDERMAL SYSTEM 1 MG/1
1 PATCH, EXTENDED RELEASE TRANSDERMAL
Qty: 3 PATCH | Refills: 0 | Status: SHIPPED | OUTPATIENT
Start: 2024-08-09

## 2024-08-09 RX ORDER — HYDROXYZINE HYDROCHLORIDE 25 MG/1
25 TABLET, FILM COATED ORAL
Qty: 90 TABLET | Refills: 1 | Status: SHIPPED | OUTPATIENT
Start: 2024-08-09

## 2024-08-09 ASSESSMENT — PAIN SCALES - GENERAL: PAINLEVEL: NO PAIN (0)

## 2024-08-09 NOTE — RESULT ENCOUNTER NOTE
Kelby Son,  Your A1c is in excellent range  Let us continue with current medications with no change   Let me know if you have any questions. Take care.  Windy Gordillo MD
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
## 2024-08-09 NOTE — PATIENT INSTRUCTIONS
Patient Education   Preventive Care Advice   This is general advice given by our system to help you stay healthy. However, your care team may have specific advice just for you. Please talk to your care team about your preventive care needs.  Nutrition  Eat 5 or more servings of fruits and vegetables each day.  Try wheat bread, brown rice and whole grain pasta (instead of white bread, rice, and pasta).  Get enough calcium and vitamin D. Check the label on foods and aim for 100% of the RDA (recommended daily allowance).  Lifestyle  Exercise at least 150 minutes each week  (30 minutes a day, 5 days a week).  Do muscle strengthening activities 2 days a week. These help control your weight and prevent disease.  No smoking.  Wear sunscreen to prevent skin cancer.  Have a dental exam and cleaning every 6 months.  Yearly exams  See your health care team every year to talk about:  Any changes in your health.  Any medicines your care team has prescribed.  Preventive care, family planning, and ways to prevent chronic diseases.  Shots (vaccines)   HPV shots (up to age 26), if you've never had them before.  Hepatitis B shots (up to age 59), if you've never had them before.  COVID-19 shot: Get this shot when it's due.  Flu shot: Get a flu shot every year.  Tetanus shot: Get a tetanus shot every 10 years.  Pneumococcal, hepatitis A, and RSV shots: Ask your care team if you need these based on your risk.  Shingles shot (for age 50 and up)  General health tests  Diabetes screening:  Starting at age 35, Get screened for diabetes at least every 3 years.  If you are younger than age 35, ask your care team if you should be screened for diabetes.  Cholesterol test: At age 39, start having a cholesterol test every 5 years, or more often if advised.  Bone density scan (DEXA): At age 50, ask your care team if you should have this scan for osteoporosis (brittle bones).  Hepatitis C: Get tested at least once in your life.  STIs (sexually  transmitted infections)  Before age 24: Ask your care team if you should be screened for STIs.  After age 24: Get screened for STIs if you're at risk. You are at risk for STIs (including HIV) if:  You are sexually active with more than one person.  You don't use condoms every time.  You or a partner was diagnosed with a sexually transmitted infection.  If you are at risk for HIV, ask about PrEP medicine to prevent HIV.  Get tested for HIV at least once in your life, whether you are at risk for HIV or not.  Cancer screening tests  Cervical cancer screening: If you have a cervix, begin getting regular cervical cancer screening tests starting at age 21.  Breast cancer scan (mammogram): If you've ever had breasts, begin having regular mammograms starting at age 40. This is a scan to check for breast cancer.  Colon cancer screening: It is important to start screening for colon cancer at age 45.  Have a colonoscopy test every 10 years (or more often if you're at risk) Or, ask your provider about stool tests like a FIT test every year or Cologuard test every 3 years.  To learn more about your testing options, visit:   .  For help making a decision, visit:   https://bit.ly/nz25756.  Prostate cancer screening test: If you have a prostate, ask your care team if a prostate cancer screening test (PSA) at age 55 is right for you.  Lung cancer screening: If you are a current or former smoker ages 50 to 80, ask your care team if ongoing lung cancer screenings are right for you.  For informational purposes only. Not to replace the advice of your health care provider. Copyright   2023 [employer]. All rights reserved. Clinically reviewed by the [facility]. [employer] 426273 - REV 01/24.

## 2024-10-03 ENCOUNTER — MYC MEDICAL ADVICE (OUTPATIENT)
Dept: ENDOCRINOLOGY | Facility: CLINIC | Age: 54
End: 2024-10-03
Payer: COMMERCIAL

## 2024-10-04 NOTE — TELEPHONE ENCOUNTER
CHRISSYM for pt to call back and mychart msg. Please help sched pt when she calls back as provider req.

## 2024-10-31 ENCOUNTER — OFFICE VISIT (OUTPATIENT)
Dept: OPTOMETRY | Facility: CLINIC | Age: 54
End: 2024-10-31
Payer: COMMERCIAL

## 2024-10-31 DIAGNOSIS — H52.13 MYOPIA OF BOTH EYES: ICD-10-CM

## 2024-10-31 DIAGNOSIS — H52.4 PRESBYOPIA: ICD-10-CM

## 2024-10-31 DIAGNOSIS — E11.9 TYPE 2 DIABETES MELLITUS WITHOUT COMPLICATION, WITHOUT LONG-TERM CURRENT USE OF INSULIN (H): Primary | ICD-10-CM

## 2024-10-31 PROCEDURE — 92310 CONTACT LENS FITTING OU: CPT | Mod: GA | Performed by: OPTOMETRIST

## 2024-10-31 PROCEDURE — 92015 DETERMINE REFRACTIVE STATE: CPT | Performed by: OPTOMETRIST

## 2024-10-31 PROCEDURE — 92014 COMPRE OPH EXAM EST PT 1/>: CPT | Performed by: OPTOMETRIST

## 2024-10-31 ASSESSMENT — CONF VISUAL FIELD
OS_INFERIOR_NASAL_RESTRICTION: 0
OD_NORMAL: 1
OD_INFERIOR_TEMPORAL_RESTRICTION: 0
OS_INFERIOR_TEMPORAL_RESTRICTION: 0
OS_NORMAL: 1
OS_SUPERIOR_TEMPORAL_RESTRICTION: 0
OS_SUPERIOR_NASAL_RESTRICTION: 0
OD_SUPERIOR_NASAL_RESTRICTION: 0
OD_INFERIOR_NASAL_RESTRICTION: 0
OD_SUPERIOR_TEMPORAL_RESTRICTION: 0

## 2024-10-31 ASSESSMENT — REFRACTION_MANIFEST
OD_ADD: +2.25
OS_ADD: +2.25
OS_CYLINDER: -1.00
OS_AXIS: 160
OD_SPHERE: -3.75
OS_SPHERE: -3.75
OD_AXIS: 010
OD_CYLINDER: -0.25

## 2024-10-31 ASSESSMENT — REFRACTION_WEARINGRX
OS_CYLINDER: +0.75
OD_ADD: +2.25
OS_AXIS: 070
OD_SPHERE: -4.50
OD_AXIS: 175
OS_ADD: +2.25
OD_CYLINDER: +0.25
OS_SPHERE: -4.75

## 2024-10-31 ASSESSMENT — TONOMETRY
IOP_METHOD: ICARE
OD_IOP_MMHG: 16.3
OS_IOP_MMHG: 15.7

## 2024-10-31 ASSESSMENT — REFRACTION_CURRENTRX
OS_BRAND: ALCON DAILIES TOTAL 1 BC 8.5, D 14.1
OS_SPHERE: -4.00
OD_SPHERE: -4.25
OD_BRAND: ALCON DAILIES TOTAL 1 BC 8.5, D 14.1

## 2024-10-31 ASSESSMENT — KERATOMETRY
OS_AXISANGLE_DEGREES: 090
OS_AXISANGLE2_DEGREES: 180
OD_AXISANGLE2_DEGREES: 176
OS_K2POWER_DIOPTERS: 45.75
OD_K1POWER_DIOPTERS: 44.75
OD_K2POWER_DIOPTERS: 45.75
OD_AXISANGLE_DEGREES: 086
OS_K1POWER_DIOPTERS: 44.75

## 2024-10-31 ASSESSMENT — SLIT LAMP EXAM - LIDS
COMMENTS: NORMAL
COMMENTS: NORMAL

## 2024-10-31 ASSESSMENT — VISUAL ACUITY
OS_SC: 20/60
METHOD: SNELLEN - LINEAR
OS_SC: 20/25
OD_SC: 20/25
OD_CC: 20/20
OS_CC: 20/25
CORRECTION_TYPE: GLASSES
OD_SC: 20/150
OD_CC+: -1

## 2024-10-31 ASSESSMENT — EXTERNAL EXAM - RIGHT EYE: OD_EXAM: NORMAL

## 2024-10-31 ASSESSMENT — CUP TO DISC RATIO
OS_RATIO: 0.2
OD_RATIO: 0.2

## 2024-10-31 ASSESSMENT — EXTERNAL EXAM - LEFT EYE: OS_EXAM: NORMAL

## 2024-10-31 NOTE — LETTER
10/31/2024      Adelaida Packer  [address] [place] [address]      Dear Colleague,    Thank you for referring your patient, Adelaida Packer, to the [facility]. Please see a copy of my visit note below.    Chief Complaint   Patient presents with     Diabetic Eye Exam        Chief Complaint(s) and History of Present Illness(es)       Diabetic Eye Exam              Vision: is stable    Diabetes Type: Type 2 and taking oral medications    Duration: 2 years    Blood Sugars: is controlled                   Lab Results   Component Value Date    A1C 5.6 08/09/2024    A1C 6.1 03/19/2024    A1C 5.9 12/11/2023    A1C 6.6 07/07/2023    A1C 8.0 04/17/2023    A1C 6.1 11/30/2020    A1C 5.5 11/19/2019    A1C 5.3 01/29/2019    A1C 5.2 08/06/2018    A1C 5.2 08/15/2017        Previous contact lens wearer? Yes total 1  Comfort of contact lenses :good  Satisfied with current lenses: Yes     Last Eye Exam: 3-7-2023  Dilated Previously: Yes    What are you currently using to see?  glasses    Distance Vision Acuity: Satisfied with vision    Near Vision Acuity: Satisfied with vision while reading  unaided,takes glasses off to read    Eye Comfort: good  Do you use eye drops? 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     Medical, surgical and family histories reviewed and updated 10/31/2024.       OBJECTIVE: See Ophthalmology exam    ASSESSMENT:    ICD-10-CM    1. Type 2 diabetes mellitus without complication, without long-term current use of insulin (H)  E11.9 EYE EXAM (SIMPLE-NONBILLABLE)    Negative diabetic retinopathy both eyes      2. Myopia of both eyes  H52.13 REFRACTION     CONTACT LENS FITTING,BILAT w/ signed waiver      3. Presbyopia  H52.4 REFRACTION     CONTACT LENS FITTING,BILAT w/ signed waiver          PLAN:    Adelaida Packer aware  eye exam  results will be sent to Windy Gordillo  Patient Instructions   There are not any signs of the diabetes affecting the eyes today.  It is important that you get your eyes dilated once yearly and keep good control of your diabetes.    Eyeglass prescription given.    Contact lens prescription given and form signed.  Ok to order single vision both eyes with OTC readers or multifocal contacts.    Return in 1 year for a complete eye exam or sooner if needed.    Zac Sandhu, OD             Again, thank you for allowing me to participate in the care of your patient.        Sincerely,        Zac Sandhu, OD

## 2024-10-31 NOTE — PATIENT INSTRUCTIONS
There are not any signs of the diabetes affecting the eyes today.  It is important that you get your eyes dilated once yearly and keep good control of your diabetes.    Eyeglass prescription given.    Contact lens prescription given and form signed.  Ok to order single vision both eyes with OTC readers or multifocal contacts.    Return in 1 year for a complete eye exam or sooner if needed.    Zac Sandhu, OD    The affects of the dilating drops last for 4- 6 hours.  You will be more sensitive to light and vision will be blurry up close.  Do not drive if you do not feel comfortable.  Mydriatic sunglasses were given if needed.    Patient Education   Diabetes weakens the blood vessels all over the body, including the eyes. Damage to the blood vessels in the eyes can cause swelling or bleeding into part of the eye (called the retina). This is called diabetic retinopathy (DENIS-tin-AH-puh-thee). If not treated, this disease can cause vision loss or blindness.   Symptoms may include blurred or distorted vision, but many people have no symptoms. It's important to see your eye doctor regularly to check for problems.   Early treatment and good control can help protect your vision. Here are the things you can do to help prevent vision loss:      1. Keep your blood sugar levels under tight control.      2. Bring high blood pressure under control.      3. No smoking.      4. Have yearly dilated eye exams.       Optometry Providers       Clinic Locations                                 Telephone Number   Dr. Aliza Hyde   [facility] [phone]     [facility] Hours:                [facility] Hours:       [facility] Hours:   [address]   [address]     [address] [place] [address]    [place] [address]    [place] [address]  Phone: [phone]                    Phone: [phone]     Phone: [phone]                      Monday 8:00-6:00                          Monday 8:00-6:00                          Monday 8:00-6:00              Tuesday 8:00-6:00                          Tuesday 8:00-6:00                          Tuesday 8:00-6:00              Wednesday 8:00-6:00                  Wednesday 8:00-6:00                   Wednesday 8:00-6:00      Thursday 8:00-6:00                        Thursday 8:00-6:00                         Thursday 8:00-6:00            Friday 8:00-5:00                              Friday 8:00-5:00                              Friday 8:00-5:00    [facility] Hours:   [address] [place] [address]  [phone]    Monday 9:00-6:00  Tuesday 9:00-6:00  Wednesday 9:00-6:00  Thursday 9:00-6:00  Friday 9:00-5:00  As always, Thank you for trusting us with your health care needs!

## 2024-10-31 NOTE — PROGRESS NOTES
Chief Complaint   Patient presents with    Diabetic Eye Exam        Chief Complaint(s) and History of Present Illness(es)       Diabetic Eye Exam              Vision: is stable    Diabetes Type: Type 2 and taking oral medications    Duration: 2 years    Blood Sugars: is controlled                   Lab Results   Component Value Date    A1C 5.6 08/09/2024    A1C 6.1 03/19/2024    A1C 5.9 12/11/2023    A1C 6.6 07/07/2023    A1C 8.0 04/17/2023    A1C 6.1 11/30/2020    A1C 5.5 11/19/2019    A1C 5.3 01/29/2019    A1C 5.2 08/06/2018    A1C 5.2 08/15/2017        Previous contact lens wearer? Yes total 1  Comfort of contact lenses :good  Satisfied with current lenses: Yes     Last Eye Exam: 3-7-2023  Dilated Previously: Yes    What are you currently using to see?  glasses    Distance Vision Acuity: Satisfied with vision    Near Vision Acuity: Satisfied with vision while reading  unaided,takes glasses off to read    Eye Comfort: good  Do you use eye drops? 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     Medical, surgical and family histories reviewed and updated 10/31/2024.       OBJECTIVE: See Ophthalmology exam    ASSESSMENT:    ICD-10-CM    1. Type 2 diabetes mellitus without complication, without long-term current use of insulin (H)  E11.9 EYE EXAM (SIMPLE-NONBILLABLE)    Negative diabetic retinopathy both eyes      2. Myopia of both eyes  H52.13 REFRACTION     CONTACT LENS FITTING,BILAT w/ signed waiver      3. Presbyopia  H52.4 REFRACTION     CONTACT LENS FITTING,BILAT w/ signed waiver          PLAN:    Adelaida Packer aware  eye exam results will be sent to Windy Gordillo  Patient Instructions   There are not any signs of the diabetes affecting the eyes today.  It is important that you get your eyes dilated once yearly and keep good control of your diabetes.    Eyeglass prescription  given.    Contact lens prescription given and form signed.  Ok to order single vision both eyes with OTC readers or multifocal contacts.    Return in 1 year for a complete eye exam or sooner if needed.    Zac Sandhu, OD

## 2024-11-21 DIAGNOSIS — E26.09 PRIMARY HYPERALDOSTERONISM (H): ICD-10-CM

## 2024-11-21 RX ORDER — EPLERENONE 50 MG/1
100 TABLET, FILM COATED ORAL 2 TIMES DAILY
Qty: 360 TABLET | Refills: 3 | Status: SHIPPED | OUTPATIENT
Start: 2024-11-21

## 2025-01-14 ENCOUNTER — VIRTUAL VISIT (OUTPATIENT)
Dept: NEPHROLOGY | Facility: CLINIC | Age: 55
End: 2025-01-14
Payer: COMMERCIAL

## 2025-01-14 VITALS — WEIGHT: 242 LBS | BODY MASS INDEX: 44.53 KG/M2 | HEIGHT: 62 IN

## 2025-01-14 DIAGNOSIS — E11.69 TYPE 2 DIABETES MELLITUS WITH OTHER SPECIFIED COMPLICATION, UNSPECIFIED WHETHER LONG TERM INSULIN USE (H): ICD-10-CM

## 2025-01-14 DIAGNOSIS — E83.52 HYPERCALCEMIA: Primary | ICD-10-CM

## 2025-01-14 DIAGNOSIS — E26.09 PRIMARY HYPERALDOSTERONISM: ICD-10-CM

## 2025-01-14 DIAGNOSIS — N18.31 STAGE 3A CHRONIC KIDNEY DISEASE (H): ICD-10-CM

## 2025-01-14 DIAGNOSIS — I10 HYPERTENSION GOAL BP (BLOOD PRESSURE) < 130/80: ICD-10-CM

## 2025-01-14 ASSESSMENT — PAIN SCALES - GENERAL: PAINLEVEL_OUTOF10: NO PAIN (0)

## 2025-01-14 NOTE — Clinical Note
Kelby Son has hypercalcemia. I am lowering her eplerenone to make sure it is not contributing. I will repeat labs in 2 weeks - just wanted to touch base - do you want to repeat any of your labs with this upcoming lab given the hypercalcemia? Appreciate you following her.  Dia

## 2025-01-14 NOTE — PATIENT INSTRUCTIONS
Decrease eplerenone to 100 mg AM and 50 mg PM  Repeat labs in 2 weeks  Send BP updates next week via [employer].   Follow-up in 6 months but we will be in touch in the meantime.

## 2025-01-14 NOTE — PROGRESS NOTES
Not at this time.  Pats MGUS labs have shown some minimal change over time.  And calcium has been normal for me and only intermittently elevated.  I think follow-up as planned (12 months) is appropriate unless things clearly change.    Geremias Dennis MD.

## 2025-01-14 NOTE — PROGRESS NOTES
Virtual Visit Details    Type of service:  Video Visit     Originating Location (pt. Location): work    Distant Location (provider location):  Off-site  Platform used for Video Visit: Delmi    1/14/25  CC: HTN and CKD    HPI: Adelaida Packer is a 54 year old female who presents for follow-up of HTN/CKD.  Has some mild CKD which is felt to be related to hypertension as well as likely some effects from hypercalcemia in the past. Her hx includes hyperparathyroidism, primary for which she underwent parathyroidectomy on 3/21/13. Following that surgery, she did have a complication related to a hematoma but we have seen many benefits of her parathyroidectomy - blood pressure improved as well as kidney function.     03/28/23: video visit. Now on Continuous glucose monitor. Much improved the past few weeks. No recent home BP readings. Now going back to the gym to be more active - water aerobics, biking.    03/26/24: video visit.  On mounjaro and farxiga. BP has been good. Has lost 26 lbs. No calcium supplements. Feeling good. Seeing PCP in April. At times, tingling in her legs. No tums/rolaids. On pepcid. No swelling. Feeling well overall. No NSAIDs. 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     07/30/24: video visit. Has been traveling this summer - covid infection - has upcoming [employer]. This /87. 2 weeks ago ran out of amlodipine - stopped taking it. Weight loss of 11 lbs since Jan. Has been consistently less than 130/80. Changing jobs has also decreased her stress level. Not a lot of BP readings lately. 121/75 In May. AT times she can feel dehydrated. She is working on getting good water intake.     01/14/25: video visit. Home readings most recently: 114/85, 123/87, 112/73, 128/84. No calcium supplements. Off of vitamin d at this time. No swelling. No NsAIDs. No new sxs or concerns. She is feeling well overall - has had great success with weight loss the past  2 years.        Current Outpatient Medications   Medication Sig Dispense Refill    acetaminophen (TYLENOL) 325 MG tablet Take 2 tablets (650 mg) by mouth every 4 hours as needed for other (mild pain) 100 tablet 0    amoxicillin (AMOXIL) 500 MG capsule Take 4 caps (2000mg) 30-60 minutes before dental procedure 4 capsule 0    aspirin (ASA) 81 MG EC tablet Take 1 tablet (81 mg) by mouth daily 90 tablet 3    atorvastatin (LIPITOR) 10 MG tablet Take 1 tablet (10 mg) by mouth every evening 90 tablet 3    blood glucose (NO BRAND SPECIFIED) lancets standard Use to test blood sugar 2 times daily or as directed. 200 lancet 3    blood glucose (NO BRAND SPECIFIED) lancets standard Use to test blood sugar 1-2 times daily or as directed. 200 each 3    blood glucose (NO BRAND SPECIFIED) test strip Use to test blood sugar 2 times daily or as directed. 200 strip 3    blood glucose (NO BRAND SPECIFIED) test strip Use to test blood sugar 2-3 times daily or as directed. 200 strip 3    blood glucose monitoring (NO BRAND SPECIFIED) meter device kit Use to test blood sugar 1-2 times daily or as directed. 1 kit 0    cetirizine (ZYRTEC) 10 MG tablet Take 10 mg by mouth daily as needed for allergies      dapagliflozin (FARXIGA) 10 MG TABS tablet Take 1 tablet (10 mg) by mouth daily 90 tablet 3    eplerenone (INSPRA) 50 MG tablet Take 2 tablets (100 mg) by mouth 2 times daily. 360 tablet 3    famotidine (PEPCID) 20 MG tablet Take 1 tablet (20 mg) by mouth 2 times daily 180 tablet 1    Fluocinolone Acetonide Scalp (DERMA-SMOOTHE/FS SCALP) 0.01 % OIL oil Apply nightly to the scalp for the 6 weeks 60 mL 1    fluticasone (FLONASE) 50 MCG/ACT nasal spray INSTILL 1 SPRAY INTO BOTH NOSTRILS DAILY 16 mL 1    hydrOXYzine HCl (ATARAX) 25 MG tablet Take 1 tablet (25 mg) by mouth nightly as needed for other (sleep) 90 tablet 1    labetalol (NORMODYNE) 300 MG tablet Take 1 tablet (300 mg) by mouth 2 times daily 10 tablet 0    ondansetron (ZOFRAN ODT) 4 MG  ODT tab Take 1 tablet (4 mg) by mouth every 8 hours as needed for nausea 30 tablet 1    polyethylene glycol (MIRALAX) 17 g packet Take 17 g by mouth daily 7 packet 0    SUMAtriptan (IMITREX) 25 MG tablet Profile rx,TAKE 1 TO 2 TABLETS BY MOUTH AT ONSET OF HEADACHE FOR MIGRAINE. MAY REPEAT DOSE IN 2 HOURS. MAX OF 200MG OR 2 DOSES IN 24 HOURS 18 tablet 1    tirzepatide (MOUNJARO) 15 MG/0.5ML pen Inject 15 mg subcutaneously once a week 6 mL 1     Exam:  Gen - alert and oriented, appears comfortable.    Result  No visits with results within 1 Day(s) from this visit.   Latest known visit with results is:   Lab on 01/10/2025   Component Date Value Ref Range Status    Sodium 01/10/2025 138  135 - 145 mmol/L Final    Potassium 01/10/2025 4.3  3.4 - 5.3 mmol/L Final    Chloride 01/10/2025 106  98 - 107 mmol/L Final    Carbon Dioxide (CO2) 01/10/2025 23  22 - 29 mmol/L Final    Anion Gap 01/10/2025 9  7 - 15 mmol/L Final    Glucose 01/10/2025 100 (H)  70 - 99 mg/dL Final    Urea Nitrogen 01/10/2025 26.6 (H)  6.0 - 20.0 mg/dL Final    Creatinine 01/10/2025 1.23 (H)  0.51 - 0.95 mg/dL Final    GFR Estimate 01/10/2025 52 (L)  >60 mL/min/1.73m2 Final    eGFR calculated using 2021 CKD-EPI equation.    Calcium 01/10/2025 10.5 (H)  8.8 - 10.4 mg/dL Final    Reference intervals for this test were updated on 7/16/2024 to reflect our healthy population more accurately. There may be differences in the flagging of prior results with similar values performed with this method. Those prior results can be interpreted in the context of the updated reference intervals.    Albumin 01/10/2025 4.3  3.5 - 5.2 g/dL Final    Phosphorus 01/10/2025 3.9  2.5 - 4.5 mg/dL Final    WBC Count 01/10/2025 4.8  4.0 - 11.0 10e3/uL Final    RBC Count 01/10/2025 4.56  3.80 - 5.20 10e6/uL Final    Hemoglobin 01/10/2025 13.3  11.7 - 15.7 g/dL Final    Hematocrit 01/10/2025 39.9  35.0 - 47.0 % Final    MCV 01/10/2025 88  78 - 100 fL Final    MCH 01/10/2025 29.2   26.5 - 33.0 pg Final    MCHC 01/10/2025 33.3  31.5 - 36.5 g/dL Final    RDW 01/10/2025 13.3  10.0 - 15.0 % Final    Platelet Count 01/10/2025 191  150 - 450 10e3/uL Final    Total Protein Urine mg/dL 01/10/2025 7.2    mg/dL Final    The reference ranges have not been established in urine protein. The results should be integrated into the clinical context for interpretation.    Total Protein Urine mg/mg Creat 01/10/2025 0.05  0.00 - 0.20 mg/mg Cr Final    Creatinine Urine mg/dL 01/10/2025 133.0  mg/dL Final    The reference ranges have not been established in urine creatinine. The results should be integrated into the clinical context for interpretation.         Assessment/Plan:  1. Hypertension: aldosterone level has been high on evaluation by Dr. Quintanilla with a low renin. In the past I was suspicious for hyperaldosteronism as well but CT scan was without adrenal adenoma appreciated. On potassium sparing diuretic for medical mgmt of presumed bilateral adrenal hyperplasia. BP most recently very well controlled. Likely seeing some improvement with decrease stress in her new job as well as weight loss. Given hypercalcemia, will trial lowering the eplerenone slightly - will monitor BP and repeat labs in the coming weeks.     2. CKD: likely some chronic kidney changes related to longstanding hypertension and hypercalcemia. At risk for diabetic nephropathy but no proteinuria which is reassuring. On farxiga as well as mounjaro and has had success with weight loss which is great to minimize risk of hyperfiltration. Focus at this time will be to correct hypercalcemia.     3. Hypercalcemia: s/p parathyroidectomy on 3/21/13. Off calcium and vit D supplements. I question whether the hypercalcemia is secondary to the potassium sparing diuretic - will trial minimizing dose slightly to see if this corrects. PTH is suppressed. Will also update Dr. Dennis to see if any paraproteinemia workup should be repeated at this time.      4. Monoclonal heavy Chain: following with Dr. Dennis. See above regarding hypercalcemia.     5. DM: A1C 5.6% in August.     6. Obesity: has had great success with weight loss the past 2 years. Current BMI 44 - ongoing encouragement. She remains on mounjaro at this time.     Patient Instructions   Decrease eplerenone to 100 mg AM and 50 mg PM  Repeat labs in 2 weeks  Send BP updates next week via [employer].   Follow-up in 6 months but we will be in touch in the meantime.        [phone] AM video visit via [employer] - offsite.   Dia Aleman,

## 2025-01-15 ENCOUNTER — TELEPHONE (OUTPATIENT)
Dept: NEPHROLOGY | Facility: CLINIC | Age: 55
End: 2025-01-15
Payer: COMMERCIAL

## 2025-01-15 NOTE — TELEPHONE ENCOUNTER
1/15/2025 3:30PM  Left Voicemail (1st Attempt) and Sent Mychart (1st Attempt) for the patient to call back and schedule the following:    Appointment type: Labs  Provider: Per Dr. Aleman  Return date: 2 weeks  Specialty phone number: [phone]    AND    Appointment type: Return Nephrology  Provider: Dr. Aleman  Return date: 6 month follow-up (7/15/2025)  Specialty phone number: [phone]  Additional appointment(s) needed: labs prior  Additonal Notes: 1/15 LVM and MYC for pt to schedule labs in 2 weeks and Return Nephro w/ Dr. Aleman w/ labs prior July 2025. [facility]   Rheumatology, Gastroenterology, Nephrology, Infectious Disease, Pulmonology  [facility]

## 2025-01-28 ENCOUNTER — LAB (OUTPATIENT)
Dept: LAB | Facility: CLINIC | Age: 55
End: 2025-01-28
Payer: COMMERCIAL

## 2025-01-28 DIAGNOSIS — E83.52 HYPERCALCEMIA: ICD-10-CM

## 2025-01-28 LAB
ANION GAP SERPL CALCULATED.3IONS-SCNC: 9 MMOL/L (ref 7–15)
BUN SERPL-MCNC: 24 MG/DL (ref 6–20)
CALCIUM SERPL-MCNC: 10.2 MG/DL (ref 8.8–10.4)
CHLORIDE SERPL-SCNC: 101 MMOL/L (ref 98–107)
CREAT SERPL-MCNC: 1.33 MG/DL (ref 0.51–0.95)
EGFRCR SERPLBLD CKD-EPI 2021: 47 ML/MIN/1.73M2
GLUCOSE SERPL-MCNC: 94 MG/DL (ref 70–99)
HCO3 SERPL-SCNC: 25 MMOL/L (ref 22–29)
POTASSIUM SERPL-SCNC: 4.3 MMOL/L (ref 3.4–5.3)
SODIUM SERPL-SCNC: 135 MMOL/L (ref 135–145)

## 2025-01-28 PROCEDURE — 36415 COLL VENOUS BLD VENIPUNCTURE: CPT

## 2025-01-28 PROCEDURE — 80048 BASIC METABOLIC PNL TOTAL CA: CPT

## 2025-01-31 ENCOUNTER — MEDICAL CORRESPONDENCE (OUTPATIENT)
Dept: HEALTH INFORMATION MANAGEMENT | Facility: CLINIC | Age: 55
End: 2025-01-31
Payer: COMMERCIAL

## 2025-02-01 LAB
DEPRECATED CALCIDIOL+CALCIFEROL SERPL-MC: <24 UG/L (ref 20–75)
VITAMIN D2 SERPL-MCNC: <5 UG/L
VITAMIN D3 SERPL-MCNC: 19 UG/L

## 2025-02-13 ENCOUNTER — LAB (OUTPATIENT)
Dept: LAB | Facility: CLINIC | Age: 55
End: 2025-02-13
Payer: COMMERCIAL

## 2025-02-13 DIAGNOSIS — E11.65 UNCONTROLLED TYPE 2 DIABETES MELLITUS WITH HYPERGLYCEMIA (H): Primary | ICD-10-CM

## 2025-02-13 LAB
EST. AVERAGE GLUCOSE BLD GHB EST-MCNC: 117 MG/DL
HBA1C MFR BLD: 5.7 % (ref 0–5.6)

## 2025-02-14 ENCOUNTER — TELEPHONE (OUTPATIENT)
Dept: FAMILY MEDICINE | Facility: CLINIC | Age: 55
End: 2025-02-14
Payer: COMMERCIAL

## 2025-02-14 DIAGNOSIS — N18.32 STAGE 3B CHRONIC KIDNEY DISEASE (H): ICD-10-CM

## 2025-02-14 DIAGNOSIS — E11.22 TYPE 2 DIABETES MELLITUS WITH STAGE 3A CHRONIC KIDNEY DISEASE, WITHOUT LONG-TERM CURRENT USE OF INSULIN (H): Primary | ICD-10-CM

## 2025-02-14 DIAGNOSIS — N18.31 TYPE 2 DIABETES MELLITUS WITH STAGE 3A CHRONIC KIDNEY DISEASE, WITHOUT LONG-TERM CURRENT USE OF INSULIN (H): Primary | ICD-10-CM

## 2025-02-17 ENCOUNTER — VIRTUAL VISIT (OUTPATIENT)
Dept: FAMILY MEDICINE | Facility: CLINIC | Age: 55
End: 2025-02-17
Payer: COMMERCIAL

## 2025-02-17 DIAGNOSIS — E78.5 HYPERLIPIDEMIA LDL GOAL <100: ICD-10-CM

## 2025-02-17 DIAGNOSIS — G43.709 CHRONIC MIGRAINE WITHOUT AURA WITHOUT STATUS MIGRAINOSUS, NOT INTRACTABLE: ICD-10-CM

## 2025-02-17 DIAGNOSIS — E11.22 TYPE 2 DIABETES MELLITUS WITH STAGE 3B CHRONIC KIDNEY DISEASE, WITHOUT LONG-TERM CURRENT USE OF INSULIN (H): Primary | ICD-10-CM

## 2025-02-17 DIAGNOSIS — N18.32 TYPE 2 DIABETES MELLITUS WITH STAGE 3B CHRONIC KIDNEY DISEASE, WITHOUT LONG-TERM CURRENT USE OF INSULIN (H): Primary | ICD-10-CM

## 2025-02-17 DIAGNOSIS — F51.02 ADJUSTMENT INSOMNIA: ICD-10-CM

## 2025-02-17 DIAGNOSIS — E66.813 CLASS 3 SEVERE OBESITY DUE TO EXCESS CALORIES WITH SERIOUS COMORBIDITY AND BODY MASS INDEX (BMI) OF 40.0 TO 44.9 IN ADULT (H): ICD-10-CM

## 2025-02-17 DIAGNOSIS — E66.01 CLASS 3 SEVERE OBESITY DUE TO EXCESS CALORIES WITH SERIOUS COMORBIDITY AND BODY MASS INDEX (BMI) OF 40.0 TO 44.9 IN ADULT (H): ICD-10-CM

## 2025-02-17 DIAGNOSIS — R10.13 DYSPEPSIA: ICD-10-CM

## 2025-02-17 PROCEDURE — 98006 SYNCH AUDIO-VIDEO EST MOD 30: CPT | Performed by: FAMILY MEDICINE

## 2025-02-17 RX ORDER — HYDROXYZINE HYDROCHLORIDE 25 MG/1
25 TABLET, FILM COATED ORAL
Qty: 90 TABLET | Refills: 3 | Status: SHIPPED | OUTPATIENT
Start: 2025-02-17

## 2025-02-17 RX ORDER — FAMOTIDINE 20 MG/1
20 TABLET, FILM COATED ORAL 2 TIMES DAILY
Qty: 180 TABLET | Refills: 3 | Status: SHIPPED | OUTPATIENT
Start: 2025-02-17

## 2025-02-17 RX ORDER — SUMATRIPTAN SUCCINATE 25 MG/1
TABLET ORAL
Qty: 18 TABLET | Refills: 1 | Status: SHIPPED | OUTPATIENT
Start: 2025-02-17

## 2025-02-17 RX ORDER — ATORVASTATIN CALCIUM 10 MG/1
10 TABLET, FILM COATED ORAL EVERY EVENING
Qty: 90 TABLET | Refills: 3 | Status: SHIPPED | OUTPATIENT
Start: 2025-02-17

## 2025-02-17 NOTE — PROGRESS NOTES
"  If patient has telephone visit, have they been educated on video visit as preferred visit method and offered to change to video visit? N/A        Instructions Relayed to Patient by Virtual Roomer:     Patient is active on [employer]:   Relayed following to patient: \"It looks like you are active on [employer], are you able to join the visit this way? If not, do you need us to send you a link now or would you like your provider to send a link via text or email when they are ready to initiate the visit?\"      Patient Confirmed they will join visit via: [employer]  Reminded patient to ensure they were logged on to virtual visit by arrival time listed.   Asked if patient has flexibility to initiate visit sooner than arrival time: patient is unable to initiate visit earlier than arrival time     If pediatric virtual visit, ensured pediatric patient along with parent/guardian will be present for video visit.     Patient offered the website [email or web address] and/or phone number to [employer] Help line: [phone]      Adelaida is a 54 year old who is being evaluated via a billable video visit.    How would you like to obtain your AVS? [facility]  If the video visit is dropped, the invitation should be resent by: Send to e-mail at: [email or web address]  Will anyone else be joining your video visit? No      Assessment & Plan     Type 2 diabetes mellitus with stage 3b chronic kidney disease, without long-term current use of insulin (H)  Lab Results   Component Value Date    A1C 5.7 02/13/2025    A1C 5.6 08/09/2024    A1C 6.1 03/19/2024    A1C 5.9 12/11/2023    A1C 6.6 07/07/2023    A1C 6.1 11/30/2020    A1C 5.5 11/19/2019    A1C 5.3 01/29/2019    A1C 5.2 08/06/2018    A1C 5.2 08/15/2017     A1c stable, continue with current dose of tirzepatide 15 mg once a week, healthy eating, regular exercises, annual eye exams, blood close monitoring, follow for recheck in October at the time of physical or sooner if needed  - " "Tirzepatide 15 MG/0.5ML SOAJ; Inject 0.5 mLs (15 mg) subcutaneously every 7 days.    Class 3 severe obesity due to excess calories with serious comorbidity and body mass index (BMI) of 40.0 to 44.9 in adult (H)  Wt Readings from Last 5 Encounters:   01/14/25 109.8 kg (242 lb)   08/09/24 111.1 kg (245 lb)   07/30/24 114.3 kg (252 lb)   06/19/24 114.3 kg (252 lb)   03/15/24 114.3 kg (252 lb)     243 pounds at home as of today  Other plan-as mentioned above  - Tirzepatide 15 MG/0.5ML SOAJ; Inject 0.5 mLs (15 mg) subcutaneously every 7 days.    Hyperlipidemia LDL goal <100  Continue current dose of Lipitor, recheck lipids at the time of physical or sooner if needed  - atorvastatin (LIPITOR) 10 MG tablet; Take 1 tablet (10 mg) by mouth every evening.    Dyspepsia  Stable, continue with reflux precautions, Pepcid twice a day, recheck in 1 year or sooner if needed  - famotidine (PEPCID) 20 MG tablet; Take 1 tablet (20 mg) by mouth 2 times daily.    Adjustment insomnia  Continue with sleep hygiene, hydroxyzine as needed for sleep, recheck in October or sooner if needed  - hydrOXYzine HCl (ATARAX) 25 MG tablet; Take 1 tablet (25 mg) by mouth nightly as needed for other (sleep).    Chronic migraine without aura without status migrainosus, not intractable  Stable, continue to avoid potential triggers for migraine, continue with Imitrex as needed, recheck in 6 months or sooner if needed  - SUMAtriptan (IMITREX) 25 MG tablet; Profile rx,TAKE 1 TO 2 TABLETS BY MOUTH AT ONSET OF HEADACHE FOR MIGRAINE. MAY REPEAT DOSE IN 2 HOURS. MAX OF 200MG OR 2 DOSES IN 24 HOURS          BMI  Estimated body mass index is 44.26 kg/m  as calculated from the following:    Height as of 1/14/25: 1.575 m (5' 2\").    Weight as of 1/14/25: 109.8 kg (242 lb).   Weight management plan: As above      Chart documentation done in part with Dragon Voice recognition Software. Although reviewed after completion, some word and grammatical error may " remain.    See Patient Instructions    Subjective   Adelaida is a 54 year old, presenting for the following health issues:  Patient is here for a video visit instead of in person visit due to the current COVID-19 pandemic.    Diabetes      2/17/2025     8:01 AM   Additional Questions   Roomed by Carrie MILLER   Accompanied by Self     History of Present Illness       Diabetes:   She presents for follow up of diabetes.  She is checking home blood glucose a few times a month.   She checks blood glucose before and after meals.  Blood glucose is never over 200 and never under 70.  When her blood glucose is low, the patient is asymptomatic for confusion, blurred vision, lethargy and reports not feeling dizzy, shaky, or weak.   She has no concerns regarding her diabetes at this time.   She is not experiencing numbness or burning in feet, excessive thirst, blurry vision, weight changes or redness, sores or blisters on feet.           She eats 0-1 servings of fruits and vegetables daily.She consumes 0 sweetened beverage(s) daily.She exercises with enough effort to increase her heart rate 9 or less minutes per day.  She exercises with enough effort to increase her heart rate 3 or less days per week.   She is taking medications regularly.       Diabetes Follow-up    How often are you checking your blood sugar? A few times a month  What time of day are you checking your blood sugars (select all that apply)?  Before meals  Have you had any blood sugars above 200?  No  Have you had any blood sugars below 70?  No  What symptoms do you notice when your blood sugar is low?  None  What concerns do you have today about your diabetes? None   Do you have any of these symptoms? (Select all that apply)  No numbness or tingling in feet.  No redness, sores or blisters on feet.  No complaints of excessive thirst.  No reports of blurry vision.  No significant changes to weight.      BP Readings from Last 2 Encounters:   08/09/24 121/79   06/19/24  109/75     Hemoglobin A1C (%)   Date Value   02/13/2025 5.7 (H)   08/09/2024 5.6   11/30/2020 6.1 (H)   11/19/2019 5.5     LDL Cholesterol Calculated (mg/dL)   Date Value   07/26/2024 47   04/17/2023 51   03/29/2021 120 (H)   02/18/2019 95             Hyperlipidemia Follow-Up    Are you regularly taking any medication or supplement to lower your cholesterol?   Yes- lipitor  Are you having muscle aches or other side effects that you think could be caused by your cholesterol lowering medication?  No    Migraine   Since your last clinic visit, how have your headaches changed?  Improved  How often are you getting headaches or migraines? Every few months   Are you able to do normal daily activities when you have a migraine? Yes  Are you taking rescue/relief medications? (Select all that apply) sumatriptan (Imitrex)  How helpful is your rescue/relief medication?  I get total relief  Are you taking any medications to prevent migraines? (Select all that apply)  No      Medication Followup of insomnia  Taking Medication as prescribed: yes  Side Effects:  None  Medication Helping Symptoms:  yes      Review of Systems  CONSTITUTIONAL: History of obesity  RESP: NEGATIVE for significant cough or SOB  CV: NEGATIVE for chest pain, palpitations or peripheral edema  CV: History of hypertension  GI: History of dyspepsia, GERD  MUSCULOSKELETAL: NEGATIVE for significant arthralgias or myalgia  NEURO: History of migraine  ENDOCRINE: History of diabetes  HEME/ALLERGY/IMMUNE: NEGATIVE for bleeding problems  PSYCHIATRIC: NEGATIVE for changes in mood or affect      Objective           Vitals:  No vitals were obtained today due to virtual visit.    Physical Exam   GENERAL: alert and no distress  EYES: Eyes grossly normal to inspection  RESP: No audible wheeze, cough, or visible cyanosis.    NEURO: Cranial nerves grossly intact.  Mentation and speech appropriate for age.  PSYCH: Appropriate affect, tone, and pace of words    Hemoglobin A1C    Date Value Ref Range Status   02/13/2025 5.7 (H) 0.0 - 5.6 % Final     Comment:     Normal <5.7%   Prediabetes 5.7-6.4%    Diabetes 6.5% or higher     Note: Adopted from ADA consensus guidelines.   11/30/2020 6.1 (H) 0 - 5.6 % Final     Comment:     Normal <5.7% Prediabetes 5.7-6.4%  Diabetes 6.5% or higher - adopted from ADA   consensus guidelines.             Video-Visit Details    Type of service:  Video Visit   Originating Location (pt. Location): Home    Distant Location (provider location):  Off-site  Platform used for Video Visit: Delmi  Signed Electronically by: Windy Gordillo MD

## 2025-02-19 NOTE — TELEPHONE ENCOUNTER
Central Prior Authorization Team   Phone: [phone]    PA Initiation    Medication: Dapagliflozin Propanediol 10 Tabs  Insurance Company: [employer] - Phone [phone] Fax [phone]  Pharmacy Filling the Rx: [facility] - [place], [place] - [address]  Filling Pharmacy Phone: [phone]  Filling Pharmacy Fax:    Start Date: 2/19/2025    [facility] KEY: [id number]

## 2025-02-20 NOTE — TELEPHONE ENCOUNTER
Patient notified per Windy Gordillo MD:    Please update patient on PA denial for Farxiga  Per insurance , prescription sent to [facility] pharmacy for Jardiance 25 mg once daily instead    Advised to monitor how she feels on prescription. Advised to monitor blood sugar levels and watch for signs of hypo or hyperglycemia. Patient verbalized understanding and agrees with plan. Advised to call with questions or concerns. Ever Ames RN, BSN

## 2025-02-20 NOTE — TELEPHONE ENCOUNTER
Please update patient on PA denial for Farxiga  Per insurance , prescription sent to [facility] pharmacy for Jardiance 25 mg once daily instead

## 2025-02-20 NOTE — TELEPHONE ENCOUNTER
PRIOR AUTHORIZATION DENIED    Medication: Dapagliflozin Propanediol 10 Tabs    Denial Date: 2/20/2025    Denial Rational:  Patient must have a history of trial & failure to the formulary alternative(s) or have a contraindication or intolerance to the formulary alternatives:          Appeal Information:    If you would like to appeal, please supply P/A team with a letter of medical necessity with clinical reason.

## 2025-02-20 NOTE — TELEPHONE ENCOUNTER
First attempt. Left message for patient to return call to clinic per Windy Gordillo MD:    Please update patient on PA denial for Farxiga  Per insurance , prescription sent to [facility] pharmacy for Jardiance 25 mg once daily instead     RN, if/when patient returns call, please review message as shown. Ever Ames RN, BSN

## 2025-03-03 ENCOUNTER — OFFICE VISIT (OUTPATIENT)
Dept: URGENT CARE | Facility: URGENT CARE | Age: 55
End: 2025-03-03
Payer: COMMERCIAL

## 2025-03-03 VITALS
OXYGEN SATURATION: 98 % | WEIGHT: 246 LBS | DIASTOLIC BLOOD PRESSURE: 83 MMHG | SYSTOLIC BLOOD PRESSURE: 136 MMHG | HEART RATE: 77 BPM | TEMPERATURE: 98.2 F | BODY MASS INDEX: 44.99 KG/M2 | RESPIRATION RATE: 18 BRPM

## 2025-03-03 DIAGNOSIS — H66.93 ACUTE EAR INFECTION, BILATERAL: Primary | ICD-10-CM

## 2025-03-03 DIAGNOSIS — R05.1 ACUTE COUGH: ICD-10-CM

## 2025-03-03 PROCEDURE — 3075F SYST BP GE 130 - 139MM HG: CPT

## 2025-03-03 PROCEDURE — 1125F AMNT PAIN NOTED PAIN PRSNT: CPT

## 2025-03-03 PROCEDURE — 99213 OFFICE O/P EST LOW 20 MIN: CPT

## 2025-03-03 PROCEDURE — 3079F DIAST BP 80-89 MM HG: CPT

## 2025-03-03 RX ORDER — NEOMYCIN SULFATE, POLYMYXIN B SULFATE AND HYDROCORTISONE 10; 3.5; 1 MG/ML; MG/ML; [USP'U]/ML
3 SUSPENSION/ DROPS AURICULAR (OTIC) 4 TIMES DAILY
Qty: 10 ML | Refills: 0 | Status: SHIPPED | OUTPATIENT
Start: 2025-03-03 | End: 2025-03-13

## 2025-03-03 RX ORDER — BENZONATATE 200 MG/1
200 CAPSULE ORAL 3 TIMES DAILY PRN
Qty: 21 CAPSULE | Refills: 0 | Status: SHIPPED | OUTPATIENT
Start: 2025-03-03

## 2025-03-03 ASSESSMENT — PAIN SCALES - GENERAL: PAINLEVEL_OUTOF10: MODERATE PAIN (5)

## 2025-03-03 NOTE — LETTER
March 3, 2025      Adelaida Packer  [address] [place] [address]        To Whom It May Concern:    Adelaida Packer  was seen on March 3, 2025.  Please excuse her from work until March 5th due to illness.        Sincerely,        PEREZ Siddiqi CNP    Electronically signed

## 2025-03-04 ENCOUNTER — E-VISIT (OUTPATIENT)
Dept: URGENT CARE | Facility: CLINIC | Age: 55
End: 2025-03-04
Payer: COMMERCIAL

## 2025-03-04 DIAGNOSIS — B96.89 ACUTE BACTERIAL SINUSITIS: Primary | ICD-10-CM

## 2025-03-04 DIAGNOSIS — J01.90 ACUTE BACTERIAL SINUSITIS: Primary | ICD-10-CM

## 2025-03-04 PROCEDURE — 99207 PR NON-BILLABLE SERV PER CHARTING: CPT | Performed by: EMERGENCY MEDICINE

## 2025-03-04 NOTE — PATIENT INSTRUCTIONS
Use the ear drops and take benzonatate as prescribed.  Follow up with your primary care provider should symptoms persist.

## 2025-03-04 NOTE — PATIENT INSTRUCTIONS
Acute Sinusitis: Care Instructions  Overview     Acute sinusitis is an inflammation of the mucous membranes inside the nose and sinuses. Sinuses are the hollow spaces in your skull around the eyes and nose. Acute sinusitis often follows a cold. Acute sinusitis causes thick, discolored mucus that drains from the nose or down the back of the throat. It also can cause pain and pressure in your head and face along with a stuffy or blocked nose.  In most cases, sinusitis gets better on its own in 1 to 2 weeks. But some mild symptoms may last for several weeks. Sometimes antibiotics are needed if there is a bacterial infection.  Follow-up care is a key part of your treatment and safety. Be sure to make and go to all appointments, and call your doctor if you are having problems. It's also a good idea to know your test results and keep a list of the medicines you take.  How can you care for yourself at home?  Use saline (saltwater) nasal washes. This can help keep your nasal passages open and wash out mucus and allergens.  You can buy saline nose washes at a grocery store or drugstore. Follow the instructions on the package.  You can make your own at home. Add 1 teaspoon of non-iodized salt and 1 teaspoon of baking soda to 2 cups of distilled or boiled and cooled water. Fill a squeeze bottle or a nasal cleansing pot (such as a neti pot) with the nasal wash. Then put the tip into your nostril, and lean over the sink. With your mouth open, gently squirt the liquid. Repeat on the other side.  Try a decongestant nasal spray like oxymetazoline (Afrin). Do not use it for more than 3 days in a row. Using it for more than 3 days can make your congestion worse.  If needed, take an over-the-counter pain medicine, such as acetaminophen (Tylenol), ibuprofen (Advil, Motrin), or naproxen (Aleve). Read and follow all instructions on the label.  If the doctor prescribed antibiotics, take them as directed. Do not stop taking them just  "because you feel better. You need to take the full course of antibiotics.  Be careful when taking over-the-counter cold or flu medicines and Tylenol at the same time. Many of these medicines have acetaminophen, which is Tylenol. Read the labels to make sure that you are not taking more than the recommended dose. Too much acetaminophen (Tylenol) can be harmful.  Try a steroid nasal spray. It may help with your symptoms.  Breathe warm, moist air. You can use a steamy shower, a hot bath, or a sink filled with hot water. Avoid cold, dry air. Using a humidifier in your home may help. Follow the directions for cleaning the machine.  When should you call for help?   Call your doctor now or seek immediate medical care if:    You have new or worse swelling, redness, or pain in your face or around one or both of your eyes.     You have double vision or a change in your vision.     You have a high fever.     You have a severe headache and a stiff neck.     You have mental changes, such as feeling confused or much less alert.   Watch closely for changes in your health, and be sure to contact your doctor if:    You are not getting better as expected.   Where can you learn more?  Go to https://www.[employer].net/patiented  Enter I933 in the search box to learn more about \"Acute Sinusitis: Care Instructions.\"  Current as of: September 27, 2023  Content Version: 14.3    2024 [facility].   Care instructions adapted under license by your healthcare professional. If you have questions about a medical condition or this instruction, always ask your healthcare professional. [facility] disclaims any warranty or liability for your use of this information.    Dear Adelaida Packer       Based on your responses and diagnosis, I have prescribed Augmentin  to treat your symptoms. I have sent this to your pharmacy.?     It is also important to stay well hydrated, get lots of rest and take over-the-counter " decongestants,?tylenol?or ibuprofen if you?are able to?take those medications per your primary care provider to help relieve discomfort.?     It is important that you take?all of?your prescribed medication even if your symptoms are improving after a few doses.? Taking?all of?your medicine helps prevent the symptoms from returning.?     If your symptoms worsen, you develop severe headache, vomiting, high fever (>102), or are not improving in 7 days, please contact your primary care provider for an appointment or visit any of our convenient Walk-in Care or Urgent Care Centers to be seen which can be found on our website?here.?     Thanks again for choosing?us?as your health care partner,?   ?  Wilber Alvarenga MD?   Thank you for choosing us for your care. I have placed an order for a prescription so that you can start treatment:  Orders Placed This Encounter   Medications     amoxicillin-clavulanate (AUGMENTIN) 875-125 MG tablet     Sig: Take 1 tablet by mouth 2 times daily for 7 days.     Dispense:  14 tablet     Refill:  0          View your full visit summary for details by clicking on the link below. Your pharmacist will able to address any questions you may have about the medication.     If you're not feeling better within 5-7 days, please schedule an appointment.  You can schedule an appointment right here in [facility], or call [phone]  If the visit is for the same symptoms as your eVisit, we'll refund the cost of your eVisit if seen within seven days.

## 2025-03-04 NOTE — PROGRESS NOTES
"ASSESSMENT:  (H66.93) Acute ear infection, bilateral  (primary encounter diagnosis)  Plan: neomycin-polymyxin-hydrocortisone (CORTISPORIN)        3.5-01953-2 otic suspension    (R05.1) Acute cough  Plan: benzonatate (TESSALON) 200 MG capsule    PLAN:  Patient's symptoms likely related to the \"float therapy\" that she experienced 2 days ago prior to symptom onset.  We discussed using the eardrops and taking benzonatate as prescribed.  We also discussed following up with her primary care provider should symptoms persist.  Work note provided.  Patient acknowledged her understanding of the above plan.    The use of Dragon/[employer] dictation services may have been used to construct the content in this note; any grammatical or spelling errors are non-intentional. Please contact the author of this note directly if you are in need of any clarification.      Chase Arevalo, PEREZ CNP      SUBJECTIVE:   Adelaida Packer is a 54 year old female presenting with a chief complaint of cough - non-productive and ear pain right.  Onset of symptoms was 2 day(s) ago after experiencing \"float therapy\" in water.   Treatment measures tried include Tylenol, warm compress and \"cold medicine\".    ROS:  Negative except noted above.    OBJECTIVE:  /83 (BP Location: Left arm, Patient Position: Sitting, Cuff Size: Adult Large)   Pulse 77   Temp 98.2  F (36.8  C) (Tympanic)   Resp 18   Wt 111.6 kg (246 lb)   LMP 08/13/2005   SpO2 98%   BMI 44.99 kg/m    GENERAL APPEARANCE: healthy, alert and no distress  EYES: EOMI,  PERRL, conjunctiva clear  HENT: TM erythematous left, internal auditory canal erythematous bilaterally and oral mucous membranes moist, no erythema noted  NECK: supple, nontender, no lymphadenopathy  RESP: lungs clear to auscultation - no rales, rhonchi or wheezes  CV: regular rates and rhythm, normal S1 S2, no murmur noted  SKIN: no suspicious lesions or rashes  "

## 2025-03-06 ENCOUNTER — OFFICE VISIT (OUTPATIENT)
Dept: FAMILY MEDICINE | Facility: CLINIC | Age: 55
End: 2025-03-06
Payer: COMMERCIAL

## 2025-03-06 VITALS
OXYGEN SATURATION: 100 % | SYSTOLIC BLOOD PRESSURE: 136 MMHG | RESPIRATION RATE: 20 BRPM | DIASTOLIC BLOOD PRESSURE: 87 MMHG | BODY MASS INDEX: 44.5 KG/M2 | WEIGHT: 241.8 LBS | HEART RATE: 84 BPM | HEIGHT: 62 IN | TEMPERATURE: 98.2 F

## 2025-03-06 DIAGNOSIS — J01.00 ACUTE NON-RECURRENT MAXILLARY SINUSITIS: ICD-10-CM

## 2025-03-06 DIAGNOSIS — J06.9 VIRAL URI: Primary | ICD-10-CM

## 2025-03-06 LAB
FLUAV RNA SPEC QL NAA+PROBE: POSITIVE
FLUBV RNA RESP QL NAA+PROBE: NEGATIVE
RSV RNA SPEC NAA+PROBE: NEGATIVE
SARS-COV-2 RNA RESP QL NAA+PROBE: NEGATIVE

## 2025-03-06 RX ORDER — FLUTICASONE PROPIONATE 50 MCG
1 SPRAY, SUSPENSION (ML) NASAL DAILY
Qty: 16 G | Refills: 2 | Status: SHIPPED | OUTPATIENT
Start: 2025-03-06

## 2025-03-06 RX ORDER — FLUTICASONE PROPIONATE AND SALMETEROL 250; 50 UG/1; UG/1
1 POWDER RESPIRATORY (INHALATION) EVERY 12 HOURS
Qty: 60 EACH | Refills: 1 | Status: SHIPPED | OUTPATIENT
Start: 2025-03-06

## 2025-03-06 NOTE — LETTER
3/6/2025    Adelaidasusan Packer   1970        To Whom it May Concern;    Please excuse Adelaida TANIA Rendonon from work 3/3/25 through 3/7/25 due to illness.    Sincerely,        Ranulfo Fernandez MD

## 2025-03-06 NOTE — PROGRESS NOTES
"  Assessment & Plan       ICD-10-CM    1. Viral URI  J06.9 Influenza A/B, RSV and SARS-CoV2 PCR (COVID-19) Nose     fluticasone-salmeterol (ADVAIR) 250-50 MCG/ACT inhaler     CANCELED: Influenza A/B, RSV and SARS-CoV2 PCR (COVID-19)      2. Acute non-recurrent maxillary sinusitis  J01.00 fluticasone (FLONASE) 50 MCG/ACT nasal spray            The longitudinal plan of care for the diagnosis(es)/condition(s) as documented were addressed during this visit. Due to the added complexity in care, I will continue to support Adelaida in the subsequent management and with ongoing continuity of care.     MED REC REQUIRED  Post Medication Reconciliation Status:       There are no Patient Instructions on file for this visit.    Shannan Son is a 54 year old, presenting for the following health issues:  RECHECK (UC visit)      3/6/2025     9:17 AM   Additional Questions   Roomed by Shannan   Accompanied by None         3/6/2025     9:17 AM   Patient Reported Additional Medications   Patient reports taking the following new medications none     HPI        ED/UC Followup:    Facility:  [facility]  Date of visit: 3/3/2025  Reason for visit: Ear pain  Current Status: Ear pain still persisting. Requesting flu swab today. Has been experiencing chills, body aches, feeling feverish, sweats    Float therapy a few days prior to symptom onset  External era infection - abx drops  Coughing as well  Next day - sinus pressure - augmentin started 3/4  Wednesday - body aches, chills, fever - highest temp was 100-101, sweating  Home covid test yesterday was negative  No nasal sprays or sinus rinse              Review of Systems  Constitutional, HEENT, cardiovascular, pulmonary, gi and gu systems are negative, except as otherwise noted.      Objective    /87   Pulse 84   Temp 98.2  F (36.8  C) (Temporal)   Resp 20   Ht 1.575 m (5' 2\")   Wt 109.7 kg (241 lb 12.8 oz)   LMP 08/13/2005   SpO2 100%   BMI 44.23 " kg/m    Body mass index is 44.23 kg/m .  Physical Exam  Vitals reviewed.   Constitutional:       General: She is not in acute distress.     Appearance: Normal appearance. She is well-developed.   HENT:      Head: Normocephalic and atraumatic.      Comments: Bilateral sinus tenderness     Right Ear: External ear normal.      Ears:      Comments: Erythema left external canal     Nose: Nose normal.      Mouth/Throat:      Mouth: Mucous membranes are moist.      Pharynx: Oropharynx is clear.   Eyes:      General: No scleral icterus.     Extraocular Movements: Extraocular movements intact.      Conjunctiva/sclera: Conjunctivae normal.   Cardiovascular:      Rate and Rhythm: Normal rate.   Pulmonary:      Effort: Pulmonary effort is normal.   Musculoskeletal:         General: No deformity. Normal range of motion.      Cervical back: Normal range of motion.   Skin:     General: Skin is warm and dry.      Findings: No rash.   Neurological:      Mental Status: She is alert and oriented to person, place, and time. Mental status is at baseline.      Gait: Gait normal.   Psychiatric:         Behavior: Behavior normal.         Thought Content: Thought content normal.         Judgment: Judgment normal.                    Signed Electronically by: Ranulfo Fernandez MD

## 2025-03-13 DIAGNOSIS — I12.9 HYPERTENSION, RENAL: ICD-10-CM

## 2025-03-15 RX ORDER — LABETALOL 300 MG/1
300 TABLET, FILM COATED ORAL 2 TIMES DAILY
Qty: 180 TABLET | Refills: 3 | Status: SHIPPED | OUTPATIENT
Start: 2025-03-15

## 2025-03-16 ENCOUNTER — OFFICE VISIT (OUTPATIENT)
Dept: URGENT CARE | Facility: URGENT CARE | Age: 55
End: 2025-03-16
Payer: COMMERCIAL

## 2025-03-16 VITALS
OXYGEN SATURATION: 100 % | SYSTOLIC BLOOD PRESSURE: 132 MMHG | BODY MASS INDEX: 43.64 KG/M2 | WEIGHT: 238.6 LBS | HEART RATE: 70 BPM | TEMPERATURE: 97.7 F | DIASTOLIC BLOOD PRESSURE: 81 MMHG | RESPIRATION RATE: 16 BRPM

## 2025-03-16 DIAGNOSIS — H60.392 INFECTIVE OTITIS EXTERNA, LEFT: ICD-10-CM

## 2025-03-16 DIAGNOSIS — H66.005 RECURRENT ACUTE SUPPURATIVE OTITIS MEDIA WITHOUT SPONTANEOUS RUPTURE OF LEFT TYMPANIC MEMBRANE: Primary | ICD-10-CM

## 2025-03-16 DIAGNOSIS — E11.65 TYPE 2 DIABETES MELLITUS WITH HYPERGLYCEMIA, UNSPECIFIED WHETHER LONG TERM INSULIN USE (H): ICD-10-CM

## 2025-03-16 PROCEDURE — 99214 OFFICE O/P EST MOD 30 MIN: CPT | Performed by: PHYSICIAN ASSISTANT

## 2025-03-16 PROCEDURE — 3075F SYST BP GE 130 - 139MM HG: CPT | Performed by: PHYSICIAN ASSISTANT

## 2025-03-16 PROCEDURE — 3079F DIAST BP 80-89 MM HG: CPT | Performed by: PHYSICIAN ASSISTANT

## 2025-03-16 RX ORDER — OFLOXACIN 3 MG/ML
10 SOLUTION AURICULAR (OTIC) DAILY
Qty: 10 ML | Refills: 0 | Status: SHIPPED | OUTPATIENT
Start: 2025-03-16 | End: 2025-03-23

## 2025-03-16 RX ORDER — FLUCONAZOLE 150 MG/1
150 TABLET ORAL ONCE
Qty: 2 TABLET | Refills: 0 | Status: SHIPPED | OUTPATIENT
Start: 2025-03-16 | End: 2025-03-16

## 2025-03-16 RX ORDER — CEFDINIR 300 MG/1
300 CAPSULE ORAL 2 TIMES DAILY
Qty: 20 CAPSULE | Refills: 0 | Status: SHIPPED | OUTPATIENT
Start: 2025-03-16

## 2025-03-16 NOTE — PROGRESS NOTES
Chief Complaint   Patient presents with    Urgent Care    Otalgia     Pt reports Left ear plugge, 2 weeks ago was treated for sinus infection and had influenza A, last night kind of hurting and woke up this morning can't hear                      ASSESSMENT:     ICD-10-CM    1. Recurrent acute suppurative otitis media without spontaneous rupture of left tympanic membrane  H66.005 Adult ENT  Referral      2. Infective otitis externa, left  H60.392 cefdinir (OMNICEF) 300 MG capsule     ofloxacin (FLOXIN) 0.3 % otic solution     fluconazole (DIFLUCAN) 150 MG tablet     Adult ENT  Referral      3. Type 2 diabetes mellitus with hyperglycemia, unspecified whether long term insulin use (H)  E11.65 cefdinir (OMNICEF) 300 MG capsule     ofloxacin (FLOXIN) 0.3 % otic solution     fluconazole (DIFLUCAN) 150 MG tablet     Adult ENT  Referral            PLAN: Persistent/recurrent left ear infection.  Left otitis media/externa.  Ofloxacin eardrops.  Cefdinir.  Follow-up with ENT if no resolution.  I have discussed clinical findings with patient.  Side effects of medications discussed.  Symptomatic care is discussed.  I have discussed the possibility of  worsening symptoms and indication to RTC or go to the ER if they occur.  All questions are answered, patient indicates understanding of these issues and is in agreement with plan.   Patient care instructions are discussed/given at the end of visit.   Lots of rest and fluids.      Fransisca Encinas PA-C      SUBJECTIVE:  54-year-old female presents for evaluation of persistent left ear plugged sensation, worse over the past 2 days.  Started after doing some Epsom salt spa bath.  Mild residual cough from influenza, continues to improve  3/4/2025- Augmentin for sinusitis  3/3/2025- adult cortisporin ear drops  Then got influenza.    Allergies   Allergen Reactions    Sulfa Antibiotics Shortness Of Breath and Rash    Doxycycline      Severe gastritis,  nausea, vomiting-ended up in the ER    Hydrochlorothiazide W-Triamterene Other (See Comments)     Renal insuff    Maxzide [Hydrochlorothiazide W/Triamterene] Other (See Comments)     Renal insuff    Metoprolol Other (See Comments)     Other reaction(s): Bradycardia  At high dose only  At high dose only    Seasonal Allergies      Hayfever, tree pollens       Past Medical History:   Diagnosis Date    Allergic rhinitis due to other allergen     seasonal    Arthritis     Diabetes (H)     Localized osteoarthritis of both knees     Migraine, unspecified, without mention of intractable migraine without mention of status migrainosus     Monoclonal gammopathy     Morbid obesity (H)     Therapeutic drug monitoring 09/02/2022    Type 2 diabetes mellitus with stage 3a chronic kidney disease, without long-term current use of insulin (H) 08/09/2016    Unspecified essential hypertension     dx around age 31       Current Outpatient Medications   Medication Sig Dispense Refill    acetaminophen (TYLENOL) 325 MG tablet Take 2 tablets (650 mg) by mouth every 4 hours as needed for other (mild pain) 100 tablet 0    aspirin (ASA) 81 MG EC tablet Take 1 tablet (81 mg) by mouth daily 90 tablet 3    atorvastatin (LIPITOR) 10 MG tablet Take 1 tablet (10 mg) by mouth every evening. 90 tablet 3    benzonatate (TESSALON) 200 MG capsule Take 1 capsule (200 mg) by mouth 3 times daily as needed for cough. 21 capsule 0    blood glucose (NO BRAND SPECIFIED) lancets standard Use to test blood sugar 2 times daily or as directed. 200 lancet 3    blood glucose (NO BRAND SPECIFIED) lancets standard Use to test blood sugar 1-2 times daily or as directed. 200 each 3    blood glucose (NO BRAND SPECIFIED) test strip Use to test blood sugar 2 times daily or as directed. 200 strip 3    blood glucose (NO BRAND SPECIFIED) test strip Use to test blood sugar 2-3 times daily or as directed. 200 strip 3    blood glucose monitoring (NO BRAND SPECIFIED) meter device  kit Use to test blood sugar 1-2 times daily or as directed. 1 kit 0    cetirizine (ZYRTEC) 10 MG tablet Take 10 mg by mouth daily as needed for allergies      empagliflozin (JARDIANCE) 25 MG TABS tablet Take 1 tablet (25 mg) by mouth daily. 90 tablet 1    eplerenone (INSPRA) 50 MG tablet Take 2 tablets (100 mg) by mouth 2 times daily. 360 tablet 3    famotidine (PEPCID) 20 MG tablet Take 1 tablet (20 mg) by mouth 2 times daily. 180 tablet 3    fluconazole (DIFLUCAN) 150 MG tablet Take 1 tablet (150 mg) by mouth every 72 hours. 2 tablet 0    Fluocinolone Acetonide Scalp (DERMA-SMOOTHE/FS SCALP) 0.01 % OIL oil Apply nightly to the scalp for the 6 weeks 60 mL 1    fluticasone (FLONASE) 50 MCG/ACT nasal spray Spray 1 spray into both nostrils daily. 16 g 2    fluticasone (FLONASE) 50 MCG/ACT nasal spray INSTILL 1 SPRAY INTO BOTH NOSTRILS DAILY 16 mL 1    fluticasone-salmeterol (ADVAIR) 250-50 MCG/ACT inhaler Inhale 1 puff into the lungs every 12 hours. 60 each 1    hydrOXYzine HCl (ATARAX) 25 MG tablet Take 1 tablet (25 mg) by mouth nightly as needed for other (sleep). 90 tablet 3    labetalol (NORMODYNE) 300 MG tablet TAKE ONE TABLET BY MOUTH TWICE A  tablet 3    ondansetron (ZOFRAN ODT) 4 MG ODT tab Take 1 tablet (4 mg) by mouth every 8 hours as needed for nausea 30 tablet 1    polyethylene glycol (MIRALAX) 17 g packet Take 17 g by mouth daily 7 packet 0    SUMAtriptan (IMITREX) 25 MG tablet Profile rx,TAKE 1 TO 2 TABLETS BY MOUTH AT ONSET OF HEADACHE FOR MIGRAINE. MAY REPEAT DOSE IN 2 HOURS. MAX OF 200MG OR 2 DOSES IN 24 HOURS 18 tablet 1    tirzepatide (MOUNJARO) 15 MG/0.5ML pen Inject 15 mg subcutaneously once a week 6 mL 1    Tirzepatide 15 MG/0.5ML SOAJ Inject 0.5 mLs (15 mg) subcutaneously every 7 days. 6 mL 3    amoxicillin (AMOXIL) 500 MG capsule Take 4 caps (2000mg) 30-60 minutes before dental procedure (Patient not taking: Reported on 3/16/2025) 4 capsule 0     No current facility-administered  medications for this visit.       Social History     Tobacco Use    Smoking status: Never     Passive exposure: Never    Smokeless tobacco: Never   Substance Use Topics    Alcohol use: No       ROS:  CONSTITUTIONAL: Negative for fatigue or fever.  EYES: Negative for eye problems.  ENT: As above.  RESP: As above.  CV: Negative for chest pains.  GI: Negative for vomiting.  MUSCULOSKELETAL:  Negative for significant muscle or joint pains.  NEUROLOGIC: Negative for headaches.  SKIN: Negative for rash.  PSYCH: Normal mentation for age.    OBJECTIVE:  /81   Pulse 70   Temp 97.7  F (36.5  C) (Tympanic)   Resp 16   Wt 108.2 kg (238 lb 9.6 oz)   LMP 08/13/2005   SpO2 100%   BMI 43.64 kg/m    GENERAL APPEARANCE: Healthy, alert and no distress.  EYES:Conjunctiva/sclera clear.  EARS: No cerumen.   Right canal is clear.  Left canal is erythematous and inflamed.  Left TM is red, dull, retracted.  Right TM is translucent.  .  THROAT: No erythema w/o tonsillar enlargement . No exudates.  NECK: Supple  RESP: Breathing comfortably   NEURO: Awake, alert    SKIN: No rashes      Fransisca Encinas PA-C

## 2025-04-02 ENCOUNTER — OFFICE VISIT (OUTPATIENT)
Dept: AUDIOLOGY | Facility: CLINIC | Age: 55
End: 2025-04-02
Payer: COMMERCIAL

## 2025-04-02 ENCOUNTER — OFFICE VISIT (OUTPATIENT)
Dept: OTOLARYNGOLOGY | Facility: CLINIC | Age: 55
End: 2025-04-02
Payer: COMMERCIAL

## 2025-04-02 DIAGNOSIS — H93.8X2 EAR FULLNESS, LEFT: Primary | ICD-10-CM

## 2025-04-02 DIAGNOSIS — E11.65 TYPE 2 DIABETES MELLITUS WITH HYPERGLYCEMIA, UNSPECIFIED WHETHER LONG TERM INSULIN USE (H): ICD-10-CM

## 2025-04-02 DIAGNOSIS — H60.392 INFECTIVE OTITIS EXTERNA, LEFT: ICD-10-CM

## 2025-04-02 DIAGNOSIS — H61.22 IMPACTED CERUMEN OF LEFT EAR: Primary | ICD-10-CM

## 2025-04-02 DIAGNOSIS — H66.005 RECURRENT ACUTE SUPPURATIVE OTITIS MEDIA WITHOUT SPONTANEOUS RUPTURE OF LEFT TYMPANIC MEMBRANE: ICD-10-CM

## 2025-04-02 PROCEDURE — 92557 COMPREHENSIVE HEARING TEST: CPT | Performed by: AUDIOLOGIST

## 2025-04-02 PROCEDURE — 92550 TYMPANOMETRY & REFLEX THRESH: CPT | Performed by: AUDIOLOGIST

## 2025-04-02 ASSESSMENT — ENCOUNTER SYMPTOMS
EYES NEGATIVE: 1
GASTROINTESTINAL NEGATIVE: 1
RESPIRATORY NEGATIVE: 1
CONSTITUTIONAL NEGATIVE: 1
DIZZINESS: 1

## 2025-04-02 NOTE — NURSING NOTE
Adelaida Packer's chief complaint for this visit includes:  Chief Complaint   Patient presents with    Consult     Left ear fullness and decreased hearing following an ear infection 1 month ago. Was doing float therapy and then the next day had ear pain. Seen in urgent care on 3/3/25 - concern for ear infection and sinus infection so treated with Floxin drops and Tessalon, determined she influenza A. Ear did not improve so seen in urgent care again on 3/16/25 - given Cefdinir abx and continue ear drops. Used saline irrigation which did not provide much relief of ear symptoms.     PCP: Windy Gordillo    Referring Provider:  [place] [address]    [facility] 08/13/2005     Sanya Linares, CHELSEA  April 2, 2025

## 2025-04-02 NOTE — PROGRESS NOTES
AUDIOLOGY REPORT    SUMMARY: Audiology visit completed. See audiogram for results.    RECOMMENDATIONS: Follow-up with ENT.    Zara Garcia  Doctor of Audiology  [place] License # [id number]
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
## 2025-04-03 ENCOUNTER — MEDICAL CORRESPONDENCE (OUTPATIENT)
Dept: HEALTH INFORMATION MANAGEMENT | Facility: CLINIC | Age: 55
End: 2025-04-03
Payer: COMMERCIAL

## 2025-04-15 ENCOUNTER — TELEPHONE (OUTPATIENT)
Dept: NEPHROLOGY | Facility: CLINIC | Age: 55
End: 2025-04-15
Payer: COMMERCIAL

## 2025-04-15 NOTE — TELEPHONE ENCOUNTER
Pt sent back a [employer] message: (copy below).    RICARDO Motley [facility] [place] (supporting Dia Aleman DO)2 minutes ago (3:35 PM)     Hi! I got your message and will begin taking Vitamin D asap.     Thanks,  Adelaida Packer

## 2025-04-15 NOTE — TELEPHONE ENCOUNTER
Attempted to contact pt.  No answer.  Message left to return call or can respond to Dr. Aleman's lab results message sent to her my chart 4/09/25.    Calling to discuss Lab Result Note received from Dr. Aleman. (Copy below).    RICARDO Motley,     I apologize for the delay in commenting on this as your vitamin D level had come back after the fact. Your vitamin  Dlevel was slightly below goal when we did your last set of labs. Your calcium has been at the upper limit of normal so I have been hesitant for you to take vitamin D. That being said, it probably makes sense for you to be on a vitamin D supplement but recommend only 800 units daily for now.     Sorry for the delay - please let me know if you have questions or concerns.  Dia Aleman DO   Written by Dia Aleman,  on 4/9/2025  1:30 PM CDT    Component      Latest Ref Rng 1/28/2025  8:22 AM   25 OH Vit D2      ug/L <5    25 OH Vit D3      ug/L 19    25 OH Vit D total      20 - 75 ug/L <24

## 2025-05-29 DIAGNOSIS — R11.0 NAUSEA: ICD-10-CM

## 2025-05-29 DIAGNOSIS — Z51.81 THERAPEUTIC DRUG MONITORING: ICD-10-CM

## 2025-05-29 RX ORDER — ONDANSETRON 4 MG/1
4 TABLET, ORALLY DISINTEGRATING ORAL EVERY 8 HOURS PRN
Qty: 30 TABLET | Refills: 1 | Status: SHIPPED | OUTPATIENT
Start: 2025-05-29

## 2025-06-06 ENCOUNTER — TELEPHONE (OUTPATIENT)
Dept: FAMILY MEDICINE | Facility: CLINIC | Age: 55
End: 2025-06-06
Payer: COMMERCIAL

## 2025-06-06 DIAGNOSIS — E11.22 TYPE 2 DIABETES MELLITUS WITH STAGE 3B CHRONIC KIDNEY DISEASE, WITHOUT LONG-TERM CURRENT USE OF INSULIN (H): Primary | ICD-10-CM

## 2025-06-06 DIAGNOSIS — N18.32 TYPE 2 DIABETES MELLITUS WITH STAGE 3B CHRONIC KIDNEY DISEASE, WITHOUT LONG-TERM CURRENT USE OF INSULIN (H): Primary | ICD-10-CM

## 2025-06-06 DIAGNOSIS — E78.5 HYPERLIPIDEMIA LDL GOAL <100: ICD-10-CM

## 2025-06-06 NOTE — TELEPHONE ENCOUNTER
Reason for Call:  Appointment Request    Patient requesting this type of appt:  Preventive     Requested provider: Windy Gordillo    Reason patient unable to be scheduled: Not within requested timeframe    When does patient want to be seen/preferred time: ASAP    Comments: patient stated she needs her A1C checked ASAP    Could we send this information to you in [place] or would you prefer to receive a phone call?:   Patient would prefer a phone call   Okay to leave a detailed message?: Yes at Cell number on file:    Telephone Information:   Mobile [phone]       Call taken on 6/6/2025 at 4:18 PM by Joselyn Salgado

## 2025-06-09 NOTE — TELEPHONE ENCOUNTER
Patient is not due for physical until August 2026 unless her insurance covers 1 physical for a year you can use one of my same-day spot.  Otherwise, please help patient schedule for a video visit for diabetes recheck

## 2025-06-10 ENCOUNTER — PATIENT OUTREACH (OUTPATIENT)
Dept: CARE COORDINATION | Facility: CLINIC | Age: 55
End: 2025-06-10
Payer: COMMERCIAL

## 2025-06-10 NOTE — TELEPHONE ENCOUNTER
Informed patient that fasting labs have been placed. Patient can go to any [facility] location. Patient will check [place] and [place].

## 2025-06-11 NOTE — TELEPHONE ENCOUNTER
DIAGNOSIS: (L) Bunion / self ref / HP / no images or surg    APPOINTMENT DATE: 7/15/25   NOTES STATUS DETAILS   OFFICE NOTE from referring provider N/A Self Referred     MEDICATION LIST Internal

## 2025-06-12 ENCOUNTER — PATIENT OUTREACH (OUTPATIENT)
Dept: CARE COORDINATION | Facility: CLINIC | Age: 55
End: 2025-06-12
Payer: COMMERCIAL

## 2025-06-13 ENCOUNTER — RESULTS FOLLOW-UP (OUTPATIENT)
Dept: ONCOLOGY | Facility: CLINIC | Age: 55
End: 2025-06-13

## 2025-06-13 ENCOUNTER — DOCUMENTATION ONLY (OUTPATIENT)
Dept: ONCOLOGY | Facility: CLINIC | Age: 55
End: 2025-06-13

## 2025-06-13 NOTE — PROGRESS NOTES
Adelaida Boatengon has an upcoming lab appointment:    Future Appointments   Date Time Provider Department Center   6/17/2025  1:15 PM  [facility]   6/17/2025  2:00 PM Geremias Dennis MD [facility]     Patient is scheduled for the following lab(s): CBC, CMP, SPEP, FLC     There is no order available. Please review and place future orders, as appropriate.    Thank you,  Deanna Lala    
3

## 2025-06-16 ENCOUNTER — VIRTUAL VISIT (OUTPATIENT)
Dept: FAMILY MEDICINE | Facility: CLINIC | Age: 55
End: 2025-06-16
Payer: COMMERCIAL

## 2025-06-16 ENCOUNTER — RESULTS FOLLOW-UP (OUTPATIENT)
Dept: FAMILY MEDICINE | Facility: CLINIC | Age: 55
End: 2025-06-16

## 2025-06-16 DIAGNOSIS — E66.813 CLASS 3 SEVERE OBESITY DUE TO EXCESS CALORIES WITH SERIOUS COMORBIDITY AND BODY MASS INDEX (BMI) OF 40.0 TO 44.9 IN ADULT (H): ICD-10-CM

## 2025-06-16 DIAGNOSIS — R10.13 DYSPEPSIA: ICD-10-CM

## 2025-06-16 DIAGNOSIS — G43.709 CHRONIC MIGRAINE WITHOUT AURA WITHOUT STATUS MIGRAINOSUS, NOT INTRACTABLE: ICD-10-CM

## 2025-06-16 DIAGNOSIS — E78.5 HYPERLIPIDEMIA LDL GOAL <100: ICD-10-CM

## 2025-06-16 DIAGNOSIS — N18.32 STAGE 3B CHRONIC KIDNEY DISEASE (H): ICD-10-CM

## 2025-06-16 DIAGNOSIS — N18.32 TYPE 2 DIABETES MELLITUS WITH STAGE 3B CHRONIC KIDNEY DISEASE, WITHOUT LONG-TERM CURRENT USE OF INSULIN (H): Primary | ICD-10-CM

## 2025-06-16 DIAGNOSIS — M54.41 CHRONIC RIGHT-SIDED LOW BACK PAIN WITH RIGHT-SIDED SCIATICA: ICD-10-CM

## 2025-06-16 DIAGNOSIS — E11.22 TYPE 2 DIABETES MELLITUS WITH STAGE 3B CHRONIC KIDNEY DISEASE, WITHOUT LONG-TERM CURRENT USE OF INSULIN (H): Primary | ICD-10-CM

## 2025-06-16 DIAGNOSIS — G89.29 CHRONIC RIGHT-SIDED LOW BACK PAIN WITH RIGHT-SIDED SCIATICA: ICD-10-CM

## 2025-06-16 PROCEDURE — 98006 SYNCH AUDIO-VIDEO EST MOD 30: CPT | Performed by: FAMILY MEDICINE

## 2025-06-16 RX ORDER — SUMATRIPTAN SUCCINATE 25 MG/1
TABLET ORAL
Qty: 18 TABLET | Refills: 1 | Status: SHIPPED | OUTPATIENT
Start: 2025-06-16

## 2025-06-16 RX ORDER — ATORVASTATIN CALCIUM 10 MG/1
10 TABLET, FILM COATED ORAL EVERY EVENING
Qty: 90 TABLET | Refills: 3 | Status: SHIPPED | OUTPATIENT
Start: 2025-06-16

## 2025-06-16 RX ORDER — FAMOTIDINE 20 MG/1
20 TABLET, FILM COATED ORAL 2 TIMES DAILY
Qty: 180 TABLET | Refills: 3 | Status: SHIPPED | OUTPATIENT
Start: 2025-06-16
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
## 2025-06-17 ENCOUNTER — ONCOLOGY VISIT (OUTPATIENT)
Dept: ONCOLOGY | Facility: CLINIC | Age: 55
End: 2025-06-17
Attending: INTERNAL MEDICINE
Payer: COMMERCIAL

## 2025-06-17 ENCOUNTER — RESULTS FOLLOW-UP (OUTPATIENT)
Dept: FAMILY MEDICINE | Facility: CLINIC | Age: 55
End: 2025-06-17

## 2025-06-17 ENCOUNTER — ANCILLARY PROCEDURE (OUTPATIENT)
Dept: GENERAL RADIOLOGY | Facility: CLINIC | Age: 55
End: 2025-06-17
Attending: FAMILY MEDICINE
Payer: COMMERCIAL

## 2025-06-17 ENCOUNTER — PATIENT OUTREACH (OUTPATIENT)
Dept: CARE COORDINATION | Facility: CLINIC | Age: 55
End: 2025-06-17

## 2025-06-17 VITALS
HEART RATE: 67 BPM | RESPIRATION RATE: 16 BRPM | BODY MASS INDEX: 45.36 KG/M2 | SYSTOLIC BLOOD PRESSURE: 124 MMHG | DIASTOLIC BLOOD PRESSURE: 84 MMHG | WEIGHT: 248 LBS | OXYGEN SATURATION: 98 %

## 2025-06-17 DIAGNOSIS — N18.2 CHRONIC KIDNEY DISEASE, STAGE 2 (MILD): ICD-10-CM

## 2025-06-17 DIAGNOSIS — G89.29 CHRONIC RIGHT-SIDED LOW BACK PAIN WITH RIGHT-SIDED SCIATICA: ICD-10-CM

## 2025-06-17 DIAGNOSIS — D47.2 MGUS (MONOCLONAL GAMMOPATHY OF UNKNOWN SIGNIFICANCE): Primary | ICD-10-CM

## 2025-06-17 DIAGNOSIS — M54.41 CHRONIC RIGHT-SIDED LOW BACK PAIN WITH RIGHT-SIDED SCIATICA: ICD-10-CM

## 2025-06-17 PROCEDURE — 72100 X-RAY EXAM L-S SPINE 2/3 VWS: CPT | Mod: TC | Performed by: RADIOLOGY

## 2025-06-17 PROCEDURE — G2211 COMPLEX E/M VISIT ADD ON: HCPCS | Performed by: INTERNAL MEDICINE

## 2025-06-17 PROCEDURE — 99214 OFFICE O/P EST MOD 30 MIN: CPT | Performed by: INTERNAL MEDICINE

## 2025-06-17 PROCEDURE — 99213 OFFICE O/P EST LOW 20 MIN: CPT | Performed by: INTERNAL MEDICINE

## 2025-06-17 ASSESSMENT — PAIN SCALES - GENERAL: PAINLEVEL_OUTOF10: SEVERE PAIN (7)

## 2025-06-17 NOTE — NURSING NOTE
"Oncology Rooming Note    June 17, 2025 2:02 PM   Adelaida Packer is a 55 year old female who presents for:    Chief Complaint   Patient presents with    Oncology Clinic Visit     1 year follow up       Initial Vitals: /83   Pulse 67   Resp 16   Wt 112.5 kg (248 lb)   LMP 08/13/2005   SpO2 98%   BMI 45.36 kg/m   Estimated body mass index is 45.36 kg/m  as calculated from the following:    Height as of 3/6/25: 1.575 m (5' 2\").    Weight as of this encounter: 112.5 kg (248 lb). Body surface area is 2.22 meters squared.  Severe Pain (7) Comment: Data Unavailable   Patient's last menstrual period was 08/13/2005.  Allergies reviewed: Yes  Medications reviewed: Yes    Medications: Medication refills not needed today.  Pharmacy name entered into [facility]:    [facility] - [place], [place] - [address], [address]  [id number] - [place], [place] - [address] [facility] - [place], [place] - [address] [id number] [place], [place] - [address] AT [address]    Frailty Screening:   Is the patient here for a new oncology consult visit in cancer care? 2. No    PHQ9:  Did this patient require a PHQ9?: No      Clinical concerns: results       Dana Mcintyre LPN              "

## 2025-06-18 NOTE — PROGRESS NOTES
[facility] Hematology / Oncology  Progress Note  Name: Adelaida Packer  :  1970  MRN:  [id number]    --------------------    Subjective:  Adelaida returns for follow-up of MGUS unaccompanied.  Since our last visit, Adelaida has done well from an overall health standpoint.  No unexplained fevers, chills or sweats; stable appetite, energy and weight; no progressive or symptomatic adenopathy; no immune system concerns.  Looking forward to a big educators conference.    --------------------    Objective:  VS: /84   Pulse 67   Resp 16   Wt 112.5 kg (248 lb)   LMP 2005   SpO2 98%   BMI 45.36 kg/m    Gen: Well-appearing.  Josafat: No cervical, clavicular, axillary adenopathy.    We reviewed CBC, CMP, SPEP, FLC.    --------------------    Assessment / Plan:  IgG lambda MGUS.  CKD, stage IIIA.  Hypercalcemia.  Status post parathyroidectomy.  Neutropenia, mild and suspect benign.    Continue observation; clinically stable from an MGUS standpoint; no CRAB phenomenon; stable blood counts and chemistries.  Stable M spike; slight rise in lambda light chains over time, likely consider bone marrow biopsy if ratio approaches 8-10.    Patient Instructions   BJT 12 months w/ labs 5-7 days prior to visit (CBC, CMP, SPEP, FLC).    Geremias Dennis MD.

## 2025-06-19 ENCOUNTER — PATIENT OUTREACH (OUTPATIENT)
Dept: CARE COORDINATION | Facility: CLINIC | Age: 55
End: 2025-06-19

## 2025-06-19 ENCOUNTER — THERAPY VISIT (OUTPATIENT)
Dept: PHYSICAL THERAPY | Facility: CLINIC | Age: 55
End: 2025-06-19
Attending: FAMILY MEDICINE
Payer: COMMERCIAL

## 2025-06-19 ENCOUNTER — OFFICE VISIT (OUTPATIENT)
Dept: PODIATRY | Facility: CLINIC | Age: 55
End: 2025-06-19
Payer: COMMERCIAL

## 2025-06-19 ENCOUNTER — HOSPITAL ENCOUNTER (OUTPATIENT)
Dept: GENERAL RADIOLOGY | Facility: HOSPITAL | Age: 55
Discharge: HOME | End: 2025-06-19
Attending: STUDENT IN AN ORGANIZED HEALTH CARE EDUCATION/TRAINING PROGRAM
Payer: COMMERCIAL

## 2025-06-19 DIAGNOSIS — M54.41 CHRONIC RIGHT-SIDED LOW BACK PAIN WITH RIGHT-SIDED SCIATICA: ICD-10-CM

## 2025-06-19 DIAGNOSIS — M20.12 HALLUX ABDUCTO VALGUS, LEFT: ICD-10-CM

## 2025-06-19 DIAGNOSIS — G89.29 CHRONIC PAIN OF LEFT ANKLE: ICD-10-CM

## 2025-06-19 DIAGNOSIS — M25.572 CHRONIC PAIN OF LEFT ANKLE: ICD-10-CM

## 2025-06-19 DIAGNOSIS — M76.821 POSTERIOR TIBIAL TENDON DYSFUNCTION (PTTD) OF BOTH LOWER EXTREMITIES: Primary | ICD-10-CM

## 2025-06-19 DIAGNOSIS — G89.29 CHRONIC RIGHT-SIDED LOW BACK PAIN WITH RIGHT-SIDED SCIATICA: ICD-10-CM

## 2025-06-19 DIAGNOSIS — M76.822 POSTERIOR TIBIAL TENDON DYSFUNCTION (PTTD) OF BOTH LOWER EXTREMITIES: Primary | ICD-10-CM

## 2025-06-19 PROCEDURE — 99204 OFFICE O/P NEW MOD 45 MIN: CPT | Performed by: STUDENT IN AN ORGANIZED HEALTH CARE EDUCATION/TRAINING PROGRAM

## 2025-06-19 PROCEDURE — 1125F AMNT PAIN NOTED PAIN PRSNT: CPT | Performed by: STUDENT IN AN ORGANIZED HEALTH CARE EDUCATION/TRAINING PROGRAM

## 2025-06-19 PROCEDURE — 73610 X-RAY EXAM OF ANKLE: CPT | Mod: LT

## 2025-06-19 PROCEDURE — 73630 X-RAY EXAM OF FOOT: CPT | Mod: LT

## 2025-06-19 ASSESSMENT — PAIN SCALES - GENERAL: PAINLEVEL_OUTOF10: MODERATE PAIN (4)

## 2025-06-19 NOTE — LETTER
"6/19/2025      Adelaida Packer  [address] [facility] [address] [place] [address]      Dear Colleague,    Thank you for referring your patient, Adelaida Packer, to the [facility]. Please see a copy of my visit note below.    CC: Flatfoot, Bilateral Foot     HPI: Adelaida Packer is a 55 year old female who presents to clinic for chief concern of flatfoot both her feet.  She states she is she is doing a \"step challenge\" with her daughter and that she now that she is walking more and more she is having more more pain to both her feet, pointing along the PT tendon on the inside of both legs.  She states that the left foot is significantly worse but is not sure if this is because of her bunion.  She would like to discuss what is going on with these as well as what is happening with the bunion and what can be done with this.    Past Surgical History:   Procedure Laterality Date     ARTHROPLASTY KNEE Right 8/11/2021    Procedure: ARTHROPLASTY, KNEE, TOTAL RIGHT;  Surgeon: Dylan Hernandez MD;  Location: [facility]     ARTHROPLASTY KNEE Left 7/27/2022    Procedure: ARTHROPLASTY, LEFT  KNEE, TOTAL;  Surgeon: Dylan Hernandez MD;  Location: [facility]     BUNIONECTOMY Right 7/6/2018    Procedure: BUNIONECTOMY;  Surgeon: Erik Bazan DPM;  Location: [facility];  Service:      EXCISE GANGLION WRIST Right 7/6/2018    Procedure: EXCISION OF THE GANGLION CYST, BUNIONECTOMU WITH FIRST METATARSAL  OSTEOTOMY WITH INTERNAL SCREW FIXATION, A SUBTALAR JIONT ARTHROERESIS OF THE RIGHT FOOT AND GASTROCNEMIUS RECESSION RIGHT LOWER EXTREMITY;  Surgeon: Erik Bazan DPM;  Location: [facility];  Service:      HC REMOVE TONSILS/ADENOIDS,12+ Y/O  1995     HEAD & NECK SURGERY  3/2013    Parathyroidectomy--had to go back in 6 days later for a possible bleed     HYSTERECTOMY       HYSTERECTOMY, VAGINAL  12/2005    hysterectomy- fibroids     KNEE SURGERY Right      ORTHOPEDIC SURGERY       " PARATHYROIDECTOMY       TONSILLECTOMY       [facility] ANESTH,KNEE AREA SURGERY  01/2001     [facility] GASTROPLASTY,OBESITY,VERT BAND      removed 2009     Past Medical History:   Diagnosis Date     Allergic rhinitis due to other allergen     seasonal     Arthritis      Diabetes (H)      Localized osteoarthritis of both knees      Migraine, unspecified, without mention of intractable migraine without mention of status migrainosus      Monoclonal gammopathy      Morbid obesity (H)      Therapeutic drug monitoring 09/02/2022     Type 2 diabetes mellitus with stage 3a chronic kidney disease, without long-term current use of insulin (H) 08/09/2016     Unspecified essential hypertension     dx around age 31     Medications:  Current Outpatient Medications   Medication Sig Dispense Refill     acetaminophen (TYLENOL) 325 MG tablet Take 2 tablets (650 mg) by mouth every 4 hours as needed for other (mild pain) 100 tablet 0     aspirin (ASA) 81 MG EC tablet Take 1 tablet (81 mg) by mouth daily 90 tablet 3     atorvastatin (LIPITOR) 10 MG tablet Take 1 tablet (10 mg) by mouth every evening. 90 tablet 3     blood glucose (NO BRAND SPECIFIED) lancets standard Use to test blood sugar 2 times daily or as directed. 200 lancet 3     blood glucose (NO BRAND SPECIFIED) lancets standard Use to test blood sugar 1-2 times daily or as directed. 200 each 3     blood glucose (NO BRAND SPECIFIED) test strip Use to test blood sugar 2 times daily or as directed. 200 strip 3     blood glucose (NO BRAND SPECIFIED) test strip Use to test blood sugar 2-3 times daily or as directed. 200 strip 3     blood glucose monitoring (NO BRAND SPECIFIED) meter device kit Use to test blood sugar 1-2 times daily or as directed. 1 kit 0     cetirizine (ZYRTEC) 10 MG tablet Take 10 mg by mouth daily as needed for allergies       empagliflozin (JARDIANCE) 25 MG TABS tablet Take 1 tablet (25 mg) by mouth daily. 90 tablet 1     eplerenone (INSPRA) 50 MG tablet Take 2 tablets  (100 mg) by mouth 2 times daily. 360 tablet 3     famotidine (PEPCID) 20 MG tablet Take 1 tablet (20 mg) by mouth 2 times daily. 180 tablet 3     fluconazole (DIFLUCAN) 150 MG tablet Take 1 tablet (150 mg) by mouth every 72 hours. 2 tablet 0     Fluocinolone Acetonide Scalp (DERMA-SMOOTHE/FS SCALP) 0.01 % OIL oil Apply nightly to the scalp for the 6 weeks 60 mL 1     hydrOXYzine HCl (ATARAX) 25 MG tablet Take 1-2 tablets (25-50 mg) by mouth nightly as needed for other (sleep). 180 tablet 0     labetalol (NORMODYNE) 300 MG tablet TAKE ONE TABLET BY MOUTH TWICE A  tablet 3     ondansetron (ZOFRAN ODT) 4 MG ODT tab TAKE 1 TABLET BY MOUTH EVERY 8 HOURS AS NEEDED FOR NAUSEA 30 tablet 1     polyethylene glycol (MIRALAX) 17 g packet Take 17 g by mouth daily 7 packet 0     SUMAtriptan (IMITREX) 25 MG tablet Profile rx,TAKE 1 TO 2 TABLETS BY MOUTH AT ONSET OF HEADACHE FOR MIGRAINE. MAY REPEAT DOSE IN 2 HOURS. MAX OF 200MG OR 2 DOSES IN 24 HOURS 18 tablet 1     tirzepatide (MOUNJARO) 15 MG/0.5ML SOAJ auto-injector pen Inject 0.5 mLs (15 mg) subcutaneously every 7 days. 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 No   Social History  Narrative    Lives in [place], [place]        Social Documentation:        Balanced Diet: YES    Calcium intake: 1200 calcium per day    Caffeine: none per day    Exercise:  type of activity walking;3-4 times per week    Sunscreen: No    Seatbelts:  Yes    Self Breast Exam:  Yes    Physical/Emotional/Sexual Abuse: No     Do you feel safe in your environment? Yes        Cholesterol screen up to date: Yes-2005    Eye Exam up to date: Yes    Dental Exam up to date: Yes    Pap smear up to date: Yes    Mammogram up to date: NO, but want a Mammogram    Dexa Scan up to date: Does Not Apply    Colonoscopy up to date: Does Not Apply    [facility]    Glucose screen if over 40:  NO        Aislinn Wilson MA     Social Drivers of Health     Financial Resource Strain: High Risk (8/6/2024)    Financial Resource Strain      Within the past 12 months, have you or your family members you live with been unable to get utilities (heat, electricity) when it was really needed?: Yes   Food Insecurity: Low Risk  (8/6/2024)    Food Insecurity      Within the past 12 months, did you worry that your food would run out before you got money to buy more?: No      Within the past 12 months, did the food you bought just not last and you didn t have money to get more?: No   Transportation Needs: Low Risk  (8/6/2024)    Transportation Needs      Within the past 12 months, has lack of transportation kept you from medical appointments, getting your medicines, non-medical meetings or appointments, work, or from getting things that you need?: No   Physical Activity: Insufficiently Active (8/6/2024)    Exercise Vital Sign      Days of Exercise per Week: 3 days      Minutes of Exercise per Session: 20 min   Stress: No Stress Concern Present (8/6/2024)    [place] [employer] - Occupational Stress Questionnaire      Feeling of Stress : Only a little   Social Connections: Unknown (8/6/2024)    Social  Connection and Isolation Panel [NHANES]      Frequency of Communication with Friends and Family: Not on file      Frequency of Social Gatherings with Friends and Family: Twice a week      Attends Anabaptism Services: Not on file      Active Member of Clubs or Organizations: Not on file      Attends Club or Organization Meetings: Not on file      Marital Status: Not on file   Interpersonal Safety: Low Risk  (3/6/2025)    Interpersonal Safety      Do you feel physically and emotionally safe where you currently live?: Yes      Within the past 12 months, have you been hit, slapped, kicked or otherwise physically hurt by someone?: No      Within the past 12 months, have you been humiliated or emotionally abused in other ways by your partner or ex-partner?: No   Housing Stability: Low Risk  (8/6/2024)    Housing Stability      Do you have housing? : Yes      Are you worried about losing your housing?: No     Family History   Problem Relation Age of Onset     Hypertension Mother      Gynecology Mother         hysterectomy     Gastrointestinal Disease Mother         bowel upstruction     Thyroid Disease Mother      Neurologic Disorder Mother      Osteoporosis Mother      Anesthesia Reaction Mother         migraines     Hypertension Father      Lipids Father      Arthritis Father      Heart Disease Father      Diabetes Sister      Cerebrovascular Disease Sister      Prostate Cancer Brother      Alcohol/Drug Brother      Circulatory Brother      Cancer Maternal Grandmother         tumor-head     Obesity Maternal Grandmother      Cancer Maternal Grandfather         bone     Eye Disorder Maternal Grandfather      Prostate Cancer Maternal Grandfather      Glaucoma Maternal Grandfather      Arthritis Paternal Grandmother      Respiratory Daughter         asthma     Macular Degeneration No family hx of      Deep Vein Thrombosis (DVT) No family hx of        Medical records were reviewed and are summarized above.    Review of  Systems    PHYSICAL EXAM:  Focused lower extremity physical exam:     Derm: Skin is warm, dry, intact. No open wounds, laceration or abrasions noted. Nails are well trimmed. No ecchymosis or erythema noted.     Vasc: Dorsalis pedis pulses, 2/4 bilateral. Posterior tibial pulses 2/4 bilateral. Cap fill time < 3 seconds to the digits. No edema is present. Hair growth present to the toes.     Neuro: Protective sensation intact via light touch to the feet. Gross sensation intact.     MSK:   - Decreased medial arch height noted with RCSP.  Valgus tilt of the calcaneus with patient's stance and RCSP.  Palpable talar head noted at the TN joint, bilaterally.  - Muscle strength 5/5 with dorsiflexion, plantarflexion, eversion, inversion of the feet. Compartments soft and compressible.  - Pain to palpation at the insertion of the navicular of the PT tendon as well as proximal to this area all the way up to the muscle belly of the PT tendon on the left side and to the level of the ankle on the right.  - Medial deviation of the first metatarsal with lateral deviation and valgus rotation of the hallux, left.  - Hallux is not track bound nor does it have pain through range of motion.  Hypermobile first ray appreciated.  Tenderness to the medial eminence of the first metatarsal head.    Imaging: 3 views of the left foot and ankle were evaluated.  No valgus tilt of the ankle appreciated.  Mild arthritic changes appreciated to the TN and subtalar joint.  Decreased Meary's angle appreciated on the lateral view with -16 Meary's appreciated and 50% talonavicular uncovering noted.  Atavistic medial cuneiform appreciated with increased one-two IM angle of 17 degrees.  No acute fractures, subluxations, dislocations noted.  Decreased calcaneal inclination angle with increased talar declination angle.    Assessment:   - Posterior tibial tendon dysfunction, stage IIb, bilaterally  - Hallux abductovalgus, left    Plan:  - Patient was seen and  evaluated in clinic by myself.   - 3 views of the left foot were ordered and evaluated  - 3 views of the left ankle were ordered and evaluated    - Hallux abductovalgus, Left:  Discussed the patient's hallux abductovalgus deformity in detail.  Discussed that these deformities can be painful or painless deformities.  Discussed the current clinical and radiographic differences between a mild, moderate, and severe bunion.  Discussed the patient's radiographs in detail.  Discussed conservative and surgical treatment options in detail.  Discussed wider shoe gear with a more aggressive forefoot rocker.  Discussed bunion sleeve and toe spacer.  Discussed RICE and NSAIDs.  Discussed surgical intervention including Isamar Marcano, first MPJ fusion.    - Posterior tibial tendon dysfunction, bilaterally:  Discussed PTTD and the for stage of this.  Discussed that she does not have significant arthritic changes to the hindfoot nor valgus tilting to the ankle but that she does have greater than 50% talonavicular uncovering on the AP view leading her to be at the stage IIb.  Discussed the stages of PTTD and their associated treatments.  Discussed physical therapy, ankle bracing, orthotics for this.  DME orders were placed for custom molded orthotics.  Physical therapy was ordered.    Chase Escalante DPM    Again, thank you for allowing me to participate in the care of your patient.        Sincerely,        Chase Escalante DPM    Electronically signed
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
## 2025-06-19 NOTE — PATIENT INSTRUCTIONS
Patient due for Colonoscopy in December 2022.  Second reminder letter sent to patient to have him schedule office visit or Video Visit.  Asked patient to call office to make an appointment for Colonoscopy.   What are Prescription Custom Orthotics?  Custom orthotics are specially-made devices designed to support and comfort your feet. Prescription orthotics are crafted for you and no one else. They match the contours of your feet precisely and are designed for the way you move. Orthotics are only manufactured after a podiatrist has conducted a complete evaluation of your feet, ankles, and legs, so the orthotic can accommodate your unique foot structure and pathology.  Prescription orthotics are divided into two categories:  Functional orthotics are designed to control abnormal motion. They may be used to treat foot pain caused by abnormal motion; they can also be used to treat injuries such as shin splints or tendinitis. Functional orthotics are usually crafted of a semi-rigid material such as plastic or graphite.  Accommodative orthotics are softer and meant to provide additional cushioning and support. They can be used to treat diabetic foot ulcers, painful calluses on the bottom of the foot, and other uncomfortable conditions.  Podiatrists use orthotics to treat foot problems such as plantar fasciitis, bursitis, tendinitis, diabetic foot ulcers, and foot, ankle, and heel pain. Clinical research studies have shown that podiatrist-prescribed foot orthotics decrease foot pain and improve function.  Orthotics typically cost more than shoe inserts purchased in a retail store, but the additional cost is usually well worth it. Unlike shoe inserts, orthotics are molded to fit each individual foot, so you can be sure that your orthotics fit and do what they're supposed to do. Prescription orthotics are also made of top-notch materials and last many years when cared for properly. Insurance often helps pay for prescription orthotics.  What are Shoe Inserts?   You've seen them at the grocery store and at the mall. You've probably even seen them on TV and online. Shoe inserts are any kind of non-prescription foot support designed  to be worn inside a shoe. Pre-packaged, mass produced, arch supports are shoe inserts. So are the  custom-made  insoles and foot supports that you can order online or at retail stores. Unless the device has been prescribed by a doctor and crafted for your specific foot, it's a shoe insert, not a custom orthotic device--despite what the ads might say.  Shoe inserts can be very helpful for a variety of foot ailments, including flat arches and foot and leg pain. They can cushion your feet, provide comfort, and support your arches, but they can't correct biomechanical foot problems or cure long-standing foot issues.  The most common types of shoe inserts are:  Arch supports: Some people have high arches. Others have low arches or flat feet. Arch supports generally have a  bumped-up  appearance and are designed to support the foot's natural arch.   Insoles: Insoles slip into your shoe to provide extra cushioning and support. Insoles are often made of gel, foam, or plastic.   Heel liners: Heel liners, sometimes called heel pads or heel cups, provide extra cushioning in the heel region. They may be especially useful for patients who have foot pain caused by age-related thinning of the heels' natural fat pads.   Foot cushions: Do your shoes rub against your heel or your toes? Foot cushions come in many different shapes and sizes and can be used as a barrier between you and your shoe.  Choosing an Over-the-Counter Shoe Insert  Selecting a shoe insert from the wide variety of devices on the market can be overwhelming. Here are some podiatrist-tested tips to help you find the insert that best meets your needs:  Consider your health. Do you have diabetes? Problems with circulation? An over-the-counter insert may not be your best bet. Diabetes and poor circulation increase your risk of foot ulcers and infections, so schedule an appointment with a podiatrist. He or she can help you select a solution that won't cause additional  health problems.   Think about the purpose. Are you planning to run a marathon, or do you just need a little arch support in your work shoes? Look for a product that fits your planned level of activity.   Bring your shoes. For the insert to be effective, it has to fit into your shoes. So bring your sneakers, dress shoes, or work boots--whatever you plan to wear with your insert. Look for an insert that will fit the contours of your shoe.   Try them on. If all possible, slip the insert into your shoe and try it out. Walk around a little. How does it feel? Don't assume that feelings of pressure will go away with continued wear. (If you can't try the inserts at the store, ask about the store's return policy and hold on to your receipt.)    Please call one of the [facility] locations below to schedule an appointment. If you received a prescription please bring it with you to your appointment. Some locations are limited to what they carry.    Office Locations    [facility]  [address] [place] [address]  Home Medical Equipment, [address]   Phone: [phone]   Orthotics and Prosthetics, [address]   Phone: [phone]    [facility]  [address]   [place] [address]   Phone: [phone]    [facility]   [address]  [place] [address]  Phone: [phone]    [facility]  [address]  [place] [address]  Phone: [phone]    [facility]  [address]  [address]  [place] [address]  Phone: [phone]    [facility]  [address] [place] [address]   Phone: [phone]    [facility]  [address] [place] [address]  Phone: [phone]    [facility]  [address] [place] [address]  [facility], [address]  [place] [address]  Phone: [phone]    Full

## 2025-06-20 NOTE — PROGRESS NOTES
"CC: Flatfoot, Bilateral Foot     HPI: Adelaida Packer is a 55 year old female who presents to clinic for chief concern of flatfoot both her feet.  She states she is she is doing a \"step challenge\" with her daughter and that she now that she is walking more and more she is having more more pain to both her feet, pointing along the PT tendon on the inside of both legs.  She states that the left foot is significantly worse but is not sure if this is because of her bunion.  She would like to discuss what is going on with these as well as what is happening with the bunion and what can be done with this.    Past Surgical History:   Procedure Laterality Date    ARTHROPLASTY KNEE Right 8/11/2021    Procedure: ARTHROPLASTY, KNEE, TOTAL RIGHT;  Surgeon: Dylan Hernandez MD;  Location: [facility] OR    ARTHROPLASTY KNEE Left 7/27/2022    Procedure: ARTHROPLASTY, LEFT  KNEE, TOTAL;  Surgeon: Dylan Hernandez MD;  [facility]    BUNIONECTOMY Right 7/6/2018    Procedure: BUNIONECTOMY;  Surgeon: Erik Bazan DPM;  Location: [facility];  Service:     EXCISE GANGLION WRIST Right 7/6/2018    Procedure: EXCISION OF THE GANGLION CYST, BUNIONECTOMU WITH FIRST METATARSAL  OSTEOTOMY WITH INTERNAL SCREW FIXATION, A SUBTALAR JIONT ARTHROERESIS OF THE RIGHT FOOT AND GASTROCNEMIUS RECESSION RIGHT LOWER EXTREMITY;  Surgeon: Erik Bazan DPM;  Location: [facility];  Service:     HC REMOVE TONSILS/ADENOIDS,12+ Y/O  1995    HEAD & NECK SURGERY  3/2013    Parathyroidectomy--had to go back in 6 days later for a possible bleed    HYSTERECTOMY      HYSTERECTOMY, VAGINAL  12/2005    hysterectomy- fibroids    KNEE SURGERY Right     ORTHOPEDIC SURGERY      PARATHYROIDECTOMY      TONSILLECTOMY      [facility] ANESTH,KNEE AREA SURGERY  01/2001    [facility] GASTROPLASTY,OBESITY,VERT BAND      removed 2009     Past Medical History:   Diagnosis Date    Allergic rhinitis due to other allergen     seasonal    Arthritis     Diabetes (H)     " Localized osteoarthritis of both knees     Migraine, unspecified, without mention of intractable migraine without mention of status migrainosus     Monoclonal gammopathy     Morbid obesity (H)     Therapeutic drug monitoring 09/02/2022    Type 2 diabetes mellitus with stage 3a chronic kidney disease, without long-term current use of insulin (H) 08/09/2016    Unspecified essential hypertension     dx around age 31     Medications:  Current Outpatient Medications   Medication Sig Dispense Refill    acetaminophen (TYLENOL) 325 MG tablet Take 2 tablets (650 mg) by mouth every 4 hours as needed for other (mild pain) 100 tablet 0    aspirin (ASA) 81 MG EC tablet Take 1 tablet (81 mg) by mouth daily 90 tablet 3    atorvastatin (LIPITOR) 10 MG tablet Take 1 tablet (10 mg) by mouth every evening. 90 tablet 3    blood glucose (NO BRAND SPECIFIED) lancets standard Use to test blood sugar 2 times daily or as directed. 200 lancet 3    blood glucose (NO BRAND SPECIFIED) lancets standard Use to test blood sugar 1-2 times daily or as directed. 200 each 3    blood glucose (NO BRAND SPECIFIED) test strip Use to test blood sugar 2 times daily or as directed. 200 strip 3    blood glucose (NO BRAND SPECIFIED) test strip Use to test blood sugar 2-3 times daily or as directed. 200 strip 3    blood glucose monitoring (NO BRAND SPECIFIED) meter device kit Use to test blood sugar 1-2 times daily or as directed. 1 kit 0    cetirizine (ZYRTEC) 10 MG tablet Take 10 mg by mouth daily as needed for allergies      empagliflozin (JARDIANCE) 25 MG TABS tablet Take 1 tablet (25 mg) by mouth daily. 90 tablet 1    eplerenone (INSPRA) 50 MG tablet Take 2 tablets (100 mg) by mouth 2 times daily. 360 tablet 3    famotidine (PEPCID) 20 MG tablet Take 1 tablet (20 mg) by mouth 2 times daily. 180 tablet 3    fluconazole (DIFLUCAN) 150 MG tablet Take 1 tablet (150 mg) by mouth every 72 hours. 2 tablet 0    Fluocinolone Acetonide Scalp (DERMA-SMOOTHE/FS SCALP)  0.01 % OIL oil Apply nightly to the scalp for the 6 weeks 60 mL 1    hydrOXYzine HCl (ATARAX) 25 MG tablet Take 1-2 tablets (25-50 mg) by mouth nightly as needed for other (sleep). 180 tablet 0    labetalol (NORMODYNE) 300 MG tablet TAKE ONE TABLET BY MOUTH TWICE A  tablet 3    ondansetron (ZOFRAN ODT) 4 MG ODT tab TAKE 1 TABLET BY MOUTH EVERY 8 HOURS AS NEEDED FOR NAUSEA 30 tablet 1    polyethylene glycol (MIRALAX) 17 g packet Take 17 g by mouth daily 7 packet 0    SUMAtriptan (IMITREX) 25 MG tablet Profile rx,TAKE 1 TO 2 TABLETS BY MOUTH AT ONSET OF HEADACHE FOR MIGRAINE. MAY REPEAT DOSE IN 2 HOURS. MAX OF 200MG OR 2 DOSES IN 24 HOURS 18 tablet 1    tirzepatide (MOUNJARO) 15 MG/0.5ML SOAJ auto-injector pen Inject 0.5 mLs (15 mg) subcutaneously every 7 days. 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 No   Social History Narrative    Lives in [place], [place]        Social Documentation:        Balanced Diet: YES    Calcium intake: 1200 calcium per day    Caffeine: none per day    Exercise:  type of activity walking;3-4 times per week    Sunscreen: No    Seatbelts:  Yes    Self Breast Exam:  Yes    Physical/Emotional/Sexual Abuse: No      Do you feel safe in your environment? Yes        Cholesterol screen up to date: Yes-2005    Eye Exam up to date: Yes    Dental Exam up to date: Yes    Pap smear up to date: Yes    Mammogram up to date: NO, but want a Mammogram    Dexa Scan up to date: Does Not Apply    Colonoscopy up to date: Does Not Apply    [facility]    Glucose screen if over 40:  NO        Aislinn Wilson MA     Social Drivers of Health     Financial Resource Strain: High Risk (8/6/2024)    Financial Resource Strain     Within the past 12 months, have you or your family members you live with been unable to get utilities (heat, electricity) when it was really needed?: Yes   Food Insecurity: Low Risk  (8/6/2024)    Food Insecurity     Within the past 12 months, did you worry that your food would run out before you got money to buy more?: No     Within the past 12 months, did the food you bought just not last and you didn t have money to get more?: No   Transportation Needs: Low Risk  (8/6/2024)    Transportation Needs     Within the past 12 months, has lack of transportation kept you from medical appointments, getting your medicines, non-medical meetings or appointments, work, or from getting things that you need?: No   Physical Activity: Insufficiently Active (8/6/2024)    Exercise Vital Sign     Days of Exercise per Week: 3 days     Minutes of Exercise per Session: 20 min   Stress: No Stress Concern Present (8/6/2024)    [employer] - Occupational Stress Questionnaire     Feeling of Stress : Only a little   Social Connections: Unknown (8/6/2024)    Social Connection and Isolation Panel [NHANES]     Frequency of Communication with Friends and Family: Not on file     Frequency of Social Gatherings with Friends and Family: Twice a week     Attends Religion Services: Not on file     Active Member of Clubs or Organizations: Not on file     Attends Club or Organization  Meetings: Not on file     Marital Status: Not on file   Interpersonal Safety: Low Risk  (3/6/2025)    Interpersonal Safety     Do you feel physically and emotionally safe where you currently live?: Yes     Within the past 12 months, have you been hit, slapped, kicked or otherwise physically hurt by someone?: No     Within the past 12 months, have you been humiliated or emotionally abused in other ways by your partner or ex-partner?: No   Housing Stability: Low Risk  (8/6/2024)    Housing Stability     Do you have housing? : Yes     Are you worried about losing your housing?: No     Family History   Problem Relation Age of Onset    Hypertension Mother     Gynecology Mother         hysterectomy    Gastrointestinal Disease Mother         bowel upstruction    Thyroid Disease Mother     Neurologic Disorder Mother     Osteoporosis Mother     Anesthesia Reaction Mother         migraines    Hypertension Father     Lipids Father     Arthritis Father     Heart Disease Father     Diabetes Sister     Cerebrovascular Disease Sister     Prostate Cancer Brother     Alcohol/Drug Brother     Circulatory Brother     Cancer Maternal Grandmother         tumor-head    Obesity Maternal Grandmother     Cancer Maternal Grandfather         bone    Eye Disorder Maternal Grandfather     Prostate Cancer Maternal Grandfather     Glaucoma Maternal Grandfather     Arthritis Paternal Grandmother     Respiratory Daughter         asthma    Macular Degeneration No family hx of     Deep Vein Thrombosis (DVT) No family hx of        Medical records were reviewed and are summarized above.    Review of Systems    PHYSICAL EXAM:  Focused lower extremity physical exam:     Derm: Skin is warm, dry, intact. No open wounds, laceration or abrasions noted. Nails are well trimmed. No ecchymosis or erythema noted.     Vasc: Dorsalis pedis pulses, 2/4 bilateral. Posterior tibial pulses 2/4 bilateral. Cap fill time < 3 seconds to the digits. No edema is present.  Hair growth present to the toes.     Neuro: Protective sensation intact via light touch to the feet. Gross sensation intact.     MSK:   - Decreased medial arch height noted with RCSP.  Valgus tilt of the calcaneus with patient's stance and RCSP.  Palpable talar head noted at the TN joint, bilaterally.  - Muscle strength 5/5 with dorsiflexion, plantarflexion, eversion, inversion of the feet. Compartments soft and compressible.  - Pain to palpation at the insertion of the navicular of the PT tendon as well as proximal to this area all the way up to the muscle belly of the PT tendon on the left side and to the level of the ankle on the right.  - Medial deviation of the first metatarsal with lateral deviation and valgus rotation of the hallux, left.  - Hallux is not track bound nor does it have pain through range of motion.  Hypermobile first ray appreciated.  Tenderness to the medial eminence of the first metatarsal head.    Imaging: 3 views of the left foot and ankle were evaluated.  No valgus tilt of the ankle appreciated.  Mild arthritic changes appreciated to the TN and subtalar joint.  Decreased Meary's angle appreciated on the lateral view with -16 Meary's appreciated and 50% talonavicular uncovering noted.  Atavistic medial cuneiform appreciated with increased one-two IM angle of 17 degrees.  No acute fractures, subluxations, dislocations noted.  Decreased calcaneal inclination angle with increased talar declination angle.    Assessment:   - Posterior tibial tendon dysfunction, stage IIb, bilaterally  - Hallux abductovalgus, left    Plan:  - Patient was seen and evaluated in clinic by myself.   - 3 views of the left foot were ordered and evaluated  - 3 views of the left ankle were ordered and evaluated    - Hallux abductovalgus, Left:  Discussed the patient's hallux abductovalgus deformity in detail.  Discussed that these deformities can be painful or painless deformities.  Discussed the current clinical and  radiographic differences between a mild, moderate, and severe bunion.  Discussed the patient's radiographs in detail.  Discussed conservative and surgical treatment options in detail.  Discussed wider shoe gear with a more aggressive forefoot rocker.  Discussed bunion sleeve and toe spacer.  Discussed RICE and NSAIDs.  Discussed surgical intervention including Jeet, Isamar, first MPJ fusion.    - Posterior tibial tendon dysfunction, bilaterally:  Discussed PTTD and the for stage of this.  Discussed that she does not have significant arthritic changes to the hindfoot nor valgus tilting to the ankle but that she does have greater than 50% talonavicular uncovering on the AP view leading her to be at the stage IIb.  Discussed the stages of PTTD and their associated treatments.  Discussed physical therapy, ankle bracing, orthotics for this.  DME orders were placed for custom molded orthotics.  Physical therapy was ordered.    Chase Escalante DPM

## 2025-06-21 ENCOUNTER — THERAPY VISIT (OUTPATIENT)
Dept: PHYSICAL THERAPY | Facility: CLINIC | Age: 55
End: 2025-06-21
Attending: STUDENT IN AN ORGANIZED HEALTH CARE EDUCATION/TRAINING PROGRAM
Payer: COMMERCIAL

## 2025-06-21 DIAGNOSIS — M76.822 POSTERIOR TIBIAL TENDON DYSFUNCTION (PTTD) OF BOTH LOWER EXTREMITIES: ICD-10-CM

## 2025-06-21 DIAGNOSIS — G89.29 CHRONIC PAIN OF LEFT ANKLE: ICD-10-CM

## 2025-06-21 DIAGNOSIS — M76.821 POSTERIOR TIBIAL TENDON DYSFUNCTION (PTTD) OF BOTH LOWER EXTREMITIES: ICD-10-CM

## 2025-06-21 DIAGNOSIS — M25.572 CHRONIC PAIN OF LEFT ANKLE: ICD-10-CM

## 2025-06-21 PROCEDURE — 97161 PT EVAL LOW COMPLEX 20 MIN: CPT | Mod: GP

## 2025-06-21 PROCEDURE — 97110 THERAPEUTIC EXERCISES: CPT | Mod: GP

## 2025-06-21 PROCEDURE — 97140 MANUAL THERAPY 1/> REGIONS: CPT | Mod: GP

## 2025-06-21 ASSESSMENT — ACTIVITIES OF DAILY LIVING (ADL)
GOING_UP_OR_DOWN_10_STAIRS: MODERATE DIFFICULTY
WALKING_A_MILE: QUITE A BIT OF DIFFICULTY
SITTING_FOR_1_HOUR: MODERATE DIFFICULTY
SHOPPING: EXTREME DIFFICULTY OR UNABLE TO PERFORM ACTIVITY
LEFS_RAW_SCORE: 0
RUNNING_ON_EVEN_GROUND: EXTREME DIFFICULTY OR UNABLE TO PERFORM ACTIVITY
GETTING_INTO_OR_OUT_OF_A_CAR: NO DIFFICULTY
LIFTING_AN_OBJECT,_LIKE_A_BAG_OF_GROCERIES_FROM_THE_FLOOR: NO DIFFICULTY
PERFORMING_HEAVY_ACTIVITIES_AROUND_YOUR_HOME: A LITTLE BIT OF DIFFICULTY
WALKING_2_BLOCKS: QUITE A BIT OF DIFFICULTY
ANY_OF_YOUR_USUAL_WORK,_HOUSEWORK_OR_SCHOOL_ACTIVITIES: MODERATE DIFFICULTY
PUTTING_ON_YOUR_SHOES_OR_SOCKS: A LITTLE BIT OF DIFFICULTY
WALKING_BETWEEN_ROOMS: MODERATE DIFFICULTY
GETTING_INTO_AND_OUT_OF_A_BATH: MODERATE DIFFICULTY
PERFORMING_LIGHT_ACTIVITIES_AROUND_YOUR_HOME: NO DIFFICULTY
YOUR_USUAL_HOBBIES,_RECREATIONAL_OR_SPORTING_ACTIVITIES: MODERATE DIFFICULTY
STANDING_FOR_1_HOUR: EXTREME DIFFICULTY OR UNABLE TO PERFORM ACTIVITY
LEFS_SCORE(%): 0
ROLLING_OVER_IN_BED: A LITTLE BIT OF DIFFICULTY
SQUATTING: A LITTLE BIT OF DIFFICULTY
PLEASE_INDICATE_YOR_PRIMARY_REASON_FOR_REFERRAL_TO_THERAPY:: FOOT AND/OR ANKLE
RUNNING_ON_UNEVEN_GROUND: EXTREME DIFFICULTY OR UNABLE TO PERFORM ACTIVITY
MAKING_SHARP_TURNS_WHILE_RUNNING_FAST: EXTREME DIFFICULTY OR UNABLE TO PERFORM ACTIVITY

## 2025-06-21 NOTE — PROGRESS NOTES
PHYSICAL THERAPY EVALUATION  Type of Visit: Evaluation       Fall Risk Screen:  Have you fallen 2 or more times in the past year?: No  Have you fallen and had an injury in the past year?: No    Subjective  - Pt notes that she had L ankle has been bothersome for 2-3 months, is able to function but it hurts all the time. Pt notes that wearing a uncomfortable shoes pushed pain over the edge. Pain is always there but she ca through it.         Presenting condition or subjective complaint: L ankle pain   Date of onset: 06/19/25    Relevant medical history:     Dates & types of surgery: knee replacement left side 2022    Prior diagnostic imaging/testing results: X-ray     Prior therapy history for the same diagnosis, illness or injury: No      Living Environment  Social support: With family members   [facility] of [facility]: [facility]   Stairs to enter the home: Yes 8 Is there a railing: Yes     Ramp: No   Stairs inside the home: Yes 32 Is there a railing: Yes     Help at home: None  Equipment owned:       Employment: Yes Teacher  Hobbies/Interests: reading going for walks travel    Patient goals for therapy: sleep on my back wo being in pain     Objective   FOOT/ANKLE EVALUATION  PAIN: Pain Level at Rest: 5/10  Pain Level with Use: 6/10  Pain Location: ankle  Pain Quality: Aching, Burning, and Sharp  Pain Frequency: constant  Pain is Worst: daytime  Pain is Exacerbated By: Walking, wearing bad shoes   Pain is Relieved By: supportive shoes, pain patches   Pain Progression: Worsened  POSTURE: Standing Posture: Pes planus, valgus hindfoot   GAIT:   Weightbearing Status: WBAT  Assistive Device(s): None  Gait Deviations: Valgus hindfoot and pes planus with walking   ROM:   (Degrees) Left AROM Left PROM  Right AROM Right PROM   Ankle Dorsiflexion 8  10    Ankle Plantarflexion 50  45    Ankle Inversion 20  21    Ankle Eversion 10  10      STRENGTH:   Pain: - none + mild ++ moderate +++ severe  Strength Scale: 0-5/5 Left Right   Ankle  Dorsiflexion 5 5   Ankle Plantarflexion Unable to do single leg calf raise Unable to do single leg calf raise   Ankle Inversion 5 4+   Ankle Eversion 5 4+     FLEXIBILITY: knee to wall: tighter on L   FUNCTIONAL TESTS: Double Leg Squat: heels do not elevate   PALPATION: tib posterior tendon TTP, calf tenderness      Assessment & Plan   CLINICAL IMPRESSIONS  Medical Diagnosis: Chronic pain of left ankle  Posterior tibial tendon dysfunction (PTTD) of both lower extremities    Treatment Diagnosis: Chronic pain of left ankle  Posterior tibial tendon dysfunction (PTTD) of both lower extremities   Impression/Assessment: Patient is a 55 year old female with L ankle pain complaints.  The following significant findings have been identified: Pain, Decreased ROM/flexibility, Decreased strength, Impaired gait, Impaired muscle performance, and Decreased activity tolerance. These impairments interfere with their ability to perform self care tasks, work tasks, recreational activities, household chores, household mobility, and community mobility as compared to previous level of function.     Clinical Decision Making (Complexity):  Clinical Presentation: Stable/Uncomplicated  Clinical Presentation Rationale: based on medical and personal factors listed in PT evaluation  Clinical Decision Making (Complexity): Low complexity    PLAN OF CARE  Treatment Interventions:  Modalities: Cryotherapy, Cupping, Dry Needling, E-stim  Interventions: Gait Training, Manual Therapy, Neuromuscular Re-education, Therapeutic Activity, Therapeutic Exercise, Self-Care/Home Management    Long Term Goals     PT Goal 1  Goal Identifier: Walking  Goal Description: Pt will be able to walk for 15 minutes with ankle pain <3/10.  Rationale: to maximize safety and independence with self cares  Target Date: 09/18/25      Frequency of Treatment: 1x per week for 4 weeks, 2x per month for 2 months  Duration of Treatment: 12 weeks    Education Assessment:    Learner/Method: Patient;No Barriers to Learning    Risks and benefits of evaluation/treatment have been explained.   Patient/Family/caregiver agrees with Plan of Care.     Evaluation Time:     PT Eval, Low Complexity Minutes (07678): 22     Signing Clinician: Layla Patel PT

## 2025-07-02 ENCOUNTER — PATIENT OUTREACH (OUTPATIENT)
Dept: CARE COORDINATION | Facility: CLINIC | Age: 55
End: 2025-07-02
Payer: COMMERCIAL

## 2025-07-14 ENCOUNTER — THERAPY VISIT (OUTPATIENT)
Dept: PHYSICAL THERAPY | Facility: CLINIC | Age: 55
End: 2025-07-14
Attending: STUDENT IN AN ORGANIZED HEALTH CARE EDUCATION/TRAINING PROGRAM
Payer: COMMERCIAL

## 2025-07-14 DIAGNOSIS — G89.29 CHRONIC PAIN OF LEFT ANKLE: Primary | ICD-10-CM

## 2025-07-14 DIAGNOSIS — M25.572 CHRONIC PAIN OF LEFT ANKLE: Primary | ICD-10-CM

## 2025-07-14 DIAGNOSIS — M76.821 POSTERIOR TIBIAL TENDON DYSFUNCTION (PTTD) OF BOTH LOWER EXTREMITIES: ICD-10-CM

## 2025-07-14 DIAGNOSIS — M76.822 POSTERIOR TIBIAL TENDON DYSFUNCTION (PTTD) OF BOTH LOWER EXTREMITIES: ICD-10-CM

## 2025-07-14 PROCEDURE — 97110 THERAPEUTIC EXERCISES: CPT | Mod: GP

## 2025-07-14 PROCEDURE — 97140 MANUAL THERAPY 1/> REGIONS: CPT | Mod: GP

## 2025-07-15 ENCOUNTER — LAB (OUTPATIENT)
Dept: LAB | Facility: CLINIC | Age: 55
End: 2025-07-15
Payer: COMMERCIAL

## 2025-07-15 ENCOUNTER — PRE VISIT (OUTPATIENT)
Dept: ORTHOPEDICS | Facility: CLINIC | Age: 55
End: 2025-07-15

## 2025-07-15 DIAGNOSIS — E83.52 HYPERCALCEMIA: ICD-10-CM

## 2025-07-15 DIAGNOSIS — N18.31 STAGE 3A CHRONIC KIDNEY DISEASE (H): ICD-10-CM

## 2025-07-15 LAB
ALBUMIN MFR UR ELPH: 6.8 MG/DL
ALBUMIN SERPL BCG-MCNC: 4.4 G/DL (ref 3.5–5.2)
ANION GAP SERPL CALCULATED.3IONS-SCNC: 10 MMOL/L (ref 7–15)
BUN SERPL-MCNC: 26.7 MG/DL (ref 6–20)
CALCIUM SERPL-MCNC: 10.8 MG/DL (ref 8.8–10.4)
CHLORIDE SERPL-SCNC: 102 MMOL/L (ref 98–107)
CREAT SERPL-MCNC: 1.3 MG/DL (ref 0.51–0.95)
CREAT UR-MCNC: 125 MG/DL
EGFRCR SERPLBLD CKD-EPI 2021: 48 ML/MIN/1.73M2
GLUCOSE SERPL-MCNC: 104 MG/DL (ref 70–99)
HCO3 SERPL-SCNC: 25 MMOL/L (ref 22–29)
HGB BLD-MCNC: 13.6 G/DL (ref 11.7–15.7)
MCV RBC AUTO: 89 FL (ref 78–100)
PHOSPHATE SERPL-MCNC: 4.5 MG/DL (ref 2.5–4.5)
POTASSIUM SERPL-SCNC: 4.6 MMOL/L (ref 3.4–5.3)
PROT/CREAT 24H UR: 0.05 MG/MG CR (ref 0–0.2)
PTH-INTACT SERPL-MCNC: 53 PG/ML (ref 15–65)
SODIUM SERPL-SCNC: 137 MMOL/L (ref 135–145)

## 2025-07-15 PROCEDURE — 84156 ASSAY OF PROTEIN URINE: CPT

## 2025-07-15 PROCEDURE — 85018 HEMOGLOBIN: CPT

## 2025-07-15 PROCEDURE — 83970 ASSAY OF PARATHORMONE: CPT

## 2025-07-15 PROCEDURE — 80069 RENAL FUNCTION PANEL: CPT

## 2025-07-15 PROCEDURE — 36415 COLL VENOUS BLD VENIPUNCTURE: CPT

## 2025-07-19 LAB
DEPRECATED CALCIDIOL+CALCIFEROL SERPL-MC: <34 UG/L (ref 20–75)
VITAMIN D2 SERPL-MCNC: <5 UG/L
VITAMIN D3 SERPL-MCNC: 29 UG/L

## 2025-07-21 ENCOUNTER — OFFICE VISIT (OUTPATIENT)
Dept: PODIATRY | Facility: CLINIC | Age: 55
End: 2025-07-21
Payer: COMMERCIAL

## 2025-07-21 DIAGNOSIS — G89.29 CHRONIC PAIN OF LEFT ANKLE: ICD-10-CM

## 2025-07-21 DIAGNOSIS — M25.572 CHRONIC PAIN OF LEFT ANKLE: ICD-10-CM

## 2025-07-21 DIAGNOSIS — M20.12 HALLUX ABDUCTO VALGUS, LEFT: ICD-10-CM

## 2025-07-21 DIAGNOSIS — M76.821 POSTERIOR TIBIAL TENDON DYSFUNCTION (PTTD) OF BOTH LOWER EXTREMITIES: Primary | ICD-10-CM

## 2025-07-21 DIAGNOSIS — M76.822 POSTERIOR TIBIAL TENDON DYSFUNCTION (PTTD) OF BOTH LOWER EXTREMITIES: Primary | ICD-10-CM

## 2025-07-21 PROCEDURE — 99214 OFFICE O/P EST MOD 30 MIN: CPT | Performed by: STUDENT IN AN ORGANIZED HEALTH CARE EDUCATION/TRAINING PROGRAM

## 2025-07-21 NOTE — LETTER
7/21/2025      Adelaida Packer  [address] [facility] [place] [address]      Dear Colleague,    Thank you for referring your patient, Adelaida Packer, to the [facility]. Please see a copy of my visit note below.        FOOT AND ANKLE SURGERY/PODIATRY PROGRESS NOTE    ASSESSMENT:   - Posterior tibial tendon dysfunction, left  - Hallux abducto valgus, left    PLAN:  - Hallux abductovalgus, Left:  Discussed the patient's hallux abductovalgus deformity in detail.  Again we discussed RICE and NSAID use.  Again we discussed surgical intervention between Lapidus and first MPJ fusion.  Discussed the nonweightbearing time after Lapidus.  Discussed performing Lapidus and flatfoot reconstruction surgery at the same time but that she seems to be improving with conservative management and I would like to continue conservative management at this time if she is willing.  Patient is fine to continue conservative management at this time.     - Posterior tibial tendon dysfunction, left:  Patient to continue wearing custom orthotics at this time.  She is to wear her over-the-counter braces especially on uneven ground.  Physical therapy was ordered focused on posterior tibial tendon dysfunction.    - Patient's questions invited and answered. Patient to return to clinic in 6 week(s) for re-evaluation. Patient was encouraged to call my office with any further questions or concerns.     30 minutes spent on the day of encounter doing chart review, history and exam, documentation, and further activities as noted.     Anant Escalante, MARISA  [facility] Podiatry/Foot & Ankle Surgery      SUBJECTIVE: Adelaida Packer was seen in clinic today for continued evaluation the patient's posterior tibial tendon dysfunction and hallux abductovalgus on the left side.  Patient has over-the-counter bracing that she just picked up but has not tried them either side.  She does state that the inserts that she has are  helping but she does feel that the left foot is lagging behind.  She does state that she has been exercising more and has been doing her step challenge as well as been utilizing the recumbent bike and this has been helping and strengthening and weight loss.  She does have concerns as the right side is feeling better but the left side continues not to.  She is wondering when we move forward with surgical intervention.    Past Medical History:   Diagnosis Date     Allergic rhinitis due to other allergen     seasonal     Arthritis      Diabetes (H)      Localized osteoarthritis of both knees      Migraine, unspecified, without mention of intractable migraine without mention of status migrainosus      Monoclonal gammopathy      Morbid obesity (H)      Therapeutic drug monitoring 09/02/2022     Type 2 diabetes mellitus with stage 3a chronic kidney disease, without long-term current use of insulin (H) 08/09/2016     Unspecified essential hypertension     dx around age 31       Past Surgical History:   Procedure Laterality Date     ARTHROPLASTY KNEE Right 8/11/2021    Procedure: ARTHROPLASTY, KNEE, TOTAL RIGHT;  Surgeon: Dylan Hernandez MD;  Location: [facility] OR     ARTHROPLASTY KNEE Left 7/27/2022    Procedure: ARTHROPLASTY, LEFT  KNEE, TOTAL;  Surgeon: Dylan Hernandez MD;  Location: [facility] OR     BUNIONECTOMY Right 7/6/2018    Procedure: BUNIONECTOMY;  Surgeon: Erik Bazan DPM;  Location: [facility];  Service:      EXCISE GANGLION WRIST Right 7/6/2018    Procedure: EXCISION OF THE GANGLION CYST, BUNIONECTOMU WITH FIRST METATARSAL  OSTEOTOMY WITH INTERNAL SCREW FIXATION, A SUBTALAR JIONT ARTHROERESIS OF THE RIGHT FOOT AND GASTROCNEMIUS RECESSION RIGHT LOWER EXTREMITY;  Surgeon: Erik Bazan DPM;  Location: [facility];  Service:      HC REMOVE TONSILS/ADENOIDS,11+ Y/O  1995     HEAD & NECK SURGERY  3/2013    Parathyroidectomy--had to go back in 6 days later for a possible bleed     HYSTERECTOMY        HYSTERECTOMY, VAGINAL  12/2005    hysterectomy- fibroids     KNEE SURGERY Right      ORTHOPEDIC SURGERY       PARATHYROIDECTOMY       TONSILLECTOMY       [facility] ANESTH,KNEE AREA SURGERY  01/2001     [facility] GASTROPLASTY,OBESITY,VERT BAND      removed 2009       Allergies   Allergen Reactions     Sulfa Antibiotics Shortness Of Breath and Rash     Doxycycline      Severe gastritis, nausea, vomiting-ended up in the ER     Hydrochlorothiazide-Triamterene Other (See Comments)     Renal insuff     Maxzide [Hydrochlorothiazide W/Triamterene] Other (See Comments)     Renal insuff     Metoprolol Other (See Comments)     Other reaction(s): Bradycardia  At high dose only  At high dose only     Seasonal Allergies      Hayfever, tree pollens         Current Outpatient Medications:      acetaminophen (TYLENOL) 325 MG tablet, Take 2 tablets (650 mg) by mouth every 4 hours as needed for other (mild pain), Disp: 100 tablet, Rfl: 0     aspirin (ASA) 81 MG EC tablet, Take 1 tablet (81 mg) by mouth daily, Disp: 90 tablet, Rfl: 3     atorvastatin (LIPITOR) 10 MG tablet, Take 1 tablet (10 mg) by mouth every evening., Disp: 90 tablet, Rfl: 3     blood glucose (NO BRAND SPECIFIED) lancets standard, Use to test blood sugar 1-2 times daily or as directed., Disp: 200 each, Rfl: 3     blood glucose monitoring (NO BRAND SPECIFIED) meter device kit, Use to test blood sugar 1-2 times daily or as directed., Disp: 1 kit, Rfl: 0     cetirizine (ZYRTEC) 10 MG tablet, Take 10 mg by mouth daily as needed for allergies, Disp: , Rfl:      empagliflozin (JARDIANCE) 25 MG TABS tablet, Take 1 tablet (25 mg) by mouth daily., Disp: 90 tablet, Rfl: 1     eplerenone (INSPRA) 50 MG tablet, Take 2 tablets (100 mg) by mouth 2 times daily., Disp: 360 tablet, Rfl: 3     famotidine (PEPCID) 20 MG tablet, Take 1 tablet (20 mg) by mouth 2 times daily., Disp: 180 tablet, Rfl: 3     fluconazole (DIFLUCAN) 150 MG tablet, Take 1 tablet (150 mg) by mouth every 72 hours., Disp:  2 tablet, Rfl: 0     Fluocinolone Acetonide Scalp (DERMA-SMOOTHE/FS SCALP) 0.01 % OIL oil, Apply nightly to the scalp for the 6 weeks, Disp: 60 mL, Rfl: 1     hydrOXYzine HCl (ATARAX) 25 MG tablet, Take 1-2 tablets (25-50 mg) by mouth nightly as needed for other (sleep)., Disp: 180 tablet, Rfl: 0     labetalol (NORMODYNE) 300 MG tablet, TAKE ONE TABLET BY MOUTH TWICE A DAY, Disp: 180 tablet, Rfl: 3     ondansetron (ZOFRAN ODT) 4 MG ODT tab, TAKE 1 TABLET BY MOUTH EVERY 8 HOURS AS NEEDED FOR NAUSEA, Disp: 30 tablet, Rfl: 1     polyethylene glycol (MIRALAX) 17 g packet, Take 17 g by mouth daily, Disp: 7 packet, Rfl: 0     SUMAtriptan (IMITREX) 25 MG tablet, Profile rx,TAKE 1 TO 2 TABLETS BY MOUTH AT ONSET OF HEADACHE FOR MIGRAINE. MAY REPEAT DOSE IN 2 HOURS. MAX OF 200MG OR 2 DOSES IN 24 HOURS, Disp: 18 tablet, Rfl: 1     tirzepatide (MOUNJARO) 15 MG/0.5ML SOAJ auto-injector pen, Inject 0.5 mLs (15 mg) subcutaneously every 7 days., Disp: 6 mL, Rfl: 3     blood glucose (NO BRAND SPECIFIED) lancets standard, Use to test blood sugar 2 times daily or as directed., Disp: 200 lancet, Rfl: 3     blood glucose (NO BRAND SPECIFIED) test strip, Use to test blood sugar 2 times daily or as directed., Disp: 200 strip, Rfl: 3     blood glucose (NO BRAND SPECIFIED) test strip, Use to test blood sugar 2-3 times daily or as directed., Disp: 200 strip, Rfl: 3    Family History   Problem Relation Age of Onset     Hypertension Mother      Gynecology Mother         hysterectomy     Gastrointestinal Disease Mother         bowel upstruction     Thyroid Disease Mother      Neurologic Disorder Mother      Osteoporosis Mother      Anesthesia Reaction Mother         migraines     Hypertension Father      Lipids Father      Arthritis Father      Heart Disease Father      Diabetes Sister      Cerebrovascular Disease Sister      Prostate Cancer Brother      Alcohol/Drug Brother      Circulatory Brother      Cancer Maternal Grandmother          tumor-head     Obesity Maternal Grandmother      Cancer Maternal Grandfather         bone     Eye Disorder Maternal Grandfather      Prostate Cancer Maternal Grandfather      Glaucoma Maternal Grandfather      Arthritis Paternal Grandmother      Respiratory Daughter         asthma     Macular Degeneration No family hx of      Deep Vein Thrombosis (DVT) No family hx of        Social History     Socioeconomic History     Marital status: Single     Spouse name: Not on file     Number of children: 2     Years of education: Not on file     Highest education level: Not on file   Occupational History     Employer: [employer]     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 No   Social History Narrative    Lives in [place], [place]        Social Documentation:        Balanced Diet: YES    Calcium intake: 1200 calcium per day    Caffeine: none per day    Exercise:  type of activity walking;3-4 times per week    Sunscreen: No    Seatbelts:  Yes    Self Breast Exam:  Yes    Physical/Emotional/Sexual Abuse: No     Do you feel safe in your environment? Yes        Cholesterol screen up to date: Yes-2005    Eye Exam up to date: Yes    Dental Exam up to date: Yes    Pap smear up to date: Yes    Mammogram up to date: NO, but want a Mammogram    Dexa Scan up to date: Does Not Apply    Colonoscopy up to date: Does Not Apply    [facility]    Glucose screen if over 40:  NO        Aislinn Wilson MA     Social Drivers of Health     Financial Resource Strain: High Risk (8/6/2024)    Financial Resource Strain      Within the past  12 months, have you or your family members you live with been unable to get utilities (heat, electricity) when it was really needed?: Yes   Food Insecurity: Low Risk  (8/6/2024)    Food Insecurity      Within the past 12 months, did you worry that your food would run out before you got money to buy more?: No      Within the past 12 months, did the food you bought just not last and you didn t have money to get more?: No   Transportation Needs: Low Risk  (8/6/2024)    Transportation Needs      Within the past 12 months, has lack of transportation kept you from medical appointments, getting your medicines, non-medical meetings or appointments, work, or from getting things that you need?: No   Physical Activity: Insufficiently Active (8/6/2024)    Exercise Vital Sign      Days of Exercise per Week: 3 days      Minutes of Exercise per Session: 20 min   Stress: No Stress Concern Present (8/6/2024)    [employer] [employer] - Occupational Stress Questionnaire      Feeling of Stress : Only a little   Social Connections: Unknown (8/6/2024)    Social Connection and Isolation Panel [NHANES]      Frequency of Communication with Friends and Family: Not on file      Frequency of Social Gatherings with Friends and Family: Twice a week      Attends Alevism Services: Not on file      Active Member of Clubs or Organizations: Not on file      Attends Club or Organization Meetings: Not on file      Marital Status: Not on file   Interpersonal Safety: Low Risk  (3/6/2025)    Interpersonal Safety      Do you feel physically and emotionally safe where you currently live?: Yes      Within the past 12 months, have you been hit, slapped, kicked or otherwise physically hurt by someone?: No      Within the past 12 months, have you been humiliated or emotionally abused in other ways by your partner or ex-partner?: No   Housing Stability: Low Risk  (8/6/2024)    Housing Stability      Do you have housing? : Yes      Are you  worried about losing your housing?: No       10 point Review of Systems is negative except otherwise noted in HPI.    OBJECTIVE:  Derm: Skin is warm, dry, intact. No open wounds, laceration or abrasions noted. Nails are well trimmed. No ecchymosis or erythema noted.      Vasc: Dorsalis pedis pulses, 2/4 bilateral. Posterior tibial pulses 2/4 bilateral. Cap fill time < 3 seconds to the digits. No edema is present. Hair growth present to the toes.      Neuro: Protective sensation intact via light touch to the feet. Gross sensation intact.      MSK:   - Decreased medial arch height noted with RCSP.  Valgus tilt of the calcaneus with patient's stance and RCSP.  Palpable talar head noted at the TN joint, bilaterally.  - Muscle strength 5/5 with dorsiflexion, plantarflexion, eversion, inversion of the feet. Compartments soft and compressible.  - Pain to palpation at the insertion of the navicular of the PT tendon as well as proximal to this area all the way up to the muscle belly of the PT tendon on the left side and to the level of the ankle on the right.  - Medial deviation of the first metatarsal with lateral deviation and valgus rotation of the hallux, left.  - Hallux is not track bound nor does it have pain through range of motion.  Hypermobile first ray appreciated.  Tenderness to the medial eminence of the first metatarsal head.      Again, thank you for allowing me to participate in the care of your patient.        Sincerely,        Chase Escalante DPM    Electronically signed

## 2025-07-21 NOTE — PROGRESS NOTES
FOOT AND ANKLE SURGERY/PODIATRY PROGRESS NOTE    ASSESSMENT:   - Posterior tibial tendon dysfunction, left  - Hallux abducto valgus, left    PLAN:  - Hallux abductovalgus, Left:  Discussed the patient's hallux abductovalgus deformity in detail.  Again we discussed RICE and NSAID use.  Again we discussed surgical intervention between Lapidus and first MPJ fusion.  Discussed the nonweightbearing time after Lapidus.  Discussed performing Lapidus and flatfoot reconstruction surgery at the same time but that she seems to be improving with conservative management and I would like to continue conservative management at this time if she is willing.  Patient is fine to continue conservative management at this time.     - Posterior tibial tendon dysfunction, left:  Patient to continue wearing custom orthotics at this time.  She is to wear her over-the-counter braces especially on uneven ground.  Physical therapy was ordered focused on posterior tibial tendon dysfunction.    - Patient's questions invited and answered. Patient to return to clinic in 6 week(s) for re-evaluation. Patient was encouraged to call my office with any further questions or concerns.     30 minutes spent on the day of encounter doing chart review, history and exam, documentation, and further activities as noted.     Anant Escalante DPM  [facility] Podiatry/Foot & Ankle Surgery      SUBJECTIVE: Adelaida Packer was seen in clinic today for continued evaluation the patient's posterior tibial tendon dysfunction and hallux abductovalgus on the left side.  Patient has over-the-counter bracing that she just picked up but has not tried them either side.  She does state that the inserts that she has are helping but she does feel that the left foot is lagging behind.  She does state that she has been exercising more and has been doing her step challenge as well as been utilizing the recumbent bike and this has been helping and strengthening and weight  loss.  She does have concerns as the right side is feeling better but the left side continues not to.  She is wondering when we move forward with surgical intervention.    Past Medical History:   Diagnosis Date    Allergic rhinitis due to other allergen     seasonal    Arthritis     Diabetes (H)     Localized osteoarthritis of both knees     Migraine, unspecified, without mention of intractable migraine without mention of status migrainosus     Monoclonal gammopathy     Morbid obesity (H)     Therapeutic drug monitoring 09/02/2022    Type 2 diabetes mellitus with stage 3a chronic kidney disease, without long-term current use of insulin (H) 08/09/2016    Unspecified essential hypertension     dx around age 31       Past Surgical History:   Procedure Laterality Date    ARTHROPLASTY KNEE Right 8/11/2021    Procedure: ARTHROPLASTY, KNEE, TOTAL RIGHT;  Surgeon: Dylan Hernandez MD;  Location: [facility]    ARTHROPLASTY KNEE Left 7/27/2022    Procedure: ARTHROPLASTY, LEFT  KNEE, TOTAL;  Surgeon: Dylan Hernandez MD;  [facility]    BUNIONECTOMY Right 7/6/2018    Procedure: BUNIONECTOMY;  Surgeon: Erik Bazan DPM;  Location: [facility];  Service:     EXCISE GANGLION WRIST Right 7/6/2018    Procedure: EXCISION OF THE GANGLION CYST, BUNIONECTOMU WITH FIRST METATARSAL  OSTEOTOMY WITH INTERNAL SCREW FIXATION, A SUBTALAR JIONT ARTHROERESIS OF THE RIGHT FOOT AND GASTROCNEMIUS RECESSION RIGHT LOWER EXTREMITY;  Surgeon: Erik Bazan DPM;  Location: [facility];  Service:     HC REMOVE TONSILS/ADENOIDS,11+ Y/O  1995    HEAD & NECK SURGERY  3/2013    Parathyroidectomy--had to go back in 6 days later for a possible bleed    HYSTERECTOMY      HYSTERECTOMY, VAGINAL  12/2005    hysterectomy- fibroids    KNEE SURGERY Right     ORTHOPEDIC SURGERY      PARATHYROIDECTOMY      TONSILLECTOMY      [facility] ANESTH,KNEE AREA SURGERY  01/2001    [facility] GASTROPLASTY,OBESITY,VERT BAND      removed 2009       Allergies    Allergen Reactions    Sulfa Antibiotics Shortness Of Breath and Rash    Doxycycline      Severe gastritis, nausea, vomiting-ended up in the ER    Hydrochlorothiazide-Triamterene Other (See Comments)     Renal insuff    Maxzide [Hydrochlorothiazide W/Triamterene] Other (See Comments)     Renal insuff    Metoprolol Other (See Comments)     Other reaction(s): Bradycardia  At high dose only  At high dose only    Seasonal Allergies      Hayfever, tree pollens         Current Outpatient Medications:     acetaminophen (TYLENOL) 325 MG tablet, Take 2 tablets (650 mg) by mouth every 4 hours as needed for other (mild pain), Disp: 100 tablet, Rfl: 0    aspirin (ASA) 81 MG EC tablet, Take 1 tablet (81 mg) by mouth daily, Disp: 90 tablet, Rfl: 3    atorvastatin (LIPITOR) 10 MG tablet, Take 1 tablet (10 mg) by mouth every evening., Disp: 90 tablet, Rfl: 3    blood glucose (NO BRAND SPECIFIED) lancets standard, Use to test blood sugar 1-2 times daily or as directed., Disp: 200 each, Rfl: 3    blood glucose monitoring (NO BRAND SPECIFIED) meter device kit, Use to test blood sugar 1-2 times daily or as directed., Disp: 1 kit, Rfl: 0    cetirizine (ZYRTEC) 10 MG tablet, Take 10 mg by mouth daily as needed for allergies, Disp: , Rfl:     empagliflozin (JARDIANCE) 25 MG TABS tablet, Take 1 tablet (25 mg) by mouth daily., Disp: 90 tablet, Rfl: 1    eplerenone (INSPRA) 50 MG tablet, Take 2 tablets (100 mg) by mouth 2 times daily., Disp: 360 tablet, Rfl: 3    famotidine (PEPCID) 20 MG tablet, Take 1 tablet (20 mg) by mouth 2 times daily., Disp: 180 tablet, Rfl: 3    fluconazole (DIFLUCAN) 150 MG tablet, Take 1 tablet (150 mg) by mouth every 72 hours., Disp: 2 tablet, Rfl: 0    Fluocinolone Acetonide Scalp (DERMA-SMOOTHE/FS SCALP) 0.01 % OIL oil, Apply nightly to the scalp for the 6 weeks, Disp: 60 mL, Rfl: 1    hydrOXYzine HCl (ATARAX) 25 MG tablet, Take 1-2 tablets (25-50 mg) by mouth nightly as needed for other (sleep)., Disp: 180  tablet, Rfl: 0    labetalol (NORMODYNE) 300 MG tablet, TAKE ONE TABLET BY MOUTH TWICE A DAY, Disp: 180 tablet, Rfl: 3    ondansetron (ZOFRAN ODT) 4 MG ODT tab, TAKE 1 TABLET BY MOUTH EVERY 8 HOURS AS NEEDED FOR NAUSEA, Disp: 30 tablet, Rfl: 1    polyethylene glycol (MIRALAX) 17 g packet, Take 17 g by mouth daily, Disp: 7 packet, Rfl: 0    SUMAtriptan (IMITREX) 25 MG tablet, Profile rx,TAKE 1 TO 2 TABLETS BY MOUTH AT ONSET OF HEADACHE FOR MIGRAINE. MAY REPEAT DOSE IN 2 HOURS. MAX OF 200MG OR 2 DOSES IN 24 HOURS, Disp: 18 tablet, Rfl: 1    tirzepatide (MOUNJARO) 15 MG/0.5ML SOAJ auto-injector pen, Inject 0.5 mLs (15 mg) subcutaneously every 7 days., Disp: 6 mL, Rfl: 3    blood glucose (NO BRAND SPECIFIED) lancets standard, Use to test blood sugar 2 times daily or as directed., Disp: 200 lancet, Rfl: 3    blood glucose (NO BRAND SPECIFIED) test strip, Use to test blood sugar 2 times daily or as directed., Disp: 200 strip, Rfl: 3    blood glucose (NO BRAND SPECIFIED) test strip, Use to test blood sugar 2-3 times daily or as directed., Disp: 200 strip, Rfl: 3    Family History   Problem Relation Age of Onset    Hypertension Mother     Gynecology Mother         hysterectomy    Gastrointestinal Disease Mother         bowel upstruction    Thyroid Disease Mother     Neurologic Disorder Mother     Osteoporosis Mother     Anesthesia Reaction Mother         migraines    Hypertension Father     Lipids Father     Arthritis Father     Heart Disease Father     Diabetes Sister     Cerebrovascular Disease Sister     Prostate Cancer Brother     Alcohol/Drug Brother     Circulatory Brother     Cancer Maternal Grandmother         tumor-head    Obesity Maternal Grandmother     Cancer Maternal Grandfather         bone    Eye Disorder Maternal Grandfather     Prostate Cancer Maternal Grandfather     Glaucoma Maternal Grandfather     Arthritis Paternal Grandmother     Respiratory Daughter         asthma    Macular Degeneration No family hx  of     Deep Vein Thrombosis (DVT) No family hx of        Social History     Socioeconomic History    Marital status: Single     Spouse name: Not on file    Number of children: 2    Years of education: Not on file    Highest education level: Not on file   Occupational History     Employer: [employer]     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 No   Social History Narrative    Lives in [place], [place]        Social Documentation:        Balanced Diet: YES    Calcium intake: 1200 calcium per day    Caffeine: none per day    Exercise:  type of activity walking;3-4 times per week    Sunscreen: No    Seatbelts:  Yes    Self Breast Exam:  Yes    Physical/Emotional/Sexual Abuse: No     Do you feel safe in your environment? Yes        Cholesterol screen up to date: Yes-2005    Eye Exam up to date: Yes    Dental Exam up to date: Yes    Pap smear up to date: Yes    Mammogram up to date: NO, but want a Mammogram    Dexa Scan up to date: Does Not Apply    Colonoscopy up to date: Does Not Apply    [facility]    Glucose screen if over 40:  NO        Aislinn Wilson MA     Social Drivers of Health     Financial Resource Strain: High Risk (8/6/2024)    Financial Resource Strain     Within the past 12 months, have you or your family members you live with been unable to get utilities (heat, electricity) when it was really needed?: Yes   Food Insecurity: Low Risk  (8/6/2024)    Food Insecurity     Within the past 12 months, did you worry that your food would run out before you got money to buy more?: No     Within the past 12  months, did the food you bought just not last and you didn t have money to get more?: No   Transportation Needs: Low Risk  (8/6/2024)    Transportation Needs     Within the past 12 months, has lack of transportation kept you from medical appointments, getting your medicines, non-medical meetings or appointments, work, or from getting things that you need?: No   Physical Activity: Insufficiently Active (8/6/2024)    Exercise Vital Sign     Days of Exercise per Week: 3 days     Minutes of Exercise per Session: 20 min   Stress: No Stress Concern Present (8/6/2024)    [employer] - Occupational Stress Questionnaire     Feeling of Stress : Only a little   Social Connections: Unknown (8/6/2024)    Social Connection and Isolation Panel [NHANES]     Frequency of Communication with Friends and Family: Not on file     Frequency of Social Gatherings with Friends and Family: Twice a week     Attends [employer] Services: Not on file     Active Member of Clubs or Organizations: Not on file     Attends Club or Organization Meetings: Not on file     Marital Status: Not on file   Interpersonal Safety: Low Risk  (3/6/2025)    Interpersonal Safety     Do you feel physically and emotionally safe where you currently live?: Yes     Within the past 12 months, have you been hit, slapped, kicked or otherwise physically hurt by someone?: No     Within the past 12 months, have you been humiliated or emotionally abused in other ways by your partner or ex-partner?: No   Housing Stability: Low Risk  (8/6/2024)    Housing Stability     Do you have housing? : Yes     Are you worried about losing your housing?: No       10 point Review of Systems is negative except otherwise noted in HPI.    OBJECTIVE:  Derm: Skin is warm, dry, intact. No open wounds, laceration or abrasions noted. Nails are well trimmed. No ecchymosis or erythema noted.      Vasc: Dorsalis pedis pulses, 2/4 bilateral. Posterior tibial pulses 2/4  bilateral. Cap fill time < 3 seconds to the digits. No edema is present. Hair growth present to the toes.      Neuro: Protective sensation intact via light touch to the feet. Gross sensation intact.      MSK:   - Decreased medial arch height noted with RCSP.  Valgus tilt of the calcaneus with patient's stance and RCSP.  Palpable talar head noted at the TN joint, bilaterally.  - Muscle strength 5/5 with dorsiflexion, plantarflexion, eversion, inversion of the feet. Compartments soft and compressible.  - Pain to palpation at the insertion of the navicular of the PT tendon as well as proximal to this area all the way up to the muscle belly of the PT tendon on the left side and to the level of the ankle on the right.  - Medial deviation of the first metatarsal with lateral deviation and valgus rotation of the hallux, left.  - Hallux is not track bound nor does it have pain through range of motion.  Hypermobile first ray appreciated.  Tenderness to the medial eminence of the first metatarsal head.

## 2025-07-22 ENCOUNTER — THERAPY VISIT (OUTPATIENT)
Dept: PHYSICAL THERAPY | Facility: CLINIC | Age: 55
End: 2025-07-22
Attending: FAMILY MEDICINE
Payer: COMMERCIAL

## 2025-07-22 ENCOUNTER — VIRTUAL VISIT (OUTPATIENT)
Dept: NEPHROLOGY | Facility: CLINIC | Age: 55
End: 2025-07-22
Payer: COMMERCIAL

## 2025-07-22 ENCOUNTER — OFFICE VISIT (OUTPATIENT)
Dept: FAMILY MEDICINE | Facility: CLINIC | Age: 55
End: 2025-07-22
Payer: COMMERCIAL

## 2025-07-22 VITALS
DIASTOLIC BLOOD PRESSURE: 77 MMHG | WEIGHT: 251.4 LBS | OXYGEN SATURATION: 98 % | BODY MASS INDEX: 46.26 KG/M2 | HEIGHT: 62 IN | SYSTOLIC BLOOD PRESSURE: 112 MMHG | HEART RATE: 68 BPM | TEMPERATURE: 97.4 F | RESPIRATION RATE: 18 BRPM

## 2025-07-22 VITALS — WEIGHT: 245 LBS | HEIGHT: 62 IN | BODY MASS INDEX: 45.08 KG/M2

## 2025-07-22 DIAGNOSIS — G89.29 CHRONIC PAIN OF LEFT ANKLE: ICD-10-CM

## 2025-07-22 DIAGNOSIS — E83.52 HYPERCALCEMIA: Primary | ICD-10-CM

## 2025-07-22 DIAGNOSIS — E11.69 TYPE 2 DIABETES MELLITUS WITH OTHER SPECIFIED COMPLICATION, UNSPECIFIED WHETHER LONG TERM INSULIN USE (H): ICD-10-CM

## 2025-07-22 DIAGNOSIS — R21 RASH: Primary | ICD-10-CM

## 2025-07-22 DIAGNOSIS — N18.31 STAGE 3A CHRONIC KIDNEY DISEASE (H): ICD-10-CM

## 2025-07-22 DIAGNOSIS — M54.41 CHRONIC RIGHT-SIDED LOW BACK PAIN WITH RIGHT-SIDED SCIATICA: Primary | ICD-10-CM

## 2025-07-22 DIAGNOSIS — G89.29 CHRONIC RIGHT-SIDED LOW BACK PAIN WITH RIGHT-SIDED SCIATICA: Primary | ICD-10-CM

## 2025-07-22 DIAGNOSIS — M25.572 CHRONIC PAIN OF LEFT ANKLE: ICD-10-CM

## 2025-07-22 DIAGNOSIS — E26.09 PRIMARY HYPERALDOSTERONISM: ICD-10-CM

## 2025-07-22 DIAGNOSIS — I10 HYPERTENSION GOAL BP (BLOOD PRESSURE) < 130/80: ICD-10-CM

## 2025-07-22 DIAGNOSIS — I12.9 HYPERTENSION, RENAL: ICD-10-CM

## 2025-07-22 PROCEDURE — 3074F SYST BP LT 130 MM HG: CPT | Performed by: STUDENT IN AN ORGANIZED HEALTH CARE EDUCATION/TRAINING PROGRAM

## 2025-07-22 PROCEDURE — 3078F DIAST BP <80 MM HG: CPT | Performed by: STUDENT IN AN ORGANIZED HEALTH CARE EDUCATION/TRAINING PROGRAM

## 2025-07-22 PROCEDURE — 1125F AMNT PAIN NOTED PAIN PRSNT: CPT | Mod: 95 | Performed by: INTERNAL MEDICINE

## 2025-07-22 PROCEDURE — 98002 SYNCH AUDIO-VIDEO NEW MOD 45: CPT | Performed by: INTERNAL MEDICINE

## 2025-07-22 PROCEDURE — 99214 OFFICE O/P EST MOD 30 MIN: CPT | Performed by: STUDENT IN AN ORGANIZED HEALTH CARE EDUCATION/TRAINING PROGRAM

## 2025-07-22 PROCEDURE — 97110 THERAPEUTIC EXERCISES: CPT | Mod: GP | Performed by: PHYSICAL THERAPIST

## 2025-07-22 PROCEDURE — G2211 COMPLEX E/M VISIT ADD ON: HCPCS | Performed by: STUDENT IN AN ORGANIZED HEALTH CARE EDUCATION/TRAINING PROGRAM

## 2025-07-22 PROCEDURE — 97140 MANUAL THERAPY 1/> REGIONS: CPT | Mod: GP | Performed by: PHYSICAL THERAPIST

## 2025-07-22 RX ORDER — KETOCONAZOLE 20 MG/G
CREAM TOPICAL 2 TIMES DAILY
Qty: 120 G | Refills: 0 | Status: SHIPPED | OUTPATIENT
Start: 2025-07-22

## 2025-07-22 RX ORDER — EPLERENONE 50 MG/1
50 TABLET ORAL 2 TIMES DAILY
COMMUNITY
Start: 2025-07-22

## 2025-07-22 RX ORDER — TRIAMCINOLONE ACETONIDE 1 MG/G
OINTMENT TOPICAL 2 TIMES DAILY PRN
Qty: 160 G | Refills: 0 | Status: SHIPPED | OUTPATIENT
Start: 2025-07-22

## 2025-07-22 ASSESSMENT — PAIN SCALES - GENERAL: PAINLEVEL_OUTOF10: MILD PAIN (2)

## 2025-07-22 NOTE — PATIENT INSTRUCTIONS
Trial lowering the eplerenone to 50 mg twice a day  Repeat labs in 2 weeks  Monitor blood pressure at home - please update if seeing readings greater than 130/80.   Follow-up in 6 months with labs prior.

## 2025-07-22 NOTE — PROGRESS NOTES
"  Assessment & Plan     (R21) Rash  (primary encounter diagnosis)  Comment: Acute, uncontrolled. The rash is likely a combination of yeast infection and ulcerations due to skin separation.  Plan: ketoconazole (NIZORAL) 2 % external cream,         triamcinolone (KENALOG) 0.1 % external ointment        Prescribe Ketoconazole cream to be applied twice daily for two weeks. Prescribe triamcinolone cream for use on particularly irritated areas. Advise keeping the area clean, dry, and using a towel or paper towel to absorb moisture. Monitor the rash, and if it worsens after a week, consider antibiotics to prevent a full-blown skin infection.       I am utilizing AI dictation through [employer] to document the patient's history and physical examination. Please note that while the AI is designed to assist in capturing detailed information, there may be errors in the dictation. I will review and edit the content for accuracy before finalizing.      The longitudinal plan of care for the diagnosis(es)/condition(s) as documented were addressed during this visit. Due to the added complexity in care, I will continue to support Adleaida in the subsequent management and with ongoing continuity of care.                Subjective   Adelaida is a 55 year old, presenting for the following health issues:  Derm Problem    History of Present Illness       Reason for visit:  Rash  Symptoms include:  Itching  Symptom intensity:  Moderate  Symptom progression:  Staying the same  Had these symptoms before:  No  What makes it worse:  No  What makes it better:  No   She is taking medications regularly.      History of Present Illness    Adelaida Packer, 55 years    Intertriginous Rash and Skin Irritation  Developed itching and burning under the abdominal fold and on the legs after using a different deodorant (\"Dove\") for a couple of days, instead of the usual brand (\"Lume\"), due to not having unpacked luggage. The symptoms began as itching, then " "progressed to burning, described as \"started a wildfire.\" Noted that the irritation worsened over a few days prior to the visit. At the time of presentation, some areas were still itching, particularly in the front part of the affected area. Reported that one area felt like a pimple. No mention of fever, drainage, or systemic symptoms. No prior similar episodes reported. Expressed concern about managing the condition while traveling to [place], [place], with departure planned for Thursday. No other symptoms or concerns reported.                  ROS: 10 point ROS neg other than the symptoms noted above in the HPI.        Objective    /77   Pulse 68   Temp 97.4  F (36.3  C) (Temporal)   Resp 18   Ht 1.58 m (5' 2.21\")   Wt 114 kg (251 lb 6.4 oz)   LMP 08/13/2005   SpO2 98%   BMI 45.68 kg/m    Body mass index is 45.68 kg/m .  Physical Exam   GENERAL: healthy, alert and no distress  EYES: Eyes grossly normal to inspection, PERRL and conjunctivae and sclerae normal  Neck: No visible JVD or lymphadenopathy   RESP: symmetrical rise in chest   CV: No peripheral edema notable   MS: no gross musculoskeletal defects noted  SKIN: **see media tab  PSYCH: mentation appears normal, affect normal/bright      **            Signed Electronically by: GRACIA RIVERA MD    "

## 2025-07-22 NOTE — NURSING NOTE
Current patient location: [address] [place] [address] [place] [id number]    Is the patient currently in the state of [place]? YES    Visit mode: VIDEO    If the visit is dropped, the patient can be reconnected by:VIDEO VISIT: Send to e-mail at: [email or web address]    Will anyone else be joining the visit? NO  (If patient encounters technical issues they should call [phone]    Are changes needed to the allergy or medication list? No    Are refills needed on medications prescribed by this physician? NO    Rooming Documentation:  Not applicable    Reason for visit: [place]

## 2025-07-22 NOTE — PROGRESS NOTES
"Virtual Visit Details    Type of service:  Video Visit     Originating Location (pt. Location): Home    Distant Location (provider location):  Off-site  Platform used for Video Visit: [employer]    [id number] AM video visit via Un[employer] - offsite      Nephrology Visit  07/22/25    History of present illness  - 54 yo female with hx of CKD, primary hyperparathyroidism, primary hyperaldosteronism presents for follow-up.   - Some difficulty with orthopedic issues including sciatica.   - Last blood pressure readings: June 13, 2025 (home) 119/76 mmHg; June 17, 2025 (clinic) 124/84 mmHg on second check  - History of parathyroidectomy (one gland removed) - noted now to have hypercalcemia again.   - No current use of vitamin D supplements until recently; started taking vitamin D3, dose not specified, obtained from [facility]. History of low vitamin D levels in the past  - No regular use of Tums or Rolaids; occasional use of Pepto Bismol tablets  - Stable weight at 245 lbs  - No recent increase in blood pressure after eplerenone dose reduction to 100 mg AM and 50 mg PM.   - No significant stress at work currently  - Diagnosis of monoclonal gammopathy of undetermined significance, under hematology follow-up, no current concerning changes reported  - Mild ankle swelling related to tendon issue, improved with physical therapy, no surgery performed.  No significant leg swelling otherwise  - No use of ibuprofen, Aleve, or Motrin for pain; uses Tylenol, sumatriptan for migraines, CBD cream, and Salonpas (lidocaine) for pain management    Physical exam  Height 1.575 m (5' 2\"), weight 111.1 kg (245 lb), last menstrual period 08/13/2005, not currently breastfeeding.   Gen - alert and oriented, NAD    Results  No visits with results within 1 Day(s) from this visit.   Latest known visit with results is:   Lab on 07/15/2025   Component Date Value Ref Range Status    25 OH Vitamin D2 07/15/2025 <5  ug/L Final    25 OH Vitamin D3 07/15/2025 29  ug/L " Final    25 OH Vit D Total 07/15/2025 <34  20 - 75 ug/L Final    Season, race, dietary intake, and treatment affect the concentration of 25-hydroxy-Vitamin D. Values may decrease during winter months and increase during summer months. Values 20-29 ug/L may indicate Vitamin D insufficiency and values <20 ug/L may indicate Vitamin D deficiency.    Hemoglobin 07/15/2025 13.6  11.7 - 15.7 g/dL Final    MCV 07/15/2025 89  78 - 100 fL Final    Parathyroid Hormone Intact 07/15/2025 53  15 - 65 pg/mL Final    Total Protein Urine mg/dL 07/15/2025 6.8    mg/dL Final    The reference ranges have not been established in urine protein. The results should be integrated into the clinical context for interpretation.    Total Protein Urine mg/mg Creat 07/15/2025 0.05  0.00 - 0.20 mg/mg Cr Final    Unable to calculate, urine creatinine or protein is outside the detectable limits.    Creatinine Urine mg/dL 07/15/2025 125.0  mg/dL Final    The reference ranges have not been established in urine creatinine. The results should be integrated into the clinical context for interpretation.    Sodium 07/15/2025 137  135 - 145 mmol/L Final    Potassium 07/15/2025 4.6  3.4 - 5.3 mmol/L Final    Chloride 07/15/2025 102  98 - 107 mmol/L Final    Carbon Dioxide (CO2) 07/15/2025 25  22 - 29 mmol/L Final    Anion Gap 07/15/2025 10  7 - 15 mmol/L Final    Glucose 07/15/2025 104 (H)  70 - 99 mg/dL Final    Urea Nitrogen 07/15/2025 26.7 (H)  6.0 - 20.0 mg/dL Final    Creatinine 07/15/2025 1.30 (H)  0.51 - 0.95 mg/dL Final    GFR Estimate 07/15/2025 48 (L)  >60 mL/min/1.73m2 Final    eGFR calculated using 2021 CKD-EPI equation.    Calcium 07/15/2025 10.8 (H)  8.8 - 10.4 mg/dL Final    Albumin 07/15/2025 4.4  3.5 - 5.2 g/dL Final    Phosphorus 07/15/2025 4.5  2.5 - 4.5 mg/dL Final        Assessment & Plan  CKD Stage 3a: likely some chronic kidney changes in the setting of longstanding hypertension and hx of hypercalcemia. At risk for diabetic nephropathy  but no proteinuria which is reassuring. She is on mounjaro and SGLT2 inhibitor.    Sciatica:  - Sciatica present, managed with Tylenol.. Avoidance of ibuprofen, Aleve, and Motrin recommended.    Hypertension:  - Blood pressure readings are stable with current medication regimen.  - Given hypercalcemia and the potential that eplerenone could be contributing, recommend a trial of adjusting eplerenone to 50 mg twice a day. Monitor blood pressure.  If readings consistently above 130/80, please send update.     Hypercalcemia:  - High calcium levels noted, PTH is higher than it should be in the setting of hypercalcemia although still in normal range.   - Trial of lowering eplerenone to see if it affects calcium levels. Repeat labs in two weeks.    Monoclonal gammopathy of undetermined significance:  - following with Dr. Dennis in hematology.     Diabetes: hemoglobin A1c 5.4% in June.     Obesity: BMI 44 - has had success with weight loss with GLP1 agonist     Patient Instructions   Trial lowering the eplerenone to 50 mg twice a day  Repeat labs in 2 weeks  Monitor blood pressure at home - please update if seeing readings greater than 130/80.   Follow-up in 6 months with labs prior.    Dia Aleman,

## 2025-07-23 ENCOUNTER — TELEPHONE (OUTPATIENT)
Dept: NEPHROLOGY | Facility: CLINIC | Age: 55
End: 2025-07-23

## 2025-07-23 NOTE — TELEPHONE ENCOUNTER
7/23/2025 9:36AM  Left Voicemail (1st Attempt) and Sent Mychart (1st Attempt) for the patient to call back and schedule the following:    Appointment type: Labs  Provider: Per Dr. Aleman  Return date: 2 weeks (~8/5/2025)  Specialty phone number: [phone]  Additional appointment(s) needed: Return Nephrology w/ Dr. Aleman w/ labs prior in about 6 months (around 1/22/2026)  Additonal Notes: 7/23 LVM and MYC for pt to schedule labs in 2 weeks and Return Nephrology w/ Dr. Aleman w/ labs prior in about 6 months (around 1/22/2026). [facility]   Rheumatology, Gastroenterology, Nephrology, Infectious Disease, Pulmonology  [facility] [place] [place]

## 2025-07-30 ENCOUNTER — PATIENT OUTREACH (OUTPATIENT)
Dept: CARE COORDINATION | Facility: CLINIC | Age: 55
End: 2025-07-30
Payer: COMMERCIAL

## 2025-07-30 ASSESSMENT — ACTIVITIES OF DAILY LIVING (ADL)
LEFS_RAW_SCORE: 0
TWISTING/PIVOTING_ON_INVOLVED_LEG: SLIGHT DIFFICULTY
CUTTING/LATERAL_MOVEMENTS: SLIGHT DIFFICULTY
ABILITY_TO_PERFORM_ACTIVITY_WITH_YOUR_NORMAL_TECHNIQUE: SLIGHT DIFFICULTY
SITTING_FOR_15_MINUTES: SLIGHT DIFFICULTY
ANY_OF_YOUR_USUAL_WORK,_HOUSEWORK_OR_SCHOOL_ACTIVITIES: A LITTLE BIT OF DIFFICULTY
LEFS_SCORE(%): 0
RECREATIONAL ACTIVITIES: SLIGHT DIFFICULTY
GETTING INTO AND OUT OF AN AVERAGE CAR: SLIGHT DIFFICULTY
WALKING_UP_STEEP_HILLS: MODERATE DIFFICULTY
HOS_ADL_HIGHEST_POTENTIAL_SCORE: 64
YOUR_USUAL_HOBBIES,_RECREATIONAL_OR_SPORTING_ACTIVITIES: A LITTLE BIT OF DIFFICULTY
GOING DOWN 1 FLIGHT OF STAIRS: MODERATE DIFFICULTY
RECREATIONAL_ACTIVITIES: SLIGHT DIFFICULTY
ROLLING_OVER_IN_BED: MODERATE DIFFICULTY
HEAVY_WORK: SLIGHT DIFFICULTY
GETTING_INTO_AND_OUT_OF_AN_AVERAGE_CAR: SLIGHT DIFFICULTY
HOW_WOULD_YOU_RATE_YOUR_CURRENT_LEVEL_OF_FUNCTION_DURING_YOUR_SPORTS_RELATED_ACTIVITIES_FROM_0_TO_100_WITH_100_BEING_YOUR_LEVEL_OF_FUNCTION_PRIOR_TO_YOUR_HIP_PROBLEM_AND_0_BEING_THE_INABILITY_TO_PERFORM_ANY_OF_YOUR_USUAL_DAILY_ACTIVITIES?: 75
WALKING_UP_STEEP_HILLS: MODERATE DIFFICULTY
DEEP_SQUATTING: SLIGHT DIFFICULTY
JUMPING: UNABLE TO DO
SPORTS_TOTAL_ITEM_SCORE: 0
ADL_SCORE(%): 0
STEPPING UP AND DOWN CURBS: SLIGHT DIFFICULTY
WALKING_APPROXIMATELY_10_MINUTES: MODERATE DIFFICULTY
STANDING_FOR_15_MINUTES: SLIGHT DIFFICULTY
STEPPING_UP_AND_DOWN_CURBS: SLIGHT DIFFICULTY
SITTING FOR 15 MINUTES: SLIGHT DIFFICULTY
WALKING_INITIALLY: SLIGHT DIFFICULTY
ADL_COUNT: 17
STANDING_FOR_1_HOUR: QUITE A BIT OF DIFFICULTY
WALKING_BETWEEN_ROOMS: NO DIFFICULTY
WALKING_DOWN_STEEP_HILLS: MODERATE DIFFICULTY
PUTTING_ON_SOCKS_AND_SHOES: SLIGHT DIFFICULTY
HOS_ADL_ITEM_SCORE_TOTAL: 43
GETTING_INTO_OR_OUT_OF_A_CAR: A LITTLE BIT OF DIFFICULTY
STARTING_AND_STOPPING_QUICKLY: SLIGHT DIFFICULTY
WALKING_2_BLOCKS: A LITTLE BIT OF DIFFICULTY
LIGHT_TO_MODERATE_WORK: SLIGHT DIFFICULTY
ABILITY_TO_PARTICIPATE_IN_YOUR_DESIRED_SPORT_AS_LONG_AS_YOU_WOULD_LIKE: MODERATE DIFFICULTY
SQUATTING: A LITTLE BIT OF DIFFICULTY
RUNNING_ON_UNEVEN_GROUND: EXTREME DIFFICULTY OR UNABLE TO PERFORM ACTIVITY
GOING_UP_OR_DOWN_10_STAIRS: A LITTLE BIT OF DIFFICULTY
LIGHT_TO_MODERATE_WORK: SLIGHT DIFFICULTY
WALKING_DOWN_STEEP_HILLS: MODERATE DIFFICULTY
ADL_HIGHEST_POTENTIAL_SCORE: 68
SPORTS_HIGHEST_POTENTIAL_SCORE: 36
GOING_UP_1_FLIGHT_OF_STAIRS: SLIGHT DIFFICULTY
WALKING_15_MINUTES_OR_GREATER: MODERATE DIFFICULTY
ROLLING_OVER_IN_BED: SLIGHT DIFFICULTY
RUNNING_ON_EVEN_GROUND: QUITE A BIT OF DIFFICULTY
LIFTING_AN_OBJECT,_LIKE_A_BAG_OF_GROCERIES_FROM_THE_FLOOR: A LITTLE BIT OF DIFFICULTY
WALKING_15_MINUTES_OR_GREATER: MODERATE DIFFICULTY
WALKING_A_MILE: MODERATE DIFFICULTY
SPORTS_COUNT: 9
STANDING FOR 15 MINUTES: SLIGHT DIFFICULTY
DEEP SQUATTING: SLIGHT DIFFICULTY
GETTING_INTO_AND_OUT_OF_A_BATH: MODERATE DIFFICULTY
LOW_IMPACT_ACTIVITIES_LIKE_FAST_WALKING: SLIGHT DIFFICULTY
WALKING_INITIALLY: SLIGHT DIFFICULTY
RUNNING_ONE_MILE: UNABLE TO DO
SPORTS_SCORE(%): 0
WALKING_FOR_APPROXIMATELY_10_MINUTES: MODERATE DIFFICULTY
LANDING: MODERATE DIFFICULTY
HOS_ADL_SCORE(%): 67.19
ROLLING OVER IN BED: SLIGHT DIFFICULTY
MAKING_SHARP_TURNS_WHILE_RUNNING_FAST: EXTREME DIFFICULTY OR UNABLE TO PERFORM ACTIVITY
PERFORMING_HEAVY_ACTIVITIES_AROUND_YOUR_HOME: MODERATE DIFFICULTY
SITTING_FOR_1_HOUR: MODERATE DIFFICULTY
PLEASE_INDICATE_YOR_PRIMARY_REASON_FOR_REFERRAL_TO_THERAPY:: FOOT AND/OR ANKLE
PLEASE_INDICATE_YOR_PRIMARY_REASON_FOR_REFERRAL_TO_THERAPY:: HIP
HOW_WOULD_YOU_RATE_YOUR_CURRENT_LEVEL_OF_FUNCTION_DURING_YOUR_USUAL_ACTIVITIES_OF_DAILY_LIVING_FROM_0_TO_100_WITH_100_BEING_YOUR_LEVEL_OF_FUNCTION_PRIOR_TO_YOUR_HIP_PROBLEM_AND_0_BEING_THE_INABILITY_TO_PERFORM_ANY_OF_YOUR_USUAL_DAILY_ACTIVITIES?: 75
SHOPPING: EXTREME DIFFICULTY OR UNABLE TO PERFORM ACTIVITY
GOING_DOWN_1_FLIGHT_OF_STAIRS: MODERATE DIFFICULTY
HOW_WOULD_YOU_RATE_YOUR_CURRENT_LEVEL_OF_FUNCTION_DURING_YOUR_USUAL_ACTIVITIES_OF_DAILY_LIVING_FROM_0_TO_100_WITH_100_BEING_YOUR_LEVEL_OF_FUNCTION_PRIOR_TO_YOUR_HIP_PROBLEM_AND_0_BEING_THE_INABILITY_TO_PERFORM_ANY_OF_YOUR_USUAL_DAILY_ACTIVITIES?: 75
PERFORMING_LIGHT_ACTIVITIES_AROUND_YOUR_HOME: A LITTLE BIT OF DIFFICULTY
PUTTING_ON_YOUR_SHOES_OR_SOCKS: A LITTLE BIT OF DIFFICULTY
GOING UP 1 FLIGHT OF STAIRS: SLIGHT DIFFICULTY
PUTTING ON SOCKS AND SHOES: SLIGHT DIFFICULTY
ADL_TOTAL_ITEM_SCORE: 0
HOW_WOULD_YOU_RATE_YOUR_CURRENT_LEVEL_OF_FUNCTION?: NEARLY NORMAL
HEAVY_WORK: SLIGHT DIFFICULTY
TWISTING/PIVOTING ON INVOLVED LEG: SLIGHT DIFFICULTY

## 2025-07-31 ENCOUNTER — THERAPY VISIT (OUTPATIENT)
Dept: PHYSICAL THERAPY | Facility: CLINIC | Age: 55
End: 2025-07-31
Attending: STUDENT IN AN ORGANIZED HEALTH CARE EDUCATION/TRAINING PROGRAM
Payer: COMMERCIAL

## 2025-07-31 DIAGNOSIS — M76.822 POSTERIOR TIBIAL TENDON DYSFUNCTION (PTTD) OF BOTH LOWER EXTREMITIES: ICD-10-CM

## 2025-07-31 DIAGNOSIS — M76.821 POSTERIOR TIBIAL TENDON DYSFUNCTION (PTTD) OF BOTH LOWER EXTREMITIES: ICD-10-CM

## 2025-07-31 DIAGNOSIS — G89.29 CHRONIC PAIN OF LEFT ANKLE: ICD-10-CM

## 2025-07-31 DIAGNOSIS — M25.572 CHRONIC PAIN OF LEFT ANKLE: ICD-10-CM

## 2025-08-04 ENCOUNTER — LAB (OUTPATIENT)
Dept: LAB | Facility: CLINIC | Age: 55
End: 2025-08-04
Payer: COMMERCIAL

## 2025-08-04 ENCOUNTER — THERAPY VISIT (OUTPATIENT)
Dept: PHYSICAL THERAPY | Facility: CLINIC | Age: 55
End: 2025-08-04
Payer: COMMERCIAL

## 2025-08-04 ENCOUNTER — ANCILLARY PROCEDURE (OUTPATIENT)
Dept: MAMMOGRAPHY | Facility: CLINIC | Age: 55
End: 2025-08-04
Attending: FAMILY MEDICINE
Payer: COMMERCIAL

## 2025-08-04 DIAGNOSIS — G89.29 CHRONIC PAIN OF LEFT ANKLE: Primary | ICD-10-CM

## 2025-08-04 DIAGNOSIS — M25.572 CHRONIC PAIN OF LEFT ANKLE: Primary | ICD-10-CM

## 2025-08-04 DIAGNOSIS — Z12.31 VISIT FOR SCREENING MAMMOGRAM: ICD-10-CM

## 2025-08-04 DIAGNOSIS — E26.09 PRIMARY HYPERALDOSTERONISM: ICD-10-CM

## 2025-08-04 LAB
ALBUMIN SERPL BCG-MCNC: 4.3 G/DL (ref 3.5–5.2)
ANION GAP SERPL CALCULATED.3IONS-SCNC: 10 MMOL/L (ref 7–15)
BUN SERPL-MCNC: 24.7 MG/DL (ref 6–20)
CALCIUM SERPL-MCNC: 10.2 MG/DL (ref 8.8–10.4)
CHLORIDE SERPL-SCNC: 106 MMOL/L (ref 98–107)
CREAT SERPL-MCNC: 1.24 MG/DL (ref 0.51–0.95)
EGFRCR SERPLBLD CKD-EPI 2021: 51 ML/MIN/1.73M2
GLUCOSE SERPL-MCNC: 99 MG/DL (ref 70–99)
HCO3 SERPL-SCNC: 24 MMOL/L (ref 22–29)
PHOSPHATE SERPL-MCNC: 3.5 MG/DL (ref 2.5–4.5)
POTASSIUM SERPL-SCNC: 4.5 MMOL/L (ref 3.4–5.3)
SODIUM SERPL-SCNC: 140 MMOL/L (ref 135–145)

## 2025-08-04 PROCEDURE — 97110 THERAPEUTIC EXERCISES: CPT | Mod: GP | Performed by: PHYSICAL THERAPIST

## 2025-08-04 PROCEDURE — 77063 BREAST TOMOSYNTHESIS BI: CPT | Mod: GC | Performed by: STUDENT IN AN ORGANIZED HEALTH CARE EDUCATION/TRAINING PROGRAM

## 2025-08-04 PROCEDURE — 77067 SCR MAMMO BI INCL CAD: CPT | Mod: GC | Performed by: STUDENT IN AN ORGANIZED HEALTH CARE EDUCATION/TRAINING PROGRAM

## 2025-08-04 PROCEDURE — 80069 RENAL FUNCTION PANEL: CPT

## 2025-08-04 PROCEDURE — 36415 COLL VENOUS BLD VENIPUNCTURE: CPT

## 2025-08-04 PROCEDURE — 97140 MANUAL THERAPY 1/> REGIONS: CPT | Mod: GP | Performed by: PHYSICAL THERAPIST

## 2025-08-07 DIAGNOSIS — F51.02 ADJUSTMENT INSOMNIA: ICD-10-CM

## 2025-08-07 RX ORDER — HYDROXYZINE HYDROCHLORIDE 25 MG/1
TABLET, FILM COATED ORAL
Qty: 180 TABLET | Refills: 1 | Status: SHIPPED | OUTPATIENT
Start: 2025-08-07

## 2025-08-12 ENCOUNTER — THERAPY VISIT (OUTPATIENT)
Dept: PHYSICAL THERAPY | Facility: CLINIC | Age: 55
End: 2025-08-12
Payer: COMMERCIAL

## 2025-08-12 DIAGNOSIS — M25.572 CHRONIC PAIN OF LEFT ANKLE: Primary | ICD-10-CM

## 2025-08-12 DIAGNOSIS — G89.29 CHRONIC PAIN OF LEFT ANKLE: Primary | ICD-10-CM

## 2025-08-12 PROCEDURE — 97110 THERAPEUTIC EXERCISES: CPT | Mod: GP | Performed by: PHYSICAL THERAPIST

## 2025-08-12 PROCEDURE — 97140 MANUAL THERAPY 1/> REGIONS: CPT | Mod: GP | Performed by: PHYSICAL THERAPIST

## (undated) DEVICE — DRSG TEGADERM 4X10" 1627

## (undated) DEVICE — SU VICRYL 0 CT-1 36" J946H

## (undated) DEVICE — HOOD FLYTE W/PEELAWAY 408-800-100

## (undated) DEVICE — GLOVE PROTEXIS W/NEU-THERA 8.0  2D73TE80

## (undated) DEVICE — PREP CHLORAPREP 26ML TINTED HI-LITE ORANGE 930815

## (undated) DEVICE — BONE CLEANING TIP INTERPULSE  0210-010-000

## (undated) DEVICE — DRSG TEGADERM ALGINATE AG 4X5" 90303

## (undated) DEVICE — DRSG STERI STRIP 1/2X4" R1547

## (undated) DEVICE — ADH SKIN CLOSURE PREMIERPRO EXOFIN 1.0ML 3470

## (undated) DEVICE — BLADE KNIFE SURG 20 371120

## (undated) DEVICE — LINEN TOWEL PACK X5 5464

## (undated) DEVICE — SUCTION IRR SYSTEM W/O TIP INTERPULSE HANDPIECE 0210-100-000

## (undated) DEVICE — GOWN IMPERVIOUS SPECIALTY XLG/XLONG 32474

## (undated) DEVICE — BONE CEMENT MIXEVAC HI VAC W/CARTRIDGE 0306-563-000

## (undated) DEVICE — BASIN SET MAJOR

## (undated) DEVICE — SU MONOCRYL 3-0 PS-1 27" Y936H

## (undated) DEVICE — SOL NACL 0.9% IRRIG 1000ML BOTTLE 2F7124

## (undated) DEVICE — TUBING SMOKE EVAC SUCTION SLEEVE 0703-005-065

## (undated) DEVICE — BLADE SAW SAGITTAL STRK 18X90X1.27MM HD SYS 6 6118-127-090

## (undated) DEVICE — SU VICRYL 0 CT-1 27" J340H

## (undated) DEVICE — ESU GROUND PAD ADULT W/CORD E7507

## (undated) DEVICE — STRAP KNEE/BODY 31143004

## (undated) DEVICE — BLADE CLIPPER SGL USE 9680

## (undated) DEVICE — DECANTER VIAL 2006S

## (undated) DEVICE — GLOVE PROTEXIS POWDER FREE 7.5 ORTHOPEDIC 2D73ET75

## (undated) DEVICE — SUCTION MANIFOLD NEPTUNE 2 SYS 4 PORT 0702-020-000

## (undated) DEVICE — ESU PENCIL W/SMOKE EVAC NEPTUNE STRYKER 0703-046-000

## (undated) DEVICE — GLOVE PROTEXIS BLUE W/NEU-THERA 8.0  2D73EB80

## (undated) DEVICE — LINEN ORTHO PACK 5446

## (undated) DEVICE — GOWN XLG DISP 9545

## (undated) DEVICE — SOL NACL 0.9% IRRIG 3000ML BAG 2B7477

## (undated) DEVICE — Device

## (undated) DEVICE — SPONGE LAP 18X18" X8435

## (undated) DEVICE — SOL WATER IRRIG 1000ML BOTTLE 2F7114

## (undated) DEVICE — SU VICRYL 2-0 CT-1 27" UND J259H

## (undated) DEVICE — GLOVE PROTEXIS W/NEU-THERA 6.0  2D73TE60

## (undated) DEVICE — TOURNIQUET CUFF 34" REPRO BROWN 60-7070-106

## (undated) DEVICE — TOURNIQUET CUFF 42" REPRO MAROON 60-7070-107

## (undated) DEVICE — DRSG AQUACEL AG 3.5X13.75" HYDROFIBER 412012

## (undated) DEVICE — GLOVE PROTEXIS BLUE W/NEU-THERA 6.5  2D73EB65

## (undated) RX ORDER — CELECOXIB 200 MG/1
CAPSULE ORAL
Status: DISPENSED
Start: 2021-08-11

## (undated) RX ORDER — CEFAZOLIN SODIUM/WATER 2 G/20 ML
SYRINGE (ML) INTRAVENOUS
Status: DISPENSED
Start: 2022-07-27

## (undated) RX ORDER — TRANEXAMIC ACID 650 MG/1
TABLET ORAL
Status: DISPENSED
Start: 2022-06-06

## (undated) RX ORDER — OXYCODONE HYDROCHLORIDE 5 MG/1
TABLET ORAL
Status: DISPENSED
Start: 2022-07-27

## (undated) RX ORDER — ACETAMINOPHEN 325 MG/1
TABLET ORAL
Status: DISPENSED
Start: 2021-08-11

## (undated) RX ORDER — FENTANYL CITRATE 50 UG/ML
INJECTION, SOLUTION INTRAMUSCULAR; INTRAVENOUS
Status: DISPENSED
Start: 2021-08-11

## (undated) RX ORDER — KETOROLAC TROMETHAMINE 15 MG/ML
INJECTION, SOLUTION INTRAMUSCULAR; INTRAVENOUS
Status: DISPENSED
Start: 2022-07-27

## (undated) RX ORDER — HYDROXYZINE HYDROCHLORIDE 25 MG/1
TABLET, FILM COATED ORAL
Status: DISPENSED
Start: 2022-07-27

## (undated) RX ORDER — FENTANYL CITRATE 50 UG/ML
INJECTION, SOLUTION INTRAMUSCULAR; INTRAVENOUS
Status: DISPENSED
Start: 2022-07-27

## (undated) RX ORDER — TRANEXAMIC ACID 650 MG/1
TABLET ORAL
Status: DISPENSED
Start: 2022-07-27

## (undated) RX ORDER — FENTANYL CITRATE 50 UG/ML
INJECTION, SOLUTION INTRAMUSCULAR; INTRAVENOUS
Status: DISPENSED
Start: 2022-06-06

## (undated) RX ORDER — ACETAMINOPHEN 325 MG/1
TABLET ORAL
Status: DISPENSED
Start: 2022-07-27

## (undated) RX ORDER — TRANEXAMIC ACID 650 MG/1
TABLET ORAL
Status: DISPENSED
Start: 2021-08-11

## (undated) RX ORDER — PROPOFOL 10 MG/ML
INJECTION, EMULSION INTRAVENOUS
Status: DISPENSED
Start: 2022-07-27

## (undated) RX ORDER — ACETAMINOPHEN 325 MG/1
TABLET ORAL
Status: DISPENSED
Start: 2022-06-06

## (undated) RX ORDER — CEFAZOLIN SODIUM 2 G/100ML
INJECTION, SOLUTION INTRAVENOUS
Status: DISPENSED
Start: 2021-08-11

## (undated) RX ORDER — CEFAZOLIN SODIUM/WATER 2 G/20 ML
SYRINGE (ML) INTRAVENOUS
Status: DISPENSED
Start: 2022-06-06